# Patient Record
Sex: MALE | Race: WHITE | NOT HISPANIC OR LATINO | Employment: OTHER | URBAN - METROPOLITAN AREA
[De-identification: names, ages, dates, MRNs, and addresses within clinical notes are randomized per-mention and may not be internally consistent; named-entity substitution may affect disease eponyms.]

---

## 2017-03-24 DIAGNOSIS — E11.9 TYPE 2 DIABETES MELLITUS WITHOUT COMPLICATION: ICD-10-CM

## 2017-07-18 ENCOUNTER — PATIENT OUTREACH (OUTPATIENT)
Dept: ADMINISTRATIVE | Facility: HOSPITAL | Age: 77
End: 2017-07-18

## 2017-07-18 NOTE — PROGRESS NOTES
Pt returned my call. Pt did schedule labs and stated he knew nothing of a Diabetic Eye Exam that needed to be done yearly. Pt was asked to discuss with Dr Cannon at next visit.

## 2017-07-18 NOTE — PROGRESS NOTES
Attempting to contact pt to schedule the following over due HM:  HA1c  Microalbumin  DM Foot Exam  DM Eye Exam  Unable to reach patient at this time. Left voicemail. 2nd Attempt.

## 2017-07-25 ENCOUNTER — LAB VISIT (OUTPATIENT)
Dept: LAB | Facility: HOSPITAL | Age: 77
End: 2017-07-25
Attending: FAMILY MEDICINE
Payer: MEDICARE

## 2017-07-25 DIAGNOSIS — E78.2 MIXED HYPERLIPIDEMIA: ICD-10-CM

## 2017-07-25 DIAGNOSIS — E11.9 CONTROLLED TYPE 2 DIABETES MELLITUS WITHOUT COMPLICATION, WITHOUT LONG-TERM CURRENT USE OF INSULIN: ICD-10-CM

## 2017-07-25 PROCEDURE — 83036 HEMOGLOBIN GLYCOSYLATED A1C: CPT

## 2017-07-25 PROCEDURE — 80061 LIPID PANEL: CPT

## 2017-07-25 PROCEDURE — 80053 COMPREHEN METABOLIC PANEL: CPT

## 2017-07-25 PROCEDURE — 36415 COLL VENOUS BLD VENIPUNCTURE: CPT | Mod: PO

## 2017-07-26 LAB
ALBUMIN SERPL BCP-MCNC: 3.6 G/DL
ALBUMIN SERPL BCP-MCNC: 3.6 G/DL
ALP SERPL-CCNC: 68 U/L
ALP SERPL-CCNC: 68 U/L
ALT SERPL W/O P-5'-P-CCNC: 32 U/L
ALT SERPL W/O P-5'-P-CCNC: 32 U/L
ANION GAP SERPL CALC-SCNC: 11 MMOL/L
ANION GAP SERPL CALC-SCNC: 11 MMOL/L
AST SERPL-CCNC: 32 U/L
AST SERPL-CCNC: 32 U/L
BILIRUB SERPL-MCNC: 0.5 MG/DL
BILIRUB SERPL-MCNC: 0.5 MG/DL
BUN SERPL-MCNC: 17 MG/DL
BUN SERPL-MCNC: 17 MG/DL
CALCIUM SERPL-MCNC: 9.5 MG/DL
CALCIUM SERPL-MCNC: 9.5 MG/DL
CHLORIDE SERPL-SCNC: 103 MMOL/L
CHLORIDE SERPL-SCNC: 103 MMOL/L
CHOLEST/HDLC SERPL: 7.7 {RATIO}
CHOLEST/HDLC SERPL: 7.7 {RATIO}
CO2 SERPL-SCNC: 25 MMOL/L
CO2 SERPL-SCNC: 25 MMOL/L
CREAT SERPL-MCNC: 1.1 MG/DL
CREAT SERPL-MCNC: 1.1 MG/DL
EST. GFR  (AFRICAN AMERICAN): >60 ML/MIN/1.73 M^2
EST. GFR  (AFRICAN AMERICAN): >60 ML/MIN/1.73 M^2
EST. GFR  (NON AFRICAN AMERICAN): >60 ML/MIN/1.73 M^2
EST. GFR  (NON AFRICAN AMERICAN): >60 ML/MIN/1.73 M^2
ESTIMATED AVG GLUCOSE: 146 MG/DL
ESTIMATED AVG GLUCOSE: 146 MG/DL
GLUCOSE SERPL-MCNC: 126 MG/DL
GLUCOSE SERPL-MCNC: 126 MG/DL
HBA1C MFR BLD HPLC: 6.7 %
HBA1C MFR BLD HPLC: 6.7 %
HDL/CHOLESTEROL RATIO: 13 %
HDL/CHOLESTEROL RATIO: 13 %
HDLC SERPL-MCNC: 246 MG/DL
HDLC SERPL-MCNC: 246 MG/DL
HDLC SERPL-MCNC: 32 MG/DL
HDLC SERPL-MCNC: 32 MG/DL
LDLC SERPL CALC-MCNC: 176.8 MG/DL
LDLC SERPL CALC-MCNC: 176.8 MG/DL
NONHDLC SERPL-MCNC: 214 MG/DL
NONHDLC SERPL-MCNC: 214 MG/DL
POTASSIUM SERPL-SCNC: 3.9 MMOL/L
POTASSIUM SERPL-SCNC: 3.9 MMOL/L
PROT SERPL-MCNC: 7.2 G/DL
PROT SERPL-MCNC: 7.2 G/DL
SODIUM SERPL-SCNC: 139 MMOL/L
SODIUM SERPL-SCNC: 139 MMOL/L
TRIGL SERPL-MCNC: 186 MG/DL
TRIGL SERPL-MCNC: 186 MG/DL

## 2017-07-28 ENCOUNTER — OFFICE VISIT (OUTPATIENT)
Dept: FAMILY MEDICINE | Facility: CLINIC | Age: 77
End: 2017-07-28
Payer: MEDICARE

## 2017-07-28 VITALS
OXYGEN SATURATION: 97 % | WEIGHT: 247.38 LBS | HEART RATE: 84 BPM | TEMPERATURE: 97 F | SYSTOLIC BLOOD PRESSURE: 130 MMHG | DIASTOLIC BLOOD PRESSURE: 80 MMHG | BODY MASS INDEX: 35.42 KG/M2 | HEIGHT: 70 IN

## 2017-07-28 DIAGNOSIS — Z01.00 DIABETIC EYE EXAM: ICD-10-CM

## 2017-07-28 DIAGNOSIS — E11.9 DIABETIC EYE EXAM: ICD-10-CM

## 2017-07-28 DIAGNOSIS — E78.2 MIXED HYPERLIPIDEMIA: ICD-10-CM

## 2017-07-28 DIAGNOSIS — E11.9 CONTROLLED TYPE 2 DIABETES MELLITUS WITHOUT COMPLICATION, WITHOUT LONG-TERM CURRENT USE OF INSULIN: Primary | ICD-10-CM

## 2017-07-28 PROCEDURE — 99999 PR PBB SHADOW E&M-EST. PATIENT-LVL III: CPT | Mod: PBBFAC,,, | Performed by: FAMILY MEDICINE

## 2017-07-28 PROCEDURE — 1126F AMNT PAIN NOTED NONE PRSNT: CPT | Mod: ,,, | Performed by: FAMILY MEDICINE

## 2017-07-28 PROCEDURE — 99214 OFFICE O/P EST MOD 30 MIN: CPT | Mod: S$PBB,,, | Performed by: FAMILY MEDICINE

## 2017-07-28 PROCEDURE — 99213 OFFICE O/P EST LOW 20 MIN: CPT | Mod: PBBFAC,PO | Performed by: FAMILY MEDICINE

## 2017-07-28 PROCEDURE — 1159F MED LIST DOCD IN RCRD: CPT | Mod: ,,, | Performed by: FAMILY MEDICINE

## 2017-07-28 RX ORDER — HYDROCHLOROTHIAZIDE 25 MG/1
25 TABLET ORAL DAILY
Qty: 90 TABLET | Refills: 3 | Status: SHIPPED | OUTPATIENT
Start: 2017-07-28 | End: 2018-08-03 | Stop reason: SDUPTHER

## 2017-07-28 RX ORDER — PRAVASTATIN SODIUM 20 MG/1
20 TABLET ORAL DAILY
Qty: 90 TABLET | Refills: 3 | Status: SHIPPED | OUTPATIENT
Start: 2017-07-28 | End: 2018-01-26

## 2017-07-28 RX ORDER — METFORMIN HYDROCHLORIDE 500 MG/1
500 TABLET ORAL 2 TIMES DAILY WITH MEALS
Qty: 180 TABLET | Refills: 3 | Status: SHIPPED | OUTPATIENT
Start: 2017-07-28 | End: 2018-08-03 | Stop reason: SDUPTHER

## 2017-07-28 NOTE — PROGRESS NOTES
Chief Complaint:    Chief Complaint   Patient presents with    Annual Exam       History of Present Illness:    He is here for six-month follow-up of chronic medical problems is includes diabetes hyperlipidemia.  He is taking metformin but sometimes forgets the evening dose.  A1c is 6.6.  He is however not taking statin drug, he was given Crestor to which she had some side effect then he was given Lipitor but he says he got some stomach upset he took it for a week and then quit taking it.  He still not very convinced why he needs to be on a statin drug would discussed the increased risk of heart attacks based on his lipid numbers and is willing to try statin again.    ROS:  Review of Systems   Constitutional: Negative for activity change, chills, fatigue, fever and unexpected weight change.   HENT: Negative for congestion, ear discharge, ear pain, hearing loss, postnasal drip and rhinorrhea.    Eyes: Negative for pain and visual disturbance.   Respiratory: Negative for cough, chest tightness and shortness of breath.    Cardiovascular: Negative for chest pain and palpitations.   Gastrointestinal: Negative for abdominal pain, diarrhea and vomiting.   Endocrine: Negative for heat intolerance.   Genitourinary: Negative for dysuria, flank pain, frequency and hematuria.   Musculoskeletal: Negative for back pain, gait problem and neck pain.   Skin: Negative for color change and rash.   Neurological: Negative for dizziness, tremors, seizures, numbness and headaches.   Psychiatric/Behavioral: Negative for agitation, hallucinations, self-injury, sleep disturbance and suicidal ideas. The patient is not nervous/anxious.        No past medical history on file.    Social History:  Social History     Social History    Marital status: Single     Spouse name: N/A    Number of children: N/A    Years of education: N/A     Social History Main Topics    Smoking status: Never Smoker    Smokeless tobacco: None    Alcohol use No  "   Drug use: No    Sexual activity: No     Other Topics Concern    None     Social History Narrative    None       Family History:   family history includes Hypertension in his brother, father, and sister.    Health Maintenance   Topic Date Due    Eye Exam  01/19/1950    Urine Microalbumin  03/15/2017    Influenza Vaccine  08/01/2017    Hemoglobin A1c  01/25/2018    Lipid Panel  07/25/2018    Foot Exam  07/28/2018    TETANUS VACCINE  12/10/2025    Zoster Vaccine  Completed    Pneumococcal (65+)  Completed       Physical Exam:    Vital Signs  Temp: 97 °F (36.1 °C)  Temp src: Tympanic  Pulse: 84  SpO2: 97 %  BP: 130/80  BP Location: Left arm  BP Method: Manual  Patient Position: Sitting  Pain Score: 0-No pain  Height and Weight  Height: 5' 10" (177.8 cm)  Weight: 112.2 kg (247 lb 5.7 oz)  BSA (Calculated - sq m): 2.35 sq meters  BMI (Calculated): 35.6  Weight in (lb) to have BMI = 25: 173.9]    Body mass index is 35.49 kg/m².    Physical Exam   Constitutional: He is oriented to person, place, and time. He appears well-developed.   HENT:   Mouth/Throat: Oropharynx is clear and moist.   Eyes: Conjunctivae are normal. Pupils are equal, round, and reactive to light.   Neck: Normal range of motion. Neck supple.   Cardiovascular: Normal rate, regular rhythm and normal heart sounds.    No murmur heard.  Pulses:       Dorsalis pedis pulses are 1+ on the right side, and 1+ on the left side.        Posterior tibial pulses are 1+ on the right side, and 1+ on the left side.   Pulmonary/Chest: Effort normal and breath sounds normal. No respiratory distress. He has no wheezes. He has no rales. He exhibits no tenderness.   Abdominal: Soft. He exhibits no distension and no mass. There is no tenderness. There is no guarding.   Musculoskeletal: He exhibits no edema or tenderness.   Feet:   Right Foot:   Protective Sensation: 10 sites tested. 10 sites sensed.   Left Foot:   Protective Sensation: 10 sites tested. 10 sites " sensed.   Lymphadenopathy:     He has no cervical adenopathy.   Neurological: He is alert and oriented to person, place, and time. He has normal reflexes.   Skin: Skin is warm and dry.   Psychiatric: He has a normal mood and affect. His behavior is normal. Judgment and thought content normal.       Lab Results   Component Value Date    CHOL 246 (H) 07/25/2017    CHOL 246 (H) 07/25/2017    CHOL 248 (H) 11/14/2016    TRIG 186 (H) 07/25/2017    TRIG 186 (H) 07/25/2017    TRIG 172 (H) 11/14/2016    HDL 32 (L) 07/25/2017    HDL 32 (L) 07/25/2017    HDL 32 (L) 11/14/2016    TOTALCHOLEST 7.7 (H) 07/25/2017    TOTALCHOLEST 7.7 (H) 07/25/2017    TOTALCHOLEST 7.8 (H) 11/14/2016    NONHDLCHOL 214 07/25/2017    NONHDLCHOL 214 07/25/2017    NONHDLCHOL 216 11/14/2016       Lab Results   Component Value Date    HGBA1C 6.7 (H) 07/25/2017    HGBA1C 6.7 (H) 07/25/2017       Assessment:      ICD-10-CM ICD-9-CM   1. Controlled type 2 diabetes mellitus without complication, without long-term current use of insulin E11.9 250.00   2. Mixed hyperlipidemia E78.2 272.2   3. Diabetic eye exam E11.9 V72.0    Z01.00 250.00         Plan:  We'll add pravastatin 20 mg once a day, side effects discussed with patient please watch for any muscle symptoms of muscle pain fatigue, should that happen remote report immediately.  Please start taking evening dose of metformin on a regular basis and continue with an exercise program some weight loss will be beneficial.  Schedule a diabetic eye exam.  Recommend a six-month follow-up.  Orders Placed This Encounter   Procedures    Lipid panel    Comprehensive metabolic panel    Microalbumin/creatinine urine ratio    Hemoglobin A1c    Ambulatory referral to Optometry       Current Outpatient Prescriptions   Medication Sig Dispense Refill    hydrochlorothiazide (HYDRODIURIL) 25 MG tablet Take 1 tablet (25 mg total) by mouth once daily. 90 tablet 3    metformin (GLUCOPHAGE) 500 MG tablet Take 1 tablet (500  mg total) by mouth 2 (two) times daily with meals. 180 tablet 3    pravastatin (PRAVACHOL) 20 MG tablet Take 1 tablet (20 mg total) by mouth once daily. 90 tablet 3     No current facility-administered medications for this visit.        Medications Discontinued During This Encounter   Medication Reason    atorvastatin (LIPITOR) 20 MG tablet Patient no longer taking    rosuvastatin (CRESTOR) 20 MG tablet Patient no longer taking    hydrochlorothiazide (HYDRODIURIL) 25 MG tablet Reorder    metformin (GLUCOPHAGE) 500 MG tablet Reorder       Return in about 6 months (around 1/28/2018).      Dr Tatyana Cannon MD    Disclaimer: This note is prepared using voice recognition system and as such is likely to have errors and is not proof read.

## 2017-08-10 ENCOUNTER — OFFICE VISIT (OUTPATIENT)
Dept: OPHTHALMOLOGY | Facility: CLINIC | Age: 77
End: 2017-08-10
Payer: MEDICARE

## 2017-08-10 DIAGNOSIS — H52.03 HYPEROPIA, BILATERAL: ICD-10-CM

## 2017-08-10 DIAGNOSIS — E11.9 DIABETES MELLITUS TYPE 2 WITHOUT RETINOPATHY: Primary | ICD-10-CM

## 2017-08-10 DIAGNOSIS — H52.4 BILATERAL PRESBYOPIA: ICD-10-CM

## 2017-08-10 PROCEDURE — 92015 DETERMINE REFRACTIVE STATE: CPT | Mod: ,,, | Performed by: OPTOMETRIST

## 2017-08-10 PROCEDURE — 92004 COMPRE OPH EXAM NEW PT 1/>: CPT | Mod: S$PBB,,, | Performed by: OPTOMETRIST

## 2017-08-10 PROCEDURE — 99999 PR PBB SHADOW E&M-EST. PATIENT-LVL I: CPT | Mod: PBBFAC,,, | Performed by: OPTOMETRIST

## 2017-08-10 PROCEDURE — 99211 OFF/OP EST MAY X REQ PHY/QHP: CPT | Mod: PBBFAC | Performed by: OPTOMETRIST

## 2017-08-10 NOTE — PROGRESS NOTES
HPI     Diabetic Eye Exam    Additional comments: Latest  yesterday morning. Normally fluctuates   up and down.           Comments   Pt is new. Last eye exam was 2-3 years ago with [facility]. Pt is here   because he is a diabetic and needs his eyes checked for diabetes. No pain   or discomfort. Pt states that his reading vision has declined a little   bit. He can see most things, but its starting to get a little harder to   read. Pt wears PVL lenses right now and would like a new Rx.        Last edited by Lennox Huang, Patient Care Assistant on 8/10/2017  3:46   PM. (History)            Assessment /Plan     For exam results, see Encounter Report.    Diabetes mellitus type 2 without retinopathy    Hyperopia, bilateral    Bilateral presbyopia    Nuclear cataract, bilateral      No Background Diabetic Retinopathy    Moderate cataracts OU, not surgical    Dispense Final Rx for glasses.  RTC 1 year

## 2017-08-10 NOTE — LETTER
August 10, 2017      Tatyana Cannon MD  [id number] [address] [address]           [facility]  [address] [place] [id number]  Phone: [phone]  Fax: [phone]          Patient: Mark Dickerson Sr.   MR Number: [id number]   YOB: 1940   Date of Visit: 8/10/2017       Dear Dr. Tatyana Cannon:    Thank you for referring Mark Dickerson to me for evaluation. Attached you will find relevant portions of my assessment and plan of care.    If you have questions, please do not hesitate to call me. I look forward to following Mark Dickerson along with you.    Sincerely,    Gavin Hahn, OD    Enclosure  CC:  No Recipients    If you would like to receive this communication electronically, please contact [email or web address] or [phone] to request more information on [employer] access.    For providers and/or their staff who would like to refer a patient to [facility], please contact us through our one-stop-shop provider referral line, Tatiana Chery, at [phone].    If you feel you have received this communication in error or would no longer like to receive these types of communications, please e-mail [email or web address]

## 2018-01-09 ENCOUNTER — PES CALL (OUTPATIENT)
Dept: ADMINISTRATIVE | Facility: CLINIC | Age: 78
End: 2018-01-09

## 2018-01-18 ENCOUNTER — PATIENT OUTREACH (OUTPATIENT)
Dept: ADMINISTRATIVE | Facility: HOSPITAL | Age: 78
End: 2018-01-18

## 2018-01-19 ENCOUNTER — LAB VISIT (OUTPATIENT)
Dept: LAB | Facility: HOSPITAL | Age: 78
End: 2018-01-19
Attending: FAMILY MEDICINE
Payer: MEDICARE

## 2018-01-19 DIAGNOSIS — E11.9 CONTROLLED TYPE 2 DIABETES MELLITUS WITHOUT COMPLICATION, WITHOUT LONG-TERM CURRENT USE OF INSULIN: ICD-10-CM

## 2018-01-19 DIAGNOSIS — E78.2 MIXED HYPERLIPIDEMIA: ICD-10-CM

## 2018-01-19 PROCEDURE — 36415 COLL VENOUS BLD VENIPUNCTURE: CPT | Mod: PO

## 2018-01-19 PROCEDURE — 80053 COMPREHEN METABOLIC PANEL: CPT

## 2018-01-19 PROCEDURE — 83036 HEMOGLOBIN GLYCOSYLATED A1C: CPT

## 2018-01-19 PROCEDURE — 80061 LIPID PANEL: CPT

## 2018-01-20 LAB
ALBUMIN SERPL BCP-MCNC: 3.6 G/DL
ALP SERPL-CCNC: 60 U/L
ALT SERPL W/O P-5'-P-CCNC: 29 U/L
ANION GAP SERPL CALC-SCNC: 14 MMOL/L
AST SERPL-CCNC: 29 U/L
BILIRUB SERPL-MCNC: 0.6 MG/DL
BUN SERPL-MCNC: 19 MG/DL
CALCIUM SERPL-MCNC: 9.8 MG/DL
CHLORIDE SERPL-SCNC: 100 MMOL/L
CHOLEST SERPL-MCNC: 257 MG/DL
CHOLEST/HDLC SERPL: 7.8 {RATIO}
CO2 SERPL-SCNC: 26 MMOL/L
CREAT SERPL-MCNC: 1.2 MG/DL
EST. GFR  (AFRICAN AMERICAN): >60 ML/MIN/1.73 M^2
EST. GFR  (NON AFRICAN AMERICAN): 56.8 ML/MIN/1.73 M^2
ESTIMATED AVG GLUCOSE: 134 MG/DL
GLUCOSE SERPL-MCNC: 144 MG/DL
HBA1C MFR BLD HPLC: 6.3 %
HDLC SERPL-MCNC: 33 MG/DL
HDLC SERPL: 12.8 %
LDLC SERPL CALC-MCNC: 195.4 MG/DL
NONHDLC SERPL-MCNC: 224 MG/DL
POTASSIUM SERPL-SCNC: 3.8 MMOL/L
PROT SERPL-MCNC: 7.4 G/DL
SODIUM SERPL-SCNC: 140 MMOL/L
TRIGL SERPL-MCNC: 143 MG/DL

## 2018-01-26 ENCOUNTER — APPOINTMENT (OUTPATIENT)
Dept: LAB | Facility: HOSPITAL | Age: 78
End: 2018-01-26
Payer: MEDICARE

## 2018-01-26 ENCOUNTER — OFFICE VISIT (OUTPATIENT)
Dept: FAMILY MEDICINE | Facility: CLINIC | Age: 78
End: 2018-01-26
Payer: MEDICARE

## 2018-01-26 VITALS
TEMPERATURE: 97 F | OXYGEN SATURATION: 95 % | DIASTOLIC BLOOD PRESSURE: 78 MMHG | SYSTOLIC BLOOD PRESSURE: 130 MMHG | WEIGHT: 245.13 LBS | HEIGHT: 70 IN | BODY MASS INDEX: 35.09 KG/M2 | HEART RATE: 86 BPM

## 2018-01-26 VITALS
TEMPERATURE: 97 F | DIASTOLIC BLOOD PRESSURE: 78 MMHG | HEIGHT: 70 IN | OXYGEN SATURATION: 95 % | BODY MASS INDEX: 35.09 KG/M2 | HEART RATE: 86 BPM | SYSTOLIC BLOOD PRESSURE: 130 MMHG | WEIGHT: 245.13 LBS

## 2018-01-26 DIAGNOSIS — E78.2 MIXED HYPERLIPIDEMIA: ICD-10-CM

## 2018-01-26 DIAGNOSIS — Z00.00 WELL ADULT EXAM: Primary | ICD-10-CM

## 2018-01-26 DIAGNOSIS — Z12.11 SCREENING FOR MALIGNANT NEOPLASM OF COLON: ICD-10-CM

## 2018-01-26 DIAGNOSIS — Z78.9 STATIN INTOLERANCE: ICD-10-CM

## 2018-01-26 DIAGNOSIS — E11.9 CONTROLLED TYPE 2 DIABETES MELLITUS WITHOUT COMPLICATION, WITHOUT LONG-TERM CURRENT USE OF INSULIN: ICD-10-CM

## 2018-01-26 DIAGNOSIS — E11.69 HYPERLIPIDEMIA ASSOCIATED WITH TYPE 2 DIABETES MELLITUS: ICD-10-CM

## 2018-01-26 DIAGNOSIS — E78.5 HYPERLIPIDEMIA ASSOCIATED WITH TYPE 2 DIABETES MELLITUS: ICD-10-CM

## 2018-01-26 DIAGNOSIS — Z00.00 ENCOUNTER FOR PREVENTIVE HEALTH EXAMINATION: Primary | ICD-10-CM

## 2018-01-26 PROCEDURE — G0439 PPPS, SUBSEQ VISIT: HCPCS | Mod: S$GLB,,,

## 2018-01-26 PROCEDURE — 99999 PR PBB SHADOW E&M-EST. PATIENT-LVL III: CPT | Mod: PBBFAC,,, | Performed by: FAMILY MEDICINE

## 2018-01-26 PROCEDURE — 99999 PR PBB SHADOW E&M-EST. PATIENT-LVL IV: CPT | Mod: PBBFAC,,,

## 2018-01-26 PROCEDURE — 82274 ASSAY TEST FOR BLOOD FECAL: CPT

## 2018-01-26 PROCEDURE — 99397 PER PM REEVAL EST PAT 65+ YR: CPT | Mod: S$GLB,,, | Performed by: FAMILY MEDICINE

## 2018-01-26 RX ORDER — FLUTICASONE PROPIONATE 50 MCG
2 SPRAY, SUSPENSION (ML) NASAL DAILY
Qty: 16 G | Refills: 11 | Status: SHIPPED | OUTPATIENT
Start: 2018-01-26 | End: 2018-08-03 | Stop reason: SDUPTHER

## 2018-01-26 RX ORDER — AZITHROMYCIN 250 MG/1
250 TABLET, FILM COATED ORAL DAILY
Qty: 6 TABLET | Refills: 0 | Status: SHIPPED | OUTPATIENT
Start: 2018-01-26 | End: 2018-01-31

## 2018-01-26 NOTE — Clinical Note
Tatyana Cannon MD,   Your patient was seen today for a HRA visit. Any abnormalities or gaps that may have been identified during this visit have been addressed. Though some may require further follow up and/or texting  Orders Placed This Encounter     Fecal Immunochemical Test (iFOBT)         Standing Status: Future         Number of Occurrences: 1         Standing Expiration Date: 3/27/2019   These orders were placed using [facility] approved protocol and any results will be forwarded to your office for appropriate follow up.  I have included a copy of my visit note, please review the note and feel free to contact me with any questions.   Thank you for allowing me to participate in the care of your patients.  ALEX Farley

## 2018-01-26 NOTE — PROGRESS NOTES
Chief Complaint:    Chief Complaint   Patient presents with    Follow-up       History of Present Illness:    Patient presents today for six-month follow-up,  He is staying active lost a couple of pounds but he is not exercising as much.  A1c 6.3° blood pressure is normal his lipids have gone up because he says he's had significant side effects from pravastatin he could not sleep he looked up on the Internet and found that as a side effect so he stopped using it after that.  He does not want to try another statin drug and he is not interested in trying a statin alternative either.    He is requesting a little antibiotic to have it on hand when he travels and he gets a sinus infection 2 treated.    ROS:  Review of Systems   Constitutional: Negative for activity change, chills, fatigue, fever and unexpected weight change.   HENT: Negative for congestion, ear discharge, ear pain, hearing loss, postnasal drip and rhinorrhea.    Eyes: Negative for pain and visual disturbance.   Respiratory: Negative for cough, chest tightness and shortness of breath.    Cardiovascular: Negative for chest pain and palpitations.   Gastrointestinal: Negative for abdominal pain, diarrhea and vomiting.   Endocrine: Negative for heat intolerance.   Genitourinary: Negative for dysuria, flank pain, frequency and hematuria.   Musculoskeletal: Negative for back pain, gait problem and neck pain.   Skin: Negative for color change and rash.   Neurological: Negative for dizziness, tremors, seizures, numbness and headaches.   Psychiatric/Behavioral: Negative for agitation, hallucinations, self-injury, sleep disturbance and suicidal ideas. The patient is not nervous/anxious.        History reviewed. No pertinent past medical history.    Social History:  Social History     Social History    Marital status: Single     Spouse name: N/A    Number of children: N/A    Years of education: N/A     Social History Main Topics    Smoking status: Never Smoker  "   Smokeless tobacco: Never Used    Alcohol use No    Drug use: No    Sexual activity: No     Other Topics Concern    None     Social History Narrative    None       Family History:   family history includes Hypertension in his brother, father, and sister.    Health Maintenance   Topic Date Due    Hemoglobin A1c  07/19/2018    Foot Exam  07/28/2018    Eye Exam  08/10/2018    Lipid Panel  01/19/2019    Urine Microalbumin  01/19/2019    TETANUS VACCINE  12/10/2025    Zoster Vaccine  Completed    Pneumococcal (65+)  Completed    Influenza Vaccine  Completed       Physical Exam:    Vital Signs  Temp: 97 °F (36.1 °C)  Temp src: Tympanic  Pulse: 86  SpO2: 95 %  BP: 130/78  BP Location: Left arm  Patient Position: Sitting  Pain Score: 0-No pain  Height and Weight  Height: 5' 10" (177.8 cm)  Weight: 111.2 kg (245 lb 2.4 oz)  BSA (Calculated - sq m): 2.34 sq meters  BMI (Calculated): 35.2  Weight in (lb) to have BMI = 25: 173.9]    Body mass index is 35.18 kg/m².    Physical Exam   Constitutional: He is oriented to person, place, and time. He appears well-developed.   HENT:   Mouth/Throat: Oropharynx is clear and moist.   Eyes: Conjunctivae are normal. Pupils are equal, round, and reactive to light.   Neck: Normal range of motion. Neck supple.   Cardiovascular: Normal rate, regular rhythm and normal heart sounds.    No murmur heard.  Pulmonary/Chest: Effort normal and breath sounds normal. No respiratory distress. He has no wheezes. He has no rales. He exhibits no tenderness.   Abdominal: Soft. He exhibits no distension and no mass. There is no tenderness. There is no guarding.   Musculoskeletal: He exhibits no edema or tenderness.   Lymphadenopathy:     He has no cervical adenopathy.   Neurological: He is alert and oriented to person, place, and time. He has normal reflexes.   Skin: Skin is warm and dry.   Psychiatric: He has a normal mood and affect. His behavior is normal. Judgment and thought content " normal.         Diabetes Management Status    Statin: Taking  ACE/ARB: Not taking    Screening or Prevention Patient's value Goal Complete/Controlled?   HgA1C Testing and Control   Lab Results   Component Value Date    HGBA1C 6.3 (H) 01/19/2018      Annually/Less than 8% Yes   Lipid profile : 01/19/2018 Annually Yes   LDL control Lab Results   Component Value Date    LDLCALC 195.4 (H) 01/19/2018    Annually/Less than 100 mg/dl  No   Nephropathy screening Lab Results   Component Value Date    LABMICR 33.0 01/19/2018     No results found for: PROTEINUA Annually Yes   Blood pressure BP Readings from Last 1 Encounters:   01/26/18 130/78    Less than 140/90 Yes   Dilated retinal exam : 08/10/2017 Annually Yes   Foot exam   : 07/28/2017 Annually Yes       Assessment:      ICD-10-CM ICD-9-CM   1. Well adult exam Z00.00 V70.0   2. Controlled type 2 diabetes mellitus without complication, without long-term current use of insulin E11.9 250.00   3. Mixed hyperlipidemia E78.2 272.2   4. Statin intolerance Z78.9 995.27         Plan:  Continue current plan.  We discussed ultra low-fat diet patient is refusing another statin drug is also refusing repatha.  Please work on weight loss resume exercise program.  Prescription for Z-Murphy given to only take if absolutely necessary the signs and symptoms that he needs to watch for when antibiotic is necessary have been explained to him.  Follow-up 6 months  Orders Placed This Encounter   Procedures    Comprehensive metabolic panel    Hemoglobin A1c    Lipid panel       Current Outpatient Prescriptions   Medication Sig Dispense Refill    hydrochlorothiazide (HYDRODIURIL) 25 MG tablet Take 1 tablet (25 mg total) by mouth once daily. 90 tablet 3    metformin (GLUCOPHAGE) 500 MG tablet Take 1 tablet (500 mg total) by mouth 2 (two) times daily with meals. 180 tablet 3    azithromycin (ZITHROMAX Z-MURPHY) 250 MG tablet Take 1 tablet (250 mg total) by mouth once daily. Take 2 tabs on day 1  thereafter one tab daily by mouth for 4 days. 6 tablet 0    fluticasone (FLONASE) 50 mcg/actuation nasal spray 2 sprays (100 mcg total) by Each Nare route once daily. 16 g 11     No current facility-administered medications for this visit.        Medications Discontinued During This Encounter   Medication Reason    pravastatin (PRAVACHOL) 20 MG tablet Patient no longer taking       Follow-up in about 6 months (around 7/26/2018).      Tatyana Cannon MD

## 2018-01-26 NOTE — PROGRESS NOTES
"Mark Dickerson presented for a  Medicare AWV and comprehensive Health Risk Assessment today. The following components were reviewed and updated:    · Medical history  · Family History  · Social history  · Allergies and Current Medications  · Health Risk Assessment  · Health Maintenance  · Care Team     ** See Completed Assessments for Annual Wellness Visit within the encounter summary.**       The following assessments were completed:  · Living Situation  · CAGE  · Depression Screening  · Timed Get Up and Go  · Whisper Test  · Cognitive Function Screening  · Nutrition Screening  · ADL Screening  · PAQ Screening    Vitals:    01/26/18 1006   BP: 130/78   BP Location: Left arm   Patient Position: Sitting   BP Method: Large (Automatic)   Pulse: 86   Temp: 97 °F (36.1 °C)   TempSrc: Tympanic   SpO2: 95%   Weight: 111.2 kg (245 lb 2.4 oz)   Height: 5' 10" (1.778 m)     Body mass index is 35.18 kg/m².  Physical Exam   Constitutional: He is oriented to person, place, and time. Vital signs are normal. He appears well-developed. He is cooperative.   Eyes: Lids are normal. Right eye exhibits normal extraocular motion. Left eye exhibits normal extraocular motion.   Neck: Normal carotid pulses, no hepatojugular reflux and no JVD present. Carotid bruit is not present. No thyroid mass and no thyromegaly present.   Cardiovascular: Normal rate and regular rhythm.    Murmur heard.   Systolic murmur is present with a grade of 2/6   Pulses:       Carotid pulses are 2+ on the right side, and 2+ on the left side.       Radial pulses are 2+ on the right side, and 2+ on the left side.        Dorsalis pedis pulses are 2+ on the right side, and 2+ on the left side.        Posterior tibial pulses are 2+ on the right side, and 2+ on the left side.   Pulmonary/Chest: Effort normal and breath sounds normal.   Neurological: He is alert and oriented to person, place, and time. He has normal strength. GCS eye subscore is 4. GCS verbal subscore is " 5. GCS motor subscore is 6.   Skin: Skin is warm and dry. Capillary refill takes less than 2 seconds.   Psychiatric: He has a normal mood and affect. His speech is normal and behavior is normal. Judgment and thought content normal. He is not actively hallucinating. Cognition and memory are normal. He is attentive.         Diagnoses and health risks identified today and associated recommendations/orders:    1. Encounter for preventive health examination      2. Controlled type 2 diabetes mellitus without complication, without long-term current use of insulin        -    This problem is Chronic it is stable and well controlled. The patient is currently taking Metformin for this. It is followed by their PCP.    Lab Results   Component Value Date    HGBA1C 6.3 (H) 01/19/2018           3. Hyperlipidemia associated with type 2 diabetes mellitus        -    This problem is Chronic it is stable and poorly controlled. The patient is currently taking no medications, he is intolerant of statins  for this. It is followed by their PCP.    Lab Results   Component Value Date    CHOL 257 (H) 01/19/2018    CHOL 246 (H) 07/25/2017    CHOL 246 (H) 07/25/2017     Lab Results   Component Value Date    HDL 33 (L) 01/19/2018    HDL 32 (L) 07/25/2017    HDL 32 (L) 07/25/2017     Lab Results   Component Value Date    LDLCALC 195.4 (H) 01/19/2018    LDLCALC 176.8 (H) 07/25/2017    LDLCALC 176.8 (H) 07/25/2017     Lab Results   Component Value Date    TRIG 143 01/19/2018    TRIG 186 (H) 07/25/2017    TRIG 186 (H) 07/25/2017     Lab Results   Component Value Date    CHOLHDL 12.8 (L) 01/19/2018    CHOLHDL 13.0 (L) 07/25/2017    CHOLHDL 13.0 (L) 07/25/2017       4. Screening for malignant neoplasm of colon  - Fecal Immunochemical Test (iFOBT); Future      Provided Mark with a 5-10 year written screening schedule and personal prevention plan. Recommendations were developed using the USPSTF age appropriate recommendations. Education, counseling,  and referrals were provided as needed. After Visit Summary printed and given to patient which includes a list of additional screenings\tests needed.    No Follow-up on file.    ALEX Farley

## 2018-01-26 NOTE — PROGRESS NOTES
Patient, Mark Dickerson Sr. (MRN #[id number]), presented with a recorded BMI of 35.18 kg/m^2 and a documented comorbidity(s):  - Diabetes Mellitus Type 2  - Hyperlipidemia  to which the severe obesity is a contributing factor. This is consistent with the definition of severe obesity (BMI 35.0-35.9) with comorbidity (ICD-10 E66.01, Z68.35). The patient's severe obesity was monitored, evaluated, addressed and/or treated. This addendum to the medical record is made on 01/26/2018.

## 2018-01-26 NOTE — PATIENT INSTRUCTIONS
Counseling and Referral of Other Preventative  (Italic type indicates deductible and co-insurance are waived)    Patient Name: Mark Dickerson  Today's Date: 1/26/2018    Health Maintenance       Date Due Completion Date    Hemoglobin A1c 07/19/2018 1/19/2018    Foot Exam 07/28/2018 7/28/2017 (Done)    Override on 7/28/2017: Done    Eye Exam 08/10/2018 8/10/2017    Override on 8/10/2017: Done    Lipid Panel 01/19/2019 1/19/2018    Urine Microalbumin 01/19/2019 1/19/2018    TETANUS VACCINE 12/10/2025 12/10/2015        Orders Placed This Encounter   Procedures    Fecal Immunochemical Test (iFOBT)     The following information is provided to all patients.  This information is to help you find resources for any of the problems found today that may be affecting your health:                Living healthy guide: www.[facility].[place].gov      Understanding Diabetes: www.diabetes.org      Eating healthy: www.cdc.gov/healthyweight      CDC home safety checklist: www.cdc.gov/steadi/patient.html      Agency on Aging: www.goea.[place].[facility]      Alcoholics anonymous (AA): www.aa.org      Physical Activity: www.danuta.nih.gov/ux5okkv      Tobacco use: www.quitwithusla.org

## 2018-07-27 ENCOUNTER — LAB VISIT (OUTPATIENT)
Dept: LAB | Facility: HOSPITAL | Age: 78
End: 2018-07-27
Attending: FAMILY MEDICINE
Payer: MEDICARE

## 2018-07-27 DIAGNOSIS — E78.2 MIXED HYPERLIPIDEMIA: ICD-10-CM

## 2018-07-27 DIAGNOSIS — E11.9 CONTROLLED TYPE 2 DIABETES MELLITUS WITHOUT COMPLICATION, WITHOUT LONG-TERM CURRENT USE OF INSULIN: ICD-10-CM

## 2018-07-27 LAB
ALBUMIN SERPL BCP-MCNC: 3.5 G/DL
ALP SERPL-CCNC: 55 U/L
ALT SERPL W/O P-5'-P-CCNC: 24 U/L
ANION GAP SERPL CALC-SCNC: 8 MMOL/L
AST SERPL-CCNC: 25 U/L
BILIRUB SERPL-MCNC: 0.8 MG/DL
BUN SERPL-MCNC: 17 MG/DL
CALCIUM SERPL-MCNC: 10.1 MG/DL
CHLORIDE SERPL-SCNC: 103 MMOL/L
CHOLEST SERPL-MCNC: 245 MG/DL
CHOLEST/HDLC SERPL: 7.7 {RATIO}
CO2 SERPL-SCNC: 28 MMOL/L
CREAT SERPL-MCNC: 1 MG/DL
EST. GFR  (AFRICAN AMERICAN): >60 ML/MIN/1.73 M^2
EST. GFR  (NON AFRICAN AMERICAN): >60 ML/MIN/1.73 M^2
ESTIMATED AVG GLUCOSE: 140 MG/DL
GLUCOSE SERPL-MCNC: 134 MG/DL
HBA1C MFR BLD HPLC: 6.5 %
HDLC SERPL-MCNC: 32 MG/DL
HDLC SERPL: 13.1 %
LDLC SERPL CALC-MCNC: 175.8 MG/DL
NONHDLC SERPL-MCNC: 213 MG/DL
POTASSIUM SERPL-SCNC: 3.7 MMOL/L
PROT SERPL-MCNC: 7 G/DL
SODIUM SERPL-SCNC: 139 MMOL/L
TRIGL SERPL-MCNC: 186 MG/DL

## 2018-07-27 PROCEDURE — 36415 COLL VENOUS BLD VENIPUNCTURE: CPT | Mod: PO

## 2018-07-27 PROCEDURE — 83036 HEMOGLOBIN GLYCOSYLATED A1C: CPT

## 2018-07-27 PROCEDURE — 80061 LIPID PANEL: CPT

## 2018-07-27 PROCEDURE — 80053 COMPREHEN METABOLIC PANEL: CPT

## 2018-08-03 ENCOUNTER — OFFICE VISIT (OUTPATIENT)
Dept: FAMILY MEDICINE | Facility: CLINIC | Age: 78
End: 2018-08-03
Payer: MEDICARE

## 2018-08-03 VITALS
HEIGHT: 69 IN | BODY MASS INDEX: 36.54 KG/M2 | OXYGEN SATURATION: 95 % | TEMPERATURE: 98 F | HEART RATE: 83 BPM | DIASTOLIC BLOOD PRESSURE: 78 MMHG | SYSTOLIC BLOOD PRESSURE: 120 MMHG | WEIGHT: 246.69 LBS

## 2018-08-03 DIAGNOSIS — E78.5 HYPERLIPIDEMIA ASSOCIATED WITH TYPE 2 DIABETES MELLITUS: ICD-10-CM

## 2018-08-03 DIAGNOSIS — Z78.9 STATIN INTOLERANCE: ICD-10-CM

## 2018-08-03 DIAGNOSIS — M79.10 MYALGIA DUE TO HMG COA REDUCTASE INHIBITOR: ICD-10-CM

## 2018-08-03 DIAGNOSIS — E11.69 HYPERLIPIDEMIA ASSOCIATED WITH TYPE 2 DIABETES MELLITUS: ICD-10-CM

## 2018-08-03 DIAGNOSIS — T46.6X5A MYALGIA DUE TO HMG COA REDUCTASE INHIBITOR: ICD-10-CM

## 2018-08-03 DIAGNOSIS — E11.9 CONTROLLED TYPE 2 DIABETES MELLITUS WITHOUT COMPLICATION, WITHOUT LONG-TERM CURRENT USE OF INSULIN: Primary | ICD-10-CM

## 2018-08-03 DIAGNOSIS — R01.1 SYSTOLIC MURMUR: ICD-10-CM

## 2018-08-03 PROCEDURE — 99999 PR PBB SHADOW E&M-EST. PATIENT-LVL III: CPT | Mod: PBBFAC,,, | Performed by: FAMILY MEDICINE

## 2018-08-03 PROCEDURE — 99214 OFFICE O/P EST MOD 30 MIN: CPT | Mod: S$GLB,,, | Performed by: FAMILY MEDICINE

## 2018-08-03 RX ORDER — FLUTICASONE PROPIONATE 50 MCG
2 SPRAY, SUSPENSION (ML) NASAL DAILY
Qty: 16 G | Refills: 11 | Status: SHIPPED | OUTPATIENT
Start: 2018-08-03 | End: 2019-08-27 | Stop reason: SDUPTHER

## 2018-08-03 RX ORDER — METFORMIN HYDROCHLORIDE 500 MG/1
500 TABLET ORAL 2 TIMES DAILY WITH MEALS
Qty: 180 TABLET | Refills: 3 | Status: SHIPPED | OUTPATIENT
Start: 2018-08-03 | End: 2019-08-27 | Stop reason: SDUPTHER

## 2018-08-03 RX ORDER — HYDROCHLOROTHIAZIDE 25 MG/1
25 TABLET ORAL DAILY
Qty: 90 TABLET | Refills: 3 | Status: SHIPPED | OUTPATIENT
Start: 2018-08-03 | End: 2019-08-27 | Stop reason: SDUPTHER

## 2018-08-04 NOTE — PROGRESS NOTES
Chief Complaint:    Chief Complaint   Patient presents with    Follow-up       History of Present Illness:  Here for 6 month follow-up on a chronic medical problems diabetes is 6.5 blood pressure normal  Lipids chemistries okay he is not exercising as much.        ROS:  Review of Systems   Constitutional: Negative for activity change, chills, fatigue, fever and unexpected weight change.   HENT: Negative for congestion, ear discharge, ear pain, hearing loss, postnasal drip and rhinorrhea.    Eyes: Negative for pain and visual disturbance.   Respiratory: Negative for cough, chest tightness and shortness of breath.    Cardiovascular: Negative for chest pain and palpitations.   Gastrointestinal: Negative for abdominal pain, diarrhea and vomiting.   Endocrine: Negative for heat intolerance.   Genitourinary: Negative for dysuria, flank pain, frequency and hematuria.   Musculoskeletal: Negative for back pain, gait problem and neck pain.   Skin: Negative for color change and rash.   Neurological: Negative for dizziness, tremors, seizures, numbness and headaches.   Psychiatric/Behavioral: Negative for agitation, hallucinations, self-injury, sleep disturbance and suicidal ideas. The patient is not nervous/anxious.        Past Medical History:   Diagnosis Date    Hyperlipidemia     Hypertension     Type 2 diabetes mellitus        Social History:  Social History     Social History    Marital status: Single     Spouse name: N/A    Number of children: N/A    Years of education: N/A     Social History Main Topics    Smoking status: Former Smoker     Packs/day: 3.00     Types: Cigarettes     Start date: 1952     Quit date: 1962    Smokeless tobacco: Never Used    Alcohol use No    Drug use: No    Sexual activity: No     Other Topics Concern    None     Social History Narrative    None       Family History:   family history includes Allergic rhinitis in his daughter; Cancer in his son; Heart disease in his father and  "mother; Hyperlipidemia in his father; Hypertension in his brother, father, and sister.    Health Maintenance   Topic Date Due    Influenza Vaccine  08/01/2018    Eye Exam  08/10/2018    Urine Microalbumin  01/19/2019    Fecal Occult Blood Test (FOBT)/[employer]  01/26/2019    Hemoglobin A1c  01/27/2019    Lipid Panel  07/27/2019    Foot Exam  08/03/2019    TETANUS VACCINE  12/10/2025    Zoster Vaccine  Completed    Pneumococcal (65+)  Completed       Physical Exam:    Vital Signs  Temp: 97.9 °F (36.6 °C)  Temp src: Tympanic  Pulse: 83  SpO2: 95 %  BP: 120/78  BP Location: Left arm  Patient Position: Sitting  Pain Score: 0-No pain  Height and Weight  Height: 5' 9" (175.3 cm)  Weight: 111.9 kg (246 lb 11.1 oz)  BSA (Calculated - sq m): 2.33 sq meters  BMI (Calculated): 36.5  Weight in (lb) to have BMI = 25: 168.9]    Body mass index is 36.43 kg/m².    Physical Exam   Constitutional: He is oriented to person, place, and time. He appears well-developed.   HENT:   Mouth/Throat: Oropharynx is clear and moist.   Eyes: Conjunctivae are normal. Pupils are equal, round, and reactive to light.   Neck: Normal range of motion. Neck supple.   Cardiovascular: Normal rate and regular rhythm.    Murmur heard.   Systolic murmur is present with a grade of 3/6   Pulses:       Dorsalis pedis pulses are 0 on the right side, and 0 on the left side.        Posterior tibial pulses are 0 on the right side, and 0 on the left side.   Pulmonary/Chest: Effort normal and breath sounds normal. No respiratory distress. He has no wheezes. He has no rales. He exhibits no tenderness.   Abdominal: Soft. He exhibits no distension and no mass. There is no tenderness. There is no guarding.   Musculoskeletal: He exhibits no edema or tenderness.   Feet:   Right Foot:   Protective Sensation: 10 sites tested. 10 sites sensed.   Left Foot:   Protective Sensation: 10 sites tested. 10 sites sensed.   Lymphadenopathy:     He has no cervical adenopathy. "   Neurological: He is alert and oriented to person, place, and time. He has normal reflexes.   Skin: Skin is warm and dry.   Psychiatric: He has a normal mood and affect. His behavior is normal. Judgment and thought content normal.         Diabetes Management Status    Statin: Not taking  ACE/ARB: Not taking    Screening or Prevention Patient's value Goal Complete/Controlled?   HgA1C Testing and Control   Lab Results   Component Value Date    HGBA1C 6.5 (H) 07/27/2018      Annually/Less than 8% Yes   Lipid profile : 07/27/2018 Annually Yes   LDL control Lab Results   Component Value Date    LDLCALC 175.8 (H) 07/27/2018    Annually/Less than 100 mg/dl  No   Nephropathy screening Lab Results   Component Value Date    LABMICR 33.0 01/19/2018     No results found for: PROTEINUA Annually Yes   Blood pressure BP Readings from Last 1 Encounters:   08/03/18 120/78    Less than 140/90 Yes   Dilated retinal exam : 08/10/2017 Annually Yes   Foot exam   : 08/03/2018 Annually Yes       Assessment:      ICD-10-CM ICD-9-CM   1. Controlled type 2 diabetes mellitus without complication, without long-term current use of insulin E11.9 250.00   2. Hyperlipidemia associated with type 2 diabetes mellitus E11.69 250.80    E78.5 272.4   3. Statin intolerance Z78.9 995.27   4. Myalgia due to HMG CoA reductase inhibitor M79.1 729.1    T46.6X5A E942.2   5. Systolic murmur R01.1 785.2         Plan:  Continue current plan work on weight loss and exercise schedule echo to evaluate the murmur follow-up 6 months  Orders Placed This Encounter   Procedures    Hemoglobin A1c    Comprehensive metabolic panel    Lipid panel    Microalbumin/creatinine urine ratio    2D Echo Only       Current Outpatient Prescriptions   Medication Sig Dispense Refill    fluticasone (FLONASE) 50 mcg/actuation nasal spray 2 sprays (100 mcg total) by Each Nare route once daily. 16 g 11    hydroCHLOROthiazide (HYDRODIURIL) 25 MG tablet Take 1 tablet (25 mg total) by  mouth once daily. 90 tablet 3    metFORMIN (GLUCOPHAGE) 500 MG tablet Take 1 tablet (500 mg total) by mouth 2 (two) times daily with meals. 180 tablet 3     No current facility-administered medications for this visit.        Medications Discontinued During This Encounter   Medication Reason    hydrochlorothiazide (HYDRODIURIL) 25 MG tablet Reorder    metformin (GLUCOPHAGE) 500 MG tablet Reorder    fluticasone (FLONASE) 50 mcg/actuation nasal spray Reorder       Follow-up in about 8 months (around 4/15/2019).      Tatyana Cannon MD

## 2018-10-05 ENCOUNTER — CLINICAL SUPPORT (OUTPATIENT)
Dept: CARDIOLOGY | Facility: CLINIC | Age: 78
End: 2018-10-05
Attending: FAMILY MEDICINE
Payer: MEDICARE

## 2018-10-05 DIAGNOSIS — R01.1 UNDIAGNOSED CARDIAC MURMURS: Primary | ICD-10-CM

## 2018-10-05 DIAGNOSIS — R01.1 SYSTOLIC MURMUR: ICD-10-CM

## 2018-10-05 DIAGNOSIS — R01.1 UNDIAGNOSED CARDIAC MURMURS: ICD-10-CM

## 2018-10-05 PROCEDURE — 93307 TTE W/O DOPPLER COMPLETE: CPT | Mod: PBBFAC | Performed by: INTERNAL MEDICINE

## 2018-10-07 LAB
DIASTOLIC DYSFUNCTION: NO
ESTIMATED PA SYSTOLIC PRESSURE: 20.43
RETIRED EF AND QEF - SEE NOTES: 60 (ref 55–65)
TRICUSPID VALVE REGURGITATION: NORMAL

## 2018-11-08 ENCOUNTER — IMMUNIZATION (OUTPATIENT)
Dept: FAMILY MEDICINE | Facility: CLINIC | Age: 78
End: 2018-11-08
Payer: MEDICARE

## 2018-11-08 PROCEDURE — G0008 ADMIN INFLUENZA VIRUS VAC: HCPCS | Mod: HCNC,S$GLB,, | Performed by: FAMILY MEDICINE

## 2018-11-08 PROCEDURE — 90662 IIV NO PRSV INCREASED AG IM: CPT | Mod: HCNC,S$GLB,, | Performed by: FAMILY MEDICINE

## 2018-11-09 ENCOUNTER — PES CALL (OUTPATIENT)
Dept: ADMINISTRATIVE | Facility: CLINIC | Age: 78
End: 2018-11-09

## 2018-12-12 ENCOUNTER — OFFICE VISIT (OUTPATIENT)
Dept: OPHTHALMOLOGY | Facility: CLINIC | Age: 78
End: 2018-12-12
Payer: MEDICARE

## 2018-12-12 DIAGNOSIS — E11.9 DIABETES MELLITUS TYPE 2 WITHOUT RETINOPATHY: Primary | ICD-10-CM

## 2018-12-12 DIAGNOSIS — H52.4 BILATERAL PRESBYOPIA: ICD-10-CM

## 2018-12-12 DIAGNOSIS — H25.13 CATARACT, NUCLEAR SCLEROTIC SENILE, BILATERAL: ICD-10-CM

## 2018-12-12 DIAGNOSIS — H52.03 HYPEROPIA, BILATERAL: ICD-10-CM

## 2018-12-12 PROCEDURE — 92015 DETERMINE REFRACTIVE STATE: CPT | Mod: HCNC,S$GLB,, | Performed by: OPTOMETRIST

## 2018-12-12 PROCEDURE — 99999 PR PBB SHADOW E&M-EST. PATIENT-LVL II: CPT | Mod: PBBFAC,HCNC,, | Performed by: OPTOMETRIST

## 2018-12-12 PROCEDURE — 92014 COMPRE OPH EXAM EST PT 1/>: CPT | Mod: HCNC,S$GLB,, | Performed by: OPTOMETRIST

## 2018-12-12 NOTE — PROGRESS NOTES
HPI     NIDDM exam.  Decrease near and distance visual acuity with glasses.  Last eye exam 08/10/2017 TRF.  Update glasses RX.       Last edited by Jess Lawler on 12/12/2018 10:11 AM. (History)            Assessment /Plan     For exam results, see Encounter Report.    Diabetes mellitus type 2 without retinopathy    Cataract, nuclear sclerotic senile, bilateral    Hyperopia, bilateral    Bilateral presbyopia      No Background Diabetic Retinopathy    Significant cataracts OU, pt defers the cataract evaluation consult.    Dispense Final Rx for glasses.  RTC 1 year  Discussed above and answered questions.

## 2018-12-28 ENCOUNTER — TELEPHONE (OUTPATIENT)
Dept: FAMILY MEDICINE | Facility: CLINIC | Age: 78
End: 2018-12-28

## 2018-12-28 NOTE — TELEPHONE ENCOUNTER
Notified patient we received the [employer] paperwork he dropped off and would send it to them as soon the paperwork gets sign by Dr Cannon. Patient verbalized understanding.

## 2018-12-28 NOTE — TELEPHONE ENCOUNTER
----- Message from Tamiko Santoro sent at 12/28/2018  3:35 PM CST -----  Contact: pt   Pt is requesting a call back from the nurse in regards to the claim being denied for his hearing aid because the company needs a statement from the Dr saying the pt needs the hearing aids  And why the pt needs it   [phone] (home)

## 2019-01-11 ENCOUNTER — TELEPHONE (OUTPATIENT)
Dept: FAMILY MEDICINE | Facility: CLINIC | Age: 79
End: 2019-01-11

## 2019-01-11 DIAGNOSIS — H91.93 BILATERAL HEARING LOSS, UNSPECIFIED HEARING LOSS TYPE: Primary | ICD-10-CM

## 2019-03-28 ENCOUNTER — PATIENT OUTREACH (OUTPATIENT)
Dept: ADMINISTRATIVE | Facility: HOSPITAL | Age: 79
End: 2019-03-28

## 2019-04-03 ENCOUNTER — LAB VISIT (OUTPATIENT)
Dept: LAB | Facility: HOSPITAL | Age: 79
End: 2019-04-03
Attending: FAMILY MEDICINE
Payer: MEDICARE

## 2019-04-03 DIAGNOSIS — E11.9 CONTROLLED TYPE 2 DIABETES MELLITUS WITHOUT COMPLICATION, WITHOUT LONG-TERM CURRENT USE OF INSULIN: ICD-10-CM

## 2019-04-03 DIAGNOSIS — E78.5 HYPERLIPIDEMIA ASSOCIATED WITH TYPE 2 DIABETES MELLITUS: ICD-10-CM

## 2019-04-03 DIAGNOSIS — E11.69 HYPERLIPIDEMIA ASSOCIATED WITH TYPE 2 DIABETES MELLITUS: ICD-10-CM

## 2019-04-03 LAB
ALBUMIN SERPL BCP-MCNC: 3.6 G/DL (ref 3.5–5.2)
ALP SERPL-CCNC: 64 U/L (ref 55–135)
ALT SERPL W/O P-5'-P-CCNC: 27 U/L (ref 10–44)
ANION GAP SERPL CALC-SCNC: 7 MMOL/L (ref 8–16)
AST SERPL-CCNC: 23 U/L (ref 10–40)
BILIRUB SERPL-MCNC: 0.5 MG/DL (ref 0.1–1)
BUN SERPL-MCNC: 18 MG/DL (ref 8–23)
CALCIUM SERPL-MCNC: 10.1 MG/DL (ref 8.7–10.5)
CHLORIDE SERPL-SCNC: 103 MMOL/L (ref 95–110)
CHOLEST SERPL-MCNC: 262 MG/DL (ref 120–199)
CHOLEST/HDLC SERPL: 8.7 {RATIO} (ref 2–5)
CO2 SERPL-SCNC: 29 MMOL/L (ref 23–29)
CREAT SERPL-MCNC: 1.2 MG/DL (ref 0.5–1.4)
EST. GFR  (AFRICAN AMERICAN): >60 ML/MIN/1.73 M^2
EST. GFR  (NON AFRICAN AMERICAN): 56.4 ML/MIN/1.73 M^2
GLUCOSE SERPL-MCNC: 135 MG/DL (ref 70–110)
HDLC SERPL-MCNC: 30 MG/DL (ref 40–75)
HDLC SERPL: 11.5 % (ref 20–50)
LDLC SERPL CALC-MCNC: 172 MG/DL (ref 63–159)
NONHDLC SERPL-MCNC: 232 MG/DL
POTASSIUM SERPL-SCNC: 4.3 MMOL/L (ref 3.5–5.1)
PROT SERPL-MCNC: 7.3 G/DL (ref 6–8.4)
SODIUM SERPL-SCNC: 139 MMOL/L (ref 136–145)
TRIGL SERPL-MCNC: 300 MG/DL (ref 30–150)

## 2019-04-03 PROCEDURE — 36415 COLL VENOUS BLD VENIPUNCTURE: CPT | Mod: HCNC,PO

## 2019-04-03 PROCEDURE — 83036 HEMOGLOBIN GLYCOSYLATED A1C: CPT | Mod: HCNC

## 2019-04-03 PROCEDURE — 80053 COMPREHEN METABOLIC PANEL: CPT | Mod: HCNC

## 2019-04-03 PROCEDURE — 80061 LIPID PANEL: CPT | Mod: HCNC

## 2019-04-04 LAB
ESTIMATED AVG GLUCOSE: 146 MG/DL (ref 68–131)
HBA1C MFR BLD HPLC: 6.7 % (ref 4–5.6)

## 2019-04-10 ENCOUNTER — OFFICE VISIT (OUTPATIENT)
Dept: FAMILY MEDICINE | Facility: CLINIC | Age: 79
End: 2019-04-10
Payer: MEDICARE

## 2019-04-10 VITALS
TEMPERATURE: 97 F | SYSTOLIC BLOOD PRESSURE: 128 MMHG | OXYGEN SATURATION: 98 % | DIASTOLIC BLOOD PRESSURE: 82 MMHG | HEART RATE: 84 BPM | WEIGHT: 243.75 LBS | BODY MASS INDEX: 35.99 KG/M2

## 2019-04-10 DIAGNOSIS — Z00.00 WELL ADULT EXAM: Primary | ICD-10-CM

## 2019-04-10 DIAGNOSIS — Z78.9 STATIN INTOLERANCE: ICD-10-CM

## 2019-04-10 DIAGNOSIS — E78.5 HYPERLIPIDEMIA ASSOCIATED WITH TYPE 2 DIABETES MELLITUS: ICD-10-CM

## 2019-04-10 DIAGNOSIS — M25.561 ACUTE PAIN OF RIGHT KNEE: ICD-10-CM

## 2019-04-10 DIAGNOSIS — E11.9 CONTROLLED TYPE 2 DIABETES MELLITUS WITHOUT COMPLICATION, WITHOUT LONG-TERM CURRENT USE OF INSULIN: ICD-10-CM

## 2019-04-10 DIAGNOSIS — E11.69 HYPERLIPIDEMIA ASSOCIATED WITH TYPE 2 DIABETES MELLITUS: ICD-10-CM

## 2019-04-10 PROCEDURE — 99397 PER PM REEVAL EST PAT 65+ YR: CPT | Mod: HCNC,S$GLB,, | Performed by: FAMILY MEDICINE

## 2019-04-10 PROCEDURE — 99999 PR PBB SHADOW E&M-EST. PATIENT-LVL III: ICD-10-PCS | Mod: PBBFAC,HCNC,, | Performed by: FAMILY MEDICINE

## 2019-04-10 PROCEDURE — 99397 PR PREVENTIVE VISIT,EST,65 & OVER: ICD-10-PCS | Mod: HCNC,S$GLB,, | Performed by: FAMILY MEDICINE

## 2019-04-10 PROCEDURE — 99999 PR PBB SHADOW E&M-EST. PATIENT-LVL III: CPT | Mod: PBBFAC,HCNC,, | Performed by: FAMILY MEDICINE

## 2019-04-10 RX ORDER — DICLOFENAC SODIUM 10 MG/G
2 GEL TOPICAL DAILY
Qty: 1 TUBE | Refills: 2 | Status: SHIPPED | OUTPATIENT
Start: 2019-04-10 | End: 2021-12-21

## 2019-04-10 NOTE — PROGRESS NOTES
Chief Complaint:    Chief Complaint   Patient presents with    Follow-up       History of Present Illness:    Presents today for six-month follow-up:  He typically walks 2-3 miles a day but he has had a knee sprain on the right knee and has not been walking much because of that.  The knee is actually getting better  Blood pressure stable  A1c slightly up to 6.7.  Lipids still elevated patient has significant intolerance to statin drugs.  He wants something to help with the knee pain    ROS:  Review of Systems   Constitutional: Negative for activity change, chills, fatigue, fever and unexpected weight change.   HENT: Negative for congestion, ear discharge, ear pain, hearing loss, postnasal drip and rhinorrhea.    Eyes: Negative for pain and visual disturbance.   Respiratory: Negative for cough, chest tightness and shortness of breath.    Cardiovascular: Negative for chest pain and palpitations.   Gastrointestinal: Negative for abdominal pain, diarrhea and vomiting.   Endocrine: Negative for heat intolerance.   Genitourinary: Negative for dysuria, flank pain, frequency and hematuria.   Musculoskeletal: Negative for back pain, gait problem and neck pain.   Skin: Negative for color change and rash.   Neurological: Negative for dizziness, tremors, seizures, numbness and headaches.   Psychiatric/Behavioral: Negative for agitation, hallucinations, self-injury, sleep disturbance and suicidal ideas. The patient is not nervous/anxious.        Past Medical History:   Diagnosis Date    Foreign body, eye     right eye    Hyperlipidemia     Hypertension     Type 2 diabetes mellitus 2014    BS didn't check 12/12/2018       Social History:  Social History     Socioeconomic History    Marital status: Single     Spouse name: Not on file    Number of children: Not on file    Years of education: Not on file    Highest education level: Not on file   Occupational History    Not on file   Social Needs    Financial resource  strain: Not on file    Food insecurity:     Worry: Not on file     Inability: Not on file    Transportation needs:     Medical: Not on file     Non-medical: Not on file   Tobacco Use    Smoking status: Former Smoker     Packs/day: 3.00     Types: Cigarettes     Start date:      Last attempt to quit:      Years since quittin.3    Smokeless tobacco: Never Used   Substance and Sexual Activity    Alcohol use: No    Drug use: No    Sexual activity: Never   Lifestyle    Physical activity:     Days per week: Not on file     Minutes per session: Not on file    Stress: Not on file   Relationships    Social connections:     Talks on phone: Not on file     Gets together: Not on file     Attends [employer] service: Not on file     Active member of club or organization: Not on file     Attends meetings of clubs or organizations: Not on file     Relationship status: Not on file   Other Topics Concern    Not on file   Social History Narrative    Not on file       Family History:   family history includes Allergic rhinitis in his daughter; Cancer in his son; Cataracts in his father; Heart disease in his father and mother; Hyperlipidemia in his father; Hypertension in his brother, father, and sister.    Health Maintenance   Topic Date Due    Foot Exam  2019    Hemoglobin A1c  10/03/2019    Eye Exam  2019    Lipid Panel  2020    Urine Microalbumin  2020    TETANUS VACCINE  12/10/2025    Zoster Vaccine  Completed    Pneumococcal Vaccine (65+ Low/Medium Risk)  Completed    Influenza Vaccine  Completed       Physical Exam:    Vital Signs  Temp: 97 °F (36.1 °C)  Temp src: Tympanic  Pulse: 84  SpO2: 98 %  BP: 128/82  BP Location: Left arm  Patient Position: Sitting  Pain Score: 0-No pain  Height and Weight  Weight: 110.6 kg (243 lb 11.5 oz)]    Body mass index is 35.99 kg/m².    Physical Exam   Constitutional: He is oriented to person, place, and time. He appears well-developed.    HENT:   Right Ear: External ear normal.   Left Ear: External ear normal.   Mouth/Throat: Oropharynx is clear and moist.   Eyes: Pupils are equal, round, and reactive to light. Conjunctivae are normal.   Neck: Normal range of motion. Neck supple.   Cardiovascular: Normal rate, regular rhythm and normal heart sounds.   No murmur heard.  Pulmonary/Chest: Effort normal and breath sounds normal. No respiratory distress. He has no wheezes. He has no rales. He exhibits no tenderness.   Abdominal: Soft. He exhibits no distension and no mass. There is no tenderness. There is no guarding.   Musculoskeletal: He exhibits no edema or tenderness.   Lymphadenopathy:     He has no cervical adenopathy.   Neurological: He is alert and oriented to person, place, and time. He has normal reflexes.   Skin: Skin is warm and dry.   Psychiatric: He has a normal mood and affect. His behavior is normal. Judgment and thought content normal.         Diabetes Management Status    Statin: Not taking  ACE/ARB: Not taking    Screening or Prevention Patient's value Goal Complete/Controlled?   HgA1C Testing and Control   Lab Results   Component Value Date    HGBA1C 6.7 (H) 04/03/2019      Annually/Less than 8% Yes   Lipid profile : 04/03/2019 Annually Yes   LDL control Lab Results   Component Value Date    LDLCALC 172.0 (H) 04/03/2019    Annually/Less than 100 mg/dl  No   Nephropathy screening Lab Results   Component Value Date    LABMICR <2.5 04/03/2019     No results found for: PROTEINUA Annually Yes   Blood pressure BP Readings from Last 1 Encounters:   04/10/19 128/82    Less than 140/90 Yes   Dilated retinal exam : 12/12/2018 Annually Yes   Foot exam   : 08/03/2018 Annually Yes       Assessment:      ICD-10-CM ICD-9-CM   1. Well adult exam Z00.00 V70.0   2. Controlled type 2 diabetes mellitus without complication, without long-term current use of insulin E11.9 250.00   3. Hyperlipidemia associated with type 2 diabetes mellitus E11.69 250.80     E78.5 272.4   4. Statin intolerance Z78.9 995.27   5. Acute pain of right knee M25.561 719.46         Plan:  Recommend Voltaren gel to help with the knee pain  Please cut back on sweets and get back in the walking program to help lower the A1c  Recommend using Debrox ear drops  Cut back on fat intake in diet to help lower cholesterol.  Orders Placed This Encounter   Procedures    Comprehensive metabolic panel    Hemoglobin A1c    Lipid panel       Current Outpatient Medications   Medication Sig Dispense Refill    fluticasone (FLONASE) 50 mcg/actuation nasal spray 2 sprays (100 mcg total) by Each Nare route once daily. 16 g 11    hydroCHLOROthiazide (HYDRODIURIL) 25 MG tablet Take 1 tablet (25 mg total) by mouth once daily. 90 tablet 3    metFORMIN (GLUCOPHAGE) 500 MG tablet Take 1 tablet (500 mg total) by mouth 2 (two) times daily with meals. 180 tablet 3    diclofenac sodium (VOLTAREN) 1 % Gel Apply 2 g topically once daily. 1 Tube 2     No current facility-administered medications for this visit.        There are no discontinued medications.    Follow up in about 6 months (around 10/10/2019).      Tatyana Cannon MD

## 2019-07-10 ENCOUNTER — OFFICE VISIT (OUTPATIENT)
Dept: FAMILY MEDICINE | Facility: CLINIC | Age: 79
End: 2019-07-10
Payer: MEDICARE

## 2019-07-10 VITALS
OXYGEN SATURATION: 96 % | WEIGHT: 244.94 LBS | DIASTOLIC BLOOD PRESSURE: 84 MMHG | SYSTOLIC BLOOD PRESSURE: 127 MMHG | HEART RATE: 99 BPM | BODY MASS INDEX: 36.17 KG/M2

## 2019-07-10 DIAGNOSIS — Z13.6 SCREENING FOR AAA (ABDOMINAL AORTIC ANEURYSM): ICD-10-CM

## 2019-07-10 DIAGNOSIS — E11.69 HYPERLIPIDEMIA ASSOCIATED WITH TYPE 2 DIABETES MELLITUS: ICD-10-CM

## 2019-07-10 DIAGNOSIS — Z78.9 STATIN INTOLERANCE: ICD-10-CM

## 2019-07-10 DIAGNOSIS — E66.01 SEVERE OBESITY (BMI 35.0-35.9 WITH COMORBIDITY): ICD-10-CM

## 2019-07-10 DIAGNOSIS — E11.21 TYPE 2 DIABETES MELLITUS WITH DIABETIC NEPHROPATHY, WITHOUT LONG-TERM CURRENT USE OF INSULIN: Primary | ICD-10-CM

## 2019-07-10 DIAGNOSIS — E11.9 CONTROLLED TYPE 2 DIABETES MELLITUS WITHOUT COMPLICATION, WITHOUT LONG-TERM CURRENT USE OF INSULIN: ICD-10-CM

## 2019-07-10 DIAGNOSIS — E78.5 HYPERLIPIDEMIA ASSOCIATED WITH TYPE 2 DIABETES MELLITUS: ICD-10-CM

## 2019-07-10 PROCEDURE — 99999 PR PBB SHADOW E&M-EST. PATIENT-LVL III: ICD-10-PCS | Mod: PBBFAC,HCNC,, | Performed by: NURSE PRACTITIONER

## 2019-07-10 PROCEDURE — 99999 PR PBB SHADOW E&M-EST. PATIENT-LVL III: CPT | Mod: PBBFAC,HCNC,, | Performed by: NURSE PRACTITIONER

## 2019-07-10 PROCEDURE — 96160 PT-FOCUSED HLTH RISK ASSMT: CPT | Mod: HCNC,S$GLB,, | Performed by: NURSE PRACTITIONER

## 2019-07-10 PROCEDURE — 96160 PR PT FOCUSED HLTH RISK ASSMT: ICD-10-PCS | Mod: HCNC,S$GLB,, | Performed by: NURSE PRACTITIONER

## 2019-07-10 RX ORDER — MULTIVITAMIN
1 TABLET ORAL DAILY
COMMUNITY

## 2019-07-10 NOTE — PROGRESS NOTES
Mark Dickerson presented for a  Medicare AWV and comprehensive Health Risk Assessment today. The following components were reviewed and updated:    · Medical history  · Family History  · Social history  · Allergies and Current Medications  · Health Risk Assessment  · Health Maintenance  · Care Team     ** See Completed Assessments for Annual Wellness Visit within the encounter summary.**       The following assessments were completed:  · Living Situation  · CAGE  · Depression Screening  · Timed Get Up and Go  · Whisper Test  · Cognitive Function Screening  · Nutrition Screening  · ADL Screening  · PAQ Screening    Vitals:    07/10/19 1310   BP: 127/84   Pulse: 99   SpO2: 96%   Weight: 111.1 kg (244 lb 14.9 oz)     Body mass index is 36.17 kg/m².  Physical Exam      Diagnoses and health risks identified today and associated recommendations/orders:    1. Type 2 diabetes mellitus with diabetic nephropathy, without long-term current use of insulin  Stable continue current treatment plan      2. Controlled type 2 diabetes mellitus without complication, without long-term current use of insulin  Stable continue current treatment plan    3. Hyperlipidemia associated with type 2 diabetes mellitus  Stable continue current treatment plan    4. Statin intolerance        Provided Mark with a 5-10 year written screening schedule and personal prevention plan. Recommendations were developed using the USPSTF age appropriate recommendations. Education, counseling, and referrals were provided as needed. After Visit Summary printed and given to patient which includes a list of additional screenings\tests needed.    No follow-ups on file.    Codi Johnson NP  I offered to discuss end of life issues, including information on how to make advance directives that the patient could use to name someone who would make medical decisions on their behalf if they became too ill to make themselves.    _X_Patient declined  ___Patient is  interested, I provided paper work and offered to discuss.

## 2019-08-27 RX ORDER — FLUTICASONE PROPIONATE 50 MCG
SPRAY, SUSPENSION (ML) NASAL
Qty: 1 BOTTLE | Refills: 11 | Status: SHIPPED | OUTPATIENT
Start: 2019-08-27 | End: 2020-05-13 | Stop reason: SDUPTHER

## 2019-08-27 RX ORDER — HYDROCHLOROTHIAZIDE 25 MG/1
TABLET ORAL
Qty: 90 TABLET | Refills: 3 | Status: SHIPPED | OUTPATIENT
Start: 2019-08-27 | End: 2020-09-09 | Stop reason: SDUPTHER

## 2019-08-27 RX ORDER — METFORMIN HYDROCHLORIDE 500 MG/1
TABLET ORAL
Qty: 180 TABLET | Refills: 3 | Status: SHIPPED | OUTPATIENT
Start: 2019-08-27 | End: 2020-05-13

## 2019-11-15 ENCOUNTER — LAB VISIT (OUTPATIENT)
Dept: LAB | Facility: HOSPITAL | Age: 79
End: 2019-11-15
Attending: FAMILY MEDICINE
Payer: MEDICARE

## 2019-11-15 DIAGNOSIS — E11.69 HYPERLIPIDEMIA ASSOCIATED WITH TYPE 2 DIABETES MELLITUS: ICD-10-CM

## 2019-11-15 DIAGNOSIS — E78.5 HYPERLIPIDEMIA ASSOCIATED WITH TYPE 2 DIABETES MELLITUS: ICD-10-CM

## 2019-11-15 DIAGNOSIS — E11.9 CONTROLLED TYPE 2 DIABETES MELLITUS WITHOUT COMPLICATION, WITHOUT LONG-TERM CURRENT USE OF INSULIN: ICD-10-CM

## 2019-11-15 PROCEDURE — 83036 HEMOGLOBIN GLYCOSYLATED A1C: CPT | Mod: HCNC

## 2019-11-15 PROCEDURE — 36415 COLL VENOUS BLD VENIPUNCTURE: CPT | Mod: HCNC,PO

## 2019-11-15 PROCEDURE — 80061 LIPID PANEL: CPT | Mod: HCNC

## 2019-11-15 PROCEDURE — 80053 COMPREHEN METABOLIC PANEL: CPT | Mod: HCNC

## 2019-11-16 LAB
ALBUMIN SERPL BCP-MCNC: 3.5 G/DL (ref 3.5–5.2)
ALP SERPL-CCNC: 54 U/L (ref 55–135)
ALT SERPL W/O P-5'-P-CCNC: 26 U/L (ref 10–44)
ANION GAP SERPL CALC-SCNC: 11 MMOL/L (ref 8–16)
AST SERPL-CCNC: 27 U/L (ref 10–40)
BILIRUB SERPL-MCNC: 0.5 MG/DL (ref 0.1–1)
BUN SERPL-MCNC: 16 MG/DL (ref 8–23)
CALCIUM SERPL-MCNC: 8.9 MG/DL (ref 8.7–10.5)
CHLORIDE SERPL-SCNC: 102 MMOL/L (ref 95–110)
CHOLEST SERPL-MCNC: 260 MG/DL (ref 120–199)
CHOLEST/HDLC SERPL: 9 {RATIO} (ref 2–5)
CO2 SERPL-SCNC: 24 MMOL/L (ref 23–29)
CREAT SERPL-MCNC: 1.1 MG/DL (ref 0.5–1.4)
EST. GFR  (AFRICAN AMERICAN): >60 ML/MIN/1.73 M^2
EST. GFR  (NON AFRICAN AMERICAN): >60 ML/MIN/1.73 M^2
ESTIMATED AVG GLUCOSE: 151 MG/DL (ref 68–131)
GLUCOSE SERPL-MCNC: 132 MG/DL (ref 70–110)
HBA1C MFR BLD HPLC: 6.9 % (ref 4–5.6)
HDLC SERPL-MCNC: 29 MG/DL (ref 40–75)
HDLC SERPL: 11.2 % (ref 20–50)
LDLC SERPL CALC-MCNC: 186.8 MG/DL (ref 63–159)
NONHDLC SERPL-MCNC: 231 MG/DL
POTASSIUM SERPL-SCNC: 3.9 MMOL/L (ref 3.5–5.1)
PROT SERPL-MCNC: 7 G/DL (ref 6–8.4)
SODIUM SERPL-SCNC: 137 MMOL/L (ref 136–145)
TRIGL SERPL-MCNC: 221 MG/DL (ref 30–150)

## 2019-11-20 ENCOUNTER — OFFICE VISIT (OUTPATIENT)
Dept: FAMILY MEDICINE | Facility: CLINIC | Age: 79
End: 2019-11-20
Payer: MEDICARE

## 2019-11-20 VITALS
TEMPERATURE: 98 F | OXYGEN SATURATION: 95 % | SYSTOLIC BLOOD PRESSURE: 124 MMHG | HEIGHT: 69 IN | HEART RATE: 96 BPM | WEIGHT: 247.56 LBS | DIASTOLIC BLOOD PRESSURE: 78 MMHG | BODY MASS INDEX: 36.67 KG/M2

## 2019-11-20 DIAGNOSIS — Z78.9 STATIN INTOLERANCE: ICD-10-CM

## 2019-11-20 DIAGNOSIS — E78.5 HYPERLIPIDEMIA ASSOCIATED WITH TYPE 2 DIABETES MELLITUS: ICD-10-CM

## 2019-11-20 DIAGNOSIS — Z01.00 DIABETIC EYE EXAM: ICD-10-CM

## 2019-11-20 DIAGNOSIS — E11.9 DIABETIC EYE EXAM: ICD-10-CM

## 2019-11-20 DIAGNOSIS — R09.89 DECREASED PEDAL PULSES: ICD-10-CM

## 2019-11-20 DIAGNOSIS — E11.69 HYPERLIPIDEMIA ASSOCIATED WITH TYPE 2 DIABETES MELLITUS: ICD-10-CM

## 2019-11-20 DIAGNOSIS — Z23 NEED FOR SHINGLES VACCINE: ICD-10-CM

## 2019-11-20 DIAGNOSIS — E11.9 CONTROLLED TYPE 2 DIABETES MELLITUS WITHOUT COMPLICATION, WITHOUT LONG-TERM CURRENT USE OF INSULIN: Primary | ICD-10-CM

## 2019-11-20 PROCEDURE — 99499 RISK ADDL DX/OHS AUDIT: ICD-10-PCS | Mod: HCNC,S$GLB,, | Performed by: FAMILY MEDICINE

## 2019-11-20 PROCEDURE — 1101F PT FALLS ASSESS-DOCD LE1/YR: CPT | Mod: HCNC,CPTII,S$GLB, | Performed by: FAMILY MEDICINE

## 2019-11-20 PROCEDURE — 99999 PR PBB SHADOW E&M-EST. PATIENT-LVL IV: CPT | Mod: PBBFAC,HCNC,, | Performed by: FAMILY MEDICINE

## 2019-11-20 PROCEDURE — 1159F MED LIST DOCD IN RCRD: CPT | Mod: HCNC,S$GLB,, | Performed by: FAMILY MEDICINE

## 2019-11-20 PROCEDURE — 99214 PR OFFICE/OUTPT VISIT, EST, LEVL IV, 30-39 MIN: ICD-10-PCS | Mod: HCNC,S$GLB,, | Performed by: FAMILY MEDICINE

## 2019-11-20 PROCEDURE — 1101F PR PT FALLS ASSESS DOC 0-1 FALLS W/OUT INJ PAST YR: ICD-10-PCS | Mod: HCNC,CPTII,S$GLB, | Performed by: FAMILY MEDICINE

## 2019-11-20 PROCEDURE — 99499 UNLISTED E&M SERVICE: CPT | Mod: HCNC,S$GLB,, | Performed by: FAMILY MEDICINE

## 2019-11-20 PROCEDURE — 1126F AMNT PAIN NOTED NONE PRSNT: CPT | Mod: HCNC,S$GLB,, | Performed by: FAMILY MEDICINE

## 2019-11-20 PROCEDURE — 1159F PR MEDICATION LIST DOCUMENTED IN MEDICAL RECORD: ICD-10-PCS | Mod: HCNC,S$GLB,, | Performed by: FAMILY MEDICINE

## 2019-11-20 PROCEDURE — 1126F PR PAIN SEVERITY QUANTIFIED, NO PAIN PRESENT: ICD-10-PCS | Mod: HCNC,S$GLB,, | Performed by: FAMILY MEDICINE

## 2019-11-20 PROCEDURE — 99999 PR PBB SHADOW E&M-EST. PATIENT-LVL IV: ICD-10-PCS | Mod: PBBFAC,HCNC,, | Performed by: FAMILY MEDICINE

## 2019-11-20 PROCEDURE — 99214 OFFICE O/P EST MOD 30 MIN: CPT | Mod: HCNC,S$GLB,, | Performed by: FAMILY MEDICINE

## 2019-11-20 NOTE — PROGRESS NOTES
Chief Complaint:    Chief Complaint   Patient presents with    Follow-up       History of Present Illness:    Presents today for six-month follow-up:  He typically walks 2-3 miles a day but he has had a knee sprain on the right knee and has not been walking much because of that.  The knee is actually getting better  He plans to start walking soon.  Blood pressure stable  A1c slightly up to 6.9.  Lipids still elevated patient has significant intolerance to statin drugs.    ROS:  Review of Systems   Constitutional: Negative for activity change, chills, fatigue, fever and unexpected weight change.   HENT: Negative for congestion, ear discharge, ear pain, hearing loss, postnasal drip and rhinorrhea.    Eyes: Negative for pain and visual disturbance.   Respiratory: Negative for cough, chest tightness and shortness of breath.    Cardiovascular: Negative for chest pain and palpitations.   Gastrointestinal: Negative for abdominal pain, diarrhea and vomiting.   Endocrine: Negative for heat intolerance.   Genitourinary: Negative for dysuria, flank pain, frequency and hematuria.   Musculoskeletal: Negative for back pain, gait problem and neck pain.   Skin: Negative for color change and rash.   Neurological: Negative for dizziness, tremors, seizures, numbness and headaches.   Psychiatric/Behavioral: Negative for agitation, hallucinations, self-injury, sleep disturbance and suicidal ideas. The patient is not nervous/anxious.        Past Medical History:   Diagnosis Date    Foreign body, eye     right eye    Hyperlipidemia     Hypertension     Pneumonia     Severe obesity (BMI 35.0-35.9 with comorbidity) 7/10/2019    Type 2 diabetes mellitus 2014    BS didn't check 12/12/2018       Social History:  Social History     Socioeconomic History    Marital status: Single     Spouse name: Not on file    Number of children: Not on file    Years of education: Not on file    Highest education level: Not on file   Occupational  "History    Not on file   Social Needs    Financial resource strain: Not on file    Food insecurity:     Worry: Not on file     Inability: Not on file    Transportation needs:     Medical: Not on file     Non-medical: Not on file   Tobacco Use    Smoking status: Former Smoker     Packs/day: 3.00     Types: Cigarettes     Start date:      Last attempt to quit:      Years since quittin.9    Smokeless tobacco: Never Used   Substance and Sexual Activity    Alcohol use: No    Drug use: No    Sexual activity: Never   Lifestyle    Physical activity:     Days per week: Not on file     Minutes per session: Not on file    Stress: Not on file   Relationships    Social connections:     Talks on phone: Not on file     Gets together: Not on file     Attends Mu-ism service: Not on file     Active member of club or organization: Not on file     Attends meetings of clubs or organizations: Not on file     Relationship status: Not on file   Other Topics Concern    Not on file   Social History Narrative    Not on file       Family History:   family history includes Allergic rhinitis in his daughter; Cancer in his son; Cataracts in his father; Heart disease in his father and mother; Hyperlipidemia in his father; Hypertension in his brother, father, and sister.    Health Maintenance   Topic Date Due    Eye Exam  2019    Urine Microalbumin  2020    Hemoglobin A1c  05/15/2020    Lipid Panel  11/15/2020    Foot Exam  2020    TETANUS VACCINE  12/10/2025    Pneumococcal Vaccine (65+ Low/Medium Risk)  Completed       Physical Exam:    Vital Signs  Temp: 98.4 °F (36.9 °C)  Temp src: Temporal  Pulse: 96  SpO2: 95 %  BP: 124/78  BP Location: Left arm  Patient Position: Sitting  Pain Score: 0-No pain  Height and Weight  Height: 5' 9" (175.3 cm)  Weight: 112.3 kg (247 lb 9.2 oz)  BSA (Calculated - sq m): 2.34 sq meters  BMI (Calculated): 36.5  Weight in (lb) to have BMI = 25: 168.9]    Body mass " index is 36.56 kg/m².    Physical Exam   Constitutional: He is oriented to person, place, and time. He appears well-developed.   HENT:   Right Ear: External ear normal.   Left Ear: External ear normal.   Mouth/Throat: Oropharynx is clear and moist.   Eyes: Pupils are equal, round, and reactive to light. Conjunctivae are normal.   Neck: Normal range of motion. Neck supple.   Cardiovascular: Normal rate, regular rhythm and normal heart sounds.   No murmur heard.  Pulses:       Dorsalis pedis pulses are 0 on the right side, and 0 on the left side.        Posterior tibial pulses are 0 on the right side, and 0 on the left side.   Pulmonary/Chest: Effort normal and breath sounds normal. No respiratory distress. He has no wheezes. He has no rales. He exhibits no tenderness.   Abdominal: Soft. He exhibits no distension and no mass. There is no tenderness. There is no guarding.   Musculoskeletal: He exhibits no edema or tenderness.   Feet:   Right Foot:   Protective Sensation: 10 sites tested. 10 sites sensed.   Left Foot:   Protective Sensation: 10 sites tested. 10 sites sensed.   Lymphadenopathy:     He has no cervical adenopathy.   Neurological: He is alert and oriented to person, place, and time. He has normal reflexes.   Skin: Skin is warm and dry.   Psychiatric: He has a normal mood and affect. His behavior is normal. Judgment and thought content normal.         Diabetes Management Status    Statin: Not taking  ACE/ARB: Not taking    Screening or Prevention Patient's value Goal Complete/Controlled?   HgA1C Testing and Control   Lab Results   Component Value Date    HGBA1C 6.9 (H) 11/15/2019      Annually/Less than 8% Yes   Lipid profile : 11/15/2019 Annually Yes   LDL control Lab Results   Component Value Date    LDLCALC 186.8 (H) 11/15/2019    Annually/Less than 100 mg/dl  No   Nephropathy screening Lab Results   Component Value Date    LABMICR <2.5 04/03/2019     No results found for: PROTEINUA Annually Yes   Blood  pressure BP Readings from Last 1 Encounters:   11/20/19 124/78    Less than 140/90 Yes   Dilated retinal exam : 12/12/2018 Annually Yes   Foot exam   : 11/20/2019 Annually Yes       Assessment:      ICD-10-CM ICD-9-CM   1. Controlled type 2 diabetes mellitus without complication, without long-term current use of insulin E11.9 250.00   2. Hyperlipidemia associated with type 2 diabetes mellitus E11.69 250.80    E78.5 272.4   3. Statin intolerance Z78.9 995.27   4. Diabetic eye exam Z01.00 V72.0    E11.9 250.00   5. Decreased pedal pulses R09.89 785.9         Plan:  Recommend JAISON with exercise but he wants to wait on schedule an eye exam  Please get back on the walking program and work on weight loss Please cut back on sweets and get back in the walking program to help lower the A1c  Follow-up 6 months  Orders Placed This Encounter   Procedures    VAS Ankle Brachial Indices with Exercise    Hemoglobin A1c    Comprehensive metabolic panel    Lipid panel    Microalbumin/creatinine urine ratio    CBC auto differential    Ambulatory referral to Optometry       Current Outpatient Medications   Medication Sig Dispense Refill    cinnamon bark (CINNAMON ORAL) Take by mouth.      diclofenac sodium (VOLTAREN) 1 % Gel Apply 2 g topically once daily. 1 Tube 2    fluticasone propionate (FLONASE) 50 mcg/actuation nasal spray USE 2 SPRAY(S) IN EACH NOSTRIL ONCE DAILY 1 Bottle 11    hydroCHLOROthiazide (HYDRODIURIL) 25 MG tablet TAKE 1 TABLET BY MOUTH ONCE DAILY 90 tablet 3    metFORMIN (GLUCOPHAGE) 500 MG tablet TAKE 1 TABLET BY MOUTH TWICE DAILY WITH MEALS 180 tablet 3    multivitamin (ONE DAILY MULTIVITAMIN) per tablet Take 1 tablet by mouth once daily.       No current facility-administered medications for this visit.        There are no discontinued medications.    No follow-ups on file.      Tatyana Cannon MD

## 2019-12-11 ENCOUNTER — OFFICE VISIT (OUTPATIENT)
Dept: OPHTHALMOLOGY | Facility: CLINIC | Age: 79
End: 2019-12-11
Payer: MEDICARE

## 2019-12-11 DIAGNOSIS — H52.03 HYPEROPIA, BILATERAL: ICD-10-CM

## 2019-12-11 DIAGNOSIS — H25.13 CATARACT, NUCLEAR SCLEROTIC SENILE, BILATERAL: ICD-10-CM

## 2019-12-11 DIAGNOSIS — H52.4 BILATERAL PRESBYOPIA: ICD-10-CM

## 2019-12-11 DIAGNOSIS — E11.9 DIABETES MELLITUS TYPE 2 WITHOUT RETINOPATHY: Primary | ICD-10-CM

## 2019-12-11 PROCEDURE — 99999 PR PBB SHADOW E&M-EST. PATIENT-LVL I: ICD-10-PCS | Mod: PBBFAC,HCNC,, | Performed by: OPTOMETRIST

## 2019-12-11 PROCEDURE — 92014 COMPRE OPH EXAM EST PT 1/>: CPT | Mod: HCNC,S$GLB,, | Performed by: OPTOMETRIST

## 2019-12-11 PROCEDURE — 92014 PR EYE EXAM, EST PATIENT,COMPREHESV: ICD-10-PCS | Mod: HCNC,S$GLB,, | Performed by: OPTOMETRIST

## 2019-12-11 PROCEDURE — 92015 DETERMINE REFRACTIVE STATE: CPT | Mod: HCNC,S$GLB,, | Performed by: OPTOMETRIST

## 2019-12-11 PROCEDURE — 92015 PR REFRACTION: ICD-10-PCS | Mod: HCNC,S$GLB,, | Performed by: OPTOMETRIST

## 2019-12-11 PROCEDURE — 99999 PR PBB SHADOW E&M-EST. PATIENT-LVL I: CPT | Mod: PBBFAC,HCNC,, | Performed by: OPTOMETRIST

## 2019-12-11 NOTE — LETTER
December 11, 2019      Tatyana Cannon MD  [id number] [address] [address]           [facility]  [address] [place] [id number]  Phone: [phone]  Fax: [phone]          Patient: Mark Dickerson Sr.   MR Number: [id number]   YOB: 1940   Date of Visit: 12/11/2019       Dear Dr. Tatyana Cannon:    Thank you for referring Mark Dickerson to me for evaluation. Attached you will find relevant portions of my assessment and plan of care.    If you have questions, please do not hesitate to call me. I look forward to following Mark Dickerson along with you.    Sincerely,    Gavin Hahn, OD    Enclosure  CC:  No Recipients    If you would like to receive this communication electronically, please contact [email or web address] or [phone] to request more information on [employer] access.    For providers and/or their staff who would like to refer a patient to [facility], please contact us through our one-stop-shop provider referral line, Tatiana Chery, at [phone].    If you feel you have received this communication in error or would no longer like to receive these types of communications, please e-mail [email or web address]

## 2019-12-11 NOTE — PROGRESS NOTES
HPI     NIDDM exam.  Cloudy vision right eye with glasses.  Last eye exam 12/12/2018 TRF.  Update glasses RX.   Lab Results       Component                Value               Date                       HGBA1C                   6.9 (H)             11/15/2019            Last edited by Gavin Hahn, OD on 12/11/2019 10:06 AM. (History)            Assessment /Plan     For exam results, see Encounter Report.    Diabetes mellitus type 2 without retinopathy    Cataract, nuclear sclerotic senile, bilateral    Hyperopia, bilateral    Bilateral presbyopia      No Background Diabetic Retinopathy    Significant cataracts OU, discussed cataract surgery including Specialty IOL's  Myopic shift OU, failed glare test OU  Consult Ophthalmology for cataract evaluation at next available appointment.

## 2019-12-17 ENCOUNTER — TELEPHONE (OUTPATIENT)
Dept: OPHTHALMOLOGY | Facility: CLINIC | Age: 79
End: 2019-12-17

## 2019-12-17 NOTE — TELEPHONE ENCOUNTER
----- Message from Gemini Lawler sent at 12/17/2019 10:13 AM CST -----  Contact: pt  The pt request a return call, no additional info given and can be reached at [phone]///[email or web address]

## 2020-01-13 ENCOUNTER — OFFICE VISIT (OUTPATIENT)
Dept: OPHTHALMOLOGY | Facility: CLINIC | Age: 80
End: 2020-01-13
Payer: MEDICARE

## 2020-01-13 DIAGNOSIS — H25.13 SENILE NUCLEAR CATARACT, BILATERAL: Primary | ICD-10-CM

## 2020-01-13 DIAGNOSIS — E11.9 DIABETES MELLITUS TYPE 2 WITHOUT RETINOPATHY: ICD-10-CM

## 2020-01-13 PROCEDURE — 92014 COMPRE OPH EXAM EST PT 1/>: CPT | Mod: HCNC,S$GLB,, | Performed by: OPHTHALMOLOGY

## 2020-01-13 PROCEDURE — 92136 OPHTHALMIC BIOMETRY: CPT | Mod: HCNC,RT,S$GLB, | Performed by: OPHTHALMOLOGY

## 2020-01-13 PROCEDURE — 92014 PR EYE EXAM, EST PATIENT,COMPREHESV: ICD-10-PCS | Mod: HCNC,S$GLB,, | Performed by: OPHTHALMOLOGY

## 2020-01-13 PROCEDURE — 99999 PR PBB SHADOW E&M-EST. PATIENT-LVL II: ICD-10-PCS | Mod: PBBFAC,HCNC,, | Performed by: OPHTHALMOLOGY

## 2020-01-13 PROCEDURE — 92136 IOL MASTER - OD - RIGHT EYE: ICD-10-PCS | Mod: HCNC,RT,S$GLB, | Performed by: OPHTHALMOLOGY

## 2020-01-13 PROCEDURE — 99999 PR PBB SHADOW E&M-EST. PATIENT-LVL II: CPT | Mod: PBBFAC,HCNC,, | Performed by: OPHTHALMOLOGY

## 2020-01-13 RX ORDER — PREDNISOLONE ACETATE 10 MG/ML
1 SUSPENSION/ DROPS OPHTHALMIC 4 TIMES DAILY
Qty: 1 BOTTLE | Refills: 2 | Status: SHIPPED | OUTPATIENT
Start: 2020-01-13 | End: 2020-11-20 | Stop reason: ALTCHOICE

## 2020-01-13 RX ORDER — GATIFLOXACIN 5 MG/ML
1 SOLUTION/ DROPS OPHTHALMIC 2 TIMES DAILY
Qty: 1 BOTTLE | Refills: 2 | Status: SHIPPED | OUTPATIENT
Start: 2020-01-13 | End: 2020-11-20 | Stop reason: ALTCHOICE

## 2020-01-13 RX ORDER — KETOROLAC TROMETHAMINE 5 MG/ML
1 SOLUTION OPHTHALMIC 4 TIMES DAILY
Qty: 1 BOTTLE | Refills: 2 | Status: SHIPPED | OUTPATIENT
Start: 2020-01-13 | End: 2020-11-20 | Stop reason: ALTCHOICE

## 2020-01-13 NOTE — PROGRESS NOTES
SUBJECTIVE:   Mark Dickerson Sr. is a 79 y.o. male   Corrected distance visual acuity was 20/40 in the right eye and 20/40 in the left eye.   Chief Complaint   Patient presents with    Cataract Evaluation        HPI:  HPI     Patient states trouble with cloudy vision and glare of both eyes but more   so the right eye and no ocular pain/discomfort.      Last edited by Katie Mckeon on 1/13/2020  2:53 PM. (History)        Assessment /Plan :  1. Senile nuclear cataract, bilateral  Visually Significant Cataract OD > OS:   Patient reports decreased vision consistent with the clinical amount of lenticular opacity,  which reaches the level of visual significance and affects activities of daily living such as  read. Risks, benefits, and alternatives to cataract surgery were  discussed.  IOL options were discussed, including Premium IOL'S and the associated  side effects and additional patient cost associated with them as well as patient's visual  goals. The pt expressed a desire to proceed with surgery with the potential for some  reasonable degree of visual improvement and was consented.  Risks of loss of vision and eye reviewed as well as possibility of need for spectacle correction after surgery even with premium implants. Verbal and written preop  instructions were provided to the patient.       Pt is interested in traditional monofocal IOL aiming for:    Distance OU and understands that glasses will be generally needed at all times for near vision and often for finer distance correction especially for higher degrees of astigmatism.       Final visual acuity may be limited by astigmatism    Pt wishes to have Phaco/IOL  OD     Requests a Monofocal IOL.    Will aim for distance    Other considerations: Corneal Precautions                 2. Diabetes mellitus type 2 without retinopathy No diabetic retinopathy at this time. Reviewed diabetic eye precautions including avoiding tobacco use,  Good glucose control, and  importance of regular follow up.

## 2020-02-26 ENCOUNTER — OUTSIDE PLACE OF SERVICE (OUTPATIENT)
Dept: ADMINISTRATIVE | Facility: OTHER | Age: 80
End: 2020-02-26
Payer: MEDICARE

## 2020-02-26 PROCEDURE — 66984 XCAPSL CTRC RMVL W/O ECP: CPT | Mod: RT,,, | Performed by: OPHTHALMOLOGY

## 2020-02-26 PROCEDURE — 66984 PR REMOVAL, CATARACT, W/INSRT INTRAOC LENS, W/O ENDO CYCLO: ICD-10-PCS | Mod: RT,,, | Performed by: OPHTHALMOLOGY

## 2020-02-27 ENCOUNTER — OFFICE VISIT (OUTPATIENT)
Dept: OPHTHALMOLOGY | Facility: CLINIC | Age: 80
End: 2020-02-27
Payer: MEDICARE

## 2020-02-27 DIAGNOSIS — Z98.890 POST-OPERATIVE STATE: Primary | ICD-10-CM

## 2020-02-27 PROCEDURE — 99999 PR PBB SHADOW E&M-EST. PATIENT-LVL II: CPT | Mod: PBBFAC,HCNC,, | Performed by: OPHTHALMOLOGY

## 2020-02-27 PROCEDURE — 99024 POSTOP FOLLOW-UP VISIT: CPT | Mod: HCNC,S$GLB,, | Performed by: OPHTHALMOLOGY

## 2020-02-27 PROCEDURE — 99999 PR PBB SHADOW E&M-EST. PATIENT-LVL II: ICD-10-PCS | Mod: PBBFAC,HCNC,, | Performed by: OPHTHALMOLOGY

## 2020-02-27 PROCEDURE — 99024 PR POST-OP FOLLOW-UP VISIT: ICD-10-PCS | Mod: HCNC,S$GLB,, | Performed by: OPHTHALMOLOGY

## 2020-02-27 NOTE — PROGRESS NOTES
SUBJECTIVE  Mark Dickerson Sr. is 80 y.o. male  Uncorrected distance visual acuity was 20/40 in the right eye and not recorded in the left eye.   Chief Complaint   Patient presents with    Post-op Evaluation          HPI     1 day phaco OD doing well with drops    Last edited by Tiffany Alba MA on 2/27/2020  7:45 AM. (History)         Assessment /Plan :  1. Post-operative state Exam:  SLE:  S/C: normal  K : trace k fold  AC: trace cell and flare  Iris: normal  Lens: PCIOL    IMP:  PO Day 1 S/P Phaco/IOL OD : Doing well.    Plan:  Continue gtts to operative eye:  Pred 1% QID  Ketorolac QID  Zymaxid BID  Reinstructed in importance of absolute compliance with Post-OP instructions including medications, shield at bedtime, and limitation of activities. Follow up appointments in approximately one and six weeks or call immediately for increased pain, redness or vision loss.

## 2020-02-28 DIAGNOSIS — E11.9 CONTROLLED TYPE 2 DIABETES MELLITUS WITHOUT COMPLICATION, WITHOUT LONG-TERM CURRENT USE OF INSULIN: ICD-10-CM

## 2020-02-28 DIAGNOSIS — E78.5 HYPERLIPIDEMIA ASSOCIATED WITH TYPE 2 DIABETES MELLITUS: Primary | ICD-10-CM

## 2020-02-28 DIAGNOSIS — E11.69 HYPERLIPIDEMIA ASSOCIATED WITH TYPE 2 DIABETES MELLITUS: Primary | ICD-10-CM

## 2020-02-28 RX ORDER — GLIPIZIDE 5 MG/1
TABLET ORAL
Qty: 30 TABLET | Refills: 3 | Status: SHIPPED | OUTPATIENT
Start: 2020-02-28 | End: 2020-04-27

## 2020-02-28 NOTE — TELEPHONE ENCOUNTER
----- Message from Gabriela Jaimes sent at 2/28/2020 10:07 AM CST -----  FYI he stopped his metformin because he broke out in a rash. He said it got better after he quit the meds.

## 2020-02-28 NOTE — TELEPHONE ENCOUNTER
Stay off metformin in such a case, if he wants a different medication we can start on glipizide 5 mg, start with half pill 2 times a day watch for hypoglycemia monitor blood sugar carefully when starting glipizide.  If low blood sugar develops which include blood sugar less than 80 and symptoms stop glipizide and call back

## 2020-03-05 ENCOUNTER — OFFICE VISIT (OUTPATIENT)
Dept: OPHTHALMOLOGY | Facility: CLINIC | Age: 80
End: 2020-03-05
Payer: MEDICARE

## 2020-03-05 DIAGNOSIS — H25.12 NUCLEAR SCLEROSIS OF LEFT EYE: ICD-10-CM

## 2020-03-05 DIAGNOSIS — Z98.890 POST-OPERATIVE STATE: Primary | ICD-10-CM

## 2020-03-05 PROCEDURE — 99024 PR POST-OP FOLLOW-UP VISIT: ICD-10-PCS | Mod: HCNC,S$GLB,, | Performed by: OPHTHALMOLOGY

## 2020-03-05 PROCEDURE — 99999 PR PBB SHADOW E&M-EST. PATIENT-LVL II: CPT | Mod: PBBFAC,HCNC,, | Performed by: OPHTHALMOLOGY

## 2020-03-05 PROCEDURE — 99024 POSTOP FOLLOW-UP VISIT: CPT | Mod: HCNC,S$GLB,, | Performed by: OPHTHALMOLOGY

## 2020-03-05 PROCEDURE — 99999 PR PBB SHADOW E&M-EST. PATIENT-LVL II: ICD-10-PCS | Mod: PBBFAC,HCNC,, | Performed by: OPHTHALMOLOGY

## 2020-03-05 PROCEDURE — 92136 OPHTHALMIC BIOMETRY: CPT | Mod: 26,HCNC,LT,S$GLB | Performed by: OPHTHALMOLOGY

## 2020-03-05 PROCEDURE — 92136 IOL MASTER - OD - RIGHT EYE: ICD-10-PCS | Mod: 26,HCNC,LT,S$GLB | Performed by: OPHTHALMOLOGY

## 2020-03-05 RX ORDER — KETOROLAC TROMETHAMINE 5 MG/ML
1 SOLUTION OPHTHALMIC 4 TIMES DAILY
Qty: 1 BOTTLE | Refills: 2 | Status: SHIPPED | OUTPATIENT
Start: 2020-03-05 | End: 2020-11-20 | Stop reason: SDUPTHER

## 2020-03-05 RX ORDER — GATIFLOXACIN 5 MG/ML
1 SOLUTION/ DROPS OPHTHALMIC 2 TIMES DAILY
Qty: 1 BOTTLE | Refills: 2 | Status: SHIPPED | OUTPATIENT
Start: 2020-03-05 | End: 2020-11-20 | Stop reason: ALTCHOICE

## 2020-03-05 RX ORDER — PREDNISOLONE ACETATE 10 MG/ML
1 SUSPENSION/ DROPS OPHTHALMIC 4 TIMES DAILY
Qty: 1 BOTTLE | Refills: 2 | Status: SHIPPED | OUTPATIENT
Start: 2020-03-05 | End: 2020-11-20 | Stop reason: SDUPTHER

## 2020-03-05 NOTE — PROGRESS NOTES
SUBJECTIVE  Mark Dickerson Sr. is 80 y.o. male  Uncorrected distance visual acuity was 20/25 -2 in the right eye and not recorded in the left eye.   Chief Complaint   Patient presents with    Post-op Evaluation     1 week s/p phaco OD. doing well.           HPI     Post-op Evaluation      Additional comments: 1 week s/p phaco OD. doing well.               Comments     1.Cat OS  PCIOL OD +19.0 SN60WF (Distance) / 2/26/2020  2. DM      Pred ket qid gat bid OD          Last edited by Ute Dean MA on 3/5/2020  7:47 AM. (History)         Assessment /Plan :  1. Post-operative state Slit lamp exam:  L/L: nl  K: clear, wound sealed  AC: 0 cell and flare  Iris/Lens: IOL centered and stable      IMP:  PO Week 1 S/P Phaco/ IOL OD : Doing well with no evidence of infection or abnormal inflammation.     Plan:  Pt given and instructed in one week PO instructions. D/C zymaxid and start to taper off Ketorlac and Pred Forte 1% weekly. Can resume normal activitites and d/c eye shield. OTC reading glasses can be used until evaluated for final MR. Follow up in one month or PRN pain, redness, vision loss, or other concerns.     2. Nuclear sclerosis of left eye   Patient reports decreased vision in the fellow eye consistent with the clinical amount of lenticular opacity, which reaches the level of visual significance and affects activities of daily living including reading and glare. Risks, benefits, and alternatives to cataract surgery were discussed and pt desired to schedule cataract surgery. Pt was consented and the biometry and lens options were reviewed.    Schedule cataract surgery in OS       Pt is interested in traditional monofocal IOL aiming for:    Distance OU and understands that glasses will be generally needed at all times for near vision and often for finer distance correction especially for higher degrees of astigmatism.       Final visual acuity may be limited by astigmatism and diabetes    Pt wishes to have  Phaco/IOL  OS     Requests a Monofocal IOL.    Will aim for distance    Other considerations: None

## 2020-03-06 ENCOUNTER — TELEPHONE (OUTPATIENT)
Dept: OPHTHALMOLOGY | Facility: CLINIC | Age: 80
End: 2020-03-06

## 2020-03-06 NOTE — TELEPHONE ENCOUNTER
----- Message from LISA Mancuso sent at 3/6/2020 11:56 AM CST -----  Contact: pt  Patient is returning your call about his time for surgery.  Gunjan Fallon  ----- Message -----  From: Gunjan Cavazos  Sent: 3/6/2020  11:47 AM CST  To: [facility] Scheduling    Type:  Patient Returning Call    Who Called:Patient  Who Left Message for Patient:nurse  Does the patient know what this is regarding? na  Would the patient rather a call back or a response via [employer]? Call back  Best Call Back Number:[phone]  Additional Information: na

## 2020-03-11 ENCOUNTER — OUTSIDE PLACE OF SERVICE (OUTPATIENT)
Dept: ADMINISTRATIVE | Facility: OTHER | Age: 80
End: 2020-03-11
Payer: MEDICARE

## 2020-03-11 PROCEDURE — 66984 PR REMOVAL, CATARACT, W/INSRT INTRAOC LENS, W/O ENDO CYCLO: ICD-10-PCS | Mod: 79,LT,, | Performed by: OPHTHALMOLOGY

## 2020-03-11 PROCEDURE — 66984 XCAPSL CTRC RMVL W/O ECP: CPT | Mod: 79,LT,, | Performed by: OPHTHALMOLOGY

## 2020-03-12 ENCOUNTER — OFFICE VISIT (OUTPATIENT)
Dept: OPHTHALMOLOGY | Facility: CLINIC | Age: 80
End: 2020-03-12
Payer: MEDICARE

## 2020-03-12 DIAGNOSIS — Z98.890 POST-OPERATIVE STATE: Primary | ICD-10-CM

## 2020-03-12 PROCEDURE — 99024 PR POST-OP FOLLOW-UP VISIT: ICD-10-PCS | Mod: HCNC,S$GLB,, | Performed by: OPHTHALMOLOGY

## 2020-03-12 PROCEDURE — 99024 POSTOP FOLLOW-UP VISIT: CPT | Mod: HCNC,S$GLB,, | Performed by: OPHTHALMOLOGY

## 2020-03-12 PROCEDURE — 99999 PR PBB SHADOW E&M-EST. PATIENT-LVL I: CPT | Mod: PBBFAC,HCNC,, | Performed by: OPHTHALMOLOGY

## 2020-03-12 PROCEDURE — 99999 PR PBB SHADOW E&M-EST. PATIENT-LVL I: ICD-10-PCS | Mod: PBBFAC,HCNC,, | Performed by: OPHTHALMOLOGY

## 2020-03-12 NOTE — PROGRESS NOTES
SUBJECTIVE  Mark Dickerson Sr. is 80 y.o. male  Visual acuity was not recorded.   No chief complaint on file.              Assessment /Plan :  1. Post-operative state Exam:  SLE:  S/C: normal  K : trace k fold  AC: trace cell and flare  Iris: normal  Lens: PCIOL    IMP:  PO Day 1 S/P Phaco/IOL OS : Doing well.    Plan:  Continue gtts to operative eye:  Pred 1% QID  Ketorolac QID  Zymaxid BID  Reinstructed in importance of absolute compliance with Post-OP instructions including medications, shield at bedtime, and limitation of activities. Follow up appointments in approximately one and six weeks or call immediately for increased pain, redness or vision loss.

## 2020-03-19 ENCOUNTER — OFFICE VISIT (OUTPATIENT)
Dept: OPHTHALMOLOGY | Facility: CLINIC | Age: 80
End: 2020-03-19
Payer: MEDICARE

## 2020-03-19 DIAGNOSIS — Z98.890 POST-OPERATIVE STATE: Primary | ICD-10-CM

## 2020-03-19 PROCEDURE — 99999 PR PBB SHADOW E&M-EST. PATIENT-LVL II: CPT | Mod: PBBFAC,HCNC,, | Performed by: OPHTHALMOLOGY

## 2020-03-19 PROCEDURE — 99999 PR PBB SHADOW E&M-EST. PATIENT-LVL II: ICD-10-PCS | Mod: PBBFAC,HCNC,, | Performed by: OPHTHALMOLOGY

## 2020-03-19 PROCEDURE — 99024 PR POST-OP FOLLOW-UP VISIT: ICD-10-PCS | Mod: HCNC,S$GLB,, | Performed by: OPHTHALMOLOGY

## 2020-03-19 PROCEDURE — 99024 POSTOP FOLLOW-UP VISIT: CPT | Mod: HCNC,S$GLB,, | Performed by: OPHTHALMOLOGY

## 2020-03-19 NOTE — PROGRESS NOTES
SUBJECTIVE  Mark Dickerson Sr. is 80 y.o. male  Visual acuity was not recorded.   No chief complaint on file.              Assessment /Plan :  1. Post-operative state Slit lamp exam:  L/L: nl  K: clear, wound sealed  AC: 0 cell and flare  Iris/Lens: IOL centered and stable      IMP:  PO Week 1 S/P Phaco/ IOL OS : Doing well with no evidence of infection or abnormal inflammation.     Plan:  Pt given and instructed in one week PO instructions. D/C zymaxid and start to taper off Ketorlac and Pred Forte 1% weekly. Can resume normal activitites and d/c eye shield. OTC reading glasses can be used until evaluated for final MR. Follow up in one month or PRN pain, redness, vision loss, or other concerns.

## 2020-04-27 DIAGNOSIS — E11.69 HYPERLIPIDEMIA ASSOCIATED WITH TYPE 2 DIABETES MELLITUS: ICD-10-CM

## 2020-04-27 DIAGNOSIS — E78.5 HYPERLIPIDEMIA ASSOCIATED WITH TYPE 2 DIABETES MELLITUS: ICD-10-CM

## 2020-04-27 RX ORDER — GLIPIZIDE 5 MG/1
TABLET ORAL
Qty: 90 TABLET | Refills: 0 | Status: SHIPPED | OUTPATIENT
Start: 2020-04-27 | End: 2020-05-13

## 2020-05-06 ENCOUNTER — LAB VISIT (OUTPATIENT)
Dept: LAB | Facility: HOSPITAL | Age: 80
End: 2020-05-06
Attending: FAMILY MEDICINE
Payer: MEDICARE

## 2020-05-06 DIAGNOSIS — E11.9 CONTROLLED TYPE 2 DIABETES MELLITUS WITHOUT COMPLICATION, WITHOUT LONG-TERM CURRENT USE OF INSULIN: ICD-10-CM

## 2020-05-06 PROCEDURE — 82043 UR ALBUMIN QUANTITATIVE: CPT | Mod: HCNC

## 2020-05-07 LAB
ALBUMIN/CREAT UR: 8.6 UG/MG (ref 0–30)
CREAT UR-MCNC: 151 MG/DL (ref 23–375)
MICROALBUMIN UR DL<=1MG/L-MCNC: 13 UG/ML

## 2020-05-13 ENCOUNTER — OFFICE VISIT (OUTPATIENT)
Dept: FAMILY MEDICINE | Facility: CLINIC | Age: 80
End: 2020-05-13
Payer: MEDICARE

## 2020-05-13 ENCOUNTER — LAB VISIT (OUTPATIENT)
Dept: LAB | Facility: HOSPITAL | Age: 80
End: 2020-05-13
Attending: FAMILY MEDICINE
Payer: MEDICARE

## 2020-05-13 VITALS
OXYGEN SATURATION: 96 % | WEIGHT: 255.06 LBS | DIASTOLIC BLOOD PRESSURE: 76 MMHG | SYSTOLIC BLOOD PRESSURE: 128 MMHG | HEART RATE: 90 BPM | HEIGHT: 69 IN | TEMPERATURE: 99 F | BODY MASS INDEX: 37.78 KG/M2

## 2020-05-13 DIAGNOSIS — Z00.00 WELL ADULT EXAM: Primary | ICD-10-CM

## 2020-05-13 DIAGNOSIS — Z20.822 SUSPECTED COVID-19 VIRUS INFECTION: ICD-10-CM

## 2020-05-13 DIAGNOSIS — Z78.9 STATIN INTOLERANCE: ICD-10-CM

## 2020-05-13 DIAGNOSIS — E11.69 HYPERLIPIDEMIA ASSOCIATED WITH TYPE 2 DIABETES MELLITUS: ICD-10-CM

## 2020-05-13 DIAGNOSIS — E78.5 HYPERLIPIDEMIA ASSOCIATED WITH TYPE 2 DIABETES MELLITUS: ICD-10-CM

## 2020-05-13 LAB — SARS-COV-2 IGG SERPLBLD QL IA.RAPID: NEGATIVE

## 2020-05-13 PROCEDURE — 99397 PR PREVENTIVE VISIT,EST,65 & OVER: ICD-10-PCS | Mod: HCNC,S$GLB,, | Performed by: FAMILY MEDICINE

## 2020-05-13 PROCEDURE — 36415 COLL VENOUS BLD VENIPUNCTURE: CPT | Mod: HCNC,PO

## 2020-05-13 PROCEDURE — 99397 PER PM REEVAL EST PAT 65+ YR: CPT | Mod: HCNC,S$GLB,, | Performed by: FAMILY MEDICINE

## 2020-05-13 PROCEDURE — 99999 PR PBB SHADOW E&M-EST. PATIENT-LVL III: ICD-10-PCS | Mod: PBBFAC,HCNC,, | Performed by: FAMILY MEDICINE

## 2020-05-13 PROCEDURE — 99999 PR PBB SHADOW E&M-EST. PATIENT-LVL III: CPT | Mod: PBBFAC,HCNC,, | Performed by: FAMILY MEDICINE

## 2020-05-13 PROCEDURE — 3051F PR MOST RECENT HEMOGLOBIN A1C LEVEL 7.0 - < 8.0%: ICD-10-PCS | Mod: HCNC,CPTII,S$GLB, | Performed by: FAMILY MEDICINE

## 2020-05-13 PROCEDURE — 99499 UNLISTED E&M SERVICE: CPT | Mod: S$GLB,,, | Performed by: FAMILY MEDICINE

## 2020-05-13 PROCEDURE — 3051F HG A1C>EQUAL 7.0%<8.0%: CPT | Mod: HCNC,CPTII,S$GLB, | Performed by: FAMILY MEDICINE

## 2020-05-13 PROCEDURE — 86769 SARS-COV-2 COVID-19 ANTIBODY: CPT | Mod: HCNC

## 2020-05-13 PROCEDURE — 99499 RISK ADDL DX/OHS AUDIT: ICD-10-PCS | Mod: S$GLB,,, | Performed by: FAMILY MEDICINE

## 2020-05-13 RX ORDER — FLUTICASONE PROPIONATE 50 MCG
SPRAY, SUSPENSION (ML) NASAL
Qty: 1 G | Refills: 1 | Status: SHIPPED | OUTPATIENT
Start: 2020-05-13 | End: 2020-11-20 | Stop reason: SDUPTHER

## 2020-05-13 RX ORDER — GLIPIZIDE 5 MG/1
5 TABLET ORAL 2 TIMES DAILY WITH MEALS
Qty: 180 TABLET | Refills: 1 | Status: SHIPPED | OUTPATIENT
Start: 2020-05-13 | End: 2020-11-20 | Stop reason: SDUPTHER

## 2020-05-13 NOTE — PROGRESS NOTES
Chief Complaint:    Chief Complaint   Patient presents with    Follow-up       History of Present Illness:    Presents today for six-month follow-up:  He is not as active he has gained some weight.  His A1c is up to 7.1.  He is taking glipizide half pill b.i.d. and not taking metformin due to side effects.   Lipids still elevated patient has significant intolerance to statin drugs.    He had his cataract operation    He said he had flu like symptoms back in January would like to be checked for COVID-19 antibodies    He does have some trouble with sleep his mind runs constantly hard for him to shut down.    ROS:  Review of Systems   Constitutional: Negative for activity change, chills, fatigue, fever and unexpected weight change.   HENT: Negative for congestion, ear discharge, ear pain, hearing loss, postnasal drip and rhinorrhea.    Eyes: Negative for pain and visual disturbance.   Respiratory: Negative for cough, chest tightness and shortness of breath.    Cardiovascular: Negative for chest pain and palpitations.   Gastrointestinal: Negative for abdominal pain, diarrhea and vomiting.   Endocrine: Negative for heat intolerance.   Genitourinary: Negative for dysuria, flank pain, frequency and hematuria.   Musculoskeletal: Negative for back pain, gait problem and neck pain.   Skin: Negative for color change and rash.   Neurological: Negative for dizziness, tremors, seizures, numbness and headaches.   Psychiatric/Behavioral: Negative for agitation, hallucinations, self-injury, sleep disturbance and suicidal ideas. The patient is not nervous/anxious.        Past Medical History:   Diagnosis Date    DM (diabetes mellitus) 2014    BS didn't check 12/11/2019    Foreign body, eye     right eye    Hyperlipidemia     Hypertension     Pneumonia     Severe obesity (BMI 35.0-35.9 with comorbidity) 7/10/2019    Type 2 diabetes mellitus 2014    BS didn't check 12/12/2018       Social History:  Social History      Socioeconomic History    Marital status: Single     Spouse name: Not on file    Number of children: Not on file    Years of education: Not on file    Highest education level: Not on file   Occupational History    Not on file   Social Needs    Financial resource strain: Not on file    Food insecurity:     Worry: Not on file     Inability: Not on file    Transportation needs:     Medical: Not on file     Non-medical: Not on file   Tobacco Use    Smoking status: Former Smoker     Packs/day: 3.00     Types: Cigarettes     Start date:      Last attempt to quit:      Years since quittin.4    Smokeless tobacco: Never Used   Substance and Sexual Activity    Alcohol use: No    Drug use: No    Sexual activity: Never   Lifestyle    Physical activity:     Days per week: Not on file     Minutes per session: Not on file    Stress: Not on file   Relationships    Social connections:     Talks on phone: Not on file     Gets together: Not on file     Attends Spiritism service: Not on file     Active member of club or organization: Not on file     Attends meetings of clubs or organizations: Not on file     Relationship status: Not on file   Other Topics Concern    Not on file   Social History Narrative    Not on file       Family History:   family history includes Allergic rhinitis in his daughter; Cancer in his son; Cataracts in his father; Heart disease in his father and mother; Hyperlipidemia in his father; Hypertension in his brother, father, and sister.    Health Maintenance   Topic Date Due    Hemoglobin A1c  2020    Foot Exam  2020    Eye Exam  2021    Lipid Panel  2021    Urine Microalbumin  2021    TETANUS VACCINE  12/10/2025    Pneumococcal Vaccine (65+ Low/Medium Risk)  Completed       Physical Exam:    Vital Signs  Temp: 98.6 °F (37 °C)  Temp src: Oral  Pulse: 90  SpO2: 96 %  BP: 128/76  BP Location: Left arm  Patient Position: Sitting  Pain Score:  "0-No pain  Height and Weight  Height: 5' 9" (175.3 cm)  Weight: 115.7 kg (255 lb 1.2 oz)  BSA (Calculated - sq m): 2.37 sq meters  BMI (Calculated): 37.7  Weight in (lb) to have BMI = 25: 168.9]    Body mass index is 37.67 kg/m².    Physical Exam   Constitutional: He is oriented to person, place, and time. He appears well-developed.   HENT:   Right Ear: External ear normal.   Left Ear: External ear normal.   Mouth/Throat: Oropharynx is clear and moist.   Eyes: Pupils are equal, round, and reactive to light. Conjunctivae are normal.   Neck: Normal range of motion. Neck supple.   Cardiovascular: Normal rate, regular rhythm and normal heart sounds.   No murmur heard.  Pulses:       Dorsalis pedis pulses are 0 on the right side, and 0 on the left side.        Posterior tibial pulses are 0 on the right side, and 0 on the left side.   Pulmonary/Chest: Effort normal and breath sounds normal. No respiratory distress. He has no wheezes. He has no rales. He exhibits no tenderness.   Abdominal: Soft. He exhibits no distension and no mass. There is no tenderness. There is no guarding.   Musculoskeletal: He exhibits no edema or tenderness.   Feet:   Right Foot:   Protective Sensation: 10 sites tested. 10 sites sensed.   Left Foot:   Protective Sensation: 10 sites tested. 10 sites sensed.   Lymphadenopathy:     He has no cervical adenopathy.   Neurological: He is alert and oriented to person, place, and time. He has normal reflexes.   Skin: Skin is warm and dry.   Psychiatric: He has a normal mood and affect. His behavior is normal. Judgment and thought content normal.         Diabetes Management Status    Statin: Not taking  ACE/ARB: Not taking    Screening or Prevention Patient's value Goal Complete/Controlled?   HgA1C Testing and Control   Lab Results   Component Value Date    HGBA1C 7.1 (H) 05/06/2020      Annually/Less than 8% Yes   Lipid profile : 05/06/2020 Annually Yes   LDL control Lab Results   Component Value Date    " LDLCALC 174.4 (H) 05/06/2020    Annually/Less than 100 mg/dl  No   Nephropathy screening Lab Results   Component Value Date    LABMICR 13.0 05/06/2020     No results found for: PROTEINUA Annually Yes   Blood pressure BP Readings from Last 1 Encounters:   05/13/20 128/76    Less than 140/90 Yes   Dilated retinal exam : 01/13/2020 Annually Yes   Foot exam   : 11/20/2019 Annually Yes       Assessment:      ICD-10-CM ICD-9-CM   1. Well adult exam Z00.00 V70.0   2. Hyperlipidemia associated with type 2 diabetes mellitus E11.69 250.80    E78.5 272.4   3. Statin intolerance Z78.9 995.27   4. Suspected Covid-19 Virus Infection R68.89          Plan:    Increase glipizide to 5 mg b.i.d. and start an exercise program eat healthy cut back on snacking.  Check for COVID-19 antibody  Other medical problems stable  Discussed meditation techniques to help with insomnia    Follow-up 6 months  Orders Placed This Encounter   Procedures    Hemoglobin A1C    Comprehensive metabolic panel    Lipid Panel    Microalbumin/creatinine urine ratio    CBC auto differential    COVID-19 (SARS CoV-2) IgG Antibody       Current Outpatient Medications   Medication Sig Dispense Refill    cinnamon bark (CINNAMON ORAL) Take by mouth.      diclofenac sodium (VOLTAREN) 1 % Gel Apply 2 g topically once daily. 1 Tube 2    fluticasone propionate (FLONASE) 50 mcg/actuation nasal spray USE 2 SPRAY(S) IN EACH NOSTRIL ONCE DAILY 1 g 1    gatifloxacin 0.5 % Drop drops Apply 1 drop to eye 2 (two) times daily. Start one day before surgery 1 Bottle 2    gatifloxacin 0.5 % Drop drops Place 1 drop into the left eye 2 (two) times daily. Eyedrops to start one day before surgery 1 Bottle 2    glipiZIDE (GLUCOTROL) 5 MG tablet Take 1 tablet (5 mg total) by mouth 2 (two) times daily with meals. 180 tablet 1    hydroCHLOROthiazide (HYDRODIURIL) 25 MG tablet TAKE 1 TABLET BY MOUTH ONCE DAILY 90 tablet 3    ketorolac 0.5% (ACULAR) 0.5 % Drop Place 1 drop into  the right eye 4 (four) times daily. Eyedrops to start one day before surgery 1 Bottle 2    ketorolac 0.5% (ACULAR) 0.5 % Drop Place 1 drop into the left eye 4 (four) times daily. Eyedrops to start one day before surgery 1 Bottle 2    multivitamin (ONE DAILY MULTIVITAMIN) per tablet Take 1 tablet by mouth once daily.      prednisoLONE acetate (PRED FORTE) 1 % DrpS Place 1 drop into the right eye 4 (four) times daily. Start one day before surgery 1 Bottle 2    prednisoLONE acetate (PRED FORTE) 1 % DrpS Place 1 drop into the left eye 4 (four) times daily. Eyedrops to start one day before surgery 1 Bottle 2     No current facility-administered medications for this visit.        Medications Discontinued During This Encounter   Medication Reason    metFORMIN (GLUCOPHAGE) 500 MG tablet     glipiZIDE (GLUCOTROL) 5 MG tablet     fluticasone propionate (FLONASE) 50 mcg/actuation nasal spray Reorder       Follow up in about 6 months (around 11/13/2020).      Tatyana Cannon MD

## 2020-05-26 ENCOUNTER — OFFICE VISIT (OUTPATIENT)
Dept: OPHTHALMOLOGY | Facility: CLINIC | Age: 80
End: 2020-05-26
Payer: MEDICARE

## 2020-05-26 DIAGNOSIS — Z96.1 PSEUDOPHAKIA OF BOTH EYES: Primary | ICD-10-CM

## 2020-05-26 PROCEDURE — 99999 PR PBB SHADOW E&M-EST. PATIENT-LVL I: CPT | Mod: PBBFAC,HCNC,, | Performed by: OPTOMETRIST

## 2020-05-26 PROCEDURE — 92015 DETERMINE REFRACTIVE STATE: CPT | Mod: HCNC,S$GLB,, | Performed by: OPTOMETRIST

## 2020-05-26 PROCEDURE — 92014 COMPRE OPH EXAM EST PT 1/>: CPT | Mod: HCNC,S$GLB,, | Performed by: OPTOMETRIST

## 2020-05-26 PROCEDURE — 92014 PR EYE EXAM, EST PATIENT,COMPREHESV: ICD-10-PCS | Mod: HCNC,S$GLB,, | Performed by: OPTOMETRIST

## 2020-05-26 PROCEDURE — 92015 PR REFRACTION: ICD-10-PCS | Mod: HCNC,S$GLB,, | Performed by: OPTOMETRIST

## 2020-05-26 PROCEDURE — 99499 UNLISTED E&M SERVICE: CPT | Mod: S$GLB,,, | Performed by: OPTOMETRIST

## 2020-05-26 PROCEDURE — 99499 RISK ADDL DX/OHS AUDIT: ICD-10-PCS | Mod: S$GLB,,, | Performed by: OPTOMETRIST

## 2020-05-26 PROCEDURE — 99999 PR PBB SHADOW E&M-EST. PATIENT-LVL I: ICD-10-PCS | Mod: PBBFAC,HCNC,, | Performed by: OPTOMETRIST

## 2020-05-26 NOTE — PROGRESS NOTES
HPI     6 weeks S/P cataract SX left eye per [facility].  Decrease near visual acuity, hard to read small print.  Last eye visit 03/19/2020.  Last eye exam with [facility] 12/11/2019    Last edited by Jess Lawler on 5/26/2020  9:55 AM. (History)            Assessment /Plan     For exam results, see Encounter Report.    Pseudophakia of both eyes      Has stopped all drops. No complaints, no cell or flare present.    Stable IOP OU    RTC 6 mos DM DFE

## 2020-06-09 ENCOUNTER — TELEPHONE (OUTPATIENT)
Dept: FAMILY MEDICINE | Facility: CLINIC | Age: 80
End: 2020-06-09

## 2020-06-09 NOTE — TELEPHONE ENCOUNTER
----- Message from Mikala Izquierdo sent at 6/9/2020 12:20 PM CDT -----  Contact: Pt  Pt missed a call from the Dr's Office and would like a return call from the nurse.    Pt can be reached at [phone]    Thanks

## 2020-09-09 ENCOUNTER — TELEPHONE (OUTPATIENT)
Dept: FAMILY MEDICINE | Facility: CLINIC | Age: 80
End: 2020-09-09

## 2020-09-09 RX ORDER — HYDROCHLOROTHIAZIDE 25 MG/1
25 TABLET ORAL DAILY
Qty: 90 TABLET | Refills: 3 | Status: SHIPPED | OUTPATIENT
Start: 2020-09-09 | End: 2020-11-20 | Stop reason: SDUPTHER

## 2020-09-09 NOTE — TELEPHONE ENCOUNTER
----- Message from Eileen Bustamante sent at 9/9/2020  8:55 AM CDT -----  Regarding: refill  Please note Pt is in [place]. States he will speak with his [facility] here.     1. What is the name of the medication you are requesting? Hydrochlorot  2. What is the dose? .  3. How do you take the medication? Orally, topically, etc? .  4. How often do you take this medication? One a day  5. Do you need a 30 day or 90 day supply? .  6. How many refills are you requesting? .  7. What is your preferred pharmacy and location of the pharmacy? [facility]  8. Who can we contact with further questions? [phone]

## 2020-09-09 NOTE — TELEPHONE ENCOUNTER
----- Message from Vania Atkins sent at 9/9/2020 11:11 AM CDT -----  Regarding: refill  Contact: patient  Patient states that t he refill her requested needs to be sent to his [facility] here, not in [place], please call him back at [phone]

## 2020-11-13 ENCOUNTER — LAB VISIT (OUTPATIENT)
Dept: LAB | Facility: HOSPITAL | Age: 80
End: 2020-11-13
Attending: FAMILY MEDICINE
Payer: MEDICARE

## 2020-11-13 DIAGNOSIS — E11.69 HYPERLIPIDEMIA ASSOCIATED WITH TYPE 2 DIABETES MELLITUS: ICD-10-CM

## 2020-11-13 DIAGNOSIS — Z00.00 WELL ADULT EXAM: ICD-10-CM

## 2020-11-13 DIAGNOSIS — E78.5 HYPERLIPIDEMIA ASSOCIATED WITH TYPE 2 DIABETES MELLITUS: ICD-10-CM

## 2020-11-13 DIAGNOSIS — Z78.9 STATIN INTOLERANCE: ICD-10-CM

## 2020-11-13 LAB
ALBUMIN SERPL BCP-MCNC: 3.5 G/DL (ref 3.5–5.2)
ALP SERPL-CCNC: 71 U/L (ref 55–135)
ALT SERPL W/O P-5'-P-CCNC: 28 U/L (ref 10–44)
ANION GAP SERPL CALC-SCNC: 9 MMOL/L (ref 8–16)
AST SERPL-CCNC: 27 U/L (ref 10–40)
BASOPHILS # BLD AUTO: 0.07 K/UL (ref 0–0.2)
BASOPHILS NFR BLD: 0.8 % (ref 0–1.9)
BILIRUB SERPL-MCNC: 0.5 MG/DL (ref 0.1–1)
BUN SERPL-MCNC: 21 MG/DL (ref 8–23)
CALCIUM SERPL-MCNC: 9.6 MG/DL (ref 8.7–10.5)
CHLORIDE SERPL-SCNC: 98 MMOL/L (ref 95–110)
CHOLEST SERPL-MCNC: 257 MG/DL (ref 120–199)
CHOLEST/HDLC SERPL: 7.6 {RATIO} (ref 2–5)
CO2 SERPL-SCNC: 33 MMOL/L (ref 23–29)
CREAT SERPL-MCNC: 1.2 MG/DL (ref 0.5–1.4)
DIFFERENTIAL METHOD: ABNORMAL
EOSINOPHIL # BLD AUTO: 0.2 K/UL (ref 0–0.5)
EOSINOPHIL NFR BLD: 2.4 % (ref 0–8)
ERYTHROCYTE [DISTWIDTH] IN BLOOD BY AUTOMATED COUNT: 14.2 % (ref 11.5–14.5)
EST. GFR  (AFRICAN AMERICAN): >60 ML/MIN/1.73 M^2
EST. GFR  (NON AFRICAN AMERICAN): 56 ML/MIN/1.73 M^2
ESTIMATED AVG GLUCOSE: 146 MG/DL (ref 68–131)
GLUCOSE SERPL-MCNC: 162 MG/DL (ref 70–110)
HBA1C MFR BLD HPLC: 6.7 % (ref 4–5.6)
HCT VFR BLD AUTO: 44.4 % (ref 40–54)
HDLC SERPL-MCNC: 34 MG/DL (ref 40–75)
HDLC SERPL: 13.2 % (ref 20–50)
HGB BLD-MCNC: 14.7 G/DL (ref 14–18)
IMM GRANULOCYTES # BLD AUTO: 0.02 K/UL (ref 0–0.04)
IMM GRANULOCYTES NFR BLD AUTO: 0.2 % (ref 0–0.5)
LDLC SERPL CALC-MCNC: 194.2 MG/DL (ref 63–159)
LYMPHOCYTES # BLD AUTO: 2.2 K/UL (ref 1–4.8)
LYMPHOCYTES NFR BLD: 24 % (ref 18–48)
MCH RBC QN AUTO: 30.2 PG (ref 27–31)
MCHC RBC AUTO-ENTMCNC: 33.1 G/DL (ref 32–36)
MCV RBC AUTO: 91 FL (ref 82–98)
MONOCYTES # BLD AUTO: 0.7 K/UL (ref 0.3–1)
MONOCYTES NFR BLD: 7.4 % (ref 4–15)
NEUTROPHILS # BLD AUTO: 5.9 K/UL (ref 1.8–7.7)
NEUTROPHILS NFR BLD: 65.2 % (ref 38–73)
NONHDLC SERPL-MCNC: 223 MG/DL
NRBC BLD-RTO: 0 /100 WBC
PLATELET # BLD AUTO: 375 K/UL (ref 150–350)
PMV BLD AUTO: 10 FL (ref 9.2–12.9)
POTASSIUM SERPL-SCNC: 3.6 MMOL/L (ref 3.5–5.1)
PROT SERPL-MCNC: 7.3 G/DL (ref 6–8.4)
RBC # BLD AUTO: 4.87 M/UL (ref 4.6–6.2)
SODIUM SERPL-SCNC: 140 MMOL/L (ref 136–145)
TRIGL SERPL-MCNC: 144 MG/DL (ref 30–150)
WBC # BLD AUTO: 9.03 K/UL (ref 3.9–12.7)

## 2020-11-13 PROCEDURE — 80061 LIPID PANEL: CPT | Mod: HCNC

## 2020-11-13 PROCEDURE — 83036 HEMOGLOBIN GLYCOSYLATED A1C: CPT | Mod: HCNC

## 2020-11-13 PROCEDURE — 85025 COMPLETE CBC W/AUTO DIFF WBC: CPT | Mod: HCNC

## 2020-11-13 PROCEDURE — 80053 COMPREHEN METABOLIC PANEL: CPT | Mod: HCNC

## 2020-11-13 PROCEDURE — 36415 COLL VENOUS BLD VENIPUNCTURE: CPT | Mod: HCNC,PO

## 2020-11-20 ENCOUNTER — OFFICE VISIT (OUTPATIENT)
Dept: FAMILY MEDICINE | Facility: CLINIC | Age: 80
End: 2020-11-20
Payer: MEDICARE

## 2020-11-20 VITALS
TEMPERATURE: 98 F | HEIGHT: 69 IN | BODY MASS INDEX: 37.13 KG/M2 | OXYGEN SATURATION: 95 % | SYSTOLIC BLOOD PRESSURE: 118 MMHG | WEIGHT: 250.69 LBS | DIASTOLIC BLOOD PRESSURE: 76 MMHG | HEART RATE: 95 BPM

## 2020-11-20 DIAGNOSIS — E11.9 CONTROLLED TYPE 2 DIABETES MELLITUS WITHOUT COMPLICATION, WITHOUT LONG-TERM CURRENT USE OF INSULIN: Primary | ICD-10-CM

## 2020-11-20 DIAGNOSIS — E78.5 HYPERLIPIDEMIA ASSOCIATED WITH TYPE 2 DIABETES MELLITUS: ICD-10-CM

## 2020-11-20 DIAGNOSIS — E66.01 SEVERE OBESITY (BMI 35.0-35.9 WITH COMORBIDITY): ICD-10-CM

## 2020-11-20 DIAGNOSIS — E11.69 HYPERLIPIDEMIA ASSOCIATED WITH TYPE 2 DIABETES MELLITUS: ICD-10-CM

## 2020-11-20 DIAGNOSIS — L30.9 DERMATITIS: ICD-10-CM

## 2020-11-20 PROCEDURE — 99214 OFFICE O/P EST MOD 30 MIN: CPT | Mod: HCNC,S$GLB,, | Performed by: FAMILY MEDICINE

## 2020-11-20 PROCEDURE — 1126F AMNT PAIN NOTED NONE PRSNT: CPT | Mod: HCNC,S$GLB,, | Performed by: FAMILY MEDICINE

## 2020-11-20 PROCEDURE — 3288F PR FALLS RISK ASSESSMENT DOCUMENTED: ICD-10-PCS | Mod: HCNC,CPTII,S$GLB, | Performed by: FAMILY MEDICINE

## 2020-11-20 PROCEDURE — 1159F MED LIST DOCD IN RCRD: CPT | Mod: HCNC,S$GLB,, | Performed by: FAMILY MEDICINE

## 2020-11-20 PROCEDURE — 99999 PR PBB SHADOW E&M-EST. PATIENT-LVL III: ICD-10-PCS | Mod: PBBFAC,HCNC,, | Performed by: FAMILY MEDICINE

## 2020-11-20 PROCEDURE — 99499 RISK ADDL DX/OHS AUDIT: ICD-10-PCS | Mod: S$GLB,,, | Performed by: FAMILY MEDICINE

## 2020-11-20 PROCEDURE — 1159F PR MEDICATION LIST DOCUMENTED IN MEDICAL RECORD: ICD-10-PCS | Mod: HCNC,S$GLB,, | Performed by: FAMILY MEDICINE

## 2020-11-20 PROCEDURE — 99214 PR OFFICE/OUTPT VISIT, EST, LEVL IV, 30-39 MIN: ICD-10-PCS | Mod: HCNC,S$GLB,, | Performed by: FAMILY MEDICINE

## 2020-11-20 PROCEDURE — 3288F FALL RISK ASSESSMENT DOCD: CPT | Mod: HCNC,CPTII,S$GLB, | Performed by: FAMILY MEDICINE

## 2020-11-20 PROCEDURE — 99999 PR PBB SHADOW E&M-EST. PATIENT-LVL III: CPT | Mod: PBBFAC,HCNC,, | Performed by: FAMILY MEDICINE

## 2020-11-20 PROCEDURE — 1101F PR PT FALLS ASSESS DOC 0-1 FALLS W/OUT INJ PAST YR: ICD-10-PCS | Mod: HCNC,CPTII,S$GLB, | Performed by: FAMILY MEDICINE

## 2020-11-20 PROCEDURE — 99499 UNLISTED E&M SERVICE: CPT | Mod: S$GLB,,, | Performed by: FAMILY MEDICINE

## 2020-11-20 PROCEDURE — 1126F PR PAIN SEVERITY QUANTIFIED, NO PAIN PRESENT: ICD-10-PCS | Mod: HCNC,S$GLB,, | Performed by: FAMILY MEDICINE

## 2020-11-20 PROCEDURE — 1101F PT FALLS ASSESS-DOCD LE1/YR: CPT | Mod: HCNC,CPTII,S$GLB, | Performed by: FAMILY MEDICINE

## 2020-11-20 RX ORDER — FLUTICASONE PROPIONATE 50 MCG
SPRAY, SUSPENSION (ML) NASAL
Qty: 1 G | Refills: 1 | Status: SHIPPED | OUTPATIENT
Start: 2020-11-20 | End: 2021-12-21 | Stop reason: SDUPTHER

## 2020-11-20 RX ORDER — TRIAMCINOLONE ACETONIDE 1 MG/G
CREAM TOPICAL 2 TIMES DAILY
Qty: 1 TUBE | Refills: 1 | Status: SHIPPED | OUTPATIENT
Start: 2020-11-20 | End: 2022-04-02

## 2020-11-20 RX ORDER — HYDROCHLOROTHIAZIDE 25 MG/1
25 TABLET ORAL DAILY
Qty: 90 TABLET | Refills: 3 | Status: SHIPPED | OUTPATIENT
Start: 2020-11-20 | End: 2021-12-21 | Stop reason: SDUPTHER

## 2020-11-20 RX ORDER — GLIPIZIDE 5 MG/1
5 TABLET ORAL 2 TIMES DAILY WITH MEALS
Qty: 180 TABLET | Refills: 1 | Status: SHIPPED | OUTPATIENT
Start: 2020-11-20 | End: 2021-02-18

## 2020-11-20 NOTE — PROGRESS NOTES
Chief Complaint:    Chief Complaint   Patient presents with    Follow-up       History of Present Illness:    Presents today for six-month follow-up:  He says he is staying active and the A1c has come down as a result lost about 5 lb.    He is taking glipizide  b.i.d. and not taking metformin due to side effects.   Lipids still elevated patient has significant intolerance to statin drugs.      ROS:  Review of Systems   Constitutional: Negative for activity change, chills, fatigue, fever and unexpected weight change.   HENT: Negative for congestion, ear discharge, ear pain, hearing loss, postnasal drip and rhinorrhea.    Eyes: Negative for pain and visual disturbance.   Respiratory: Negative for cough, chest tightness and shortness of breath.    Cardiovascular: Negative for chest pain and palpitations.   Gastrointestinal: Negative for abdominal pain, diarrhea and vomiting.   Endocrine: Negative for heat intolerance.   Genitourinary: Negative for dysuria, flank pain, frequency and hematuria.   Musculoskeletal: Negative for back pain, gait problem and neck pain.   Skin: Negative for color change and rash.   Neurological: Negative for dizziness, tremors, seizures, numbness and headaches.   Psychiatric/Behavioral: Negative for agitation, hallucinations, self-injury, sleep disturbance and suicidal ideas. The patient is not nervous/anxious.        Past Medical History:   Diagnosis Date    DM (diabetes mellitus) 2014    BS didn't check 12/11/2019    Foreign body, eye     right eye    Hyperlipidemia     Hypertension     Pneumonia     Severe obesity (BMI 35.0-35.9 with comorbidity) 7/10/2019    Type 2 diabetes mellitus 2014    BS didn't check 12/12/2018       Social History:  Social History     Socioeconomic History    Marital status: Single     Spouse name: Not on file    Number of children: Not on file    Years of education: Not on file    Highest education level: Not on file   Occupational History    Not on  "file   Social Needs    Financial resource strain: Not on file    Food insecurity     Worry: Not on file     Inability: Not on file    Transportation needs     Medical: Not on file     Non-medical: Not on file   Tobacco Use    Smoking status: Former Smoker     Packs/day: 3.00     Types: Cigarettes     Start date:      Quit date:      Years since quittin.9    Smokeless tobacco: Never Used   Substance and Sexual Activity    Alcohol use: No    Drug use: No    Sexual activity: Never   Lifestyle    Physical activity     Days per week: Not on file     Minutes per session: Not on file    Stress: Not on file   Relationships    Social connections     Talks on phone: Not on file     Gets together: Not on file     Attends [employer] service: Not on file     Active member of club or organization: Not on file     Attends meetings of clubs or organizations: Not on file     Relationship status: Not on file   Other Topics Concern    Not on file   Social History Narrative    Not on file       Family History:   family history includes Allergic rhinitis in his daughter; Cancer in his son; Cataracts in his father; Heart disease in his father and mother; Hyperlipidemia in his father; Hypertension in his brother, father, and sister.    Health Maintenance   Topic Date Due    Foot Exam  2020    Hemoglobin A1c  2021    Eye Exam  2021    Lipid Panel  2021    Urine Microalbumin  2021    TETANUS VACCINE  12/10/2025    Pneumococcal Vaccine (65+ Low/Medium Risk)  Completed       Physical Exam:    Vital Signs  Temp: 97.7 °F (36.5 °C)  Temp src: Temporal  Pulse: 95  SpO2: 95 %  BP: 118/76  BP Location: Left arm  Patient Position: Sitting  Pain Score: 0-No pain  Height and Weight  Height: 5' 9" (175.3 cm)  Weight: 113.7 kg (250 lb 10.6 oz)  BSA (Calculated - sq m): 2.35 sq meters  BMI (Calculated): 37  Weight in (lb) to have BMI = 25: 168.9]    Body mass index is 37.02 kg/m².    Physical " Exam  Constitutional:       Appearance: He is well-developed.   HENT:      Right Ear: External ear normal.      Left Ear: External ear normal.   Eyes:      Conjunctiva/sclera: Conjunctivae normal.      Pupils: Pupils are equal, round, and reactive to light.   Neck:      Musculoskeletal: Normal range of motion and neck supple.   Cardiovascular:      Rate and Rhythm: Normal rate and regular rhythm.      Pulses:           Dorsalis pedis pulses are 0 on the right side and 0 on the left side.        Posterior tibial pulses are 0 on the right side and 0 on the left side.      Heart sounds: Normal heart sounds. No murmur.   Pulmonary:      Effort: Pulmonary effort is normal. No respiratory distress.      Breath sounds: Normal breath sounds. No wheezing or rales.   Chest:      Chest wall: No tenderness.   Abdominal:      General: There is no distension.      Palpations: Abdomen is soft. There is no mass.      Tenderness: There is no abdominal tenderness. There is no guarding.   Musculoskeletal:         General: No tenderness.   Feet:      Right foot:      Protective Sensation: 10 sites tested. 10 sites sensed.      Left foot:      Protective Sensation: 10 sites tested. 10 sites sensed.   Lymphadenopathy:      Cervical: No cervical adenopathy.   Skin:     General: Skin is warm and dry.   Neurological:      Mental Status: He is alert and oriented to person, place, and time.      Deep Tendon Reflexes: Reflexes are normal and symmetric.   Psychiatric:         Behavior: Behavior normal.         Thought Content: Thought content normal.         Judgment: Judgment normal.           Diabetes Management Status    Statin: Not taking  ACE/ARB: Not taking    Screening or Prevention Patient's value Goal Complete/Controlled?   HgA1C Testing and Control   Lab Results   Component Value Date    HGBA1C 6.7 (H) 11/13/2020      Annually/Less than 8% Yes   Lipid profile : 11/13/2020 Annually Yes   LDL control Lab Results   Component Value Date     LDLCALC 194.2 (H) 11/13/2020    Annually/Less than 100 mg/dl  No   Nephropathy screening Lab Results   Component Value Date    LABMICR 23.0 11/13/2020     No results found for: PROTEINUA Annually Yes   Blood pressure BP Readings from Last 1 Encounters:   11/20/20 118/76    Less than 140/90 Yes   Dilated retinal exam : 05/26/2020 Annually Yes   Foot exam   : 11/20/2019 Annually Yes       Assessment:      ICD-10-CM ICD-9-CM   1. Controlled type 2 diabetes mellitus without complication, without long-term current use of insulin  E11.9 250.00   2. Hyperlipidemia associated with type 2 diabetes mellitus  E11.69 250.80    E78.5 272.4   3. Severe obesity (BMI 35.0-35.9 with comorbidity)  E66.01 278.01    Z68.35 V85.35         Plan:    Continue current meds and plan  Discuss treatment for hyperlipidemia including Repatha or Praluent injection but he refused  Continue to work on weight loss  Follow-up 6 months  Orders Placed This Encounter   Procedures    Hemoglobin A1C    Comprehensive Metabolic Panel    Lipid Panel    CBC Auto Differential       Current Outpatient Medications   Medication Sig Dispense Refill    cinnamon bark (CINNAMON ORAL) Take by mouth.      diclofenac sodium (VOLTAREN) 1 % Gel Apply 2 g topically once daily. 1 Tube 2    fluticasone propionate (FLONASE) 50 mcg/actuation nasal spray USE 2 SPRAY(S) IN EACH NOSTRIL ONCE DAILY 1 g 1    glipiZIDE (GLUCOTROL) 5 MG tablet Take 1 tablet (5 mg total) by mouth 2 (two) times daily with meals. 180 tablet 1    hydroCHLOROthiazide (HYDRODIURIL) 25 MG tablet Take 1 tablet (25 mg total) by mouth once daily. 90 tablet 3    multivitamin (ONE DAILY MULTIVITAMIN) per tablet Take 1 tablet by mouth once daily.       No current facility-administered medications for this visit.        Medications Discontinued During This Encounter   Medication Reason    gatifloxacin 0.5 % Drop drops Therapy completed    gatifloxacin 0.5 % Drop drops Therapy completed    ketorolac  0.5% (ACULAR) 0.5 % Drop Therapy completed    ketorolac 0.5% (ACULAR) 0.5 % Drop Duplicate Order    prednisoLONE acetate (PRED FORTE) 1 % DrpS Therapy completed    prednisoLONE acetate (PRED FORTE) 1 % DrpS Duplicate Order       Follow up in about 6 months (around 5/20/2021).      Tatyana Cannon MD

## 2020-12-08 ENCOUNTER — PES CALL (OUTPATIENT)
Dept: ADMINISTRATIVE | Facility: CLINIC | Age: 80
End: 2020-12-08

## 2021-01-27 ENCOUNTER — PES CALL (OUTPATIENT)
Dept: ADMINISTRATIVE | Facility: CLINIC | Age: 81
End: 2021-01-27

## 2021-01-28 ENCOUNTER — PES CALL (OUTPATIENT)
Dept: ADMINISTRATIVE | Facility: CLINIC | Age: 81
End: 2021-01-28

## 2021-02-18 ENCOUNTER — OFFICE VISIT (OUTPATIENT)
Dept: FAMILY MEDICINE | Facility: CLINIC | Age: 81
End: 2021-02-18
Payer: MEDICARE

## 2021-02-18 VITALS
BODY MASS INDEX: 35.69 KG/M2 | OXYGEN SATURATION: 96 % | HEART RATE: 102 BPM | RESPIRATION RATE: 20 BRPM | WEIGHT: 249.31 LBS | TEMPERATURE: 98 F | DIASTOLIC BLOOD PRESSURE: 70 MMHG | SYSTOLIC BLOOD PRESSURE: 120 MMHG | HEIGHT: 70 IN

## 2021-02-18 DIAGNOSIS — E78.5 HYPERLIPIDEMIA ASSOCIATED WITH TYPE 2 DIABETES MELLITUS: ICD-10-CM

## 2021-02-18 DIAGNOSIS — E11.9 CONTROLLED TYPE 2 DIABETES MELLITUS WITHOUT COMPLICATION, WITHOUT LONG-TERM CURRENT USE OF INSULIN: ICD-10-CM

## 2021-02-18 DIAGNOSIS — Z00.00 ENCOUNTER FOR PREVENTIVE HEALTH EXAMINATION: Primary | ICD-10-CM

## 2021-02-18 DIAGNOSIS — E11.69 HYPERLIPIDEMIA ASSOCIATED WITH TYPE 2 DIABETES MELLITUS: ICD-10-CM

## 2021-02-18 DIAGNOSIS — E11.22 CKD STAGE 3 DUE TO TYPE 2 DIABETES MELLITUS: ICD-10-CM

## 2021-02-18 DIAGNOSIS — N18.30 CKD STAGE 3 DUE TO TYPE 2 DIABETES MELLITUS: ICD-10-CM

## 2021-02-18 DIAGNOSIS — E66.01 SEVERE OBESITY (BMI 35.0-35.9 WITH COMORBIDITY): ICD-10-CM

## 2021-02-18 DIAGNOSIS — Z78.9 STATIN INTOLERANCE: ICD-10-CM

## 2021-02-18 PROCEDURE — 99499 RISK ADDL DX/OHS AUDIT: ICD-10-PCS | Mod: HCNC,S$GLB,, | Performed by: NURSE PRACTITIONER

## 2021-02-18 PROCEDURE — 1158F ADVNC CARE PLAN TLK DOCD: CPT | Mod: S$GLB,,, | Performed by: NURSE PRACTITIONER

## 2021-02-18 PROCEDURE — 1158F PR ADVANCE CARE PLANNING DISCUSS DOCUMENTED IN MEDICAL RECORD: ICD-10-PCS | Mod: S$GLB,,, | Performed by: NURSE PRACTITIONER

## 2021-02-18 PROCEDURE — G0439 PPPS, SUBSEQ VISIT: HCPCS | Mod: S$GLB,,, | Performed by: NURSE PRACTITIONER

## 2021-02-18 PROCEDURE — 3288F PR FALLS RISK ASSESSMENT DOCUMENTED: ICD-10-PCS | Mod: CPTII,S$GLB,, | Performed by: NURSE PRACTITIONER

## 2021-02-18 PROCEDURE — 1101F PR PT FALLS ASSESS DOC 0-1 FALLS W/OUT INJ PAST YR: ICD-10-PCS | Mod: CPTII,S$GLB,, | Performed by: NURSE PRACTITIONER

## 2021-02-18 PROCEDURE — 3288F FALL RISK ASSESSMENT DOCD: CPT | Mod: CPTII,S$GLB,, | Performed by: NURSE PRACTITIONER

## 2021-02-18 PROCEDURE — 99999 PR PBB SHADOW E&M-EST. PATIENT-LVL IV: ICD-10-PCS | Mod: PBBFAC,,, | Performed by: NURSE PRACTITIONER

## 2021-02-18 PROCEDURE — G0439 PR MEDICARE ANNUAL WELLNESS SUBSEQUENT VISIT: ICD-10-PCS | Mod: S$GLB,,, | Performed by: NURSE PRACTITIONER

## 2021-02-18 PROCEDURE — 1125F PR PAIN SEVERITY QUANTIFIED, PAIN PRESENT: ICD-10-PCS | Mod: S$GLB,,, | Performed by: NURSE PRACTITIONER

## 2021-02-18 PROCEDURE — 99999 PR PBB SHADOW E&M-EST. PATIENT-LVL IV: CPT | Mod: PBBFAC,,, | Performed by: NURSE PRACTITIONER

## 2021-02-18 PROCEDURE — 1101F PT FALLS ASSESS-DOCD LE1/YR: CPT | Mod: CPTII,S$GLB,, | Performed by: NURSE PRACTITIONER

## 2021-02-18 PROCEDURE — 99499 UNLISTED E&M SERVICE: CPT | Mod: HCNC,S$GLB,, | Performed by: NURSE PRACTITIONER

## 2021-02-18 PROCEDURE — 1125F AMNT PAIN NOTED PAIN PRSNT: CPT | Mod: S$GLB,,, | Performed by: NURSE PRACTITIONER

## 2021-03-22 RX ORDER — DEXTROSE 4 G
1 TABLET,CHEWABLE ORAL 2 TIMES DAILY
Qty: 1 EACH | Refills: 0 | Status: SHIPPED | OUTPATIENT
Start: 2021-03-22 | End: 2022-04-02

## 2021-03-22 RX ORDER — LANCETS
EACH MISCELLANEOUS
Qty: 180 EACH | Refills: 3 | Status: SHIPPED | OUTPATIENT
Start: 2021-03-22 | End: 2022-04-02

## 2021-03-29 RX ORDER — ISOPROPYL ALCOHOL 70 ML/100ML
1 SWAB TOPICAL 2 TIMES DAILY
Qty: 200 EACH | Refills: 1 | Status: SHIPPED | OUTPATIENT
Start: 2021-03-29 | End: 2022-04-02

## 2021-03-31 ENCOUNTER — IMMUNIZATION (OUTPATIENT)
Dept: INTERNAL MEDICINE | Facility: CLINIC | Age: 81
End: 2021-03-31
Payer: MEDICARE

## 2021-03-31 DIAGNOSIS — Z23 NEED FOR VACCINATION: Primary | ICD-10-CM

## 2021-03-31 PROCEDURE — 91300 COVID-19, MRNA, LNP-S, PF, 30 MCG/0.3 ML DOSE VACCINE: CPT | Mod: PBBFAC | Performed by: FAMILY MEDICINE

## 2021-04-21 ENCOUNTER — IMMUNIZATION (OUTPATIENT)
Dept: INTERNAL MEDICINE | Facility: CLINIC | Age: 81
End: 2021-04-21
Payer: MEDICARE

## 2021-04-21 DIAGNOSIS — Z23 NEED FOR VACCINATION: Primary | ICD-10-CM

## 2021-04-21 PROCEDURE — 91300 COVID-19, MRNA, LNP-S, PF, 30 MCG/0.3 ML DOSE VACCINE: CPT | Mod: HCNC,PBBFAC | Performed by: FAMILY MEDICINE

## 2021-04-21 PROCEDURE — 0002A COVID-19, MRNA, LNP-S, PF, 30 MCG/0.3 ML DOSE VACCINE: CPT | Mod: HCNC,PBBFAC | Performed by: FAMILY MEDICINE

## 2021-05-04 DIAGNOSIS — E11.69 HYPERLIPIDEMIA ASSOCIATED WITH TYPE 2 DIABETES MELLITUS: ICD-10-CM

## 2021-05-04 DIAGNOSIS — E78.5 HYPERLIPIDEMIA ASSOCIATED WITH TYPE 2 DIABETES MELLITUS: ICD-10-CM

## 2021-05-04 RX ORDER — GLIPIZIDE 5 MG/1
5 TABLET ORAL 2 TIMES DAILY WITH MEALS
Qty: 180 TABLET | Refills: 0 | Status: SHIPPED | OUTPATIENT
Start: 2021-05-04 | End: 2021-07-02 | Stop reason: SDUPTHER

## 2021-05-04 RX ORDER — GLIPIZIDE 5 MG/1
TABLET ORAL
Qty: 30 TABLET | Refills: 0 | Status: SHIPPED | OUTPATIENT
Start: 2021-05-04 | End: 2021-05-04 | Stop reason: SDUPTHER

## 2021-05-21 ENCOUNTER — LAB VISIT (OUTPATIENT)
Dept: LAB | Facility: HOSPITAL | Age: 81
End: 2021-05-21
Attending: FAMILY MEDICINE
Payer: MEDICARE

## 2021-05-21 DIAGNOSIS — E11.9 CONTROLLED TYPE 2 DIABETES MELLITUS WITHOUT COMPLICATION, WITHOUT LONG-TERM CURRENT USE OF INSULIN: ICD-10-CM

## 2021-05-21 DIAGNOSIS — E78.5 HYPERLIPIDEMIA ASSOCIATED WITH TYPE 2 DIABETES MELLITUS: ICD-10-CM

## 2021-05-21 DIAGNOSIS — E66.01 SEVERE OBESITY (BMI 35.0-35.9 WITH COMORBIDITY): ICD-10-CM

## 2021-05-21 DIAGNOSIS — E11.69 HYPERLIPIDEMIA ASSOCIATED WITH TYPE 2 DIABETES MELLITUS: ICD-10-CM

## 2021-05-21 LAB
ALBUMIN SERPL BCP-MCNC: 3.5 G/DL (ref 3.5–5.2)
ALP SERPL-CCNC: 68 U/L (ref 55–135)
ALT SERPL W/O P-5'-P-CCNC: 26 U/L (ref 10–44)
ANION GAP SERPL CALC-SCNC: 12 MMOL/L (ref 8–16)
AST SERPL-CCNC: 26 U/L (ref 10–40)
BASOPHILS # BLD AUTO: 0.07 K/UL (ref 0–0.2)
BASOPHILS NFR BLD: 0.9 % (ref 0–1.9)
BILIRUB SERPL-MCNC: 0.5 MG/DL (ref 0.1–1)
BUN SERPL-MCNC: 15 MG/DL (ref 8–23)
CALCIUM SERPL-MCNC: 9.7 MG/DL (ref 8.7–10.5)
CHLORIDE SERPL-SCNC: 99 MMOL/L (ref 95–110)
CHOLEST SERPL-MCNC: 234 MG/DL (ref 120–199)
CHOLEST/HDLC SERPL: 7.8 {RATIO} (ref 2–5)
CO2 SERPL-SCNC: 27 MMOL/L (ref 23–29)
CREAT SERPL-MCNC: 1.1 MG/DL (ref 0.5–1.4)
DIFFERENTIAL METHOD: NORMAL
EOSINOPHIL # BLD AUTO: 0.2 K/UL (ref 0–0.5)
EOSINOPHIL NFR BLD: 3.1 % (ref 0–8)
ERYTHROCYTE [DISTWIDTH] IN BLOOD BY AUTOMATED COUNT: 14.4 % (ref 11.5–14.5)
EST. GFR  (AFRICAN AMERICAN): >60 ML/MIN/1.73 M^2
EST. GFR  (NON AFRICAN AMERICAN): >60 ML/MIN/1.73 M^2
ESTIMATED AVG GLUCOSE: 148 MG/DL (ref 68–131)
GLUCOSE SERPL-MCNC: 161 MG/DL (ref 70–110)
HBA1C MFR BLD: 6.8 % (ref 4–5.6)
HCT VFR BLD AUTO: 43.8 % (ref 40–54)
HDLC SERPL-MCNC: 30 MG/DL (ref 40–75)
HDLC SERPL: 12.8 % (ref 20–50)
HGB BLD-MCNC: 14.5 G/DL (ref 14–18)
IMM GRANULOCYTES # BLD AUTO: 0.02 K/UL (ref 0–0.04)
IMM GRANULOCYTES NFR BLD AUTO: 0.3 % (ref 0–0.5)
LDLC SERPL CALC-MCNC: 163.8 MG/DL (ref 63–159)
LYMPHOCYTES # BLD AUTO: 2 K/UL (ref 1–4.8)
LYMPHOCYTES NFR BLD: 26.5 % (ref 18–48)
MCH RBC QN AUTO: 29.9 PG (ref 27–31)
MCHC RBC AUTO-ENTMCNC: 33.1 G/DL (ref 32–36)
MCV RBC AUTO: 90 FL (ref 82–98)
MONOCYTES # BLD AUTO: 0.6 K/UL (ref 0.3–1)
MONOCYTES NFR BLD: 8 % (ref 4–15)
NEUTROPHILS # BLD AUTO: 4.7 K/UL (ref 1.8–7.7)
NEUTROPHILS NFR BLD: 61.2 % (ref 38–73)
NONHDLC SERPL-MCNC: 204 MG/DL
NRBC BLD-RTO: 0 /100 WBC
PLATELET # BLD AUTO: 350 K/UL (ref 150–450)
PMV BLD AUTO: 10.1 FL (ref 9.2–12.9)
POTASSIUM SERPL-SCNC: 3.5 MMOL/L (ref 3.5–5.1)
PROT SERPL-MCNC: 7.4 G/DL (ref 6–8.4)
RBC # BLD AUTO: 4.85 M/UL (ref 4.6–6.2)
SODIUM SERPL-SCNC: 138 MMOL/L (ref 136–145)
TRIGL SERPL-MCNC: 201 MG/DL (ref 30–150)
WBC # BLD AUTO: 7.62 K/UL (ref 3.9–12.7)

## 2021-05-21 PROCEDURE — 83036 HEMOGLOBIN GLYCOSYLATED A1C: CPT | Performed by: FAMILY MEDICINE

## 2021-05-21 PROCEDURE — 36415 COLL VENOUS BLD VENIPUNCTURE: CPT | Mod: PO | Performed by: FAMILY MEDICINE

## 2021-05-21 PROCEDURE — 80061 LIPID PANEL: CPT | Performed by: FAMILY MEDICINE

## 2021-05-21 PROCEDURE — 80053 COMPREHEN METABOLIC PANEL: CPT | Performed by: FAMILY MEDICINE

## 2021-05-21 PROCEDURE — 85025 COMPLETE CBC W/AUTO DIFF WBC: CPT | Performed by: FAMILY MEDICINE

## 2021-05-28 ENCOUNTER — OFFICE VISIT (OUTPATIENT)
Dept: FAMILY MEDICINE | Facility: CLINIC | Age: 81
End: 2021-05-28
Payer: MEDICARE

## 2021-05-28 VITALS
TEMPERATURE: 98 F | OXYGEN SATURATION: 96 % | HEART RATE: 92 BPM | BODY MASS INDEX: 34.82 KG/M2 | HEIGHT: 70 IN | SYSTOLIC BLOOD PRESSURE: 118 MMHG | WEIGHT: 243.19 LBS | DIASTOLIC BLOOD PRESSURE: 86 MMHG

## 2021-05-28 DIAGNOSIS — Z78.9 STATIN INTOLERANCE: ICD-10-CM

## 2021-05-28 DIAGNOSIS — M17.11 PRIMARY OSTEOARTHRITIS OF RIGHT KNEE: ICD-10-CM

## 2021-05-28 DIAGNOSIS — G47.00 INSOMNIA, UNSPECIFIED TYPE: ICD-10-CM

## 2021-05-28 DIAGNOSIS — E78.5 HYPERLIPIDEMIA ASSOCIATED WITH TYPE 2 DIABETES MELLITUS: ICD-10-CM

## 2021-05-28 DIAGNOSIS — Z01.00 DIABETIC EYE EXAM: ICD-10-CM

## 2021-05-28 DIAGNOSIS — T78.40XA ALLERGY, INITIAL ENCOUNTER: ICD-10-CM

## 2021-05-28 DIAGNOSIS — E11.69 HYPERLIPIDEMIA ASSOCIATED WITH TYPE 2 DIABETES MELLITUS: ICD-10-CM

## 2021-05-28 DIAGNOSIS — E11.9 CONTROLLED TYPE 2 DIABETES MELLITUS WITHOUT COMPLICATION, WITHOUT LONG-TERM CURRENT USE OF INSULIN: Primary | ICD-10-CM

## 2021-05-28 DIAGNOSIS — E11.9 DIABETIC EYE EXAM: ICD-10-CM

## 2021-05-28 PROCEDURE — 99214 OFFICE O/P EST MOD 30 MIN: CPT | Mod: 25,S$GLB,, | Performed by: FAMILY MEDICINE

## 2021-05-28 PROCEDURE — 3288F FALL RISK ASSESSMENT DOCD: CPT | Mod: CPTII,S$GLB,, | Performed by: FAMILY MEDICINE

## 2021-05-28 PROCEDURE — 99999 PR PBB SHADOW E&M-EST. PATIENT-LVL IV: ICD-10-PCS | Mod: PBBFAC,,, | Performed by: FAMILY MEDICINE

## 2021-05-28 PROCEDURE — 20610 DRAIN/INJ JOINT/BURSA W/O US: CPT | Mod: RT,S$GLB,, | Performed by: FAMILY MEDICINE

## 2021-05-28 PROCEDURE — 3288F PR FALLS RISK ASSESSMENT DOCUMENTED: ICD-10-PCS | Mod: CPTII,S$GLB,, | Performed by: FAMILY MEDICINE

## 2021-05-28 PROCEDURE — 1159F MED LIST DOCD IN RCRD: CPT | Mod: S$GLB,,, | Performed by: FAMILY MEDICINE

## 2021-05-28 PROCEDURE — 1126F AMNT PAIN NOTED NONE PRSNT: CPT | Mod: S$GLB,,, | Performed by: FAMILY MEDICINE

## 2021-05-28 PROCEDURE — 1159F PR MEDICATION LIST DOCUMENTED IN MEDICAL RECORD: ICD-10-PCS | Mod: S$GLB,,, | Performed by: FAMILY MEDICINE

## 2021-05-28 PROCEDURE — 99499 RISK ADDL DX/OHS AUDIT: ICD-10-PCS | Mod: HCNC,S$GLB,, | Performed by: FAMILY MEDICINE

## 2021-05-28 PROCEDURE — 1126F PR PAIN SEVERITY QUANTIFIED, NO PAIN PRESENT: ICD-10-PCS | Mod: S$GLB,,, | Performed by: FAMILY MEDICINE

## 2021-05-28 PROCEDURE — 1101F PR PT FALLS ASSESS DOC 0-1 FALLS W/OUT INJ PAST YR: ICD-10-PCS | Mod: CPTII,S$GLB,, | Performed by: FAMILY MEDICINE

## 2021-05-28 PROCEDURE — 99499 UNLISTED E&M SERVICE: CPT | Mod: HCNC,S$GLB,, | Performed by: FAMILY MEDICINE

## 2021-05-28 PROCEDURE — 20610 PR DRAIN/INJECT LARGE JOINT/BURSA: ICD-10-PCS | Mod: RT,S$GLB,, | Performed by: FAMILY MEDICINE

## 2021-05-28 PROCEDURE — 99214 PR OFFICE/OUTPT VISIT, EST, LEVL IV, 30-39 MIN: ICD-10-PCS | Mod: 25,S$GLB,, | Performed by: FAMILY MEDICINE

## 2021-05-28 PROCEDURE — 99999 PR PBB SHADOW E&M-EST. PATIENT-LVL IV: CPT | Mod: PBBFAC,,, | Performed by: FAMILY MEDICINE

## 2021-05-28 PROCEDURE — 1101F PT FALLS ASSESS-DOCD LE1/YR: CPT | Mod: CPTII,S$GLB,, | Performed by: FAMILY MEDICINE

## 2021-05-28 RX ORDER — LEVOCETIRIZINE DIHYDROCHLORIDE 5 MG/1
5 TABLET, FILM COATED ORAL NIGHTLY
Qty: 30 TABLET | Refills: 11 | Status: SHIPPED | OUTPATIENT
Start: 2021-05-28 | End: 2021-12-21 | Stop reason: SDUPTHER

## 2021-05-28 RX ORDER — METHYLPREDNISOLONE ACETATE 40 MG/ML
40 INJECTION, SUSPENSION INTRA-ARTICULAR; INTRALESIONAL; INTRAMUSCULAR; SOFT TISSUE
Status: COMPLETED | OUTPATIENT
Start: 2021-05-28 | End: 2021-05-28

## 2021-05-28 RX ORDER — ZOLPIDEM TARTRATE 5 MG/1
5 TABLET ORAL NIGHTLY PRN
Qty: 26 TABLET | Refills: 3 | Status: SHIPPED | OUTPATIENT
Start: 2021-05-28 | End: 2022-04-02

## 2021-05-28 RX ADMIN — METHYLPREDNISOLONE ACETATE 40 MG: 40 INJECTION, SUSPENSION INTRA-ARTICULAR; INTRALESIONAL; INTRAMUSCULAR; SOFT TISSUE at 09:05

## 2021-06-02 ENCOUNTER — PATIENT OUTREACH (OUTPATIENT)
Dept: ADMINISTRATIVE | Facility: OTHER | Age: 81
End: 2021-06-02

## 2021-06-03 ENCOUNTER — OFFICE VISIT (OUTPATIENT)
Dept: OPHTHALMOLOGY | Facility: CLINIC | Age: 81
End: 2021-06-03
Payer: MEDICARE

## 2021-06-03 DIAGNOSIS — Z96.1 PSEUDOPHAKIA OF BOTH EYES: ICD-10-CM

## 2021-06-03 DIAGNOSIS — Z01.00 DIABETIC EYE EXAM: ICD-10-CM

## 2021-06-03 DIAGNOSIS — E11.9 DIABETIC EYE EXAM: ICD-10-CM

## 2021-06-03 DIAGNOSIS — H52.4 BILATERAL PRESBYOPIA: ICD-10-CM

## 2021-06-03 DIAGNOSIS — E11.9 DIABETES MELLITUS TYPE 2 WITHOUT RETINOPATHY: Primary | ICD-10-CM

## 2021-06-03 PROCEDURE — 99999 PR PBB SHADOW E&M-EST. PATIENT-LVL III: CPT | Mod: PBBFAC,,, | Performed by: OPTOMETRIST

## 2021-06-03 PROCEDURE — 2023F PR DILATED RETINAL EXAM W/O EVID OF RETINOPATHY: ICD-10-PCS | Mod: S$GLB,,, | Performed by: OPTOMETRIST

## 2021-06-03 PROCEDURE — 92014 PR EYE EXAM, EST PATIENT,COMPREHESV: ICD-10-PCS | Mod: S$GLB,,, | Performed by: OPTOMETRIST

## 2021-06-03 PROCEDURE — 92015 PR REFRACTION: ICD-10-PCS | Mod: S$GLB,,, | Performed by: OPTOMETRIST

## 2021-06-03 PROCEDURE — 99499 UNLISTED E&M SERVICE: CPT | Mod: HCNC,S$GLB,, | Performed by: OPTOMETRIST

## 2021-06-03 PROCEDURE — 99999 PR PBB SHADOW E&M-EST. PATIENT-LVL III: ICD-10-PCS | Mod: PBBFAC,,, | Performed by: OPTOMETRIST

## 2021-06-03 PROCEDURE — 99499 RISK ADDL DX/OHS AUDIT: ICD-10-PCS | Mod: HCNC,S$GLB,, | Performed by: OPTOMETRIST

## 2021-06-03 PROCEDURE — 92015 DETERMINE REFRACTIVE STATE: CPT | Mod: S$GLB,,, | Performed by: OPTOMETRIST

## 2021-06-03 PROCEDURE — 2023F DILAT RTA XM W/O RTNOPTHY: CPT | Mod: S$GLB,,, | Performed by: OPTOMETRIST

## 2021-06-03 PROCEDURE — 92014 COMPRE OPH EXAM EST PT 1/>: CPT | Mod: S$GLB,,, | Performed by: OPTOMETRIST

## 2021-06-15 ENCOUNTER — TELEPHONE (OUTPATIENT)
Dept: FAMILY MEDICINE | Facility: CLINIC | Age: 81
End: 2021-06-15

## 2021-07-02 DIAGNOSIS — E78.5 HYPERLIPIDEMIA ASSOCIATED WITH TYPE 2 DIABETES MELLITUS: ICD-10-CM

## 2021-07-02 DIAGNOSIS — E11.69 HYPERLIPIDEMIA ASSOCIATED WITH TYPE 2 DIABETES MELLITUS: ICD-10-CM

## 2021-07-02 RX ORDER — GLIPIZIDE 5 MG/1
5 TABLET ORAL 2 TIMES DAILY WITH MEALS
Qty: 180 TABLET | Refills: 0 | Status: SHIPPED | OUTPATIENT
Start: 2021-07-02 | End: 2021-07-28 | Stop reason: SDUPTHER

## 2021-07-28 DIAGNOSIS — E78.5 HYPERLIPIDEMIA ASSOCIATED WITH TYPE 2 DIABETES MELLITUS: ICD-10-CM

## 2021-07-28 DIAGNOSIS — E11.69 HYPERLIPIDEMIA ASSOCIATED WITH TYPE 2 DIABETES MELLITUS: ICD-10-CM

## 2021-07-28 RX ORDER — GLIPIZIDE 5 MG/1
5 TABLET ORAL 2 TIMES DAILY WITH MEALS
Qty: 180 TABLET | Refills: 1 | Status: SHIPPED | OUTPATIENT
Start: 2021-07-28 | End: 2021-10-01

## 2021-09-29 ENCOUNTER — TELEPHONE (OUTPATIENT)
Dept: FAMILY MEDICINE | Facility: CLINIC | Age: 81
End: 2021-09-29

## 2021-09-29 DIAGNOSIS — M17.11 PRIMARY OSTEOARTHRITIS OF RIGHT KNEE: Primary | ICD-10-CM

## 2021-09-30 ENCOUNTER — TELEPHONE (OUTPATIENT)
Dept: ORTHOPEDICS | Facility: CLINIC | Age: 81
End: 2021-09-30

## 2021-10-25 DIAGNOSIS — M25.561 RIGHT KNEE PAIN, UNSPECIFIED CHRONICITY: Primary | ICD-10-CM

## 2021-10-26 ENCOUNTER — OFFICE VISIT (OUTPATIENT)
Dept: SPORTS MEDICINE | Facility: CLINIC | Age: 81
End: 2021-10-26
Payer: MEDICARE

## 2021-10-26 ENCOUNTER — HOSPITAL ENCOUNTER (OUTPATIENT)
Dept: RADIOLOGY | Facility: HOSPITAL | Age: 81
Discharge: HOME | End: 2021-10-26
Attending: FAMILY MEDICINE
Payer: MEDICARE

## 2021-10-26 VITALS — BODY MASS INDEX: 34.82 KG/M2 | WEIGHT: 243.19 LBS | HEIGHT: 70 IN

## 2021-10-26 DIAGNOSIS — M17.0 BILATERAL PRIMARY OSTEOARTHRITIS OF KNEE: Primary | ICD-10-CM

## 2021-10-26 DIAGNOSIS — M25.561 RIGHT KNEE PAIN, UNSPECIFIED CHRONICITY: ICD-10-CM

## 2021-10-26 DIAGNOSIS — M17.11 PRIMARY OSTEOARTHRITIS OF RIGHT KNEE: ICD-10-CM

## 2021-10-26 DIAGNOSIS — E11.22 CKD STAGE 3 DUE TO TYPE 2 DIABETES MELLITUS: Primary | ICD-10-CM

## 2021-10-26 DIAGNOSIS — N18.30 CKD STAGE 3 DUE TO TYPE 2 DIABETES MELLITUS: Primary | ICD-10-CM

## 2021-10-26 PROCEDURE — 1125F PR PAIN SEVERITY QUANTIFIED, PAIN PRESENT: ICD-10-PCS | Mod: HCNC,CPTII,S$GLB, | Performed by: FAMILY MEDICINE

## 2021-10-26 PROCEDURE — 99499 RISK ADDL DX/OHS AUDIT: ICD-10-PCS | Mod: HCNC,S$GLB,, | Performed by: FAMILY MEDICINE

## 2021-10-26 PROCEDURE — 3288F FALL RISK ASSESSMENT DOCD: CPT | Mod: HCNC,CPTII,S$GLB, | Performed by: FAMILY MEDICINE

## 2021-10-26 PROCEDURE — 99214 PR OFFICE/OUTPT VISIT, EST, LEVL IV, 30-39 MIN: ICD-10-PCS | Mod: HCNC,S$GLB,, | Performed by: FAMILY MEDICINE

## 2021-10-26 PROCEDURE — 1101F PT FALLS ASSESS-DOCD LE1/YR: CPT | Mod: HCNC,CPTII,S$GLB, | Performed by: FAMILY MEDICINE

## 2021-10-26 PROCEDURE — 73562 X-RAY EXAM OF KNEE 3: CPT | Mod: 26,HCNC,LT, | Performed by: RADIOLOGY

## 2021-10-26 PROCEDURE — 73564 X-RAY EXAM KNEE 4 OR MORE: CPT | Mod: 26,HCNC,RT, | Performed by: RADIOLOGY

## 2021-10-26 PROCEDURE — 73562 XR KNEE ORTHO RIGHT WITH FLEXION: ICD-10-PCS | Mod: 26,HCNC,LT, | Performed by: RADIOLOGY

## 2021-10-26 PROCEDURE — 99999 PR PBB SHADOW E&M-EST. PATIENT-LVL IV: ICD-10-PCS | Mod: PBBFAC,HCNC,, | Performed by: FAMILY MEDICINE

## 2021-10-26 PROCEDURE — 1125F AMNT PAIN NOTED PAIN PRSNT: CPT | Mod: HCNC,CPTII,S$GLB, | Performed by: FAMILY MEDICINE

## 2021-10-26 PROCEDURE — 3288F PR FALLS RISK ASSESSMENT DOCUMENTED: ICD-10-PCS | Mod: HCNC,CPTII,S$GLB, | Performed by: FAMILY MEDICINE

## 2021-10-26 PROCEDURE — 99999 PR PBB SHADOW E&M-EST. PATIENT-LVL IV: CPT | Mod: PBBFAC,HCNC,, | Performed by: FAMILY MEDICINE

## 2021-10-26 PROCEDURE — 99499 UNLISTED E&M SERVICE: CPT | Mod: HCNC,S$GLB,, | Performed by: FAMILY MEDICINE

## 2021-10-26 PROCEDURE — 1159F PR MEDICATION LIST DOCUMENTED IN MEDICAL RECORD: ICD-10-PCS | Mod: HCNC,CPTII,S$GLB, | Performed by: FAMILY MEDICINE

## 2021-10-26 PROCEDURE — 1101F PR PT FALLS ASSESS DOC 0-1 FALLS W/OUT INJ PAST YR: ICD-10-PCS | Mod: HCNC,CPTII,S$GLB, | Performed by: FAMILY MEDICINE

## 2021-10-26 PROCEDURE — 1159F MED LIST DOCD IN RCRD: CPT | Mod: HCNC,CPTII,S$GLB, | Performed by: FAMILY MEDICINE

## 2021-10-26 PROCEDURE — 73564 XR KNEE ORTHO RIGHT WITH FLEXION: ICD-10-PCS | Mod: 26,HCNC,RT, | Performed by: RADIOLOGY

## 2021-10-26 PROCEDURE — 73564 X-RAY EXAM KNEE 4 OR MORE: CPT | Mod: TC,HCNC,RT

## 2021-10-26 PROCEDURE — 99214 OFFICE O/P EST MOD 30 MIN: CPT | Mod: HCNC,S$GLB,, | Performed by: FAMILY MEDICINE

## 2021-10-28 ENCOUNTER — CLINICAL SUPPORT (OUTPATIENT)
Dept: REHABILITATION | Facility: HOSPITAL | Age: 81
End: 2021-10-28
Payer: MEDICARE

## 2021-10-28 DIAGNOSIS — M17.0 BILATERAL PRIMARY OSTEOARTHRITIS OF KNEE: ICD-10-CM

## 2021-10-28 DIAGNOSIS — R29.898 DECREASED STRENGTH INVOLVING KNEE JOINT: ICD-10-CM

## 2021-10-28 PROCEDURE — 97110 THERAPEUTIC EXERCISES: CPT | Mod: HCNC

## 2021-10-28 PROCEDURE — 97161 PT EVAL LOW COMPLEX 20 MIN: CPT | Mod: HCNC

## 2021-10-29 PROBLEM — M17.11 PRIMARY OSTEOARTHRITIS OF RIGHT KNEE: Status: ACTIVE | Noted: 2021-10-29

## 2021-11-02 ENCOUNTER — CLINICAL SUPPORT (OUTPATIENT)
Dept: REHABILITATION | Facility: HOSPITAL | Age: 81
End: 2021-11-02
Payer: MEDICARE

## 2021-11-02 DIAGNOSIS — R29.898 DECREASED STRENGTH INVOLVING KNEE JOINT: ICD-10-CM

## 2021-11-02 PROCEDURE — 97110 THERAPEUTIC EXERCISES: CPT | Mod: HCNC

## 2021-12-02 ENCOUNTER — IMMUNIZATION (OUTPATIENT)
Dept: PHARMACY | Facility: CLINIC | Age: 81
End: 2021-12-02
Payer: MEDICARE

## 2021-12-02 DIAGNOSIS — Z23 NEED FOR VACCINATION: Primary | ICD-10-CM

## 2021-12-03 ENCOUNTER — TELEPHONE (OUTPATIENT)
Dept: ORTHOPEDICS | Facility: CLINIC | Age: 81
End: 2021-12-03
Payer: MEDICARE

## 2021-12-14 ENCOUNTER — OFFICE VISIT (OUTPATIENT)
Dept: SPORTS MEDICINE | Facility: CLINIC | Age: 81
End: 2021-12-14
Payer: MEDICARE

## 2021-12-14 VITALS — HEIGHT: 70 IN | WEIGHT: 243 LBS | BODY MASS INDEX: 34.79 KG/M2

## 2021-12-14 DIAGNOSIS — M17.0 BILATERAL PRIMARY OSTEOARTHRITIS OF KNEE: Primary | ICD-10-CM

## 2021-12-14 PROCEDURE — 99999 PR PBB SHADOW E&M-EST. PATIENT-LVL III: ICD-10-PCS | Mod: PBBFAC,,, | Performed by: PHYSICAL MEDICINE & REHABILITATION

## 2021-12-14 PROCEDURE — 20610 LARGE JOINT ASPIRATION/INJECTION: BILATERAL KNEE: ICD-10-PCS | Mod: 50,S$GLB,, | Performed by: PHYSICAL MEDICINE & REHABILITATION

## 2021-12-14 PROCEDURE — 99499 NO LOS: ICD-10-PCS | Mod: S$GLB,,, | Performed by: PHYSICAL MEDICINE & REHABILITATION

## 2021-12-14 PROCEDURE — 99499 UNLISTED E&M SERVICE: CPT | Mod: S$GLB,,, | Performed by: PHYSICAL MEDICINE & REHABILITATION

## 2021-12-14 PROCEDURE — 20610 DRAIN/INJ JOINT/BURSA W/O US: CPT | Mod: 50,S$GLB,, | Performed by: PHYSICAL MEDICINE & REHABILITATION

## 2021-12-14 PROCEDURE — 99999 PR PBB SHADOW E&M-EST. PATIENT-LVL III: CPT | Mod: PBBFAC,,, | Performed by: PHYSICAL MEDICINE & REHABILITATION

## 2021-12-16 ENCOUNTER — LAB VISIT (OUTPATIENT)
Dept: LAB | Facility: HOSPITAL | Age: 81
End: 2021-12-16
Attending: FAMILY MEDICINE
Payer: MEDICARE

## 2021-12-16 DIAGNOSIS — T78.40XA ALLERGY, INITIAL ENCOUNTER: ICD-10-CM

## 2021-12-16 DIAGNOSIS — G47.00 INSOMNIA, UNSPECIFIED TYPE: ICD-10-CM

## 2021-12-16 DIAGNOSIS — E11.69 HYPERLIPIDEMIA ASSOCIATED WITH TYPE 2 DIABETES MELLITUS: ICD-10-CM

## 2021-12-16 DIAGNOSIS — Z78.9 STATIN INTOLERANCE: ICD-10-CM

## 2021-12-16 DIAGNOSIS — E11.9 CONTROLLED TYPE 2 DIABETES MELLITUS WITHOUT COMPLICATION, WITHOUT LONG-TERM CURRENT USE OF INSULIN: ICD-10-CM

## 2021-12-16 DIAGNOSIS — E78.5 HYPERLIPIDEMIA ASSOCIATED WITH TYPE 2 DIABETES MELLITUS: ICD-10-CM

## 2021-12-16 DIAGNOSIS — M17.11 PRIMARY OSTEOARTHRITIS OF RIGHT KNEE: ICD-10-CM

## 2021-12-16 LAB
ALBUMIN SERPL BCP-MCNC: 3.4 G/DL (ref 3.5–5.2)
ALP SERPL-CCNC: 67 U/L (ref 55–135)
ALT SERPL W/O P-5'-P-CCNC: 22 U/L (ref 10–44)
ANION GAP SERPL CALC-SCNC: 12 MMOL/L (ref 8–16)
AST SERPL-CCNC: 26 U/L (ref 10–40)
BASOPHILS # BLD AUTO: 0.06 K/UL (ref 0–0.2)
BASOPHILS NFR BLD: 0.7 % (ref 0–1.9)
BILIRUB SERPL-MCNC: 0.6 MG/DL (ref 0.1–1)
BUN SERPL-MCNC: 17 MG/DL (ref 8–23)
CALCIUM SERPL-MCNC: 9.9 MG/DL (ref 8.7–10.5)
CHLORIDE SERPL-SCNC: 100 MMOL/L (ref 95–110)
CHOLEST SERPL-MCNC: 268 MG/DL (ref 120–199)
CHOLEST/HDLC SERPL: 8.9 {RATIO} (ref 2–5)
CO2 SERPL-SCNC: 26 MMOL/L (ref 23–29)
CREAT SERPL-MCNC: 1.2 MG/DL (ref 0.5–1.4)
DIFFERENTIAL METHOD: NORMAL
EOSINOPHIL # BLD AUTO: 0.3 K/UL (ref 0–0.5)
EOSINOPHIL NFR BLD: 3.2 % (ref 0–8)
ERYTHROCYTE [DISTWIDTH] IN BLOOD BY AUTOMATED COUNT: 13.9 % (ref 11.5–14.5)
EST. GFR  (AFRICAN AMERICAN): >60 ML/MIN/1.73 M^2
EST. GFR  (NON AFRICAN AMERICAN): 55.6 ML/MIN/1.73 M^2
ESTIMATED AVG GLUCOSE: 160 MG/DL (ref 68–131)
GLUCOSE SERPL-MCNC: 160 MG/DL (ref 70–110)
HBA1C MFR BLD: 7.2 % (ref 4–5.6)
HCT VFR BLD AUTO: 42.7 % (ref 40–54)
HDLC SERPL-MCNC: 30 MG/DL (ref 40–75)
HDLC SERPL: 11.2 % (ref 20–50)
HGB BLD-MCNC: 14.1 G/DL (ref 14–18)
IMM GRANULOCYTES # BLD AUTO: 0.01 K/UL (ref 0–0.04)
IMM GRANULOCYTES NFR BLD AUTO: 0.1 % (ref 0–0.5)
LDLC SERPL CALC-MCNC: 198 MG/DL (ref 63–159)
LYMPHOCYTES # BLD AUTO: 2.3 K/UL (ref 1–4.8)
LYMPHOCYTES NFR BLD: 26.5 % (ref 18–48)
MCH RBC QN AUTO: 29.9 PG (ref 27–31)
MCHC RBC AUTO-ENTMCNC: 33 G/DL (ref 32–36)
MCV RBC AUTO: 91 FL (ref 82–98)
MONOCYTES # BLD AUTO: 0.6 K/UL (ref 0.3–1)
MONOCYTES NFR BLD: 6.5 % (ref 4–15)
NEUTROPHILS # BLD AUTO: 5.5 K/UL (ref 1.8–7.7)
NEUTROPHILS NFR BLD: 63 % (ref 38–73)
NONHDLC SERPL-MCNC: 238 MG/DL
NRBC BLD-RTO: 0 /100 WBC
PLATELET # BLD AUTO: 357 K/UL (ref 150–450)
PMV BLD AUTO: 10 FL (ref 9.2–12.9)
POTASSIUM SERPL-SCNC: 4 MMOL/L (ref 3.5–5.1)
PROT SERPL-MCNC: 7.2 G/DL (ref 6–8.4)
RBC # BLD AUTO: 4.71 M/UL (ref 4.6–6.2)
SODIUM SERPL-SCNC: 138 MMOL/L (ref 136–145)
TRIGL SERPL-MCNC: 200 MG/DL (ref 30–150)
WBC # BLD AUTO: 8.78 K/UL (ref 3.9–12.7)

## 2021-12-16 PROCEDURE — 83036 HEMOGLOBIN GLYCOSYLATED A1C: CPT | Mod: HCNC | Performed by: FAMILY MEDICINE

## 2021-12-16 PROCEDURE — 36415 COLL VENOUS BLD VENIPUNCTURE: CPT | Mod: HCNC,PO | Performed by: FAMILY MEDICINE

## 2021-12-16 PROCEDURE — 80061 LIPID PANEL: CPT | Mod: HCNC | Performed by: FAMILY MEDICINE

## 2021-12-16 PROCEDURE — 85025 COMPLETE CBC W/AUTO DIFF WBC: CPT | Mod: HCNC | Performed by: FAMILY MEDICINE

## 2021-12-16 PROCEDURE — 80053 COMPREHEN METABOLIC PANEL: CPT | Mod: HCNC | Performed by: FAMILY MEDICINE

## 2021-12-21 ENCOUNTER — OFFICE VISIT (OUTPATIENT)
Dept: FAMILY MEDICINE | Facility: CLINIC | Age: 81
End: 2021-12-21
Payer: MEDICARE

## 2021-12-21 ENCOUNTER — OFFICE VISIT (OUTPATIENT)
Dept: SPORTS MEDICINE | Facility: CLINIC | Age: 81
End: 2021-12-21
Payer: MEDICARE

## 2021-12-21 VITALS
HEART RATE: 100 BPM | HEIGHT: 70 IN | SYSTOLIC BLOOD PRESSURE: 114 MMHG | TEMPERATURE: 98 F | OXYGEN SATURATION: 96 % | BODY MASS INDEX: 34.2 KG/M2 | WEIGHT: 238.88 LBS | DIASTOLIC BLOOD PRESSURE: 80 MMHG

## 2021-12-21 VITALS — BODY MASS INDEX: 34.07 KG/M2 | HEIGHT: 70 IN | WEIGHT: 238 LBS

## 2021-12-21 DIAGNOSIS — Z00.00 WELL ADULT EXAM: Primary | ICD-10-CM

## 2021-12-21 DIAGNOSIS — E11.22 CKD STAGE 3 DUE TO TYPE 2 DIABETES MELLITUS: ICD-10-CM

## 2021-12-21 DIAGNOSIS — J30.9 ALLERGIC RHINITIS, UNSPECIFIED SEASONALITY, UNSPECIFIED TRIGGER: ICD-10-CM

## 2021-12-21 DIAGNOSIS — R09.89 DECREASED PEDAL PULSES: ICD-10-CM

## 2021-12-21 DIAGNOSIS — E11.69 HYPERLIPIDEMIA ASSOCIATED WITH TYPE 2 DIABETES MELLITUS: ICD-10-CM

## 2021-12-21 DIAGNOSIS — M17.0 BILATERAL PRIMARY OSTEOARTHRITIS OF KNEE: Primary | ICD-10-CM

## 2021-12-21 DIAGNOSIS — M25.561 RIGHT KNEE PAIN, UNSPECIFIED CHRONICITY: ICD-10-CM

## 2021-12-21 DIAGNOSIS — N18.30 CKD STAGE 3 DUE TO TYPE 2 DIABETES MELLITUS: ICD-10-CM

## 2021-12-21 DIAGNOSIS — E78.5 HYPERLIPIDEMIA ASSOCIATED WITH TYPE 2 DIABETES MELLITUS: ICD-10-CM

## 2021-12-21 PROCEDURE — 99397 PER PM REEVAL EST PAT 65+ YR: CPT | Mod: HCNC,S$GLB,, | Performed by: FAMILY MEDICINE

## 2021-12-21 PROCEDURE — 99999 PR PBB SHADOW E&M-EST. PATIENT-LVL IV: ICD-10-PCS | Mod: PBBFAC,HCNC,, | Performed by: FAMILY MEDICINE

## 2021-12-21 PROCEDURE — 99499 NO LOS: ICD-10-PCS | Mod: HCNC,S$GLB,, | Performed by: PHYSICAL MEDICINE & REHABILITATION

## 2021-12-21 PROCEDURE — 99999 PR PBB SHADOW E&M-EST. PATIENT-LVL III: ICD-10-PCS | Mod: PBBFAC,HCNC,, | Performed by: PHYSICAL MEDICINE & REHABILITATION

## 2021-12-21 PROCEDURE — 99397 PR PREVENTIVE VISIT,EST,65 & OVER: ICD-10-PCS | Mod: HCNC,S$GLB,, | Performed by: FAMILY MEDICINE

## 2021-12-21 PROCEDURE — 20610 DRAIN/INJ JOINT/BURSA W/O US: CPT | Mod: 50,HCNC,S$GLB, | Performed by: PHYSICAL MEDICINE & REHABILITATION

## 2021-12-21 PROCEDURE — 99999 PR PBB SHADOW E&M-EST. PATIENT-LVL IV: CPT | Mod: PBBFAC,HCNC,, | Performed by: FAMILY MEDICINE

## 2021-12-21 PROCEDURE — 99999 PR PBB SHADOW E&M-EST. PATIENT-LVL III: CPT | Mod: PBBFAC,HCNC,, | Performed by: PHYSICAL MEDICINE & REHABILITATION

## 2021-12-21 PROCEDURE — 99499 UNLISTED E&M SERVICE: CPT | Mod: HCNC,S$GLB,, | Performed by: PHYSICAL MEDICINE & REHABILITATION

## 2021-12-21 PROCEDURE — 20610 LARGE JOINT ASPIRATION/INJECTION: BILATERAL KNEE: ICD-10-PCS | Mod: 50,HCNC,S$GLB, | Performed by: PHYSICAL MEDICINE & REHABILITATION

## 2021-12-21 RX ORDER — LEVOCETIRIZINE DIHYDROCHLORIDE 5 MG/1
5 TABLET, FILM COATED ORAL NIGHTLY
Qty: 30 TABLET | Refills: 11 | Status: SHIPPED | OUTPATIENT
Start: 2021-12-21 | End: 2022-11-28 | Stop reason: SDUPTHER

## 2021-12-21 RX ORDER — GLIPIZIDE 5 MG/1
5 TABLET ORAL 2 TIMES DAILY WITH MEALS
Qty: 180 TABLET | Refills: 3 | Status: SHIPPED | OUTPATIENT
Start: 2021-12-21 | End: 2022-06-20 | Stop reason: SDUPTHER

## 2021-12-21 RX ORDER — HYDROCHLOROTHIAZIDE 25 MG/1
25 TABLET ORAL DAILY
Qty: 90 TABLET | Refills: 3 | Status: SHIPPED | OUTPATIENT
Start: 2021-12-21 | End: 2022-04-27

## 2021-12-21 RX ORDER — FLUTICASONE PROPIONATE 50 MCG
SPRAY, SUSPENSION (ML) NASAL
Qty: 1 G | Refills: 1 | Status: SHIPPED | OUTPATIENT
Start: 2021-12-21 | End: 2022-04-02

## 2021-12-30 ENCOUNTER — OFFICE VISIT (OUTPATIENT)
Dept: SPORTS MEDICINE | Facility: CLINIC | Age: 81
End: 2021-12-30
Payer: MEDICARE

## 2021-12-30 VITALS — HEIGHT: 70 IN | RESPIRATION RATE: 20 BRPM | WEIGHT: 244.06 LBS | BODY MASS INDEX: 34.94 KG/M2

## 2021-12-30 DIAGNOSIS — M17.0 BILATERAL PRIMARY OSTEOARTHRITIS OF KNEE: Primary | ICD-10-CM

## 2021-12-30 PROCEDURE — 99999 PR PBB SHADOW E&M-EST. PATIENT-LVL III: CPT | Mod: PBBFAC,HCNC,, | Performed by: STUDENT IN AN ORGANIZED HEALTH CARE EDUCATION/TRAINING PROGRAM

## 2021-12-30 PROCEDURE — 99999 PR PBB SHADOW E&M-EST. PATIENT-LVL III: ICD-10-PCS | Mod: PBBFAC,HCNC,, | Performed by: STUDENT IN AN ORGANIZED HEALTH CARE EDUCATION/TRAINING PROGRAM

## 2021-12-30 PROCEDURE — 1101F PT FALLS ASSESS-DOCD LE1/YR: CPT | Mod: HCNC,CPTII,S$GLB, | Performed by: STUDENT IN AN ORGANIZED HEALTH CARE EDUCATION/TRAINING PROGRAM

## 2021-12-30 PROCEDURE — 1159F PR MEDICATION LIST DOCUMENTED IN MEDICAL RECORD: ICD-10-PCS | Mod: HCNC,CPTII,S$GLB, | Performed by: STUDENT IN AN ORGANIZED HEALTH CARE EDUCATION/TRAINING PROGRAM

## 2021-12-30 PROCEDURE — 1101F PR PT FALLS ASSESS DOC 0-1 FALLS W/OUT INJ PAST YR: ICD-10-PCS | Mod: HCNC,CPTII,S$GLB, | Performed by: STUDENT IN AN ORGANIZED HEALTH CARE EDUCATION/TRAINING PROGRAM

## 2021-12-30 PROCEDURE — 3288F PR FALLS RISK ASSESSMENT DOCUMENTED: ICD-10-PCS | Mod: HCNC,CPTII,S$GLB, | Performed by: STUDENT IN AN ORGANIZED HEALTH CARE EDUCATION/TRAINING PROGRAM

## 2021-12-30 PROCEDURE — 1126F AMNT PAIN NOTED NONE PRSNT: CPT | Mod: HCNC,CPTII,S$GLB, | Performed by: STUDENT IN AN ORGANIZED HEALTH CARE EDUCATION/TRAINING PROGRAM

## 2021-12-30 PROCEDURE — 3288F FALL RISK ASSESSMENT DOCD: CPT | Mod: HCNC,CPTII,S$GLB, | Performed by: STUDENT IN AN ORGANIZED HEALTH CARE EDUCATION/TRAINING PROGRAM

## 2021-12-30 PROCEDURE — 99499 UNLISTED E&M SERVICE: CPT | Mod: HCNC,S$GLB,, | Performed by: STUDENT IN AN ORGANIZED HEALTH CARE EDUCATION/TRAINING PROGRAM

## 2021-12-30 PROCEDURE — 1126F PR PAIN SEVERITY QUANTIFIED, NO PAIN PRESENT: ICD-10-PCS | Mod: HCNC,CPTII,S$GLB, | Performed by: STUDENT IN AN ORGANIZED HEALTH CARE EDUCATION/TRAINING PROGRAM

## 2021-12-30 PROCEDURE — 20610 DRAIN/INJ JOINT/BURSA W/O US: CPT | Mod: 50,HCNC,S$GLB, | Performed by: STUDENT IN AN ORGANIZED HEALTH CARE EDUCATION/TRAINING PROGRAM

## 2021-12-30 PROCEDURE — 1159F MED LIST DOCD IN RCRD: CPT | Mod: HCNC,CPTII,S$GLB, | Performed by: STUDENT IN AN ORGANIZED HEALTH CARE EDUCATION/TRAINING PROGRAM

## 2021-12-30 PROCEDURE — 99499 NO LOS: ICD-10-PCS | Mod: HCNC,S$GLB,, | Performed by: STUDENT IN AN ORGANIZED HEALTH CARE EDUCATION/TRAINING PROGRAM

## 2021-12-30 PROCEDURE — 20610 LARGE JOINT ASPIRATION/INJECTION: BILATERAL KNEE: ICD-10-PCS | Mod: 50,HCNC,S$GLB, | Performed by: STUDENT IN AN ORGANIZED HEALTH CARE EDUCATION/TRAINING PROGRAM

## 2022-04-02 ENCOUNTER — HOSPITAL ENCOUNTER (OUTPATIENT)
Facility: HOSPITAL | Age: 82
Discharge: HOME | End: 2022-04-03
Attending: EMERGENCY MEDICINE | Admitting: INTERNAL MEDICINE
Payer: MEDICARE

## 2022-04-02 DIAGNOSIS — R79.89 TROPONIN I ABOVE REFERENCE RANGE: Primary | ICD-10-CM

## 2022-04-02 DIAGNOSIS — R42 DIZZINESS: ICD-10-CM

## 2022-04-02 DIAGNOSIS — R55 PRE-SYNCOPE: ICD-10-CM

## 2022-04-02 DIAGNOSIS — R42 POSTURAL DIZZINESS WITH PRESYNCOPE: ICD-10-CM

## 2022-04-02 DIAGNOSIS — R55 POSTURAL DIZZINESS WITH PRESYNCOPE: ICD-10-CM

## 2022-04-02 PROBLEM — I95.1 ORTHOSTATIC HYPOTENSION: Status: ACTIVE | Noted: 2022-04-02

## 2022-04-02 LAB
ALBUMIN SERPL BCP-MCNC: 3.6 G/DL (ref 3.5–5.2)
ALP SERPL-CCNC: 59 U/L (ref 55–135)
ALT SERPL W/O P-5'-P-CCNC: 23 U/L (ref 10–44)
ANION GAP SERPL CALC-SCNC: 13 MMOL/L (ref 8–16)
AST SERPL-CCNC: 26 U/L (ref 10–40)
BASOPHILS # BLD AUTO: 0.09 K/UL (ref 0–0.2)
BASOPHILS NFR BLD: 1 % (ref 0–1.9)
BILIRUB SERPL-MCNC: 0.5 MG/DL (ref 0.1–1)
BILIRUB UR QL STRIP: NEGATIVE
BNP SERPL-MCNC: 60 PG/ML (ref 0–99)
BUN SERPL-MCNC: 21 MG/DL (ref 8–23)
CALCIUM SERPL-MCNC: 9.3 MG/DL (ref 8.7–10.5)
CHLORIDE SERPL-SCNC: 103 MMOL/L (ref 95–110)
CLARITY UR: CLEAR
CO2 SERPL-SCNC: 24 MMOL/L (ref 23–29)
COLOR UR: YELLOW
CREAT SERPL-MCNC: 1.2 MG/DL (ref 0.5–1.4)
DIFFERENTIAL METHOD: ABNORMAL
EOSINOPHIL # BLD AUTO: 0.3 K/UL (ref 0–0.5)
EOSINOPHIL NFR BLD: 2.9 % (ref 0–8)
ERYTHROCYTE [DISTWIDTH] IN BLOOD BY AUTOMATED COUNT: 14.2 % (ref 11.5–14.5)
EST. GFR  (AFRICAN AMERICAN): >60 ML/MIN/1.73 M^2
EST. GFR  (NON AFRICAN AMERICAN): 55 ML/MIN/1.73 M^2
GLUCOSE SERPL-MCNC: 149 MG/DL (ref 70–110)
GLUCOSE UR QL STRIP: NEGATIVE
HCT VFR BLD AUTO: 40.2 % (ref 40–54)
HGB BLD-MCNC: 13.7 G/DL (ref 14–18)
HGB UR QL STRIP: NEGATIVE
IMM GRANULOCYTES # BLD AUTO: 0.04 K/UL (ref 0–0.04)
IMM GRANULOCYTES NFR BLD AUTO: 0.4 % (ref 0–0.5)
KETONES UR QL STRIP: NEGATIVE
LEUKOCYTE ESTERASE UR QL STRIP: NEGATIVE
LIPASE SERPL-CCNC: 11 U/L (ref 4–60)
LYMPHOCYTES # BLD AUTO: 2.1 K/UL (ref 1–4.8)
LYMPHOCYTES NFR BLD: 22.2 % (ref 18–48)
MCH RBC QN AUTO: 30 PG (ref 27–31)
MCHC RBC AUTO-ENTMCNC: 34.1 G/DL (ref 32–36)
MCV RBC AUTO: 88 FL (ref 82–98)
MONOCYTES # BLD AUTO: 0.7 K/UL (ref 0.3–1)
MONOCYTES NFR BLD: 7.3 % (ref 4–15)
NEUTROPHILS # BLD AUTO: 6.2 K/UL (ref 1.8–7.7)
NEUTROPHILS NFR BLD: 66.2 % (ref 38–73)
NITRITE UR QL STRIP: NEGATIVE
NRBC BLD-RTO: 0 /100 WBC
PH UR STRIP: 7 [PH] (ref 5–8)
PLATELET # BLD AUTO: 310 K/UL (ref 150–450)
PMV BLD AUTO: 9.6 FL (ref 9.2–12.9)
POCT GLUCOSE: 124 MG/DL (ref 70–110)
POCT GLUCOSE: 148 MG/DL (ref 70–110)
POTASSIUM SERPL-SCNC: 3.6 MMOL/L (ref 3.5–5.1)
PROT SERPL-MCNC: 7.3 G/DL (ref 6–8.4)
PROT UR QL STRIP: NEGATIVE
RBC # BLD AUTO: 4.57 M/UL (ref 4.6–6.2)
SARS-COV-2 RDRP RESP QL NAA+PROBE: NEGATIVE
SODIUM SERPL-SCNC: 140 MMOL/L (ref 136–145)
SP GR UR STRIP: 1.02 (ref 1–1.03)
TROPONIN I SERPL DL<=0.01 NG/ML-MCNC: 0.04 NG/ML (ref 0–0.03)
TROPONIN I SERPL DL<=0.01 NG/ML-MCNC: 0.05 NG/ML (ref 0–0.03)
TROPONIN I SERPL DL<=0.01 NG/ML-MCNC: 0.05 NG/ML (ref 0–0.03)
TSH SERPL DL<=0.005 MIU/L-ACNC: 1.06 UIU/ML (ref 0.4–4)
URN SPEC COLLECT METH UR: NORMAL
UROBILINOGEN UR STRIP-ACNC: NEGATIVE EU/DL
WBC # BLD AUTO: 9.4 K/UL (ref 3.9–12.7)

## 2022-04-02 PROCEDURE — 25000003 PHARM REV CODE 250: Performed by: NURSE PRACTITIONER

## 2022-04-02 PROCEDURE — 93005 ELECTROCARDIOGRAM TRACING: CPT

## 2022-04-02 PROCEDURE — P9612 CATHETERIZE FOR URINE SPEC: HCPCS

## 2022-04-02 PROCEDURE — U0002 COVID-19 LAB TEST NON-CDC: HCPCS | Performed by: EMERGENCY MEDICINE

## 2022-04-02 PROCEDURE — 93010 ELECTROCARDIOGRAM REPORT: CPT | Mod: ,,, | Performed by: INTERNAL MEDICINE

## 2022-04-02 PROCEDURE — 83880 ASSAY OF NATRIURETIC PEPTIDE: CPT | Performed by: EMERGENCY MEDICINE

## 2022-04-02 PROCEDURE — 84443 ASSAY THYROID STIM HORMONE: CPT | Performed by: NURSE PRACTITIONER

## 2022-04-02 PROCEDURE — 63600175 PHARM REV CODE 636 W HCPCS: Performed by: NURSE PRACTITIONER

## 2022-04-02 PROCEDURE — 84484 ASSAY OF TROPONIN QUANT: CPT | Mod: 91 | Performed by: NURSE PRACTITIONER

## 2022-04-02 PROCEDURE — G0378 HOSPITAL OBSERVATION PER HR: HCPCS

## 2022-04-02 PROCEDURE — 96360 HYDRATION IV INFUSION INIT: CPT

## 2022-04-02 PROCEDURE — 96372 THER/PROPH/DIAG INJ SC/IM: CPT | Mod: 59 | Performed by: NURSE PRACTITIONER

## 2022-04-02 PROCEDURE — 84484 ASSAY OF TROPONIN QUANT: CPT | Performed by: EMERGENCY MEDICINE

## 2022-04-02 PROCEDURE — 93010 EKG 12-LEAD: ICD-10-PCS | Mod: ,,, | Performed by: INTERNAL MEDICINE

## 2022-04-02 PROCEDURE — 99285 EMERGENCY DEPT VISIT HI MDM: CPT | Mod: 25,CS

## 2022-04-02 PROCEDURE — 25000003 PHARM REV CODE 250: Performed by: EMERGENCY MEDICINE

## 2022-04-02 PROCEDURE — 83690 ASSAY OF LIPASE: CPT | Performed by: EMERGENCY MEDICINE

## 2022-04-02 PROCEDURE — 80053 COMPREHEN METABOLIC PANEL: CPT | Performed by: EMERGENCY MEDICINE

## 2022-04-02 PROCEDURE — 81003 URINALYSIS AUTO W/O SCOPE: CPT | Performed by: EMERGENCY MEDICINE

## 2022-04-02 PROCEDURE — 82962 GLUCOSE BLOOD TEST: CPT

## 2022-04-02 PROCEDURE — 85025 COMPLETE CBC W/AUTO DIFF WBC: CPT | Performed by: EMERGENCY MEDICINE

## 2022-04-02 RX ORDER — IBUPROFEN 200 MG
24 TABLET ORAL
Status: DISCONTINUED | OUTPATIENT
Start: 2022-04-02 | End: 2022-04-03 | Stop reason: HOSPADM

## 2022-04-02 RX ORDER — ACETAMINOPHEN 325 MG/1
650 TABLET ORAL EVERY 8 HOURS PRN
Status: DISCONTINUED | OUTPATIENT
Start: 2022-04-02 | End: 2022-04-03 | Stop reason: HOSPADM

## 2022-04-02 RX ORDER — SODIUM CHLORIDE 9 MG/ML
INJECTION, SOLUTION INTRAVENOUS ONCE
Status: COMPLETED | OUTPATIENT
Start: 2022-04-02 | End: 2022-04-02

## 2022-04-02 RX ORDER — ONDANSETRON 8 MG/1
8 TABLET, ORALLY DISINTEGRATING ORAL EVERY 8 HOURS PRN
Status: DISCONTINUED | OUTPATIENT
Start: 2022-04-02 | End: 2022-04-03 | Stop reason: HOSPADM

## 2022-04-02 RX ORDER — IBUPROFEN 200 MG
16 TABLET ORAL
Status: DISCONTINUED | OUTPATIENT
Start: 2022-04-02 | End: 2022-04-03 | Stop reason: HOSPADM

## 2022-04-02 RX ORDER — ASPIRIN 325 MG
325 TABLET ORAL
Status: COMPLETED | OUTPATIENT
Start: 2022-04-02 | End: 2022-04-02

## 2022-04-02 RX ORDER — ENOXAPARIN SODIUM 100 MG/ML
40 INJECTION SUBCUTANEOUS EVERY 24 HOURS
Status: DISCONTINUED | OUTPATIENT
Start: 2022-04-02 | End: 2022-04-03 | Stop reason: HOSPADM

## 2022-04-02 RX ORDER — INSULIN ASPART 100 [IU]/ML
0-5 INJECTION, SOLUTION INTRAVENOUS; SUBCUTANEOUS
Status: DISCONTINUED | OUTPATIENT
Start: 2022-04-02 | End: 2022-04-03 | Stop reason: HOSPADM

## 2022-04-02 RX ORDER — GLUCAGON 1 MG
1 KIT INJECTION
Status: DISCONTINUED | OUTPATIENT
Start: 2022-04-02 | End: 2022-04-03 | Stop reason: HOSPADM

## 2022-04-02 RX ORDER — AMOXICILLIN 250 MG
1 CAPSULE ORAL 2 TIMES DAILY PRN
Status: DISCONTINUED | OUTPATIENT
Start: 2022-04-02 | End: 2022-04-03 | Stop reason: HOSPADM

## 2022-04-02 RX ORDER — TALC
6 POWDER (GRAM) TOPICAL NIGHTLY PRN
Status: DISCONTINUED | OUTPATIENT
Start: 2022-04-02 | End: 2022-04-03 | Stop reason: HOSPADM

## 2022-04-02 RX ORDER — MECLIZINE HYDROCHLORIDE 25 MG/1
25 TABLET ORAL 3 TIMES DAILY PRN
Status: DISCONTINUED | OUTPATIENT
Start: 2022-04-02 | End: 2022-04-03 | Stop reason: HOSPADM

## 2022-04-02 RX ADMIN — ENOXAPARIN SODIUM 40 MG: 100 INJECTION SUBCUTANEOUS at 09:04

## 2022-04-02 RX ADMIN — SODIUM CHLORIDE 1000 ML: 0.9 INJECTION, SOLUTION INTRAVENOUS at 01:04

## 2022-04-02 RX ADMIN — SODIUM CHLORIDE: 0.9 INJECTION, SOLUTION INTRAVENOUS at 07:04

## 2022-04-02 RX ADMIN — ASPIRIN 325 MG ORAL TABLET 325 MG: 325 PILL ORAL at 01:04

## 2022-04-02 NOTE — ED PROVIDER NOTES
SCRIBE #1 NOTE: I, Brody Baum, am scribing for, and in the presence of, Moisés Rodarte Jr., MD. I have scribed the entire note.       History     Chief Complaint   Patient presents with    Dizziness     Dizziness on standing, fell at home/ lost balance     Review of patient's allergies indicates:  No Known Allergies      History of Present Illness     HPI    4/2/2022, 12:02 PM  History obtained from the patient      History of Present Illness: Mark Dickerson Sr. is a 82 y.o. male patient with a PMHx of AFIB, CKD, DM, HTN, who presents to the Emergency Department for evaluation of fall due to diziness which occurred PTA. Pt states that his diziness improves when he sits down but does not go away, and that he feels like the room is spinning around him. Pt reports that he is currently taking blood sugar medication. Following onset of symptoms pt reports that his blood sugar was 90/60. Symptoms are constant and moderate in severity. No mitigating or exacerbating factors reported. Associated sxs include SOB, syncope, and nausea. Patient denies any vomiting, chest pain, diarrhea, coughing, blood in stool, and all other sxs at this time. No prior Tx reported. No further complaints or concerns at this time.       Arrival mode: Personal vehicle    PCP: Tatyana Cannon MD        Past Medical History:  Past Medical History:   Diagnosis Date    CKD stage 3 due to type 2 diabetes mellitus 2/18/2021    DM (diabetes mellitus) 2014    BS didn't check 12/11/2019    Foreign body, eye     right eye    Hyperlipidemia     Hypertension     Pneumonia     Primary osteoarthritis of right knee 10/29/2021    Severe obesity (BMI 35.0-35.9 with comorbidity) 7/10/2019    Type 2 diabetes mellitus 2014    BS didn't check 12/12/2018       Past Surgical History:  Past Surgical History:   Procedure Laterality Date    APPENDECTOMY      CATARACT EXTRACTION Bilateral     CPG         Family History:  Family History   Problem Relation Age of  Onset    Heart disease Mother     Hypertension Father     Heart disease Father     Hyperlipidemia Father     Cataracts Father     Hypertension Sister     Hypertension Brother     Cancer Son         colon    Allergic rhinitis Daughter        Social History:  Social History     Tobacco Use    Smoking status: Former Smoker     Packs/day: 3.00     Types: Cigarettes     Start date:      Quit date:      Years since quittin.2    Smokeless tobacco: Never Used   Substance and Sexual Activity    Alcohol use: No    Drug use: No    Sexual activity: Never        Review of Systems     Review of Systems   Constitutional: Negative for fever.   HENT: Negative for sore throat.    Respiratory: Positive for shortness of breath. Negative for cough.    Cardiovascular: Negative for chest pain.   Gastrointestinal: Positive for nausea. Negative for blood in stool, diarrhea and vomiting.   Genitourinary: Negative for dysuria.   Musculoskeletal: Negative for back pain.   Skin: Negative for rash.   Neurological: Positive for dizziness and syncope. Negative for weakness.   Hematological: Does not bruise/bleed easily.   All other systems reviewed and are negative.       Physical Exam     Initial Vitals [22 1145]   BP Pulse Resp Temp SpO2   92/60 84 16 98.4 °F (36.9 °C) 97 %      MAP       --          Physical Exam  Nursing Notes and Vital Signs Reviewed.  Constitutional: Patient is in no acute distress. Well-developed and well-nourished.  Head: Atraumatic. Normocephalic.  Eyes: PERRL. EOM intact. Conjunctivae are not pale. No scleral icterus.  ENT: Mucous membranes are moist. Oropharynx is clear and symmetric.    Neck: Supple. Full ROM. No lymphadenopathy.  Cardiovascular: Regular rate. Regular rhythm. No murmurs, rubs, or gallops. Distal pulses are 2+ and symmetric.  Pulmonary/Chest: No respiratory distress. Clear to auscultation bilaterally. No wheezing or rales.  Abdominal: Soft and non-distended.  There is no  "tenderness.  No rebound, guarding, or rigidity. Good bowel sounds.  Genitourinary: No CVA tenderness  Musculoskeletal: Moves all extremities. No obvious deformities. No edema. No calf tenderness.  Skin: Warm and dry.  Neurological:  Alert, awake, and appropriate.  Normal speech.  No acute focal neurological deficits are appreciated.  Psychiatric: Normal affect. Good eye contact. Appropriate in content.     ED Course   Procedures  ED Vital Signs:  Vitals:    04/02/22 1145 04/02/22 1223 04/02/22 1225 04/02/22 1230   BP: 92/60 127/82  131/81   Pulse: 84 89 77 92   Resp: 16 20  20   Temp: 98.4 °F (36.9 °C)      TempSrc: Oral      SpO2: 97% 95%  (!) 94%   Height: 5' 10" (1.778 m)       04/02/22 1231 04/02/22 1300 04/02/22 1400 04/02/22 1530   BP: 111/69 128/80 (!) 162/82 (!) 165/94   Pulse: 92 90 80 82   Resp: 19 20 16 19   Temp:       TempSrc:       SpO2: 96% 95% 97% 99%   Height:        04/02/22 1600 04/02/22 1630   BP: (!) 148/81 (!) 167/95   Pulse: 68 77   Resp: 17    Temp:     TempSrc:     SpO2: 96% 95%   Height:         Abnormal Lab Results:  Labs Reviewed   CBC W/ AUTO DIFFERENTIAL - Abnormal; Notable for the following components:       Result Value    RBC 4.57 (*)     Hemoglobin 13.7 (*)     All other components within normal limits   COMPREHENSIVE METABOLIC PANEL - Abnormal; Notable for the following components:    Glucose 149 (*)     eGFR if non  55 (*)     All other components within normal limits   TROPONIN I - Abnormal; Notable for the following components:    Troponin I 0.043 (*)     All other components within normal limits   TROPONIN I - Abnormal; Notable for the following components:    Troponin I 0.053 (*)     All other components within normal limits   POCT GLUCOSE - Abnormal; Notable for the following components:    POCT Glucose 148 (*)     All other components within normal limits   LIPASE   URINALYSIS, REFLEX TO URINE CULTURE    Narrative:     Specimen Source->Urine   B-TYPE " NATRIURETIC PEPTIDE   SARS-COV-2 RNA AMPLIFICATION, QUAL   POCT GLUCOSE MONITORING CONTINUOUS        All Lab Results:  Results for orders placed or performed during the hospital encounter of 04/02/22   CBC auto differential   Result Value Ref Range    WBC 9.40 3.90 - 12.70 K/uL    RBC 4.57 (L) 4.60 - 6.20 M/uL    Hemoglobin 13.7 (L) 14.0 - 18.0 g/dL    Hematocrit 40.2 40.0 - 54.0 %    MCV 88 82 - 98 fL    MCH 30.0 27.0 - 31.0 pg    MCHC 34.1 32.0 - 36.0 g/dL    RDW 14.2 11.5 - 14.5 %    Platelets 310 150 - 450 K/uL    MPV 9.6 9.2 - 12.9 fL    Immature Granulocytes 0.4 0.0 - 0.5 %    Gran # (ANC) 6.2 1.8 - 7.7 K/uL    Immature Grans (Abs) 0.04 0.00 - 0.04 K/uL    Lymph # 2.1 1.0 - 4.8 K/uL    Mono # 0.7 0.3 - 1.0 K/uL    Eos # 0.3 0.0 - 0.5 K/uL    Baso # 0.09 0.00 - 0.20 K/uL    nRBC 0 0 /100 WBC    Gran % 66.2 38.0 - 73.0 %    Lymph % 22.2 18.0 - 48.0 %    Mono % 7.3 4.0 - 15.0 %    Eosinophil % 2.9 0.0 - 8.0 %    Basophil % 1.0 0.0 - 1.9 %    Differential Method Automated    Comprehensive metabolic panel   Result Value Ref Range    Sodium 140 136 - 145 mmol/L    Potassium 3.6 3.5 - 5.1 mmol/L    Chloride 103 95 - 110 mmol/L    CO2 24 23 - 29 mmol/L    Glucose 149 (H) 70 - 110 mg/dL    BUN 21 8 - 23 mg/dL    Creatinine 1.2 0.5 - 1.4 mg/dL    Calcium 9.3 8.7 - 10.5 mg/dL    Total Protein 7.3 6.0 - 8.4 g/dL    Albumin 3.6 3.5 - 5.2 g/dL    Total Bilirubin 0.5 0.1 - 1.0 mg/dL    Alkaline Phosphatase 59 55 - 135 U/L    AST 26 10 - 40 U/L    ALT 23 10 - 44 U/L    Anion Gap 13 8 - 16 mmol/L    eGFR if African American >60 >60 mL/min/1.73 m^2    eGFR if non African American 55 (A) >60 mL/min/1.73 m^2   Lipase   Result Value Ref Range    Lipase 11 4 - 60 U/L   Troponin I   Result Value Ref Range    Troponin I 0.043 (H) 0.000 - 0.026 ng/mL   Urinalysis, Reflex to Urine Culture Urine, Clean Catch    Specimen: Urine   Result Value Ref Range    Specimen UA Urine, Catheterized     Color, UA Yellow Yellow, Straw, Arti     Appearance, UA Clear Clear    pH, UA 7.0 5.0 - 8.0    Specific Gravity, UA 1.020 1.005 - 1.030    Protein, UA Negative Negative    Glucose, UA Negative Negative    Ketones, UA Negative Negative    Bilirubin (UA) Negative Negative    Occult Blood UA Negative Negative    Nitrite, UA Negative Negative    Urobilinogen, UA Negative <2.0 EU/dL    Leukocytes, UA Negative Negative   Brain natriuretic peptide   Result Value Ref Range    BNP 60 0 - 99 pg/mL   Troponin I   Result Value Ref Range    Troponin I 0.053 (H) 0.000 - 0.026 ng/mL   POCT glucose   Result Value Ref Range    POCT Glucose 148 (H) 70 - 110 mg/dL         Imaging Results:  Imaging Results          X-Ray Chest AP Portable (Final result)  Result time 04/02/22 13:24:58    Final result by SITA Elliott Sr., MD (04/02/22 13:24:58)                 Impression:      1. There is no evidence of an acute pulmonary process.  2. The size of the heart is prominent.  This may be secondary to magnification.  .      Electronically signed by: Marco Elliott MD  Date:    04/02/2022  Time:    13:24             Narrative:    EXAMINATION:  XR CHEST AP PORTABLE    CLINICAL HISTORY:  dizziness;    COMPARISON:  None    FINDINGS:  The size of the heart is prominent.  There is no evidence of an acute pulmonary process.  There is no pneumothorax.  The costophrenic angles are sharp.                                 The EKG was ordered, reviewed, and independently interpreted by the ED provider.  Interpretation time: 12:17  Rate: 96 BPM  Rhythm: Normal sinus rhythm with sinus arrythmia  Interpretation: No acute ST changes. No STEMI.           The Emergency Provider reviewed the vital signs and test results, which are outlined above.     ED Discussion       4:34 PM  Patient is orthostatic positive our has a mild bump in his troponin which is elevated on a repeat.  He is having no chest pain but was nauseated at the time of his episode.  Patient being admitted to Hospital Medicine for  further evaluation and treatment.  I made consultation with Albina Chiang NP.  She accepts the patient to observation status \[facility]\"" Medicine  telemetry.    ED Course as of 04/02/22 1650   Sat Apr 02, 2022   1553 Troponin I(!): 0.053 [RT]      ED Course User Index  [RT] Moisés Rodarte Jr., MD     Medical Decision Making:   Clinical Tests:   Lab Tests: Ordered and Reviewed  Radiological Study: Ordered and Reviewed  Medical Tests: Ordered and Reviewed           ED Medication(s):  Medications   sodium chloride 0.9% bolus 1,000 mL (0 mLs Intravenous Stopped 4/2/22 1419)   aspirin tablet 325 mg (325 mg Oral Given 4/2/22 1319)       New Prescriptions    No medications on file               Scribe Attestation:   Scribe #1: I performed the above scribed service and the documentation accurately describes the services I performed. I attest to the accuracy of the note.     Attending:   Physician Attestation Statement for Scribe #1: I, Moisés Rodarte Jr., MD, personally performed the services described in this documentation, as scribed by Brody Baum, in my presence, and it is both accurate and complete.           Clinical Impression       ICD-10-CM ICD-9-CM   1. Troponin I above reference range  R77.8 790.6   2. Postural dizziness with presyncope  R42 780.4    R55 780.2       Disposition:   Disposition: Admitted  Condition: Fair         Moisés Rodarte Jr., MD  04/02/22 1638       Mosiés Rodarte Jr., MD  04/02/22 1650

## 2022-04-02 NOTE — SUBJECTIVE & OBJECTIVE
Past Medical History:   Diagnosis Date    CKD stage 3 due to type 2 diabetes mellitus 2/18/2021    DM (diabetes mellitus) 2014    BS didn't check 12/11/2019    Foreign body, eye     right eye    Hyperlipidemia     Hypertension     Pneumonia     Primary osteoarthritis of right knee 10/29/2021    Severe obesity (BMI 35.0-35.9 with comorbidity) 7/10/2019    Type 2 diabetes mellitus 2014    BS didn't check 12/12/2018       Past Surgical History:   Procedure Laterality Date    APPENDECTOMY      CATARACT EXTRACTION Bilateral     CPG       Review of patient's allergies indicates:  No Known Allergies    No current facility-administered medications on file prior to encounter.     Current Outpatient Medications on File Prior to Encounter   Medication Sig    glipiZIDE (GLUCOTROL) 5 MG tablet Take 1 tablet (5 mg total) by mouth 2 (two) times daily with meals.    hydroCHLOROthiazide (HYDRODIURIL) 25 MG tablet Take 1 tablet (25 mg total) by mouth once daily.    levocetirizine (XYZAL) 5 MG tablet Take 1 tablet (5 mg total) by mouth every evening.    multivitamin (THERAGRAN) per tablet Take 1 tablet by mouth once daily.    [DISCONTINUED] alcohol swabs PadM Apply 1 each topically 2 (two) times a day.    [DISCONTINUED] blood glucose control high,low Soln Use as directed    [DISCONTINUED] blood sugar diagnostic Strp 1 strip by Misc.(Non-Drug; Combo Route) route 2 (two) times a day.    [DISCONTINUED] blood-glucose meter (ACCU-CHEK GUIDE GLUCOSE METER) Misc 1 each by Misc.(Non-Drug; Combo Route) route 2 (two) times a day.    [DISCONTINUED] cinnamon bark (CINNAMON ORAL) Take by mouth.    [DISCONTINUED] fluticasone propionate (FLONASE) 50 mcg/actuation nasal spray USE 2 SPRAY(S) IN EACH NOSTRIL ONCE DAILY    [DISCONTINUED] lancets (ACCU-CHEK SOFTCLIX LANCETS) Misc Use to check blood sugar BID    [DISCONTINUED] triamcinolone acetonide 0.1% (KENALOG) 0.1 % cream Apply topically 2 (two) times daily. for 10 days    [DISCONTINUED] zolpidem  (AMBIEN) 5 MG Tab Take 1 tablet (5 mg total) by mouth nightly as needed.     Family History       Problem Relation (Age of Onset)    Allergic rhinitis Daughter    Cancer Son    Cataracts Father    Heart disease Mother, Father    Hyperlipidemia Father    Hypertension Father, Sister, Brother          Tobacco Use    Smoking status: Former Smoker     Packs/day: 3.00     Types: Cigarettes     Start date:      Quit date:      Years since quittin.2    Smokeless tobacco: Never Used   Substance and Sexual Activity    Alcohol use: No    Drug use: No    Sexual activity: Never     Review of Systems   Constitutional: Negative.    HENT: Negative.     Eyes: Negative.    Respiratory: Negative.     Cardiovascular: Negative.    Gastrointestinal:  Positive for nausea.   Endocrine: Negative.    Genitourinary: Negative.    Musculoskeletal: Negative.    Skin: Negative.    Allergic/Immunologic: Negative.    Neurological:  Positive for dizziness.        + fall    Hematological: Negative.    Psychiatric/Behavioral: Negative.     Objective:     Vital Signs (Most Recent):  Temp: 98.4 °F (36.9 °C) (22 1145)  Pulse: 76 (22 1700)  Resp: 19 (22 1700)  BP: (!) 157/77 (22 1700)  SpO2: 96 % (22 1700)   Vital Signs (24h Range):  Temp:  [98.4 °F (36.9 °C)] 98.4 °F (36.9 °C)  Pulse:  [68-92] 76  Resp:  [16-20] 19  SpO2:  [94 %-99 %] 96 %  BP: ()/(60-95) 157/77     Weight: 116.1 kg (255 lb 15.3 oz)  Body mass index is 36.73 kg/m².    Physical Exam  Vitals and nursing note reviewed.   Constitutional:       Appearance: Normal appearance. He is well-developed.   HENT:      Head: Normocephalic and atraumatic.   Eyes:      Extraocular Movements: EOM normal.      Pupils: Pupils are equal, round, and reactive to light.   Cardiovascular:      Rate and Rhythm: Normal rate and regular rhythm.      Pulses: Normal pulses.      Heart sounds: Normal heart sounds.   Pulmonary:      Effort: Pulmonary effort is normal. No  tachypnea, accessory muscle usage or respiratory distress.      Breath sounds: Normal breath sounds.   Abdominal:      General: Bowel sounds are normal.      Palpations: Abdomen is soft.      Tenderness: There is no abdominal tenderness.   Musculoskeletal:         General: Normal range of motion.      Cervical back: Normal range of motion and neck supple.   Skin:     General: Skin is warm and dry.      Capillary Refill: Capillary refill takes less than 2 seconds.   Neurological:      Mental Status: He is alert and oriented to person, place, and time.      Deep Tendon Reflexes: Reflexes are normal and symmetric.   Psychiatric:         Speech: Speech normal.         Behavior: Behavior normal.         Thought Content: Thought content normal.         Judgment: Judgment normal.         CRANIAL NERVES     CN III, IV, VI   Pupils are equal, round, and reactive to light.  Extraocular motions are normal.      Significant Labs: All pertinent labs within the past 24 hours have been reviewed.  BMP:   Recent Labs   Lab 04/02/22  1236   *      K 3.6      CO2 24   BUN 21   CREATININE 1.2   CALCIUM 9.3     CBC:   Recent Labs   Lab 04/02/22  1236   WBC 9.40   HGB 13.7*   HCT 40.2        CMP:   Recent Labs   Lab 04/02/22  1236      K 3.6      CO2 24   *   BUN 21   CREATININE 1.2   CALCIUM 9.3   PROT 7.3   ALBUMIN 3.6   BILITOT 0.5   ALKPHOS 59   AST 26   ALT 23   ANIONGAP 13   EGFRNONAA 55*     Cardiac Markers:   Recent Labs   Lab 04/02/22  1236   BNP 60     Troponin:   Recent Labs   Lab 04/02/22  1236 04/02/22  1435   TROPONINI 0.043* 0.053*     TSH: No results for input(s): TSH in the last 4320 hours.  Urine Studies:   Recent Labs   Lab 04/02/22  1524   COLORU Yellow   APPEARANCEUA Clear   PHUR 7.0   SPECGRAV 1.020   PROTEINUA Negative   GLUCUA Negative   KETONESU Negative   BILIRUBINUA Negative   OCCULTUA Negative   NITRITE Negative   UROBILINOGEN Negative   LEUKOCYTESUR Negative        Significant Imaging:   Imaging Results              X-Ray Chest AP Portable (Final result)  Result time 04/02/22 13:24:58      Final result by SITA Elliott Sr., MD (04/02/22 13:24:58)                   Impression:      1. There is no evidence of an acute pulmonary process.  2. The size of the heart is prominent.  This may be secondary to magnification.  .      Electronically signed by: Marco Elliott MD  Date:    04/02/2022  Time:    13:24               Narrative:    EXAMINATION:  XR CHEST AP PORTABLE    CLINICAL HISTORY:  dizziness;    COMPARISON:  None    FINDINGS:  The size of the heart is prominent.  There is no evidence of an acute pulmonary process.  There is no pneumothorax.  The costophrenic angles are sharp.

## 2022-04-02 NOTE — H&P
[facility]  [facility] Medicine  History & Physical    Patient Name: Mark Dickerson Sr.  MRN: [id number]  Patient Class: OP- Observation  Admission Date: 4/2/2022  Attending Physician: Vishal Dye MD   Primary Care Provider: Tatyana Cannon MD         Patient information was obtained from patient, relative(s) and ER records.     Subjective:     Principal Problem: Orthostatic hypotension  Chief Complaint:   Chief Complaint   Patient presents with    Dizziness     Dizziness on standing, fell at home/ lost balance        HPI: Mark Dickerson Sr. is a 82 y.o. male patient with a PMHx of AFIB, CKD, DM, HTN, who presents to the Emergency Department for evaluation of fall due to dizziness which occurred PTA. Pt reports the room was spinning around him, hx of vertigo in the past.  Pt reports that his blood sugar was in the 140s. Associated sxs include nausea. Patient denies any vomiting, chest pain, diarrhea, coughing, blood in stool, and all other sxs at this time. Found in the ED to be orthostatic. Troponin 0.043, 0.053. Otherwise labs unremarkable. Patient received 1L NS bolus in the ED. Patient placed in observation for orthostatic hypotension under the care of hospital medicine.       Past Medical History:   Diagnosis Date    CKD stage 3 due to type 2 diabetes mellitus 2/18/2021    DM (diabetes mellitus) 2014    BS didn't check 12/11/2019    Foreign body, eye     right eye    Hyperlipidemia     Hypertension     Pneumonia     Primary osteoarthritis of right knee 10/29/2021    Severe obesity (BMI 35.0-35.9 with comorbidity) 7/10/2019    Type 2 diabetes mellitus 2014    BS didn't check 12/12/2018       Past Surgical History:   Procedure Laterality Date    APPENDECTOMY      CATARACT EXTRACTION Bilateral     CPG       Review of patient's allergies indicates:  No Known Allergies    No current facility-administered medications on file prior to encounter.     Current Outpatient Medications on File  Prior to Encounter   Medication Sig    glipiZIDE (GLUCOTROL) 5 MG tablet Take 1 tablet (5 mg total) by mouth 2 (two) times daily with meals.    hydroCHLOROthiazide (HYDRODIURIL) 25 MG tablet Take 1 tablet (25 mg total) by mouth once daily.    levocetirizine (XYZAL) 5 MG tablet Take 1 tablet (5 mg total) by mouth every evening.    multivitamin (THERAGRAN) per tablet Take 1 tablet by mouth once daily.    [DISCONTINUED] alcohol swabs PadM Apply 1 each topically 2 (two) times a day.    [DISCONTINUED] blood glucose control high,low Soln Use as directed    [DISCONTINUED] blood sugar diagnostic Strp 1 strip by Misc.(Non-Drug; Combo Route) route 2 (two) times a day.    [DISCONTINUED] blood-glucose meter (ACCU-CHEK GUIDE GLUCOSE METER) Misc 1 each by Misc.(Non-Drug; Combo Route) route 2 (two) times a day.    [DISCONTINUED] cinnamon bark (CINNAMON ORAL) Take by mouth.    [DISCONTINUED] fluticasone propionate (FLONASE) 50 mcg/actuation nasal spray USE 2 SPRAY(S) IN EACH NOSTRIL ONCE DAILY    [DISCONTINUED] lancets (ACCU-CHEK SOFTCLIX LANCETS) Misc Use to check blood sugar BID    [DISCONTINUED] triamcinolone acetonide 0.1% (KENALOG) 0.1 % cream Apply topically 2 (two) times daily. for 10 days    [DISCONTINUED] zolpidem (AMBIEN) 5 MG Tab Take 1 tablet (5 mg total) by mouth nightly as needed.     Family History       Problem Relation (Age of Onset)    Allergic rhinitis Daughter    Cancer Son    Cataracts Father    Heart disease Mother, Father    Hyperlipidemia Father    Hypertension Father, Sister, Brother          Tobacco Use    Smoking status: Former Smoker     Packs/day: 3.00     Types: Cigarettes     Start date:      Quit date:      Years since quittin.2    Smokeless tobacco: Never Used   Substance and Sexual Activity    Alcohol use: No    Drug use: No    Sexual activity: Never     Review of Systems   Constitutional: Negative.    HENT: Negative.     Eyes: Negative.    Respiratory: Negative.      Cardiovascular: Negative.    Gastrointestinal:  Positive for nausea.   Endocrine: Negative.    Genitourinary: Negative.    Musculoskeletal: Negative.    Skin: Negative.    Allergic/Immunologic: Negative.    Neurological:  Positive for dizziness.        + fall    Hematological: Negative.    Psychiatric/Behavioral: Negative.     Objective:     Vital Signs (Most Recent):  Temp: 98.4 °F (36.9 °C) (04/02/22 1145)  Pulse: 76 (04/02/22 1700)  Resp: 19 (04/02/22 1700)  BP: (!) 157/77 (04/02/22 1700)  SpO2: 96 % (04/02/22 1700)   Vital Signs (24h Range):  Temp:  [98.4 °F (36.9 °C)] 98.4 °F (36.9 °C)  Pulse:  [68-92] 76  Resp:  [16-20] 19  SpO2:  [94 %-99 %] 96 %  BP: ()/(60-95) 157/77     Weight: 116.1 kg (255 lb 15.3 oz)  Body mass index is 36.73 kg/m².    Physical Exam  Vitals and nursing note reviewed.   Constitutional:       Appearance: Normal appearance. He is well-developed.   HENT:      Head: Normocephalic and atraumatic.   Eyes:      Extraocular Movements: EOM normal.      Pupils: Pupils are equal, round, and reactive to light.   Cardiovascular:      Rate and Rhythm: Normal rate and regular rhythm.      Pulses: Normal pulses.      Heart sounds: Normal heart sounds.   Pulmonary:      Effort: Pulmonary effort is normal. No tachypnea, accessory muscle usage or respiratory distress.      Breath sounds: Normal breath sounds.   Abdominal:      General: Bowel sounds are normal.      Palpations: Abdomen is soft.      Tenderness: There is no abdominal tenderness.   Musculoskeletal:         General: Normal range of motion.      Cervical back: Normal range of motion and neck supple.   Skin:     General: Skin is warm and dry.      Capillary Refill: Capillary refill takes less than 2 seconds.   Neurological:      Mental Status: He is alert and oriented to person, place, and time.      Deep Tendon Reflexes: Reflexes are normal and symmetric.   Psychiatric:         Speech: Speech normal.         Behavior: Behavior normal.          Thought Content: Thought content normal.         Judgment: Judgment normal.         CRANIAL NERVES     CN III, IV, VI   Pupils are equal, round, and reactive to light.  Extraocular motions are normal.      Significant Labs: All pertinent labs within the past 24 hours have been reviewed.  BMP:   Recent Labs   Lab 04/02/22  1236   *      K 3.6      CO2 24   BUN 21   CREATININE 1.2   CALCIUM 9.3     CBC:   Recent Labs   Lab 04/02/22  1236   WBC 9.40   HGB 13.7*   HCT 40.2        CMP:   Recent Labs   Lab 04/02/22  1236      K 3.6      CO2 24   *   BUN 21   CREATININE 1.2   CALCIUM 9.3   PROT 7.3   ALBUMIN 3.6   BILITOT 0.5   ALKPHOS 59   AST 26   ALT 23   ANIONGAP 13   EGFRNONAA 55*     Cardiac Markers:   Recent Labs   Lab 04/02/22  1236   BNP 60     Troponin:   Recent Labs   Lab 04/02/22  1236 04/02/22  1435   TROPONINI 0.043* 0.053*     TSH: No results for input(s): TSH in the last 4320 hours.  Urine Studies:   Recent Labs   Lab 04/02/22  1524   COLORU Yellow   APPEARANCEUA Clear   PHUR 7.0   SPECGRAV 1.020   PROTEINUA Negative   GLUCUA Negative   KETONESU Negative   BILIRUBINUA Negative   OCCULTUA Negative   NITRITE Negative   UROBILINOGEN Negative   LEUKOCYTESUR Negative       Significant Imaging:   Imaging Results              X-Ray Chest AP Portable (Final result)  Result time 04/02/22 13:24:58      Final result by SITA Elliott Sr., MD (04/02/22 13:24:58)                   Impression:      1. There is no evidence of an acute pulmonary process.  2. The size of the heart is prominent.  This may be secondary to magnification.  .      Electronically signed by: Marco Elliott MD  Date:    04/02/2022  Time:    13:24               Narrative:    EXAMINATION:  XR CHEST AP PORTABLE    CLINICAL HISTORY:  dizziness;    COMPARISON:  None    FINDINGS:  The size of the heart is prominent.  There is no evidence of an acute pulmonary process.  There is no pneumothorax.  The  costophrenic angles are sharp.                                       Assessment/Plan:     Elevated troponin  Denies any chest pain  EKG unrevealing   Troponin 0.043, 0.053  Trend troponin       Pre-syncope  Likely related to orthostatic hypotension, Will r/o cardiac/neuro event   CT head pending   Echo  Carotid US   Orthostatic VS every shift  Neuro checks every 4 hours  Serial troponin levels         Vertigo  Meclizine prn   Neuro-checks    ENT follow-up       Orthostatic hypotension  IV hydration   Orthostatics qshift         CKD stage 3 due to type 2 diabetes mellitus  Renal function stable       Controlled type 2 diabetes mellitus without complication, without long-term current use of insulin  Patient's FSGs are controlled on current medication regimen.  Last A1c reviewed-   Lab Results   Component Value Date    HGBA1C 7.2 (H) 12/16/2021     Most recent fingerstick glucose reviewed-   Recent Labs   Lab 04/02/22  1226   POCTGLUCOSE 148*     Current correctional scale  Low  Maintain anti-hyperglycemic dose as follows-   Antihyperglycemics (From admission, onward)            Start     Stop Route Frequency Ordered    04/02/22 1818  insulin aspart U-100 pen 0-5 Units         -- SubQ Before meals & nightly PRN 04/02/22 1720        Hold Oral hypoglycemics while patient is in the hospital.        VTE Risk Mitigation (From admission, onward)         Ordered     enoxaparin injection 40 mg  Daily         04/02/22 1720     IP VTE HIGH RISK PATIENT  Once         04/02/22 1720     Place sequential compression device  Until discontinued         04/02/22 1720                   Joan Chiang NP  Department of Hospital Medicine   [facility] - Emergency Dept.

## 2022-04-02 NOTE — PHARMACY MED REC
"Admission Medication History     The home medication history was taken by Randy Lawler.    You may go to "Admission" then "Reconcile Home Medications" tabs to review and/or act upon these items.      The home medication list has been updated by the Pharmacy department.    Please read ALL comments highlighted in yellow.    Please address this information as you see fit.     Feel free to contact us if you have any questions or require assistance.      The medications listed below were removed from the home medication list. Please reorder if appropriate:  Patient reports no longer taking the following medication(s):   AMBIEN 5MG    Medications listed below were obtained from: Patient/family  (Not in a hospital admission)      Randy Lawler  [id number]    Current Outpatient Medications on File Prior to Encounter   Medication Sig Dispense Refill Last Dose    glipiZIDE (GLUCOTROL) 5 MG tablet Take 1 tablet (5 mg total) by mouth 2 (two) times daily with meals. 180 tablet 3 4/2/2022 at Unknown time    hydroCHLOROthiazide (HYDRODIURIL) 25 MG tablet Take 1 tablet (25 mg total) by mouth once daily. 90 tablet 3 4/1/2022 at Unknown time    levocetirizine (XYZAL) 5 MG tablet Take 1 tablet (5 mg total) by mouth every evening. 30 tablet 11 Past Week at Unknown time    multivitamin (THERAGRAN) per tablet Take 1 tablet by mouth once daily.   4/1/2022 at Unknown time                             .          "

## 2022-04-02 NOTE — ASSESSMENT & PLAN NOTE
Patient's FSGs are controlled on current medication regimen.  Last A1c reviewed-   Lab Results   Component Value Date    HGBA1C 7.2 (H) 12/16/2021     Most recent fingerstick glucose reviewed-   Recent Labs   Lab 04/02/22  1226   POCTGLUCOSE 148*     Current correctional scale  Low  Maintain anti-hyperglycemic dose as follows-   Antihyperglycemics (From admission, onward)            Start     Stop Route Frequency Ordered    04/02/22 1818  insulin aspart U-100 pen 0-5 Units         -- SubQ Before meals & nightly PRN 04/02/22 1720        Hold Oral hypoglycemics while patient is in the hospital.

## 2022-04-02 NOTE — HPI
Mark Dickerson Sr. is a 82 y.o. male patient with a PMHx of AFIB, CKD, DM, HTN, who presents to the Emergency Department for evaluation of fall due to dizziness which occurred PTA. Pt reports the room was spinning around him, hx of vertigo in the past.  Pt reports that his blood sugar was in the 140s. Associated sxs include nausea. Patient denies any vomiting, chest pain, diarrhea, coughing, blood in stool, and all other sxs at this time. Found in the ED to be orthostatic. Troponin 0.043, 0.053. Otherwise labs unremarkable. Patient received 1L NS bolus in the ED. Patient placed in observation for orthostatic hypotension under the care of hospital medicine.

## 2022-04-02 NOTE — ASSESSMENT & PLAN NOTE
Likely related to orthostatic hypotension, Will r/o cardiac/neuro event   CT head pending   Echo  Carotid US   Orthostatic VS every shift  Neuro checks every 4 hours  Serial troponin levels

## 2022-04-03 VITALS
SYSTOLIC BLOOD PRESSURE: 125 MMHG | OXYGEN SATURATION: 94 % | TEMPERATURE: 98 F | WEIGHT: 240 LBS | RESPIRATION RATE: 18 BRPM | BODY MASS INDEX: 34.36 KG/M2 | DIASTOLIC BLOOD PRESSURE: 66 MMHG | HEIGHT: 70 IN | HEART RATE: 84 BPM

## 2022-04-03 PROBLEM — R55 PRE-SYNCOPE: Status: RESOLVED | Noted: 2022-04-02 | Resolved: 2022-04-03

## 2022-04-03 PROBLEM — I95.1 ORTHOSTATIC HYPOTENSION: Status: RESOLVED | Noted: 2022-04-02 | Resolved: 2022-04-03

## 2022-04-03 PROBLEM — R42 VERTIGO: Status: RESOLVED | Noted: 2022-04-02 | Resolved: 2022-04-03

## 2022-04-03 PROBLEM — I10 PRIMARY HYPERTENSION: Status: ACTIVE | Noted: 2022-04-03

## 2022-04-03 PROBLEM — I35.0 NONRHEUMATIC AORTIC VALVE STENOSIS: Status: ACTIVE | Noted: 2022-04-03

## 2022-04-03 LAB
ANION GAP SERPL CALC-SCNC: 10 MMOL/L (ref 8–16)
AORTIC ROOT ANNULUS: 2.7 CM
AV INDEX (PROSTH): 0.25
AV MEAN GRADIENT: 31 MMHG
AV PEAK GRADIENT: 47 MMHG
AV REGURGITATION PRESSURE HALF TIME: 608.83 MS
AV VALVE AREA: 1.05 CM2
AV VELOCITY RATIO: 0.25
BSA FOR ECHO PROCEDURE: 2.32 M2
BUN SERPL-MCNC: 15 MG/DL (ref 8–23)
CALCIUM SERPL-MCNC: 8.9 MG/DL (ref 8.7–10.5)
CHLORIDE SERPL-SCNC: 105 MMOL/L (ref 95–110)
CO2 SERPL-SCNC: 23 MMOL/L (ref 23–29)
CREAT SERPL-MCNC: 0.9 MG/DL (ref 0.5–1.4)
CV ECHO LV RWT: 0.69 CM
DOP CALC AO PEAK VEL: 3.43 M/S
DOP CALC AO VTI: 79 CM
DOP CALC LVOT AREA: 4.2 CM2
DOP CALC LVOT DIAMETER: 2.3 CM
DOP CALC LVOT PEAK VEL: 0.85 M/S
DOP CALC LVOT STROKE VOLUME: 83.05 CM3
DOP CALC MV VTI: 58.6 CM
DOP CALCLVOT PEAK VEL VTI: 20 CM
E WAVE DECELERATION TIME: 246.08 MSEC
E/A RATIO: 0.65
E/E' RATIO: 15.83 M/S
ECHO EF ESTIMATED: 61 %
ECHO LV POSTERIOR WALL: 1.41 CM (ref 0.6–1.1)
EJECTION FRACTION: 55 %
EST. GFR  (AFRICAN AMERICAN): >60 ML/MIN/1.73 M^2
EST. GFR  (NON AFRICAN AMERICAN): >60 ML/MIN/1.73 M^2
FRACTIONAL SHORTENING: 33 % (ref 28–44)
GLUCOSE SERPL-MCNC: 151 MG/DL (ref 70–110)
INTERVENTRICULAR SEPTUM: 1.72 CM (ref 0.6–1.1)
IVC DIAMETER: 2.19 CM
LA MAJOR: 4.5 CM
LA MINOR: 4.39 CM
LA WIDTH: 4.31 CM
LEFT ATRIUM SIZE: 4.5 CM
LEFT ATRIUM VOLUME INDEX: 32.4 ML/M2
LEFT ATRIUM VOLUME: 73.27 CM3
LEFT INTERNAL DIMENSION IN SYSTOLE: 2.76 CM (ref 2.1–4)
LEFT VENTRICLE DIASTOLIC VOLUME INDEX: 32.81 ML/M2
LEFT VENTRICLE DIASTOLIC VOLUME: 74.14 ML
LEFT VENTRICLE MASS INDEX: 114 G/M2
LEFT VENTRICLE SYSTOLIC VOLUME INDEX: 12.6 ML/M2
LEFT VENTRICLE SYSTOLIC VOLUME: 28.58 ML
LEFT VENTRICULAR INTERNAL DIMENSION IN DIASTOLE: 4.1 CM (ref 3.5–6)
LEFT VENTRICULAR MASS: 257.7 G
LV LATERAL E/E' RATIO: 15.83 M/S
LV SEPTAL E/E' RATIO: 15.83 M/S
LVOT MG: 1.66 MMHG
LVOT MV: 0.64 CM/S
MV MEAN GRADIENT: 5 MMHG
MV PEAK A VEL: 1.46 M/S
MV PEAK E VEL: 0.95 M/S
MV PEAK GRADIENT: 13 MMHG
MV STENOSIS PRESSURE HALF TIME: 71.36 MS
MV VALVE AREA BY CONTINUITY EQUATION: 1.42 CM2
MV VALVE AREA P 1/2 METHOD: 3.08 CM2
PISA AR MAX VEL: 3.59 M/S
PISA TR MAX VEL: 2.57 M/S
POCT GLUCOSE: 160 MG/DL (ref 70–110)
POTASSIUM SERPL-SCNC: 3.5 MMOL/L (ref 3.5–5.1)
PULM VEIN S/D RATIO: 0.81
PV MV: 0.64 M/S
PV PEAK D VEL: 0.47 M/S
PV PEAK S VEL: 0.38 M/S
PV PEAK VELOCITY: 0.89 CM/S
RA MAJOR: 4.23 CM
RA PRESSURE: 8 MMHG
RA WIDTH: 3.47 CM
RIGHT VENTRICULAR END-DIASTOLIC DIMENSION: 2.15 CM
SODIUM SERPL-SCNC: 138 MMOL/L (ref 136–145)
TDI LATERAL: 0.06 M/S
TDI SEPTAL: 0.06 M/S
TDI: 0.06 M/S
TR MAX PG: 26 MMHG
TRICUSPID ANNULAR PLANE SYSTOLIC EXCURSION: 1.65 CM
TV REST PULMONARY ARTERY PRESSURE: 34 MMHG

## 2022-04-03 PROCEDURE — G0378 HOSPITAL OBSERVATION PER HR: HCPCS

## 2022-04-03 PROCEDURE — 99220 PR INITIAL OBSERVATION CARE,LEVL III: ICD-10-PCS | Mod: 25,,, | Performed by: INTERNAL MEDICINE

## 2022-04-03 PROCEDURE — 97165 OT EVAL LOW COMPLEX 30 MIN: CPT

## 2022-04-03 PROCEDURE — 97161 PT EVAL LOW COMPLEX 20 MIN: CPT

## 2022-04-03 PROCEDURE — 36415 COLL VENOUS BLD VENIPUNCTURE: CPT | Performed by: NURSE PRACTITIONER

## 2022-04-03 PROCEDURE — 80048 BASIC METABOLIC PNL TOTAL CA: CPT | Performed by: NURSE PRACTITIONER

## 2022-04-03 PROCEDURE — 99220 PR INITIAL OBSERVATION CARE,LEVL III: CPT | Mod: 25,,, | Performed by: INTERNAL MEDICINE

## 2022-04-03 NOTE — CONSULTS
[facility] - Med Surg  Cardiology  Consult Note    Patient Name: Makr Dickerson Sr.  MRN: [id number]  Admission Date: 4/2/2022  Hospital Length of Stay: 0 days  Code Status: Full Code   Attending Provider: Vishal Ackerman, *   Consulting Provider: Juwan Villalta MD  Primary Care Physician: Tatyana Cannon MD  Principal Problem:Orthostatic hypotension    Patient information was obtained from patient and ER records.     Inpatient consult to Cardiology  Consult performed by: Juwan Villalta MD  Consult ordered by: Pranav Rene NP        Subjective:       HPI:   81 yo male, cardiology consutl for orthostatic hypotension and elevated troponin   PMHAFIB, CKD, DM, HTN, who presents to the Emergency Department for evaluation of fall due to dizziness which occurred PTA. Pt reports the room was spinning around him, hx of vertigo in the past.  Pt reports that his blood sugar was in the 140s. Patient received 1L NS bolus in the ED.   ED to be orthostatic positive  Troponin 0.043 -->0.053.   Ekg NSR and PVC  Today echo  · Concentric remodeling and normal systolic function.  · With normal right ventricular systolic function.  · Mild aortic regurgitation.  · There is moderate aortic valve stenosis.  · Aortic valve area is 1.05 cm2; peak velocity is 3.43 m/s; mean gradient is 31 mmHg.  · Intermediate central venous pressure (8 mmHg).  · The estimated PA systolic pressure is 34 mmHg.  · The estimated ejection fraction is 55%.  · Indeterminate left ventricular diastolic function        Past Medical History:   Diagnosis Date    CKD stage 3 due to type 2 diabetes mellitus 2/18/2021    DM (diabetes mellitus) 2014    BS didn't check 12/11/2019    Foreign body, eye     right eye    Hyperlipidemia     Hypertension     Pneumonia     Primary osteoarthritis of right knee 10/29/2021    Severe obesity (BMI 35.0-35.9 with comorbidity) 7/10/2019    Type 2 diabetes mellitus 2014    BS didn't check 12/12/2018       Past Surgical  History:   Procedure Laterality Date    APPENDECTOMY      CATARACT EXTRACTION Bilateral     CPG       Review of patient's allergies indicates:  No Known Allergies    No current facility-administered medications on file prior to encounter.     Current Outpatient Medications on File Prior to Encounter   Medication Sig    glipiZIDE (GLUCOTROL) 5 MG tablet Take 1 tablet (5 mg total) by mouth 2 (two) times daily with meals.    hydroCHLOROthiazide (HYDRODIURIL) 25 MG tablet Take 1 tablet (25 mg total) by mouth once daily.    levocetirizine (XYZAL) 5 MG tablet Take 1 tablet (5 mg total) by mouth every evening.    multivitamin (THERAGRAN) per tablet Take 1 tablet by mouth once daily.     Family History       Problem Relation (Age of Onset)    Allergic rhinitis Daughter    Cancer Son    Cataracts Father    Heart disease Mother, Father    Hyperlipidemia Father    Hypertension Father, Sister, Brother          Tobacco Use    Smoking status: Former Smoker     Packs/day: 3.00     Types: Cigarettes     Start date:      Quit date:      Years since quittin.2    Smokeless tobacco: Never Used   Substance and Sexual Activity    Alcohol use: No    Drug use: No    Sexual activity: Never     Review of Systems   Neurological:  Positive for dizziness.   Objective:     Vital Signs (Most Recent):  Temp: 98 °F (36.7 °C) (22 0736)  Pulse: 88 (22 1310)  Resp: 18 (22 0736)  BP: 125/66 (22 1140)  SpO2: (!) 94 % (22 0736)   Vital Signs (24h Range):  Temp:  [97.4 °F (36.3 °C)-98.6 °F (37 °C)] 98 °F (36.7 °C)  Pulse:  [] 88  Resp:  [17-20] 18  SpO2:  [94 %-99 %] 94 %  BP: (110-177)/(66-95) 125/66     Weight: 108.9 kg (240 lb)  Body mass index is 34.44 kg/m².    SpO2: (!) 94 %  O2 Device (Oxygen Therapy): room air      Intake/Output Summary (Last 24 hours) at 4/3/2022 6557  Last data filed at 4/3/2022 1000  Gross per 24 hour   Intake 600 ml   Output --   Net 600 ml       Lines/Drains/Airways        Peripheral Intravenous Line  Duration                  Peripheral IV - Single Lumen 04/02/22 1242 20 G Right Antecubital 1 day                    Physical Exam  HENT:      Head: Normocephalic.   Eyes:      Pupils: Pupils are equal, round, and reactive to light.   Neck:      Thyroid: No thyromegaly.      Vascular: Normal carotid pulses. No carotid bruit or JVD.   Cardiovascular:      Rate and Rhythm: Normal rate and regular rhythm. No extrasystoles are present.     Chest Wall: PMI is not displaced.      Pulses: Normal pulses.           Carotid pulses are 2+ on the right side and 2+ on the left side.     Heart sounds: Murmur heard.   Harsh midsystolic murmur is present at the upper right sternal border radiating to the neck.     No gallop. No S3 sounds.   Pulmonary:      Effort: No respiratory distress.      Breath sounds: Normal breath sounds. No stridor.   Abdominal:      General: Bowel sounds are normal.      Palpations: Abdomen is soft.      Tenderness: There is no abdominal tenderness. There is no rebound.   Musculoskeletal:         General: Normal range of motion.   Skin:     Findings: No rash.   Neurological:      Mental Status: He is alert and oriented to person, place, and time.   Psychiatric:         Behavior: Behavior normal.       Significant Labs:   Recent Lab Results  (Last 5 results in the past 24 hours)        04/03/22  1206   04/03/22  0800   04/03/22  0600   04/02/22  2024   04/02/22  1822        Anion Gap   10             Ao root annulus 2.70               Ao peak can 3.43               Ao VTI 79.00               AR Max Can 3.59               AV valve area 1.05               AV mean gradient 31               AV index (prosthetic) 0.25               AV peak gradient 47               AV regurgitation pressure 1/2 time [id number]               AV Velocity Ratio 0.25               BSA 2.32               BUN   15             Calcium   8.9             Chloride   105             CO2   23              Creatinine   0.9             Left Ventricle Relative Wall Thickness 0.69               E/A ratio 0.65               Echo EF Estimated 61               E/E' ratio 15.83               eGFR if    >60             eGFR if non    >60  Comment: Calculation used to obtain the estimated glomerular filtration  rate (eGFR) is the CKD-EPI equation.                EF 55               E wave deceleration time [id number]               FS 33               Glucose   151             IVC diameter 2.19               IVS 1.72               LA WIDTH 4.31               Left Atrium Major Axis 4.50               Left Atrium Minor Axis 4.39               LA size 4.50               LA volume 73.27               LA Volume Index 32.4               LVOT area 4.2               LV LATERAL E/E' RATIO 15.83               LV SEPTAL E/E' RATIO 15.83               LV Diastolic Volume 74.14               LV Diastolic Volume Index 32.81               LVIDd 4.10               LVIDs 2.76               LV mass 257.70               LV Mass Index 114               Left Ventricular Outflow Tract Mean Gradient 1.66               Left Ventricular Outflow Tract Mean Velocity [id number]               LVOT diameter 2.30               LVOT peak can 0.85               LVOT stroke volume 83.05               LVOT peak VTI 20.00               LV Systolic Volume 28.58               LV Systolic Volume Index 12.6               Mean e' 0.06               MV valve area p 1/2 method 3.08               MV valve area by continuity eq 1.42               MV mean gradient 5               MV peak gradient 13               MV Peak A Can 1.46               MV Peak E Can 0.95               MV stenosis pressure 1/2 time 71.021243228917112               MV VTI 58.6               POCT Glucose     160   124         Potassium   3.5             Pulmonary Valve Mean Velocity 0.64               PV Peak D Can 0.47               PV Peak S  Can [id number]               PV PEAK VELOCITY 0.89               Pulm vein S/D ratio 0.81               Posterior Wall 1.41               Right Atrial Pressure (from IVC) 8               RA Major Axis 4.23               RA Width 3.47               RVDD 2.15               Sodium   138             TAPSE 1.65               TDI SEPTAL 0.06               TDI LATERAL 0.06               Triscuspid Valve Regurgitation Peak Gradient 26               TR Max Can 2.57               Troponin I         0.050  Comment: The reference interval for Troponin I represents the 99th percentile   cutoff   for our facility and is consistent with 3rd generation assay   performance.         TV rest pulmonary artery pressure 34                                      Significant Imaging: EKG:      Assessment and Plan:     * Orthostatic hypotension  Resolved  Hydration  Fall precaution    Nonrheumatic aortic valve stenosis  Moderate AS    F/u as OP    Primary hypertension  Continue HCTZ    Elevated troponin  Demand ischemia from dehydration and orthostatic hypotension  F/u as OP        VTE Risk Mitigation (From admission, onward)         Ordered     enoxaparin injection 40 mg  Daily         04/02/22 1720     IP VTE HIGH RISK PATIENT  Once         04/02/22 1720     Place sequential compression device  Until discontinued         04/02/22 1720                Thank you for your consult. I will sign off. Please contact us if you have any additional questions.    Juwan Villalta MD  Cardiology   [facility] - Med Surg

## 2022-04-03 NOTE — PT/OT/SLP EVAL
Physical Therapy Evaluation and Discharge Note    Patient Name:  Mark Dickerson Sr.   MRN:  [id number]    Recommendations:     Discharge Recommendations:  home   Discharge Equipment Recommendations: none   Barriers to discharge: None    Assessment:     Mark Dickerson Sr. is a 82 y.o. male admitted with a medical diagnosis of Orthostatic hypotension. .  At this time, patient is functioning at their prior level of function and does not require further acute PT services.     Recent Surgery: * No surgery found *      Plan:     During this hospitalization, patient does not require further acute PT services.  Please re-consult if situation changes.      Subjective     Chief Complaint: NONE  Patient/Family Comments/goals: GO HOME  Pain/Comfort:  · Pain Rating 1: 0/10  · Pain Rating Post-Intervention 1: 0/10    Patients cultural, spiritual, Cheondoism conflicts given the current situation:      Living Environment:  PT LIVES AT HOME ALONE IN A ONE STORY HOME WITH 4 STEPS AND RAILING  Prior to admission, patients level of function was IND DRIVES.  Equipment used at home: none.  DME owned (not currently used): single point cane.  Upon discharge, patient will have assistance from NONE.    Objective:     Communicated with NURSE WARREN AND [facility] CHART REVIEW prior to session.  Patient found supine with telemetry, peripheral IV upon PT entry to room.    General Precautions: Standard,     Orthopedic Precautions:N/A   Braces: N/A   Respiratory Status: Room air    Exams:  · RLE ROM: WNL  · RLE Strength: WNL  · LLE ROM: WNL  · LLE Strength: WNL    Functional Mobility:  · Bed Mobility:     · Supine to Sit: independence  · Transfers:     · Sit to Stand:  independence with no AD  · Bed to Chair: independence with  no AD  using  Stand Pivot  · Gait: 250' IND      AM-PAC 6 CLICK MOBILITY  Total Score:21     Patient left up in chair with call button in reach.    GOALS:   Multidisciplinary Problems     Physical Therapy Goals     Not on file                 History:     Past Medical History:   Diagnosis Date    CKD stage 3 due to type 2 diabetes mellitus 2/18/2021    DM (diabetes mellitus) 2014    BS didn't check 12/11/2019    Foreign body, eye     right eye    Hyperlipidemia     Hypertension     Pneumonia     Primary osteoarthritis of right knee 10/29/2021    Severe obesity (BMI 35.0-35.9 with comorbidity) 7/10/2019    Type 2 diabetes mellitus 2014    BS didn't check 12/12/2018       Past Surgical History:   Procedure Laterality Date    APPENDECTOMY      CATARACT EXTRACTION Bilateral     CPG       Time Tracking:     PT Received On: 04/03/22  PT Start Time: 1115     PT Stop Time: 1135  PT Total Time (min): 20 min     Billable Minutes: Evaluation 20 04/03/2022

## 2022-04-03 NOTE — DISCHARGE SUMMARY
[facility] Medicine  Discharge Summary      Patient Name: Mark Dickerson Sr.  MRN: [id number]  Patient Class: OP- Observation  Admission Date: 4/2/2022  Hospital Length of Stay: 0 days  Discharge Date and Time:  04/03/2022 3:11 PM  Attending Physician: Vishal Ackerman, *   Discharging Provider: Pranav Rene NP  Primary Care Provider: Tatyana Cannon MD      HPI:   Mark Dickerson Sr. is a 82 y.o. male patient with a PMHx of AFIB, CKD, DM, HTN, who presents to the Emergency Department for evaluation of fall due to dizziness which occurred PTA. Pt reports the room was spinning around him, hx of vertigo in the past.  Pt reports that his blood sugar was in the 140s. Associated sxs include nausea. Patient denies any vomiting, chest pain, diarrhea, coughing, blood in stool, and all other sxs at this time. Found in the ED to be orthostatic. Troponin 0.043, 0.053. Otherwise labs unremarkable. Patient received 1L NS bolus in the ED. Patient placed in observation for orthostatic hypotension under the care of hospital medicine.       * No surgery found *      Hospital Course:   He was admitted to the [facility] service for further care. He was positive for orthostatic hypotension. IVFs were initiated. TTE ordered and reviewed, head CT negative, troponin was elevated (likely 2/2 demand--no c/o CP or EKG changes). CXR with no evidence of an acute pulmonary process. Cardiology was consulted. Will DC to home with cardiology outpatient follow up. Patient has been instructed to cont PO intake while at home. He has also been instructed to change positions from lying, sitting, to standing slowly.        Goals of Care Treatment Preferences:  Code Status: Full Code      Consults:   Consults (From admission, onward)        Status Ordering Provider     Inpatient consult to Cardiology  Once        Provider:  Juwan Villalta MD    Completed PRANAV RENE          No new Assessment & Plan notes have  been filed under this hospital service since the last note was generated.  Service: Hospital Medicine    Final Active Diagnoses:    Diagnosis Date Noted POA    PRINCIPAL PROBLEM:  Orthostatic hypotension [I95.1] 04/02/2022 Unknown    Primary hypertension [I10] 04/03/2022 Unknown    Nonrheumatic aortic valve stenosis [I35.0] 04/03/2022 Unknown    Elevated troponin [R77.8] 04/02/2022 Yes    CKD stage 3 due to type 2 diabetes mellitus [E11.22, N18.30] 02/18/2021 Yes    Controlled type 2 diabetes mellitus without complication, without long-term current use of insulin [E11.9] 07/28/2017 Yes      Problems Resolved During this Admission:    Diagnosis Date Noted Date Resolved POA    Vertigo [R42] 04/02/2022 04/03/2022 Yes    Pre-syncope [R55] 04/02/2022 04/03/2022 Yes       Discharged Condition: stable    Disposition: Home or Self Care    Follow Up:   Follow-up Information     Tatyana Cannon MD Follow up in 1 week(s).    Specialty: Family Medicine  Contact information:  [address] [place] [id number]  [phone]                       Patient Instructions:      Ambulatory referral/consult to Cardiology   Standing Status: Future   Referral Priority: Routine Referral Type: Consultation   Referral Reason: Specialty Services Required   Requested Specialty: Cardiology   Number of Visits Requested: 1     Diet diabetic     Diet Cardiac     Notify your health care provider if you experience any of the following:  temperature >100.4     Notify your health care provider if you experience any of the following:  persistent nausea and vomiting or diarrhea     Notify your health care provider if you experience any of the following:  severe uncontrolled pain     Notify your health care provider if you experience any of the following:  redness, tenderness, or signs of infection (pain, swelling, redness, odor or green/yellow discharge around incision site)     Notify your health care provider if you experience any of  the following:  difficulty breathing or increased cough     Notify your health care provider if you experience any of the following:  severe persistent headache     Notify your health care provider if you experience any of the following:  worsening rash     Notify your health care provider if you experience any of the following:  persistent dizziness, light-headedness, or visual disturbances     Notify your health care provider if you experience any of the following:  increased confusion or weakness     Activity as tolerated       Significant Diagnostic Studies: Labs: All labs within the past 24 hours have been reviewed    Pending Diagnostic Studies:     None         Medications:  Reconciled Home Medications:      Medication List      CONTINUE taking these medications    glipiZIDE 5 MG tablet  Commonly known as: GLUCOTROL  Take 1 tablet (5 mg total) by mouth 2 (two) times daily with meals.     hydroCHLOROthiazide 25 MG tablet  Commonly known as: HYDRODIURIL  Take 1 tablet (25 mg total) by mouth once daily.     levocetirizine 5 MG tablet  Commonly known as: XYZAL  Take 1 tablet (5 mg total) by mouth every evening.     multivitamin per tablet  Commonly known as: THERAGRAN  Take 1 tablet by mouth once daily.        STOP taking these medications    CINNAMON ORAL            Indwelling Lines/Drains at time of discharge:   Lines/Drains/Airways     None                 Time spent on the discharge of patient: 45 minutes         Pranav Rene NP  Department of Hospital Medicine  [facility] Surg

## 2022-04-03 NOTE — HPI
83 yo male, cardiology consutl for orthostatic hypotension and elevated troponin   PMHAFIB, CKD, DM, HTN, who presents to the Emergency Department for evaluation of fall due to dizziness which occurred PTA. Pt reports the room was spinning around him, hx of vertigo in the past.  Pt reports that his blood sugar was in the 140s. Patient received 1L NS bolus in the ED.   ED to be orthostatic positive  Troponin 0.043 -->0.053.   Ekg NSR and PVC  Today echo  Concentric remodeling and normal systolic function.  With normal right ventricular systolic function.  Mild aortic regurgitation.  There is moderate aortic valve stenosis.  Aortic valve area is 1.05 cm2; peak velocity is 3.43 m/s; mean gradient is 31 mmHg.  Intermediate central venous pressure (8 mmHg).  The estimated PA systolic pressure is 34 mmHg.  The estimated ejection fraction is 55%.  Indeterminate left ventricular diastolic function

## 2022-04-03 NOTE — PLAN OF CARE
P.T. EVAL COMPLETED PT IS IND WITH GROSS FUNC MOBILITY AND GT COMMUNITY DISTANCES. PT WILL BE D/C  FROM P.T. TO [facility]. COMPLETE EVAL NOTE TO FOLLOW.

## 2022-04-03 NOTE — HOSPITAL COURSE
He was admitted to the [facility] service for further care. He was positive for orthostatic hypotension. IVFs were initiated. TTE ordered and reviewed, head CT negative, troponin was elevated (likely 2/2 demand--no c/o CP or EKG changes). CXR with no evidence of an acute pulmonary process. Cardiology was consulted. Will DC to home with cardiology outpatient follow up. Patient has been instructed to cont PO intake while at home. He has also been instructed to change positions from lying, sitting, to standing slowly.

## 2022-04-03 NOTE — PLAN OF CARE
[facility] - Med Surg  Discharge Final Note    Primary Care Provider: Tatyana Cannon MD    Expected Discharge Date: 4/3/2022    Final Discharge Note (most recent)       Final Note - 04/03/22 1611          Final Note    Assessment Type Final Discharge Note (P)      Anticipated Discharge Disposition Home or Self Care (P)         Post-Acute Status    Discharge Delays None known at this time (P)                      Important Message from Medicare  Important Message from Medicare regarding Discharge Appeal Rights: Given to patient/caregiver, Explained to patient/caregiver, Signed/date by patient/caregiver     Date IMM was signed: 04/03/22  Time IMM was signed: 1239    Contact Info       Tatyana Cannon MD   Specialty: Family Medicine   Relationship: PCP - General    [address] [place] [id number]   Phone: [phone]       Next Steps: Follow up in 1 week(s)          Violetta Gong LMSW 4/3/2022 4:12 PM

## 2022-04-03 NOTE — PT/OT/SLP EVAL
Occupational Therapy   Evaluation and Discharge Note    Name: Mark Dickerson Sr.  MRN: [id number]  Admitting Diagnosis:  Orthostatic hypotension   Recent Surgery: * No surgery found *      Recommendations:     Discharge Recommendations: home  Discharge Equipment Recommendations:  none  Barriers to discharge:       Assessment:     Mark Dickerson Sr. is a 82 y.o. male with a medical diagnosis of Orthostatic hypotension. At this time, patient is functioning at their prior level of function and does not require further acute OT services.     Plan:     During this hospitalization, patient does not require further acute OT services.  Please re-consult if situation changes.    · Plan of Care Reviewed with: patient    Subjective     Chief Complaint:   Patient/Family Comments/goals:     Occupational Profile:  Living Environment: lives alone in 1 story house with 4 steps to enter  Previous level of function: (I) with adl's and functional mobility  Roles and Routines: occupational therapy  Equipment Used at home:  none  Assistance upon Discharge:     Pain/Comfort:  · Pain Rating 1: 0/10    Patients cultural, spiritual, Yazidism conflicts given the current situation:      Objective:     Communicated with: nurse and epic chart review prior to session.  Patient found HOB elevated with telemetry, peripheral IV upon OT entry to room.    General Precautions: Standard,     Orthopedic Precautions:N/A   Braces:    Respiratory Status: Room air     Occupational Performance:    Bed Mobility:    · Patient completed Rolling/Turning to Right with independence  · Patient completed Scooting/Bridging with independence  · Patient completed Supine to Sit with independence    Functional Mobility/Transfers:  · Patient completed Sit <> Stand Transfer with independence  with  no assistive device   · Patient completed Bed <> Chair Transfer using Step Transfer technique with independence with no assistive device  · Functional Mobility:  PT AMBULATED (I)  100 FEET .    Activities of Daily Living:  · Upper Body Dressing: independence .  · Lower Body Dressing: independence .    Cognitive/Visual Perceptual:  Cognitive/Psychosocial Skills:     -       Oriented to: Person, Place, Time and Situation   -       Follows Commands/attention:Follows multistep  commands  -       Communication: clear/fluent  -       Memory: No Deficits noted  -       Safety awareness/insight to disability: intact     Physical Exam:  Upper Extremity Range of Motion:     -       Right Upper Extremity: WFL  -       Left Upper Extremity: WFL  Upper Extremity Strength:    -       Right Upper Extremity: WFL  -       Left Upper Extremity: WFL   Strength:    -       Right Upper Extremity: WFL  -       Left Upper Extremity: WFL    AMPAC 6 Click ADL:  AMPAC Total Score: 24    Treatment & Education:  O.T. EVAL COMPLETED . PT EDUCATED ON O.T. POC. PT (I) WITH ADL'S, FUNCTIONAL MOBILITY/ TRANSFERS AND WFL B UE STRENGTH.ENDURANCE. PT DISCHARGED FROM SKILLED O.T. . RECOMMEND: HOME  Education:    Patient left up in chair with all lines intact, call button in reach and NURSE WARREN notified    GOALS:   Multidisciplinary Problems     Occupational Therapy Goals     Not on file                History:     Past Medical History:   Diagnosis Date    CKD stage 3 due to type 2 diabetes mellitus 2/18/2021    DM (diabetes mellitus) 2014    BS didn't check 12/11/2019    Foreign body, eye     right eye    Hyperlipidemia     Hypertension     Pneumonia     Primary osteoarthritis of right knee 10/29/2021    Severe obesity (BMI 35.0-35.9 with comorbidity) 7/10/2019    Type 2 diabetes mellitus 2014    BS didn't check 12/12/2018       Past Surgical History:   Procedure Laterality Date    APPENDECTOMY      CATARACT EXTRACTION Bilateral     CPG       Time Tracking:     OT Date of Treatment: 04/03/22  OT Start Time: 0815  OT Stop Time: 0830  OT Total Time (min): 15 min    Billable Minutes:Evaluation 15  MINUTES    4/3/2022

## 2022-04-03 NOTE — SUBJECTIVE & OBJECTIVE
Past Medical History:   Diagnosis Date    CKD stage 3 due to type 2 diabetes mellitus 2021    DM (diabetes mellitus)     BS didn't check 2019    Foreign body, eye     right eye    Hyperlipidemia     Hypertension     Pneumonia     Primary osteoarthritis of right knee 10/29/2021    Severe obesity (BMI 35.0-35.9 with comorbidity) 7/10/2019    Type 2 diabetes mellitus     BS didn't check 2018       Past Surgical History:   Procedure Laterality Date    APPENDECTOMY      CATARACT EXTRACTION Bilateral     CPG       Review of patient's allergies indicates:  No Known Allergies    No current facility-administered medications on file prior to encounter.     Current Outpatient Medications on File Prior to Encounter   Medication Sig    glipiZIDE (GLUCOTROL) 5 MG tablet Take 1 tablet (5 mg total) by mouth 2 (two) times daily with meals.    hydroCHLOROthiazide (HYDRODIURIL) 25 MG tablet Take 1 tablet (25 mg total) by mouth once daily.    levocetirizine (XYZAL) 5 MG tablet Take 1 tablet (5 mg total) by mouth every evening.    multivitamin (THERAGRAN) per tablet Take 1 tablet by mouth once daily.     Family History       Problem Relation (Age of Onset)    Allergic rhinitis Daughter    Cancer Son    Cataracts Father    Heart disease Mother, Father    Hyperlipidemia Father    Hypertension Father, Sister, Brother          Tobacco Use    Smoking status: Former Smoker     Packs/day: 3.00     Types: Cigarettes     Start date:      Quit date:      Years since quittin.2    Smokeless tobacco: Never Used   Substance and Sexual Activity    Alcohol use: No    Drug use: No    Sexual activity: Never     Review of Systems   Neurological:  Positive for dizziness.   Objective:     Vital Signs (Most Recent):  Temp: 98 °F (36.7 °C) (22 0736)  Pulse: 88 (22 1310)  Resp: 18 (22 0736)  BP: 125/66 (22 1140)  SpO2: (!) 94 % (22 0736)   Vital Signs (24h Range):  Temp:  [97.4 °F (36.3  °C)-98.6 °F (37 °C)] 98 °F (36.7 °C)  Pulse:  [] 88  Resp:  [17-20] 18  SpO2:  [94 %-99 %] 94 %  BP: (110-177)/(66-95) 125/66     Weight: 108.9 kg (240 lb)  Body mass index is 34.44 kg/m².    SpO2: (!) 94 %  O2 Device (Oxygen Therapy): room air      Intake/Output Summary (Last 24 hours) at 4/3/2022 1454  Last data filed at 4/3/2022 1000  Gross per 24 hour   Intake 600 ml   Output --   Net 600 ml       Lines/Drains/Airways       Peripheral Intravenous Line  Duration                  Peripheral IV - Single Lumen 04/02/22 1242 20 G Right Antecubital 1 day                    Physical Exam  HENT:      Head: Normocephalic.   Eyes:      Pupils: Pupils are equal, round, and reactive to light.   Neck:      Thyroid: No thyromegaly.      Vascular: Normal carotid pulses. No carotid bruit or JVD.   Cardiovascular:      Rate and Rhythm: Normal rate and regular rhythm. No extrasystoles are present.     Chest Wall: PMI is not displaced.      Pulses: Normal pulses.           Carotid pulses are 2+ on the right side and 2+ on the left side.     Heart sounds: Murmur heard.   Harsh midsystolic murmur is present at the upper right sternal border radiating to the neck.     No gallop. No S3 sounds.   Pulmonary:      Effort: No respiratory distress.      Breath sounds: Normal breath sounds. No stridor.   Abdominal:      General: Bowel sounds are normal.      Palpations: Abdomen is soft.      Tenderness: There is no abdominal tenderness. There is no rebound.   Musculoskeletal:         General: Normal range of motion.   Skin:     Findings: No rash.   Neurological:      Mental Status: He is alert and oriented to person, place, and time.   Psychiatric:         Behavior: Behavior normal.       Significant Labs:   Recent Lab Results  (Last 5 results in the past 24 hours)        04/03/22  1206   04/03/22  0800   04/03/22  0600   04/02/22 2024 04/02/22  1822        Anion Gap   10             Ao root annulus 2.70               Ao peak zac  3.43               Ao VTI 79.00               AR Max Can 3.59               AV valve area 1.05               AV mean gradient 31               AV index (prosthetic) 0.25               AV peak gradient 47               AV regurgitation pressure 1/2 time [id number]               AV Velocity Ratio 0.25               BSA 2.32               BUN   15             Calcium   8.9             Chloride   105             CO2   23             Creatinine   0.9             Left Ventricle Relative Wall Thickness 0.69               E/A ratio 0.65               Echo EF Estimated 61               E/E' ratio 15.83               eGFR if    >60             eGFR if non    >60  Comment: Calculation used to obtain the estimated glomerular filtration  rate (eGFR) is the CKD-EPI equation.                EF 55               E wave deceleration time [id number]               FS 33               Glucose   151             IVC diameter 2.19               IVS 1.72               LA WIDTH 4.31               Left Atrium Major Axis 4.50               Left Atrium Minor Axis 4.39               LA size 4.50               LA volume 73.27               LA Volume Index 32.4               LVOT area 4.2               LV LATERAL E/E' RATIO 15.83               LV SEPTAL E/E' RATIO 15.83               LV Diastolic Volume 74.14               LV Diastolic Volume Index 32.81               LVIDd 4.10               LVIDs 2.76               LV mass 257.70               LV Mass Index 114               Left Ventricular Outflow Tract Mean Gradient 1.66               Left Ventricular Outflow Tract Mean Velocity [id number]               LVOT diameter 2.30               LVOT peak can 0.85               LVOT stroke volume 83.05               LVOT peak VTI 20.00               LV Systolic Volume 28.58               LV Systolic Volume Index 12.6               Mean e' 0.06               MV valve area p 1/2 method 3.08                MV valve area by continuity eq 1.42               MV mean gradient 5               MV peak gradient 13               MV Peak A Can 1.46               MV Peak E Can 0.95               MV stenosis pressure 1/2 time [id number]               MV VTI 58.6               POCT Glucose     160   124         Potassium   3.5             Pulmonary Valve Mean Velocity 0.64               PV Peak D Can 0.47               PV Peak S Can [id number]               PV PEAK VELOCITY 0.89               Pulm vein S/D ratio 0.81               Posterior Wall 1.41               Right Atrial Pressure (from IVC) 8               RA Major Axis 4.23               RA Width 3.47               RVDD 2.15               Sodium   138             TAPSE 1.65               TDI SEPTAL 0.06               TDI LATERAL 0.06               Triscuspid Valve Regurgitation Peak Gradient 26               TR Max Can 2.57               Troponin I         0.050  Comment: The reference interval for Troponin I represents the 99th percentile   cutoff   for our facility and is consistent with 3rd generation assay   performance.         TV rest pulmonary artery pressure 34                                      Significant Imaging: EKG:

## 2022-04-05 ENCOUNTER — PATIENT OUTREACH (OUTPATIENT)
Dept: ADMINISTRATIVE | Facility: HOSPITAL | Age: 82
End: 2022-04-05
Payer: MEDICARE

## 2022-04-06 ENCOUNTER — OFFICE VISIT (OUTPATIENT)
Dept: FAMILY MEDICINE | Facility: CLINIC | Age: 82
End: 2022-04-06
Payer: MEDICARE

## 2022-04-06 VITALS
HEART RATE: 98 BPM | SYSTOLIC BLOOD PRESSURE: 120 MMHG | DIASTOLIC BLOOD PRESSURE: 70 MMHG | OXYGEN SATURATION: 96 % | BODY MASS INDEX: 34.49 KG/M2 | WEIGHT: 240.94 LBS | TEMPERATURE: 98 F | HEIGHT: 70 IN

## 2022-04-06 DIAGNOSIS — I35.0 AORTIC VALVE STENOSIS, ETIOLOGY OF CARDIAC VALVE DISEASE UNSPECIFIED: ICD-10-CM

## 2022-04-06 DIAGNOSIS — E11.9 CONTROLLED TYPE 2 DIABETES MELLITUS WITHOUT COMPLICATION, WITHOUT LONG-TERM CURRENT USE OF INSULIN: ICD-10-CM

## 2022-04-06 DIAGNOSIS — Z09 HOSPITAL DISCHARGE FOLLOW-UP: Primary | ICD-10-CM

## 2022-04-06 PROCEDURE — 3051F HG A1C>EQUAL 7.0%<8.0%: CPT | Mod: CPTII,S$GLB,, | Performed by: FAMILY MEDICINE

## 2022-04-06 PROCEDURE — 3288F PR FALLS RISK ASSESSMENT DOCUMENTED: ICD-10-PCS | Mod: CPTII,S$GLB,, | Performed by: FAMILY MEDICINE

## 2022-04-06 PROCEDURE — 3072F PR LOW RISK FOR RETINOPATHY: ICD-10-PCS | Mod: CPTII,S$GLB,, | Performed by: FAMILY MEDICINE

## 2022-04-06 PROCEDURE — 3078F PR MOST RECENT DIASTOLIC BLOOD PRESSURE < 80 MM HG: ICD-10-PCS | Mod: CPTII,S$GLB,, | Performed by: FAMILY MEDICINE

## 2022-04-06 PROCEDURE — 3074F SYST BP LT 130 MM HG: CPT | Mod: CPTII,S$GLB,, | Performed by: FAMILY MEDICINE

## 2022-04-06 PROCEDURE — 1159F PR MEDICATION LIST DOCUMENTED IN MEDICAL RECORD: ICD-10-PCS | Mod: CPTII,S$GLB,, | Performed by: FAMILY MEDICINE

## 2022-04-06 PROCEDURE — 1101F PR PT FALLS ASSESS DOC 0-1 FALLS W/OUT INJ PAST YR: ICD-10-PCS | Mod: CPTII,S$GLB,, | Performed by: FAMILY MEDICINE

## 2022-04-06 PROCEDURE — 99999 PR PBB SHADOW E&M-EST. PATIENT-LVL III: CPT | Mod: PBBFAC,,, | Performed by: FAMILY MEDICINE

## 2022-04-06 PROCEDURE — 1101F PT FALLS ASSESS-DOCD LE1/YR: CPT | Mod: CPTII,S$GLB,, | Performed by: FAMILY MEDICINE

## 2022-04-06 PROCEDURE — 99214 OFFICE O/P EST MOD 30 MIN: CPT | Mod: S$GLB,,, | Performed by: FAMILY MEDICINE

## 2022-04-06 PROCEDURE — 3051F PR MOST RECENT HEMOGLOBIN A1C LEVEL 7.0 - < 8.0%: ICD-10-PCS | Mod: CPTII,S$GLB,, | Performed by: FAMILY MEDICINE

## 2022-04-06 PROCEDURE — 3288F FALL RISK ASSESSMENT DOCD: CPT | Mod: CPTII,S$GLB,, | Performed by: FAMILY MEDICINE

## 2022-04-06 PROCEDURE — 99999 PR PBB SHADOW E&M-EST. PATIENT-LVL III: ICD-10-PCS | Mod: PBBFAC,,, | Performed by: FAMILY MEDICINE

## 2022-04-06 PROCEDURE — 3078F DIAST BP <80 MM HG: CPT | Mod: CPTII,S$GLB,, | Performed by: FAMILY MEDICINE

## 2022-04-06 PROCEDURE — 3072F LOW RISK FOR RETINOPATHY: CPT | Mod: CPTII,S$GLB,, | Performed by: FAMILY MEDICINE

## 2022-04-06 PROCEDURE — 99214 PR OFFICE/OUTPT VISIT, EST, LEVL IV, 30-39 MIN: ICD-10-PCS | Mod: S$GLB,,, | Performed by: FAMILY MEDICINE

## 2022-04-06 PROCEDURE — 1159F MED LIST DOCD IN RCRD: CPT | Mod: CPTII,S$GLB,, | Performed by: FAMILY MEDICINE

## 2022-04-06 PROCEDURE — 3074F PR MOST RECENT SYSTOLIC BLOOD PRESSURE < 130 MM HG: ICD-10-PCS | Mod: CPTII,S$GLB,, | Performed by: FAMILY MEDICINE

## 2022-04-06 NOTE — PROGRESS NOTES
Chief Complaint:    Chief Complaint   Patient presents with    Hospital Follow Up       History of Present Illness:  Patient with a hx of HLD associated with DM2, HTN, CKD stage 3, and primary osteoarthritis of right knee presents today for a hospital follow-up    Dehydration.   Will now follow up with Dr. Villalta.  Was found to have aortic stenosis moderate  Was taken off cinnamon and water pill. Drinks about 1 gallon of water per day.   BP good today.  Wants continuous glucose monitor.   Thinks he may need a right knee replacement.           ROS:  Review of Systems   Constitutional: Negative for activity change, chills, fatigue, fever and unexpected weight change.   HENT: Negative for congestion, ear discharge, ear pain, hearing loss, postnasal drip and rhinorrhea.    Eyes: Negative for pain and visual disturbance.   Respiratory: Negative for cough, chest tightness and shortness of breath.    Cardiovascular: Negative for chest pain and palpitations.   Gastrointestinal: Negative for abdominal pain, diarrhea and vomiting.   Endocrine: Negative for heat intolerance.   Genitourinary: Negative for dysuria, flank pain, frequency and hematuria.   Musculoskeletal: Negative for back pain, gait problem and neck pain.   Skin: Negative for color change and rash.   Neurological: Negative for dizziness, tremors, seizures, numbness and headaches.   Psychiatric/Behavioral: Negative for agitation, hallucinations, self-injury, sleep disturbance and suicidal ideas. The patient is not nervous/anxious.    All other systems reviewed and are negative.      Past Medical History:   Diagnosis Date    CKD stage 3 due to type 2 diabetes mellitus 2/18/2021    DM (diabetes mellitus) 2014    BS didn't check 12/11/2019    Foreign body, eye     right eye    Hyperlipidemia     Hypertension     Pneumonia     Primary osteoarthritis of right knee 10/29/2021    Severe obesity (BMI 35.0-35.9 with comorbidity) 7/10/2019    Type 2 diabetes  "mellitus 2014    BS didn't check 2018       Social History:  Social History     Socioeconomic History    Marital status:    Tobacco Use    Smoking status: Former Smoker     Packs/day: 3.00     Types: Cigarettes     Start date:      Quit date:      Years since quittin.3    Smokeless tobacco: Never Used   Substance and Sexual Activity    Alcohol use: No    Drug use: No    Sexual activity: Never       Family History:   family history includes Allergic rhinitis in his daughter; Cancer in his son; Cataracts in his father; Heart disease in his father and mother; Hyperlipidemia in his father; Hypertension in his brother, father, and sister.    Health Maintenance   Topic Date Due    Eye Exam  2022    Hemoglobin A1c  2022    Lipid Panel  2022    Foot Exam  2022    TETANUS VACCINE  12/10/2025       Physical Exam:    Vital Signs  Temp: 97.7 °F (36.5 °C)  Pulse: 98  SpO2: 96 %  BP: 120/70  Height and Weight  Height: 5' 10" (177.8 cm)  Weight: 109.3 kg (240 lb 15.4 oz)  BSA (Calculated - sq m): 2.32 sq meters  BMI (Calculated): 34.6  Weight in (lb) to have BMI = 25: 173.9]    Body mass index is 34.57 kg/m².    Physical Exam  Vitals and nursing note reviewed.   Constitutional:       Appearance: Normal appearance.   HENT:      Head: Normocephalic and atraumatic.      Right Ear: Tympanic membrane normal.      Left Ear: Tympanic membrane normal.   Eyes:      Extraocular Movements: Extraocular movements intact.      Pupils: Pupils are equal, round, and reactive to light.   Cardiovascular:      Rate and Rhythm: Normal rate and regular rhythm.      Pulses: Normal pulses.      Heart sounds: Murmur heard.     No gallop.   Pulmonary:      Effort: Pulmonary effort is normal. No respiratory distress.      Breath sounds: Normal breath sounds. No wheezing, rhonchi or rales.   Abdominal:      General: There is no distension.      Palpations: Abdomen is soft.      Tenderness: There is " no abdominal tenderness.   Musculoskeletal:         General: No swelling, deformity or signs of injury. Normal range of motion.      Cervical back: Normal range of motion.   Skin:     General: Skin is warm and dry.      Capillary Refill: Capillary refill takes less than 2 seconds.      Coloration: Skin is not jaundiced or pale.   Neurological:      General: No focal deficit present.      Mental Status: He is alert and oriented to person, place, and time.   Psychiatric:         Mood and Affect: Mood normal.         Behavior: Behavior normal.           Diabetes Management Status    Statin: Not taking  ACE/ARB: Not taking    Screening or Prevention Patient's value Goal Complete/Controlled?   HgA1C Testing and Control   Lab Results   Component Value Date    HGBA1C 7.2 (H) 12/16/2021      Annually/Less than 8% Yes   Lipid profile : 12/16/2021 Annually Yes   LDL control Lab Results   Component Value Date    LDLCALC 198.0 (H) 12/16/2021    Annually/Less than 100 mg/dl  No   Nephropathy screening Lab Results   Component Value Date    LABMICR <5.0 12/16/2021     Lab Results   Component Value Date    PROTEINUA Negative 04/02/2022    Annually Yes   Blood pressure BP Readings from Last 1 Encounters:   04/03/22 125/66    Less than 140/90 Yes   Dilated retinal exam : 06/03/2021 Annually Yes   Foot exam   : 12/21/2021 Annually Yes       Assessment:      ICD-10-CM ICD-9-CM   1. Hospital discharge follow-up  Z09 V67.59   2. Aortic valve stenosis, etiology of cardiac valve disease unspecified  I35.0 424.1         Plan:    Maintain hydration  Stay active as tolerated.   Follow up with Dr. Villalta on 04/27 at 3:40 PM  Kidney disease is stable  Continue current meds and plan  No orders of the defined types were placed in this encounter.        Current Outpatient Medications   Medication Sig Dispense Refill    glipiZIDE (GLUCOTROL) 5 MG tablet Take 1 tablet (5 mg total) by mouth 2 (two) times daily with meals. 180 tablet 3     hydroCHLOROthiazide (HYDRODIURIL) 25 MG tablet Take 1 tablet (25 mg total) by mouth once daily. 90 tablet 3    levocetirizine (XYZAL) 5 MG tablet Take 1 tablet (5 mg total) by mouth every evening. 30 tablet 11    multivitamin (THERAGRAN) per tablet Take 1 tablet by mouth once daily.       No current facility-administered medications for this visit.       There are no discontinued medications.    No follow-ups on file.      Tatyana Cannon MD  Scribe Attestation:   I, Philomena Gardner, am scribing for, and in the presence of, Dr.Arif Cannon I performed the above scribed service and the documentation accurately describes the services I performed. I attest to the accuracy of the note.    I, Dr. Tatyana Cannon, reviewed documentation as scribed above. I performed the services described in this documentation.  I agree that the record reflects my personal performance and is accurate and complete. Tatyana Cannon MD.  10/04/2021

## 2022-04-12 ENCOUNTER — TELEPHONE (OUTPATIENT)
Dept: ADMINISTRATIVE | Facility: HOSPITAL | Age: 82
End: 2022-04-12
Payer: MEDICARE

## 2022-04-22 ENCOUNTER — OFFICE VISIT (OUTPATIENT)
Dept: FAMILY MEDICINE | Facility: CLINIC | Age: 82
End: 2022-04-22
Payer: MEDICARE

## 2022-04-22 ENCOUNTER — IMMUNIZATION (OUTPATIENT)
Dept: PRIMARY CARE CLINIC | Facility: CLINIC | Age: 82
End: 2022-04-22
Payer: MEDICARE

## 2022-04-22 VITALS
DIASTOLIC BLOOD PRESSURE: 78 MMHG | BODY MASS INDEX: 34.43 KG/M2 | WEIGHT: 240.5 LBS | OXYGEN SATURATION: 97 % | SYSTOLIC BLOOD PRESSURE: 130 MMHG | HEART RATE: 74 BPM | TEMPERATURE: 98 F | RESPIRATION RATE: 20 BRPM | HEIGHT: 70 IN

## 2022-04-22 DIAGNOSIS — N18.30 CKD STAGE 3 DUE TO TYPE 2 DIABETES MELLITUS: ICD-10-CM

## 2022-04-22 DIAGNOSIS — E11.9 CONTROLLED TYPE 2 DIABETES MELLITUS WITHOUT COMPLICATION, WITHOUT LONG-TERM CURRENT USE OF INSULIN: ICD-10-CM

## 2022-04-22 DIAGNOSIS — I35.0 NONRHEUMATIC AORTIC VALVE STENOSIS: ICD-10-CM

## 2022-04-22 DIAGNOSIS — Z23 NEED FOR VACCINATION: Primary | ICD-10-CM

## 2022-04-22 DIAGNOSIS — E11.59 HYPERTENSION ASSOCIATED WITH DIABETES: ICD-10-CM

## 2022-04-22 DIAGNOSIS — E66.9 OBESITY (BMI 30.0-34.9): ICD-10-CM

## 2022-04-22 DIAGNOSIS — E11.22 CKD STAGE 3 DUE TO TYPE 2 DIABETES MELLITUS: ICD-10-CM

## 2022-04-22 DIAGNOSIS — E78.5 HYPERLIPIDEMIA ASSOCIATED WITH TYPE 2 DIABETES MELLITUS: ICD-10-CM

## 2022-04-22 DIAGNOSIS — I15.2 HYPERTENSION ASSOCIATED WITH DIABETES: ICD-10-CM

## 2022-04-22 DIAGNOSIS — E11.69 HYPERLIPIDEMIA ASSOCIATED WITH TYPE 2 DIABETES MELLITUS: ICD-10-CM

## 2022-04-22 DIAGNOSIS — R26.9 ABNORMALITY OF GAIT AND MOBILITY: ICD-10-CM

## 2022-04-22 DIAGNOSIS — M17.11 PRIMARY OSTEOARTHRITIS OF RIGHT KNEE: ICD-10-CM

## 2022-04-22 DIAGNOSIS — Z00.00 ENCOUNTER FOR PREVENTIVE HEALTH EXAMINATION: Primary | ICD-10-CM

## 2022-04-22 PROBLEM — I95.1 ORTHOSTATIC HYPOTENSION: Status: RESOLVED | Noted: 2022-04-02 | Resolved: 2022-04-22

## 2022-04-22 PROBLEM — E66.01 SEVERE OBESITY (BMI 35.0-35.9 WITH COMORBIDITY): Status: RESOLVED | Noted: 2019-07-10 | Resolved: 2022-04-22

## 2022-04-22 PROBLEM — R79.89 ELEVATED TROPONIN: Status: RESOLVED | Noted: 2022-04-02 | Resolved: 2022-04-22

## 2022-04-22 PROCEDURE — 3051F HG A1C>EQUAL 7.0%<8.0%: CPT | Mod: CPTII,S$GLB,, | Performed by: NURSE PRACTITIONER

## 2022-04-22 PROCEDURE — 3288F PR FALLS RISK ASSESSMENT DOCUMENTED: ICD-10-PCS | Mod: CPTII,S$GLB,, | Performed by: NURSE PRACTITIONER

## 2022-04-22 PROCEDURE — 3072F PR LOW RISK FOR RETINOPATHY: ICD-10-PCS | Mod: CPTII,S$GLB,, | Performed by: NURSE PRACTITIONER

## 2022-04-22 PROCEDURE — G0439 PPPS, SUBSEQ VISIT: HCPCS | Mod: S$GLB,,, | Performed by: NURSE PRACTITIONER

## 2022-04-22 PROCEDURE — 1125F AMNT PAIN NOTED PAIN PRSNT: CPT | Mod: CPTII,S$GLB,, | Performed by: NURSE PRACTITIONER

## 2022-04-22 PROCEDURE — 3078F PR MOST RECENT DIASTOLIC BLOOD PRESSURE < 80 MM HG: ICD-10-PCS | Mod: CPTII,S$GLB,, | Performed by: NURSE PRACTITIONER

## 2022-04-22 PROCEDURE — 1170F FXNL STATUS ASSESSED: CPT | Mod: CPTII,S$GLB,, | Performed by: NURSE PRACTITIONER

## 2022-04-22 PROCEDURE — 1101F PT FALLS ASSESS-DOCD LE1/YR: CPT | Mod: CPTII,S$GLB,, | Performed by: NURSE PRACTITIONER

## 2022-04-22 PROCEDURE — 1160F PR REVIEW ALL MEDS BY PRESCRIBER/CLIN PHARMACIST DOCUMENTED: ICD-10-PCS | Mod: CPTII,S$GLB,, | Performed by: NURSE PRACTITIONER

## 2022-04-22 PROCEDURE — 20610 PR DRAIN/INJECT LARGE JOINT/BURSA: ICD-10-PCS | Mod: RT,S$GLB,, | Performed by: NURSE PRACTITIONER

## 2022-04-22 PROCEDURE — 99999 PR PBB SHADOW E&M-EST. PATIENT-LVL IV: CPT | Mod: PBBFAC,,, | Performed by: NURSE PRACTITIONER

## 2022-04-22 PROCEDURE — 3051F PR MOST RECENT HEMOGLOBIN A1C LEVEL 7.0 - < 8.0%: ICD-10-PCS | Mod: CPTII,S$GLB,, | Performed by: NURSE PRACTITIONER

## 2022-04-22 PROCEDURE — 99213 OFFICE O/P EST LOW 20 MIN: CPT | Mod: 25,S$GLB,, | Performed by: NURSE PRACTITIONER

## 2022-04-22 PROCEDURE — 1170F PR FUNCTIONAL STATUS ASSESSED: ICD-10-PCS | Mod: CPTII,S$GLB,, | Performed by: NURSE PRACTITIONER

## 2022-04-22 PROCEDURE — 1101F PR PT FALLS ASSESS DOC 0-1 FALLS W/OUT INJ PAST YR: ICD-10-PCS | Mod: CPTII,S$GLB,, | Performed by: NURSE PRACTITIONER

## 2022-04-22 PROCEDURE — 99213 PR OFFICE/OUTPT VISIT, EST, LEVL III, 20-29 MIN: ICD-10-PCS | Mod: 25,S$GLB,, | Performed by: NURSE PRACTITIONER

## 2022-04-22 PROCEDURE — 1159F PR MEDICATION LIST DOCUMENTED IN MEDICAL RECORD: ICD-10-PCS | Mod: CPTII,S$GLB,, | Performed by: NURSE PRACTITIONER

## 2022-04-22 PROCEDURE — G0439 PR MEDICARE ANNUAL WELLNESS SUBSEQUENT VISIT: ICD-10-PCS | Mod: S$GLB,,, | Performed by: NURSE PRACTITIONER

## 2022-04-22 PROCEDURE — 1159F MED LIST DOCD IN RCRD: CPT | Mod: CPTII,S$GLB,, | Performed by: NURSE PRACTITIONER

## 2022-04-22 PROCEDURE — 1160F RVW MEDS BY RX/DR IN RCRD: CPT | Mod: CPTII,S$GLB,, | Performed by: NURSE PRACTITIONER

## 2022-04-22 PROCEDURE — 1125F PR PAIN SEVERITY QUANTIFIED, PAIN PRESENT: ICD-10-PCS | Mod: CPTII,S$GLB,, | Performed by: NURSE PRACTITIONER

## 2022-04-22 PROCEDURE — 3075F PR MOST RECENT SYSTOLIC BLOOD PRESS GE 130-139MM HG: ICD-10-PCS | Mod: CPTII,S$GLB,, | Performed by: NURSE PRACTITIONER

## 2022-04-22 PROCEDURE — 3288F FALL RISK ASSESSMENT DOCD: CPT | Mod: CPTII,S$GLB,, | Performed by: NURSE PRACTITIONER

## 2022-04-22 PROCEDURE — 20610 DRAIN/INJ JOINT/BURSA W/O US: CPT | Mod: RT,S$GLB,, | Performed by: NURSE PRACTITIONER

## 2022-04-22 PROCEDURE — 3078F DIAST BP <80 MM HG: CPT | Mod: CPTII,S$GLB,, | Performed by: NURSE PRACTITIONER

## 2022-04-22 PROCEDURE — 3075F SYST BP GE 130 - 139MM HG: CPT | Mod: CPTII,S$GLB,, | Performed by: NURSE PRACTITIONER

## 2022-04-22 PROCEDURE — 99999 PR PBB SHADOW E&M-EST. PATIENT-LVL IV: ICD-10-PCS | Mod: PBBFAC,,, | Performed by: NURSE PRACTITIONER

## 2022-04-22 PROCEDURE — 3072F LOW RISK FOR RETINOPATHY: CPT | Mod: CPTII,S$GLB,, | Performed by: NURSE PRACTITIONER

## 2022-04-22 PROCEDURE — 91305 COVID-19, MRNA, LNP-S, PF, 30 MCG/0.3 ML DOSE VACCINE (PFIZER): CPT | Mod: PBBFAC | Performed by: FAMILY MEDICINE

## 2022-04-22 RX ORDER — METHYLPREDNISOLONE ACETATE 40 MG/ML
40 INJECTION, SUSPENSION INTRA-ARTICULAR; INTRALESIONAL; INTRAMUSCULAR; SOFT TISSUE ONCE
Status: COMPLETED | OUTPATIENT
Start: 2022-04-22 | End: 2022-04-22

## 2022-04-22 RX ADMIN — METHYLPREDNISOLONE ACETATE 40 MG: 40 INJECTION, SUSPENSION INTRA-ARTICULAR; INTRALESIONAL; INTRAMUSCULAR; SOFT TISSUE at 09:04

## 2022-04-22 NOTE — PATIENT INSTRUCTIONS
Counseling and Referral of Other Preventative  (Italic type indicates deductible and co-insurance are waived)    Patient Name: Mark Dickerson  Today's Date: 4/22/2022    Health Maintenance       Date Due Completion Date    Eye Exam 06/03/2022 6/3/2021    Override on 12/11/2019: Done    Hemoglobin A1c 06/16/2022 12/16/2021    Diabetes Urine Screening 12/16/2022 12/16/2021    Lipid Panel 12/16/2022 12/16/2021    Foot Exam 12/21/2022 12/21/2021 (Done)    Override on 12/21/2021: Done    Override on 11/20/2019: Done    Override on 8/3/2018: Done    Override on 7/28/2017: Done    TETANUS VACCINE 12/10/2025 12/10/2015        No orders of the defined types were placed in this encounter.    The following information is provided to all patients.  This information is to help you find resources for any of the problems found today that may be affecting your health:                Living healthy guide: www.[facility].[place].gov      Understanding Diabetes: www.diabetes.org      Eating healthy: www.cdc.gov/healthyweight      CDC home safety checklist: www.cdc.gov/steadi/patient.html      Agency on Aging: www.goea.[place].gov      Alcoholics anonymous (AA): www.aa.org      Physical Activity: www.danuta.nih.gov/of8lqfh      Tobacco use: www.quitwithusla.org

## 2022-04-22 NOTE — PROGRESS NOTES
"Subjective:      Patient ID: Mark Dickerson Sr. is a 82 y.o. male.    Chief Complaint: Medicare AWV    HPI:  Mr Dickerson is here for his AWV.  He is requesting a steroid shot to his right knee.  Has a hx of OA, was given Orthovisc in Dec and had relief until recently.  His right knee is beginning to hurt again, his left knee feels ok.  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  Would like to see if a steroid shot will relieve some of the pain.  Denies any recent falls or infection.      Past Medical History:   Diagnosis Date    CKD stage 3 due to type 2 diabetes mellitus 2/18/2021    DM (diabetes mellitus) 2014    BS didn't check 12/11/2019    Foreign body, eye     right eye    Hyperlipidemia     Hypertension     Need for shingles vaccine 11/20/2019    Pneumonia     Primary osteoarthritis of right knee 10/29/2021    Severe obesity (BMI 35.0-35.9 with comorbidity) 7/10/2019    Type 2 diabetes mellitus 2014    BS didn't check 12/12/2018       Past Surgical History:   Procedure Laterality Date    APPENDECTOMY      CATARACT EXTRACTION Bilateral     CPG       Lab Results   Component Value Date    WBC 9.40 04/02/2022    HGB 13.7 (L) 04/02/2022    HCT 40.2 04/02/2022     04/02/2022    CHOL 268 (H) 12/16/2021    TRIG 200 (H) 12/16/2021    HDL 30 (L) 12/16/2021    ALT 23 04/02/2022    AST 26 04/02/2022     04/03/2022    K 3.5 04/03/2022     04/03/2022    CREATININE 0.9 04/03/2022    BUN 15 04/03/2022    CO2 23 04/03/2022    TSH 1.063 04/02/2022    PSA 0.33 12/10/2015    HGBA1C 7.2 (H) 12/16/2021       /78   Pulse 74   Temp 97.5 °F (36.4 °C) (Temporal)   Resp 20   Ht 5' 10" (1.778 m)   Wt 109.1 kg (240 lb 8.4 oz)   SpO2 97%   BMI 34.51 kg/m²       Review of Systems   Musculoskeletal: Positive for arthralgias and gait problem.      Objective:     Physical Exam  Musculoskeletal:      Comments: Right knee was cleaned with betadine.  I cc of Depomedrol 40 mg and .5 cc of lidocaine " injected to right medial knee.  Tolerated well       Assessment:      1. Encounter for preventive health examination    2. Abnormality of gait and mobility    3. Hypertension associated with diabetes    4. Hyperlipidemia associated with type 2 diabetes mellitus    5. CKD stage 3 due to type 2 diabetes mellitus    6. Obesity (BMI 30.0-34.9)    7. Nonrheumatic aortic valve stenosis    8. Controlled type 2 diabetes mellitus without complication, without long-term current use of insulin    9. Primary osteoarthritis of right knee      Plan:   Encounter for preventive health examination    Abnormality of gait and mobility    Hypertension associated with diabetes    Hyperlipidemia associated with type 2 diabetes mellitus    CKD stage 3 due to type 2 diabetes mellitus    Obesity (BMI 30.0-34.9)    Nonrheumatic aortic valve stenosis    Controlled type 2 diabetes mellitus without complication, without long-term current use of insulin    Primary osteoarthritis of right knee    We discussed he may not get a lot of relief from the steroid, but wanted to proceed.  Advised to elevate his knee and ice it today.  Please follow up with ortho.  His eye exam was scheduled since he will not return until November of this year.       Current Outpatient Medications:     glipiZIDE (GLUCOTROL) 5 MG tablet, Take 1 tablet (5 mg total) by mouth 2 (two) times daily with meals., Disp: 180 tablet, Rfl: 3    hydroCHLOROthiazide (HYDRODIURIL) 25 MG tablet, Take 1 tablet (25 mg total) by mouth once daily., Disp: 90 tablet, Rfl: 3    levocetirizine (XYZAL) 5 MG tablet, Take 1 tablet (5 mg total) by mouth every evening., Disp: 30 tablet, Rfl: 11    multivitamin (THERAGRAN) per tablet, Take 1 tablet by mouth once daily., Disp: , Rfl:

## 2022-04-22 NOTE — PROGRESS NOTES
"  Mark Dickerson presented for a  Medicare AWV and comprehensive Health Risk Assessment today. The following components were reviewed and updated:    · Medical history  · Family History  · Social history  · Allergies and Current Medications  · Health Risk Assessment  · Health Maintenance  · Care Team         ** See Completed Assessments for Annual Wellness Visit within the encounter summary.**         The following assessments were completed:  · Living Situation  · CAGE  · Depression Screening  · Timed Get Up and Go  · Whisper Test  · Cognitive Function Screening  · Nutrition Screening  · ADL Screening  · PAQ Screening        Vitals:    04/22/22 0905   BP: 130/78   Pulse: 74   Resp: 20   Temp: 97.5 °F (36.4 °C)   TempSrc: Temporal   SpO2: 97%   Weight: 109.1 kg (240 lb 8.4 oz)   Height: 5' 10" (1.778 m)     Body mass index is 34.51 kg/m².  Physical Exam  Constitutional:       General: He is not in acute distress.     Appearance: Normal appearance. He is well-developed. He is obese. He is not ill-appearing, toxic-appearing or diaphoretic.   HENT:      Head: Normocephalic and atraumatic.      Right Ear: External ear normal.      Left Ear: External ear normal.      Nose: Nose normal. No rhinorrhea.      Mouth/Throat:      Pharynx: Oropharynx is clear. No posterior oropharyngeal erythema.   Eyes:      Extraocular Movements: Extraocular movements intact.      Conjunctiva/sclera: Conjunctivae normal.   Neck:      Vascular: No carotid bruit.   Cardiovascular:      Rate and Rhythm: Normal rate and regular rhythm.      Pulses: Normal pulses.      Heart sounds: Normal heart sounds. No murmur heard.    No friction rub. No gallop.   Pulmonary:      Effort: Pulmonary effort is normal. No respiratory distress.      Breath sounds: Normal breath sounds. No stridor. No wheezing, rhonchi or rales.   Chest:      Chest wall: No tenderness.   Abdominal:      General: Bowel sounds are normal. There is no distension.      Palpations: " Abdomen is soft. There is no mass.      Tenderness: There is no abdominal tenderness.      Hernia: No hernia is present.   Musculoskeletal:         General: No tenderness. Normal range of motion.      Cervical back: Normal range of motion and neck supple. No tenderness.      Comments: Right knee limited ROM due to pain, scant swelling, no redness or signs of infection   Lymphadenopathy:      Cervical: No cervical adenopathy.   Skin:     General: Skin is warm and dry.   Neurological:      Mental Status: He is alert and oriented to person, place, and time.      Cranial Nerves: No cranial nerve deficit.   Psychiatric:         Mood and Affect: Mood normal.         Behavior: Behavior normal.               Diagnoses and health risks identified today and associated recommendations/orders:    1. Encounter for preventive health examination  Screenings performed, as noted above.  Personal preventative testing needs reviewed.     2. Abnormality of gait and mobility  Monitored/treated on meds, continue the same tx, stable, limited ROM of right knee due to OA pain    3. Hypertension associated with diabetes  Monitored/treated on meds, continue the same tx, stable, follow up with pcp and cardiology    4. Hyperlipidemia associated with type 2 diabetes mellitus  Monitored/treated on meds, continue the same tx, stable, follow up with pcp and cardiology    5. CKD stage 3 due to type 2 diabetes mellitus  Monitored/treated on meds, continue the same tx, stable, follow up with pcp    6. Obesity (BMI 30.0-34.9)  Monitored/treated on meds, continue the same tx, stable, follow up with pcp    7. Nonrheumatic aortic valve stenosis  Monitored/treated on meds, continue the same tx, stable, follow up with pcp and cardiology    8. Controlled type 2 diabetes mellitus without complication, without long-term current use of insulin  Monitored/treated on meds, continue the same tx, stable, follow up with pcp    9. Primary osteoarthritis of right  knee  Monitored/treated on meds, continue the same tx, follow up with ortho      Provided Mark with a 5-10 year written screening schedule and personal prevention plan. Recommendations were developed using the USPSTF age appropriate recommendations. Education, counseling, and referrals were provided as needed. After Visit Summary printed and given to patient which includes a list of additional screenings\tests needed.    No follow-ups on file.    Freddy Tejeda, NP  I offered to discuss advanced care planning, including how to pick a person who would make decisions for you if you were unable to make them for yourself, called a health care power of , and what kind of decisions you might make such as use of life sustaining treatments such as ventilators and tube feeding when faced with a life limiting illness recorded on a living will that they will need to know. (How you want to be cared for as you near the end of your natural life)     X Patient is interested in learning more about how to make advanced directives.  I provided them paperwork and offered to discuss this with them.

## 2022-04-27 ENCOUNTER — OFFICE VISIT (OUTPATIENT)
Dept: CARDIOLOGY | Facility: CLINIC | Age: 82
End: 2022-04-27
Payer: MEDICARE

## 2022-04-27 VITALS
BODY MASS INDEX: 34.29 KG/M2 | OXYGEN SATURATION: 95 % | HEART RATE: 108 BPM | DIASTOLIC BLOOD PRESSURE: 68 MMHG | SYSTOLIC BLOOD PRESSURE: 118 MMHG | WEIGHT: 239 LBS

## 2022-04-27 DIAGNOSIS — E11.22 CKD STAGE 3 DUE TO TYPE 2 DIABETES MELLITUS: ICD-10-CM

## 2022-04-27 DIAGNOSIS — E11.9 CONTROLLED TYPE 2 DIABETES MELLITUS WITHOUT COMPLICATION, WITHOUT LONG-TERM CURRENT USE OF INSULIN: ICD-10-CM

## 2022-04-27 DIAGNOSIS — R79.89 TROPONIN I ABOVE REFERENCE RANGE: ICD-10-CM

## 2022-04-27 DIAGNOSIS — I15.2 HYPERTENSION ASSOCIATED WITH DIABETES: Primary | ICD-10-CM

## 2022-04-27 DIAGNOSIS — E11.69 HYPERLIPIDEMIA ASSOCIATED WITH TYPE 2 DIABETES MELLITUS: ICD-10-CM

## 2022-04-27 DIAGNOSIS — I35.0 NONRHEUMATIC AORTIC VALVE STENOSIS: ICD-10-CM

## 2022-04-27 DIAGNOSIS — E11.59 HYPERTENSION ASSOCIATED WITH DIABETES: Primary | ICD-10-CM

## 2022-04-27 DIAGNOSIS — N18.30 CKD STAGE 3 DUE TO TYPE 2 DIABETES MELLITUS: ICD-10-CM

## 2022-04-27 DIAGNOSIS — R55 PRE-SYNCOPE: ICD-10-CM

## 2022-04-27 DIAGNOSIS — E78.5 HYPERLIPIDEMIA ASSOCIATED WITH TYPE 2 DIABETES MELLITUS: ICD-10-CM

## 2022-04-27 PROCEDURE — 99499 RISK ADDL DX/OHS AUDIT: ICD-10-PCS | Mod: S$GLB,,, | Performed by: INTERNAL MEDICINE

## 2022-04-27 PROCEDURE — 99214 OFFICE O/P EST MOD 30 MIN: CPT | Mod: S$GLB,,, | Performed by: INTERNAL MEDICINE

## 2022-04-27 PROCEDURE — 99214 PR OFFICE/OUTPT VISIT, EST, LEVL IV, 30-39 MIN: ICD-10-PCS | Mod: S$GLB,,, | Performed by: INTERNAL MEDICINE

## 2022-04-27 PROCEDURE — 3072F PR LOW RISK FOR RETINOPATHY: ICD-10-PCS | Mod: CPTII,S$GLB,, | Performed by: INTERNAL MEDICINE

## 2022-04-27 PROCEDURE — 99999 PR PBB SHADOW E&M-EST. PATIENT-LVL III: ICD-10-PCS | Mod: PBBFAC,,, | Performed by: INTERNAL MEDICINE

## 2022-04-27 PROCEDURE — 1159F PR MEDICATION LIST DOCUMENTED IN MEDICAL RECORD: ICD-10-PCS | Mod: CPTII,S$GLB,, | Performed by: INTERNAL MEDICINE

## 2022-04-27 PROCEDURE — 3078F PR MOST RECENT DIASTOLIC BLOOD PRESSURE < 80 MM HG: ICD-10-PCS | Mod: CPTII,S$GLB,, | Performed by: INTERNAL MEDICINE

## 2022-04-27 PROCEDURE — 1160F PR REVIEW ALL MEDS BY PRESCRIBER/CLIN PHARMACIST DOCUMENTED: ICD-10-PCS | Mod: CPTII,S$GLB,, | Performed by: INTERNAL MEDICINE

## 2022-04-27 PROCEDURE — 3051F PR MOST RECENT HEMOGLOBIN A1C LEVEL 7.0 - < 8.0%: ICD-10-PCS | Mod: CPTII,S$GLB,, | Performed by: INTERNAL MEDICINE

## 2022-04-27 PROCEDURE — 99499 UNLISTED E&M SERVICE: CPT | Mod: S$GLB,,, | Performed by: INTERNAL MEDICINE

## 2022-04-27 PROCEDURE — 99999 PR PBB SHADOW E&M-EST. PATIENT-LVL III: CPT | Mod: PBBFAC,,, | Performed by: INTERNAL MEDICINE

## 2022-04-27 PROCEDURE — 3072F LOW RISK FOR RETINOPATHY: CPT | Mod: CPTII,S$GLB,, | Performed by: INTERNAL MEDICINE

## 2022-04-27 PROCEDURE — 3051F HG A1C>EQUAL 7.0%<8.0%: CPT | Mod: CPTII,S$GLB,, | Performed by: INTERNAL MEDICINE

## 2022-04-27 PROCEDURE — 3078F DIAST BP <80 MM HG: CPT | Mod: CPTII,S$GLB,, | Performed by: INTERNAL MEDICINE

## 2022-04-27 PROCEDURE — 1159F MED LIST DOCD IN RCRD: CPT | Mod: CPTII,S$GLB,, | Performed by: INTERNAL MEDICINE

## 2022-04-27 PROCEDURE — 1160F RVW MEDS BY RX/DR IN RCRD: CPT | Mod: CPTII,S$GLB,, | Performed by: INTERNAL MEDICINE

## 2022-04-27 PROCEDURE — 3074F SYST BP LT 130 MM HG: CPT | Mod: CPTII,S$GLB,, | Performed by: INTERNAL MEDICINE

## 2022-04-27 PROCEDURE — 3074F PR MOST RECENT SYSTOLIC BLOOD PRESSURE < 130 MM HG: ICD-10-PCS | Mod: CPTII,S$GLB,, | Performed by: INTERNAL MEDICINE

## 2022-04-27 RX ORDER — METOPROLOL SUCCINATE 50 MG/1
50 TABLET, EXTENDED RELEASE ORAL DAILY
Qty: 90 TABLET | Refills: 2 | Status: SHIPPED | OUTPATIENT
Start: 2022-04-27 | End: 2022-04-27 | Stop reason: SDUPTHER

## 2022-04-27 RX ORDER — METOPROLOL SUCCINATE 50 MG/1
50 TABLET, EXTENDED RELEASE ORAL DAILY
Qty: 90 TABLET | Refills: 2 | Status: SHIPPED | OUTPATIENT
Start: 2022-04-27 | End: 2022-11-28

## 2022-04-27 RX ORDER — METOPROLOL SUCCINATE 50 MG/1
50 TABLET, EXTENDED RELEASE ORAL DAILY
Qty: 30 TABLET | Refills: 5 | Status: SHIPPED | OUTPATIENT
Start: 2022-04-27 | End: 2022-04-27 | Stop reason: SDUPTHER

## 2022-04-27 NOTE — TELEPHONE ENCOUNTER
----- Message from [employer] sent at 4/27/2022  3:12 PM CDT -----  Contact: pt  1. What is the name of the medication you are requesting? Heart medication  2. What is the dose? n/a  3. How do you take the medication? Orally, topically, etc? n/a  4. How often do you take this medication? n/a  5. Do you need a 30 day or 90 day supply? 90 day  6. How many refills are you requesting? n/a  7. What is your preferred pharmacy and location of the pharmacy? [facility]  8. Who can we contact with further questions? The pt at [phone]

## 2022-04-27 NOTE — PROGRESS NOTES
Subjective:   Patient ID:  Mark Dickerson Sr. is a 82 y.o. male who presents for follow up of No chief complaint on file.      83 yo male, d/c f/u  PMH PAF, mod AS, CKD, DM, HTN, OH.   ad[facility] for [facility] due to dizziness which occurred PTA. Pt reports the room was spinning around him, hx of vertigo in the past.  Pt reports that his blood sugar was in the 140s. Patient received 1L NS bolus in the ED.   ED to be orthostatic positive. Troponin 0.043 -->0.053. Ekg NSR and PVC  Echo  · Concentric remodeling and normal systolic function.  · With normal right ventricular systolic function.  · Mild aortic regurgitation.  · There is moderate aortic valve stenosis.  · Aortic valve area is 1.05 cm2; peak velocity is 3.43 m/s; mean gradient is 31 mmHg.  · Intermediate central venous pressure (8 mmHg).  · The estimated PA systolic pressure is 34 mmHg.  · The estimated ejection fraction is 55%.  · Indeterminate left ventricular diastolic function    Now no recurrent dizziness and faint. No chest pain dyspnea dyspnea and diarrhea          Past Medical History:   Diagnosis Date    CKD stage 3 due to type 2 diabetes mellitus 2021    DM (diabetes mellitus)     BS didn't check 2019    Foreign body, eye     right eye    Hyperlipidemia     Hypertension     Need for shingles vaccine 2019    Pneumonia     Primary osteoarthritis of right knee 10/29/2021    Severe obesity (BMI 35.0-35.9 with comorbidity) 7/10/2019    Type 2 diabetes mellitus     BS didn't check 2018       Past Surgical History:   Procedure Laterality Date    APPENDECTOMY      CATARACT EXTRACTION Bilateral     CPG       Social History     Tobacco Use    Smoking status: Former Smoker     Packs/day: 3.00     Types: Cigarettes     Start date:      Quit date:      Years since quittin.3    Smokeless tobacco: Never Used   Substance Use Topics    Alcohol use: No    Drug use: No       Family History   Problem  Relation Age of Onset    Heart disease Mother     Hypertension Father     Heart disease Father     Hyperlipidemia Father     Cataracts Father     Hypertension Sister     Hypertension Brother     Cancer Son         colon    Allergic rhinitis Daughter          Review of Systems   Constitutional: Positive for malaise/fatigue. Negative for decreased appetite, diaphoresis, fever and night sweats.   HENT: Negative for nosebleeds.    Eyes: Negative for blurred vision and double vision.   Cardiovascular: Negative for chest pain, claudication, dyspnea on exertion, irregular heartbeat, leg swelling, near-syncope, orthopnea, palpitations, paroxysmal nocturnal dyspnea and syncope.   Respiratory: Negative for cough, shortness of breath, sleep disturbances due to breathing, snoring, sputum production and wheezing.    Endocrine: Negative for cold intolerance and polyuria.   Hematologic/Lymphatic: Does not bruise/bleed easily.   Skin: Negative for rash.   Musculoskeletal: Negative for back pain, falls, joint pain, joint swelling and neck pain.   Gastrointestinal: Negative for abdominal pain, heartburn, nausea and vomiting.   Genitourinary: Negative for dysuria, frequency and hematuria.   Neurological: Negative for difficulty with concentration, dizziness, focal weakness, headaches, light-headedness, numbness, seizures and weakness.   Psychiatric/Behavioral: Negative for depression, memory loss and substance abuse. The patient does not have insomnia.    Allergic/Immunologic: Negative for HIV exposure and hives.       Objective:   Physical Exam  HENT:      Head: Normocephalic.   Eyes:      Pupils: Pupils are equal, round, and reactive to light.   Neck:      Thyroid: No thyromegaly.      Vascular: Normal carotid pulses. No carotid bruit or JVD.   Cardiovascular:      Rate and Rhythm: Normal rate and regular rhythm.  No extrasystoles are present.     Chest Wall: PMI is not displaced.      Pulses: Normal pulses.            Carotid pulses are 2+ on the right side and 2+ on the left side.     Heart sounds: Murmur heard.    Harsh midsystolic murmur is present at the upper right sternal border radiating to the neck.    No gallop. No S3 sounds.   Pulmonary:      Effort: No respiratory distress.      Breath sounds: Normal breath sounds. No stridor.   Abdominal:      General: Bowel sounds are normal.      Palpations: Abdomen is soft.      Tenderness: There is no abdominal tenderness. There is no rebound.   Musculoskeletal:         General: Normal range of motion.   Skin:     Findings: No rash.   Neurological:      Mental Status: He is alert and oriented to person, place, and time.   Psychiatric:         Behavior: Behavior normal.         Lab Results   Component Value Date    CHOL 268 (H) 12/16/2021    CHOL 234 (H) 05/21/2021    CHOL 257 (H) 11/13/2020     Lab Results   Component Value Date    HDL 30 (L) 12/16/2021    HDL 30 (L) 05/21/2021    HDL 34 (L) 11/13/2020     Lab Results   Component Value Date    LDLCALC 198.0 (H) 12/16/2021    LDLCALC 163.8 (H) 05/21/2021    LDLCALC 194.2 (H) 11/13/2020     Lab Results   Component Value Date    TRIG 200 (H) 12/16/2021    TRIG 201 (H) 05/21/2021    TRIG 144 11/13/2020     Lab Results   Component Value Date    CHOLHDL 11.2 (L) 12/16/2021    CHOLHDL 12.8 (L) 05/21/2021    CHOLHDL 13.2 (L) 11/13/2020       Chemistry        Component Value Date/Time     04/03/2022 0800    K 3.5 04/03/2022 0800     04/03/2022 0800    CO2 23 04/03/2022 0800    BUN 15 04/03/2022 0800    CREATININE 0.9 04/03/2022 0800     (H) 04/03/2022 0800        Component Value Date/Time    CALCIUM 8.9 04/03/2022 0800    ALKPHOS 59 04/02/2022 1236    AST 26 04/02/2022 1236    ALT 23 04/02/2022 1236    BILITOT 0.5 04/02/2022 1236    ESTGFRAFRICA >60 04/03/2022 0800    EGFRNONAA >60 04/03/2022 0800          Lab Results   Component Value Date    HGBA1C 7.2 (H) 12/16/2021     Lab Results   Component Value Date    TSH 1.063  04/02/2022     No results found for: INR, PROTIME  Lab Results   Component Value Date    WBC 9.40 04/02/2022    HGB 13.7 (L) 04/02/2022    HCT 40.2 04/02/2022    MCV 88 04/02/2022     04/02/2022     BMP  Sodium   Date Value Ref Range Status   04/03/2022 138 136 - 145 mmol/L Final     Potassium   Date Value Ref Range Status   04/03/2022 3.5 3.5 - 5.1 mmol/L Final     Chloride   Date Value Ref Range Status   04/03/2022 105 95 - 110 mmol/L Final     CO2   Date Value Ref Range Status   04/03/2022 23 23 - 29 mmol/L Final     BUN   Date Value Ref Range Status   04/03/2022 15 8 - 23 mg/dL Final     Creatinine   Date Value Ref Range Status   04/03/2022 0.9 0.5 - 1.4 mg/dL Final     Calcium   Date Value Ref Range Status   04/03/2022 8.9 8.7 - 10.5 mg/dL Final     Anion Gap   Date Value Ref Range Status   04/03/2022 10 8 - 16 mmol/L Final     eGFR if    Date Value Ref Range Status   04/03/2022 >60 >60 mL/min/1.73 m^2 Final     eGFR if non    Date Value Ref Range Status   04/03/2022 >60 >60 mL/min/1.73 m^2 Final     Comment:     Calculation used to obtain the estimated glomerular filtration  rate (eGFR) is the CKD-EPI equation.        BNP  @LABRCNTIP(BNP,BNPTRIAGEBLO)@  @LABRCNTIP(troponini)@  CrCl cannot be calculated (Patient's most recent lab result is older than the maximum 7 days allowed.).  No results found in the last 24 hours.  No results found in the last 24 hours.  No results found in the last 24 hours.    Assessment:      1. Hypertension associated with diabetes    2. Troponin I above reference range    3. Pre-syncope    4. Hyperlipidemia associated with type 2 diabetes mellitus    5. Nonrheumatic aortic valve stenosis    6. CKD stage 3 due to type 2 diabetes mellitus    7. Controlled type 2 diabetes mellitus without complication, without long-term current use of insulin      BP controll  Mod AS    Plan:   D/c HCTZ  Add ToprolXL 50 mg daily for HTN and PAF    Counseled  DASH  Check Lipid profile in 6 months  Recommend heart-healthy diet, weight control and regular exercise.  [place]. Risk modification.   I have reviewed all pertinent labs and cardiac studies independently. Plans and recommendations have been formulated under my direct supervision. All questions answered and patient voiced understanding.   If symptoms persist go to the ED  RTC in 6 months

## 2022-04-27 NOTE — TELEPHONE ENCOUNTER
Care Due:                  Date            Visit Type   Department     Provider  --------------------------------------------------------------------------------                                [facility]  Last Visit: 04-      FOLLOW UP    MEDICINE       Tatyana Cannon                               -                              PRIMARY      [facility]  Next Visit: 05-      CARE ([facility])   MEDICINE       Tatyana Cannon                                                            Last  Test          Frequency    Reason                     Performed    Due Date  --------------------------------------------------------------------------------    HBA1C.......  6 months...  glipiZIDE................  12-   06-    Powered by [employer]. Reference number: [id number].   4/27/2022 5:33:54 PM CDT

## 2022-05-04 ENCOUNTER — OFFICE VISIT (OUTPATIENT)
Dept: OPHTHALMOLOGY | Facility: CLINIC | Age: 82
End: 2022-05-04
Payer: MEDICARE

## 2022-05-04 ENCOUNTER — PATIENT MESSAGE (OUTPATIENT)
Dept: OPHTHALMOLOGY | Facility: CLINIC | Age: 82
End: 2022-05-04

## 2022-05-04 DIAGNOSIS — Z96.1 PSEUDOPHAKIA OF BOTH EYES: ICD-10-CM

## 2022-05-04 DIAGNOSIS — E11.9 DIABETES MELLITUS TYPE 2 WITHOUT RETINOPATHY: Primary | ICD-10-CM

## 2022-05-04 DIAGNOSIS — H52.4 BILATERAL PRESBYOPIA: ICD-10-CM

## 2022-05-04 PROCEDURE — 92014 PR EYE EXAM, EST PATIENT,COMPREHESV: ICD-10-PCS | Mod: S$GLB,,, | Performed by: OPTOMETRIST

## 2022-05-04 PROCEDURE — 1159F MED LIST DOCD IN RCRD: CPT | Mod: CPTII,S$GLB,, | Performed by: OPTOMETRIST

## 2022-05-04 PROCEDURE — 99499 RISK ADDL DX/OHS AUDIT: ICD-10-PCS | Mod: HCNC,S$GLB,, | Performed by: OPTOMETRIST

## 2022-05-04 PROCEDURE — 92014 COMPRE OPH EXAM EST PT 1/>: CPT | Mod: S$GLB,,, | Performed by: OPTOMETRIST

## 2022-05-04 PROCEDURE — 99999 PR PBB SHADOW E&M-EST. PATIENT-LVL II: CPT | Mod: PBBFAC,,, | Performed by: OPTOMETRIST

## 2022-05-04 PROCEDURE — 1159F PR MEDICATION LIST DOCUMENTED IN MEDICAL RECORD: ICD-10-PCS | Mod: CPTII,S$GLB,, | Performed by: OPTOMETRIST

## 2022-05-04 PROCEDURE — 99999 PR PBB SHADOW E&M-EST. PATIENT-LVL II: ICD-10-PCS | Mod: PBBFAC,,, | Performed by: OPTOMETRIST

## 2022-05-04 PROCEDURE — 92015 PR REFRACTION: ICD-10-PCS | Mod: S$GLB,,, | Performed by: OPTOMETRIST

## 2022-05-04 PROCEDURE — 92015 DETERMINE REFRACTIVE STATE: CPT | Mod: S$GLB,,, | Performed by: OPTOMETRIST

## 2022-05-04 PROCEDURE — 99499 UNLISTED E&M SERVICE: CPT | Mod: HCNC,S$GLB,, | Performed by: OPTOMETRIST

## 2022-05-04 NOTE — PROGRESS NOTES
HPI     NIDDM exam  No visual complaints.  Las eye exam 06/03/2021 TRF.  Update glasses RX.  Lab Results       Component                Value               Date                       HGBA1C                   7.2 (H)             12/16/2021                Last edited by Jess Lawler MA on 5/4/2022 10:12 AM. (History)            Assessment /Plan     For exam results, see Encounter Report.    Diabetes mellitus type 2 without retinopathy    Pseudophakia of both eyes    Bilateral presbyopia      No Background Diabetic Retinopathy    Stable IOL OU.    Dispense Final Rx for glasses.  RTC 1 year  Discussed above and answered questions.

## 2022-05-09 ENCOUNTER — TELEPHONE (OUTPATIENT)
Dept: CARDIOLOGY | Facility: CLINIC | Age: 82
End: 2022-05-09
Payer: MEDICARE

## 2022-05-09 NOTE — TELEPHONE ENCOUNTER
----- Message from Radha Kelly MA sent at 5/9/2022  9:14 AM CDT -----  Regarding: FW: new med  Contact: patient  Please advise.  ----- Message -----  From: Vania Atkins  Sent: 5/9/2022   9:06 AM CDT  To: [facility] Eche[facility] Staff  Subject: new med                                          Patient states that since he has been on new med, his energy level has gone down, patient would like to be advised, please call him back at [phone]

## 2022-05-20 ENCOUNTER — LAB VISIT (OUTPATIENT)
Dept: LAB | Facility: HOSPITAL | Age: 82
End: 2022-05-20
Attending: FAMILY MEDICINE
Payer: MEDICARE

## 2022-05-20 DIAGNOSIS — E11.22 CKD STAGE 3 DUE TO TYPE 2 DIABETES MELLITUS: ICD-10-CM

## 2022-05-20 DIAGNOSIS — E78.5 HYPERLIPIDEMIA ASSOCIATED WITH TYPE 2 DIABETES MELLITUS: ICD-10-CM

## 2022-05-20 DIAGNOSIS — E11.69 HYPERLIPIDEMIA ASSOCIATED WITH TYPE 2 DIABETES MELLITUS: ICD-10-CM

## 2022-05-20 DIAGNOSIS — R09.89 DECREASED PEDAL PULSES: ICD-10-CM

## 2022-05-20 DIAGNOSIS — N18.30 CKD STAGE 3 DUE TO TYPE 2 DIABETES MELLITUS: ICD-10-CM

## 2022-05-20 DIAGNOSIS — J30.9 ALLERGIC RHINITIS, UNSPECIFIED SEASONALITY, UNSPECIFIED TRIGGER: ICD-10-CM

## 2022-05-20 LAB
ALBUMIN SERPL BCP-MCNC: 3.4 G/DL (ref 3.5–5.2)
ALP SERPL-CCNC: 78 U/L (ref 55–135)
ALT SERPL W/O P-5'-P-CCNC: 24 U/L (ref 10–44)
ANION GAP SERPL CALC-SCNC: 10 MMOL/L (ref 8–16)
AST SERPL-CCNC: 28 U/L (ref 10–40)
BASOPHILS # BLD AUTO: 0.05 K/UL (ref 0–0.2)
BASOPHILS NFR BLD: 0.7 % (ref 0–1.9)
BILIRUB SERPL-MCNC: 0.3 MG/DL (ref 0.1–1)
BUN SERPL-MCNC: 19 MG/DL (ref 8–23)
CALCIUM SERPL-MCNC: 9.3 MG/DL (ref 8.7–10.5)
CHLORIDE SERPL-SCNC: 105 MMOL/L (ref 95–110)
CHOLEST SERPL-MCNC: 234 MG/DL (ref 120–199)
CHOLEST/HDLC SERPL: 7.1 {RATIO} (ref 2–5)
CO2 SERPL-SCNC: 24 MMOL/L (ref 23–29)
CREAT SERPL-MCNC: 1.2 MG/DL (ref 0.5–1.4)
DIFFERENTIAL METHOD: ABNORMAL
EOSINOPHIL # BLD AUTO: 0.3 K/UL (ref 0–0.5)
EOSINOPHIL NFR BLD: 3.8 % (ref 0–8)
ERYTHROCYTE [DISTWIDTH] IN BLOOD BY AUTOMATED COUNT: 14.6 % (ref 11.5–14.5)
EST. GFR  (AFRICAN AMERICAN): >60 ML/MIN/1.73 M^2
EST. GFR  (NON AFRICAN AMERICAN): 55.2 ML/MIN/1.73 M^2
ESTIMATED AVG GLUCOSE: 148 MG/DL (ref 68–131)
GLUCOSE SERPL-MCNC: 144 MG/DL (ref 70–110)
HBA1C MFR BLD: 6.8 % (ref 4–5.6)
HCT VFR BLD AUTO: 39.7 % (ref 40–54)
HDLC SERPL-MCNC: 33 MG/DL (ref 40–75)
HDLC SERPL: 14.1 % (ref 20–50)
HGB BLD-MCNC: 12.8 G/DL (ref 14–18)
IMM GRANULOCYTES # BLD AUTO: 0.02 K/UL (ref 0–0.04)
IMM GRANULOCYTES NFR BLD AUTO: 0.3 % (ref 0–0.5)
LDLC SERPL CALC-MCNC: 175.2 MG/DL (ref 63–159)
LYMPHOCYTES # BLD AUTO: 2 K/UL (ref 1–4.8)
LYMPHOCYTES NFR BLD: 29.7 % (ref 18–48)
MCH RBC QN AUTO: 29.3 PG (ref 27–31)
MCHC RBC AUTO-ENTMCNC: 32.2 G/DL (ref 32–36)
MCV RBC AUTO: 91 FL (ref 82–98)
MONOCYTES # BLD AUTO: 0.6 K/UL (ref 0.3–1)
MONOCYTES NFR BLD: 8 % (ref 4–15)
NEUTROPHILS # BLD AUTO: 4 K/UL (ref 1.8–7.7)
NEUTROPHILS NFR BLD: 57.5 % (ref 38–73)
NONHDLC SERPL-MCNC: 201 MG/DL
NRBC BLD-RTO: 0 /100 WBC
PLATELET # BLD AUTO: 345 K/UL (ref 150–450)
PMV BLD AUTO: 9.9 FL (ref 9.2–12.9)
POTASSIUM SERPL-SCNC: 4.7 MMOL/L (ref 3.5–5.1)
PROT SERPL-MCNC: 6.3 G/DL (ref 6–8.4)
RBC # BLD AUTO: 4.37 M/UL (ref 4.6–6.2)
SODIUM SERPL-SCNC: 139 MMOL/L (ref 136–145)
TRIGL SERPL-MCNC: 129 MG/DL (ref 30–150)
WBC # BLD AUTO: 6.88 K/UL (ref 3.9–12.7)

## 2022-05-20 PROCEDURE — 80053 COMPREHEN METABOLIC PANEL: CPT | Performed by: FAMILY MEDICINE

## 2022-05-20 PROCEDURE — 80061 LIPID PANEL: CPT | Performed by: FAMILY MEDICINE

## 2022-05-20 PROCEDURE — 85025 COMPLETE CBC W/AUTO DIFF WBC: CPT | Performed by: FAMILY MEDICINE

## 2022-05-20 PROCEDURE — 36415 COLL VENOUS BLD VENIPUNCTURE: CPT | Mod: PO | Performed by: FAMILY MEDICINE

## 2022-05-20 PROCEDURE — 83036 HEMOGLOBIN GLYCOSYLATED A1C: CPT | Performed by: FAMILY MEDICINE

## 2022-05-25 ENCOUNTER — OFFICE VISIT (OUTPATIENT)
Dept: FAMILY MEDICINE | Facility: CLINIC | Age: 82
End: 2022-05-25
Payer: MEDICARE

## 2022-05-25 ENCOUNTER — LAB VISIT (OUTPATIENT)
Dept: LAB | Facility: HOSPITAL | Age: 82
End: 2022-05-25
Attending: FAMILY MEDICINE
Payer: MEDICARE

## 2022-05-25 VITALS
TEMPERATURE: 98 F | HEIGHT: 70 IN | DIASTOLIC BLOOD PRESSURE: 82 MMHG | HEART RATE: 84 BPM | WEIGHT: 246.25 LBS | SYSTOLIC BLOOD PRESSURE: 112 MMHG | OXYGEN SATURATION: 95 % | BODY MASS INDEX: 35.25 KG/M2

## 2022-05-25 DIAGNOSIS — E78.5 HYPERLIPIDEMIA ASSOCIATED WITH TYPE 2 DIABETES MELLITUS: Primary | ICD-10-CM

## 2022-05-25 DIAGNOSIS — N18.30 CKD STAGE 3 DUE TO TYPE 2 DIABETES MELLITUS: ICD-10-CM

## 2022-05-25 DIAGNOSIS — I15.2 HYPERTENSION ASSOCIATED WITH DIABETES: ICD-10-CM

## 2022-05-25 DIAGNOSIS — M17.11 PRIMARY OSTEOARTHRITIS OF RIGHT KNEE: ICD-10-CM

## 2022-05-25 DIAGNOSIS — D64.9 MILD ANEMIA: ICD-10-CM

## 2022-05-25 DIAGNOSIS — E11.59 HYPERTENSION ASSOCIATED WITH DIABETES: ICD-10-CM

## 2022-05-25 DIAGNOSIS — E11.22 CKD STAGE 3 DUE TO TYPE 2 DIABETES MELLITUS: ICD-10-CM

## 2022-05-25 DIAGNOSIS — E11.69 HYPERLIPIDEMIA ASSOCIATED WITH TYPE 2 DIABETES MELLITUS: Primary | ICD-10-CM

## 2022-05-25 LAB
FERRITIN SERPL-MCNC: 111 NG/ML (ref 20–300)
IRON SERPL-MCNC: 81 UG/DL (ref 45–160)
IRON SERPL-MCNC: 81 UG/DL (ref 45–160)
LDH SERPL L TO P-CCNC: 195 U/L (ref 110–260)
SATURATED IRON: 22 % (ref 20–50)
TOTAL IRON BINDING CAPACITY: 376 UG/DL (ref 250–450)
TRANSFERRIN SERPL-MCNC: 254 MG/DL (ref 200–375)
TRANSFERRIN SERPL-MCNC: 254 MG/DL (ref 200–375)

## 2022-05-25 PROCEDURE — 1101F PR PT FALLS ASSESS DOC 0-1 FALLS W/OUT INJ PAST YR: ICD-10-PCS | Mod: CPTII,S$GLB,, | Performed by: FAMILY MEDICINE

## 2022-05-25 PROCEDURE — 1159F MED LIST DOCD IN RCRD: CPT | Mod: CPTII,S$GLB,, | Performed by: FAMILY MEDICINE

## 2022-05-25 PROCEDURE — 99214 PR OFFICE/OUTPT VISIT, EST, LEVL IV, 30-39 MIN: ICD-10-PCS | Mod: S$GLB,,, | Performed by: FAMILY MEDICINE

## 2022-05-25 PROCEDURE — 83615 LACTATE (LD) (LDH) ENZYME: CPT | Performed by: FAMILY MEDICINE

## 2022-05-25 PROCEDURE — 1126F PR PAIN SEVERITY QUANTIFIED, NO PAIN PRESENT: ICD-10-PCS | Mod: CPTII,S$GLB,, | Performed by: FAMILY MEDICINE

## 2022-05-25 PROCEDURE — 3079F DIAST BP 80-89 MM HG: CPT | Mod: CPTII,S$GLB,, | Performed by: FAMILY MEDICINE

## 2022-05-25 PROCEDURE — 99999 PR PBB SHADOW E&M-EST. PATIENT-LVL III: CPT | Mod: PBBFAC,,, | Performed by: FAMILY MEDICINE

## 2022-05-25 PROCEDURE — 1101F PT FALLS ASSESS-DOCD LE1/YR: CPT | Mod: CPTII,S$GLB,, | Performed by: FAMILY MEDICINE

## 2022-05-25 PROCEDURE — 1159F PR MEDICATION LIST DOCUMENTED IN MEDICAL RECORD: ICD-10-PCS | Mod: CPTII,S$GLB,, | Performed by: FAMILY MEDICINE

## 2022-05-25 PROCEDURE — 3079F PR MOST RECENT DIASTOLIC BLOOD PRESSURE 80-89 MM HG: ICD-10-PCS | Mod: CPTII,S$GLB,, | Performed by: FAMILY MEDICINE

## 2022-05-25 PROCEDURE — 3074F PR MOST RECENT SYSTOLIC BLOOD PRESSURE < 130 MM HG: ICD-10-PCS | Mod: CPTII,S$GLB,, | Performed by: FAMILY MEDICINE

## 2022-05-25 PROCEDURE — 3288F FALL RISK ASSESSMENT DOCD: CPT | Mod: CPTII,S$GLB,, | Performed by: FAMILY MEDICINE

## 2022-05-25 PROCEDURE — 3074F SYST BP LT 130 MM HG: CPT | Mod: CPTII,S$GLB,, | Performed by: FAMILY MEDICINE

## 2022-05-25 PROCEDURE — 1126F AMNT PAIN NOTED NONE PRSNT: CPT | Mod: CPTII,S$GLB,, | Performed by: FAMILY MEDICINE

## 2022-05-25 PROCEDURE — 36415 COLL VENOUS BLD VENIPUNCTURE: CPT | Mod: PO | Performed by: FAMILY MEDICINE

## 2022-05-25 PROCEDURE — 99214 OFFICE O/P EST MOD 30 MIN: CPT | Mod: S$GLB,,, | Performed by: FAMILY MEDICINE

## 2022-05-25 PROCEDURE — 3072F LOW RISK FOR RETINOPATHY: CPT | Mod: CPTII,S$GLB,, | Performed by: FAMILY MEDICINE

## 2022-05-25 PROCEDURE — 3072F PR LOW RISK FOR RETINOPATHY: ICD-10-PCS | Mod: CPTII,S$GLB,, | Performed by: FAMILY MEDICINE

## 2022-05-25 PROCEDURE — 99999 PR PBB SHADOW E&M-EST. PATIENT-LVL III: ICD-10-PCS | Mod: PBBFAC,,, | Performed by: FAMILY MEDICINE

## 2022-05-25 PROCEDURE — 82728 ASSAY OF FERRITIN: CPT | Performed by: FAMILY MEDICINE

## 2022-05-25 PROCEDURE — 84466 ASSAY OF TRANSFERRIN: CPT | Performed by: FAMILY MEDICINE

## 2022-05-25 PROCEDURE — 3288F PR FALLS RISK ASSESSMENT DOCUMENTED: ICD-10-PCS | Mod: CPTII,S$GLB,, | Performed by: FAMILY MEDICINE

## 2022-05-25 NOTE — PROGRESS NOTES
Chief Complaint:    Chief Complaint   Patient presents with    Follow-up       History of Present Illness:  Patient with HLD associated with DM2, HTN, CKD stage 3, presents today for a six month follow-up    Not exercising as much as he would like due to right knee pain.   A1C is 6.8, down from 7.2   Cholesterol came down 30 points.   Watching his diet, cut down his meat intake. Staying hydrated. BP good today.  Slightly anemic.   Denies CP, SOB, or constipation. Denies depression or anxiety.            ROS:  Review of Systems   Constitutional: Negative for activity change, chills, fatigue, fever and unexpected weight change.   HENT: Negative for congestion, ear discharge, ear pain, hearing loss, postnasal drip and rhinorrhea.    Eyes: Negative for pain and visual disturbance.   Respiratory: Negative for cough, chest tightness and shortness of breath.    Cardiovascular: Negative for chest pain and palpitations.   Gastrointestinal: Negative for abdominal pain, constipation, diarrhea and vomiting.   Endocrine: Negative for heat intolerance.   Genitourinary: Negative for dysuria, flank pain, frequency and hematuria.   Musculoskeletal: Negative for back pain, gait problem and neck pain.   Skin: Negative for color change and rash.   Neurological: Negative for dizziness, tremors, seizures, numbness and headaches.   Psychiatric/Behavioral: Negative for agitation, dysphoric mood, hallucinations, self-injury, sleep disturbance and suicidal ideas. The patient is not nervous/anxious.    All other systems reviewed and are negative.      Past Medical History:   Diagnosis Date    CKD stage 3 due to type 2 diabetes mellitus 2/18/2021    DM (diabetes mellitus) 2014    BS didn't check 12/11/2019    DM (diabetes mellitus) 2014    BS didn't check 05/05/2022    Foreign body, eye     right eye    Hyperlipidemia     Hypertension     Need for shingles vaccine 11/20/2019    Pneumonia     Primary osteoarthritis of right knee  10/29/2021    Severe obesity (BMI 35.0-35.9 with comorbidity) 7/10/2019    Type 2 diabetes mellitus 2014    BS didn't check 2018       Social History:  Social History     Socioeconomic History    Marital status:    Tobacco Use    Smoking status: Former Smoker     Packs/day: 3.00     Types: Cigarettes     Start date:      Quit date:      Years since quittin.4    Smokeless tobacco: Never Used   Substance and Sexual Activity    Alcohol use: No    Drug use: No    Sexual activity: Never     Social Determinants of Health     Financial Resource Strain: Low Risk     Difficulty of Paying Living Expenses: Not hard at all   Food Insecurity: No Food Insecurity    Worried About Running Out of Food in the Last Year: Never true    Ran Out of Food in the Last Year: Never true   Transportation Needs: No Transportation Needs    Lack of Transportation (Medical): No    Lack of Transportation (Non-Medical): No   Physical Activity: Inactive    Days of Exercise per Week: 0 days    Minutes of Exercise per Session: 0 min   Stress: Stress Concern Present    Feeling of Stress : To some extent   Social Connections: Moderately Integrated    Frequency of Communication with Friends and Family: More than three times a week    Frequency of Social Gatherings with Friends and Family: More than three times a week    Attends Jain Services: More than 4 times per year    Active Member of Clubs or Organizations: Yes    Attends Club or Organization Meetings: More than 4 times per year    Marital Status:    Housing Stability: Unknown    Unable to Pay for Housing in the Last Year: No    Unstable Housing in the Last Year: No       Family History:   family history includes Allergic rhinitis in his daughter; Cancer in his son; Cataracts in his father; Heart disease in his father and mother; Hyperlipidemia in his father; Hypertension in his brother, father, and sister.    Health Maintenance   Topic  "Date Due    Hemoglobin A1c  11/20/2022    Foot Exam  12/21/2022    Eye Exam  05/04/2023    Lipid Panel  05/20/2023    TETANUS VACCINE  12/10/2025       Physical Exam:    Vital Signs  Temp: 97.9 °F (36.6 °C)  Temp src: Temporal  Pulse: 84  SpO2: 95 %  BP: 112/82  BP Location: Left arm  Patient Position: Sitting  Pain Score: 0-No pain  Height and Weight  Height: 5' 10" (177.8 cm)  Weight: 111.7 kg (246 lb 4.1 oz)  BSA (Calculated - sq m): 2.35 sq meters  BMI (Calculated): 35.3  Weight in (lb) to have BMI = 25: 173.9]    Body mass index is 35.33 kg/m².    Physical Exam  Vitals and nursing note reviewed.   Constitutional:       Appearance: Normal appearance.   HENT:      Head: Normocephalic and atraumatic.      Right Ear: Tympanic membrane normal.      Left Ear: Tympanic membrane normal.   Eyes:      Extraocular Movements: Extraocular movements intact.      Pupils: Pupils are equal, round, and reactive to light.   Cardiovascular:      Rate and Rhythm: Normal rate and regular rhythm.      Pulses: Normal pulses.      Heart sounds: No murmur heard.    No gallop.   Pulmonary:      Effort: Pulmonary effort is normal. No respiratory distress.      Breath sounds: Normal breath sounds. No wheezing, rhonchi or rales.   Abdominal:      General: There is no distension.      Palpations: Abdomen is soft.      Tenderness: There is no abdominal tenderness.   Musculoskeletal:         General: No swelling, deformity or signs of injury. Normal range of motion.      Cervical back: Normal range of motion.   Skin:     General: Skin is warm and dry.      Capillary Refill: Capillary refill takes less than 2 seconds.      Coloration: Skin is not jaundiced or pale.   Neurological:      General: No focal deficit present.      Mental Status: He is alert and oriented to person, place, and time.   Psychiatric:         Mood and Affect: Mood normal.         Behavior: Behavior normal.           Diabetes Management Status    Statin: Not " taking  ACE/ARB: Not taking    Screening or Prevention Patient's value Goal Complete/Controlled?   HgA1C Testing and Control   Lab Results   Component Value Date    HGBA1C 6.8 (H) 05/20/2022      Annually/Less than 8% Yes   Lipid profile : 05/20/2022 Annually Yes   LDL control Lab Results   Component Value Date    LDLCALC 175.2 (H) 05/20/2022    Annually/Less than 100 mg/dl  No   Nephropathy screening Lab Results   Component Value Date    LABMICR 6.0 05/20/2022     Lab Results   Component Value Date    PROTEINUA Negative 04/02/2022    Annually Yes   Blood pressure BP Readings from Last 1 Encounters:   05/25/22 112/82    Less than 140/90 Yes   Dilated retinal exam : 05/04/2022 Annually Yes   Foot exam   : 12/21/2021 Annually Yes       Assessment:      ICD-10-CM ICD-9-CM   1. Hyperlipidemia associated with type 2 diabetes mellitus  E11.69 250.80    E78.5 272.4   2. Hypertension associated with diabetes  E11.59 250.80    I15.2 401.9   3. CKD stage 3 due to type 2 diabetes mellitus  E11.22 250.40    N18.30 585.3   4. Primary osteoarthritis of right knee  M17.11 715.16   5. Mild anemia  D64.9 285.9     Plan:  Continue current meds and plan  Maintain hydration  Stay active as tolerated.   Keep balanced diet. Add dark green leafy vegetables to diet.  Kidney disease is stable  Complete occult blood stool tests for mild anemia.  See labs below.   Follow up 6 months.  Orders Placed This Encounter   Procedures    Iron and TIBC    Lactate Dehydrogenase    Transferrin    Occult blood x 1, stool    Iron    Ferritin    Hemoglobin A1C    Comprehensive Metabolic Panel    CBC Auto Differential    Lipid Panel    Microalbumin/Creatinine Ratio, Urine         Current Outpatient Medications   Medication Sig Dispense Refill    glipiZIDE (GLUCOTROL) 5 MG tablet Take 1 tablet (5 mg total) by mouth 2 (two) times daily with meals. 180 tablet 3    levocetirizine (XYZAL) 5 MG tablet Take 1 tablet (5 mg total) by mouth every evening.  30 tablet 11    metoprolol succinate (TOPROL-XL) 50 MG 24 hr tablet Take 1 tablet (50 mg total) by mouth once daily. 90 tablet 2    multivitamin (THERAGRAN) per tablet Take 1 tablet by mouth once daily.       No current facility-administered medications for this visit.       There are no discontinued medications.    No follow-ups on file.      Tatyana Cannon MD  Scribe Attestation:   I, Philomena Gardner, am scribing for, and in the presence of, Dr.Arif Cannon I performed the above scribed service and the documentation accurately describes the services I performed. I attest to the accuracy of the note.    I, Dr. Tatyana Cannon, reviewed documentation as scribed above. I performed the services described in this documentation.  I agree that the record reflects my personal performance and is accurate and complete. Tatyana Cannon MD.  05/25/2022

## 2022-06-20 DIAGNOSIS — E11.69 HYPERLIPIDEMIA ASSOCIATED WITH TYPE 2 DIABETES MELLITUS: ICD-10-CM

## 2022-06-20 DIAGNOSIS — E78.5 HYPERLIPIDEMIA ASSOCIATED WITH TYPE 2 DIABETES MELLITUS: ICD-10-CM

## 2022-06-20 RX ORDER — HYDROCHLOROTHIAZIDE 25 MG/1
25 TABLET ORAL DAILY
Qty: 90 TABLET | Refills: 1 | Status: SHIPPED | OUTPATIENT
Start: 2022-06-20 | End: 2022-11-28 | Stop reason: SDUPTHER

## 2022-06-20 RX ORDER — GLIPIZIDE 5 MG/1
5 TABLET ORAL 2 TIMES DAILY WITH MEALS
Qty: 180 TABLET | Refills: 1 | Status: SHIPPED | OUTPATIENT
Start: 2022-06-20 | End: 2022-11-28 | Stop reason: SDUPTHER

## 2022-06-20 NOTE — TELEPHONE ENCOUNTER
----- Message from Isadora Navarro sent at 6/20/2022 11:16 AM CDT -----  Contact: JAVY ANGELO SR. [[id number]]  .Type:  Needs Medical Advice    Who Called: JAVY ANGELO SR. [[id number]]  Pharmacy name and phone #:  .  [facility] [place], [place] - [address]  [address] [address]  Phone: [phone] Fax: [phone]     Would the patient rather a call back or a response via [facility]? Call   Best Call Back Number: .[phone] (home)    Additional Information: Pt is req a call back in regards to hydrochlorothiazide an he states that the other medication he was on messed him up and that he started back taking the hydrochlorothiazide an he is now feeling fine. He states that the other medication slowed his heart down to much he wants to speak with the nurse to have this called in for him also he needs  glipiZIDE.. Thanks AW

## 2022-06-20 NOTE — TELEPHONE ENCOUNTER
Please call the patient he needs a refill on his hydrochlorothiazide I think?  He call my phone and left a message and he wants it sent to a certain pharmacy.  Please encourage him to use my [email or web address] next time and not call my personal phone.

## 2022-06-20 NOTE — TELEPHONE ENCOUNTER
No new care gaps identified.  [facility] Embedded Care Gaps. Reference number: [id number]. 6/20/2022   12:34:24 PM CDT

## 2022-10-11 ENCOUNTER — PATIENT OUTREACH (OUTPATIENT)
Dept: ADMINISTRATIVE | Facility: HOSPITAL | Age: 82
End: 2022-10-11
Payer: MEDICARE

## 2022-10-11 NOTE — PROGRESS NOTES
Working Medication Compliance Report:     Pt moved to [place]. Spoke with pharm to confirm last medication pickup date. Care everywhere also shows patient moved to [place]. Called to discuss, LVM.

## 2022-11-22 ENCOUNTER — LAB VISIT (OUTPATIENT)
Dept: LAB | Facility: HOSPITAL | Age: 82
End: 2022-11-22
Attending: FAMILY MEDICINE
Payer: MEDICARE

## 2022-11-22 DIAGNOSIS — E78.5 HYPERLIPIDEMIA ASSOCIATED WITH TYPE 2 DIABETES MELLITUS: ICD-10-CM

## 2022-11-22 DIAGNOSIS — E11.69 HYPERLIPIDEMIA ASSOCIATED WITH TYPE 2 DIABETES MELLITUS: ICD-10-CM

## 2022-11-22 LAB
BASOPHILS # BLD AUTO: 0.08 K/UL (ref 0–0.2)
BASOPHILS NFR BLD: 0.9 % (ref 0–1.9)
DIFFERENTIAL METHOD: ABNORMAL
EOSINOPHIL # BLD AUTO: 0.2 K/UL (ref 0–0.5)
EOSINOPHIL NFR BLD: 2.7 % (ref 0–8)
ERYTHROCYTE [DISTWIDTH] IN BLOOD BY AUTOMATED COUNT: 14.4 % (ref 11.5–14.5)
HCT VFR BLD AUTO: 42.2 % (ref 40–54)
HGB BLD-MCNC: 13.9 G/DL (ref 14–18)
IMM GRANULOCYTES # BLD AUTO: 0.01 K/UL (ref 0–0.04)
IMM GRANULOCYTES NFR BLD AUTO: 0.1 % (ref 0–0.5)
LYMPHOCYTES # BLD AUTO: 2.1 K/UL (ref 1–4.8)
LYMPHOCYTES NFR BLD: 23.6 % (ref 18–48)
MCH RBC QN AUTO: 30 PG (ref 27–31)
MCHC RBC AUTO-ENTMCNC: 32.9 G/DL (ref 32–36)
MCV RBC AUTO: 91 FL (ref 82–98)
MONOCYTES # BLD AUTO: 0.7 K/UL (ref 0.3–1)
MONOCYTES NFR BLD: 7.4 % (ref 4–15)
NEUTROPHILS # BLD AUTO: 5.8 K/UL (ref 1.8–7.7)
NEUTROPHILS NFR BLD: 65.3 % (ref 38–73)
NRBC BLD-RTO: 0 /100 WBC
PLATELET # BLD AUTO: 373 K/UL (ref 150–450)
PMV BLD AUTO: 10.2 FL (ref 9.2–12.9)
RBC # BLD AUTO: 4.63 M/UL (ref 4.6–6.2)
WBC # BLD AUTO: 8.83 K/UL (ref 3.9–12.7)

## 2022-11-22 PROCEDURE — 80053 COMPREHEN METABOLIC PANEL: CPT | Performed by: FAMILY MEDICINE

## 2022-11-22 PROCEDURE — 36415 COLL VENOUS BLD VENIPUNCTURE: CPT | Mod: PO | Performed by: FAMILY MEDICINE

## 2022-11-22 PROCEDURE — 85025 COMPLETE CBC W/AUTO DIFF WBC: CPT | Performed by: FAMILY MEDICINE

## 2022-11-22 PROCEDURE — 80061 LIPID PANEL: CPT | Performed by: FAMILY MEDICINE

## 2022-11-22 PROCEDURE — 83036 HEMOGLOBIN GLYCOSYLATED A1C: CPT | Performed by: FAMILY MEDICINE

## 2022-11-23 LAB
ALBUMIN SERPL BCP-MCNC: 3.5 G/DL (ref 3.5–5.2)
ALP SERPL-CCNC: 61 U/L (ref 55–135)
ALT SERPL W/O P-5'-P-CCNC: 26 U/L (ref 10–44)
ANION GAP SERPL CALC-SCNC: 19 MMOL/L (ref 8–16)
AST SERPL-CCNC: 32 U/L (ref 10–40)
BILIRUB SERPL-MCNC: 0.5 MG/DL (ref 0.1–1)
BUN SERPL-MCNC: 20 MG/DL (ref 8–23)
CALCIUM SERPL-MCNC: 9.5 MG/DL (ref 8.7–10.5)
CHLORIDE SERPL-SCNC: 97 MMOL/L (ref 95–110)
CHOLEST SERPL-MCNC: 265 MG/DL (ref 120–199)
CHOLEST/HDLC SERPL: 8.8 {RATIO} (ref 2–5)
CO2 SERPL-SCNC: 22 MMOL/L (ref 23–29)
CREAT SERPL-MCNC: 1.1 MG/DL (ref 0.5–1.4)
EST. GFR  (NO RACE VARIABLE): >60 ML/MIN/1.73 M^2
ESTIMATED AVG GLUCOSE: 160 MG/DL (ref 68–131)
GLUCOSE SERPL-MCNC: 131 MG/DL (ref 70–110)
HBA1C MFR BLD: 7.2 % (ref 4–5.6)
HDLC SERPL-MCNC: 30 MG/DL (ref 40–75)
HDLC SERPL: 11.3 % (ref 20–50)
LDLC SERPL CALC-MCNC: 191.4 MG/DL (ref 63–159)
NONHDLC SERPL-MCNC: 235 MG/DL
POTASSIUM SERPL-SCNC: 3.6 MMOL/L (ref 3.5–5.1)
PROT SERPL-MCNC: 7.3 G/DL (ref 6–8.4)
SODIUM SERPL-SCNC: 138 MMOL/L (ref 136–145)
TRIGL SERPL-MCNC: 218 MG/DL (ref 30–150)

## 2022-11-28 ENCOUNTER — OFFICE VISIT (OUTPATIENT)
Dept: FAMILY MEDICINE | Facility: CLINIC | Age: 82
End: 2022-11-28
Payer: MEDICARE

## 2022-11-28 VITALS
HEART RATE: 89 BPM | HEIGHT: 70 IN | DIASTOLIC BLOOD PRESSURE: 86 MMHG | WEIGHT: 242.5 LBS | OXYGEN SATURATION: 97 % | SYSTOLIC BLOOD PRESSURE: 124 MMHG | BODY MASS INDEX: 34.72 KG/M2 | TEMPERATURE: 98 F

## 2022-11-28 DIAGNOSIS — Z00.00 WELL ADULT EXAM: Primary | ICD-10-CM

## 2022-11-28 DIAGNOSIS — E11.69 HYPERLIPIDEMIA ASSOCIATED WITH TYPE 2 DIABETES MELLITUS: ICD-10-CM

## 2022-11-28 DIAGNOSIS — E11.9 CONTROLLED TYPE 2 DIABETES MELLITUS WITHOUT COMPLICATION, WITHOUT LONG-TERM CURRENT USE OF INSULIN: ICD-10-CM

## 2022-11-28 DIAGNOSIS — E78.5 HYPERLIPIDEMIA ASSOCIATED WITH TYPE 2 DIABETES MELLITUS: ICD-10-CM

## 2022-11-28 DIAGNOSIS — H53.8 BLURRED VISION, RIGHT EYE: ICD-10-CM

## 2022-11-28 DIAGNOSIS — M17.11 PRIMARY OSTEOARTHRITIS OF RIGHT KNEE: ICD-10-CM

## 2022-11-28 PROCEDURE — 3074F SYST BP LT 130 MM HG: CPT | Mod: CPTII,S$GLB,, | Performed by: FAMILY MEDICINE

## 2022-11-28 PROCEDURE — 1101F PR PT FALLS ASSESS DOC 0-1 FALLS W/OUT INJ PAST YR: ICD-10-PCS | Mod: CPTII,S$GLB,, | Performed by: FAMILY MEDICINE

## 2022-11-28 PROCEDURE — 3079F DIAST BP 80-89 MM HG: CPT | Mod: CPTII,S$GLB,, | Performed by: FAMILY MEDICINE

## 2022-11-28 PROCEDURE — 20610 PR DRAIN/INJECT LARGE JOINT/BURSA: ICD-10-PCS | Mod: RT,S$GLB,, | Performed by: FAMILY MEDICINE

## 2022-11-28 PROCEDURE — 20610 DRAIN/INJ JOINT/BURSA W/O US: CPT | Mod: RT,S$GLB,, | Performed by: FAMILY MEDICINE

## 2022-11-28 PROCEDURE — 3072F PR LOW RISK FOR RETINOPATHY: ICD-10-PCS | Mod: CPTII,S$GLB,, | Performed by: FAMILY MEDICINE

## 2022-11-28 PROCEDURE — 3074F PR MOST RECENT SYSTOLIC BLOOD PRESSURE < 130 MM HG: ICD-10-PCS | Mod: CPTII,S$GLB,, | Performed by: FAMILY MEDICINE

## 2022-11-28 PROCEDURE — 3072F LOW RISK FOR RETINOPATHY: CPT | Mod: CPTII,S$GLB,, | Performed by: FAMILY MEDICINE

## 2022-11-28 PROCEDURE — 99999 PR PBB SHADOW E&M-EST. PATIENT-LVL III: CPT | Mod: PBBFAC,,, | Performed by: FAMILY MEDICINE

## 2022-11-28 PROCEDURE — 1159F PR MEDICATION LIST DOCUMENTED IN MEDICAL RECORD: ICD-10-PCS | Mod: CPTII,S$GLB,, | Performed by: FAMILY MEDICINE

## 2022-11-28 PROCEDURE — 1101F PT FALLS ASSESS-DOCD LE1/YR: CPT | Mod: CPTII,S$GLB,, | Performed by: FAMILY MEDICINE

## 2022-11-28 PROCEDURE — 3288F FALL RISK ASSESSMENT DOCD: CPT | Mod: CPTII,S$GLB,, | Performed by: FAMILY MEDICINE

## 2022-11-28 PROCEDURE — 99999 PR PBB SHADOW E&M-EST. PATIENT-LVL III: ICD-10-PCS | Mod: PBBFAC,,, | Performed by: FAMILY MEDICINE

## 2022-11-28 PROCEDURE — 1160F RVW MEDS BY RX/DR IN RCRD: CPT | Mod: CPTII,S$GLB,, | Performed by: FAMILY MEDICINE

## 2022-11-28 PROCEDURE — 99213 PR OFFICE/OUTPT VISIT, EST, LEVL III, 20-29 MIN: ICD-10-PCS | Mod: 25,S$GLB,, | Performed by: FAMILY MEDICINE

## 2022-11-28 PROCEDURE — 3079F PR MOST RECENT DIASTOLIC BLOOD PRESSURE 80-89 MM HG: ICD-10-PCS | Mod: CPTII,S$GLB,, | Performed by: FAMILY MEDICINE

## 2022-11-28 PROCEDURE — 3288F PR FALLS RISK ASSESSMENT DOCUMENTED: ICD-10-PCS | Mod: CPTII,S$GLB,, | Performed by: FAMILY MEDICINE

## 2022-11-28 PROCEDURE — 1159F MED LIST DOCD IN RCRD: CPT | Mod: CPTII,S$GLB,, | Performed by: FAMILY MEDICINE

## 2022-11-28 PROCEDURE — 99213 OFFICE O/P EST LOW 20 MIN: CPT | Mod: 25,S$GLB,, | Performed by: FAMILY MEDICINE

## 2022-11-28 PROCEDURE — 1160F PR REVIEW ALL MEDS BY PRESCRIBER/CLIN PHARMACIST DOCUMENTED: ICD-10-PCS | Mod: CPTII,S$GLB,, | Performed by: FAMILY MEDICINE

## 2022-11-28 RX ORDER — METHYLPREDNISOLONE ACETATE 40 MG/ML
40 INJECTION, SUSPENSION INTRA-ARTICULAR; INTRALESIONAL; INTRAMUSCULAR; SOFT TISSUE
Status: DISCONTINUED | OUTPATIENT
Start: 2022-11-28 | End: 2022-11-28

## 2022-11-28 RX ORDER — LEVOCETIRIZINE DIHYDROCHLORIDE 5 MG/1
5 TABLET, FILM COATED ORAL NIGHTLY
Qty: 30 TABLET | Refills: 11 | Status: SHIPPED | OUTPATIENT
Start: 2022-11-28 | End: 2023-11-28

## 2022-11-28 RX ORDER — METHYLPREDNISOLONE ACETATE 40 MG/ML
40 INJECTION, SUSPENSION INTRA-ARTICULAR; INTRALESIONAL; INTRAMUSCULAR; SOFT TISSUE
Status: COMPLETED | OUTPATIENT
Start: 2022-11-28 | End: 2022-11-28

## 2022-11-28 RX ORDER — HYDROCHLOROTHIAZIDE 25 MG/1
25 TABLET ORAL DAILY
Qty: 90 TABLET | Refills: 2 | Status: SHIPPED | OUTPATIENT
Start: 2022-11-28 | End: 2023-08-14 | Stop reason: SDUPTHER

## 2022-11-28 RX ORDER — GLIPIZIDE 5 MG/1
5 TABLET ORAL 2 TIMES DAILY WITH MEALS
Qty: 180 TABLET | Refills: 2 | Status: SHIPPED | OUTPATIENT
Start: 2022-11-28 | End: 2023-08-15 | Stop reason: SDUPTHER

## 2022-11-28 RX ADMIN — METHYLPREDNISOLONE ACETATE 40 MG: 40 INJECTION, SUSPENSION INTRA-ARTICULAR; INTRALESIONAL; INTRAMUSCULAR; SOFT TISSUE at 05:11

## 2022-11-28 NOTE — PROGRESS NOTES
Chief Complaint:    Chief Complaint   Patient presents with    Follow-up       History of Present Illness:  Patient with HLD associated with DM2, HTN, and CKD stage 3 presents today for a six month follow-up    Recently returned from [place].   A1C is 7.2, up from 6.8   Not exercising as much as he would like due to right knee pain. Looking into knee replacement now.   Cholesterol is 265. HDL is 30. LDL is 191. Triglycerides are 216.   Slightly anemic. Hb is 13.9  Reports blurry vision in his R eye. PSHx includes cataract removal.   Drinks ~one gallon of water/day.  Denies CP, SOB, or constipation. Denies difficulty urinating.          ROS:  Review of Systems   Constitutional:  Negative for activity change, chills, fatigue, fever and unexpected weight change.   HENT:  Negative for congestion, ear discharge, ear pain, hearing loss, postnasal drip and rhinorrhea.    Eyes:  Positive for visual disturbance. Negative for pain.   Respiratory:  Negative for cough, chest tightness and shortness of breath.    Cardiovascular:  Negative for chest pain and palpitations.   Gastrointestinal:  Negative for abdominal pain, constipation, diarrhea and vomiting.   Endocrine: Negative for heat intolerance.   Genitourinary:  Negative for dysuria, flank pain, frequency and hematuria.   Musculoskeletal:  Negative for back pain, gait problem and neck pain.   Skin:  Negative for color change and rash.   Neurological:  Negative for dizziness, tremors, seizures, numbness and headaches.   Psychiatric/Behavioral:  Negative for agitation, dysphoric mood, hallucinations, self-injury, sleep disturbance and suicidal ideas. The patient is not nervous/anxious.    All other systems reviewed and are negative.    Past Medical History:   Diagnosis Date    CKD stage 3 due to type 2 diabetes mellitus 2/18/2021    DM (diabetes mellitus) 2014    BS didn't check 12/11/2019    DM (diabetes mellitus) 2014    BS didn't check 05/05/2022    Foreign body, eye      right eye    Hyperlipidemia     Hypertension     Need for shingles vaccine 2019    Pneumonia     Primary osteoarthritis of right knee 10/29/2021    Severe obesity (BMI 35.0-35.9 with comorbidity) 7/10/2019    Type 2 diabetes mellitus 2014    BS didn't check 2018       Social History:  Social History     Socioeconomic History    Marital status:    Tobacco Use    Smoking status: Former     Packs/day: 3.00     Types: Cigarettes     Start date:      Quit date:      Years since quittin.9    Smokeless tobacco: Never   Substance and Sexual Activity    Alcohol use: No    Drug use: No    Sexual activity: Never     Social Determinants of Health     Financial Resource Strain: Low Risk     Difficulty of Paying Living Expenses: Not hard at all   Food Insecurity: No Food Insecurity    Worried About Running Out of Food in the Last Year: Never true    Ran Out of Food in the Last Year: Never true   Transportation Needs: No Transportation Needs    Lack of Transportation (Medical): No    Lack of Transportation (Non-Medical): No   Physical Activity: Inactive    Days of Exercise per Week: 0 days    Minutes of Exercise per Session: 0 min   Stress: Stress Concern Present    Feeling of Stress : To some extent   Social Connections: Moderately Integrated    Frequency of Communication with Friends and Family: More than three times a week    Frequency of Social Gatherings with Friends and Family: More than three times a week    Attends Taoist Services: More than 4 times per year    Active Member of Clubs or Organizations: Yes    Attends Club or Organization Meetings: More than 4 times per year    Marital Status:    Housing Stability: Unknown    Unable to Pay for Housing in the Last Year: No    Unstable Housing in the Last Year: No       Family History:   family history includes Allergic rhinitis in his daughter; Cancer in his son; Cataracts in his father; Heart disease in his father and mother;  "Hyperlipidemia in his father; Hypertension in his brother, father, and sister.    Health Maintenance   Topic Date Due    Eye Exam  05/04/2023    Hemoglobin A1c  05/22/2023    Lipid Panel  11/22/2023    TETANUS VACCINE  12/10/2025       Physical Exam:    Vital Signs  Temp: 97.5 °F (36.4 °C)  Temp src: Temporal  Pulse: 89  SpO2: 97 %  BP: 124/86  BP Location: Left arm  Patient Position: Sitting  Height and Weight  Height: 5' 10" (177.8 cm)  Weight: 110 kg (242 lb 8.1 oz)  BSA (Calculated - sq m): 2.33 sq meters  BMI (Calculated): 34.8  Weight in (lb) to have BMI = 25: 173.9]    Body mass index is 34.8 kg/m².    Physical Exam  Vitals and nursing note reviewed.   Constitutional:       Appearance: Normal appearance.   HENT:      Head: Normocephalic and atraumatic.      Right Ear: Tympanic membrane normal.      Left Ear: Tympanic membrane normal.   Eyes:      Extraocular Movements: Extraocular movements intact.      Pupils: Pupils are equal, round, and reactive to light.   Cardiovascular:      Rate and Rhythm: Normal rate and regular rhythm.      Pulses: Normal pulses.      Heart sounds: No murmur heard.    No gallop.   Pulmonary:      Effort: Pulmonary effort is normal. No respiratory distress.      Breath sounds: Normal breath sounds. No wheezing, rhonchi or rales.   Abdominal:      General: There is no distension.      Palpations: Abdomen is soft.      Tenderness: There is no abdominal tenderness.   Musculoskeletal:         General: No swelling, deformity or signs of injury. Normal range of motion.      Cervical back: Normal range of motion.   Skin:     General: Skin is warm and dry.      Capillary Refill: Capillary refill takes less than 2 seconds.      Coloration: Skin is not jaundiced or pale.   Neurological:      General: No focal deficit present.      Mental Status: He is alert and oriented to person, place, and time.   Psychiatric:         Mood and Affect: Mood normal.         Behavior: Behavior normal. "         Diabetes Management Status    Statin: Not taking  ACE/ARB: Not taking    Screening or Prevention Patient's value Goal Complete/Controlled?   HgA1C Testing and Control   Lab Results   Component Value Date    HGBA1C 7.2 (H) 11/22/2022      Annually/Less than 8% Yes   Lipid profile : 11/22/2022 Annually Yes   LDL control Lab Results   Component Value Date    LDLCALC 191.4 (H) 11/22/2022    Annually/Less than 100 mg/dl  No   Nephropathy screening Lab Results   Component Value Date    LABMICR 8.0 11/22/2022     Lab Results   Component Value Date    PROTEINUA Negative 04/02/2022    Annually Yes   Blood pressure BP Readings from Last 1 Encounters:   11/28/22 124/86    Less than 140/90 Yes   Dilated retinal exam : 05/04/2022 Annually Yes   Foot exam   Most Recent Foot Exam Date: Not Found Annually Yes       Assessment:      ICD-10-CM ICD-9-CM   1. Well adult exam  Z00.00 V70.0   2. Primary osteoarthritis of right knee  M17.11 715.16   3. Controlled type 2 diabetes mellitus without complication, without long-term current use of insulin  E11.9 250.00   4. Blurred vision, right eye  H53.8 368.8   5. Hyperlipidemia associated with type 2 diabetes mellitus  E11.69 250.80    E78.5 272.4     Plan:  Continue current meds and plan  Administered steroid injection to right knee. Massage area and apply ice after injection. Avoid heavy lifting.   He'll come back later this week for his influenza vaccine.   Refer to optometry for blurred vision, right eye.   Follow diabetic diet. Limit starch intake. Stop snacking. No junk food.   Kidney disease is stable  See labs below.   Follow up 6 months.      Offered steroid injection, risk of steroid injection discussed with the patient which include but not limited to,  1.  Infection  2.  Bleeding  3.  Tendon disruption  4.  Elevation of blood sugar  5.  Fat atrophy  6.  Worsening pain and other unforeseen complication.  Treatment alternatives were also discussed, patient likes to proceed  with the steroid injection.  Depo-Medrol 40 mg and lidocaine 2% without epi Cc was used for injection, and the area was identified prepped and injection done sterile condition, patient tolerated procedure well.  Injection done of the right knee  Orders Placed This Encounter   Procedures    Hemoglobin A1C    Comprehensive Metabolic Panel    CBC Auto Differential    Lipid Panel    Microalbumin/Creatinine Ratio, Urine    Ambulatory referral/consult to Optometry     Current Outpatient Medications   Medication Sig Dispense Refill    multivitamin (THERAGRAN) per tablet Take 1 tablet by mouth once daily.      glipiZIDE (GLUCOTROL) 5 MG tablet Take 1 tablet (5 mg total) by mouth 2 (two) times daily with meals. 180 tablet 2    hydroCHLOROthiazide (HYDRODIURIL) 25 MG tablet Take 1 tablet (25 mg total) by mouth once daily. 90 tablet 2    levocetirizine (XYZAL) 5 MG tablet Take 1 tablet (5 mg total) by mouth every evening. 30 tablet 11     No current facility-administered medications for this visit.       Medications Discontinued During This Encounter   Medication Reason    levocetirizine (XYZAL) 5 MG tablet Reorder    metoprolol succinate (TOPROL-XL) 50 MG 24 hr tablet     hydroCHLOROthiazide (HYDRODIURIL) 25 MG tablet Reorder    glipiZIDE (GLUCOTROL) 5 MG tablet Reorder       Follow up in about 6 months (around 5/28/2023).      Tatyana Cannon MD  Scribe Attestation:   I, Philomena Gardner, am scribing for, and in the presence of, Dr.Arif Cannon I performed the above scribed service and the documentation accurately describes the services I performed. I attest to the accuracy of the note.    I, Dr. Tatyana Cannon, reviewed documentation as scribed above. I performed the services described in this documentation.  I agree that the record reflects my personal performance and is accurate and complete. Tatyana Cannon MD.  11/28/2022

## 2022-11-29 DIAGNOSIS — M25.569 KNEE PAIN, UNSPECIFIED CHRONICITY, UNSPECIFIED LATERALITY: Primary | ICD-10-CM

## 2022-12-01 ENCOUNTER — PATIENT MESSAGE (OUTPATIENT)
Dept: OPHTHALMOLOGY | Facility: CLINIC | Age: 82
End: 2022-12-01

## 2022-12-01 ENCOUNTER — OFFICE VISIT (OUTPATIENT)
Dept: OPHTHALMOLOGY | Facility: CLINIC | Age: 82
End: 2022-12-01
Payer: MEDICARE

## 2022-12-01 ENCOUNTER — HOSPITAL ENCOUNTER (OUTPATIENT)
Dept: RADIOLOGY | Facility: HOSPITAL | Age: 82
Discharge: HOME | End: 2022-12-01
Attending: FAMILY MEDICINE
Payer: MEDICARE

## 2022-12-01 ENCOUNTER — OFFICE VISIT (OUTPATIENT)
Dept: SPORTS MEDICINE | Facility: CLINIC | Age: 82
End: 2022-12-01
Payer: MEDICARE

## 2022-12-01 ENCOUNTER — IMMUNIZATION (OUTPATIENT)
Dept: FAMILY MEDICINE | Facility: CLINIC | Age: 82
End: 2022-12-01
Payer: MEDICARE

## 2022-12-01 VITALS — WEIGHT: 242.5 LBS | HEIGHT: 70 IN | BODY MASS INDEX: 34.72 KG/M2

## 2022-12-01 DIAGNOSIS — H53.8 BLURRED VISION, RIGHT EYE: ICD-10-CM

## 2022-12-01 DIAGNOSIS — I15.2 HYPERTENSION ASSOCIATED WITH DIABETES: Primary | ICD-10-CM

## 2022-12-01 DIAGNOSIS — H52.7 REFRACTIVE ERRORS: ICD-10-CM

## 2022-12-01 DIAGNOSIS — M25.569 KNEE PAIN, UNSPECIFIED CHRONICITY, UNSPECIFIED LATERALITY: ICD-10-CM

## 2022-12-01 DIAGNOSIS — Z96.1 PSEUDOPHAKIA OF BOTH EYES: ICD-10-CM

## 2022-12-01 DIAGNOSIS — E11.59 HYPERTENSION ASSOCIATED WITH DIABETES: Primary | ICD-10-CM

## 2022-12-01 DIAGNOSIS — E11.9 DIABETES MELLITUS TYPE 2 WITHOUT RETINOPATHY: Primary | ICD-10-CM

## 2022-12-01 DIAGNOSIS — M17.0 PRIMARY OSTEOARTHRITIS OF KNEES, BILATERAL: ICD-10-CM

## 2022-12-01 PROCEDURE — 1159F PR MEDICATION LIST DOCUMENTED IN MEDICAL RECORD: ICD-10-PCS | Mod: CPTII,S$GLB,, | Performed by: FAMILY MEDICINE

## 2022-12-01 PROCEDURE — 1125F AMNT PAIN NOTED PAIN PRSNT: CPT | Mod: CPTII,S$GLB,, | Performed by: FAMILY MEDICINE

## 2022-12-01 PROCEDURE — 2023F DILAT RTA XM W/O RTNOPTHY: CPT | Mod: CPTII,S$GLB,, | Performed by: OPTOMETRIST

## 2022-12-01 PROCEDURE — 99214 OFFICE O/P EST MOD 30 MIN: CPT | Mod: S$GLB,,, | Performed by: FAMILY MEDICINE

## 2022-12-01 PROCEDURE — 1101F PT FALLS ASSESS-DOCD LE1/YR: CPT | Mod: CPTII,S$GLB,, | Performed by: FAMILY MEDICINE

## 2022-12-01 PROCEDURE — 92014 PR EYE EXAM, EST PATIENT,COMPREHESV: ICD-10-PCS | Mod: S$GLB,,, | Performed by: OPTOMETRIST

## 2022-12-01 PROCEDURE — 1159F MED LIST DOCD IN RCRD: CPT | Mod: CPTII,S$GLB,, | Performed by: FAMILY MEDICINE

## 2022-12-01 PROCEDURE — 2023F PR DILATED RETINAL EXAM W/O EVID OF RETINOPATHY: ICD-10-PCS | Mod: CPTII,S$GLB,, | Performed by: OPTOMETRIST

## 2022-12-01 PROCEDURE — 99214 PR OFFICE/OUTPT VISIT, EST, LEVL IV, 30-39 MIN: ICD-10-PCS | Mod: S$GLB,,, | Performed by: FAMILY MEDICINE

## 2022-12-01 PROCEDURE — 1159F PR MEDICATION LIST DOCUMENTED IN MEDICAL RECORD: ICD-10-PCS | Mod: CPTII,S$GLB,, | Performed by: OPTOMETRIST

## 2022-12-01 PROCEDURE — 1159F MED LIST DOCD IN RCRD: CPT | Mod: CPTII,S$GLB,, | Performed by: OPTOMETRIST

## 2022-12-01 PROCEDURE — 3288F PR FALLS RISK ASSESSMENT DOCUMENTED: ICD-10-PCS | Mod: CPTII,S$GLB,, | Performed by: FAMILY MEDICINE

## 2022-12-01 PROCEDURE — 92015 DETERMINE REFRACTIVE STATE: CPT | Mod: S$GLB,,, | Performed by: OPTOMETRIST

## 2022-12-01 PROCEDURE — 99999 PR PBB SHADOW E&M-EST. PATIENT-LVL II: ICD-10-PCS | Mod: PBBFAC,,, | Performed by: OPTOMETRIST

## 2022-12-01 PROCEDURE — G0008 FLU VACCINE - QUADRIVALENT - ADJUVANTED: ICD-10-PCS | Mod: S$GLB,,, | Performed by: FAMILY MEDICINE

## 2022-12-01 PROCEDURE — 99999 PR PBB SHADOW E&M-EST. PATIENT-LVL III: CPT | Mod: PBBFAC,,, | Performed by: FAMILY MEDICINE

## 2022-12-01 PROCEDURE — 73564 X-RAY EXAM KNEE 4 OR MORE: CPT | Mod: TC,50

## 2022-12-01 PROCEDURE — G0008 ADMIN INFLUENZA VIRUS VAC: HCPCS | Mod: S$GLB,,, | Performed by: FAMILY MEDICINE

## 2022-12-01 PROCEDURE — 73564 X-RAY EXAM KNEE 4 OR MORE: CPT | Mod: 26,50,, | Performed by: RADIOLOGY

## 2022-12-01 PROCEDURE — 1101F PR PT FALLS ASSESS DOC 0-1 FALLS W/OUT INJ PAST YR: ICD-10-PCS | Mod: CPTII,S$GLB,, | Performed by: FAMILY MEDICINE

## 2022-12-01 PROCEDURE — 90694 FLU VACCINE - QUADRIVALENT - ADJUVANTED: ICD-10-PCS | Mod: S$GLB,,, | Performed by: FAMILY MEDICINE

## 2022-12-01 PROCEDURE — 92014 COMPRE OPH EXAM EST PT 1/>: CPT | Mod: S$GLB,,, | Performed by: OPTOMETRIST

## 2022-12-01 PROCEDURE — 1125F PR PAIN SEVERITY QUANTIFIED, PAIN PRESENT: ICD-10-PCS | Mod: CPTII,S$GLB,, | Performed by: FAMILY MEDICINE

## 2022-12-01 PROCEDURE — 99999 PR PBB SHADOW E&M-EST. PATIENT-LVL II: CPT | Mod: PBBFAC,,, | Performed by: OPTOMETRIST

## 2022-12-01 PROCEDURE — 73564 XR KNEE ORTHO BILAT WITH FLEXION: ICD-10-PCS | Mod: 26,50,, | Performed by: RADIOLOGY

## 2022-12-01 PROCEDURE — 3288F FALL RISK ASSESSMENT DOCD: CPT | Mod: CPTII,S$GLB,, | Performed by: FAMILY MEDICINE

## 2022-12-01 PROCEDURE — 99999 PR PBB SHADOW E&M-EST. PATIENT-LVL III: ICD-10-PCS | Mod: PBBFAC,,, | Performed by: FAMILY MEDICINE

## 2022-12-01 PROCEDURE — 90694 VACC AIIV4 NO PRSRV 0.5ML IM: CPT | Mod: S$GLB,,, | Performed by: FAMILY MEDICINE

## 2022-12-01 PROCEDURE — 3072F PR LOW RISK FOR RETINOPATHY: ICD-10-PCS | Mod: CPTII,S$GLB,, | Performed by: FAMILY MEDICINE

## 2022-12-01 PROCEDURE — 3072F LOW RISK FOR RETINOPATHY: CPT | Mod: CPTII,S$GLB,, | Performed by: FAMILY MEDICINE

## 2022-12-01 PROCEDURE — 92015 PR REFRACTION: ICD-10-PCS | Mod: S$GLB,,, | Performed by: OPTOMETRIST

## 2022-12-01 NOTE — PROGRESS NOTES
HPI     Diabetic Eye Exam     Additional comments: Right eye blurry x 2 months           Comments    Lab Results       Component                Value               Date                       HGBA1C                   7.2 (H)             11/22/2022                      Last edited by Gavin Hahn, OD on 12/1/2022  8:13 AM.            Assessment /Plan     For exam results, see Encounter Report.    Diabetes mellitus type 2 without retinopathy    Blurred vision, right eye  -     Ambulatory referral/consult to Optometry    Pseudophakia of both eyes    Refractive errors      No Background Diabetic Retinopathy    Stable IOL OU.    Dispense Final Rx for glasses.  RTC 1 year  Discussed above and answered questions.

## 2022-12-01 NOTE — PROGRESS NOTES
Subjective:     Patient ID: Mark Dickerson Sr. is a 82 y.o. male.    Chief Complaint: Pain of the Left Knee and Pain of the Right Knee      HPI: Patient is being seen for bilateral knee follow up since his last office visit on 12/30/21 with Dr. Finn. Says his right knee has constant pain. Rating pain 3/10 at today's office visit. Takes advil once a day to help with pain relief. Is not in physical therapy. Would like to discuss having a right knee replacement.   States that his knees feel unstable at times when he is walking and he has to be very careful not shift his body weight in the wrong position.    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 Blossom Paredes since he has bone-on-bone.  He had Visco injections done last December and says that they did help for least 6 months or so.  Unable to walk several miles a day as he previously did for his general health and diabetes due to knee pain worsening.    Past Medical History:   Diagnosis Date    CKD stage 3 due to type 2 diabetes mellitus 2/18/2021    DM (diabetes mellitus) 2014    BS didn't check 12/11/2019    DM (diabetes mellitus) 2014    BS didn't check 05/05/2022    Foreign body, eye     right eye    Hyperlipidemia     Hypertension     Need for shingles vaccine 11/20/2019    Pneumonia     Primary osteoarthritis of right knee 10/29/2021    Severe obesity (BMI 35.0-35.9 with comorbidity) 7/10/2019    Type 2 diabetes mellitus 2014    BS didn't check 12/12/2018     Past Surgical History:   Procedure Laterality Date    APPENDECTOMY      CATARACT EXTRACTION Bilateral     CPG     Family History   Problem Relation Age of Onset    Heart disease Mother     Hypertension Father     Heart disease Father     Hyperlipidemia Father     Cataracts Father     Hypertension Sister     Hypertension Brother     Cancer Son         colon    Allergic  rhinitis Daughter      Social History     Socioeconomic History    Marital status:    Tobacco Use    Smoking status: Former     Packs/day: 3.00     Types: Cigarettes     Start date:      Quit date:      Years since quittin.9    Smokeless tobacco: Never   Substance and Sexual Activity    Alcohol use: No    Drug use: No    Sexual activity: Never     Social Determinants of Health     Financial Resource Strain: Low Risk     Difficulty of Paying Living Expenses: Not hard at all   Food Insecurity: No Food Insecurity    Worried About Running Out of Food in the Last Year: Never true    Ran Out of Food in the Last Year: Never true   Transportation Needs: No Transportation Needs    Lack of Transportation (Medical): No    Lack of Transportation (Non-Medical): No   Physical Activity: Inactive    Days of Exercise per Week: 0 days    Minutes of Exercise per Session: 0 min   Stress: Stress Concern Present    Feeling of Stress : To some extent   Social Connections: Moderately Integrated    Frequency of Communication with Friends and Family: More than three times a week    Frequency of Social Gatherings with Friends and Family: More than three times a week    Attends Judaism Services: More than 4 times per year    Active Member of Clubs or Organizations: Yes    Attends Club or Organization Meetings: More than 4 times per year    Marital Status:    Housing Stability: Unknown    Unable to Pay for Housing in the Last Year: No    Unstable Housing in the Last Year: No       Current Outpatient Medications:     glipiZIDE (GLUCOTROL) 5 MG tablet, Take 1 tablet (5 mg total) by mouth 2 (two) times daily with meals., Disp: 180 tablet, Rfl: 2    hydroCHLOROthiazide (HYDRODIURIL) 25 MG tablet, Take 1 tablet (25 mg total) by mouth once daily., Disp: 90 tablet, Rfl: 2    levocetirizine (XYZAL) 5 MG tablet, Take 1 tablet (5 mg total) by mouth every evening., Disp: 30 tablet, Rfl: 11    multivitamin (THERAGRAN) per  tablet, Take 1 tablet by mouth once daily., Disp: , Rfl:   Review of patient's allergies indicates:  No Known Allergies  Review of Systems   Constitutional:  Negative for chills, fever and weight loss.   Respiratory:  Negative for shortness of breath.    Cardiovascular:  Negative for chest pain and palpitations.     Objective:   Body mass index is 34.8 kg/m².  There were no vitals filed for this visit.        Ortho/SPM Exam--alert and oriented well-nourished well-developed and ambulatory with cane, very pleasant adult male no acute distress.    Respiratory effort normal     Bilateral knees-no acute deformity but chronic bony changes noted   Range of motion 0 to 100° with crepitus  Strength 3 to 4/5   Neurovascular intact   Minimal varus and valgus laxity and negative Lachman's  Nontender to palpation     Psychiatric good affect and cognition    Plan-start physical therapy.  Refer to joint replacement ortho.  If for some reason not getting joint replacement in the next 6 months and we could do another Visco injection series.    There are no Patient Instructions on file for this visit.    IMAGING: X-ray Knee Ortho Bilateral with Flexion  Narrative: EXAMINATION:  XR KNEE ORTHO BILAT WITH FLEXION    CLINICAL HISTORY:  Pain in unspecified knee    TECHNIQUE:  AP standing of both knees, PA flexion standing views of both knees, and Merchant views of both knees were performed.  Lateral views of both knees were also performed.    COMPARISON:  Radiographs from 10/26/2021    FINDINGS:  When compared to 10/26/2021, there is been no significant interval change.  Bilateral tricompartmental degenerative changes are seen most severe in the medial compartments, right greater than left with prominent joint space narrowing and adjacent marginal spurring.  No acute fracture or dislocation.  No significant effusion.  Atherosclerosis is noted.  Soft tissues appear normal.  Impression: See above    Electronically signed by: Son Contreras  MD  Date:    12/01/2022  Time:    11:06       Radiographs / Imaging : Relevant imaging results reviewed by me and interpreted by me, discussed with the patient and / or family -agree with x-ray report for knees      Assessment:     Encounter Diagnoses   Name Primary?    Primary osteoarthritis of knees, bilateral     Hypertension associated with diabetes Yes        Plan:   Hypertension associated with diabetes    Primary osteoarthritis of knees, bilateral      The patient verbalized good understanding of the medical issues discussed today and expressed appreciation for the care provided.  Patient was given the opportunity to ask questions and be an active participant in their medical care. Patient had no further questions or concerns at this time.     The patient was encouraged to participate in appropriate physical activity.      Lance Contreras M.D.  [facility]        This note was dictated using voice recognition software and may have sound a like errors.

## 2022-12-01 NOTE — PATIENT INSTRUCTIONS
"Patient Education       Chronic Knee Pain   About this topic   The knee is a large, complex joint. It is made up of 4 bones: The thigh bone, two lower leg bones, and the kneecap. There is a capsule around the joint. Cartilage acts like a shock absorber in the knee and reduces friction, which helps the knee move more smoothly. The knee also has ligaments and tendons. Ligaments are bands of tissue that join one bone to another. Tendons are bands of tissue that join muscles to the bone. There are many muscles around the knee and in front and back of the thigh. If there is a problem with any of these parts of the joint, you can have pain in your knee.       What are the causes?   Arthritis ? There are a few types of arthritis which all cause swelling of a joint. Osteoarthritis is the most common and happens over time with "wear and tear" on the joint.  Overuse ? Repeat motions or too much bending at the knee can lead to pain.  Bursitis ? Bursae are small fluid-filled sacs that help tendons glide easier. These can get swollen and hurt. This often happens to people who do work on their knees, like gardeners, roofers, and carpet layers.  Ligament tear or sprain ? Ligament injuries can make the knee shaky or unstable and painful.  Meniscus tear ? Injuries to the meniscus or cartilage can make your knee lock and cause pain with some movements.  Muscle strain ? Injuries to the muscles near the knee happen often with sports. If these do not heal the right way, ongoing pain can happen.  Patellar tendinopathy ? The tendon that goes over your kneecap can get swollen and hurt due to overuse and repetitive stress on the knee.  Chondromalacia patella ? This is a health problem where there is pain under the kneecap from cartilage being worn.  Dislocating kneecap ? If your kneecap slips out of its groove, pain can happen.  Baker's cyst ? This is a lump that is behind the knee. This is also known as a popliteal cyst.  Hip, ankle, back " problems ? Problems in the back and in nearby joints, such as the hip and ankle, can cause pain in the knee.  Fluid collection ? Fluid can collect in the knee joint after a knee injury and lead to pain if not treated.  Osgood-Schlatter disease ? This is a health problem seen in children and teens. The front of the knee below the kneecap is bothered by repeat movements.  Osteochondritis dissecans ? This is a health problem seen in children and teens. There is a problem with the blood flow to the bone and cartilage of the knee.  Plica syndrome ? Plica are folds of tissue that are left over from growth before birth. These can get sore and cause pain.  Patellofemoral pain syndrome - This happens when you have pain in front of your knee or around or behind your kneecap.  Iliotibial band syndrome - This happens when the iliotibial band, tissues that runs down the outside of the thigh from the hip to the shin, is overused. It causes pain on the outside of the knee.  What are the main signs?   Pain or stiffness  Swelling  Warmth or redness  Visible deformity  Hard to walk, go up or down stairs, or put weight on your leg  Knee locking ? not able to bend or straighten your knee fully  Knee is weak, roberth, or gives way  Crunching and popping noises in the knee  How does the doctor diagnose this health problem?   Your doctor will look at your knee. Your doctor will feel all over your knee to find where the pain is. The doctor may move your knee and push or pull on many parts to check your motion and strength. Your doctor may have you stand and walk to see if your knee is stable. The doctor may order:  Blood tests  X-ray  CT or MRI scan  Ultrasound  Surgery ? At times, arthroscopic surgery may be needed to find the cause of the pain.  How does the doctor treat this health problem?   Finding out the true cause of your knee pain is the most important step to fix the problem and lower your pain. Some things that may lessen your  knee pain are:  Rest  Ice  Compression  Elevation - keeping the knee raised  Braces or supports  Heat may be used later but not right away. Heat can make swelling worse.  Exercises to stretch and strengthen the knee  Physical therapy (PT)  Surgery  What drugs may be needed?   The doctor may order drugs to:  Help with pain and swelling  Fight an infection  The doctor may give you a shot of an anti-inflammatory drug called a corticosteroid. This will help with swelling. Talk with your doctor about the risks of this shot.  What can be done to prevent this health problem?   If your knee pain is due to overuse, do not do repetitive movements that caused the problem if possible.  Take breaks often when doing things that use repeat movements.  Do not sit or keep your knee in one position for long periods of time.  If you sleep on your side, use a pillow in between your legs. This can help take stress off of the knee.  Always warm up and stretch before a workout and cool down after.  If you are a runner, actively stretch before a run. Talk to your doctor to make sure you understand the difference between active and passive stretching. Use good ways to train, such as slowly adding to how far you run.  Run on softer surfaces such as a track. This is easier on your knee than a hard surface like cement.  Try activities like swimming or biking rather than running. Running can put a lot of stress on your knee joint.  Stay away from activities that could result in twisting, sudden stops and starts, and blows to the knee. Sports such as basketball, skiing, football, and jogging are some common sports that can lead to knee injuries.  Wear shoes with good support. Replace your shoes often.  Keep a healthy weight. Being too heavy puts more stress on the knee joint. This makes the knee more at risk for injury.  Stay active and work out to keep your muscles strong and flexible.  Where can I learn more?   NHS  Choices  http://www.nhs.uk/conditions/knee-pain/Pages/Introduction.aspx   Last Reviewed Date   2020-04-23  Consumer Information Use and Disclaimer   This information is not specific medical advice and does not replace information you receive from your health care provider. This is only a brief summary of general information. It does NOT include all information about conditions, illnesses, injuries, tests, procedures, treatments, therapies, discharge instructions or life-style choices that may apply to you. You must talk with your health care provider for complete information about your health and treatment options. This information should not be used to decide whether or not to accept your health care providers advice, instructions or recommendations. Only your health care provider has the knowledge and training to provide advice that is right for you.  Copyright   Copyright © 2021 [employer] and its affiliates and/or licensors. All rights reserved.

## 2022-12-02 DIAGNOSIS — M17.0 BILATERAL PRIMARY OSTEOARTHRITIS OF KNEE: Primary | ICD-10-CM

## 2022-12-06 ENCOUNTER — CLINICAL SUPPORT (OUTPATIENT)
Dept: REHABILITATION | Facility: HOSPITAL | Age: 82
End: 2022-12-06
Payer: MEDICARE

## 2022-12-06 DIAGNOSIS — M25.661 DECREASED RANGE OF MOTION OF RIGHT KNEE: ICD-10-CM

## 2022-12-06 DIAGNOSIS — M25.561 ACUTE PAIN OF RIGHT KNEE: ICD-10-CM

## 2022-12-06 DIAGNOSIS — M17.0 BILATERAL PRIMARY OSTEOARTHRITIS OF KNEE: ICD-10-CM

## 2022-12-06 DIAGNOSIS — R26.9 GAIT DIFFICULTY: ICD-10-CM

## 2022-12-06 PROBLEM — R29.898 DECREASED STRENGTH INVOLVING KNEE JOINT: Status: RESOLVED | Noted: 2021-10-28 | Resolved: 2022-12-06

## 2022-12-06 PROCEDURE — 97162 PT EVAL MOD COMPLEX 30 MIN: CPT | Mod: PN

## 2022-12-06 PROCEDURE — 97110 THERAPEUTIC EXERCISES: CPT | Mod: PN

## 2022-12-06 NOTE — PLAN OF CARE
[facility]  Physical Therapy Initial Evaluation    Name: Mark Dickerson .  Clinic Number: [id number]    Therapy Diagnosis:   Encounter Diagnoses   Name Primary?    Bilateral primary osteoarthritis of knee     Acute pain of right knee     Decreased range of motion of right knee     Gait difficulty      Physician: Lance Contreras MD    Physician Orders: PT Eval and Treat   Medical Diagnosis from Referral: M17.0 (ICD-10-CM) - Bilateral primary osteoarthritis of knee  Evaluation Date: 12/6/2022  Authorization Period Expiration: 12/31/22  Plan of Care Expiration: 2/14/23  Visit # / Visits authorized: 1/ 1  Foto: 1/3  Progress Note due 30 days from 12/6/22    Time In: 9:20 am  Time Out: 10:00 am  Total Billable Time: 40 minutes    Precautions: Standard HTN, DMII, Obesity, CKD stage 3    Subjective   Date of onset: 2.5 years ago  History of current condition - Mark reports: he is pre-op on the R knee with surgery schedule on or around 2/8/23 per patient reports. He reports that both knee need surgery but the R knee is the first.     Pain:  Current 2/10, worst 9/10, best 1/10   Location: R knee  Description: constant dull pain, sharp is stepping wrong  Aggravating Factors: walking, standing long periods  Easing Factors: rest medications    Prior Therapy: yes for the knee  Social History:  lives alone, son lives near by, 4 steps  Occupation: preacher, travels for work  Prior Level of Function: independent  Current Level of Function: limited with use of SP cane    Imaging, X-ray: When compared to 10/26/2021, there is been no significant interval change.  Bilateral tricompartmental degenerative changes are seen most severe in the medial compartments, right greater than left with prominent joint space narrowing and adjacent marginal spurring.  No acute fracture or dislocation.  No significant effusion.  Atherosclerosis is noted.  Soft tissues appear normal.    Medical History:   Past Medical  History:   Diagnosis Date    CKD stage 3 due to type 2 diabetes mellitus 2/18/2021    DM (diabetes mellitus) 2014    BS didn't check 12/11/2019    DM (diabetes mellitus) 2014    BS didn't check 05/05/2022    Foreign body, eye     right eye    Hyperlipidemia     Hypertension     Need for shingles vaccine 11/20/2019    Pneumonia     Primary osteoarthritis of right knee 10/29/2021    Severe obesity (BMI 35.0-35.9 with comorbidity) 7/10/2019    Type 2 diabetes mellitus 2014    BS didn't check 12/12/2018       Surgical History:   Mark Dickerson Sr.  has a past surgical history that includes Appendectomy and Cataract extraction (Bilateral).    Medications:   Mark has a current medication list which includes the following prescription(s): glipizide, hydrochlorothiazide, levocetirizine, and multivitamin.    Allergies:   Review of patient's allergies indicates:  No Known Allergies     Pts goals: tolerate standing longer for work duties    Objective       CMS Impairment/Limitation/Restriction for FOTO knee Survey    Therapist reviewed FOTO scores for Mark Dickerson Sr. on 12/6/2022.   FOTO documents entered into [email or web address] - see Media section.    Limitation Score: 49%       Gait:  Anterior trunk lean, wide SAMI, Short step length on the left LE, decreased hip extension, decreased to push off    Posture: forward head posture, rounded shoulders, increased kyphosis, increase lumbar lordosis, anterior pelvic tilt    Lumbar ROM:  Standing trunk flexion 10 inches from the floor with poor reversal of lumbar curve and increased T spine kyphosis    Balance: R LE single leg stand= 2 sec (single leg heel raise 1x with not even 10% ROM), L LE single leg stand = 2 sec,(single leg stand heel raise 20% ROM at best 5 reps with wall for support) sit to stand test in 30 sec 6 reps     Sensation: Sensation intact to light touch B LE's .     Knee AROM:     (R) (L)     Flexion   115 115     Extension  2 5          Hip  AROM:  Flexion   90 80     ER   35 35     IR   5 5     ABD   20 20     Hamstring   62 55    Ankle AROM:  Ankle DF(terrance) 10 10     Ankle DF (soleus) 15 10    Strength:     R L  Hip flexors L2   4-/5 5/5  Quadriceps L3   4-/5 5/5     Hamstrings S1   4/5 5/5    Dorsiflexion L4  5/5 5/5      Plantar flexion S1  2-/5 2/5 when using wall for balance/support    Gluteus Medius L45S1 2+/5 4-/5    Gluteus Rodrigo L5S12 3+/5 4-/5      Joint Mobility: patella glides laterally mildly    Muscle Length: Pt presents with limited muscle length tension in the quads, glutes, hip flexors and calves B.    Special Test:       JASBIR  -B Denies groin pain but so tight unable to fully test joint     Gavin + B    TREATMENT   Treatment Time In: 9:45 am  Treatment Time Out: 10:00 am  Total Treatment time separate from Evaluation: 15 minutes    Mark received therapeutic exercises to develop strength, ROM, flexibility, posture, and core stabilization for 15 minutes including:    SLR 1x 10 B  Leaning hip extension 1x 10 B  Seated LAQ 1x 5  Standing trunk flexion 2x 5  Single leg heel raise attempted 2-5 reps; Double heel raises 1x 8  Home Exercises and Patient Education Provided    Education provided:   -Education on condition, HEP, and PT educated pt in the need for us to work on hip mobility and strength to reduce knee pain.    Written Home Exercises Provided: Patient instructed to cont prior HEP.  Exercises were reviewed and Mark was able to demonstrate them prior to the end of the session.  Mark demonstrated good  understanding of the education provided.     See EMR under Patient Instructions for exercises provided 12/6/2022.    Assessment   Mark is a 82 y.o. male referred to outpatient Physical Therapy with a medical diagnosis of M17.0 (ICD-10-CM) - Bilateral primary osteoarthritis of knee. The patient presents with signs and symptoms consistent with diagnosis along with knee ROM loss, R knee pain, gait difficulty and impairments  which include decreased ROM, decreased strength, decreased joint mobility, decreased muscle length, impaired balance, gait abnormalities, and decreased overall function.  These impairments are limiting patient's ability to squat, kneel, stand prolonged and manage walking community distances.     Pt prognosis is Good.   Pt will benefit from skilled outpatient Physical Therapy to address the deficits stated above and in the chart below, provide pt/family education, and to maximize pt's level of independence.     Plan of care discussed with patient: Yes  Pt's spiritual, cultural and educational needs considered and patient is agreeable to the plan of care and goals as stated below:     Anticipated Barriers for therapy: driving out of town to preach    Medical Necessity is demonstrated by the following  History  Co-morbidities and personal factors that may impact the plan of care Co-morbidities:   See medical history  CKD stage 3  HTN  DMII    Personal Factors:   lifestyle     moderate   Examination  Body Structures and Functions, activity limitations and participation restrictions that may impact the plan of care Body Regions:   back  lower extremities  trunk    Body Systems:    ROM  strength  balance  gait  transfers  transitions  motor control    Participation Restrictions:   See current restrictions    Activity limitations:   Learning and applying knowledge  watching  listening    General Tasks and Commands  undertaking multiple tasks    Communication  communicating with/receiving spoken language    Mobility  lifting and carrying objects  walking  using transportation (bus, train, plane, car)  driving (bike, car, motorcycle)    Self care  washing oneself (bathing, drying, washing hands)  caring for body parts (brushing teeth, shaving, grooming)  toileting  dressing  eating  drinking  looking after one's health    Domestic Life  shopping  cooking  doing house work (cleaning house, washing dishes, laundry)  assisting  others    Interactions/Relationships  complex interpersonal interactions    Life Areas  employment  basic economic transactions    Community and Social Life  community life  recreation and leisure         moderate   Clinical Presentation evolving clinical presentation with changing clinical characteristics moderate   Decision Making/ Complexity Score: moderate     Goals:  Short Term Goals: In 4 weeks:  1.Pt to be educated on HEP.  2.Patient to demo increased hip flexion to 105 degrees or better.  3.Patient to increase glute med strength to 4-/5 or better.  4.Patient to have decreased pain to 3/10 or better.  5.Patient to increase LE single leg stance to 4 seconds or better B.  6.Patient to improve score on the FOTO by 10%.      Long Term Goals: In 10 weeks 2/14/23  1. Patient to perform daily activities including walking up 2 flights of stairs to be able to return to work tasks.  2. Patient to demonstrate increased AROM knee extension to 0 degrees or better.  3. Patient to demonstrate 4 reps or better of single leg heal raises B.  4. Patient to have decreased pain to 2/10 or better.  5. Patient to improve score on the FOTO to 42% limitation or better.      Plan   Plan of care Certification: 12/6/2022 to 2/14/23    Outpatient Physical Therapy 2 times weekly for 10 weeks to include the following interventions: Electrical Stimulation IFC, NMES, Gait Training, Manual Therapy, Moist Heat/ Ice, Neuromuscular Re-ed, Patient Education, Self Care, Therapeutic Activities, Therapeutic Exercise, and .     Cayla Fontenot, PT, CIDN, SFMA    Thank you for this referral.    These services are reasonable and necessary for the conditions set forth above while under my care.

## 2022-12-28 ENCOUNTER — CLINICAL SUPPORT (OUTPATIENT)
Dept: REHABILITATION | Facility: HOSPITAL | Age: 82
End: 2022-12-28
Payer: MEDICARE

## 2022-12-28 DIAGNOSIS — M25.661 DECREASED RANGE OF MOTION OF RIGHT KNEE: ICD-10-CM

## 2022-12-28 DIAGNOSIS — R26.9 GAIT DIFFICULTY: ICD-10-CM

## 2022-12-28 DIAGNOSIS — M25.561 ACUTE PAIN OF RIGHT KNEE: Primary | ICD-10-CM

## 2022-12-28 PROCEDURE — 97110 THERAPEUTIC EXERCISES: CPT | Mod: HCNC,PN,CQ

## 2022-12-28 NOTE — PROGRESS NOTES
"[facility]   Physical Therapy Treatment Note     Name: Mark Dickerson .  Clinic Number: [id number]    Therapy Diagnosis:   Encounter Diagnoses   Name Primary?    Acute pain of right knee Yes    Decreased range of motion of right knee     Gait difficulty      Physician: Lance Contreras MD    Visit Date: 12/28/2022     Physician Orders: PT Eval and Treat   Medical Diagnosis from Referral: M17.0 (ICD-10-CM) - Bilateral primary osteoarthritis of knee  Evaluation Date: 12/6/2022  Authorization Period Expiration: 12/31/22  Plan of Care Expiration: 2/14/23  Visit # / Visits authorized: 1/5  FOTO: 1/3  Progress Note due 30 days from 12/6/22    PTA Visit #: 1/5     Time In: 9:35pm  Time Out: 10:30pm  Total Billable Time: 48 minutes    SUBJECTIVE     Pt reports: having toothache pain with walking especially during the cold weather.  He was compliant with home exercise program.  Response to previous treatment: n/a  Functional change: n/a    Pain: 4/10  Location: right knee      OBJECTIVE     Objective Measures updated at progress report unless specified.     Treatment     Mark received the treatments listed below:      therapeutic exercises to develop strength, endurance, and flexibility for 48 minutes including:    SLR; 2x8 each slow and controlled  Hamstring Stretch; 3x20" each  Iliotibial Band Stretch; 3x20" each  DKTC; 2x10  LAQ; 2x8 each  Clams; 2x8 each  Standing Heel Raises; x15  Single leg heel raise; 2x8 each  Standing Hip Abduction; 2x10 each  Gastroc stretch standing; 3x20"     manual therapy techniques: Joint mobilizations, Myofacial release, and Soft tissue Mobilization were applied to the: right knee for 00 minutes, including:      neuromuscular re-education activities to improve: Balance and Coordination for 00 minutes. The following activities were included:          Patient Education and Home Exercises     Home Exercises Provided and Patient Education Provided     Education " provided:   -    Written Home Exercises Provided: Patient instructed to cont prior HEP. Exercises were reviewed and Mark was able to demonstrate them prior to the end of the session.  Mark demonstrated good  understanding of the education provided. See EMR under Patient Instructions for exercises provided during therapy sessions    ASSESSMENT     Today is patient's first treatment visit since Initial Evaluation. Patient demonstrates tendency to move quickly through exercises especially repetitive AROM activities. Cueing provided for patient to focus on control and allowing more time under tension especially with eccentric lowering. Patient reports feeling more work in the muscles stating a good fatigue felt after. Patient reports feeling a good workout post treatment with no exacerbations of subjective knee pain.    Mark Is progressing well towards his goals.   Pt prognosis is Good.     Pt will continue to benefit from skilled outpatient physical therapy to address the deficits listed in the problem list box on initial evaluation, provide pt/family education and to maximize pt's level of independence in the home and community environment.     Pt's spiritual, cultural and educational needs considered and pt agreeable to plan of care and goals.     Anticipated barriers to physical therapy: none    Goals:   Short Term Goals:    1. Pt will be 50% independent with HEP.     Long Term Goals: 4 weeks   1. Pt will be 100% independent with HEP.  2. Pt will have 5/5 strength in B hips and knees.  3. Pt will get in/out of the car without difficulty.  4. Pt will be able to perform heavy duties around his house without difficulty.    PLAN     Plan of care Certification: 12/6/2022 to 2/14/23     Outpatient Physical Therapy 2 times weekly for 10 weeks to include the following interventions: Electrical Stimulation IFC, NMES, Gait Training, Manual Therapy, Moist Heat/ Ice, Neuromuscular Re-ed, Patient Education, Self Care,  Therapeutic Activities, and Therapeutic Exercise.    Fred Cotton, PTA

## 2022-12-30 ENCOUNTER — DOCUMENTATION ONLY (OUTPATIENT)
Dept: REHABILITATION | Facility: HOSPITAL | Age: 82
End: 2022-12-30

## 2022-12-30 ENCOUNTER — CLINICAL SUPPORT (OUTPATIENT)
Dept: REHABILITATION | Facility: HOSPITAL | Age: 82
End: 2022-12-30
Payer: MEDICARE

## 2022-12-30 DIAGNOSIS — M25.561 ACUTE PAIN OF RIGHT KNEE: Primary | ICD-10-CM

## 2022-12-30 DIAGNOSIS — M25.661 DECREASED RANGE OF MOTION OF RIGHT KNEE: ICD-10-CM

## 2022-12-30 DIAGNOSIS — R26.9 GAIT DIFFICULTY: ICD-10-CM

## 2022-12-30 PROCEDURE — 97110 THERAPEUTIC EXERCISES: CPT | Mod: HCNC,PN,CQ

## 2022-12-30 NOTE — PROGRESS NOTES
PT/PTA met face to face to discuss pt's treatment plan and progress towards established goals. Pt will be seen by a physical therapist minimally every 6th visit or every 30 days.        Christal Whipple PTA

## 2022-12-30 NOTE — PROGRESS NOTES
"[facility]   Physical Therapist Assistant Treatment Note     Name: Mark Dickerson .  Clinic Number: [id number]    Therapy Diagnosis:   Encounter Diagnoses   Name Primary?    Acute pain of right knee Yes    Decreased range of motion of right knee     Gait difficulty      Physician: Lance Contreras MD    Visit Date: 2022     Physician Orders: PT Eval and Treat   Medical Diagnosis from Referral: M17.0 (ICD-10-CM) - Bilateral primary osteoarthritis of knee  Evaluation Date: 2022  Authorization Period Expiration: 22  Plan of Care Expiration: 23  Visit # / Visits authorized:  +eval    FOTO: 1/3  Progress Note due 30 days from 22    PTA Visit #:      Time In: 8:42  Time Out: 9:37  Total Billable Time:53  minutes    SUBJECTIVE     Pt reports: "not feeling too bad but it hurts when I walk or stand; I have bone on bone"  Patient requested not to lie prone or work in standing today as 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  He was compliant with home exercise program.  Response to previous treatment: no issues  Functional change: n/a at this time    Pain: 4/10  Location: right knee      OBJECTIVE     Objective Measures updated at progress report unless specified.     Treatment     Mark received the treatments listed below:      therapeutic exercises to develop strength, endurance, and flexibility for   53 minutes including:    NuStep for joint mobility x6 min  Quad set w/heel prop 2x10  Quad set w/SLR 3x10--cues to initiate ex w/quad set  Bridges w/ball squeeze 3x10  Side lying clams RTB hold 3"  3x10 B  Supine piriformis stretch x5 B  Seated LAQ hold 3"  2x15  Seated tail gator 3#  3 min        DEFERRED:  Hamstring Stretch; 3x20" each  Iliotibial Band Stretch; 3x20" each  DKTC; 2x10  LAQ; 2x8 each  Standing Heel Raises; x15  Single leg heel raise; 2x8 each  Standing Hip Abduction; 2x10 each  Gastroc stretch standing; 3x20" "     manual therapy techniques: Joint mobilizations, Myofacial release, and Soft tissue Mobilization were applied to the: right knee for 00 minutes, including:--      neuromuscular re-education activities to improve: Balance and Coordination for 00 minutes. The following activities were included:---          Patient Education and Home Exercises     Home Exercises Provided and Patient Education Provided     Education provided:   -    Written Home Exercises Provided: Patient instructed to cont prior HEP. Exercises were reviewed and Mark was able to demonstrate them prior to the end of the session.  Mark demonstrated good  understanding of the education provided. See EMR under Patient Instructions for exercises provided during therapy sessions    ASSESSMENT     Mark presented to therapy today with mild right knee pain. His gait was mildly antalgic with decreased stance time on the right due to pain. Continued with strengthening activities for his hips and knee for improved strength and stability. He required less cueing today with his exercises however he is a bit challenged with eccentric control from weakness but also distraction/habitual. Mark demonstrated a positive response to his session today as evidenced with his activity tolerance/progression and no reported increased pain. Continued skilled intervention indicated to increase his mobility w/stability to tolerate his daily activities.     Mark Is progressing well towards his goals.   Pt prognosis is Good.     Pt will continue to benefit from skilled outpatient physical therapy to address the deficits listed in the problem list box on initial evaluation, provide pt/family education and to maximize pt's level of independence in the home and community environment.     Pt's spiritual, cultural and educational needs considered and pt agreeable to plan of care and goals.     Anticipated barriers to physical therapy: none    Goals:   Short Term Goals:    1.  Pt will be 50% independent with HEP.     Long Term Goals: 4 weeks   1. Pt will be 100% independent with HEP.  2. Pt will have 5/5 strength in B hips and knees.  3. Pt will get in/out of the car without difficulty.  4. Pt will be able to perform heavy duties around his house without difficulty.    PLAN     Plan of care Certification: 12/6/2022 to 2/14/23     Outpatient Physical Therapy 2 times weekly for 10 weeks to include the following interventions: Electrical Stimulation IFC, NMES, Gait Training, Manual Therapy, Moist Heat/ Ice, Neuromuscular Re-ed, Patient Education, Self Care, Therapeutic Activities, and Therapeutic Exercise.    Christal Whipple, PTA

## 2022-12-30 NOTE — PROGRESS NOTES
"[facility]   Physical Therapy Treatment Note     Name: Mark Dickerson .  Clinic Number: [id number]    Therapy Diagnosis:   No diagnosis found.    Physician: Lance Contreras MD    Visit Date: 12/30/2022     Physician Orders: PT Eval and Treat   Medical Diagnosis from Referral: M17.0 (ICD-10-CM) - Bilateral primary osteoarthritis of knee  Evaluation Date: 12/6/2022  Authorization Period Expiration: 12/31/22  Plan of Care Expiration: 2/14/23  Visit # / Visits authorized: 1/5  FOTO: 1/3  Progress Note due 30 days from 12/6/22    PTA Visit #: 1/5     Time In: 9:35pm  Time Out: 10:30pm  Total Billable Time: 48 minutes    SUBJECTIVE     Pt reports: having toothache pain with walking especially during the cold weather.  He was compliant with home exercise program.  Response to previous treatment: n/a  Functional change: n/a    Pain: 4/10  Location: right knee      OBJECTIVE     Objective Measures updated at progress report unless specified.     Treatment     Mark received the treatments listed below:      therapeutic exercises to develop strength, endurance, and flexibility for 48 minutes including:    SLR; 2x8 each slow and controlled  Hamstring Stretch; 3x20" each  Iliotibial Band Stretch; 3x20" each  DKTC; 2x10  LAQ; 2x8 each  Clams; 2x8 each  Standing Heel Raises; x15  Single leg heel raise; 2x8 each  Standing Hip Abduction; 2x10 each  Gastroc stretch standing; 3x20"     manual therapy techniques: Joint mobilizations, Myofacial release, and Soft tissue Mobilization were applied to the: right knee for 00 minutes, including:      neuromuscular re-education activities to improve: Balance and Coordination for 00 minutes. The following activities were included:          Patient Education and Home Exercises     Home Exercises Provided and Patient Education Provided     Education provided:   -    Written Home Exercises Provided: Patient instructed to cont prior HEP. Exercises were reviewed and " Mark was able to demonstrate them prior to the end of the session.  Mark demonstrated good  understanding of the education provided. See EMR under Patient Instructions for exercises provided during therapy sessions    ASSESSMENT     Today is patient's first treatment visit since Initial Evaluation. Patient demonstrates tendency to move quickly through exercises especially repetitive AROM activities. Cueing provided for patient to focus on control and allowing more time under tension especially with eccentric lowering. Patient reports feeling more work in the muscles stating a good fatigue felt after. Patient reports feeling a good workout post treatment with no exacerbations of subjective knee pain.    Mark Is progressing well towards his goals.   Pt prognosis is Good.     Pt will continue to benefit from skilled outpatient physical therapy to address the deficits listed in the problem list box on initial evaluation, provide pt/family education and to maximize pt's level of independence in the home and community environment.     Pt's spiritual, cultural and educational needs considered and pt agreeable to plan of care and goals.     Anticipated barriers to physical therapy: none    Goals:   Short Term Goals:    1. Pt will be 50% independent with HEP.     Long Term Goals: 4 weeks   1. Pt will be 100% independent with HEP.  2. Pt will have 5/5 strength in B hips and knees.  3. Pt will get in/out of the car without difficulty.  4. Pt will be able to perform heavy duties around his house without difficulty.    PLAN     Plan of care Certification: 12/6/2022 to 2/14/23     Outpatient Physical Therapy 2 times weekly for 10 weeks to include the following interventions: Electrical Stimulation IFC, NMES, Gait Training, Manual Therapy, Moist Heat/ Ice, Neuromuscular Re-ed, Patient Education, Self Care, Therapeutic Activities, and Therapeutic Exercise.    Cayla Fontenot, PT

## 2023-01-03 ENCOUNTER — CLINICAL SUPPORT (OUTPATIENT)
Dept: REHABILITATION | Facility: HOSPITAL | Age: 83
End: 2023-01-03
Payer: MEDICARE

## 2023-01-03 DIAGNOSIS — R26.9 GAIT DIFFICULTY: ICD-10-CM

## 2023-01-03 DIAGNOSIS — M25.561 ACUTE PAIN OF RIGHT KNEE: Primary | ICD-10-CM

## 2023-01-03 DIAGNOSIS — M25.661 DECREASED RANGE OF MOTION OF RIGHT KNEE: ICD-10-CM

## 2023-01-03 PROCEDURE — 97110 THERAPEUTIC EXERCISES: CPT | Mod: HCNC,PN

## 2023-01-03 NOTE — PROGRESS NOTES
"[facility]   Physical Therapist Treatment Note     Name: Mark Dickerson .  Clinic Number: [id number]    Therapy Diagnosis:   Encounter Diagnoses   Name Primary?    Acute pain of right knee Yes    Decreased range of motion of right knee     Gait difficulty        Physician: Lance Contreras MD    Visit Date: 1/3/2023     Physician Orders: PT Eval and Treat   Medical Diagnosis from Referral: M17.0 (ICD-10-CM) - Bilateral primary osteoarthritis of knee  Evaluation Date: 12/6/2022  Authorization Period Expiration: 12/31/22  Plan of Care Expiration: 2/14/23  Visit # / Visits authorized: 3/12 +eval    FOTO: 1/3  Progress Note due 30 days from 12/6/22    PTA Visit #:     Time In: 9:55 am  Time Out: 10:40 am  Total Billable Time: 45 minutes    SUBJECTIVE     Pt reports: his right knee will need surgery soon.    He was compliant with home exercise program.  Response to previous treatment: no issues  Functional change: n/a at this time    Pain: 4/10  Location: right knee      OBJECTIVE     Objective Measures updated at progress report unless specified.     Treatment     Mark received the treatments listed below:      therapeutic exercises to develop strength, endurance, and flexibility for 45 minutes including:    NuStep for joint mobility x6 min  Quad set w/heel prop 2x10  Quad set w/SLR 3x10--cues to initiate ex w/quad set  Bridges w/ball squeeze 3x10  LAQ; 2x8 each  Standing Heel Raises; x15  Leg swings 2x10 B  Standing Hip Abduction; 2x10 each        DEFERRED:  Side lying clams RTB hold 3"  3x10 B  Supine piriformis stretch x5 B  Seated LAQ hold 3"  2x15  Seated tail gator 3#  3 min  Hamstring Stretch; 3x20" each  Iliotibial Band Stretch; 3x20" each  DKTC; 2x10  Gastroc stretch standing; 3x20"     manual therapy techniques: Joint mobilizations, Myofacial release, and Soft tissue Mobilization were applied to the: right knee for 00 minutes, including:--            Patient Education and Home " Exercises     Home Exercises Provided and Patient Education Provided     Education provided:   - cont HEP    Written Home Exercises Provided: Patient instructed to cont prior HEP. Exercises were reviewed and Mark was able to demonstrate them prior to the end of the session.  Mark demonstrated good  understanding of the education provided. See EMR under Patient Instructions for exercises provided during therapy sessions    ASSESSMENT     Mark presented to therapy today with mild right knee pain. Continued with strengthening activities for his hips and knee for improved strength and stability.     Mark Is progressing well towards his goals.   Pt prognosis is Good.     Pt will continue to benefit from skilled outpatient physical therapy to address the deficits listed in the problem list box on initial evaluation, provide pt/family education and to maximize pt's level of independence in the home and community environment.     Pt's spiritual, cultural and educational needs considered and pt agreeable to plan of care and goals.     Anticipated barriers to physical therapy: none    Goals:   Short Term Goals:    1. Pt will be 50% independent with HEP.     Long Term Goals: 4 weeks   1. Pt will be 100% independent with HEP.  2. Pt will have 5/5 strength in B hips and knees.  3. Pt will get in/out of the car without difficulty.  4. Pt will be able to perform heavy duties around his house without difficulty.    PLAN     Plan of care Certification: 12/6/2022 to 2/14/23     Outpatient Physical Therapy 2 times weekly for 10 weeks to include the following interventions: Electrical Stimulation IFC, NMES, Gait Training, Manual Therapy, Moist Heat/ Ice, Neuromuscular Re-ed, Patient Education, Self Care, Therapeutic Activities, and Therapeutic Exercise.    Sherley Quick, PT

## 2023-01-05 ENCOUNTER — PES CALL (OUTPATIENT)
Dept: ADMINISTRATIVE | Facility: CLINIC | Age: 83
End: 2023-01-05
Payer: MEDICARE

## 2023-01-05 ENCOUNTER — CLINICAL SUPPORT (OUTPATIENT)
Dept: REHABILITATION | Facility: HOSPITAL | Age: 83
End: 2023-01-05
Payer: MEDICARE

## 2023-01-05 DIAGNOSIS — M25.561 ACUTE PAIN OF RIGHT KNEE: Primary | ICD-10-CM

## 2023-01-05 DIAGNOSIS — R26.9 GAIT DIFFICULTY: ICD-10-CM

## 2023-01-05 DIAGNOSIS — M25.661 DECREASED RANGE OF MOTION OF RIGHT KNEE: ICD-10-CM

## 2023-01-05 PROCEDURE — 97110 THERAPEUTIC EXERCISES: CPT | Mod: HCNC,PN

## 2023-01-05 NOTE — PROGRESS NOTES
"[facility]   Physical Therapist Treatment Note     Name: Mark Dickerson .  Clinic Number: [id number]    Therapy Diagnosis:   Encounter Diagnoses   Name Primary?    Acute pain of right knee Yes    Decreased range of motion of right knee     Gait difficulty          Physician: Lance Contreras MD    Visit Date: 1/5/2023     Physician Orders: PT Eval and Treat   Medical Diagnosis from Referral: M17.0 (ICD-10-CM) - Bilateral primary osteoarthritis of knee  Evaluation Date: 12/6/2022  Authorization Period Expiration: 12/31/22  Plan of Care Expiration: 2/14/23  Visit # / Visits authorized: 2/12 ( 2 + eval)    FOTO: 1/3  Progress Note due 30 days from 12/6/22    PTA Visit #:     Time In:10:10  Time Out: 10:59  Total Billable Time: 49 minutes    SUBJECTIVE     Pt reports: his right knee hurts with walking    He was compliant with home exercise program.  Response to previous treatment: no issues  Functional change: n/a at this time    Pain: 4-5/ 10  Location: right knee      OBJECTIVE     Objective Measures updated at progress report unless specified.     Treatment     Mark received the treatments listed below:      therapeutic exercises to develop strength, endurance, and flexibility for 49 minutes including:    NuStep for joint mobility x 7 min  Shuttle 6 bands 3 minutes  Standing heel raises 3 x 10 reps  Standing hip abduction 3 x 10 reps B  Standing hip extension 3 x 10 reps B  Sit to stand 9 reps in 30 secs  Sitting knee flexion with G TB 1 x 20 reps B  Sitting knee extension with 2# 1 x 20 reps B    deferred  Quad set w/heel prop 2x10  Quad set w/SLR 3x10--cues to initiate ex w/quad set  Bridges w/ball squeeze 3x10  LAQ; 2x8 each  Leg swings 2x10 B          DEFERRED:  Side lying clams RTB hold 3"  3x10 B  Supine piriformis stretch x5 B  Seated LAQ hold 3"  2x15  Seated tail gator 3#  3 min  Hamstring Stretch; 3x20" each  Iliotibial Band Stretch; 3x20" each  DKTC; 2x10  Gastroc stretch " "standing; 3x20"     manual therapy techniques: Joint mobilizations, Myofacial release, and Soft tissue Mobilization were applied to the: right knee for 00 minutes, including:--            Patient Education and Home Exercises     Home Exercises Provided and Patient Education Provided     Education provided:   - cont HEP    Written Home Exercises Provided: Patient instructed to cont prior HEP. Exercises were reviewed and Mark was able to demonstrate them prior to the end of the session.  Mark demonstrated good  understanding of the education provided. See EMR under Patient Instructions for exercises provided during therapy sessions    ASSESSMENT     Mark presented to therapy today with mild complaints of right knee pain. Patient progressed with exercises and resistance today with no complaints. Patient will benefit from continued   strengthening activities for his hips and knee for improved strength and stability.     Mark Is progressing well towards his goals.   Pt prognosis is Good.     Pt will continue to benefit from skilled outpatient physical therapy to address the deficits listed in the problem list box on initial evaluation, provide pt/family education and to maximize pt's level of independence in the home and community environment.     Pt's spiritual, cultural and educational needs considered and pt agreeable to plan of care and goals.     Anticipated barriers to physical therapy: none    Goals:   Short Term Goals:    1. Pt will be 50% independent with HEP.     Long Term Goals: 4 weeks   1. Pt will be 100% independent with HEP.  2. Pt will have 5/5 strength in B hips and knees.  3. Pt will get in/out of the car without difficulty.  4. Pt will be able to perform heavy duties around his house without difficulty.    PLAN     Plan of care Certification: 12/6/2022 to 2/14/23     Outpatient Physical Therapy 2 times weekly for 10 weeks to include the following interventions: Electrical Stimulation IFC, NMES, " Gait Training, Manual Therapy, Moist Heat/ Ice, Neuromuscular Re-ed, Patient Education, Self Care, Therapeutic Activities, and Therapeutic Exercise.    Viv Sheikh, PT

## 2023-01-09 ENCOUNTER — OFFICE VISIT (OUTPATIENT)
Dept: FAMILY MEDICINE | Facility: CLINIC | Age: 83
End: 2023-01-09
Payer: MEDICARE

## 2023-01-09 VITALS
HEIGHT: 70 IN | WEIGHT: 244.5 LBS | RESPIRATION RATE: 18 BRPM | BODY MASS INDEX: 35 KG/M2 | TEMPERATURE: 98 F | DIASTOLIC BLOOD PRESSURE: 72 MMHG | OXYGEN SATURATION: 95 % | HEART RATE: 100 BPM | SYSTOLIC BLOOD PRESSURE: 128 MMHG

## 2023-01-09 DIAGNOSIS — E11.69 HYPERLIPIDEMIA ASSOCIATED WITH TYPE 2 DIABETES MELLITUS: ICD-10-CM

## 2023-01-09 DIAGNOSIS — I35.0 NONRHEUMATIC AORTIC VALVE STENOSIS: ICD-10-CM

## 2023-01-09 DIAGNOSIS — E11.22 CKD STAGE 3 DUE TO TYPE 2 DIABETES MELLITUS: ICD-10-CM

## 2023-01-09 DIAGNOSIS — E11.59 HYPERTENSION ASSOCIATED WITH DIABETES: ICD-10-CM

## 2023-01-09 DIAGNOSIS — Z00.00 ENCOUNTER FOR PREVENTIVE HEALTH EXAMINATION: Primary | ICD-10-CM

## 2023-01-09 DIAGNOSIS — I15.2 HYPERTENSION ASSOCIATED WITH DIABETES: ICD-10-CM

## 2023-01-09 DIAGNOSIS — M17.11 PRIMARY OSTEOARTHRITIS OF RIGHT KNEE: ICD-10-CM

## 2023-01-09 DIAGNOSIS — R26.9 ABNORMALITY OF GAIT AND MOBILITY: ICD-10-CM

## 2023-01-09 DIAGNOSIS — N18.30 CKD STAGE 3 DUE TO TYPE 2 DIABETES MELLITUS: ICD-10-CM

## 2023-01-09 DIAGNOSIS — E78.5 HYPERLIPIDEMIA ASSOCIATED WITH TYPE 2 DIABETES MELLITUS: ICD-10-CM

## 2023-01-09 PROCEDURE — 99999 PR PBB SHADOW E&M-EST. PATIENT-LVL IV: ICD-10-PCS | Mod: PBBFAC,HCNC,, | Performed by: NURSE PRACTITIONER

## 2023-01-09 PROCEDURE — 1160F PR REVIEW ALL MEDS BY PRESCRIBER/CLIN PHARMACIST DOCUMENTED: ICD-10-PCS | Mod: HCNC,CPTII,S$GLB, | Performed by: NURSE PRACTITIONER

## 2023-01-09 PROCEDURE — 1101F PT FALLS ASSESS-DOCD LE1/YR: CPT | Mod: HCNC,CPTII,S$GLB, | Performed by: NURSE PRACTITIONER

## 2023-01-09 PROCEDURE — 3074F SYST BP LT 130 MM HG: CPT | Mod: HCNC,CPTII,S$GLB, | Performed by: NURSE PRACTITIONER

## 2023-01-09 PROCEDURE — 1160F RVW MEDS BY RX/DR IN RCRD: CPT | Mod: HCNC,CPTII,S$GLB, | Performed by: NURSE PRACTITIONER

## 2023-01-09 PROCEDURE — 3072F PR LOW RISK FOR RETINOPATHY: ICD-10-PCS | Mod: HCNC,CPTII,S$GLB, | Performed by: NURSE PRACTITIONER

## 2023-01-09 PROCEDURE — 1125F PR PAIN SEVERITY QUANTIFIED, PAIN PRESENT: ICD-10-PCS | Mod: HCNC,CPTII,S$GLB, | Performed by: NURSE PRACTITIONER

## 2023-01-09 PROCEDURE — 1170F FXNL STATUS ASSESSED: CPT | Mod: HCNC,CPTII,S$GLB, | Performed by: NURSE PRACTITIONER

## 2023-01-09 PROCEDURE — 1125F AMNT PAIN NOTED PAIN PRSNT: CPT | Mod: HCNC,CPTII,S$GLB, | Performed by: NURSE PRACTITIONER

## 2023-01-09 PROCEDURE — 3078F DIAST BP <80 MM HG: CPT | Mod: HCNC,CPTII,S$GLB, | Performed by: NURSE PRACTITIONER

## 2023-01-09 PROCEDURE — 3072F LOW RISK FOR RETINOPATHY: CPT | Mod: HCNC,CPTII,S$GLB, | Performed by: NURSE PRACTITIONER

## 2023-01-09 PROCEDURE — G0439 PR MEDICARE ANNUAL WELLNESS SUBSEQUENT VISIT: ICD-10-PCS | Mod: HCNC,S$GLB,, | Performed by: NURSE PRACTITIONER

## 2023-01-09 PROCEDURE — 1101F PR PT FALLS ASSESS DOC 0-1 FALLS W/OUT INJ PAST YR: ICD-10-PCS | Mod: HCNC,CPTII,S$GLB, | Performed by: NURSE PRACTITIONER

## 2023-01-09 PROCEDURE — 3078F PR MOST RECENT DIASTOLIC BLOOD PRESSURE < 80 MM HG: ICD-10-PCS | Mod: HCNC,CPTII,S$GLB, | Performed by: NURSE PRACTITIONER

## 2023-01-09 PROCEDURE — 3288F FALL RISK ASSESSMENT DOCD: CPT | Mod: HCNC,CPTII,S$GLB, | Performed by: NURSE PRACTITIONER

## 2023-01-09 PROCEDURE — G0439 PPPS, SUBSEQ VISIT: HCPCS | Mod: HCNC,S$GLB,, | Performed by: NURSE PRACTITIONER

## 2023-01-09 PROCEDURE — 3074F PR MOST RECENT SYSTOLIC BLOOD PRESSURE < 130 MM HG: ICD-10-PCS | Mod: HCNC,CPTII,S$GLB, | Performed by: NURSE PRACTITIONER

## 2023-01-09 PROCEDURE — 1170F PR FUNCTIONAL STATUS ASSESSED: ICD-10-PCS | Mod: HCNC,CPTII,S$GLB, | Performed by: NURSE PRACTITIONER

## 2023-01-09 PROCEDURE — 3288F PR FALLS RISK ASSESSMENT DOCUMENTED: ICD-10-PCS | Mod: HCNC,CPTII,S$GLB, | Performed by: NURSE PRACTITIONER

## 2023-01-09 PROCEDURE — 99999 PR PBB SHADOW E&M-EST. PATIENT-LVL IV: CPT | Mod: PBBFAC,HCNC,, | Performed by: NURSE PRACTITIONER

## 2023-01-09 PROCEDURE — 1159F PR MEDICATION LIST DOCUMENTED IN MEDICAL RECORD: ICD-10-PCS | Mod: HCNC,CPTII,S$GLB, | Performed by: NURSE PRACTITIONER

## 2023-01-09 PROCEDURE — 1159F MED LIST DOCD IN RCRD: CPT | Mod: HCNC,CPTII,S$GLB, | Performed by: NURSE PRACTITIONER

## 2023-01-09 NOTE — PATIENT INSTRUCTIONS
Counseling and Referral of Other Preventative  (Italic type indicates deductible and co-insurance are waived)    Patient Name: Mark Dickerson  Today's Date: 1/9/2023    Health Maintenance       Date Due Completion Date    COVID-19 Vaccine (5 - Booster for Pfizer series) 06/17/2022 4/22/2022    Hemoglobin A1c 05/22/2023 11/22/2022    Diabetes Urine Screening 11/22/2023 11/22/2022    Lipid Panel 11/22/2023 11/22/2022    Eye Exam 12/01/2023 12/1/2022    Override on 12/11/2019: Done    TETANUS VACCINE 12/10/2025 12/10/2015        No orders of the defined types were placed in this encounter.      The following information is provided to all patients.  This information is to help you find resources for any of the problems found today that may be affecting your health:                Living healthy guide: www.[facility].[place].gov      Understanding Diabetes: www.diabetes.org      Eating healthy: www.cdc.gov/healthyweight      [facility] home safety checklist: www.cdc.gov/steadi/patient.html      Agency on Aging: www.goea.[place].[facility]      Alcoholics anonymous (AA): www.aa.org      Physical Activity: www.[place].nih.gov/um4lxoy      Tobacco use: www.quitwithusla.org

## 2023-01-09 NOTE — PROGRESS NOTES
"  Mark Dickerson presented for a  Medicare AWV and comprehensive Health Risk Assessment today. The following components were reviewed and updated:    Medical history  Family History  Social history  Allergies and Current Medications  Health Risk Assessment  Health Maintenance  Care Team         ** See Completed Assessments for Annual Wellness Visit within the encounter summary.**         The following assessments were completed:  Living Situation  CAGE  Depression Screening  Timed Get Up and Go  Whisper Test  Cognitive Function Screening    Nutrition Screening  ADL Screening  PAQ Screening        Vitals:    01/09/23 1002   BP: 128/72   Pulse: 100   Resp: 18   Temp: 98.1 °F (36.7 °C)   TempSrc: Temporal   SpO2: 95%   Weight: 110.9 kg (244 lb 7.8 oz)   Height: 5' 10" (1.778 m)     Body mass index is 35.08 kg/m².  Physical Exam  Constitutional:       General: He is not in acute distress.     Appearance: He is well-developed. He is obese. He is not ill-appearing, toxic-appearing or diaphoretic.   HENT:      Head: Normocephalic and atraumatic.      Right Ear: External ear normal.      Left Ear: External ear normal.      Nose: Nose normal.      Mouth/Throat:      Mouth: Mucous membranes are moist.      Pharynx: No posterior oropharyngeal erythema.   Eyes:      Conjunctiva/sclera: Conjunctivae normal.   Neck:      Vascular: No carotid bruit.   Cardiovascular:      Rate and Rhythm: Regular rhythm. Tachycardia present.      Heart sounds: Murmur heard.     No friction rub. No gallop.      Comments: Right midsternal systolic murmur  Pulmonary:      Effort: Pulmonary effort is normal. No respiratory distress.      Breath sounds: Normal breath sounds. No wheezing or rales.   Chest:      Chest wall: No tenderness.   Abdominal:      General: Bowel sounds are normal. There is no distension.      Palpations: Abdomen is soft. There is no mass.      Tenderness: There is no abdominal tenderness.      Hernia: No hernia is present. "   Musculoskeletal:         General: No tenderness. Normal range of motion.      Cervical back: Normal range of motion and neck supple. No tenderness.   Lymphadenopathy:      Cervical: No cervical adenopathy.   Skin:     General: Skin is warm and dry.   Neurological:      Mental Status: He is alert and oriented to person, place, and time.      Cranial Nerves: No cranial nerve deficit.   Psychiatric:         Mood and Affect: Mood normal.         Behavior: Behavior normal.             Diagnoses and health risks identified today and associated recommendations/orders:    1. Encounter for preventive health examination  Screenings performed, as noted above.  Personal preventative testing needs reviewed.      2. Hypertension associated with diabetes  Treated with HCTZ, stable, follow up with  pcp    3. CKD stage 3 due to type 2 diabetes mellitus  Monitored, stable, mild, follow up with pcp    4. Abnormality of gait and mobility  Monitored, stable now, is in PT for right knee pain, states is discussing knee replacement with ortho    5. Hyperlipidemia associated with type 2 diabetes mellitus  Treated with fish oil, stable, cont tx    6. Nonrheumatic aortic valve stenosis  Monitored, stable, follow up with pcp and cardiology    7. Primary osteoarthritis of right knee  Monitored, stable now, is in PT for right knee pain, states is discussing knee replacement with ortho    Review for Substance Use Disorders: patient does not use substances per chart    Review for opioid screening: Patient is not prescribed opioids       Provided Mark with a 5-10 year written screening schedule and personal prevention plan. Recommendations were developed using the USPSTF age appropriate recommendations. Education, counseling, and referrals were provided as needed. After Visit Summary printed and given to patient which includes a list of additional screenings\tests needed.    No follow-ups on file.    Freddy Tejeda NP    I offered to discuss  advanced care planning, including how to pick a person who would make decisions for you if you were unable to make them for yourself, called a health care power of , and what kind of decisions you might make such as use of life sustaining treatments such as ventilators and tube feeding when faced with a life limiting illness recorded on a living will that they will need to know. (How you want to be cared for as you near the end of your natural life)     X Patient is interested in learning more about how to make advanced directives.  I provided them paperwork and offered to discuss this with them.

## 2023-01-09 NOTE — PROGRESS NOTES
"[facility]   Physical Therapist Assistant Treatment Note     Name: Mark Dickerson .  Clinic Number: [id number]    Therapy Diagnosis:   No diagnosis found.        Physician: Lance Contreras MD    Visit Date: 1/10/2023     Physician Orders: PT Eval and Treat   Medical Diagnosis from Referral: M17.0 (ICD-10-CM) - Bilateral primary osteoarthritis of knee  Evaluation Date: 12/6/2022  Authorization Period Expiration: 4/2/23  Plan of Care Expiration: 2/14/23  Visit # / Visits authorized: 3/12 ( 2 + eval)    FOTO: 1/3  Progress Note due 30 days from 12/6/22    PTA Visit #: 1/5    Time In:  Time Out:   Total Billable Time:  minutes    SUBJECTIVE     Pt reports: his right knee hurts with walking    He was compliant with home exercise program.  Response to previous treatment: no issues  Functional change: n/a at this time    Pain: 4-5/ 10  Location: right knee      OBJECTIVE     Objective Measures updated at progress report unless specified.     Treatment     Mark received the treatments listed below:      therapeutic exercises to develop strength, endurance, and flexibility for 49 minutes including:    NuStep for joint mobility x 7 min  Shuttle 6 bands 3 minutes  Standing heel raises 3 x 10 reps  Standing hip abduction 3 x 10 reps B  Standing hip extension 3 x 10 reps B  Sit to stand 9 reps in 30 secs  Sitting knee flexion with G TB 1 x 20 reps B  Sitting knee extension with 2# 1 x 20 reps B    deferred  Quad set w/heel prop 2x10  Quad set w/SLR 3x10--cues to initiate ex w/quad set  Bridges w/ball squeeze 3x10              DEFERRED:  Side lying clams RTB hold 3"  3x10 B  Supine piriformis stretch x5 B  Seated LAQ hold 3"  2x15  Seated tail gator 3#  3 min  Hamstring Stretch; 3x20" each  Iliotibial Band Stretch; 3x20" each  DKTC; 2x10  Gastroc stretch standing; 3x20"     manual therapy techniques: Joint mobilizations, Myofacial release, and Soft tissue Mobilization were applied to the: right " knee for 00 minutes, including:--            Patient Education and Home Exercises     Home Exercises Provided and Patient Education Provided     Education provided:   - cont HEP    Written Home Exercises Provided: Patient instructed to cont prior HEP. Exercises were reviewed and Mark was able to demonstrate them prior to the end of the session.  Mark demonstrated good  understanding of the education provided. See EMR under Patient Instructions for exercises provided during therapy sessions    ASSESSMENT     Mark presented to therapy today with mild complaints of right knee pain. Patient progressed with exercises and resistance today with no complaints. Patient will benefit from continued   strengthening activities for his hips and knee for improved strength and stability.     Mark Is progressing well towards his goals.   Pt prognosis is Good.     Pt will continue to benefit from skilled outpatient physical therapy to address the deficits listed in the problem list box on initial evaluation, provide pt/family education and to maximize pt's level of independence in the home and community environment.     Pt's spiritual, cultural and educational needs considered and pt agreeable to plan of care and goals.     Anticipated barriers to physical therapy: none    Goals:   Short Term Goals:    1. Pt will be 50% independent with HEP.     Long Term Goals: 4 weeks   1. Pt will be 100% independent with HEP.  2. Pt will have 5/5 strength in B hips and knees.  3. Pt will get in/out of the car without difficulty.  4. Pt will be able to perform heavy duties around his house without difficulty.    PLAN     Plan of care Certification: 12/6/2022 to 2/14/23     Outpatient Physical Therapy 2 times weekly for 10 weeks to include the following interventions: Electrical Stimulation IFC, NMES, Gait Training, Manual Therapy, Moist Heat/ Ice, Neuromuscular Re-ed, Patient Education, Self Care, Therapeutic Activities, and Therapeutic  Exercise.    Christal Whipple, PTA

## 2023-01-10 ENCOUNTER — CLINICAL SUPPORT (OUTPATIENT)
Dept: REHABILITATION | Facility: HOSPITAL | Age: 83
End: 2023-01-10
Payer: MEDICARE

## 2023-01-10 DIAGNOSIS — M25.661 DECREASED RANGE OF MOTION OF RIGHT KNEE: ICD-10-CM

## 2023-01-10 DIAGNOSIS — R26.9 GAIT DIFFICULTY: ICD-10-CM

## 2023-01-10 DIAGNOSIS — M25.561 ACUTE PAIN OF RIGHT KNEE: Primary | ICD-10-CM

## 2023-01-10 PROCEDURE — 97110 THERAPEUTIC EXERCISES: CPT | Mod: HCNC,PN

## 2023-01-10 NOTE — PLAN OF CARE
[facility]   Physical Therapist Treatment Note and Discharge Note    Name: Mark Dickerson Cecille  Clinic Number: [id number]    Therapy Diagnosis:   Encounter Diagnoses   Name Primary?    Acute pain of right knee Yes    Decreased range of motion of right knee     Gait difficulty      Physician: Lance Contreras MD    Visit Date: 1/10/2023     Physician Orders: PT Eval and Treat   Medical Diagnosis from Referral: M17.0 (ICD-10-CM) - Bilateral primary osteoarthritis of knee  Evaluation Date: 12/6/2022  Authorization Period Expiration: 12/31/22  Plan of Care Expiration: 2/14/23  Visit # / Visits authorized: 3/12 (6 visits in total)    FOTO: 2/3 (37% limitation)    Time In:10:45 am  Time Out: 11:30  Total Billable Time: 45 minutes    SUBJECTIVE     Pt reports: movement still has pain but he is planning to schedule surgery    He was compliant with home exercise program.  Response to previous treatment: no issues  Functional change: n/a at this time    Pain: 4-5/ 10  Location: right knee      OBJECTIVE   Knee AROM:                                 at eval        (R)       (L) Today R L                                      Flexion                         115      115  115 115                                      Extension                    2          5  4 0                                                      Hip AROM:                 Flexion                         90        80  110 110                                      ER                               35        35  40 40                                      IR                                 5          5  10 10                                      ABD                             20        20  15 15                                      Hamstring                    62        55  65 60      Ankle AROM:             Ankle DF(terrance)            10        10  12 10                                          Strength:                                                         R          L  R L  Hip flexors L2                          4-/5      5/5  4+ 5  Quadriceps L3                        4-/5      5/5        5 5                          Hamstrings S1                       4/5       5/5  4+ 5                          Dorsiflexion L4                        5/5       5/5                  5 5                           Plantar flexion S1                    2-/5      2/5 when using wall for balance/support; 2+2+                          Gluteus Medius L45S1           2+/5     4-/5  4 4+                          Gluteus Rodrigo L5S12        3+/5     4-/5  4 4                           sit to stand test in 30 sec 9 reps  Objective Measures updated at progress report unless specified.     Treatment     Mark received the treatments listed below:      therapeutic exercises to develop strength, endurance, and flexibility for 40 minutes including:    NuStep for joint mobility x 6 min  Standing heel raises 3 x 15 reps  Sit to stand from 24 inch box 1x 10 then 20 inch box 1x 10  Sit to stand 9 reps in 30 secs  Seated LAQ 4# 2x 10 3-5 sed hold per leg  Standing marching 4# 3x 10 with mild lateral support of table top for balance at times  Standing hip abduction 4# 2x 10 per leg  Single knee to chest 1x 10 per leg        atient Education and Home Exercises     Home Exercises Provided and Patient Education Provided     Education provided:   - cont HEP    Written Home Exercises Provided: Patient instructed to cont prior HEP. Exercises were reviewed and Mark was able to demonstrate them prior to the end of the session.  Mark demonstrated good  understanding of the education provided. See EMR under Patient Instructions for exercises provided during therapy sessions    ASSESSMENT     Mark presented to therapy today with mild complaints of right knee pain that he reports feels mildly better with therex but ROM did not change a lot in the knees but the hips. Pt declined working on  prone limiting our ability to stretch the quad and hip flexors more. Pt states he is returning to the Dr for surgery planning so he requested D/C.  Mark Is progressing well towards his goals.   Pt prognosis is Good.     Pt's spiritual, cultural and educational needs considered and pt agreeable to plan of care and goals.     Anticipated barriers to physical therapy: none    Goals:   Short Term Goals:    1. Pt will be 50% independent with HEP. met     Long Term Goals: 4 weeks   1. Pt will be 100% independent with HEP. Met so upgraded D/C HEP today  2. Pt will have 5/5 strength in B hips and knees. Improved but not fully met  3. Pt will get in/out of the car without difficulty. met  4. Pt will be able to perform heavy duties around his house without difficulty. Met by his reports  37% limitation on FOTO today    PLAN     Plan of care Certification: 12/6/2022 to 2/14/23  D/C as pt returning for surgery.     Cayla Fontenot, PT

## 2023-01-10 NOTE — PROGRESS NOTES
[facility]   Physical Therapist Treatment Note and Discharge Note    Name: Mark Dickerson Cecille  Clinic Number: [id number]    Therapy Diagnosis:   Encounter Diagnoses   Name Primary?    Acute pain of right knee Yes    Decreased range of motion of right knee     Gait difficulty      Physician: Lance Contreras MD    Visit Date: 1/10/2023     Physician Orders: PT Eval and Treat   Medical Diagnosis from Referral: M17.0 (ICD-10-CM) - Bilateral primary osteoarthritis of knee  Evaluation Date: 12/6/2022  Authorization Period Expiration: 12/31/22  Plan of Care Expiration: 2/14/23  Visit # / Visits authorized: 3/12 (6 visits in total)    FOTO: 2/3 (37% limitation)    Time In:10:45 am  Time Out: 11:30  Total Billable Time: 45 minutes    SUBJECTIVE     Pt reports: movement still has pain but he is planning to schedule surgery    He was compliant with home exercise program.  Response to previous treatment: no issues  Functional change: n/a at this time    Pain: 4-5/ 10  Location: right knee      OBJECTIVE   Knee AROM:                                 at eval        (R)       (L) Today R L                                      Flexion                         115      115  115 115                                      Extension                    2          5  4 0                                                      Hip AROM:                 Flexion                         90        80  110 110                                      ER                               35        35  40 40                                      IR                                 5          5  10 10                                      ABD                             20        20  15 15                                      Hamstring                    62        55  65 60      Ankle AROM:             Ankle DF(terrance)            10        10  12 10                                          Strength:                                                         R          L  R L  Hip flexors L2                          4-/5      5/5  4+ 5  Quadriceps L3                        4-/5      5/5        5 5                          Hamstrings S1                       4/5       5/5  4+ 5                          Dorsiflexion L4                        5/5       5/5                  5 5                           Plantar flexion S1                    2-/5      2/5 when using wall for balance/support; 2+2+                          Gluteus Medius L45S1           2+/5     4-/5  4 4+                          Gluteus Rodrigo L5S12        3+/5     4-/5  4 4                           sit to stand test in 30 sec 9 reps  Objective Measures updated at progress report unless specified.     Treatment     Mark received the treatments listed below:      therapeutic exercises to develop strength, endurance, and flexibility for 40 minutes including:    NuStep for joint mobility x 6 min  Standing heel raises 3 x 15 reps  Sit to stand from 24 inch box 1x 10 then 20 inch box 1x 10  Sit to stand 9 reps in 30 secs  Seated LAQ 4# 2x 10 3-5 sed hold per leg  Standing marching 4# 3x 10 with mild lateral support of table top for balance at times  Standing hip abduction 4# 2x 10 per leg  Single knee to chest 1x 10 per leg        atient Education and Home Exercises     Home Exercises Provided and Patient Education Provided     Education provided:   - cont HEP    Written Home Exercises Provided: Patient instructed to cont prior HEP. Exercises were reviewed and Mark was able to demonstrate them prior to the end of the session.  Mark demonstrated good  understanding of the education provided. See EMR under Patient Instructions for exercises provided during therapy sessions    ASSESSMENT     Mark presented to therapy today with mild complaints of right knee pain that he reports feels mildly better with therex but ROM did not change a lot in the knees but the hips. Pt declined working on  prone limiting our ability to stretch the quad and hip flexors more. Pt states he is returning to the Dr for surgery planning so he requested D/C.  Mark Is progressing well towards his goals.   Pt prognosis is Good.     Pt's spiritual, cultural and educational needs considered and pt agreeable to plan of care and goals.     Anticipated barriers to physical therapy: none    Goals:   Short Term Goals:    1. Pt will be 50% independent with HEP. met     Long Term Goals: 4 weeks   1. Pt will be 100% independent with HEP. Met so upgraded D/C HEP today  2. Pt will have 5/5 strength in B hips and knees. Improved but not fully met  3. Pt will get in/out of the car without difficulty. met  4. Pt will be able to perform heavy duties around his house without difficulty. Met by his reports  37% limitation on FOTO today    PLAN     Plan of care Certification: 12/6/2022 to 2/14/23  D/C as pt returning for surgery.     Cayla Fontenot, PT

## 2023-01-18 ENCOUNTER — OFFICE VISIT (OUTPATIENT)
Dept: SPORTS MEDICINE | Facility: CLINIC | Age: 83
End: 2023-01-18
Payer: MEDICARE

## 2023-01-18 VITALS — HEIGHT: 70 IN | WEIGHT: 244.5 LBS | BODY MASS INDEX: 35 KG/M2

## 2023-01-18 DIAGNOSIS — I15.2 HYPERTENSION ASSOCIATED WITH DIABETES: ICD-10-CM

## 2023-01-18 DIAGNOSIS — M17.0 PRIMARY OSTEOARTHRITIS OF KNEES, BILATERAL: Primary | ICD-10-CM

## 2023-01-18 DIAGNOSIS — E11.59 HYPERTENSION ASSOCIATED WITH DIABETES: ICD-10-CM

## 2023-01-18 PROCEDURE — 3288F PR FALLS RISK ASSESSMENT DOCUMENTED: ICD-10-PCS | Mod: HCNC,CPTII,S$GLB, | Performed by: FAMILY MEDICINE

## 2023-01-18 PROCEDURE — 3072F PR LOW RISK FOR RETINOPATHY: ICD-10-PCS | Mod: HCNC,CPTII,S$GLB, | Performed by: FAMILY MEDICINE

## 2023-01-18 PROCEDURE — 99214 OFFICE O/P EST MOD 30 MIN: CPT | Mod: HCNC,S$GLB,, | Performed by: FAMILY MEDICINE

## 2023-01-18 PROCEDURE — 99999 PR PBB SHADOW E&M-EST. PATIENT-LVL III: ICD-10-PCS | Mod: PBBFAC,HCNC,, | Performed by: FAMILY MEDICINE

## 2023-01-18 PROCEDURE — 99999 PR PBB SHADOW E&M-EST. PATIENT-LVL III: CPT | Mod: PBBFAC,HCNC,, | Performed by: FAMILY MEDICINE

## 2023-01-18 PROCEDURE — 1101F PT FALLS ASSESS-DOCD LE1/YR: CPT | Mod: HCNC,CPTII,S$GLB, | Performed by: FAMILY MEDICINE

## 2023-01-18 PROCEDURE — 3288F FALL RISK ASSESSMENT DOCD: CPT | Mod: HCNC,CPTII,S$GLB, | Performed by: FAMILY MEDICINE

## 2023-01-18 PROCEDURE — 1159F MED LIST DOCD IN RCRD: CPT | Mod: HCNC,CPTII,S$GLB, | Performed by: FAMILY MEDICINE

## 2023-01-18 PROCEDURE — 3072F LOW RISK FOR RETINOPATHY: CPT | Mod: HCNC,CPTII,S$GLB, | Performed by: FAMILY MEDICINE

## 2023-01-18 PROCEDURE — 1159F PR MEDICATION LIST DOCUMENTED IN MEDICAL RECORD: ICD-10-PCS | Mod: HCNC,CPTII,S$GLB, | Performed by: FAMILY MEDICINE

## 2023-01-18 PROCEDURE — 1125F AMNT PAIN NOTED PAIN PRSNT: CPT | Mod: HCNC,CPTII,S$GLB, | Performed by: FAMILY MEDICINE

## 2023-01-18 PROCEDURE — 1125F PR PAIN SEVERITY QUANTIFIED, PAIN PRESENT: ICD-10-PCS | Mod: HCNC,CPTII,S$GLB, | Performed by: FAMILY MEDICINE

## 2023-01-18 PROCEDURE — 99214 PR OFFICE/OUTPT VISIT, EST, LEVL IV, 30-39 MIN: ICD-10-PCS | Mod: HCNC,S$GLB,, | Performed by: FAMILY MEDICINE

## 2023-01-18 PROCEDURE — 1101F PR PT FALLS ASSESS DOC 0-1 FALLS W/OUT INJ PAST YR: ICD-10-PCS | Mod: HCNC,CPTII,S$GLB, | Performed by: FAMILY MEDICINE

## 2023-01-18 NOTE — PROGRESS NOTES
Subjective:     Patient ID: Mark Dickerson Sr. is a 82 y.o. male.    Chief Complaint: Pain of the Left Knee and Pain of the Right Knee      HPI: Patient is being seen for bilateral knee follow up since last office visit on 12/1/22. Says his knees are doing much better since then. Rating pain 6/10 at today's office visit. Takes Advil once at night to help with pain relief. Participated and completed physical therapy twice a week at the [facility] location. Is doing at home exercises.    Very physically active but cannot walk 4 miles a day anymore and only about 1/2 mi per day and would like to be able to walk a few miles an to walk more in the woods for hunting purposes and will see Dr. Han to consider knee joint replacement in February.  Otherwise he has got some benefit from [facility] in would continue on the 6 month protocol.    He takes a cherry pill to decrease body and joint inflammation and interested in starting turmeric.    Past Medical History:   Diagnosis Date    CKD stage 3 due to type 2 diabetes mellitus 2/18/2021    DM (diabetes mellitus) 2014    BS didn't check 12/11/2019    DM (diabetes mellitus) 2014    BS didn't check 05/05/2022    Foreign body, eye     right eye    Hyperlipidemia     Hypertension     Need for shingles vaccine 11/20/2019    Pneumonia     Primary osteoarthritis of right knee 10/29/2021    Severe obesity (BMI 35.0-35.9 with comorbidity) 7/10/2019    Type 2 diabetes mellitus 2014    BS didn't check 12/12/2018     Past Surgical History:   Procedure Laterality Date    APPENDECTOMY      CATARACT EXTRACTION Bilateral     CPG     Family History   Problem Relation Age of Onset    Heart disease Mother     Hypertension Father     Heart disease Father     Hyperlipidemia Father     Cataracts Father     Hypertension Sister     Hypertension Brother     Cancer Son         colon    Allergic rhinitis Daughter      Social History     Socioeconomic History    Marital status:    Tobacco Use     Smoking status: Former     Packs/day: 3.00     Types: Cigarettes     Start date:      Quit date:      Years since quittin.0    Smokeless tobacco: Never   Substance and Sexual Activity    Alcohol use: No    Drug use: No    Sexual activity: Never     Social Determinants of Health     Financial Resource Strain: Low Risk     Difficulty of Paying Living Expenses: Not hard at all   Food Insecurity: No Food Insecurity    Worried About Running Out of Food in the Last Year: Never true    Ran Out of Food in the Last Year: Never true   Transportation Needs: No Transportation Needs    Lack of Transportation (Medical): No    Lack of Transportation (Non-Medical): No   Physical Activity: Inactive    Days of Exercise per Week: 0 days    Minutes of Exercise per Session: 0 min   Stress: No Stress Concern Present    Feeling of Stress : Only a little   Social Connections: Moderately Integrated    Frequency of Communication with Friends and Family: More than three times a week    Frequency of Social Gatherings with Friends and Family: More than three times a week    Attends L[employer] Services: More than 4 times per year    Active Member of Clubs or Organizations: Yes    Attends Club or Organization Meetings: More than 4 times per year    Marital Status:    Housing Stability: Low Risk     Unable to Pay for Housing in the Last Year: No    Number of Places Lived in the Last Year: 1    Unstable Housing in the Last Year: No       Current Outpatient Medications:     glipiZIDE (GLUCOTROL) 5 MG tablet, Take 1 tablet (5 mg total) by mouth 2 (two) times daily with meals., Disp: 180 tablet, Rfl: 2    hydroCHLOROthiazide (HYDRODIURIL) 25 MG tablet, Take 1 tablet (25 mg total) by mouth once daily., Disp: 90 tablet, Rfl: 2    levocetirizine (XYZAL) 5 MG tablet, Take 1 tablet (5 mg total) by mouth every evening., Disp: 30 tablet, Rfl: 11    multivitamin (THERAGRAN) per tablet, Take 1 tablet by mouth once daily., Disp: , Rfl:    Review of patient's allergies indicates:  No Known Allergies  Review of Systems   Constitutional:  Negative for chills, fever and weight loss.   Respiratory:  Negative for shortness of breath.    Cardiovascular:  Negative for chest pain and palpitations.     Objective:   Body mass index is 35.08 kg/m².  There were no vitals filed for this visit.        Ortho/SPM Exam-alert and oriented well-nourished well-developed ambulatory adult male no acute distress     Respiratory effort is normal     Bilateral knees-chronic bony changes noted but no acute deformity or swelling  Good range of motion  Strength 4/5   Joints stable with negative Lachman's  Nontender to palpation   Neurovascular intact         Psychiatric good affect and cognition     Plan continue home exercise program including daily walking.  Continue any oxygen supplements as desired and Advil when needed.      Follow-up with joint replacement surgeon for discussion in February and then see us to discuss later.    There are no Patient Instructions on file for this visit.    IMAGING: X-ray Knee Ortho Bilateral with Flexion  Narrative: EXAMINATION:  XR KNEE ORTHO BILAT WITH FLEXION    CLINICAL HISTORY:  Pain in unspecified knee    TECHNIQUE:  AP standing of both knees, PA flexion standing views of both knees, and Merchant views of both knees were performed.  Lateral views of both knees were also performed.    COMPARISON:  Radiographs from 10/26/2021    FINDINGS:  When compared to 10/26/2021, there is been no significant interval change.  Bilateral tricompartmental degenerative changes are seen most severe in the medial compartments, right greater than left with prominent joint space narrowing and adjacent marginal spurring.  No acute fracture or dislocation.  No significant effusion.  Atherosclerosis is noted.  Soft tissues appear normal.  Impression: See above    Electronically signed by: Son Contreras MD  Date:    12/01/2022  Time:    11:06       Radiographs  / Imaging : Relevant imaging results reviewed by me and interpreted by me, discussed with the patient and / or family -agree with x-ray report on knees      Assessment:     Encounter Diagnoses   Name Primary?    Primary osteoarthritis of knees, bilateral Yes    Hypertension associated with diabetes         Plan:   Primary osteoarthritis of knees, bilateral    Hypertension associated with diabetes        The patient verbalized good understanding of the medical issues discussed today and expressed appreciation for the care provided.  Patient was given the opportunity to ask questions and be an active participant in their medical care. Patient had no further questions or concerns at this time.     The patient was encouraged to participate in appropriate physical activity.      Lance Contreras M.D.  [facility]        This note was dictated using voice recognition software and may have sound a like errors.

## 2023-02-07 DIAGNOSIS — Z00.00 ENCOUNTER FOR MEDICARE ANNUAL WELLNESS EXAM: ICD-10-CM

## 2023-02-09 DIAGNOSIS — Z00.00 ENCOUNTER FOR MEDICARE ANNUAL WELLNESS EXAM: ICD-10-CM

## 2023-02-20 ENCOUNTER — OFFICE VISIT (OUTPATIENT)
Dept: ORTHOPEDICS | Facility: CLINIC | Age: 83
End: 2023-02-20
Payer: MEDICARE

## 2023-02-20 VITALS — HEIGHT: 70 IN | WEIGHT: 245.13 LBS | BODY MASS INDEX: 35.09 KG/M2

## 2023-02-20 DIAGNOSIS — M21.162 ACQUIRED VARUS DEFORMITY KNEE, LEFT: ICD-10-CM

## 2023-02-20 DIAGNOSIS — M17.11 ARTHRITIS OF KNEE, RIGHT: Primary | ICD-10-CM

## 2023-02-20 DIAGNOSIS — M17.12 ARTHRITIS OF KNEE, LEFT: ICD-10-CM

## 2023-02-20 DIAGNOSIS — M21.161 ACQUIRED VARUS DEFORMITY KNEE, RIGHT: ICD-10-CM

## 2023-02-20 DIAGNOSIS — M17.0 BILATERAL PRIMARY OSTEOARTHRITIS OF KNEE: Primary | ICD-10-CM

## 2023-02-20 PROCEDURE — 1160F PR REVIEW ALL MEDS BY PRESCRIBER/CLIN PHARMACIST DOCUMENTED: ICD-10-PCS | Mod: HCNC,CPTII,S$GLB, | Performed by: ORTHOPAEDIC SURGERY

## 2023-02-20 PROCEDURE — 1159F PR MEDICATION LIST DOCUMENTED IN MEDICAL RECORD: ICD-10-PCS | Mod: HCNC,CPTII,S$GLB, | Performed by: ORTHOPAEDIC SURGERY

## 2023-02-20 PROCEDURE — 1101F PT FALLS ASSESS-DOCD LE1/YR: CPT | Mod: HCNC,CPTII,S$GLB, | Performed by: ORTHOPAEDIC SURGERY

## 2023-02-20 PROCEDURE — 1159F MED LIST DOCD IN RCRD: CPT | Mod: HCNC,CPTII,S$GLB, | Performed by: ORTHOPAEDIC SURGERY

## 2023-02-20 PROCEDURE — 99204 PR OFFICE/OUTPT VISIT, NEW, LEVL IV, 45-59 MIN: ICD-10-PCS | Mod: HCNC,S$GLB,, | Performed by: ORTHOPAEDIC SURGERY

## 2023-02-20 PROCEDURE — 1126F PR PAIN SEVERITY QUANTIFIED, NO PAIN PRESENT: ICD-10-PCS | Mod: HCNC,CPTII,S$GLB, | Performed by: ORTHOPAEDIC SURGERY

## 2023-02-20 PROCEDURE — 3072F LOW RISK FOR RETINOPATHY: CPT | Mod: HCNC,CPTII,S$GLB, | Performed by: ORTHOPAEDIC SURGERY

## 2023-02-20 PROCEDURE — 99999 PR PBB SHADOW E&M-EST. PATIENT-LVL III: ICD-10-PCS | Mod: PBBFAC,HCNC,, | Performed by: ORTHOPAEDIC SURGERY

## 2023-02-20 PROCEDURE — 3072F PR LOW RISK FOR RETINOPATHY: ICD-10-PCS | Mod: HCNC,CPTII,S$GLB, | Performed by: ORTHOPAEDIC SURGERY

## 2023-02-20 PROCEDURE — 3288F PR FALLS RISK ASSESSMENT DOCUMENTED: ICD-10-PCS | Mod: HCNC,CPTII,S$GLB, | Performed by: ORTHOPAEDIC SURGERY

## 2023-02-20 PROCEDURE — 1160F RVW MEDS BY RX/DR IN RCRD: CPT | Mod: HCNC,CPTII,S$GLB, | Performed by: ORTHOPAEDIC SURGERY

## 2023-02-20 PROCEDURE — 3288F FALL RISK ASSESSMENT DOCD: CPT | Mod: HCNC,CPTII,S$GLB, | Performed by: ORTHOPAEDIC SURGERY

## 2023-02-20 PROCEDURE — 99204 OFFICE O/P NEW MOD 45 MIN: CPT | Mod: HCNC,S$GLB,, | Performed by: ORTHOPAEDIC SURGERY

## 2023-02-20 PROCEDURE — 1126F AMNT PAIN NOTED NONE PRSNT: CPT | Mod: HCNC,CPTII,S$GLB, | Performed by: ORTHOPAEDIC SURGERY

## 2023-02-20 PROCEDURE — 1101F PR PT FALLS ASSESS DOC 0-1 FALLS W/OUT INJ PAST YR: ICD-10-PCS | Mod: HCNC,CPTII,S$GLB, | Performed by: ORTHOPAEDIC SURGERY

## 2023-02-20 PROCEDURE — 99999 PR PBB SHADOW E&M-EST. PATIENT-LVL III: CPT | Mod: PBBFAC,HCNC,, | Performed by: ORTHOPAEDIC SURGERY

## 2023-02-20 NOTE — PROGRESS NOTES
Subjective:     Patient ID: Mark Dickerson Sr. is a 83 y.o. male.    Chief Complaint: Pain of the Right Knee  Bilateral knee pain with the right worse than the left     HPI:  02/20/2023  Patient diagnosed with arthritis of both of his knees.  He received Orthovisc in 2021 December and he is doing well with it.  He stated physical therapy made a big difference.  Now he can get up from a sitting position without using his hands.  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  He feels that his pain right now is 0/10 that he does not need to have any injections.  But he knows that he should get a repeated prior to having again.  He wanted to understand what total knee replacement is all about and we showed him on the x-ray how it looks like given brochure we went over things with him.  I did tell him total knee replacement is when everything fails and you are miserable and can not live with what she got is went total knee replacement as needed.  He expressed understanding he feels that he is not there yet.  He occasionally takes an Advil and I did tell him to be careful because he is diabetic and he has stage III kidney.  He started taking natural products like turmeric and it seems to help a little bit.  We went over chondroitin and glucosamine also with him that is something that people take for arthritis.    Denies any pain in the hips or in the pelvis.  Denies any fever or chills or shortness of breath or difficulty with chewing or swallowing loss of bowel bladder control  Past Medical History:   Diagnosis Date    CKD stage 3 due to type 2 diabetes mellitus 2/18/2021    DM (diabetes mellitus) 2014    BS didn't check 12/11/2019    DM (diabetes mellitus) 2014    BS didn't check 05/05/2022    Foreign body, eye     right eye    Hyperlipidemia     Hypertension     Need for shingles vaccine 11/20/2019    Pneumonia     Primary osteoarthritis of right knee 10/29/2021    Severe obesity (BMI 35.0-35.9 with  comorbidity) 7/10/2019    Type 2 diabetes mellitus     BS didn't check 2018     Past Surgical History:   Procedure Laterality Date    APPENDECTOMY      CATARACT EXTRACTION Bilateral     CPG     Family History   Problem Relation Age of Onset    Heart disease Mother     Hypertension Father     Heart disease Father     Hyperlipidemia Father     Cataracts Father     Hypertension Sister     Hypertension Brother     Cancer Son         colon    Allergic rhinitis Daughter      Social History     Socioeconomic History    Marital status:    Tobacco Use    Smoking status: Former     Packs/day: 3.00     Types: Cigarettes     Start date:      Quit date:      Years since quittin.1    Smokeless tobacco: Never   Substance and Sexual Activity    Alcohol use: No    Drug use: No    Sexual activity: Never     Social Determinants of Health     Financial Resource Strain: Low Risk     Difficulty of Paying Living Expenses: Not hard at all   Food Insecurity: No Food Insecurity    Worried About Running Out of Food in the Last Year: Never true    Ran Out of Food in the Last Year: Never true   Transportation Needs: No Transportation Needs    Lack of Transportation (Medical): No    Lack of Transportation (Non-Medical): No   Physical Activity: Inactive    Days of Exercise per Week: 0 days    Minutes of Exercise per Session: 0 min   Stress: No Stress Concern Present    Feeling of Stress : Only a little   Social Connections: Moderately Integrated    Frequency of Communication with Friends and Family: More than three times a week    Frequency of Social Gatherings with Friends and Family: More than three times a week    Attends [employer] Services: More than 4 times per year    Active Member of Clubs or Organizations: Yes    Attends Club or Organization Meetings: More than 4 times per year    Marital Status:    Housing Stability: Low Risk     Unable to Pay for Housing in the Last Year: No    Number of Places Lived  in the Last Year: 1    Unstable Housing in the Last Year: No     Medication List with Changes/Refills   Current Medications    GLIPIZIDE (GLUCOTROL) 5 MG TABLET    Take 1 tablet (5 mg total) by mouth 2 (two) times daily with meals.    HYDROCHLOROTHIAZIDE (HYDRODIURIL) 25 MG TABLET    Take 1 tablet (25 mg total) by mouth once daily.    LEVOCETIRIZINE (XYZAL) 5 MG TABLET    Take 1 tablet (5 mg total) by mouth every evening.    MULTIVITAMIN (THERAGRAN) PER TABLET    Take 1 tablet by mouth once daily.     Review of patient's allergies indicates:  No Known Allergies  Review of Systems   Constitutional: Negative for decreased appetite.   HENT:  Negative for tinnitus.    Eyes:  Negative for double vision.   Cardiovascular:  Negative for chest pain.   Respiratory:  Negative for wheezing.    Hematologic/Lymphatic: Negative for bleeding problem.   Skin:  Negative for dry skin.   Musculoskeletal:  Positive for arthritis, joint pain and stiffness. Negative for back pain, gout and neck pain.   Gastrointestinal:  Negative for abdominal pain.   Genitourinary:  Negative for bladder incontinence.   Neurological:  Negative for numbness, paresthesias and sensory change.   Psychiatric/Behavioral:  Negative for altered mental status.      Objective:   Body mass index is 35.18 kg/m².  There were no vitals filed for this visit.       General    Constitutional: He is oriented to person, place, and time. He appears well-developed.   HENT:   Head: Atraumatic.   Eyes: EOM are normal.   Pulmonary/Chest: Effort normal.   Neurological: He is alert and oriented to person, place, and time.   Psychiatric: Judgment normal.           Ambulating without any assistive devices  Pelvis is level  Hips right hip slight decrease in internal rotation compared to the left side but otherwise no pain in the groin.    Hip flexors and abductors and adductors and quads and hamstrings are 5/5   Right knee with medial joint tenderness mild crepitus to compression on  the patella.  Active motion 2-120 degrees.  The no defect in the patella or quadriceps tendon.  There is excellent strength.  And very mild swelling   Left knee with also medial joint tenderness and mild crepitus to compression on the patella.  Active motion is 0-120 degrees no defect in the patella or quadriceps tendon.  She has very mild swelling.  Ankle motion intact   Calves are soft      Relevant imaging results reviewed and interpreted by me, discussed with the patient and / or family today   X-ray 12/01/2022 bilateral knees showing complete loss of joint space on the inside of the knee with marginal osteophytic changes and sclerosis consistent with bilateral knee severe arthritis with varus deformity  Assessment:     Encounter Diagnoses   Name Primary?    Arthritis of knee, right Yes    Acquired varus deformity knee, right     Arthritis of knee, left     Acquired varus deformity knee, left         Plan:   Arthritis of knee, right    Acquired varus deformity knee, right    Arthritis of knee, left    Acquired varus deformity knee, left         Patient Instructions   X-ray show you have severe arthritis in both of her knees  The Orthovisc/gel injection seems to work well last time given in 2021   You went to physical therapy you did really well and now your pain is 0/10  You want to avoid total knee replacement and which we are agreeable about it because it is only needed when it is absolutely nothing working and you are miserable  Total knee replacement is considered by the government now as outpatient surgery it will take roughly 2 hours to perform and you go home the same day.  Will take roughly 3 months to heal from it.  And if you do well you can come back 3-6 months afterwards to have the other side done.  We usually send people home the same day and will send therapist to her house to work with you for 2 weeks and then you go for outpatient physical therapy after that.  You will have a visiting nurse that  will check on you and change her dressings   Total knee basically we capped the bone with metal and there is plastic insert.  I showed you an x-ray on that   I will give you a brochure about total knee replacement which is good reading for later.  Also if your into computers you can go on you tube and search total knee replacement    I will schedule you in April to receive bilateral knee Orthovisc injections since that brand is what used 1st and help you   Physical therapy helped her quite a bit your able to get up from sitting position without using her arms and hands to push up.  The secret is to continue exercising so you do not get weak again        Disclaimer: This note was prepared using a voice recognition system and is likely to have sound alike errors within the text.

## 2023-02-20 NOTE — PATIENT INSTRUCTIONS
X-ray show you have severe arthritis in both of her knees  The Orthovisc/gel injection seems to work well last time given in 2021   You went to physical therapy you did really well and now your pain is 0/10  You want to avoid total knee replacement and which we are agreeable about it because it is only needed when it is absolutely nothing working and you are miserable  Total knee replacement is considered by the government now as outpatient surgery it will take roughly 2 hours to perform and you go home the same day.  Will take roughly 3 months to heal from it.  And if you do well you can come back 3-6 months afterwards to have the other side done.  We usually send people home the same day and will send therapist to her house to work with you for 2 weeks and then you go for outpatient physical therapy after that.  You will have a visiting nurse that will check on you and change her dressings   Total knee basically we capped the bone with metal and there is plastic insert.  I showed you an x-ray on that   I will give you a brochure about total knee replacement which is good reading for later.  Also if your into computers you can go on you tube and search total knee replacement    I will schedule you in April to receive bilateral knee Orthovisc injections since that brand is what used 1st and help you   Physical therapy helped her quite a bit your able to get up from sitting position without using her arms and hands to push up.  The secret is to continue exercising so you do not get weak again

## 2023-04-13 ENCOUNTER — OFFICE VISIT (OUTPATIENT)
Dept: ORTHOPEDICS | Facility: CLINIC | Age: 83
End: 2023-04-13
Payer: MEDICARE

## 2023-04-13 VITALS — WEIGHT: 245.13 LBS | BODY MASS INDEX: 35.09 KG/M2 | HEIGHT: 70 IN

## 2023-04-13 DIAGNOSIS — M17.12 ARTHRITIS OF KNEE, LEFT: ICD-10-CM

## 2023-04-13 DIAGNOSIS — M21.162 ACQUIRED VARUS DEFORMITY KNEE, LEFT: ICD-10-CM

## 2023-04-13 DIAGNOSIS — M21.161 ACQUIRED VARUS DEFORMITY KNEE, RIGHT: ICD-10-CM

## 2023-04-13 DIAGNOSIS — M17.11 ARTHRITIS OF KNEE, RIGHT: Primary | ICD-10-CM

## 2023-04-13 PROCEDURE — 1159F MED LIST DOCD IN RCRD: CPT | Mod: HCNC,CPTII,S$GLB, | Performed by: ORTHOPAEDIC SURGERY

## 2023-04-13 PROCEDURE — 99213 PR OFFICE/OUTPT VISIT, EST, LEVL III, 20-29 MIN: ICD-10-PCS | Mod: 25,HCNC,S$GLB, | Performed by: ORTHOPAEDIC SURGERY

## 2023-04-13 PROCEDURE — 1101F PT FALLS ASSESS-DOCD LE1/YR: CPT | Mod: HCNC,CPTII,S$GLB, | Performed by: ORTHOPAEDIC SURGERY

## 2023-04-13 PROCEDURE — 1101F PR PT FALLS ASSESS DOC 0-1 FALLS W/OUT INJ PAST YR: ICD-10-PCS | Mod: HCNC,CPTII,S$GLB, | Performed by: ORTHOPAEDIC SURGERY

## 2023-04-13 PROCEDURE — 1125F AMNT PAIN NOTED PAIN PRSNT: CPT | Mod: HCNC,CPTII,S$GLB, | Performed by: ORTHOPAEDIC SURGERY

## 2023-04-13 PROCEDURE — 20610 LARGE JOINT ASPIRATION/INJECTION: BILATERAL KNEE: ICD-10-PCS | Mod: 50,HCNC,S$GLB, | Performed by: ORTHOPAEDIC SURGERY

## 2023-04-13 PROCEDURE — 3288F FALL RISK ASSESSMENT DOCD: CPT | Mod: HCNC,CPTII,S$GLB, | Performed by: ORTHOPAEDIC SURGERY

## 2023-04-13 PROCEDURE — 1159F PR MEDICATION LIST DOCUMENTED IN MEDICAL RECORD: ICD-10-PCS | Mod: HCNC,CPTII,S$GLB, | Performed by: ORTHOPAEDIC SURGERY

## 2023-04-13 PROCEDURE — 99999 PR PBB SHADOW E&M-EST. PATIENT-LVL III: CPT | Mod: PBBFAC,HCNC,, | Performed by: ORTHOPAEDIC SURGERY

## 2023-04-13 PROCEDURE — 3072F PR LOW RISK FOR RETINOPATHY: ICD-10-PCS | Mod: HCNC,CPTII,S$GLB, | Performed by: ORTHOPAEDIC SURGERY

## 2023-04-13 PROCEDURE — 1125F PR PAIN SEVERITY QUANTIFIED, PAIN PRESENT: ICD-10-PCS | Mod: HCNC,CPTII,S$GLB, | Performed by: ORTHOPAEDIC SURGERY

## 2023-04-13 PROCEDURE — 3288F PR FALLS RISK ASSESSMENT DOCUMENTED: ICD-10-PCS | Mod: HCNC,CPTII,S$GLB, | Performed by: ORTHOPAEDIC SURGERY

## 2023-04-13 PROCEDURE — 99213 OFFICE O/P EST LOW 20 MIN: CPT | Mod: 25,HCNC,S$GLB, | Performed by: ORTHOPAEDIC SURGERY

## 2023-04-13 PROCEDURE — 20610 DRAIN/INJ JOINT/BURSA W/O US: CPT | Mod: 50,HCNC,S$GLB, | Performed by: ORTHOPAEDIC SURGERY

## 2023-04-13 PROCEDURE — 99999 PR PBB SHADOW E&M-EST. PATIENT-LVL III: ICD-10-PCS | Mod: PBBFAC,HCNC,, | Performed by: ORTHOPAEDIC SURGERY

## 2023-04-13 PROCEDURE — 3072F LOW RISK FOR RETINOPATHY: CPT | Mod: HCNC,CPTII,S$GLB, | Performed by: ORTHOPAEDIC SURGERY

## 2023-04-13 NOTE — PROCEDURES
Large Joint Aspiration/Injection: bilateral knee    Date/Time: 4/13/2023 2:00 PM  Performed by: Marco Morales MD  Authorized by: Marco Morales MD     Consent Done?:  Yes (Verbal)  Indications:  Arthritis  Site marked: the procedure site was marked    Timeout: prior to procedure the correct patient, procedure, and site was verified      Local anesthesia used?: Yes    Local anesthetic:  Lidocaine 1% without epinephrine    Details:  Needle Size:  22 G  Ultrasonic Guidance for needle placement?: No    Approach:  Anterolateral  Location:  Knee  Laterality:  Bilateral  Site:  Bilateral knee  Medications (Right):  15 mg sodium hyaluronate (orthovisc) 30 mg/2 mL  Medications (Left):  15 mg sodium hyaluronate (orthovisc) 30 mg/2 mL  Patient tolerance:  Patient tolerated the procedure well with no immediate complications

## 2023-04-13 NOTE — PROGRESS NOTES
Subjective:     Patient ID: Mark Dickerson Sr. is a 83 y.o. male.    Chief Complaint: Pain of the Right Knee and Pain of the Left Knee  Bilateral knee pain with the right worse than the left     HPI:  02/20/2023  Patient diagnosed with arthritis of both of his knees.  He received Orthovisc in 2021 December and he is doing well with it.  He stated physical therapy made a big difference.  Now he can get up from a sitting position without using his hands.  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  He feels that his pain right now is 0/10 that he does not need to have any injections.  But he knows that he should get a repeated prior to having again.  He wanted to understand what total knee replacement is all about and we showed him on the x-ray how it looks like given brochure we went over things with him.  I did tell him total knee replacement is when everything fails and you are miserable and can not live with what she got is went total knee replacement as needed.  He expressed understanding he feels that he is not there yet.  He occasionally takes an Advil and I did tell him to be careful because he is diabetic and he has stage III kidney.  He started taking natural products like turmeric and it seems to help a little bit.  We went over chondroitin and glucosamine also with him that is something that people take for arthritis.    Denies any pain in the hips or in the pelvis.  Denies any fever or chills or shortness of breath or difficulty with chewing or swallowing loss of bowel bladder control          04/13/2023   Bilateral knee severe arthritis.  He stated the physical therapy seems to have helped .  He does have pain when he ambulates it goes up to around 2/10.  When he sits down the pain is not as bad.  Occasional Advil and occasional Tylenol.  He does have stage 3 kidney disease can not take any inflammatories.  He ambulates without any assistive devices.  Last time we discussed total knee  replacement and he is happy that at this time he is not going to needed because he is not hurting so much.  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  No fever no chills no shortness of breath or difficulty with chewing swallowing loss of bowel bladder control blurry vision double vision loss sense smell or taste  Past Medical History:   Diagnosis Date    CKD stage 3 due to type 2 diabetes mellitus 2021    DM (diabetes mellitus)     BS didn't check 2019    DM (diabetes mellitus)     BS didn't check 2022    Foreign body, eye     right eye    Hyperlipidemia     Hypertension     Need for shingles vaccine 2019    Pneumonia     Primary osteoarthritis of right knee 10/29/2021    Severe obesity (BMI 35.0-35.9 with comorbidity) 7/10/2019    Type 2 diabetes mellitus     BS didn't check 2018     Past Surgical History:   Procedure Laterality Date    APPENDECTOMY      CATARACT EXTRACTION Bilateral     CPG     Family History   Problem Relation Age of Onset    Heart disease Mother     Hypertension Father     Heart disease Father     Hyperlipidemia Father     Cataracts Father     Hypertension Sister     Hypertension Brother     Cancer Son         colon    Allergic rhinitis Daughter      Social History     Socioeconomic History    Marital status:    Tobacco Use    Smoking status: Former     Packs/day: 3.00     Types: Cigarettes     Start date:      Quit date:      Years since quittin.3    Smokeless tobacco: Never   Substance and Sexual Activity    Alcohol use: No    Drug use: No    Sexual activity: Never     Social Determinants of Health     Financial Resource Strain: Low Risk     Difficulty of Paying Living Expenses: Not hard at all   Food Insecurity: No Food Insecurity    Worried About Running Out of Food in the Last Year: Never true    Ran Out of Food in the Last Year: Never true   Transportation Needs: No Transportation Needs    Lack of  Transportation (Medical): No    Lack of Transportation (Non-Medical): No   Physical Activity: Inactive    Days of Exercise per Week: 0 days    Minutes of Exercise per Session: 0 min   Stress: No Stress Concern Present    Feeling of Stress : Only a little   Social Connections: Moderately Integrated    Frequency of Communication with Friends and Family: More than three times a week    Frequency of Social Gatherings with Friends and Family: More than three times a week    Attends [employer] Services: More than 4 times per year    Active Member of Clubs or Organizations: Yes    Attends Club or Organization Meetings: More than 4 times per year    Marital Status:    Housing Stability: Low Risk     Unable to Pay for Housing in the Last Year: No    Number of Places Lived in the Last Year: 1    Unstable Housing in the Last Year: No     Medication List with Changes/Refills   Current Medications    GLIPIZIDE (GLUCOTROL) 5 MG TABLET    Take 1 tablet (5 mg total) by mouth 2 (two) times daily with meals.    HYDROCHLOROTHIAZIDE (HYDRODIURIL) 25 MG TABLET    Take 1 tablet (25 mg total) by mouth once daily.    LEVOCETIRIZINE (XYZAL) 5 MG TABLET    Take 1 tablet (5 mg total) by mouth every evening.    MULTIVITAMIN (THERAGRAN) PER TABLET    Take 1 tablet by mouth once daily.     Review of patient's allergies indicates:  No Known Allergies  Review of Systems   Constitutional: Negative for decreased appetite.   HENT:  Negative for tinnitus.    Eyes:  Negative for double vision.   Cardiovascular:  Negative for chest pain.   Respiratory:  Negative for wheezing.    Hematologic/Lymphatic: Negative for bleeding problem.   Skin:  Negative for dry skin.   Musculoskeletal:  Positive for arthritis, joint pain and stiffness. Negative for back pain, gout and neck pain.   Gastrointestinal:  Negative for abdominal pain.   Genitourinary:  Negative for bladder incontinence.   Neurological:  Negative for numbness, paresthesias and sensory change.    Psychiatric/Behavioral:  Negative for altered mental status.      Objective:   Body mass index is 35.18 kg/m².  There were no vitals filed for this visit.       General    Constitutional: He is oriented to person, place, and time. He appears well-developed.   HENT:   Head: Atraumatic.   Eyes: EOM are normal.   Pulmonary/Chest: Effort normal.   Neurological: He is alert and oriented to person, place, and time.   Psychiatric: Judgment normal.           Ambulating without any assistive devices  Pelvis is level  Hips right hip slight decrease in internal rotation compared to the left side but otherwise no pain in the groin.    Hip flexors and abductors and adductors and quads and hamstrings are 5/5   Right knee with medial joint tenderness mild crepitus to compression on the patella.  Active motion 2-120 degrees.  The no defect in the patella or quadriceps tendon.  There is excellent strength.  And very mild swelling   Left knee with also medial joint tenderness and mild crepitus to compression on the patella.  Active motion is 0-120 degrees no defect in the patella or quadriceps tendon.  She has very mild swelling.  Ankle motion intact   Calves are soft      Relevant imaging results reviewed and interpreted by me, discussed with the patient and / or family today   X-ray 12/01/2022 bilateral knees showing complete loss of joint space on the inside of the knee with marginal osteophytic changes and sclerosis consistent with bilateral knee severe arthritis with varus deformity  Assessment:     Encounter Diagnoses   Name Primary?    Arthritis of knee, right Yes    Acquired varus deformity knee, right     Arthritis of knee, left     Acquired varus deformity knee, left         Plan:   Arthritis of knee, right  -     Large Joint Aspiration/Injection: bilateral knee    Acquired varus deformity knee, right    Arthritis of knee, left  -     Large Joint Aspiration/Injection: bilateral knee    Acquired varus deformity knee,  left         Patient Instructions   Injected both knees with orthovisc #1/3  on 4/13/2023  Ice the knees if they swell up or hurt more  It is not a reaction or allergic reaction is just sometimes people do swell up the 2nd time around   I will see you in a week to finish the series of 3        Disclaimer: This note was prepared using a voice recognition system and is likely to have sound alike errors within the text.

## 2023-04-13 NOTE — PATIENT INSTRUCTIONS
Injected both knees with orthovisc #1/3  on 4/13/2023  Ice the knees if they swell up or hurt more  It is not a reaction or allergic reaction is just sometimes people do swell up the 2nd time around   I will see you in a week to finish the series of 3

## 2023-04-14 ENCOUNTER — HOSPITAL ENCOUNTER (OUTPATIENT)
Facility: HOSPITAL | Age: 83
Discharge: HOME | End: 2023-04-15
Attending: FAMILY MEDICINE | Admitting: HOSPITALIST
Payer: MEDICARE

## 2023-04-14 DIAGNOSIS — G45.9 TIA (TRANSIENT ISCHEMIC ATTACK): Primary | ICD-10-CM

## 2023-04-14 DIAGNOSIS — E11.69 HYPERLIPIDEMIA ASSOCIATED WITH TYPE 2 DIABETES MELLITUS: ICD-10-CM

## 2023-04-14 DIAGNOSIS — E11.22 CKD STAGE 3 DUE TO TYPE 2 DIABETES MELLITUS: ICD-10-CM

## 2023-04-14 DIAGNOSIS — I15.2 HYPERTENSION ASSOCIATED WITH DIABETES: ICD-10-CM

## 2023-04-14 DIAGNOSIS — E78.5 HYPERLIPIDEMIA ASSOCIATED WITH TYPE 2 DIABETES MELLITUS: ICD-10-CM

## 2023-04-14 DIAGNOSIS — R09.89 DECREASED PEDAL PULSES: ICD-10-CM

## 2023-04-14 DIAGNOSIS — M17.0 PRIMARY OSTEOARTHRITIS OF KNEES, BILATERAL: ICD-10-CM

## 2023-04-14 DIAGNOSIS — I63.9 STROKE: ICD-10-CM

## 2023-04-14 DIAGNOSIS — I63.9 CEREBROVASCULAR ACCIDENT (CVA): ICD-10-CM

## 2023-04-14 DIAGNOSIS — N18.30 CKD STAGE 3 DUE TO TYPE 2 DIABETES MELLITUS: ICD-10-CM

## 2023-04-14 DIAGNOSIS — E11.9 CONTROLLED TYPE 2 DIABETES MELLITUS WITHOUT COMPLICATION, WITHOUT LONG-TERM CURRENT USE OF INSULIN: ICD-10-CM

## 2023-04-14 DIAGNOSIS — Z78.9 STATIN INTOLERANCE: ICD-10-CM

## 2023-04-14 DIAGNOSIS — E11.59 HYPERTENSION ASSOCIATED WITH DIABETES: ICD-10-CM

## 2023-04-14 LAB
ALBUMIN SERPL BCP-MCNC: 3.6 G/DL (ref 3.5–5.2)
ALP SERPL-CCNC: 68 U/L (ref 55–135)
ALT SERPL W/O P-5'-P-CCNC: 25 U/L (ref 10–44)
ANION GAP SERPL CALC-SCNC: 14 MMOL/L (ref 8–16)
AST SERPL-CCNC: 28 U/L (ref 10–40)
BASOPHILS # BLD AUTO: 0.09 K/UL (ref 0–0.2)
BASOPHILS NFR BLD: 0.9 % (ref 0–1.9)
BILIRUB SERPL-MCNC: 0.4 MG/DL (ref 0.1–1)
BILIRUB UR QL STRIP: NEGATIVE
BUN SERPL-MCNC: 22 MG/DL (ref 8–23)
CALCIUM SERPL-MCNC: 9.7 MG/DL (ref 8.7–10.5)
CHLORIDE SERPL-SCNC: 99 MMOL/L (ref 95–110)
CLARITY UR: CLEAR
CO2 SERPL-SCNC: 25 MMOL/L (ref 23–29)
COLOR UR: YELLOW
CREAT SERPL-MCNC: 1.3 MG/DL (ref 0.5–1.4)
DIFFERENTIAL METHOD: ABNORMAL
EOSINOPHIL # BLD AUTO: 0.4 K/UL (ref 0–0.5)
EOSINOPHIL NFR BLD: 3.5 % (ref 0–8)
ERYTHROCYTE [DISTWIDTH] IN BLOOD BY AUTOMATED COUNT: 13.5 % (ref 11.5–14.5)
EST. GFR  (NO RACE VARIABLE): 54 ML/MIN/1.73 M^2
GLUCOSE SERPL-MCNC: 163 MG/DL (ref 70–110)
GLUCOSE UR QL STRIP: NEGATIVE
HCT VFR BLD AUTO: 39 % (ref 40–54)
HCV AB SERPL QL IA: NEGATIVE
HEP C VIRUS HOLD SPECIMEN: NORMAL
HGB BLD-MCNC: 13.1 G/DL (ref 14–18)
HGB UR QL STRIP: NEGATIVE
HIV 1+2 AB+HIV1 P24 AG SERPL QL IA: NEGATIVE
IMM GRANULOCYTES # BLD AUTO: 0.02 K/UL (ref 0–0.04)
IMM GRANULOCYTES NFR BLD AUTO: 0.2 % (ref 0–0.5)
INR PPP: 1 (ref 0.8–1.2)
KETONES UR QL STRIP: NEGATIVE
LEUKOCYTE ESTERASE UR QL STRIP: NEGATIVE
LYMPHOCYTES # BLD AUTO: 2.2 K/UL (ref 1–4.8)
LYMPHOCYTES NFR BLD: 21.8 % (ref 18–48)
MCH RBC QN AUTO: 30 PG (ref 27–31)
MCHC RBC AUTO-ENTMCNC: 33.6 G/DL (ref 32–36)
MCV RBC AUTO: 89 FL (ref 82–98)
MONOCYTES # BLD AUTO: 0.7 K/UL (ref 0.3–1)
MONOCYTES NFR BLD: 6.6 % (ref 4–15)
NEUTROPHILS # BLD AUTO: 6.7 K/UL (ref 1.8–7.7)
NEUTROPHILS NFR BLD: 67 % (ref 38–73)
NITRITE UR QL STRIP: NEGATIVE
NRBC BLD-RTO: 0 /100 WBC
PH UR STRIP: 6 [PH] (ref 5–8)
PLATELET # BLD AUTO: 333 K/UL (ref 150–450)
PMV BLD AUTO: 9.3 FL (ref 9.2–12.9)
POCT GLUCOSE: 167 MG/DL (ref 70–110)
POTASSIUM SERPL-SCNC: 3.5 MMOL/L (ref 3.5–5.1)
PROT SERPL-MCNC: 7.6 G/DL (ref 6–8.4)
PROT UR QL STRIP: NEGATIVE
PROTHROMBIN TIME: 10.6 SEC (ref 9–12.5)
RBC # BLD AUTO: 4.37 M/UL (ref 4.6–6.2)
SODIUM SERPL-SCNC: 138 MMOL/L (ref 136–145)
SP GR UR STRIP: 1.01 (ref 1–1.03)
TSH SERPL DL<=0.005 MIU/L-ACNC: 0.92 UIU/ML (ref 0.4–4)
URN SPEC COLLECT METH UR: NORMAL
UROBILINOGEN UR STRIP-ACNC: NEGATIVE EU/DL
WBC # BLD AUTO: 9.93 K/UL (ref 3.9–12.7)

## 2023-04-14 PROCEDURE — 85025 COMPLETE CBC W/AUTO DIFF WBC: CPT | Mod: HCNC | Performed by: FAMILY MEDICINE

## 2023-04-14 PROCEDURE — G0425 PR INPT TELEHEALTH CONSULT 30M: ICD-10-PCS | Mod: HCNC,95,, | Performed by: STUDENT IN AN ORGANIZED HEALTH CARE EDUCATION/TRAINING PROGRAM

## 2023-04-14 PROCEDURE — 87389 HIV-1 AG W/HIV-1&-2 AB AG IA: CPT | Mod: HCNC | Performed by: FAMILY MEDICINE

## 2023-04-14 PROCEDURE — 81003 URINALYSIS AUTO W/O SCOPE: CPT | Mod: HCNC | Performed by: NURSE PRACTITIONER

## 2023-04-14 PROCEDURE — 93010 EKG 12-LEAD: ICD-10-PCS | Mod: HCNC,,, | Performed by: STUDENT IN AN ORGANIZED HEALTH CARE EDUCATION/TRAINING PROGRAM

## 2023-04-14 PROCEDURE — 85610 PROTHROMBIN TIME: CPT | Mod: HCNC | Performed by: FAMILY MEDICINE

## 2023-04-14 PROCEDURE — G0378 HOSPITAL OBSERVATION PER HR: HCPCS | Mod: HCNC

## 2023-04-14 PROCEDURE — 99285 EMERGENCY DEPT VISIT HI MDM: CPT | Mod: 25,HCNC

## 2023-04-14 PROCEDURE — G0425 INPT/ED TELECONSULT30: HCPCS | Mod: HCNC,95,, | Performed by: STUDENT IN AN ORGANIZED HEALTH CARE EDUCATION/TRAINING PROGRAM

## 2023-04-14 PROCEDURE — 93005 ELECTROCARDIOGRAM TRACING: CPT | Mod: HCNC

## 2023-04-14 PROCEDURE — 80061 LIPID PANEL: CPT | Mod: HCNC | Performed by: FAMILY MEDICINE

## 2023-04-14 PROCEDURE — 25000003 PHARM REV CODE 250: Mod: HCNC | Performed by: FAMILY MEDICINE

## 2023-04-14 PROCEDURE — 93010 ELECTROCARDIOGRAM REPORT: CPT | Mod: HCNC,,, | Performed by: STUDENT IN AN ORGANIZED HEALTH CARE EDUCATION/TRAINING PROGRAM

## 2023-04-14 PROCEDURE — 82962 GLUCOSE BLOOD TEST: CPT | Mod: HCNC

## 2023-04-14 PROCEDURE — 80053 COMPREHEN METABOLIC PANEL: CPT | Mod: HCNC | Performed by: FAMILY MEDICINE

## 2023-04-14 PROCEDURE — 86803 HEPATITIS C AB TEST: CPT | Mod: HCNC | Performed by: FAMILY MEDICINE

## 2023-04-14 PROCEDURE — 84443 ASSAY THYROID STIM HORMONE: CPT | Mod: HCNC | Performed by: FAMILY MEDICINE

## 2023-04-14 PROCEDURE — 83036 HEMOGLOBIN GLYCOSYLATED A1C: CPT | Mod: HCNC | Performed by: NURSE PRACTITIONER

## 2023-04-14 RX ORDER — DEXTROSE 40 %
30 GEL (GRAM) ORAL
Status: DISCONTINUED | OUTPATIENT
Start: 2023-04-14 | End: 2023-04-14

## 2023-04-14 RX ORDER — CLOPIDOGREL BISULFATE 75 MG/1
75 TABLET ORAL DAILY
Status: DISCONTINUED | OUTPATIENT
Start: 2023-04-15 | End: 2023-04-15 | Stop reason: HOSPADM

## 2023-04-14 RX ORDER — IBUPROFEN 200 MG
24 TABLET ORAL
Status: DISCONTINUED | OUTPATIENT
Start: 2023-04-14 | End: 2023-04-15

## 2023-04-14 RX ORDER — ONDANSETRON 2 MG/ML
4 INJECTION INTRAMUSCULAR; INTRAVENOUS EVERY 6 HOURS PRN
Status: DISCONTINUED | OUTPATIENT
Start: 2023-04-14 | End: 2023-04-15 | Stop reason: HOSPADM

## 2023-04-14 RX ORDER — GLUCAGON 1 MG
1 KIT INJECTION
Status: DISCONTINUED | OUTPATIENT
Start: 2023-04-14 | End: 2023-04-15

## 2023-04-14 RX ORDER — CLOPIDOGREL 300 MG/1
300 TABLET, FILM COATED ORAL
Status: COMPLETED | OUTPATIENT
Start: 2023-04-14 | End: 2023-04-14

## 2023-04-14 RX ORDER — LABETALOL HYDROCHLORIDE 5 MG/ML
10 INJECTION, SOLUTION INTRAVENOUS
Status: DISCONTINUED | OUTPATIENT
Start: 2023-04-14 | End: 2023-04-15 | Stop reason: HOSPADM

## 2023-04-14 RX ORDER — PROCHLORPERAZINE EDISYLATE 5 MG/ML
5 INJECTION INTRAMUSCULAR; INTRAVENOUS EVERY 6 HOURS PRN
Status: DISCONTINUED | OUTPATIENT
Start: 2023-04-14 | End: 2023-04-15 | Stop reason: HOSPADM

## 2023-04-14 RX ORDER — IBUPROFEN 200 MG
16 TABLET ORAL
Status: DISCONTINUED | OUTPATIENT
Start: 2023-04-14 | End: 2023-04-15

## 2023-04-14 RX ORDER — ATORVASTATIN CALCIUM 40 MG/1
40 TABLET, FILM COATED ORAL DAILY
Status: DISCONTINUED | OUTPATIENT
Start: 2023-04-15 | End: 2023-04-15 | Stop reason: HOSPADM

## 2023-04-14 RX ORDER — SODIUM CHLORIDE 0.9 % (FLUSH) 0.9 %
10 SYRINGE (ML) INJECTION
Status: DISCONTINUED | OUTPATIENT
Start: 2023-04-14 | End: 2023-04-15 | Stop reason: HOSPADM

## 2023-04-14 RX ORDER — DEXTROSE 40 %
15 GEL (GRAM) ORAL
Status: DISCONTINUED | OUTPATIENT
Start: 2023-04-14 | End: 2023-04-14 | Stop reason: SDUPTHER

## 2023-04-14 RX ORDER — INSULIN ASPART 100 [IU]/ML
0-5 INJECTION, SOLUTION INTRAVENOUS; SUBCUTANEOUS
Status: DISCONTINUED | OUTPATIENT
Start: 2023-04-14 | End: 2023-04-15 | Stop reason: HOSPADM

## 2023-04-14 RX ADMIN — CLOPIDOGREL BISULFATE 300 MG: 300 TABLET, FILM COATED ORAL at 06:04

## 2023-04-14 NOTE — HPI
82 y/o male with a history of HTN, HLD, T2DM, OA who presented with transient aphasia. Was at a restaurant with a friend when he had an episode of word finding difficulty and garbled speech that lasted 10-15 mins. No weakness/numbness, or facial droop. These symptoms have never occurred before.    No prior history of stroke. Was unable to tolerate aspirin in the past as he bruises easily. Also says that he 'bleeds more than normal'.    /77

## 2023-04-14 NOTE — CONSULTS
[facility]  Vascular Neurology  Comprehensive Stroke Center  TeleVascular Neurology Acute Consultation Note      Consults    Consulting Provider: DONIS MENENDEZ  Current Providers  No providers found    Patient Location:  [facility] EMERGENCY DEPARTMENT Emergency Department  Spoke hospital nurse at bedside with patient assisting consultant.     Patient information was obtained from patient, spouse/SO, past medical records, ER records, primary team, and son .         Assessment/Plan:       Diagnoses:   Neuro  TIA (transient ischemic attack)  84 y/o male with a history of HTN, HLD, T2DM who presented with transient aphasia and dysarthria lasting 10-15 mins.     Exam is normal. NIHSS 0.  ABCD2 score 4    Clinically consistent with an embolic TIA. Defer IV thrombolysis as he is back to baseline.     Recommendations:  -- Load with Plavix [id number] now and start 75mg daily from tomorrow. Will hold off on DAPT as he states that he bruises easily and has had bleeding in the past w/ aspirin.  -- Lipitor 40mg daily  -- MRI brain w/ MRA head and neck. Recommend permissive HTN w/ SBP >220 until MRI brain definitely rules out an acute infarct.  -- TTE w/o bubble  -- Lipid panel, Hb A1c and TSH    Aggressive risk factor modification: HTN, DM, HLD       Rehab efforts: The patient has been evaluated by a stroke team provider and the therapy needs have been fully considered based off the presenting complaints and exam findings. The following therapy evaluations are needed: None      VTE prophylaxis: Mechanical prophylaxis: Place SCDs       BP parameters: Infarct: No intervention, SBP <220            STROKE DOCUMENTATION     Acute Stroke Times:   Acute Stroke Times   Last Known Normal Date: 04/14/23  Last Known Normal Time: 1530  Symptom Onset Date: 04/14/23  Symptom Onset Time: 1530  Stroke Team Called Date: 04/14/23  Stroke Team Called Time: 1703  Stroke Team Arrival Date: 04/14/23  Stroke Team  Arrival Time: 1705  CT Interpretation Time: 1722  Thrombolytic Therapy Recommended: No    NIH Scale:  1a. Level of Consciousness: 0-->Alert, keenly responsive  1b. LOC Questions: 0-->Answers both questions correctly  1c. LOC Commands: 0-->Performs both tasks correctly  2. Best Gaze: 0-->Normal  3. Visual: 0-->No visual loss  4. Facial Palsy: 0-->Normal symmetrical movements  5a. Motor Arm, Left: 0-->No drift, limb holds 90 (or 45) degrees for full 10 secs  5b. Motor Arm, Right: 0-->No drift, limb holds 90 (or 45) degrees for full 10 secs  6a. Motor Leg, Left: 0-->No drift, leg holds 30 degree position for full 5 secs  6b. Motor Leg, Right: 0-->No drift, leg holds 30 degree position for full 5 secs  7. Limb Ataxia: 0-->Absent  8. Sensory: 0-->Normal, no sensory loss  9. Best Language: 0-->No aphasia, normal  10. Dysarthria: 0-->Normal  11. Extinction and Inattention (formerly Neglect): 0-->No abnormality  Total (NIH Stroke Scale): 0     Modified Alvarado Score: 0  William Coma Scale:    ABCD2 Score:    [id number] Score:   HAS -BLED Score:   ICH Score:   Hunt & Goodrich Classification:       Blood pressure 125/77, pulse 97, temperature 98.1 °F (36.7 °C), temperature source Oral, resp. rate 16, weight 112.1 kg (247 lb 2.2 oz), SpO2 95 %.  Eligible for thrombolytic therapy?: Yes  Thrombolytic therapy recomended: Thrombolytic therapy not recommended due to Patient back to neurological baseline  Possible Interventional Revascularization Candidate? No; at this time symptoms not suggestive of large vessel occlusion    Disposition Recommendation: admit to inpatient    Subjective:     History of Present Illness:  82 y/o male with a history of HTN, HLD, T2DM, OA who presented with transient aphasia. Was at a restaurant with a friend when he had an episode of word finding difficulty and garbled speech that lasted 10-15 mins. No weakness/numbness, or facial droop. These symptoms have never occurred before.    No prior history of  stroke. Was unable to tolerate aspirin in the past as he bruises easily. Also says that he 'bleeds more than normal'.    /77          Woke up with symptoms?: No    Recent bleeding noted: no  Does the patient take any Blood Thinners? no  Medications: No relevant medications      Past Medical History: hypertension, diabetes and hyperlipidemia    Past Surgical History: no relevant surgical history    Family History: no relevant history    Social History: no smoking, no drinking, no drugs    Allergies: No Known Allergies No relevant allergies    Review of Systems   Constitutional: Negative for fever.   HENT: Negative for trouble swallowing.    Eyes: Negative for visual disturbance.   Musculoskeletal: Negative for gait problem.   Neurological: Positive for speech difficulty. Negative for facial asymmetry and weakness.   Psychiatric/Behavioral: Negative for agitation and confusion.     Objective:   Vitals: Blood pressure 125/77, pulse 105, temperature 98.1 °F (36.7 °C), temperature source Oral, resp. rate 16, weight 112.1 kg (247 lb 2.2 oz), SpO2 95 %.     CT READ: Yes  No hemmorhage. No mass effect. No early infarct signs.     Physical Exam  Eyes:      General: No visual field deficit.  Neurological:      Mental Status: He is alert and oriented to person, place, and time.      Cranial Nerves: No dysarthria or facial asymmetry.      Sensory: No sensory deficit.      Motor: No weakness or pronator drift.      Coordination: Coordination normal. Finger-Nose-Finger Test and Heel to Shin Test normal.             Recommended the emergency room physician to have a brief discussion with the patient and/or family if available regarding the  risks and benefits of treatment, and to briefly document the occurrence of that discussion in his clinical encounter note.     The attending portion of this evaluation, treatment, and documentation was performed per Ruiz Jacobson MD via audiovisual.    Billing code:   (non-intervention mild to moderate stroke, TIA, some mimics)      This patient has a critical neurological condition/illness, with some potential for high morbidity and mortality.  There is a moderate probability for acute neurological change leading to clinical and possibly life-threatening deterioration requiring highest level of physician preparedness for urgent intervention.  Care was coordinated with other physicians involved in the patient's care.  Radiologic studies and laboratory data were reviewed and interpreted, and plan of care was re-assessed based on the results.  Diagnosis, treatment options and prognosis may have been discussed with the patient and/or family members or caregiver.    In your opinion, this was a: Tier 1 Van Negative    Consult End Time: 5:54 PM     Ruiz Jacobson MD  Comprehensive Stroke Center  Vascular Neurology   [facility]

## 2023-04-14 NOTE — ASSESSMENT & PLAN NOTE
84 y/o male with a history of HTN, HLD, T2DM who presented with transient aphasia and dysarthria lasting 10-15 mins.     Exam is normal. NIHSS 0.  ABCD2 score 4    Clinically consistent with an embolic TIA. Defer IV thrombolysis as he is back to baseline.     Recommendations:  -- Load with Plavix [id number] now and start 75mg daily from tomorrow. Will hold off on DAPT as he states that he bruises easily and has had bleeding in the past w/ aspirin.  -- Lipitor 40mg daily  -- MRI brain w/ MRA head and neck. Recommend permissive HTN w/ SBP >220 until MRI brain definitely rules out an acute infarct.  -- TTE w/o bubble  -- Lipid panel, Hb A1c and TSH    Aggressive risk factor modification: HTN, DM, HLD       Rehab efforts: The patient has been evaluated by a stroke team provider and the therapy needs have been fully considered based off the presenting complaints and exam findings. The following therapy evaluations are needed: None      VTE prophylaxis: Mechanical prophylaxis: Place SCDs       BP parameters: Infarct: No intervention, SBP <220

## 2023-04-14 NOTE — ED NOTES
Care handoff from MIYA Chairez. Pt is a/o x 4. No current neuro deficits, follows commands, speech is clear. Son at bedside. Pt connected to cardiac monitor, auto bp cuff, cont pulse ox. Awaiting further orders.

## 2023-04-14 NOTE — ED PROVIDER NOTES
SCRIBE #1 NOTE: I, Mal Ferrari, am scribing for, and in the presence of, Lynne Lockwood MD. I have scribed the entire note.       History     Chief Complaint   Patient presents with    Speech Problem     Pt's daughter in law reports several minutes of confusion and speech difficulties.  Last known normal was approximately 45 minutes-60 minutes prior to arrival.  Family states his speech is currently at baseline, but he was repeating himself on the way to the hospital.  Pt's daughter-in-law also reports intermittent increasing confusion x 2-3 weeks.     Review of patient's allergies indicates:  No Known Allergies      History of Present Illness     HPI    4/14/2023, 4:55 PM  History obtained from the  daughter in law and patient      History of Present Illness: Mark Dickerson Sr. is a 83 y.o. male patient with a PMHx of HLD, HTN, Pneumonia, DM type II, CKD, and severe obesity who presents to the Emergency Department for evaluation of speech aphasia which onset within 30 minutes PTA. Pt' last known normal was approx 45-60 min PTA. Pt's daughter in law says the pt is at baseline now but kept repeating himself as they were en route. Pt says his thinking was clear but words would not produce properly. Symptoms are constant and moderate in severity. No mitigating or exacerbating factors reported. No associated sxs. Patient denies any CP, SOB, weakness, abd pain, and all other sxs at this time. No prior Tx. Pt's daughter in law reports that the pt has had increased confusion within the past few weeks. No further complaints or concerns at this time.       Arrival mode: Personal vehicle    PCP: Tatyana Cannon MD        Past Medical History:  Past Medical History:   Diagnosis Date    CKD stage 3 due to type 2 diabetes mellitus 2/18/2021    DM (diabetes mellitus) 2014    BS didn't check 12/11/2019    DM (diabetes mellitus) 2014    BS didn't check 05/05/2022    Foreign body, eye     right eye    Hyperlipidemia      Hypertension     Need for shingles vaccine 2019    Pneumonia     Primary osteoarthritis of right knee 10/29/2021    Severe obesity (BMI 35.0-35.9 with comorbidity) 7/10/2019    Type 2 diabetes mellitus 2014    BS didn't check 2018       Past Surgical History:  Past Surgical History:   Procedure Laterality Date    APPENDECTOMY      CATARACT EXTRACTION Bilateral     CPG         Family History:  Family History   Problem Relation Age of Onset    Heart disease Mother     Hypertension Father     Heart disease Father     Hyperlipidemia Father     Cataracts Father     Hypertension Sister     Hypertension Brother     Cancer Son         colon    Allergic rhinitis Daughter        Social History:  Social History     Tobacco Use    Smoking status: Former     Packs/day: 3.00     Types: Cigarettes     Start date:      Quit date:      Years since quittin.3    Smokeless tobacco: Never   Substance and Sexual Activity    Alcohol use: No    Drug use: No    Sexual activity: Never        Review of Systems     Review of Systems   Constitutional:  Negative for chills and fever.   HENT:  Negative for congestion and sore throat.    Respiratory:  Negative for cough and shortness of breath.    Cardiovascular:  Negative for chest pain.   Gastrointestinal:  Negative for abdominal pain, diarrhea, nausea and vomiting.   Genitourinary:  Negative for dysuria.   Musculoskeletal:  Negative for back pain.   Skin:  Negative for rash.   Neurological:  Positive for speech difficulty. Negative for weakness and numbness.        (+) speech aphasia   Hematological:  Does not bruise/bleed easily.   All other systems reviewed and are negative.     Physical Exam     Initial Vitals [23 1645]   BP Pulse Resp Temp SpO2   125/77 105 16 98.1 °F (36.7 °C) 95 %      MAP       --          Physical Exam  Nursing Notes and Vital Signs Reviewed.  Constitutional: Patient is in no acute distress. Well-developed and well-nourished.  Head:  "Atraumatic. Normocephalic.  Eyes: PERRL. EOM intact. Conjunctivae are not pale. No scleral icterus.  ENT: Mucous membranes are moist. Oropharynx is clear and symmetric.    Neck: Supple. Full ROM. No lymphadenopathy.  Cardiovascular: Regular rate. Regular rhythm. No murmurs, rubs, or gallops. Distal pulses are 2+ and symmetric.  Pulmonary/Chest: No respiratory distress. Clear to auscultation bilaterally. No wheezing or rales.  Abdominal: Soft and non-distended.  There is no tenderness.  No rebound, guarding, or rigidity. Good bowel sounds.  Genitourinary: No CVA tenderness  Musculoskeletal: Moves all extremities. No obvious deformities. No edema. No calf tenderness.  Skin: Warm and dry.  Neurological:  Alert, awake, and appropriate.  Normal speech.  No acute focal neurological deficits are appreciated.  Psychiatric: Normal affect. Good eye contact. Appropriate in content.     ED Course   Procedures  ED Vital Signs:  Vitals:    04/14/23 1645 04/14/23 1730 04/14/23 1830 04/14/23 1930   BP: 125/77  116/70    Pulse: 105 97 85 90   Resp: 16  18    Temp: 98.1 °F (36.7 °C)      TempSrc: Oral      SpO2: 95%  95%    Weight: 112.1 kg (247 lb 2.2 oz)      Height:        04/14/23 2134 04/14/23 2205 04/15/23 0205 04/15/23 0437   BP: 139/85   130/68   Pulse: 82 85 84 88   Resp: 20   16   Temp: 97.8 °F (36.6 °C)   98.1 °F (36.7 °C)   TempSrc:    Oral   SpO2: 97%   95%   Weight: 113.8 kg (250 lb 14.1 oz)      Height: 5' 10" (1.778 m)       04/15/23 0609 04/15/23 0705 04/15/23 0754 04/15/23 0801   BP:   124/68 124/68   Pulse: 86 85 91 84   Resp:   18 18   Temp:   98.2 °F (36.8 °C)    TempSrc:       SpO2:   (!) 92% (!) 92%   Weight:    113.4 kg (250 lb)   Height:    5' 10" (1.778 m)    04/15/23 0905   BP:    Pulse: 86   Resp:    Temp:    TempSrc:    SpO2:    Weight:    Height:        Abnormal Lab Results:  Labs Reviewed   CBC W/ AUTO DIFFERENTIAL - Abnormal; Notable for the following components:       Result Value    RBC 4.37 (*)     " Hemoglobin 13.1 (*)     Hematocrit 39.0 (*)     All other components within normal limits    Narrative:     Release to patient->Immediate   COMPREHENSIVE METABOLIC PANEL - Abnormal; Notable for the following components:    Glucose 163 (*)     eGFR 54 (*)     All other components within normal limits    Narrative:     Release to patient->Immediate   POCT GLUCOSE - Abnormal; Notable for the following components:    POCT Glucose 167 (*)     All other components within normal limits   PROTIME-INR    Narrative:     Release to patient->Immediate   TSH    Narrative:     Release to patient->Immediate   HIV 1 / 2 ANTIBODY    Narrative:     Release to patient->Immediate   HEPATITIS C ANTIBODY    Narrative:     Release to patient->Immediate   HEP C VIRUS HOLD SPECIMEN    Narrative:     Release to patient->Immediate   URINALYSIS, REFLEX TO URINE CULTURE    Narrative:     Specimen Source->Urine        All Lab Results:  Results for orders placed or performed during the hospital encounter of 04/14/23   CBC W/ AUTO DIFFERENTIAL   Result Value Ref Range    WBC 9.93 3.90 - 12.70 K/uL    RBC 4.37 (L) 4.60 - 6.20 M/uL    Hemoglobin 13.1 (L) 14.0 - 18.0 g/dL    Hematocrit 39.0 (L) 40.0 - 54.0 %    MCV 89 82 - 98 fL    MCH 30.0 27.0 - 31.0 pg    MCHC 33.6 32.0 - 36.0 g/dL    RDW 13.5 11.5 - 14.5 %    Platelets 333 150 - 450 K/uL    MPV 9.3 9.2 - 12.9 fL    Immature Granulocytes 0.2 0.0 - 0.5 %    Gran # (ANC) 6.7 1.8 - 7.7 K/uL    Immature Grans (Abs) 0.02 0.00 - 0.04 K/uL    Lymph # 2.2 1.0 - 4.8 K/uL    Mono # 0.7 0.3 - 1.0 K/uL    Eos # 0.4 0.0 - 0.5 K/uL    Baso # 0.09 0.00 - 0.20 K/uL    nRBC 0 0 /100 WBC    Gran % 67.0 38.0 - 73.0 %    Lymph % 21.8 18.0 - 48.0 %    Mono % 6.6 4.0 - 15.0 %    Eosinophil % 3.5 0.0 - 8.0 %    Basophil % 0.9 0.0 - 1.9 %    Differential Method Automated    Comprehensive metabolic panel   Result Value Ref Range    Sodium 138 136 - 145 mmol/L    Potassium 3.5 3.5 - 5.1 mmol/L    Chloride 99 95 - 110  mmol/L    CO2 25 23 - 29 mmol/L    Glucose 163 (H) 70 - 110 mg/dL    BUN 22 8 - 23 mg/dL    Creatinine 1.3 0.5 - 1.4 mg/dL    Calcium 9.7 8.7 - 10.5 mg/dL    Total Protein 7.6 6.0 - 8.4 g/dL    Albumin 3.6 3.5 - 5.2 g/dL    Total Bilirubin 0.4 0.1 - 1.0 mg/dL    Alkaline Phosphatase 68 55 - 135 U/L    AST 28 10 - 40 U/L    ALT 25 10 - 44 U/L    Anion Gap 14 8 - 16 mmol/L    eGFR 54 (A) >60 mL/min/1.73 m^2   Protime-INR   Result Value Ref Range    Prothrombin Time 10.6 9.0 - 12.5 sec    INR 1.0 0.8 - 1.2   TSH   Result Value Ref Range    TSH 0.921 0.400 - 4.000 uIU/mL   LDL - Lipid Panel   Result Value Ref Range    Cholesterol 268 (H) 120 - 199 mg/dL    Triglycerides 310 (H) 30 - 150 mg/dL    HDL 30 (L) 40 - 75 mg/dL    LDL Cholesterol 176.0 (H) 63.0 - 159.0 mg/dL    HDL/Cholesterol Ratio 11.2 (L) 20.0 - 50.0 %    Total Cholesterol/HDL Ratio 8.9 (H) 2.0 - 5.0    Non-HDL Cholesterol 238 mg/dL   HIV 1/2 Ag/Ab (4th Gen)   Result Value Ref Range    HIV 1/2 Ag/Ab Negative Negative   Hepatitis C Antibody   Result Value Ref Range    Hepatitis C Ab Negative Negative   HCV Virus Hold Specimen   Result Value Ref Range    HEP C Virus Hold Specimen Hold for HCV sendout    Urinalysis, Reflex to Urine Culture Urine, Clean Catch    Specimen: Urine   Result Value Ref Range    Specimen UA Urine, Clean Catch     Color, UA Yellow Yellow, Straw, Arti    Appearance, UA Clear Clear    pH, UA 6.0 5.0 - 8.0    Specific Gravity, UA 1.010 1.005 - 1.030    Protein, UA Negative Negative    Glucose, UA Negative Negative    Ketones, UA Negative Negative    Bilirubin (UA) Negative Negative    Occult Blood UA Negative Negative    Nitrite, UA Negative Negative    Urobilinogen, UA Negative <2.0 EU/dL    Leukocytes, UA Negative Negative   Hemoglobin A1c   Result Value Ref Range    Hemoglobin A1C 6.8 (H) 4.0 - 5.6 %    Estimated Avg Glucose 148 (H) 68 - 131 mg/dL   Comprehensive metabolic panel   Result Value Ref Range    Sodium 138 136 - 145 mmol/L     Potassium 3.1 (L) 3.5 - 5.1 mmol/L    Chloride 102 95 - 110 mmol/L    CO2 25 23 - 29 mmol/L    Glucose 186 (H) 70 - 110 mg/dL    BUN 21 8 - 23 mg/dL    Creatinine 1.2 0.5 - 1.4 mg/dL    Calcium 9.1 8.7 - 10.5 mg/dL    Total Protein 6.6 6.0 - 8.4 g/dL    Albumin 3.2 (L) 3.5 - 5.2 g/dL    Total Bilirubin 0.2 0.1 - 1.0 mg/dL    Alkaline Phosphatase 70 55 - 135 U/L    AST 24 10 - 40 U/L    ALT 22 10 - 44 U/L    Anion Gap 11 8 - 16 mmol/L    eGFR 59 (A) >60 mL/min/1.73 m^2   Magnesium   Result Value Ref Range    Magnesium 1.7 1.6 - 2.6 mg/dL   Phosphorus   Result Value Ref Range    Phosphorus 2.7 2.7 - 4.5 mg/dL   CBC auto differential   Result Value Ref Range    WBC 9.20 3.90 - 12.70 K/uL    RBC 4.18 (L) 4.60 - 6.20 M/uL    Hemoglobin 12.5 (L) 14.0 - 18.0 g/dL    Hematocrit 36.8 (L) 40.0 - 54.0 %    MCV 88 82 - 98 fL    MCH 29.9 27.0 - 31.0 pg    MCHC 34.0 32.0 - 36.0 g/dL    RDW 13.6 11.5 - 14.5 %    Platelets 321 150 - 450 K/uL    MPV 9.4 9.2 - 12.9 fL    Immature Granulocytes 0.2 0.0 - 0.5 %    Gran # (ANC) 5.7 1.8 - 7.7 K/uL    Immature Grans (Abs) 0.02 0.00 - 0.04 K/uL    Lymph # 2.3 1.0 - 4.8 K/uL    Mono # 0.8 0.3 - 1.0 K/uL    Eos # 0.4 0.0 - 0.5 K/uL    Baso # 0.08 0.00 - 0.20 K/uL    nRBC 0 0 /100 WBC    Gran % 61.5 38.0 - 73.0 %    Lymph % 25.3 18.0 - 48.0 %    Mono % 8.3 4.0 - 15.0 %    Eosinophil % 3.8 0.0 - 8.0 %    Basophil % 0.9 0.0 - 1.9 %    Differential Method Automated    Troponin I   Result Value Ref Range    Troponin I <0.006 0.000 - 0.026 ng/mL   Echo   Result Value Ref Range    BSA 2.37 m2    TDI SEPTAL 0.05 m/s    LV LATERAL E/E' RATIO 9.29 m/s    LV SEPTAL E/E' RATIO 13.00 m/s    LA WIDTH 3.70 cm    IVC diameter 1.71 cm    Left Ventricular Outflow Tract Mean Velocity 0.80 cm/s    Left Ventricular Outflow Tract Mean Gradient 2.73 mmHg    TDI LATERAL 0.07 m/s    LVIDd 4.00 3.5 - 6.0 cm    IVS 1.82 (A) 0.6 - 1.1 cm    Posterior Wall 1.30 (A) 0.6 - 1.1 cm    Ao root annulus 3.31 cm    LVIDs 2.79  2.1 - 4.0 cm    FS 30 28 - 44 %    LA volume 42.49 cm3    STJ 3.38 cm    Ascending aorta 3.29 cm    LV mass 247.66 g    LA size 2.89 cm    TAPSE 1.90 cm    Left Ventricle Relative Wall Thickness 0.65 cm    AV regurgitation pressure 1/2 time [id number] ms    AV mean gradient 31 mmHg    AV valve area 0.94 cm2    AV Velocity Ratio 0.31     AV index (prosthetic) 0.27     MV valve area p 1/2 method 5.77 cm2    E/A ratio 0.47     Mean e' 0.06 m/s    E wave deceleration time 131.60 msec    IVRT 72.31 msec    LVOT diameter 2.11 cm    LVOT area 3.5 cm2    LVOT peak can 1.03 m/s    LVOT peak VTI 20.00 cm    Ao peak can 3.35 m/s    Ao VTI 74.2 cm    RVOT peak can 0.93 m/s    RVOT peak VTI 15.0 cm    LVOT stroke volume 69.90 cm3    AV peak gradient 45 mmHg    PV mean gradient 2.04 mmHg    E/E' ratio 10.83 m/s    MV Peak E Can 0.65 m/s    AR Max Can 2.51 m/s    TR Max Can 2.31 m/s    MV stenosis pressure 1/2 time 38.16 ms    MV Peak A Can 1.37 m/s    LV Systolic Volume 29.19 mL    LV Systolic Volume Index 12.7 mL/m2    LV Diastolic Volume 70.05 mL    LV Diastolic Volume Index 30.59 mL/m2    LA Volume Index 18.6 mL/m2    LV Mass Index 108 g/m2    RA Major Axis 3.40 cm    Left Atrium Minor Axis 4.63 cm    Left Atrium Major Axis 4.72 cm    Triscuspid Valve Regurgitation Peak Gradient 21 mmHg    RA Width 2.80 cm    Right Atrial Pressure (from IVC) 3 mmHg    EF 60 %    TV rest pulmonary artery pressure 24 mmHg   POCT glucose   Result Value Ref Range    POCT Glucose 167 (H) 70 - 110 mg/dL   POCT glucose   Result Value Ref Range    POCT Glucose 174 (H) 70 - 110 mg/dL        Imaging Results:  Imaging Results              MRA Brain (Final result)  Result time 04/14/23 23:26:48      Final result by Jhoan Roman MD (04/14/23 23:26:48)                   Impression:      As above is      Electronically signed by: Jhoan Roman  Date:    04/14/2023  Time:    23:26               Narrative:    EXAMINATION:  MRA BRAIN WITHOUT  CONTRAST    CLINICAL HISTORY:  Stroke/TIA, determine embolic source;    TECHNIQUE:  Non-contrast 3-D time-of-flight intracranial MR angiography was performed through the [place] [facility] [place] with MIP reformatting.    COMPARISON:  None    FINDINGS:  Mild luminal irregularities of the intracranial vasculature but no evidence for hemodynamically significant stenosis.  Flow enhancement of the middle cerebral anterior cerebral and posterior cerebral arteries are noted.                                       MRI BRAIN WITHOUT CONTRAST (Final result)  Result time 04/14/23 23:21:39      Final result by Jhoan Roman MD (04/14/23 23:21:39)                   Impression:      No evidence for acute infarct    Atrophy and chronic microvascular ischemic changes not unusual for stated age.  A      Electronically signed by: Jhoan Roman  Date:    04/14/2023  Time:    23:21               Narrative:    EXAMINATION:  MRI BRAIN WITHOUT CONTRAST    CLINICAL HISTORY:  Transient ischemic attack (TIA);    TECHNIQUE:  Sagittal T1. Axial T1, T2, T2 FLAIR, GRE, DWI. Coronal T2 FLAIR.    COMPARISON:  None available.    FINDINGS:  There is no restricted diffusion.  Periventricular and subcortical FLAIR hyperintensities consistent with chronic microvascular ischemic changes mild motion artifacts.    The ventricles and sulci are normal in size and configuration. Midline structures are normal. There are preserved arterial flow-voids on T2 weighted imaging. Right mastoid air cell opacification.    No abnormal marrow signal is identified.                                       MRA Neck without contrast (Final result)  Result time 04/14/23 23:25:39      Final result by Jhoan Roman MD (04/14/23 23:25:39)                   Impression:      Mild moderate atherosclerotic changes of the carotid bifurcation bilaterally.  No hemodynamically significant stenosis of the carotid and vertebral arteries.      Electronically signed by: Jhoan  Abel  Date:    04/14/2023  Time:    23:25               Narrative:    EXAMINATION:  MRA NECK WITHOUT CONTRAST    CLINICAL HISTORY:  Stroke/TIA, determine embolic source;    TECHNIQUE:  Non-contrast 2D and/or 3D time-of-flight MR angiography of the neck was performed, with MIP reformatting.    COMPARISON:  None    FINDINGS:  Mild moderate atherosclerotic narrowing of the carotid bifurcation bilaterally.  Flow enhancement in the internal carotid artery and vertebral arteries noted.  Arch vessels are not well evaluated.  Vertebral arteries are patent.  Right dominant vertebral artery noted.                                       X-Ray Chest 1 View (Final result)  Result time 04/14/23 18:32:26      Final result by SITA Elliott Sr., MD (04/14/23 18:32:26)                   Impression:      1. There is no focal pulmonary infiltrate visualized.  2. The size of the heart is prominent.  Some of this may be secondary to magnification.  .      Electronically signed by: Marco Elliott MD  Date:    04/14/2023  Time:    18:32               Narrative:    EXAMINATION:  XR CHEST 1 VIEW    CLINICAL HISTORY:  Cerebral infarction, unspecified    COMPARISON:  04/02/2022    FINDINGS:  The size of the heart is prominent.  There is no focal pulmonary infiltrate visualized.  There is no pneumothorax.  The costophrenic angles are sharp.                                       CT Head Without Contrast (Final result)  Result time 04/14/23 17:12:41      Final result by Timmy Thornton MD (04/14/23 17:12:41)                   Impression:      No CT evidence of acute intracranial abnormality.    Chronic right mastoid air cell effusion.    ASPECTS score is 10.    Findings communicated with Dr. Lockwood at 17:11 on 04/14/2023.    All CT scans at this facility use dose modulation, iterative reconstruction, and/or weight base dosing when appropriate to reduce radiation dose to as low as reasonably achievable.      Electronically signed by: Timmy  Norberto  Date:    04/14/2023  Time:    17:12               Narrative:    EXAMINATION:  CT HEAD WITHOUT CONTRAST    CLINICAL HISTORY:  Neuro deficit, acute, stroke suspected;    TECHNIQUE:  Contiguous axial images were obtained from the skull base through the vertex without intravenous contrast.    COMPARISON:  Head CT 04/02/2022    FINDINGS:  No intracranial hemorrhage. No mass effect or midline shift. Normal parenchymal attenuation. The ventricles and sulci are normal in size and configuration. Paranasal sinuses are clear.  Chronic right mastoid air cell effusion.  Left mastoid air cells are clear.  No concerning osseous findings.                                       The EKG was ordered, reviewed, and independently interpreted by the ED provider.  Interpretation time: 17:30  Rate: 100 BPM  Rhythm:  Sinus Rhythm with premature atrial complexes  Interpretation: LVH with repolarization abnormality. No STEMI.             The Emergency Provider reviewed the vital signs and test results, which are outlined above.     ED Discussion     6:50 PM: Discussed pt's case with Dr. Jacobson (Neurologist) who recommends 300mg plavix right now and 75mg daily from tomorrow. Hold off on the DAPT; Lipitor 40mg daily. MRI brain w/ MRA head and neck and recommend permissive HTN w/ SBP >220 until acute infarct is ruled out. TTE w/o bubble, and a lipid panel.    7:13 PM: Discussed case with Dr. Joseph ([facility]). Dr. Veras agrees with current care and management of pt and accepts admission.   Admitting Service:   Admitting Physician: Dr. Veras  Admit to: OBS    7:13 PM: Re-evaluated pt. I have discussed test results, shared treatment plan, and the need for admission with patient and family at bedside. Pt and family express understanding at this time and agree with all information. All questions answered. Pt and family have no further questions or concerns at this time. Pt is ready for admit.           Medical Decision  Making:   Clinical Tests:   Lab Tests: Ordered and Reviewed  Radiological Study: Ordered and Reviewed  Medical Tests: Ordered and Reviewed         ED Medication(s):  Medications   clopidogreL tablet 300 mg (300 mg Oral Given 4/14/23 1854)       Discharge Medication List as of 4/15/2023 11:11 AM        START taking these medications    Details   aspirin 81 MG Chew Take 1 tablet (81 mg total) by mouth once daily., Starting Sat 4/15/2023, Until Sun 4/14/2024, OTC      atorvastatin (LIPITOR) 40 MG tablet Take 1 tablet (40 mg total) by mouth once daily., Starting Sun 4/16/2023, Until Tue 5/16/2023, Normal              Follow-up Information       Tatyana Cannon MD. Schedule an appointment as soon as possible for a visit in 3 day(s).    Specialty: Family Medicine  Why: call office and schedule a hospital follow up in 3-5 days  Contact information:  [address]  [facility] ([place], [place]) [id number] [phone]               Damir Montenegro MD. Schedule an appointment as soon as possible for a visit in 1 week(s).    Specialty: Internal Medicine  Why: call office and schedule hospital follow up in 1-2 weeks  Contact information:  [address]  [address]  [facility] [address] [phone]                                 Scribe Attestation:   Scribe #1: I performed the above scribed service and the documentation accurately describes the services I performed. I attest to the accuracy of the note.     Attending:   Physician Attestation Statement for Scribe #1: I, Lynne Lockwood MD, personally performed the services described in this documentation, as scribed by Mal Ferrari, in my presence, and it is both accurate and complete.           Clinical Impression       ICD-10-CM ICD-9-CM   1. TIA (transient ischemic attack)  G45.9 435.9   2. Stroke  I63.9 434.91   3. Cerebrovascular accident (CVA)  I63.9 434.91   4. Hyperlipidemia associated with type 2 diabetes mellitus  E11.69 250.80    E78.5 272.4   5. Controlled type 2  diabetes mellitus without complication, without long-term current use of insulin  E11.9 250.00   6. Statin intolerance  Z78.9 995.27   7. Decreased pedal pulses  R09.89 785.9   8. CKD stage 3 due to type 2 diabetes mellitus  E11.22 250.40    N18.30 585.3   9. Hypertension associated with diabetes  E11.59 250.80    I15.2 401.9   10. Primary osteoarthritis of knees, bilateral  M17.0 715.16       Disposition:   Disposition: Placed in Observation  Condition: Fair       Lynne Lockwood MD  04/16/23 9681

## 2023-04-14 NOTE — SUBJECTIVE & OBJECTIVE
Woke up with symptoms?: No    Recent bleeding noted: no  Does the patient take any Blood Thinners? no  Medications: No relevant medications      Past Medical History: hypertension, diabetes and hyperlipidemia    Past Surgical History: no relevant surgical history    Family History: no relevant history    Social History: no smoking, no drinking, no drugs    Allergies: No Known Allergies No relevant allergies    Review of Systems   Constitutional: Negative for fever.   HENT: Negative for trouble swallowing.    Eyes: Negative for visual disturbance.   Musculoskeletal: Negative for gait problem.   Neurological: Positive for speech difficulty. Negative for facial asymmetry and weakness.   Psychiatric/Behavioral: Negative for agitation and confusion.     Objective:   Vitals: Blood pressure 125/77, pulse 105, temperature 98.1 °F (36.7 °C), temperature source Oral, resp. rate 16, weight 112.1 kg (247 lb 2.2 oz), SpO2 95 %.     CT READ: Yes  No hemmorhage. No mass effect. No early infarct signs.     Physical Exam  Eyes:      General: No visual field deficit.  Neurological:      Mental Status: He is alert and oriented to person, place, and time.      Cranial Nerves: No dysarthria or facial asymmetry.      Sensory: No sensory deficit.      Motor: No weakness or pronator drift.      Coordination: Coordination normal. Finger-Nose-Finger Test and Heel to Shin Test normal.

## 2023-04-15 VITALS
OXYGEN SATURATION: 92 % | HEART RATE: 86 BPM | BODY MASS INDEX: 35.79 KG/M2 | DIASTOLIC BLOOD PRESSURE: 68 MMHG | RESPIRATION RATE: 18 BRPM | WEIGHT: 250 LBS | HEIGHT: 70 IN | TEMPERATURE: 98 F | SYSTOLIC BLOOD PRESSURE: 124 MMHG

## 2023-04-15 LAB
ALBUMIN SERPL BCP-MCNC: 3.2 G/DL (ref 3.5–5.2)
ALP SERPL-CCNC: 70 U/L (ref 55–135)
ALT SERPL W/O P-5'-P-CCNC: 22 U/L (ref 10–44)
ANION GAP SERPL CALC-SCNC: 11 MMOL/L (ref 8–16)
AORTIC ROOT ANNULUS: 3.31 CM
ASCENDING AORTA: 3.29 CM
AST SERPL-CCNC: 24 U/L (ref 10–40)
AV INDEX (PROSTH): 0.27
AV MEAN GRADIENT: 31 MMHG
AV PEAK GRADIENT: 45 MMHG
AV REGURGITATION PRESSURE HALF TIME: 670.18 MS
AV VALVE AREA: 0.94 CM2
AV VELOCITY RATIO: 0.31
BASOPHILS # BLD AUTO: 0.08 K/UL (ref 0–0.2)
BASOPHILS NFR BLD: 0.9 % (ref 0–1.9)
BILIRUB SERPL-MCNC: 0.2 MG/DL (ref 0.1–1)
BSA FOR ECHO PROCEDURE: 2.37 M2
BUN SERPL-MCNC: 21 MG/DL (ref 8–23)
CALCIUM SERPL-MCNC: 9.1 MG/DL (ref 8.7–10.5)
CHLORIDE SERPL-SCNC: 102 MMOL/L (ref 95–110)
CHOLEST SERPL-MCNC: 268 MG/DL (ref 120–199)
CHOLEST/HDLC SERPL: 8.9 {RATIO} (ref 2–5)
CO2 SERPL-SCNC: 25 MMOL/L (ref 23–29)
CREAT SERPL-MCNC: 1.2 MG/DL (ref 0.5–1.4)
CV ECHO LV RWT: 0.65 CM
DIFFERENTIAL METHOD: ABNORMAL
DOP CALC AO PEAK VEL: 3.35 M/S
DOP CALC AO VTI: 74.2 CM
DOP CALC LVOT AREA: 3.5 CM2
DOP CALC LVOT DIAMETER: 2.11 CM
DOP CALC LVOT PEAK VEL: 1.03 M/S
DOP CALC LVOT STROKE VOLUME: 69.9 CM3
DOP CALC RVOT PEAK VEL: 0.93 M/S
DOP CALC RVOT VTI: 15 CM
DOP CALCLVOT PEAK VEL VTI: 20 CM
E WAVE DECELERATION TIME: 131.6 MSEC
E/A RATIO: 0.47
E/E' RATIO: 10.83 M/S
ECHO LV POSTERIOR WALL: 1.3 CM (ref 0.6–1.1)
EJECTION FRACTION: 60 %
EOSINOPHIL # BLD AUTO: 0.4 K/UL (ref 0–0.5)
EOSINOPHIL NFR BLD: 3.8 % (ref 0–8)
ERYTHROCYTE [DISTWIDTH] IN BLOOD BY AUTOMATED COUNT: 13.6 % (ref 11.5–14.5)
EST. GFR  (NO RACE VARIABLE): 59 ML/MIN/1.73 M^2
ESTIMATED AVG GLUCOSE: 148 MG/DL (ref 68–131)
FRACTIONAL SHORTENING: 30 % (ref 28–44)
GLUCOSE SERPL-MCNC: 186 MG/DL (ref 70–110)
HBA1C MFR BLD: 6.8 % (ref 4–5.6)
HCT VFR BLD AUTO: 36.8 % (ref 40–54)
HDLC SERPL-MCNC: 30 MG/DL (ref 40–75)
HDLC SERPL: 11.2 % (ref 20–50)
HGB BLD-MCNC: 12.5 G/DL (ref 14–18)
IMM GRANULOCYTES # BLD AUTO: 0.02 K/UL (ref 0–0.04)
IMM GRANULOCYTES NFR BLD AUTO: 0.2 % (ref 0–0.5)
INTERVENTRICULAR SEPTUM: 1.82 CM (ref 0.6–1.1)
IVC DIAMETER: 1.71 CM
IVRT: 72.31 MSEC
LA MAJOR: 4.72 CM
LA MINOR: 4.63 CM
LA WIDTH: 3.7 CM
LDLC SERPL CALC-MCNC: 176 MG/DL (ref 63–159)
LEFT ATRIUM SIZE: 2.89 CM
LEFT ATRIUM VOLUME INDEX: 18.6 ML/M2
LEFT ATRIUM VOLUME: 42.49 CM3
LEFT INTERNAL DIMENSION IN SYSTOLE: 2.79 CM (ref 2.1–4)
LEFT VENTRICLE DIASTOLIC VOLUME INDEX: 30.59 ML/M2
LEFT VENTRICLE DIASTOLIC VOLUME: 70.05 ML
LEFT VENTRICLE MASS INDEX: 108 G/M2
LEFT VENTRICLE SYSTOLIC VOLUME INDEX: 12.7 ML/M2
LEFT VENTRICLE SYSTOLIC VOLUME: 29.19 ML
LEFT VENTRICULAR INTERNAL DIMENSION IN DIASTOLE: 4 CM (ref 3.5–6)
LEFT VENTRICULAR MASS: 247.66 G
LV LATERAL E/E' RATIO: 9.29 M/S
LV SEPTAL E/E' RATIO: 13 M/S
LVOT MG: 2.73 MMHG
LVOT MV: 0.8 CM/S
LYMPHOCYTES # BLD AUTO: 2.3 K/UL (ref 1–4.8)
LYMPHOCYTES NFR BLD: 25.3 % (ref 18–48)
MAGNESIUM SERPL-MCNC: 1.7 MG/DL (ref 1.6–2.6)
MCH RBC QN AUTO: 29.9 PG (ref 27–31)
MCHC RBC AUTO-ENTMCNC: 34 G/DL (ref 32–36)
MCV RBC AUTO: 88 FL (ref 82–98)
MONOCYTES # BLD AUTO: 0.8 K/UL (ref 0.3–1)
MONOCYTES NFR BLD: 8.3 % (ref 4–15)
MV PEAK A VEL: 1.37 M/S
MV PEAK E VEL: 0.65 M/S
MV STENOSIS PRESSURE HALF TIME: 38.16 MS
MV VALVE AREA P 1/2 METHOD: 5.77 CM2
NEUTROPHILS # BLD AUTO: 5.7 K/UL (ref 1.8–7.7)
NEUTROPHILS NFR BLD: 61.5 % (ref 38–73)
NONHDLC SERPL-MCNC: 238 MG/DL
NRBC BLD-RTO: 0 /100 WBC
PHOSPHATE SERPL-MCNC: 2.7 MG/DL (ref 2.7–4.5)
PISA AR MAX VEL: 2.51 M/S
PISA TR MAX VEL: 2.31 M/S
PLATELET # BLD AUTO: 321 K/UL (ref 150–450)
PMV BLD AUTO: 9.4 FL (ref 9.2–12.9)
POCT GLUCOSE: 174 MG/DL (ref 70–110)
POTASSIUM SERPL-SCNC: 3.1 MMOL/L (ref 3.5–5.1)
PROT SERPL-MCNC: 6.6 G/DL (ref 6–8.4)
PV MEAN GRADIENT: 2.04 MMHG
RA MAJOR: 3.4 CM
RA PRESSURE: 3 MMHG
RA WIDTH: 2.8 CM
RBC # BLD AUTO: 4.18 M/UL (ref 4.6–6.2)
SODIUM SERPL-SCNC: 138 MMOL/L (ref 136–145)
STJ: 3.38 CM
TDI LATERAL: 0.07 M/S
TDI SEPTAL: 0.05 M/S
TDI: 0.06 M/S
TR MAX PG: 21 MMHG
TRICUSPID ANNULAR PLANE SYSTOLIC EXCURSION: 1.9 CM
TRIGL SERPL-MCNC: 310 MG/DL (ref 30–150)
TROPONIN I SERPL DL<=0.01 NG/ML-MCNC: <0.006 NG/ML (ref 0–0.03)
TV REST PULMONARY ARTERY PRESSURE: 24 MMHG
WBC # BLD AUTO: 9.2 K/UL (ref 3.9–12.7)

## 2023-04-15 PROCEDURE — 84100 ASSAY OF PHOSPHORUS: CPT | Mod: HCNC | Performed by: NURSE PRACTITIONER

## 2023-04-15 PROCEDURE — 80053 COMPREHEN METABOLIC PANEL: CPT | Mod: HCNC | Performed by: NURSE PRACTITIONER

## 2023-04-15 PROCEDURE — 36415 COLL VENOUS BLD VENIPUNCTURE: CPT | Mod: HCNC | Performed by: NURSE PRACTITIONER

## 2023-04-15 PROCEDURE — 97162 PT EVAL MOD COMPLEX 30 MIN: CPT | Mod: HCNC

## 2023-04-15 PROCEDURE — G0378 HOSPITAL OBSERVATION PER HR: HCPCS | Mod: HCNC

## 2023-04-15 PROCEDURE — 25000003 PHARM REV CODE 250: Mod: HCNC | Performed by: NURSE PRACTITIONER

## 2023-04-15 PROCEDURE — 97165 OT EVAL LOW COMPLEX 30 MIN: CPT | Mod: HCNC

## 2023-04-15 PROCEDURE — 97530 THERAPEUTIC ACTIVITIES: CPT | Mod: HCNC

## 2023-04-15 PROCEDURE — 85025 COMPLETE CBC W/AUTO DIFF WBC: CPT | Mod: HCNC | Performed by: NURSE PRACTITIONER

## 2023-04-15 PROCEDURE — 97116 GAIT TRAINING THERAPY: CPT | Mod: HCNC

## 2023-04-15 PROCEDURE — 94761 N-INVAS EAR/PLS OXIMETRY MLT: CPT | Mod: HCNC

## 2023-04-15 PROCEDURE — 83735 ASSAY OF MAGNESIUM: CPT | Mod: HCNC | Performed by: NURSE PRACTITIONER

## 2023-04-15 PROCEDURE — 84484 ASSAY OF TROPONIN QUANT: CPT | Mod: HCNC | Performed by: NURSE PRACTITIONER

## 2023-04-15 RX ORDER — EZETIMIBE 10 MG/1
10 TABLET ORAL NIGHTLY
Status: DISCONTINUED | OUTPATIENT
Start: 2023-04-15 | End: 2023-04-15 | Stop reason: HOSPADM

## 2023-04-15 RX ORDER — GLUCAGON 1 MG
1 KIT INJECTION
Status: DISCONTINUED | OUTPATIENT
Start: 2023-04-15 | End: 2023-04-15 | Stop reason: HOSPADM

## 2023-04-15 RX ORDER — IBUPROFEN 200 MG
16 TABLET ORAL
Status: DISCONTINUED | OUTPATIENT
Start: 2023-04-15 | End: 2023-04-15

## 2023-04-15 RX ORDER — IBUPROFEN 200 MG
16 TABLET ORAL
Status: DISCONTINUED | OUTPATIENT
Start: 2023-04-15 | End: 2023-04-15 | Stop reason: HOSPADM

## 2023-04-15 RX ORDER — IBUPROFEN 200 MG
24 TABLET ORAL
Status: DISCONTINUED | OUTPATIENT
Start: 2023-04-15 | End: 2023-04-15

## 2023-04-15 RX ORDER — GLUCAGON 1 MG
1 KIT INJECTION
Status: DISCONTINUED | OUTPATIENT
Start: 2023-04-15 | End: 2023-04-15

## 2023-04-15 RX ORDER — CETIRIZINE HYDROCHLORIDE 5 MG/1
5 TABLET ORAL DAILY
Status: DISCONTINUED | OUTPATIENT
Start: 2023-04-15 | End: 2023-04-15 | Stop reason: HOSPADM

## 2023-04-15 RX ORDER — DEXTROSE 40 %
30 GEL (GRAM) ORAL
Status: DISCONTINUED | OUTPATIENT
Start: 2023-04-15 | End: 2023-04-15

## 2023-04-15 RX ORDER — HYDROCHLOROTHIAZIDE 25 MG/1
25 TABLET ORAL DAILY
Status: DISCONTINUED | OUTPATIENT
Start: 2023-04-15 | End: 2023-04-15 | Stop reason: HOSPADM

## 2023-04-15 RX ORDER — ATORVASTATIN CALCIUM 40 MG/1
40 TABLET, FILM COATED ORAL DAILY
Qty: 30 TABLET | Refills: 0 | Status: SHIPPED | OUTPATIENT
Start: 2023-04-16 | End: 2023-05-19 | Stop reason: SDUPTHER

## 2023-04-15 RX ORDER — NAPROXEN SODIUM 220 MG/1
81 TABLET, FILM COATED ORAL DAILY
Refills: 0 | COMMUNITY
Start: 2023-04-15 | End: 2024-02-26

## 2023-04-15 RX ORDER — DEXTROSE 40 %
15 GEL (GRAM) ORAL
Status: DISCONTINUED | OUTPATIENT
Start: 2023-04-15 | End: 2023-04-15

## 2023-04-15 RX ORDER — IBUPROFEN 200 MG
24 TABLET ORAL
Status: DISCONTINUED | OUTPATIENT
Start: 2023-04-15 | End: 2023-04-15 | Stop reason: HOSPADM

## 2023-04-15 RX ADMIN — CETIRIZINE HYDROCHLORIDE 5 MG: 5 TABLET ORAL at 09:04

## 2023-04-15 RX ADMIN — HYDROCHLOROTHIAZIDE 25 MG: 25 TABLET ORAL at 09:04

## 2023-04-15 RX ADMIN — CLOPIDOGREL BISULFATE 75 MG: 75 TABLET ORAL at 09:04

## 2023-04-15 RX ADMIN — THERA TABS 1 TABLET: TAB at 09:04

## 2023-04-15 RX ADMIN — ATORVASTATIN CALCIUM 40 MG: 40 TABLET, FILM COATED ORAL at 09:04

## 2023-04-15 NOTE — ASSESSMENT & PLAN NOTE
Patient's FSGs are controlled on current medication regimen.  Last A1c reviewed-   Lab Results   Component Value Date    HGBA1C 6.8 (H) 04/14/2023     Most recent fingerstick glucose reviewed-   Recent Labs   Lab 04/14/23  1705 04/15/23  0439   POCTGLUCOSE 167* 174*     Current correctional scale  Low  Maintain anti-hyperglycemic dose as follows-   Antihyperglycemics (From admission, onward)    Start     Stop Route Frequency Ordered    04/14/23 2156  insulin aspart U-100 pen 0-5 Units         -- SubQ Before meals & nightly PRN 04/14/23 2056      Plan:  -Hold Oral hypoglycemics while patient is in the hospital  -SSI  -Hyperglycemia protocol

## 2023-04-15 NOTE — PT/OT/SLP EVAL
Physical Therapy Evaluation and Discharge Note    Patient Name:  Mark Dickerson Sr.   MRN:  [id number]    Recommendations:     Discharge Recommendations: home  Discharge Equipment Recommendations: none   Barriers to discharge: None    Assessment:     Mark Dickerson Sr. is a 83 y.o. male admitted with a medical diagnosis of TIA (transient ischemic attack). .  At this time, patient is functioning at their prior level of function and does not require further acute PT services.     Recent Surgery: * No surgery found *      Plan:     During this hospitalization, patient does not require further acute PT services.  Please re-consult if situation changes.      Subjective     Chief Complaint: None stated   Patient/Family Comments/goals: Go home  Pain/Comfort:  Pain Rating 1: 0/10    Patients cultural, spiritual, [employer]ism conflicts given the current situation: no    Living Environment:  Patient lives alone on the same property as his son and daughter in law. Patient lives in a mobile home with 5 steps and hand rails to enter.   Prior to admission, patients level of function was independent, only using a SPC for long distance ambulation, driving.  Equipment used at home: cane, straight.  DME owned (not currently used): none.  Upon discharge, patient will have assistance from family.    Objective:     Communicated with MIYA Ferraro prior to session.  Patient found  seated on couch  with peripheral IV, telemetry upon PT entry to room.    General Precautions: Standard,      Orthopedic Precautions:N/A   Braces: N/A  Respiratory Status: Room air    Exams:  Cognitive Exam:  Patient is oriented to Person, Place, Time, and Situation  Sensation:    -       Intact  RLE ROM: WFL  RLE Strength: WFL  LLE ROM: WFL  LLE Strength: WFL    Functional Mobility:  Transfers:     Sit to Stand:  independence with no AD  Gait: 100 feet, independence with no AD    AM-PAC 6 CLICK MOBILITY  Total Score:21       Treatment and Education:    Initial PT eval  completed. Patient ambulated 100 feet, independent with no AD. Patient will not require skilled therapy services. Recommend DC home.     AM-PAC 6 CLICK MOBILITY  Total Score:21     Patient left  seated on couch  with call button in reach and daughter in law  present.    GOALS:   Multidisciplinary Problems       Physical Therapy Goals       Not on file                    History:     Past Medical History:   Diagnosis Date    CKD stage 3 due to type 2 diabetes mellitus 2/18/2021    DM (diabetes mellitus) 2014    BS didn't check 12/11/2019    DM (diabetes mellitus) 2014    BS didn't check 05/05/2022    Foreign body, eye     right eye    Hyperlipidemia     Hypertension     Need for shingles vaccine 11/20/2019    Pneumonia     Primary osteoarthritis of right knee 10/29/2021    Severe obesity (BMI 35.0-35.9 with comorbidity) 7/10/2019    Type 2 diabetes mellitus 2014    BS didn't check 12/12/2018       Past Surgical History:   Procedure Laterality Date    APPENDECTOMY      CATARACT EXTRACTION Bilateral     CPG       Time Tracking:     PT Received On: 04/15/23  PT Start Time: 0920     PT Stop Time: 0943  PT Total Time (min): 23 min     Billable Minutes: Evaluation 10 and Gait Training 13      04/15/2023

## 2023-04-15 NOTE — PLAN OF CARE
Initial PT eval completed. Patient ambulated 100 feet, independent with no AD. Patient will not require skilled therapy services. Recommend DC home.

## 2023-04-15 NOTE — HOSPITAL COURSE
"84 y/o male admitted after having an episode of difficulty articulating that lasted ~30 minutes and confusion. He was eating lunch with friends and got back in his truck and noticed he had difficulty finding words and speaking. He was able to drive home, a friend was in the vehicle with him, and said he was driving all over the road. Patient denies that and said he thought he was driving good. He denied any falls, head injury, or previous stroke/TIA. He was brought to ER for evaluation and family says he kept repeating himself en route here. Code stroke was called. He was loaded with Plavix 300 mg x 1 and started on 75 mg daily and a statin.   CT/MRI no acute findings. 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   Lipid panel with chol 268, , triglycerides 310. 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 He said he was on a statin at one time and it caused insomnia. But he is willing to try it again. Was informed to notify PCP/cardiologist if unable to tolerate to try a different one that may cause less side effects. He also takes Fish Oil at home.   Hgb A1C 6.8, continue glipizide.   Will change Plavix to aspirin 81 mg, patient reports he bruises easily. He was not on any antiplatelet therapy prior to admission.   Echo revealed EF 60%, normal diastolic/systolic function, mod to severe aortic valve stenosis; mild aortic regurgitation. Patient said he is aware of the aortic stenosis and regurgitation, followed by Dr. Montenegro at [facility] cardiology. Last Echo 4/3/2022 revealed moderate aortic valve stenosis. Patient denies angina or dyspnea.     Potassium 3.1, repleted, will need follow up with PCP for repeat labs.     Will need to follow up with Dr. Montenegro after discharge for further monitoring and management.   Follow up with PCP in 3-5 days for hospital follow up.  Educated on cardiac/low sodium diet.     Referral sent to [facility] home " program.     Patient seen and examined on the day of discharge.  All questions and concerns were addressed prior to discharge.    Face to face encounter with patient: 32 minutes

## 2023-04-15 NOTE — H&P
[facility]  History & Physical    Patient Name: Mark Dickerson Sr.  MRN: [id number]  Patient Class: OP- Observation  Admission Date: 4/14/2023  Attending Physician: Judah Oden MD   Primary Care Provider: Tatyana Cannon MD         Patient information was obtained from patient, past medical records and ER records.     Subjective:     Principal Problem:TIA (transient ischemic attack)    Chief Complaint:   Chief Complaint   Patient presents with    Speech Problem     Pt's daughter in law reports several minutes of confusion and speech difficulties.  Last known normal was approximately 45 minutes-60 minutes prior to arrival.  Family states his speech is currently at baseline, but he was repeating himself on the way to the hospital.  Pt's daughter-in-law also reports intermittent increasing confusion x 2-3 weeks.        HPI: Mark Dickerson Sr. is a 83 y.o. male with a PMH  has a past medical history of CKD stage 3 due to type 2 diabetes mellitus (2/18/2021), DM (diabetes mellitus) (2014), DM (diabetes mellitus) (2014), Foreign body, eye, Hyperlipidemia, Hypertension, Need for shingles vaccine (11/20/2019), Pneumonia, Primary osteoarthritis of right knee (10/29/2021), Severe obesity (BMI 35.0-35.9 with comorbidity) (7/10/2019), and Type 2 diabetes mellitus (2014).  Presented to the ER for evaluation of difficulty speaking/finding his words which started roughly 30 minutes prior to arrival to our facility.  Patient last known normal was approximally 45-60 minutes prior to arrival to our facility.  While EN route to the facility patient kept repeating himself, but is now reportedly at his baseline mental status.  Denies previous history of TIA/CVA.  Denies any associated weakness/numbness/tingling/facial droop, or any other symptoms associated with presentation.  However, patient's daughter-in-law is noted some increased confusion over the past couple of weeks, but patient denies  symptoms.  ER workup mostly unremarkable with CBG reading of 163 mg/dL.  Chest x-ray without acute cardiopulmonary findings.  CT of the head was negative for acute intracranial abnormalities.  TSH negative.  Coags negative.  CBC and CMP within normal limits.  EKG revealed sinus rhythm with occasional PACs with a ventricular rate of 100 beats per minute with a QT/QTC of 362/466.  Code stroke was paged overhead.      Neurovascular tele consult was initiated.  Recommendations include:  --Load with Plavix [id number] now and start 75mg daily from tomorrow.   --Will hold off on DAPT as he states that he bruises easily and has had bleeding in the past w/ aspirin.  -- Lipitor 40mg daily  -- MRI brain w/ MRA head and neck. Recommend permissive HTN w/ SBP >220 until MRI brain definitely rules out an acute infarct.  -- TTE w/o bubble  -- Lipid panel, Hb A1c and TSH    Hospital Medicine consulted to admit patient for TIA workup.  Patient is in agreement with treatment plan.  Patient will be placed under observation status.    PCP: Tatyana Cannon        Past Medical History:   Diagnosis Date    CKD stage 3 due to type 2 diabetes mellitus 2/18/2021    DM (diabetes mellitus) 2014    BS didn't check 12/11/2019    DM (diabetes mellitus) 2014    BS didn't check 05/05/2022    Foreign body, eye     right eye    Hyperlipidemia     Hypertension     Need for shingles vaccine 11/20/2019    Pneumonia     Primary osteoarthritis of right knee 10/29/2021    Severe obesity (BMI 35.0-35.9 with comorbidity) 7/10/2019    Type 2 diabetes mellitus 2014    BS didn't check 12/12/2018       Past Surgical History:   Procedure Laterality Date    APPENDECTOMY      CATARACT EXTRACTION Bilateral     CPG       Review of patient's allergies indicates:  No Known Allergies    No current facility-administered medications on file prior to encounter.     Current Outpatient Medications on File Prior to Encounter   Medication Sig    glipiZIDE (GLUCOTROL) 5  MG tablet Take 1 tablet (5 mg total) by mouth 2 (two) times daily with meals.    hydroCHLOROthiazide (HYDRODIURIL) 25 MG tablet Take 1 tablet (25 mg total) by mouth once daily.    levocetirizine (XYZAL) 5 MG tablet Take 1 tablet (5 mg total) by mouth every evening.    multivitamin (THERAGRAN) per tablet Take 1 tablet by mouth once daily.     Family History       Problem Relation (Age of Onset)    Allergic rhinitis Daughter    Cancer Son    Cataracts Father    Heart disease Mother, Father    Hyperlipidemia Father    Hypertension Father, Sister, Brother          Tobacco Use    Smoking status: Former     Packs/day: 3.00     Types: Cigarettes     Start date:      Quit date:      Years since quittin.3    Smokeless tobacco: Never   Substance and Sexual Activity    Alcohol use: No    Drug use: No    Sexual activity: Never     Review of Systems   Neurological:  Positive for speech difficulty. Negative for dizziness, facial asymmetry, weakness, light-headedness, numbness and headaches.   All other systems reviewed and are negative.  Objective:     Vital Signs (Most Recent):  Temp: 98.1 °F (36.7 °C) (04/15/23 043)  Pulse: 86 (04/15/23 0609)  Resp: 16 (04/15/23 0437)  BP: 130/68 (04/15/23 0437)  SpO2: 95 % (04/15/23 0437) Vital Signs (24h Range):  Temp:  [97.8 °F (36.6 °C)-98.1 °F (36.7 °C)] 98.1 °F (36.7 °C)  Pulse:  [] 86  Resp:  [16-20] 16  SpO2:  [95 %-97 %] 95 %  BP: (116-139)/(68-85) 130/68     Weight: 113.8 kg (250 lb 14.1 oz)  Body mass index is 36 kg/m².    Physical Exam  Vitals and nursing note reviewed.   Constitutional:       General: He is awake. He is not in acute distress.     Appearance: Normal appearance. He is well-developed and well-groomed. He is not ill-appearing, toxic-appearing or diaphoretic.   HENT:      Head: Normocephalic and atraumatic.      Mouth/Throat:      Mouth: Mucous membranes are moist.      Pharynx: Oropharynx is clear.   Eyes:      Extraocular Movements:  Extraocular movements intact.      Conjunctiva/sclera: Conjunctivae normal.      Pupils: Pupils are equal, round, and reactive to light.   Cardiovascular:      Rate and Rhythm: Normal rate and regular rhythm.      Pulses: Normal pulses.      Heart sounds: Normal heart sounds. No murmur heard.  Pulmonary:      Effort: Pulmonary effort is normal.      Breath sounds: Normal breath sounds.   Abdominal:      General: Bowel sounds are normal.      Palpations: Abdomen is soft.      Tenderness: There is no abdominal tenderness.   Musculoskeletal:      Cervical back: Normal range of motion and neck supple.      Comments: 5/5 strength throughout   Skin:     General: Skin is warm and dry.      Capillary Refill: Capillary refill takes less than 2 seconds.   Neurological:      General: No focal deficit present.      Mental Status: He is alert and oriented to person, place, and time. Mental status is at baseline.      GCS: GCS eye subscore is 4. GCS verbal subscore is 5. GCS motor subscore is 6.      Cranial Nerves: Cranial nerves 2-12 are intact.      Motor: Motor function is intact.      Coordination: Coordination is intact.      Deep Tendon Reflexes: Reflexes are normal and symmetric.      Comments: NIH score 0.   Psychiatric:         Mood and Affect: Mood normal.         Behavior: Behavior normal. Behavior is cooperative.         CRANIAL NERVES     CN III, IV, VI   Pupils are equal, round, and reactive to light.   LABS:  Recent Results (from the past 24 hour(s))   POCT glucose    Collection Time: 04/14/23  5:05 PM   Result Value Ref Range    POCT Glucose 167 (H) 70 - 110 mg/dL   CBC W/ AUTO DIFFERENTIAL    Collection Time: 04/14/23  5:55 PM   Result Value Ref Range    WBC 9.93 3.90 - 12.70 K/uL    RBC 4.37 (L) 4.60 - 6.20 M/uL    Hemoglobin 13.1 (L) 14.0 - 18.0 g/dL    Hematocrit 39.0 (L) 40.0 - 54.0 %    MCV 89 82 - 98 fL    MCH 30.0 27.0 - 31.0 pg    MCHC 33.6 32.0 - 36.0 g/dL    RDW 13.5 11.5 - 14.5 %    Platelets 333 150  - 450 K/uL    MPV 9.3 9.2 - 12.9 fL    Immature Granulocytes 0.2 0.0 - 0.5 %    Gran # (ANC) 6.7 1.8 - 7.7 K/uL    Immature Grans (Abs) 0.02 0.00 - 0.04 K/uL    Lymph # 2.2 1.0 - 4.8 K/uL    Mono # 0.7 0.3 - 1.0 K/uL    Eos # 0.4 0.0 - 0.5 K/uL    Baso # 0.09 0.00 - 0.20 K/uL    nRBC 0 0 /100 WBC    Gran % 67.0 38.0 - 73.0 %    Lymph % 21.8 18.0 - 48.0 %    Mono % 6.6 4.0 - 15.0 %    Eosinophil % 3.5 0.0 - 8.0 %    Basophil % 0.9 0.0 - 1.9 %    Differential Method Automated    Comprehensive metabolic panel    Collection Time: 04/14/23  5:55 PM   Result Value Ref Range    Sodium 138 136 - 145 mmol/L    Potassium 3.5 3.5 - 5.1 mmol/L    Chloride 99 95 - 110 mmol/L    CO2 25 23 - 29 mmol/L    Glucose 163 (H) 70 - 110 mg/dL    BUN 22 8 - 23 mg/dL    Creatinine 1.3 0.5 - 1.4 mg/dL    Calcium 9.7 8.7 - 10.5 mg/dL    Total Protein 7.6 6.0 - 8.4 g/dL    Albumin 3.6 3.5 - 5.2 g/dL    Total Bilirubin 0.4 0.1 - 1.0 mg/dL    Alkaline Phosphatase 68 55 - 135 U/L    AST 28 10 - 40 U/L    ALT 25 10 - 44 U/L    Anion Gap 14 8 - 16 mmol/L    eGFR 54 (A) >60 mL/min/1.73 m^2   Protime-INR    Collection Time: 04/14/23  5:55 PM   Result Value Ref Range    Prothrombin Time 10.6 9.0 - 12.5 sec    INR 1.0 0.8 - 1.2   TSH    Collection Time: 04/14/23  5:55 PM   Result Value Ref Range    TSH 0.921 0.400 - 4.000 uIU/mL   LDL - Lipid Panel    Collection Time: 04/14/23  5:55 PM   Result Value Ref Range    Cholesterol 268 (H) 120 - 199 mg/dL    Triglycerides 310 (H) 30 - 150 mg/dL    HDL 30 (L) 40 - 75 mg/dL    LDL Cholesterol 176.0 (H) 63.0 - 159.0 mg/dL    HDL/Cholesterol Ratio 11.2 (L) 20.0 - 50.0 %    Total Cholesterol/HDL Ratio 8.9 (H) 2.0 - 5.0    Non-HDL Cholesterol 238 mg/dL   HIV 1/2 Ag/Ab (4th Gen)    Collection Time: 04/14/23  5:55 PM   Result Value Ref Range    HIV 1/2 Ag/Ab Negative Negative   Hepatitis C Antibody    Collection Time: 04/14/23  5:55 PM   Result Value Ref Range    Hepatitis C Ab Negative Negative   HCV Virus Hold  Specimen    Collection Time: 04/14/23  5:55 PM   Result Value Ref Range    HEP C Virus Hold Specimen Hold for HCV sendout    Urinalysis, Reflex to Urine Culture Urine, Clean Catch    Collection Time: 04/14/23  9:05 PM    Specimen: Urine   Result Value Ref Range    Specimen UA Urine, Clean Catch     Color, UA Yellow Yellow, Straw, Arti    Appearance, UA Clear Clear    pH, UA 6.0 5.0 - 8.0    Specific Gravity, UA 1.010 1.005 - 1.030    Protein, UA Negative Negative    Glucose, UA Negative Negative    Ketones, UA Negative Negative    Bilirubin (UA) Negative Negative    Occult Blood UA Negative Negative    Nitrite, UA Negative Negative    Urobilinogen, UA Negative <2.0 EU/dL    Leukocytes, UA Negative Negative   Hemoglobin A1c    Collection Time: 04/14/23  9:16 PM   Result Value Ref Range    Hemoglobin A1C 6.8 (H) 4.0 - 5.6 %    Estimated Avg Glucose 148 (H) 68 - 131 mg/dL   POCT glucose    Collection Time: 04/15/23  4:39 AM   Result Value Ref Range    POCT Glucose 174 (H) 70 - 110 mg/dL   CBC auto differential    Collection Time: 04/15/23  4:59 AM   Result Value Ref Range    WBC 9.20 3.90 - 12.70 K/uL    RBC 4.18 (L) 4.60 - 6.20 M/uL    Hemoglobin 12.5 (L) 14.0 - 18.0 g/dL    Hematocrit 36.8 (L) 40.0 - 54.0 %    MCV 88 82 - 98 fL    MCH 29.9 27.0 - 31.0 pg    MCHC 34.0 32.0 - 36.0 g/dL    RDW 13.6 11.5 - 14.5 %    Platelets 321 150 - 450 K/uL    MPV 9.4 9.2 - 12.9 fL    Immature Granulocytes 0.2 0.0 - 0.5 %    Gran # (ANC) 5.7 1.8 - 7.7 K/uL    Immature Grans (Abs) 0.02 0.00 - 0.04 K/uL    Lymph # 2.3 1.0 - 4.8 K/uL    Mono # 0.8 0.3 - 1.0 K/uL    Eos # 0.4 0.0 - 0.5 K/uL    Baso # 0.08 0.00 - 0.20 K/uL    nRBC 0 0 /100 WBC    Gran % 61.5 38.0 - 73.0 %    Lymph % 25.3 18.0 - 48.0 %    Mono % 8.3 4.0 - 15.0 %    Eosinophil % 3.8 0.0 - 8.0 %    Basophil % 0.9 0.0 - 1.9 %    Differential Method Automated    Comprehensive metabolic panel    Collection Time: 04/15/23  5:00 AM   Result Value Ref Range    Sodium 138 136 -  145 mmol/L    Potassium 3.1 (L) 3.5 - 5.1 mmol/L    Chloride 102 95 - 110 mmol/L    CO2 25 23 - 29 mmol/L    Glucose 186 (H) 70 - 110 mg/dL    BUN 21 8 - 23 mg/dL    Creatinine 1.2 0.5 - 1.4 mg/dL    Calcium 9.1 8.7 - 10.5 mg/dL    Total Protein 6.6 6.0 - 8.4 g/dL    Albumin 3.2 (L) 3.5 - 5.2 g/dL    Total Bilirubin 0.2 0.1 - 1.0 mg/dL    Alkaline Phosphatase 70 55 - 135 U/L    AST 24 10 - 40 U/L    ALT 22 10 - 44 U/L    Anion Gap 11 8 - 16 mmol/L    eGFR 59 (A) >60 mL/min/1.73 m^2   Magnesium    Collection Time: 04/15/23  5:00 AM   Result Value Ref Range    Magnesium 1.7 1.6 - 2.6 mg/dL   Phosphorus    Collection Time: 04/15/23  5:00 AM   Result Value Ref Range    Phosphorus 2.7 2.7 - 4.5 mg/dL   Troponin I    Collection Time: 04/15/23  5:00 AM   Result Value Ref Range    Troponin I <0.006 0.000 - 0.026 ng/mL       RADIOLOGY  X-Ray Chest 1 View    Result Date: 4/14/2023  EXAMINATION: XR CHEST 1 VIEW CLINICAL HISTORY: Cerebral infarction, unspecified COMPARISON: 04/02/2022 FINDINGS: The size of the heart is prominent.  There is no focal pulmonary infiltrate visualized.  There is no pneumothorax.  The costophrenic angles are sharp.     1. There is no focal pulmonary infiltrate visualized. 2. The size of the heart is prominent.  Some of this may be secondary to magnification. . Electronically signed by: Marco Elliott MD Date:    04/14/2023 Time:    18:32    CT Head Without Contrast    Result Date: 4/14/2023  EXAMINATION: CT HEAD WITHOUT CONTRAST CLINICAL HISTORY: Neuro deficit, acute, stroke suspected; TECHNIQUE: Contiguous axial images were obtained from the skull base through the vertex without intravenous contrast. COMPARISON: Head CT 04/02/2022 FINDINGS: No intracranial hemorrhage. No mass effect or midline shift. Normal parenchymal attenuation. The ventricles and sulci are normal in size and configuration. Paranasal sinuses are clear.  Chronic right mastoid air cell effusion.  Left mastoid air cells are clear.   No concerning osseous findings.     No CT evidence of acute intracranial abnormality. Chronic right mastoid air cell effusion. ASPECTS score is 10. Findings communicated with Dr. Lockwood at 17:11 on 04/14/2023. All CT scans at this facility use dose modulation, iterative reconstruction, and/or weight base dosing when appropriate to reduce radiation dose to as low as reasonably achievable. Electronically signed by: Timmy Thornton Date:    04/14/2023 Time:    17:12    MRA Brain    Result Date: 4/14/2023  EXAMINATION: MRA BRAIN WITHOUT CONTRAST CLINICAL HISTORY: Stroke/TIA, determine embolic source; TECHNIQUE: Non-contrast 3-D time-of-flight intracranial MR angiography was performed through the [place] [facility] [place] with MIP reformatting. COMPARISON: None FINDINGS: Mild luminal irregularities of the intracranial vasculature but no evidence for hemodynamically significant stenosis.  Flow enhancement of the middle cerebral anterior cerebral and posterior cerebral arteries are noted.     As above is Electronically signed by: Jhoan Roman Date:    04/14/2023 Time:    23:26    MRI BRAIN WITHOUT CONTRAST    Result Date: 4/14/2023  EXAMINATION: MRI BRAIN WITHOUT CONTRAST CLINICAL HISTORY: Transient ischemic attack (TIA); TECHNIQUE: Sagittal T1. Axial T1, T2, T2 FLAIR, GRE, DWI. Coronal T2 FLAIR. COMPARISON: None available. FINDINGS: There is no restricted diffusion.  Periventricular and subcortical FLAIR hyperintensities consistent with chronic microvascular ischemic changes mild motion artifacts. The ventricles and sulci are normal in size and configuration. Midline structures are normal. There are preserved arterial flow-voids on T2 weighted imaging. Right mastoid air cell opacification. No abnormal marrow signal is identified.     No evidence for acute infarct Atrophy and chronic microvascular ischemic changes not unusual for stated age.  A Electronically signed by: Jhoan Roman Date:    04/14/2023 Time:    23:21    MRA Neck  without contrast    Result Date: 4/14/2023  EXAMINATION: MRA NECK WITHOUT CONTRAST CLINICAL HISTORY: Stroke/TIA, determine embolic source; TECHNIQUE: Non-contrast 2D and/or 3D time-of-flight MR angiography of the neck was performed, with MIP reformatting. COMPARISON: None FINDINGS: Mild moderate atherosclerotic narrowing of the carotid bifurcation bilaterally.  Flow enhancement in the internal carotid artery and vertebral arteries noted.  Arch vessels are not well evaluated.  Vertebral arteries are patent.  Right dominant vertebral artery noted.     Mild moderate atherosclerotic changes of the carotid bifurcation bilaterally.  No hemodynamically significant stenosis of the carotid and vertebral arteries. Electronically signed by: Jhoan Roman Date:    04/14/2023 Time:    23:25      EKG    MICROBIOLOGY    MDM     Amount and/or Complexity of Data Reviewed  Clinical lab tests: reviewed  Tests in the radiology section of CPT®: reviewed  Tests in the medicine section of CPT®: reviewed  Discussion of test results with the performing providers: yes  Decide to obtain previous medical records or to obtain history from someone other than the patient: yes  Obtain history from someone other than the patient: yes  Review and summarize past medical records: yes  Discuss the patient with other providers: yes  Independent visualization of images, tracings, or specimens: yes          Assessment/Plan:     * TIA (transient ischemic attack)    Antithrombotics for secondary stroke prevention: Antiplatelets: Aspirin: 81 mg daily  Clopidogrel: 300 mg loading dose x 1, now  Clopidogrel: 75 mg daily    Statins for secondary stroke prevention and hyperlipidemia, if present:   Statins: Atorvastatin- 40 mg daily    Aggressive risk factor modification: HTN, DM, HLD, Obesity     Rehab efforts: The patient has been evaluated by a stroke team provider and the therapy needs have been fully considered based off the presenting complaints and exam  findings. The following therapy evaluations are needed: PT evaluate and treat, OT evaluate and treat, SLP evaluate and treat    Diagnostics ordered/pending: CT scan of head without contrast to asses brain parenchyma, HgbA1C to assess blood glucose levels, Lipid Profile to assess cholesterol levels, MRA head to assess vasculature, MRA neck/arch to assess vasculature, TTE to assess cardiac function/status , TSH to assess thyroid function    VTE prophylaxis: Mechanical prophylaxis: Place SCDs  None: Reason for No Pharmacological VTE Prophylaxis: Ambulating with or without assistance    BP parameters: TIA: SBP <220 until imaging confirmation of no infarct         Controlled type 2 diabetes mellitus without complication, without long-term current use of insulin  Patient's FSGs are controlled on current medication regimen.  Last A1c reviewed-   Lab Results   Component Value Date    HGBA1C 6.8 (H) 04/14/2023     Most recent fingerstick glucose reviewed-   Recent Labs   Lab 04/14/23  1705 04/15/23  0439   POCTGLUCOSE 167* 174*     Current correctional scale  Low  Maintain anti-hyperglycemic dose as follows-   Antihyperglycemics (From admission, onward)    Start     Stop Route Frequency Ordered    04/14/23 2156  insulin aspart U-100 pen 0-5 Units         -- SubQ Before meals & nightly PRN 04/14/23 2056      Plan:  -Hold Oral hypoglycemics while patient is in the hospital  -SSI  -Hyperglycemia protocol    Hypertension associated with diabetes  Currently normotensive. BP usually well controlled per patient with home medications.  Plan:  -Optimize pain control   -Continue home medications (hctz), titrate as needed   -Monitor BP  -Low salt/cardiac diet when not NPO  -IV hydralazine prn for SBP>160 or DBP>90         Hyperlipidemia associated with type 2 diabetes mellitus  Patient is not chronically on statin.will continue for now. Last Lipid Panel:   Lab Results   Component Value Date    CHOL 268 (H) 04/14/2023    HDL 30 (L)  04/14/2023    LDLCALC 176.0 (H) 04/14/2023    TRIG 310 (H) 04/14/2023    CHOLHDL 11.2 (L) 04/14/2023     Plan:  -Initiate on 40mg lipitor  -low fat/low calorie diet      Obesity with serious comorbidity  Body mass index is 36 kg/m². Morbid obesity complicates all aspects of disease management from diagnostic modalities to treatment. Weight loss encouraged and health benefits explained to patient.         VTE Risk Mitigation (From admission, onward)         Ordered     IP VTE HIGH RISK PATIENT  Once         04/14/23 2055     Place sequential compression device  Until discontinued         04/14/23 2055              //Core Measures   -DVT proph: SCDs, initiated on plavix, ambulatory  -Code status full    -Surrogate: son    Components of this note were documented using a voice recognition system and are subject to errors not corrected at the time the document was proof read. Please contact the author for any clarifications.       Robles Veras NP  Department of Hospital Medicine  [facility] - Telemetry ([facility])

## 2023-04-15 NOTE — DISCHARGE SUMMARY
[facility] ER Medicine  Discharge Summary      Patient Name: Mark Dickerson Sr.  MRN: [id number]  [facility]: [id number]  Patient Class: OP- Observation  Admission Date: 4/14/2023  Hospital Length of Stay: 0 days  Discharge Date and Time: 4/15/2023 12:21 PM  Attending Physician: No att. providers found   Discharging Provider: Pauline Link NP  Primary Care Provider: Tatyana Cannon MD    Primary Care Team: Networked reference to record PCT     HPI:   Mark Dickerson Sr. is a 83 y.o. male with a PMH  has a past medical history of CKD stage 3 due to type 2 diabetes mellitus (2/18/2021), DM (diabetes mellitus) (2014), DM (diabetes mellitus) (2014), Foreign body, eye, Hyperlipidemia, Hypertension, Need for shingles vaccine (11/20/2019), Pneumonia, Primary osteoarthritis of right knee (10/29/2021), Severe obesity (BMI 35.0-35.9 with comorbidity) (7/10/2019), and Type 2 diabetes mellitus (2014).  Presented to the ER for evaluation of difficulty speaking/finding his words which started roughly 30 minutes prior to arrival to our facility.  Patient last known normal was approximally 45-60 minutes prior to arrival to our facility.  While EN route to the facility patient kept repeating himself, but is now reportedly at his baseline mental status.  Denies previous history of TIA/CVA.  Denies any associated weakness/numbness/tingling/facial droop, or any other symptoms associated with presentation.  However, patient's daughter-in-law is noted some increased confusion over the past couple of weeks, but patient denies symptoms.  ER workup mostly unremarkable with CBG reading of 163 mg/dL.  Chest x-ray without acute cardiopulmonary findings.  CT of the head was negative for acute intracranial abnormalities.  TSH negative.  Coags negative.  CBC and CMP within normal limits.  EKG revealed sinus rhythm with occasional PACs with a ventricular rate of 100 beats per minute with a QT/QTC of 362/466.  Code stroke was  "paged overhead.      Neurovascular tele consult was initiated.  Recommendations include:  --Load with Plavix [id number] now and start 75mg daily from tomorrow.   --Will hold off on DAPT as he states that he bruises easily and has had bleeding in the past w/ aspirin.  -- Lipitor 40mg daily  -- MRI brain w/ MRA head and neck. Recommend permissive HTN w/ SBP >220 until MRI brain definitely rules out an acute infarct.  -- TTE w/o bubble  -- Lipid panel, Hb A1c and TSH    Hospital Medicine consulted to admit patient for TIA workup.  Patient is in agreement with treatment plan.  Patient will be placed under observation status.    PCP: Tatyana Cannon      * No surgery found *      Hospital Course:   82 y/o male admitted after having an episode of difficulty articulating that lasted ~30 minutes and confusion. He was eating lunch with friends and got back in his truck and noticed he had difficulty finding words and speaking. He was able to drive home, a friend was in the vehicle with him, and said he was driving all over the road. Patient denies that and said he thought he was driving good. He denied any falls, head injury, or previous stroke/TIA. He was brought to ER for evaluation and family says he kept repeating himself en route here. Code stroke was called. He was loaded with Plavix 300 mg x 1 and started on 75 mg daily and a statin.   CT/MRI no acute findings. 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   Lipid panel with chol 268, , triglycerides 310. 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 He said he was on a statin at one time and it caused insomnia. But he is willing to try it again. Was informed to notify PCP/cardiologist if unable to tolerate to try a different one that may cause less side effects. He also takes Fish Oil at home.   Hgb A1C 6.8, continue glipizide.   Will change Plavix to aspirin 81 mg, patient reports he bruises easily. " He was not on any antiplatelet therapy prior to admission.   Echo revealed EF 60%, normal diastolic/systolic function, mod to severe aortic valve stenosis; mild aortic regurgitation. Patient said he is aware of the aortic stenosis and regurgitation, followed by Dr. Montenegro at [facility] cardiology. Last Echo 4/3/2022 revealed moderate aortic valve stenosis. Patient denies angina or dyspnea.     Potassium 3.1, repleted, will need follow up with PCP for repeat labs.     Will need to follow up with Dr. Montenegro after discharge for further monitoring and management.   Follow up with PCP in 3-5 days for hospital follow up.  Educated on cardiac/low sodium diet.     Referral sent to [facility].     Patient seen and examined on the day of discharge.  All questions and concerns were addressed prior to discharge.    Face to face encounter with patient: 32 minutes     Goals of Care Treatment Preferences:  Code Status: Full Code      Consults:   Consults (From admission, onward)          Status Ordering Provider     IP consult to case management/social work  Once        Provider:  (Not yet assigned)    Completed ROSS PRIETO            Neuro  * TIA (transient ischemic attack)    Antithrombotics for secondary stroke prevention: Antiplatelets: Aspirin: 81 mg daily  Clopidogrel: 300 mg loading dose x 1, now  Clopidogrel: 75 mg daily    Statins for secondary stroke prevention and hyperlipidemia, if present:   Statins: Atorvastatin- 40 mg daily    Aggressive risk factor modification: HTN, DM, HLD, Obesity     Rehab efforts: The patient has been evaluated by a stroke team provider and the therapy needs have been fully considered based off the presenting complaints and exam findings. The following therapy evaluations are needed: PT evaluate and treat, OT evaluate and treat, SLP evaluate and treat    Diagnostics ordered/pending: CT scan of head without contrast to asses brain parenchyma, HgbA1C to assess blood glucose  levels, Lipid Profile to assess cholesterol levels, MRA head to assess vasculature, MRA neck/arch to assess vasculature, TTE to assess cardiac function/status , TSH to assess thyroid function    VTE prophylaxis: Mechanical prophylaxis: Place SCDs  None: Reason for No Pharmacological VTE Prophylaxis: Ambulating with or without assistance    BP parameters: TIA: SBP <220 until imaging confirmation of no infarct      -MRI no acute infarct  -MRA revealed mild to moderate atherosclerosis in carotids  -Follows Dr. Montenegro OP  -Will continue ASA 81 mg and statin on discharge  -educated on lifestyle modifications, diet/exercise    Cardiac/Vascular  Hypertension associated with diabetes  Chronic, controlled  -Latest blood pressure and vitals reviewed-   Temp:  [97.8 °F (36.6 °C)-98.2 °F (36.8 °C)]   Pulse:  []   Resp:  [16-20]   BP: (116-139)/(68-85)   SpO2:  [92 %-97 %] .   -Home meds for hypertension were reviewed and noted below.   Hypertension Medications               hydroCHLOROthiazide (HYDRODIURIL) 25 MG tablet Take 1 tablet (25 mg total) by mouth once daily.          -While in the hospital, will manage blood pressure as follows; Continue home antihypertensive regimen  -PRN anti-hypertensive medication if patient's BP > 160/100   -optimize pain management  -follow up with PCP after discharge    Hyperlipidemia associated with type 2 diabetes mellitus  Patient is not chronically on statin.will continue for now. Last Lipid Panel:   Lab Results   Component Value Date    CHOL 268 (H) 04/14/2023    HDL 30 (L) 04/14/2023    LDLCALC 176.0 (H) 04/14/2023    TRIG 310 (H) 04/14/2023    CHOLHDL 11.2 (L) 04/14/2023   -Initiate on 40mg lipitor  -encourage low sodium/cardiac diet    Endocrine  Obesity with serious comorbidity  Body mass index is 35.87 kg/m². Morbid obesity complicates all aspects of disease management from diagnostic modalities to treatment. Weight loss encouraged and health benefits explained to  patient.    Controlled type 2 diabetes mellitus without complication, without long-term current use of insulin  Patient's FSGs are controlled on current medication regimen.  Last A1c reviewed-   Lab Results   Component Value Date    HGBA1C 6.8 (H) 04/14/2023     Most recent fingerstick glucose reviewed-   Recent Labs   Lab 04/14/23  1705 04/15/23  0439   POCTGLUCOSE 167* 174*     Current correctional scale  Low  Maintain anti-hyperglycemic dose as follows-   Antihyperglycemics (From admission, onward)      None          -Hold Oral hypoglycemics while patient is in the hospital, restart on discharge  -SSI  -Hyperglycemia protocol      Final Active Diagnoses:    Diagnosis Date Noted POA    PRINCIPAL PROBLEM:  TIA (transient ischemic attack) [G45.9] 04/14/2023 Yes    Obesity with serious comorbidity [E66.9] 04/22/2022 Yes    Hypertension associated with diabetes [E11.59, I15.2] 04/03/2022 Yes    Hyperlipidemia associated with type 2 diabetes mellitus [E11.69, E78.5] 07/28/2017 Yes    Controlled type 2 diabetes mellitus without complication, without long-term current use of insulin [E11.9] 07/28/2017 Yes      Problems Resolved During this Admission:       Discharged Condition: good    Disposition: Home or Self Care    Follow Up:   Follow-up Information       Tatyana Cannon MD. Schedule an appointment as soon as possible for a visit in 3 day(s).    Specialty: Family Medicine  Why: call office and schedule a hospital follow up in 3-5 days  Contact information:  [address] [address] [address] [phone]               Damir Montenegro MD. Schedule an appointment as soon as possible for a visit in 1 week(s).    Specialty: Internal Medicine  Why: call office and schedule hospital follow up in 1-2 weeks  Contact information:  [address] [facility]  [address]  [facility] [address] [phone]                           Patient Instructions:      Ambulatory referral/consult to [facility]   Standing  Status: Future   Referral Priority: Routine Referral Type: Consultation   Referral Reason: Specialty Services Required   Number of Visits Requested: 1     Diet Cardiac     Diet diabetic     Activity as tolerated       Significant Diagnostic Studies: Labs: CMP   Recent Labs   Lab 04/14/23  1755 04/15/23  0500    138   K 3.5 3.1*   CL 99 102   CO2 25 25   * 186*   BUN 22 21   CREATININE 1.3 1.2   CALCIUM 9.7 9.1   PROT 7.6 6.6   ALBUMIN 3.6 3.2*   BILITOT 0.4 0.2   ALKPHOS 68 70   AST 28 24   ALT 25 22   ANIONGAP 14 11   , CBC   Recent Labs   Lab 04/14/23  1755 04/15/23  0459   WBC 9.93 9.20   HGB 13.1* 12.5*   HCT 39.0* 36.8*    321   , INR   Lab Results   Component Value Date    INR 1.0 04/14/2023   , Lipid Panel   Lab Results   Component Value Date    CHOL 268 (H) 04/14/2023    HDL 30 (L) 04/14/2023    LDLCALC 176.0 (H) 04/14/2023    TRIG 310 (H) 04/14/2023    CHOLHDL 11.2 (L) 04/14/2023   , Troponin   Recent Labs   Lab 04/15/23  0500   TROPONINI <0.006   , and A1C:   Recent Labs   Lab 11/22/22  0813 04/14/23  2116   HGBA1C 7.2* 6.8*     Microbiology: Blood Culture No results found for: LABBLOO  Radiology: X-Ray: CXR: X-Ray Chest 1 View (CXR):   Results for orders placed or performed during the hospital encounter of 04/14/23   X-Ray Chest 1 View    Narrative    EXAMINATION:  XR CHEST 1 VIEW    CLINICAL HISTORY:  Cerebral infarction, unspecified    COMPARISON:  04/02/2022    FINDINGS:  The size of the heart is prominent.  There is no focal pulmonary infiltrate visualized.  There is no pneumothorax.  The costophrenic angles are sharp.      Impression    1. There is no focal pulmonary infiltrate visualized.  2. The size of the heart is prominent.  Some of this may be secondary to magnification.  .      Electronically signed by: Marco Elliott MD  Date:    04/14/2023  Time:    18:32       Pending Diagnostic Studies:       None           Medications:  Reconciled Home Medications:      Medication  List        START taking these medications      aspirin 81 MG Chew  Take 1 tablet (81 mg total) by mouth once daily.     atorvastatin 40 MG tablet  Commonly known as: LIPITOR  Take 1 tablet (40 mg total) by mouth once daily.  Start taking on: April 16, 2023            CONTINUE taking these medications      glipiZIDE 5 MG tablet  Commonly known as: GLUCOTROL  Take 1 tablet (5 mg total) by mouth 2 (two) times daily with meals.     hydroCHLOROthiazide 25 MG tablet  Commonly known as: HYDRODIURIL  Take 1 tablet (25 mg total) by mouth once daily.     levocetirizine 5 MG tablet  Commonly known as: XYZAL  Take 1 tablet (5 mg total) by mouth every evening.     multivitamin per tablet  Commonly known as: THERAGRAN  Take 1 tablet by mouth once daily.              Indwelling Lines/Drains at time of discharge:   Lines/Drains/Airways       None                   Time spent on the discharge of patient: 41 minutes         Pauline Link NP  Department of Hospital Medicine  [facility] - Tele[facility] ([facility])

## 2023-04-15 NOTE — SUBJECTIVE & OBJECTIVE
Past Medical History:   Diagnosis Date    CKD stage 3 due to type 2 diabetes mellitus 2021    DM (diabetes mellitus)     BS didn't check 2019    DM (diabetes mellitus)     BS didn't check 2022    Foreign body, eye     right eye    Hyperlipidemia     Hypertension     Need for shingles vaccine 2019    Pneumonia     Primary osteoarthritis of right knee 10/29/2021    Severe obesity (BMI 35.0-35.9 with comorbidity) 7/10/2019    Type 2 diabetes mellitus     BS didn't check 2018       Past Surgical History:   Procedure Laterality Date    APPENDECTOMY      CATARACT EXTRACTION Bilateral     CPG       Review of patient's allergies indicates:  No Known Allergies    No current facility-administered medications on file prior to encounter.     Current Outpatient Medications on File Prior to Encounter   Medication Sig    glipiZIDE (GLUCOTROL) 5 MG tablet Take 1 tablet (5 mg total) by mouth 2 (two) times daily with meals.    hydroCHLOROthiazide (HYDRODIURIL) 25 MG tablet Take 1 tablet (25 mg total) by mouth once daily.    levocetirizine (XYZAL) 5 MG tablet Take 1 tablet (5 mg total) by mouth every evening.    multivitamin (THERAGRAN) per tablet Take 1 tablet by mouth once daily.     Family History       Problem Relation (Age of Onset)    Allergic rhinitis Daughter    Cancer Son    Cataracts Father    Heart disease Mother, Father    Hyperlipidemia Father    Hypertension Father, Sister, Brother          Tobacco Use    Smoking status: Former     Packs/day: 3.00     Types: Cigarettes     Start date:      Quit date:      Years since quittin.3    Smokeless tobacco: Never   Substance and Sexual Activity    Alcohol use: No    Drug use: No    Sexual activity: Never     Review of Systems   Neurological:  Positive for speech difficulty. Negative for dizziness, facial asymmetry, weakness, light-headedness, numbness and headaches.   All other systems reviewed and are negative.  Objective:      Vital Signs (Most Recent):  Temp: 98.1 °F (36.7 °C) (04/15/23 0437)  Pulse: 86 (04/15/23 0609)  Resp: 16 (04/15/23 0437)  BP: 130/68 (04/15/23 0437)  SpO2: 95 % (04/15/23 0437) Vital Signs (24h Range):  Temp:  [97.8 °F (36.6 °C)-98.1 °F (36.7 °C)] 98.1 °F (36.7 °C)  Pulse:  [] 86  Resp:  [16-20] 16  SpO2:  [95 %-97 %] 95 %  BP: (116-139)/(68-85) 130/68     Weight: 113.8 kg (250 lb 14.1 oz)  Body mass index is 36 kg/m².    Physical Exam  Vitals and nursing note reviewed.   Constitutional:       General: He is awake. He is not in acute distress.     Appearance: Normal appearance. He is well-developed and well-groomed. He is not ill-appearing, toxic-appearing or diaphoretic.   HENT:      Head: Normocephalic and atraumatic.      Mouth/Throat:      Mouth: Mucous membranes are moist.      Pharynx: Oropharynx is clear.   Eyes:      Extraocular Movements: Extraocular movements intact.      Conjunctiva/sclera: Conjunctivae normal.      Pupils: Pupils are equal, round, and reactive to light.   Cardiovascular:      Rate and Rhythm: Normal rate and regular rhythm.      Pulses: Normal pulses.      Heart sounds: Normal heart sounds. No murmur heard.  Pulmonary:      Effort: Pulmonary effort is normal.      Breath sounds: Normal breath sounds.   Abdominal:      General: Bowel sounds are normal.      Palpations: Abdomen is soft.      Tenderness: There is no abdominal tenderness.   Musculoskeletal:      Cervical back: Normal range of motion and neck supple.      Comments: 5/5 strength throughout   Skin:     General: Skin is warm and dry.      Capillary Refill: Capillary refill takes less than 2 seconds.   Neurological:      General: No focal deficit present.      Mental Status: He is alert and oriented to person, place, and time. Mental status is at baseline.      GCS: GCS eye subscore is 4. GCS verbal subscore is 5. GCS motor subscore is 6.      Cranial Nerves: Cranial nerves 2-12 are intact.      Motor: Motor function is  intact.      Coordination: Coordination is intact.      Deep Tendon Reflexes: Reflexes are normal and symmetric.      Comments: NIH score 0.   Psychiatric:         Mood and Affect: Mood normal.         Behavior: Behavior normal. Behavior is cooperative.         CRANIAL NERVES     CN III, IV, VI   Pupils are equal, round, and reactive to light.   LABS:  Recent Results (from the past 24 hour(s))   POCT glucose    Collection Time: 04/14/23  5:05 PM   Result Value Ref Range    POCT Glucose 167 (H) 70 - 110 mg/dL   CBC W/ AUTO DIFFERENTIAL    Collection Time: 04/14/23  5:55 PM   Result Value Ref Range    WBC 9.93 3.90 - 12.70 K/uL    RBC 4.37 (L) 4.60 - 6.20 M/uL    Hemoglobin 13.1 (L) 14.0 - 18.0 g/dL    Hematocrit 39.0 (L) 40.0 - 54.0 %    MCV 89 82 - 98 fL    MCH 30.0 27.0 - 31.0 pg    MCHC 33.6 32.0 - 36.0 g/dL    RDW 13.5 11.5 - 14.5 %    Platelets 333 150 - 450 K/uL    MPV 9.3 9.2 - 12.9 fL    Immature Granulocytes 0.2 0.0 - 0.5 %    Gran # (ANC) 6.7 1.8 - 7.7 K/uL    Immature Grans (Abs) 0.02 0.00 - 0.04 K/uL    Lymph # 2.2 1.0 - 4.8 K/uL    Mono # 0.7 0.3 - 1.0 K/uL    Eos # 0.4 0.0 - 0.5 K/uL    Baso # 0.09 0.00 - 0.20 K/uL    nRBC 0 0 /100 WBC    Gran % 67.0 38.0 - 73.0 %    Lymph % 21.8 18.0 - 48.0 %    Mono % 6.6 4.0 - 15.0 %    Eosinophil % 3.5 0.0 - 8.0 %    Basophil % 0.9 0.0 - 1.9 %    Differential Method Automated    Comprehensive metabolic panel    Collection Time: 04/14/23  5:55 PM   Result Value Ref Range    Sodium 138 136 - 145 mmol/L    Potassium 3.5 3.5 - 5.1 mmol/L    Chloride 99 95 - 110 mmol/L    CO2 25 23 - 29 mmol/L    Glucose 163 (H) 70 - 110 mg/dL    BUN 22 8 - 23 mg/dL    Creatinine 1.3 0.5 - 1.4 mg/dL    Calcium 9.7 8.7 - 10.5 mg/dL    Total Protein 7.6 6.0 - 8.4 g/dL    Albumin 3.6 3.5 - 5.2 g/dL    Total Bilirubin 0.4 0.1 - 1.0 mg/dL    Alkaline Phosphatase 68 55 - 135 U/L    AST 28 10 - 40 U/L    ALT 25 10 - 44 U/L    Anion Gap 14 8 - 16 mmol/L    eGFR 54 (A) >60 mL/min/1.73 m^2    Protime-INR    Collection Time: 04/14/23  5:55 PM   Result Value Ref Range    Prothrombin Time 10.6 9.0 - 12.5 sec    INR 1.0 0.8 - 1.2   TSH    Collection Time: 04/14/23  5:55 PM   Result Value Ref Range    TSH 0.921 0.400 - 4.000 uIU/mL   LDL - Lipid Panel    Collection Time: 04/14/23  5:55 PM   Result Value Ref Range    Cholesterol 268 (H) 120 - 199 mg/dL    Triglycerides 310 (H) 30 - 150 mg/dL    HDL 30 (L) 40 - 75 mg/dL    LDL Cholesterol 176.0 (H) 63.0 - 159.0 mg/dL    HDL/Cholesterol Ratio 11.2 (L) 20.0 - 50.0 %    Total Cholesterol/HDL Ratio 8.9 (H) 2.0 - 5.0    Non-HDL Cholesterol 238 mg/dL   HIV 1/2 Ag/Ab (4th Gen)    Collection Time: 04/14/23  5:55 PM   Result Value Ref Range    HIV 1/2 Ag/Ab Negative Negative   Hepatitis C Antibody    Collection Time: 04/14/23  5:55 PM   Result Value Ref Range    Hepatitis C Ab Negative Negative   HCV Virus Hold Specimen    Collection Time: 04/14/23  5:55 PM   Result Value Ref Range    HEP C Virus Hold Specimen Hold for HCV sendout    Urinalysis, Reflex to Urine Culture Urine, Clean Catch    Collection Time: 04/14/23  9:05 PM    Specimen: Urine   Result Value Ref Range    Specimen UA Urine, Clean Catch     Color, UA Yellow Yellow, Straw, Arti    Appearance, UA Clear Clear    pH, UA 6.0 5.0 - 8.0    Specific Gravity, UA 1.010 1.005 - 1.030    Protein, UA Negative Negative    Glucose, UA Negative Negative    Ketones, UA Negative Negative    Bilirubin (UA) Negative Negative    Occult Blood UA Negative Negative    Nitrite, UA Negative Negative    Urobilinogen, UA Negative <2.0 EU/dL    Leukocytes, UA Negative Negative   Hemoglobin A1c    Collection Time: 04/14/23  9:16 PM   Result Value Ref Range    Hemoglobin A1C 6.8 (H) 4.0 - 5.6 %    Estimated Avg Glucose 148 (H) 68 - 131 mg/dL   POCT glucose    Collection Time: 04/15/23  4:39 AM   Result Value Ref Range    POCT Glucose 174 (H) 70 - 110 mg/dL   CBC auto differential    Collection Time: 04/15/23  4:59 AM   Result Value  Ref Range    WBC 9.20 3.90 - 12.70 K/uL    RBC 4.18 (L) 4.60 - 6.20 M/uL    Hemoglobin 12.5 (L) 14.0 - 18.0 g/dL    Hematocrit 36.8 (L) 40.0 - 54.0 %    MCV 88 82 - 98 fL    MCH 29.9 27.0 - 31.0 pg    MCHC 34.0 32.0 - 36.0 g/dL    RDW 13.6 11.5 - 14.5 %    Platelets 321 150 - 450 K/uL    MPV 9.4 9.2 - 12.9 fL    Immature Granulocytes 0.2 0.0 - 0.5 %    Gran # (ANC) 5.7 1.8 - 7.7 K/uL    Immature Grans (Abs) 0.02 0.00 - 0.04 K/uL    Lymph # 2.3 1.0 - 4.8 K/uL    Mono # 0.8 0.3 - 1.0 K/uL    Eos # 0.4 0.0 - 0.5 K/uL    Baso # 0.08 0.00 - 0.20 K/uL    nRBC 0 0 /100 WBC    Gran % 61.5 38.0 - 73.0 %    Lymph % 25.3 18.0 - 48.0 %    Mono % 8.3 4.0 - 15.0 %    Eosinophil % 3.8 0.0 - 8.0 %    Basophil % 0.9 0.0 - 1.9 %    Differential Method Automated    Comprehensive metabolic panel    Collection Time: 04/15/23  5:00 AM   Result Value Ref Range    Sodium 138 136 - 145 mmol/L    Potassium 3.1 (L) 3.5 - 5.1 mmol/L    Chloride 102 95 - 110 mmol/L    CO2 25 23 - 29 mmol/L    Glucose 186 (H) 70 - 110 mg/dL    BUN 21 8 - 23 mg/dL    Creatinine 1.2 0.5 - 1.4 mg/dL    Calcium 9.1 8.7 - 10.5 mg/dL    Total Protein 6.6 6.0 - 8.4 g/dL    Albumin 3.2 (L) 3.5 - 5.2 g/dL    Total Bilirubin 0.2 0.1 - 1.0 mg/dL    Alkaline Phosphatase 70 55 - 135 U/L    AST 24 10 - 40 U/L    ALT 22 10 - 44 U/L    Anion Gap 11 8 - 16 mmol/L    eGFR 59 (A) >60 mL/min/1.73 m^2   Magnesium    Collection Time: 04/15/23  5:00 AM   Result Value Ref Range    Magnesium 1.7 1.6 - 2.6 mg/dL   Phosphorus    Collection Time: 04/15/23  5:00 AM   Result Value Ref Range    Phosphorus 2.7 2.7 - 4.5 mg/dL   Troponin I    Collection Time: 04/15/23  5:00 AM   Result Value Ref Range    Troponin I <0.006 0.000 - 0.026 ng/mL       RADIOLOGY  X-Ray Chest 1 View    Result Date: 4/14/2023  EXAMINATION: XR CHEST 1 VIEW CLINICAL HISTORY: Cerebral infarction, unspecified COMPARISON: 04/02/2022 FINDINGS: The size of the heart is prominent.  There is no focal pulmonary infiltrate  visualized.  There is no pneumothorax.  The costophrenic angles are sharp.     1. There is no focal pulmonary infiltrate visualized. 2. The size of the heart is prominent.  Some of this may be secondary to magnification. . Electronically signed by: Marco Elliott MD Date:    04/14/2023 Time:    18:32    CT Head Without Contrast    Result Date: 4/14/2023  EXAMINATION: CT HEAD WITHOUT CONTRAST CLINICAL HISTORY: Neuro deficit, acute, stroke suspected; TECHNIQUE: Contiguous axial images were obtained from the skull base through the vertex without intravenous contrast. COMPARISON: Head CT 04/02/2022 FINDINGS: No intracranial hemorrhage. No mass effect or midline shift. Normal parenchymal attenuation. The ventricles and sulci are normal in size and configuration. Paranasal sinuses are clear.  Chronic right mastoid air cell effusion.  Left mastoid air cells are clear.  No concerning osseous findings.     No CT evidence of acute intracranial abnormality. Chronic right mastoid air cell effusion. ASPECTS score is 10. Findings communicated with Dr. Lockwood at 17:11 on 04/14/2023. All CT scans at this facility use dose modulation, iterative reconstruction, and/or weight base dosing when appropriate to reduce radiation dose to as low as reasonably achievable. Electronically signed by: Timmy Thornton Date:    04/14/2023 Time:    17:12    MRA Brain    Result Date: 4/14/2023  EXAMINATION: MRA BRAIN WITHOUT CONTRAST CLINICAL HISTORY: Stroke/TIA, determine embolic source; TECHNIQUE: Non-contrast 3-D time-of-flight intracranial MR angiography was performed through the [place] [facility] [place] [facility] [place] with MIP reformatting. COMPARISON: None FINDINGS: Mild luminal irregularities of the intracranial vasculature but no evidence for hemodynamically significant stenosis.  Flow enhancement of the middle cerebral anterior cerebral and posterior cerebral arteries are noted.     As above is Electronically signed by: Jhoan Roman Date:    04/14/2023  Time:    23:26    MRI BRAIN WITHOUT CONTRAST    Result Date: 4/14/2023  EXAMINATION: MRI BRAIN WITHOUT CONTRAST CLINICAL HISTORY: Transient ischemic attack (TIA); TECHNIQUE: Sagittal T1. Axial T1, T2, T2 FLAIR, GRE, DWI. Coronal T2 FLAIR. COMPARISON: None available. FINDINGS: There is no restricted diffusion.  Periventricular and subcortical FLAIR hyperintensities consistent with chronic microvascular ischemic changes mild motion artifacts. The ventricles and sulci are normal in size and configuration. Midline structures are normal. There are preserved arterial flow-voids on T2 weighted imaging. Right mastoid air cell opacification. No abnormal marrow signal is identified.     No evidence for acute infarct Atrophy and chronic microvascular ischemic changes not unusual for stated age.  A Electronically signed by: Jhoan Roman Date:    04/14/2023 Time:    23:21    MRA Neck without contrast    Result Date: 4/14/2023  EXAMINATION: MRA NECK WITHOUT CONTRAST CLINICAL HISTORY: Stroke/TIA, determine embolic source; TECHNIQUE: Non-contrast 2D and/or 3D time-of-flight MR angiography of the neck was performed, with MIP reformatting. COMPARISON: None FINDINGS: Mild moderate atherosclerotic narrowing of the carotid bifurcation bilaterally.  Flow enhancement in the internal carotid artery and vertebral arteries noted.  Arch vessels are not well evaluated.  Vertebral arteries are patent.  Right dominant vertebral artery noted.     Mild moderate atherosclerotic changes of the carotid bifurcation bilaterally.  No hemodynamically significant stenosis of the carotid and vertebral arteries. Electronically signed by: Jhoan Roman Date:    04/14/2023 Time:    23:25      EKG    MICROBIOLOGY    MDM     Amount and/or Complexity of Data Reviewed  Clinical lab tests: reviewed  Tests in the radiology section of CPT®: reviewed  Tests in the medicine section of CPT®: reviewed  Discussion of test results with the performing providers:  yes  Decide to obtain previous medical records or to obtain history from someone other than the patient: yes  Obtain history from someone other than the patient: yes  Review and summarize past medical records: yes  Discuss the patient with other providers: yes  Independent visualization of images, tracings, or specimens: yes

## 2023-04-15 NOTE — ASSESSMENT & PLAN NOTE
Patient is not chronically on statin.will continue for now. Last Lipid Panel:   Lab Results   Component Value Date    CHOL 268 (H) 04/14/2023    HDL 30 (L) 04/14/2023    LDLCALC 176.0 (H) 04/14/2023    TRIG 310 (H) 04/14/2023    CHOLHDL 11.2 (L) 04/14/2023   -Initiate on 40mg lipitor  -encourage low sodium/cardiac diet

## 2023-04-15 NOTE — HPI
Mark Dickerson Sr. is a 83 y.o. male with a PMH  has a past medical history of CKD stage 3 due to type 2 diabetes mellitus (2/18/2021), DM (diabetes mellitus) (2014), DM (diabetes mellitus) (2014), Foreign body, eye, Hyperlipidemia, Hypertension, Need for shingles vaccine (11/20/2019), Pneumonia, Primary osteoarthritis of right knee (10/29/2021), Severe obesity (BMI 35.0-35.9 with comorbidity) (7/10/2019), and Type 2 diabetes mellitus (2014).  Presented to the ER for evaluation of difficulty speaking/finding his words which started roughly 30 minutes prior to arrival to our facility.  Patient last known normal was approximally 45-60 minutes prior to arrival to our facility.  While EN route to the facility patient kept repeating himself, but is now reportedly at his baseline mental status.  Denies previous history of TIA/CVA.  Denies any associated weakness/numbness/tingling/facial droop, or any other symptoms associated with presentation.  However, patient's daughter-in-law is noted some increased confusion over the past couple of weeks, but patient denies symptoms.  ER workup mostly unremarkable with CBG reading of 163 mg/dL.  Chest x-ray without acute cardiopulmonary findings.  CT of the head was negative for acute intracranial abnormalities.  TSH negative.  Coags negative.  CBC and CMP within normal limits.  EKG revealed sinus rhythm with occasional PACs with a ventricular rate of 100 beats per minute with a QT/QTC of 362/466.  Code stroke was paged overhead.      Neurovascular tele consult was initiated.  Recommendations include:  --Load with Plavix [id number] now and start 75mg daily from tomorrow.   --Will hold off on DAPT as he states that he bruises easily and has had bleeding in the past w/ aspirin.  -- Lipitor 40mg daily  -- MRI brain w/ MRA head and neck. Recommend permissive HTN w/ SBP >220 until MRI brain definitely rules out an acute infarct.  -- TTE w/o bubble  -- Lipid panel, Hb A1c and TSH    Hospital  Medicine consulted to admit patient for TIA workup.  Patient is in agreement with treatment plan.  Patient will be placed under observation status.    PCP: Tatyana Cannon

## 2023-04-15 NOTE — ASSESSMENT & PLAN NOTE
Chronic, controlled  -Latest blood pressure and vitals reviewed-   Temp:  [97.8 °F (36.6 °C)-98.2 °F (36.8 °C)]   Pulse:  []   Resp:  [16-20]   BP: (116-139)/(68-85)   SpO2:  [92 %-97 %] .   -Home meds for hypertension were reviewed and noted below.   Hypertension Medications             hydroCHLOROthiazide (HYDRODIURIL) 25 MG tablet Take 1 tablet (25 mg total) by mouth once daily.        -While in the hospital, will manage blood pressure as follows; Continue home antihypertensive regimen  -PRN anti-hypertensive medication if patient's BP > 160/100   -optimize pain management  -follow up with PCP after discharge

## 2023-04-15 NOTE — ASSESSMENT & PLAN NOTE
Antithrombotics for secondary stroke prevention: Antiplatelets: Aspirin: 81 mg daily  Clopidogrel: 300 mg loading dose x 1, now  Clopidogrel: 75 mg daily    Statins for secondary stroke prevention and hyperlipidemia, if present:   Statins: Atorvastatin- 40 mg daily    Aggressive risk factor modification: HTN, DM, HLD, Obesity     Rehab efforts: The patient has been evaluated by a stroke team provider and the therapy needs have been fully considered based off the presenting complaints and exam findings. The following therapy evaluations are needed: PT evaluate and treat, OT evaluate and treat, SLP evaluate and treat    Diagnostics ordered/pending: CT scan of head without contrast to asses brain parenchyma, HgbA1C to assess blood glucose levels, Lipid Profile to assess cholesterol levels, MRA head to assess vasculature, MRA neck/arch to assess vasculature, TTE to assess cardiac function/status , TSH to assess thyroid function    VTE prophylaxis: Mechanical prophylaxis: Place SCDs  None: Reason for No Pharmacological VTE Prophylaxis: Ambulating with or without assistance    BP parameters: TIA: SBP <220 until imaging confirmation of no infarct

## 2023-04-15 NOTE — ASSESSMENT & PLAN NOTE
Body mass index is 36 kg/m². Morbid obesity complicates all aspects of disease management from diagnostic modalities to treatment. Weight loss encouraged and health benefits explained to patient.

## 2023-04-15 NOTE — ASSESSMENT & PLAN NOTE
Patient's FSGs are controlled on current medication regimen.  Last A1c reviewed-   Lab Results   Component Value Date    HGBA1C 6.8 (H) 04/14/2023     Most recent fingerstick glucose reviewed-   Recent Labs   Lab 04/14/23  1705 04/15/23  0439   POCTGLUCOSE 167* 174*     Current correctional scale  Low  Maintain anti-hyperglycemic dose as follows-   Antihyperglycemics (From admission, onward)    None        -Hold Oral hypoglycemics while patient is in the hospital, restart on discharge  -SSI  -Hyperglycemia protocol

## 2023-04-15 NOTE — ASSESSMENT & PLAN NOTE
Patient is not chronically on statin.will continue for now. Last Lipid Panel:   Lab Results   Component Value Date    CHOL 268 (H) 04/14/2023    HDL 30 (L) 04/14/2023    LDLCALC 176.0 (H) 04/14/2023    TRIG 310 (H) 04/14/2023    CHOLHDL 11.2 (L) 04/14/2023     Plan:  -Initiate on 40mg lipitor  -low fat/low calorie diet

## 2023-04-15 NOTE — PLAN OF CARE
[facility] - Telemetry (Hospital)  Discharge Final Note    Primary Care Provider: Tatyana Cannon MD    Expected Discharge Date: 4/15/2023    Final Discharge Note (most recent)       Final Note - 04/15/23 1057          Final Note    Assessment Type Final Discharge Note     Anticipated Discharge Disposition Home or Self Care        Post-Acute Status    Discharge Delays None known at this time                     Important Message from Medicare             Contact Info       Tatyana Cannon MD   Specialty: Family Medicine   Relationship: PCP - General    [id number] [place] [address]  [facility] [address]   Phone: [phone]       Next Steps: Schedule an appointment as soon as possible for a visit in 3 day(s)    Instructions: call office and schedule a hospital follow up in 3-5 days    Damir Montenegro MD   Specialty: Internal Medicine    [address]  [address]  [facility] [address]   Phone: [phone]       Next Steps: Schedule an appointment as soon as possible for a visit in 1 week(s)    Instructions: call office and schedule hospital follow up in 1-2 weeks          Pt has no discharge needs at the time of chart review.

## 2023-04-15 NOTE — PT/OT/SLP EVAL
Occupational Therapy   Evaluation and Discharge Note    Name: Mark Dickerson Sr.  MRN: [id number]  Admitting Diagnosis: TIA (transient ischemic attack)  Recent Surgery: * No surgery found *      Recommendations:     Discharge Recommendations: home  Discharge Equipment Recommendations: none  Barriers to discharge:       Assessment:     Mark Dickerson Sr. is a 83 y.o. male with a medical diagnosis of TIA (transient ischemic attack). At this time, patient is functioning at their prior level of function and does not require further acute OT services.     Plan:     During this hospitalization, patient does not require further acute OT services.  Please re-consult if situation changes.    Plan of Care Reviewed with: patient    Subjective     Chief Complaint:   Patient/Family Comments/goals: RETURN HOME    Occupational Profile:  Living Environment: LIVES ALONE IN MOBILE HOME WITH 5/6 STEPS TO ENTER AND B RAILS  Previous level of function: (I) TO MOD (I) PRN WITH SPC  Roles and Routines: DRIVE AND PRN AT A   Equipment Used at home: cane, straight  Assistance upon Discharge:     Pain/Comfort:  Pain Rating 1: 0/10    Patients cultural, spiritual, Hoahaoism conflicts given the current situation:      Objective:     Communicated with: NURSE ND Epic CHART REVIEW prior to session.  Patient found up in chair with peripheral IV, telemetry upon OT entry to room.    General Precautions: Standard, fall  Orthopedic Precautions: N/A  Braces:    Respiratory Status: Room air     Occupational Performance:    Functional Mobility/Transfers:  Patient completed Sit <> Stand Transfer with independence  with  no assistive device   Functional Mobility: (I) WITH FUNCTIONAL MOBILITY X 100 FEET WITH NO AE OR O2     Activities of Daily Living:  Upper Body Dressing: independence .  Lower Body Dressing: independence .    Cognitive/Visual Perceptual:  Cognitive/Psychosocial Skills:     -       Oriented to: Person, Place, Time, and Situation   -        Follows Commands/attention:Follows multistep  commands  -       Communication: clear/fluent  -       Memory: No Deficits noted  -       Safety awareness/insight to disability: intact     Physical Exam:  Upper Extremity Range of Motion:     -       Right Upper Extremity: WFL  -       Left Upper Extremity: WFL  Upper Extremity Strength:    -       Right Upper Extremity: WFL  -       Left Upper Extremity: WFL   Strength:    -       Right Upper Extremity: WFL  -       Left Upper Extremity: WFL    AMPAC 6 Click ADL:  AMPAC Total Score: 24    Treatment & Education:  Patient educated on role of OT in acute setting and benefits oF OOB activity. Encouraged OOB throughout the course of hospitalization to decrease risk of hospital related debility. Patient stated understanding and in agreement with POC.      Patient left up in chair with all lines intact, call button in reach, NURSE notified, and FAMILY present    GOALS:   Multidisciplinary Problems       Occupational Therapy Goals       Not on file                    History:     Past Medical History:   Diagnosis Date    CKD stage 3 due to type 2 diabetes mellitus 2/18/2021    DM (diabetes mellitus) 2014    BS didn't check 12/11/2019    DM (diabetes mellitus) 2014    BS didn't check 05/05/2022    Foreign body, eye     right eye    Hyperlipidemia     Hypertension     Need for shingles vaccine 11/20/2019    Pneumonia     Primary osteoarthritis of right knee 10/29/2021    Severe obesity (BMI 35.0-35.9 with comorbidity) 7/10/2019    Type 2 diabetes mellitus 2014    BS didn't check 12/12/2018         Past Surgical History:   Procedure Laterality Date    APPENDECTOMY      CATARACT EXTRACTION Bilateral     CPG       Time Tracking:     OT Date of Treatment: 04/15/23  OT Start Time: 0925  OT Stop Time: 0950  OT Total Time (min): 25 min    Billable Minutes:Evaluation 15 MINUTES  Therapeutic Activity 10 MINUTES    4/15/2023

## 2023-04-15 NOTE — ASSESSMENT & PLAN NOTE
Body mass index is 35.87 kg/m². Morbid obesity complicates all aspects of disease management from diagnostic modalities to treatment. Weight loss encouraged and health benefits explained to patient.

## 2023-04-15 NOTE — ASSESSMENT & PLAN NOTE
Currently normotensive. BP usually well controlled per patient with home medications.  Plan:  -Optimize pain control   -Continue home medications (hctz), titrate as needed   -Monitor BP  -Low salt/cardiac diet when not NPO  -IV hydralazine prn for SBP>160 or DBP>90

## 2023-04-15 NOTE — PHARMACY MED REC
"Admission Medication History     The home medication history was taken by Randy Lawler.    You may go to "Admission" then "Reconcile Home Medications" tabs to review and/or act upon these items.     The home medication list has been updated by the Pharmacy department.   Please read ALL comments highlighted in yellow.   Please address this information as you see fit.    Feel free to contact us if you have any questions or require assistance.      Medications listed below were obtained from: Analytic software- [email or web address] and Medical records  (Not in a hospital admission)      Randy Lawler  [id number]    Current Outpatient Medications on File Prior to Encounter   Medication Sig Dispense Refill Last Dose    glipiZIDE (GLUCOTROL) 5 MG tablet Take 1 tablet (5 mg total) by mouth 2 (two) times daily with meals. 180 tablet 2     hydroCHLOROthiazide (HYDRODIURIL) 25 MG tablet Take 1 tablet (25 mg total) by mouth once daily. 90 tablet 2     levocetirizine (XYZAL) 5 MG tablet Take 1 tablet (5 mg total) by mouth every evening. 30 tablet 11     multivitamin (THERAGRAN) per tablet Take 1 tablet by mouth once daily.                            .        "

## 2023-04-15 NOTE — ASSESSMENT & PLAN NOTE
Antithrombotics for secondary stroke prevention: Antiplatelets: Aspirin: 81 mg daily  Clopidogrel: 300 mg loading dose x 1, now  Clopidogrel: 75 mg daily    Statins for secondary stroke prevention and hyperlipidemia, if present:   Statins: Atorvastatin- 40 mg daily    Aggressive risk factor modification: HTN, DM, HLD, Obesity     Rehab efforts: The patient has been evaluated by a stroke team provider and the therapy needs have been fully considered based off the presenting complaints and exam findings. The following therapy evaluations are needed: PT evaluate and treat, OT evaluate and treat, SLP evaluate and treat    Diagnostics ordered/pending: CT scan of head without contrast to asses brain parenchyma, HgbA1C to assess blood glucose levels, Lipid Profile to assess cholesterol levels, MRA head to assess vasculature, MRA neck/arch to assess vasculature, TTE to assess cardiac function/status , TSH to assess thyroid function    VTE prophylaxis: Mechanical prophylaxis: Place SCDs  None: Reason for No Pharmacological VTE Prophylaxis: Ambulating with or without assistance    BP parameters: TIA: SBP <220 until imaging confirmation of no infarct      -MRI no acute infarct  -MRA revealed mild to moderate atherosclerosis in carotids  -Follows Dr. Montenegro OP  -Will continue ASA 81 mg and statin on discharge  -educated on lifestyle modifications, diet/exercise

## 2023-04-15 NOTE — PROGRESS NOTES
Avs reviewed with pt  Verbalized understanding of instructions and diet information   Pt denies having further questions   Pt states he will call to arrange his own apts   Piv removed   Tele monitor removed and returned to [facility]     Pt wheeled down to waiting car   
Fair

## 2023-04-17 ENCOUNTER — PES CALL (OUTPATIENT)
Dept: ADMINISTRATIVE | Facility: CLINIC | Age: 83
End: 2023-04-17
Payer: MEDICARE

## 2023-04-20 ENCOUNTER — LAB VISIT (OUTPATIENT)
Dept: LAB | Facility: HOSPITAL | Age: 83
End: 2023-04-20
Attending: FAMILY MEDICINE
Payer: MEDICARE

## 2023-04-20 ENCOUNTER — OFFICE VISIT (OUTPATIENT)
Dept: FAMILY MEDICINE | Facility: CLINIC | Age: 83
End: 2023-04-20
Payer: MEDICARE

## 2023-04-20 ENCOUNTER — OFFICE VISIT (OUTPATIENT)
Dept: ORTHOPEDICS | Facility: CLINIC | Age: 83
End: 2023-04-20
Payer: MEDICARE

## 2023-04-20 VITALS — HEIGHT: 70 IN | BODY MASS INDEX: 35.07 KG/M2 | WEIGHT: 245 LBS

## 2023-04-20 VITALS
BODY MASS INDEX: 35.09 KG/M2 | DIASTOLIC BLOOD PRESSURE: 62 MMHG | HEIGHT: 70 IN | WEIGHT: 245.13 LBS | OXYGEN SATURATION: 96 % | HEART RATE: 87 BPM | SYSTOLIC BLOOD PRESSURE: 110 MMHG

## 2023-04-20 DIAGNOSIS — E11.9 CONTROLLED TYPE 2 DIABETES MELLITUS WITHOUT COMPLICATION, WITHOUT LONG-TERM CURRENT USE OF INSULIN: ICD-10-CM

## 2023-04-20 DIAGNOSIS — E87.6 HYPOKALEMIA: ICD-10-CM

## 2023-04-20 DIAGNOSIS — G45.9 TIA (TRANSIENT ISCHEMIC ATTACK): Primary | ICD-10-CM

## 2023-04-20 DIAGNOSIS — M21.162 ACQUIRED VARUS DEFORMITY KNEE, LEFT: ICD-10-CM

## 2023-04-20 DIAGNOSIS — M17.12 ARTHRITIS OF KNEE, LEFT: ICD-10-CM

## 2023-04-20 DIAGNOSIS — M17.11 ARTHRITIS OF KNEE, RIGHT: Primary | ICD-10-CM

## 2023-04-20 DIAGNOSIS — M21.161 ACQUIRED VARUS DEFORMITY KNEE, RIGHT: ICD-10-CM

## 2023-04-20 LAB
ANION GAP SERPL CALC-SCNC: 12 MMOL/L (ref 8–16)
BUN SERPL-MCNC: 20 MG/DL (ref 8–23)
CALCIUM SERPL-MCNC: 9.2 MG/DL (ref 8.7–10.5)
CHLORIDE SERPL-SCNC: 100 MMOL/L (ref 95–110)
CO2 SERPL-SCNC: 25 MMOL/L (ref 23–29)
CREAT SERPL-MCNC: 1.3 MG/DL (ref 0.5–1.4)
EST. GFR  (NO RACE VARIABLE): 53.8 ML/MIN/1.73 M^2
GLUCOSE SERPL-MCNC: 175 MG/DL (ref 70–110)
POTASSIUM SERPL-SCNC: 3.6 MMOL/L (ref 3.5–5.1)
SODIUM SERPL-SCNC: 137 MMOL/L (ref 136–145)

## 2023-04-20 PROCEDURE — 1101F PT FALLS ASSESS-DOCD LE1/YR: CPT | Mod: HCNC,CPTII,S$GLB, | Performed by: ORTHOPAEDIC SURGERY

## 2023-04-20 PROCEDURE — 1101F PR PT FALLS ASSESS DOC 0-1 FALLS W/OUT INJ PAST YR: ICD-10-PCS | Mod: HCNC,CPTII,S$GLB, | Performed by: FAMILY MEDICINE

## 2023-04-20 PROCEDURE — 99214 PR OFFICE/OUTPT VISIT, EST, LEVL IV, 30-39 MIN: ICD-10-PCS | Mod: HCNC,S$GLB,, | Performed by: FAMILY MEDICINE

## 2023-04-20 PROCEDURE — 1101F PT FALLS ASSESS-DOCD LE1/YR: CPT | Mod: HCNC,CPTII,S$GLB, | Performed by: FAMILY MEDICINE

## 2023-04-20 PROCEDURE — 3074F SYST BP LT 130 MM HG: CPT | Mod: HCNC,CPTII,S$GLB, | Performed by: FAMILY MEDICINE

## 2023-04-20 PROCEDURE — 36415 COLL VENOUS BLD VENIPUNCTURE: CPT | Mod: HCNC,PO | Performed by: FAMILY MEDICINE

## 2023-04-20 PROCEDURE — 1126F AMNT PAIN NOTED NONE PRSNT: CPT | Mod: HCNC,CPTII,S$GLB, | Performed by: FAMILY MEDICINE

## 2023-04-20 PROCEDURE — 99999 PR PBB SHADOW E&M-EST. PATIENT-LVL III: ICD-10-PCS | Mod: PBBFAC,HCNC,, | Performed by: ORTHOPAEDIC SURGERY

## 2023-04-20 PROCEDURE — 3288F FALL RISK ASSESSMENT DOCD: CPT | Mod: HCNC,CPTII,S$GLB, | Performed by: ORTHOPAEDIC SURGERY

## 2023-04-20 PROCEDURE — 99212 OFFICE O/P EST SF 10 MIN: CPT | Mod: 25,HCNC,S$GLB, | Performed by: ORTHOPAEDIC SURGERY

## 2023-04-20 PROCEDURE — 99214 OFFICE O/P EST MOD 30 MIN: CPT | Mod: HCNC,S$GLB,, | Performed by: FAMILY MEDICINE

## 2023-04-20 PROCEDURE — 3288F PR FALLS RISK ASSESSMENT DOCUMENTED: ICD-10-PCS | Mod: HCNC,CPTII,S$GLB, | Performed by: FAMILY MEDICINE

## 2023-04-20 PROCEDURE — 3072F LOW RISK FOR RETINOPATHY: CPT | Mod: HCNC,CPTII,S$GLB, | Performed by: FAMILY MEDICINE

## 2023-04-20 PROCEDURE — 20610 LARGE JOINT ASPIRATION/INJECTION: BILATERAL KNEE: ICD-10-PCS | Mod: 50,HCNC,S$GLB, | Performed by: ORTHOPAEDIC SURGERY

## 2023-04-20 PROCEDURE — 1126F PR PAIN SEVERITY QUANTIFIED, NO PAIN PRESENT: ICD-10-PCS | Mod: HCNC,CPTII,S$GLB, | Performed by: ORTHOPAEDIC SURGERY

## 2023-04-20 PROCEDURE — 3288F PR FALLS RISK ASSESSMENT DOCUMENTED: ICD-10-PCS | Mod: HCNC,CPTII,S$GLB, | Performed by: ORTHOPAEDIC SURGERY

## 2023-04-20 PROCEDURE — 3288F FALL RISK ASSESSMENT DOCD: CPT | Mod: HCNC,CPTII,S$GLB, | Performed by: FAMILY MEDICINE

## 2023-04-20 PROCEDURE — 1159F PR MEDICATION LIST DOCUMENTED IN MEDICAL RECORD: ICD-10-PCS | Mod: HCNC,CPTII,S$GLB, | Performed by: ORTHOPAEDIC SURGERY

## 2023-04-20 PROCEDURE — 1159F PR MEDICATION LIST DOCUMENTED IN MEDICAL RECORD: ICD-10-PCS | Mod: HCNC,CPTII,S$GLB, | Performed by: FAMILY MEDICINE

## 2023-04-20 PROCEDURE — 80048 BASIC METABOLIC PNL TOTAL CA: CPT | Mod: HCNC | Performed by: FAMILY MEDICINE

## 2023-04-20 PROCEDURE — 1126F PR PAIN SEVERITY QUANTIFIED, NO PAIN PRESENT: ICD-10-PCS | Mod: HCNC,CPTII,S$GLB, | Performed by: FAMILY MEDICINE

## 2023-04-20 PROCEDURE — 3074F PR MOST RECENT SYSTOLIC BLOOD PRESSURE < 130 MM HG: ICD-10-PCS | Mod: HCNC,CPTII,S$GLB, | Performed by: FAMILY MEDICINE

## 2023-04-20 PROCEDURE — 3078F DIAST BP <80 MM HG: CPT | Mod: HCNC,CPTII,S$GLB, | Performed by: FAMILY MEDICINE

## 2023-04-20 PROCEDURE — 20610 DRAIN/INJ JOINT/BURSA W/O US: CPT | Mod: 50,HCNC,S$GLB, | Performed by: ORTHOPAEDIC SURGERY

## 2023-04-20 PROCEDURE — 1159F MED LIST DOCD IN RCRD: CPT | Mod: HCNC,CPTII,S$GLB, | Performed by: FAMILY MEDICINE

## 2023-04-20 PROCEDURE — 99212 PR OFFICE/OUTPT VISIT, EST, LEVL II, 10-19 MIN: ICD-10-PCS | Mod: 25,HCNC,S$GLB, | Performed by: ORTHOPAEDIC SURGERY

## 2023-04-20 PROCEDURE — 99999 PR PBB SHADOW E&M-EST. PATIENT-LVL III: ICD-10-PCS | Mod: PBBFAC,HCNC,, | Performed by: FAMILY MEDICINE

## 2023-04-20 PROCEDURE — 3072F LOW RISK FOR RETINOPATHY: CPT | Mod: HCNC,CPTII,S$GLB, | Performed by: ORTHOPAEDIC SURGERY

## 2023-04-20 PROCEDURE — 1159F MED LIST DOCD IN RCRD: CPT | Mod: HCNC,CPTII,S$GLB, | Performed by: ORTHOPAEDIC SURGERY

## 2023-04-20 PROCEDURE — 3072F PR LOW RISK FOR RETINOPATHY: ICD-10-PCS | Mod: HCNC,CPTII,S$GLB, | Performed by: FAMILY MEDICINE

## 2023-04-20 PROCEDURE — 3072F PR LOW RISK FOR RETINOPATHY: ICD-10-PCS | Mod: HCNC,CPTII,S$GLB, | Performed by: ORTHOPAEDIC SURGERY

## 2023-04-20 PROCEDURE — 99999 PR PBB SHADOW E&M-EST. PATIENT-LVL III: CPT | Mod: PBBFAC,HCNC,, | Performed by: ORTHOPAEDIC SURGERY

## 2023-04-20 PROCEDURE — 1126F AMNT PAIN NOTED NONE PRSNT: CPT | Mod: HCNC,CPTII,S$GLB, | Performed by: ORTHOPAEDIC SURGERY

## 2023-04-20 PROCEDURE — 99999 PR PBB SHADOW E&M-EST. PATIENT-LVL III: CPT | Mod: PBBFAC,HCNC,, | Performed by: FAMILY MEDICINE

## 2023-04-20 PROCEDURE — 1101F PR PT FALLS ASSESS DOC 0-1 FALLS W/OUT INJ PAST YR: ICD-10-PCS | Mod: HCNC,CPTII,S$GLB, | Performed by: ORTHOPAEDIC SURGERY

## 2023-04-20 PROCEDURE — 3078F PR MOST RECENT DIASTOLIC BLOOD PRESSURE < 80 MM HG: ICD-10-PCS | Mod: HCNC,CPTII,S$GLB, | Performed by: FAMILY MEDICINE

## 2023-04-20 NOTE — PROGRESS NOTES
Chief Complaint:    Chief Complaint   Patient presents with    Hospital Follow Up     TIA happened last week , he has an episode where he couldn't articulate , lasted only a few minutes, no other symptoms        History of Present Illness:    Patient presents today  He went to ED says for briefly for about 5 minutes he could not speak the words would not come out of his mouth it was mostly garbled speech no other weakness symptoms resolved  His workup in the ED was unremarkable says MRI of the brain, MRA and see MRA of the neck was okay.  Echo showed aortic stenosis    ROS:  Review of Systems   Constitutional:  Negative for activity change, chills, fatigue, fever and unexpected weight change.   HENT:  Negative for congestion, ear discharge, ear pain, hearing loss, postnasal drip and rhinorrhea.    Eyes:  Negative for pain and visual disturbance.   Respiratory:  Negative for cough, chest tightness and shortness of breath.    Cardiovascular:  Negative for chest pain and palpitations.   Gastrointestinal:  Negative for abdominal pain, diarrhea and vomiting.   Endocrine: Negative for heat intolerance.   Genitourinary:  Negative for dysuria, flank pain, frequency and hematuria.   Musculoskeletal:  Negative for back pain, gait problem and neck pain.   Skin:  Negative for color change and rash.   Neurological:  Negative for dizziness, tremors, seizures, numbness and headaches.   Psychiatric/Behavioral:  Negative for agitation, hallucinations, self-injury, sleep disturbance and suicidal ideas. The patient is not nervous/anxious.      Past Medical History:   Diagnosis Date    CKD stage 3 due to type 2 diabetes mellitus 2/18/2021    DM (diabetes mellitus) 2014    BS didn't check 12/11/2019    DM (diabetes mellitus) 2014    BS didn't check 05/05/2022    Foreign body, eye     right eye    Hyperlipidemia     Hypertension     Need for shingles vaccine 11/20/2019    Pneumonia     Primary osteoarthritis of right knee 10/29/2021     Severe obesity (BMI 35.0-35.9 with comorbidity) 7/10/2019    Type 2 diabetes mellitus 2014    BS didn't check 2018       Social History:  Social History     Socioeconomic History    Marital status:    Tobacco Use    Smoking status: Former     Packs/day: 3.00     Types: Cigarettes     Start date:      Quit date:      Years since quittin.3    Smokeless tobacco: Never   Substance and Sexual Activity    Alcohol use: No    Drug use: No    Sexual activity: Never     Social Determinants of Health     Financial Resource Strain: Low Risk     Difficulty of Paying Living Expenses: Not hard at all   Food Insecurity: No Food Insecurity    Worried About Running Out of Food in the Last Year: Never true    Ran Out of Food in the Last Year: Never true   Transportation Needs: No Transportation Needs    Lack of Transportation (Medical): No    Lack of Transportation (Non-Medical): No   Physical Activity: Inactive    Days of Exercise per Week: 0 days    Minutes of Exercise per Session: 0 min   Stress: No Stress Concern Present    Feeling of Stress : Only a little   Social Connections: Moderately Integrated    Frequency of Communication with Friends and Family: More than three times a week    Frequency of Social Gatherings with Friends and Family: More than three times a week    Attends Tenriism Services: More than 4 times per year    Active Member of Clubs or Organizations: Yes    Attends Club or Organization Meetings: More than 4 times per year    Marital Status:    Housing Stability: Low Risk     Unable to Pay for Housing in the Last Year: No    Number of Places Lived in the Last Year: 1    Unstable Housing in the Last Year: No       Family History:   family history includes Allergic rhinitis in his daughter; Cancer in his son; Cataracts in his father; Heart disease in his father and mother; Hyperlipidemia in his father; Hypertension in his brother, father, and sister.    Health Maintenance   Topic  "Date Due    Hemoglobin A1c  10/14/2023    Eye Exam  12/01/2023    Lipid Panel  04/14/2024    TETANUS VACCINE  12/10/2025       Exam:Physical     Vital Signs  Pulse: 87  SpO2: 96 %  BP: 110/62  Pain Score: 0-No pain  Height and Weight  Height: 5' 10" (177.8 cm)  Weight: 111.2 kg (245 lb 2.4 oz)  BSA (Calculated - sq m): 2.34 sq meters  BMI (Calculated): 35.2  Weight in (lb) to have BMI = 25: 173.9]    Body mass index is 35.18 kg/m².    Physical Exam  Constitutional:       Appearance: He is well-developed.   Eyes:      Conjunctiva/sclera: Conjunctivae normal.      Pupils: Pupils are equal, round, and reactive to light.   Cardiovascular:      Rate and Rhythm: Normal rate and regular rhythm.      Heart sounds: Normal heart sounds. No murmur heard.  Pulmonary:      Effort: Pulmonary effort is normal. No respiratory distress.      Breath sounds: Normal breath sounds. No wheezing or rales.   Chest:      Chest wall: No tenderness.   Abdominal:      General: There is no distension.      Palpations: Abdomen is soft. There is no mass.      Tenderness: There is no abdominal tenderness. There is no guarding.   Musculoskeletal:         General: No tenderness.      Cervical back: Normal range of motion and neck supple.   Lymphadenopathy:      Cervical: No cervical adenopathy.   Skin:     General: Skin is warm and dry.   Neurological:      Mental Status: He is alert and oriented to person, place, and time.      Deep Tendon Reflexes: Reflexes are normal and symmetric.   Psychiatric:         Behavior: Behavior normal.         Thought Content: Thought content normal.         Judgment: Judgment normal.         Assessment:      ICD-10-CM ICD-9-CM   1. TIA (transient ischemic attack)  G45.9 435.9   2. Hypokalemia  E87.6 276.8   3. Controlled type 2 diabetes mellitus without complication, without long-term current use of insulin  E11.9 250.00         Plan:      Will get a 2 week Holter monitor  Check a BMP because he was hypokalemic "   Continue baby aspirin      No follow-ups on file.      Tatyana Cannon MD

## 2023-04-20 NOTE — PROCEDURES
Large Joint Aspiration/Injection: bilateral knee    Date/Time: 4/20/2023 2:00 PM  Performed by: Marco Morales MD  Authorized by: Marco Morales MD     Consent Done?:  Yes (Verbal)  Indications:  Arthritis  Site marked: the procedure site was marked    Timeout: prior to procedure the correct patient, procedure, and site was verified      Local anesthesia used?: Yes    Local anesthetic:  Lidocaine 1% without epinephrine    Details:  Needle Size:  22 G  Ultrasonic Guidance for needle placement?: No    Location:  Knee  Laterality:  Bilateral  Site:  Bilateral knee  Medications (Right):  15 mg sodium hyaluronate (orthovisc) 30 mg/2 mL  Medications (Left):  15 mg sodium hyaluronate (orthovisc) 30 mg/2 mL  Patient tolerance:  Patient tolerated the procedure well with no immediate complications

## 2023-04-20 NOTE — PROGRESS NOTES
Subjective:     Patient ID: Mark Dickerson Sr. is a 83 y.o. male.    Chief Complaint: Pain of the Right Knee and Pain of the Left Knee  Bilateral knee pain with the right worse than the left     HPI:  02/20/2023  Patient diagnosed with arthritis of both of his knees.  He received Orthovisc in 2021 December and he is doing well with it.  He stated physical therapy made a big difference.  Now he can get up from a sitting position without using his hands.  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  He feels that his pain right now is 0/10 that he does not need to have any injections.  But he knows that he should get a repeated prior to having again.  He wanted to understand what total knee replacement is all about and we showed him on the x-ray how it looks like given brochure we went over things with him.  I did tell him total knee replacement is when everything fails and you are miserable and can not live with what she got is went total knee replacement as needed.  He expressed understanding he feels that he is not there yet.  He occasionally takes an Advil and I did tell him to be careful because he is diabetic and he has stage III kidney.  He started taking natural products like turmeric and it seems to help a little bit.  We went over chondroitin and glucosamine also with him that is something that people take for arthritis.    Denies any pain in the hips or in the pelvis.  Denies any fever or chills or shortness of breath or difficulty with chewing or swallowing loss of bowel bladder control          04/13/2023   Bilateral knee severe arthritis.  He stated the physical therapy seems to have helped .  He does have pain when he ambulates it goes up to around 2/10.  When he sits down the pain is not as bad.  Occasional Advil and occasional Tylenol.  He does have stage 3 kidney disease can not take any inflammatories.  He ambulates without any assistive devices.  Last time we discussed total knee  replacement and he is happy that at this time he is not going to needed because he is not hurting so much.  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  He is continuing with exercise program.  We started Orthovisc series of 3 injections he said the mildly swelled up on him but did not require much of any treatment for it after he received them.  He is ambulating without any assistive devices.  If he sits down his pain is 0/10 when he over does it it goes up to around 3 to 4/10.    No fever no chills no shortness of breath no difficulty with chewing swallowing loss of bowel bladder control blurry vision double vision loss sense smell or taste.  He ambulates without any assistive devices  Past Medical History:   Diagnosis Date    CKD stage 3 due to type 2 diabetes mellitus 2/18/2021    DM (diabetes mellitus) 2014    BS didn't check 12/11/2019    DM (diabetes mellitus) 2014    BS didn't check 05/05/2022    Foreign body, eye     right eye    Hyperlipidemia     Hypertension     Need for shingles vaccine 11/20/2019    Pneumonia     Primary osteoarthritis of right knee 10/29/2021    Severe obesity (BMI 35.0-35.9 with comorbidity) 7/10/2019    Type 2 diabetes mellitus 2014    BS didn't check 12/12/2018     Past Surgical History:   Procedure Laterality Date    APPENDECTOMY      CATARACT EXTRACTION Bilateral     CPG     Family History   Problem Relation Age of Onset    Heart disease Mother     Hypertension Father     Heart disease Father     Hyperlipidemia Father     Cataracts Father     Hypertension Sister     Hypertension Brother     Cancer Son         colon    Allergic rhinitis Daughter      Social History     Socioeconomic History    Marital status:    Tobacco Use     Smoking status: Former     Packs/day: 3.00     Types: Cigarettes     Start date:      Quit date:      Years since quittin.3    Smokeless tobacco: Never   Substance and Sexual Activity    Alcohol use: No    Drug use: No    Sexual activity: Never     Social Determinants of Health     Financial Resource Strain: Low Risk     Difficulty of Paying Living Expenses: Not hard at all   Food Insecurity: No Food Insecurity    Worried About Running Out of Food in the Last Year: Never true    Ran Out of Food in the Last Year: Never true   Transportation Needs: No Transportation Needs    Lack of Transportation (Medical): No    Lack of Transportation (Non-Medical): No   Physical Activity: Inactive    Days of Exercise per Week: 0 days    Minutes of Exercise per Session: 0 min   Stress: No Stress Concern Present    Feeling of Stress : Only a little   Social Connections: Moderately Integrated    Frequency of Communication with Friends and Family: More than three times a week    Frequency of Social Gatherings with Friends and Family: More than three times a week    Attends Rastafari Services: More than 4 times per year    Active Member of Clubs or Organizations: Yes    Attends Club or Organization Meetings: More than 4 times per year    Marital Status:    Housing Stability: Low Risk     Unable to Pay for Housing in the Last Year: No    Number of Places Lived in the Last Year: 1    Unstable Housing in the Last Year: No     Medication List with Changes/Refills   Current Medications    ASPIRIN 81 MG CHEW    Take 1 tablet (81 mg total) by mouth once daily.    ATORVASTATIN (LIPITOR) 40 MG TABLET    Take 1 tablet (40 mg total) by mouth once daily.    GLIPIZIDE (GLUCOTROL) 5 MG TABLET    Take 1 tablet (5 mg total) by mouth 2 (two) times daily with meals.    HYDROCHLOROTHIAZIDE (HYDRODIURIL) 25 MG TABLET    Take 1 tablet (25 mg total) by mouth once daily.    LEVOCETIRIZINE (XYZAL) 5 MG TABLET    Take 1 tablet (5 mg total)  by mouth every evening.    MULTIVITAMIN (THERAGRAN) PER TABLET    Take 1 tablet by mouth once daily.     Review of patient's allergies indicates:  No Known Allergies  Review of Systems   Constitutional: Negative for decreased appetite.   HENT:  Negative for tinnitus.    Eyes:  Negative for double vision.   Cardiovascular:  Negative for chest pain.   Respiratory:  Negative for wheezing.    Hematologic/Lymphatic: Negative for bleeding problem.   Skin:  Negative for dry skin.   Musculoskeletal:  Positive for arthritis, joint pain and stiffness. Negative for back pain, gout and neck pain.   Gastrointestinal:  Negative for abdominal pain.   Genitourinary:  Negative for bladder incontinence.   Neurological:  Negative for numbness, paresthesias and sensory change.   Psychiatric/Behavioral:  Negative for altered mental status.      Objective:   Body mass index is 35.15 kg/m².  There were no vitals filed for this visit.       General    Constitutional: He is oriented to person, place, and time. He appears well-developed.   HENT:   Head: Atraumatic.   Eyes: EOM are normal.   Pulmonary/Chest: Effort normal.   Neurological: He is alert and oriented to person, place, and time.   Psychiatric: Judgment normal.           Ambulating without any assistive devices  Pelvis is level  Hips right hip slight decrease in internal rotation compared to the left side but otherwise no pain in the groin.    Hip flexors and abductors and adductors and quads and hamstrings are 5/5   Right knee with medial joint tenderness mild crepitus to compression on the patella.  Active motion 2-120 degrees.  The no defect in the patella or quadriceps tendon.  There is excellent strength.  And very mild swelling   Left knee with also medial joint tenderness and mild crepitus to compression on the patella.  Active motion is 0-120 degrees no defect in the patella or quadriceps tendon.  She has very mild swelling.  Ankle motion intact   Calves are  soft      Relevant imaging results reviewed and interpreted by me, discussed with the patient and / or family today   X-ray 12/01/2022 bilateral knees showing complete loss of joint space on the inside of the knee with marginal osteophytic changes and sclerosis consistent with bilateral knee severe arthritis with varus deformity  Assessment:     Encounter Diagnoses   Name Primary?    Arthritis of knee, right Yes    Acquired varus deformity knee, right     Arthritis of knee, left     Acquired varus deformity knee, left         Plan:   Arthritis of knee, right  -     Large Joint Aspiration/Injection: bilateral knee    Acquired varus deformity knee, right    Arthritis of knee, left  -     Large Joint Aspiration/Injection: bilateral knee    Acquired varus deformity knee, left         Patient Instructions   Bilateral knee arthritis   We started you on the Orthovisc series of 3 injections and you did well with the 1st injection out of 3   Proceeded today with 2. Out of 3 in both of her knees 04/20/2023   Ice the knees the next few days if they swell up or hurt more and also you can take Tylenol as needed not to exceed 650 milligrams twice a day  I will see you in 1 week        Disclaimer: This note was prepared using a voice recognition system and is likely to have sound alike errors within the text.

## 2023-04-20 NOTE — PATIENT INSTRUCTIONS
Bilateral knee arthritis   We started you on the Orthovisc series of 3 injections and you did well with the 1st injection out of 3   Proceeded today with 2. Out of 3 in both of her knees 04/20/2023   Ice the knees the next few days if they swell up or hurt more and also you can take Tylenol as needed not to exceed 650 milligrams twice a day  I will see you in 1 week

## 2023-04-24 ENCOUNTER — TELEPHONE (OUTPATIENT)
Dept: FAMILY MEDICINE | Facility: CLINIC | Age: 83
End: 2023-04-24
Payer: MEDICARE

## 2023-04-24 NOTE — TELEPHONE ENCOUNTER
----- Message from Gina Guthrie sent at 4/24/2023  3:34 PM CDT -----  Contact: patient Mark [phone]  Patient returning a call he thinks maybe from Dr. Cannon's office

## 2023-04-25 ENCOUNTER — OFFICE VISIT (OUTPATIENT)
Dept: HOME HEALTH SERVICES | Facility: CLINIC | Age: 83
End: 2023-04-25
Payer: MEDICARE

## 2023-04-25 VITALS
HEART RATE: 91 BPM | OXYGEN SATURATION: 98 % | RESPIRATION RATE: 18 BRPM | SYSTOLIC BLOOD PRESSURE: 128 MMHG | DIASTOLIC BLOOD PRESSURE: 62 MMHG

## 2023-04-25 DIAGNOSIS — E11.59 HYPERTENSION ASSOCIATED WITH DIABETES: ICD-10-CM

## 2023-04-25 DIAGNOSIS — E11.69 HYPERLIPIDEMIA ASSOCIATED WITH TYPE 2 DIABETES MELLITUS: ICD-10-CM

## 2023-04-25 DIAGNOSIS — E78.5 HYPERLIPIDEMIA ASSOCIATED WITH TYPE 2 DIABETES MELLITUS: ICD-10-CM

## 2023-04-25 DIAGNOSIS — G45.9 TIA (TRANSIENT ISCHEMIC ATTACK): ICD-10-CM

## 2023-04-25 DIAGNOSIS — I15.2 HYPERTENSION ASSOCIATED WITH DIABETES: ICD-10-CM

## 2023-04-25 PROCEDURE — 3072F LOW RISK FOR RETINOPATHY: CPT | Mod: CPTII,S$GLB,,

## 2023-04-25 PROCEDURE — 3074F PR MOST RECENT SYSTOLIC BLOOD PRESSURE < 130 MM HG: ICD-10-PCS | Mod: CPTII,S$GLB,,

## 2023-04-25 PROCEDURE — 3072F PR LOW RISK FOR RETINOPATHY: ICD-10-PCS | Mod: CPTII,S$GLB,,

## 2023-04-25 PROCEDURE — 3078F DIAST BP <80 MM HG: CPT | Mod: CPTII,S$GLB,,

## 2023-04-25 PROCEDURE — 3074F SYST BP LT 130 MM HG: CPT | Mod: CPTII,S$GLB,,

## 2023-04-25 PROCEDURE — 99350 HOME/RES VST EST HIGH MDM 60: CPT | Mod: S$GLB,,,

## 2023-04-25 PROCEDURE — 99350 PR HOME VISIT,ESTAB PATIENT,LEVEL IV: ICD-10-PCS | Mod: S$GLB,,,

## 2023-04-25 PROCEDURE — 3078F PR MOST RECENT DIASTOLIC BLOOD PRESSURE < 80 MM HG: ICD-10-PCS | Mod: CPTII,S$GLB,,

## 2023-04-25 NOTE — ASSESSMENT & PLAN NOTE
Episode of slurred speech, resolved after minutes  MRI/MRA brain unremarkable, MRA neck with some atherosclerosis  Added ASA/Statin  Continue current medication  Monitor

## 2023-04-25 NOTE — PROGRESS NOTES
[facility]  Medical Home Visit    Visit Date: 4/25/2023  Encounter Provider: Timmy Galloway  PCP:  Tatyana Cannon MD    Subjective:      Patient ID: Mark Dickerson Sr. is a 83 y.o. male.    Consult Requested By:  Pauline Link  Reason for Consult:  Follow up hospital encounter    Mark is being seen at home due to being seen at home due to physical debility that presents a taxing effort to leave the home, to mitigate high risk of hospital readmission and/or due to the limited availability of reliable or safe options for transportation to the point of access to the provider. Prior to treatment on this visit the chart was reviewed and patient verbal consent was obtained.    Chief Complaint: Follow-up      Patient is being seen today for a follow up visit to recent hospital encounter.  He was hospitalized 4/14-4/15 with TIA.  He presented to the ED after having an episode of approximately 5 minutes of being unable to speak.  He reports symptoms resolved after episode and has not had any problems since.  Workup on hospital unremarkable.  He was place on ASA and statin.  He had a follow up with primary since discharge.  Upon visit today he is ambulating in his home in no acute distress.  He reports no complaints or concerns at this time.  Questions elicited. Time allowed for questions, all issues addressed. Contact info given for any concerns or changes.               Review of Systems   Constitutional: Negative.    HENT: Negative.     Eyes: Negative.    Respiratory: Negative.  Negative for chest tightness.    Cardiovascular: Negative.  Negative for leg swelling.   Gastrointestinal: Negative.    Endocrine: Negative.    Genitourinary: Negative.    Musculoskeletal: Negative.    Skin: Negative.    Allergic/Immunologic: Negative.    Neurological: Negative.    Hematological: Negative.    Psychiatric/Behavioral: Negative.  Negative for agitation.    All other systems reviewed and are  negative.    Assessments:  Environmental: Patient lives in a mobile home with steps at the entrance.  There is adequate lighting and the temperature is comfortable.    Functional Status: Ambulated independently.   Safety: Fall precautions.   Nutritional: Adequate food in the home.   Home Health/DME/Supplies: No HH/DME    Objective:   Physical Exam  Vitals reviewed.   Constitutional:       General: He is not in acute distress.     Appearance: Normal appearance. He is well-developed. He is obese.   HENT:      Head: Normocephalic and atraumatic.      Nose: Nose normal.      Mouth/Throat:      Mouth: Mucous membranes are dry.      Pharynx: Oropharynx is clear.   Eyes:      Pupils: Pupils are equal, round, and reactive to light.   Cardiovascular:      Rate and Rhythm: Normal rate and regular rhythm.      Pulses: Normal pulses.      Heart sounds: Normal heart sounds.   Pulmonary:      Effort: Pulmonary effort is normal.      Breath sounds: Normal breath sounds.   Abdominal:      General: Bowel sounds are normal.      Palpations: Abdomen is soft.   Musculoskeletal:         General: Normal range of motion.      Cervical back: Normal range of motion and neck supple.   Skin:     General: Skin is warm and dry.   Neurological:      General: No focal deficit present.      Mental Status: He is alert and oriented to person, place, and time. Mental status is at baseline.   Psychiatric:         Mood and Affect: Mood normal.         Behavior: Behavior normal.         Thought Content: Thought content normal.         Judgment: Judgment normal.       Vitals:    04/25/23 1736   BP: 128/62   Pulse: 91   Resp: 18   SpO2: 98%     There is no height or weight on file to calculate BMI.    Assessment:     1. TIA (transient ischemic attack)    2. Hyperlipidemia associated with type 2 diabetes mellitus    3. Hypertension associated with diabetes        Plan:     Ethical / Legal: Advance Care Planning   Surrogate decision maker:  Javier Echavarria  Relationship: Daughter  Code Status:  Full  [place]:  None  Other advance directive:  None, Capacity to make medical decisions:  Yes, Conflict No       Problem List Items Addressed This Visit          Neuro    TIA (transient ischemic attack)    Current Assessment & Plan     Episode of slurred speech, resolved after minutes  MRI unremarkable, MRA with some atherosclerosis  Added ASA/Statin  Continue current medication  Monitor                Cardiac/Vascular    Hyperlipidemia associated with type 2 diabetes mellitus    Current Assessment & Plan     Chol 268, , Trig 310  Started on statin  Continue plan  Low fat/cardiac diet             Hypertension associated with diabetes    Current Assessment & Plan     Controlled  Continue current medication  Monitor             - Continue all medications, treatments and therapies as ordered.   - Follow all instructions, recommendations as discussed.  - Maintain Safety Precautions at all times.  - Attend all medical appointments as scheduled.  - For worsening symptoms: call Primary Care Physician or Nurse Practitioner.  - For emergencies, call 911 or immediately report to the nearest emergency room.  - Limit Risks of environmental exposure to coronavirus/COVID-19 as discussed including: social distancing, hand hygiene, and limiting departures from the home for necessities only.   Were controlled substances prescribed?  No    Follow Up Appointments:   Future Appointments   Date Time Provider Department Center   4/27/2023  1:00 PM Marco Morales MD [facility]   5/12/2023  9:00 AM SPECIMEN, [facility] SPECLAB [facility]   5/12/2023  9:10 AM LABORATORY, [facility] LAB [facility]   5/19/2023 10:20 AM Tatyana Cannon MD [facility] MED [facility]       Signature: Timmy Galloway NP

## 2023-04-27 ENCOUNTER — OFFICE VISIT (OUTPATIENT)
Dept: ORTHOPEDICS | Facility: CLINIC | Age: 83
End: 2023-04-27
Payer: MEDICARE

## 2023-04-27 VITALS — BODY MASS INDEX: 35.07 KG/M2 | HEIGHT: 70 IN | WEIGHT: 244.94 LBS

## 2023-04-27 DIAGNOSIS — M21.162 ACQUIRED VARUS DEFORMITY KNEE, LEFT: ICD-10-CM

## 2023-04-27 DIAGNOSIS — M21.161 ACQUIRED VARUS DEFORMITY KNEE, RIGHT: ICD-10-CM

## 2023-04-27 DIAGNOSIS — M17.11 ARTHRITIS OF KNEE, RIGHT: Primary | ICD-10-CM

## 2023-04-27 DIAGNOSIS — M17.12 ARTHRITIS OF KNEE, LEFT: ICD-10-CM

## 2023-04-27 DIAGNOSIS — M17.0 BILATERAL PRIMARY OSTEOARTHRITIS OF KNEE: Primary | ICD-10-CM

## 2023-04-27 PROCEDURE — 20610 LARGE JOINT ASPIRATION/INJECTION: BILATERAL KNEE: ICD-10-PCS | Mod: 50,HCNC,S$GLB, | Performed by: ORTHOPAEDIC SURGERY

## 2023-04-27 PROCEDURE — 1125F AMNT PAIN NOTED PAIN PRSNT: CPT | Mod: HCNC,CPTII,S$GLB, | Performed by: ORTHOPAEDIC SURGERY

## 2023-04-27 PROCEDURE — 1125F PR PAIN SEVERITY QUANTIFIED, PAIN PRESENT: ICD-10-PCS | Mod: HCNC,CPTII,S$GLB, | Performed by: ORTHOPAEDIC SURGERY

## 2023-04-27 PROCEDURE — 99999 PR PBB SHADOW E&M-EST. PATIENT-LVL III: CPT | Mod: PBBFAC,HCNC,, | Performed by: ORTHOPAEDIC SURGERY

## 2023-04-27 PROCEDURE — 1159F MED LIST DOCD IN RCRD: CPT | Mod: HCNC,CPTII,S$GLB, | Performed by: ORTHOPAEDIC SURGERY

## 2023-04-27 PROCEDURE — 3072F LOW RISK FOR RETINOPATHY: CPT | Mod: HCNC,CPTII,S$GLB, | Performed by: ORTHOPAEDIC SURGERY

## 2023-04-27 PROCEDURE — 20610 DRAIN/INJ JOINT/BURSA W/O US: CPT | Mod: 50,HCNC,S$GLB, | Performed by: ORTHOPAEDIC SURGERY

## 2023-04-27 PROCEDURE — 1159F PR MEDICATION LIST DOCUMENTED IN MEDICAL RECORD: ICD-10-PCS | Mod: HCNC,CPTII,S$GLB, | Performed by: ORTHOPAEDIC SURGERY

## 2023-04-27 PROCEDURE — 1101F PR PT FALLS ASSESS DOC 0-1 FALLS W/OUT INJ PAST YR: ICD-10-PCS | Mod: HCNC,CPTII,S$GLB, | Performed by: ORTHOPAEDIC SURGERY

## 2023-04-27 PROCEDURE — 99499 UNLISTED E&M SERVICE: CPT | Mod: HCNC,S$GLB,, | Performed by: ORTHOPAEDIC SURGERY

## 2023-04-27 PROCEDURE — 3072F PR LOW RISK FOR RETINOPATHY: ICD-10-PCS | Mod: HCNC,CPTII,S$GLB, | Performed by: ORTHOPAEDIC SURGERY

## 2023-04-27 PROCEDURE — 1101F PT FALLS ASSESS-DOCD LE1/YR: CPT | Mod: HCNC,CPTII,S$GLB, | Performed by: ORTHOPAEDIC SURGERY

## 2023-04-27 PROCEDURE — 99999 PR PBB SHADOW E&M-EST. PATIENT-LVL III: ICD-10-PCS | Mod: PBBFAC,HCNC,, | Performed by: ORTHOPAEDIC SURGERY

## 2023-04-27 PROCEDURE — 3288F FALL RISK ASSESSMENT DOCD: CPT | Mod: HCNC,CPTII,S$GLB, | Performed by: ORTHOPAEDIC SURGERY

## 2023-04-27 PROCEDURE — 3288F PR FALLS RISK ASSESSMENT DOCUMENTED: ICD-10-PCS | Mod: HCNC,CPTII,S$GLB, | Performed by: ORTHOPAEDIC SURGERY

## 2023-04-27 PROCEDURE — 99499 NO LOS: ICD-10-PCS | Mod: HCNC,S$GLB,, | Performed by: ORTHOPAEDIC SURGERY

## 2023-04-27 NOTE — PROCEDURES
Large Joint Aspiration/Injection: bilateral knee    Date/Time: 4/27/2023 1:00 PM  Performed by: Marco Morales MD  Authorized by: Marco Morales MD     Consent Done?:  Yes (Verbal)  Indications:  Arthritis  Site marked: the procedure site was marked    Timeout: prior to procedure the correct patient, procedure, and site was verified      Local anesthesia used?: Yes    Local anesthetic:  Lidocaine 1% without epinephrine    Details:  Needle Size:  22 G  Ultrasonic Guidance for needle placement?: No    Approach:  Anterolateral  Location:  Knee  Laterality:  Bilateral  Site:  Bilateral knee  Medications (Right):  30 mg sodium hyaluronate (orthovisc) 30 mg/2 mL  Medications (Left):  30 mg sodium hyaluronate (orthovisc) 30 mg/2 mL  Patient tolerance:  Patient tolerated the procedure well with no immediate complications

## 2023-04-27 NOTE — PATIENT INSTRUCTIONS
Injected bilateral knees with Orthovisc 3/3 04/27/2023   Ice the knees the next several days   You can still take Tylenol 650 mg twice a day  Give this roughly 6 weeks for it to start working  If they work we can have the series repeated once every 6 months   You can still get a steroid injection in the knee in the future  Return in 6 months for re-evaluation

## 2023-04-27 NOTE — PROGRESS NOTES
Subjective:     Patient ID: Mark Dickerson Sr. is a 83 y.o. male.    Chief Complaint: Pain of the Right Knee and Pain of the Left Knee  Bilateral knee pain with the right worse than the left     HPI:  02/20/2023  Patient diagnosed with arthritis of both of his knees.  He received Orthovisc in 2021 December and he is doing well with it.  He stated physical therapy made a big difference.  Now he can get up from a sitting position without using his hands.  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  He feels that his pain right now is 0/10 that he does not need to have any injections.  But he knows that he should get a repeated prior to having again.  He wanted to understand what total knee replacement is all about and we showed him on the x-ray how it looks like given brochure we went over things with him.  I did tell him total knee replacement is when everything fails and you are miserable and can not live with what she got is went total knee replacement as needed.  He expressed understanding he feels that he is not there yet.  He occasionally takes an Advil and I did tell him to be careful because he is diabetic and he has stage III kidney.  He started taking natural products like turmeric and it seems to help a little bit.  We went over chondroitin and glucosamine also with him that is something that people take for arthritis.    Denies any pain in the hips or in the pelvis.  Denies any fever or chills or shortness of breath or difficulty with chewing or swallowing loss of bowel bladder control          04/13/2023   Bilateral knee severe arthritis.  He stated the physical therapy seems to have helped .  He does have pain when he ambulates it goes up to around 2/10.  When he sits down the pain is not as bad.  Occasional Advil and occasional Tylenol.  He does have stage 3 kidney disease can not take any inflammatories.  He ambulates without any assistive devices.  Last time we discussed total knee  replacement and he is happy that at this time he is not going to needed because he is not hurting so much.  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  He is continuing with exercise program.  We started Orthovisc series of 3 injections he said the mildly swelled up on him but did not require much of any treatment for it after he received them.  He is ambulating without any assistive devices.  If he sits down his pain is 0/10 when he over does it it goes up to around 3 to 4/10.    No fever no chills no shortness of breath no difficulty with chewing swallowing loss of bowel bladder control blurry vision double vision loss sense smell or taste.  He ambulates without any assistive devices      04/27/2023   Bilateral knee severe arthritis he is happy he is not going to undergo any surgery at this time.  The viscosupplementation seems to start to work.  It only hurts him around 2/10 when he excessively work.  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  He wants follow-up appointment end of November after things given.  We discussed the pros and cons of viscosupplementation.  He is not in severe pain to require total knee replacements.      No fever no chills no shortness of breath or difficulty with chewing or swallowing loss of bowel bladder control blurry vision double vision loss sense smell or taste     He ambulates without any assistive devices and very comfortably  Past Medical History:   Diagnosis Date    CKD stage 3 due to type 2 diabetes mellitus 2/18/2021    DM (diabetes mellitus) 2014    BS didn't check 12/11/2019    DM (diabetes mellitus) 2014    BS didn't check 05/05/2022    Foreign body, eye     right eye     Hyperlipidemia     Hypertension     Need for shingles vaccine 2019    Pneumonia     Primary osteoarthritis of right knee 10/29/2021    Severe obesity (BMI 35.0-35.9 with comorbidity) 7/10/2019    Type 2 diabetes mellitus     BS didn't check 2018     Past Surgical History:   Procedure Laterality Date    APPENDECTOMY      CATARACT EXTRACTION Bilateral     CPG     Family History   Problem Relation Age of Onset    Heart disease Mother     Hypertension Father     Heart disease Father     Hyperlipidemia Father     Cataracts Father     Hypertension Sister     Hypertension Brother     Cancer Son         colon    Allergic rhinitis Daughter      Social History     Socioeconomic History    Marital status:    Tobacco Use    Smoking status: Former     Packs/day: 3.00     Types: Cigarettes     Start date:      Quit date:      Years since quittin.3    Smokeless tobacco: Never   Substance and Sexual Activity    Alcohol use: No    Drug use: No    Sexual activity: Never     Social Determinants of Health     Financial Resource Strain: Low Risk     Difficulty of Paying Living Expenses: Not hard at all   Food Insecurity: No Food Insecurity    Worried About Running Out of Food in the Last Year: Never true    Ran Out of Food in the Last Year: Never true   Transportation Needs: No Transportation Needs    Lack of Transportation (Medical): No    Lack of Transportation (Non-Medical): No   Physical Activity: Inactive    Days of Exercise per Week: 0 days    Minutes of Exercise per Session: 0 min   Stress: No Stress Concern Present    Feeling of Stress : Only a little   Social Connections: Moderately Integrated    Frequency of Communication with Friends and Family: More than three times a week    Frequency of Social Gatherings with Friends and Family: More than three times a week    Attends Mu-ism Services: More than 4 times per year    Active Member of Clubs or Organizations: Yes    Attends Club or  Organization Meetings: More than 4 times per year    Marital Status:    Housing Stability: Unknown    Unable to Pay for Housing in the Last Year: No    Number of Places Lived in the Last Year: 1     Medication List with Changes/Refills   Current Medications    ASPIRIN 81 MG CHEW    Take 1 tablet (81 mg total) by mouth once daily.    ATORVASTATIN (LIPITOR) 40 MG TABLET    Take 1 tablet (40 mg total) by mouth once daily.    GLIPIZIDE (GLUCOTROL) 5 MG TABLET    Take 1 tablet (5 mg total) by mouth 2 (two) times daily with meals.    HYDROCHLOROTHIAZIDE (HYDRODIURIL) 25 MG TABLET    Take 1 tablet (25 mg total) by mouth once daily.    LEVOCETIRIZINE (XYZAL) 5 MG TABLET    Take 1 tablet (5 mg total) by mouth every evening.    MULTIVITAMIN (THERAGRAN) PER TABLET    Take 1 tablet by mouth once daily.     Review of patient's allergies indicates:  No Known Allergies  Review of Systems   Constitutional: Negative for decreased appetite.   HENT:  Negative for tinnitus.    Eyes:  Negative for double vision.   Cardiovascular:  Negative for chest pain.   Respiratory:  Negative for wheezing.    Hematologic/Lymphatic: Negative for bleeding problem.   Skin:  Negative for dry skin.   Musculoskeletal:  Positive for arthritis, joint pain and stiffness. Negative for back pain, gout and neck pain.   Gastrointestinal:  Negative for abdominal pain.   Genitourinary:  Negative for bladder incontinence.   Neurological:  Negative for numbness, paresthesias and sensory change.   Psychiatric/Behavioral:  Negative for altered mental status.      Objective:   Body mass index is 35.14 kg/m².  There were no vitals filed for this visit.       General    Constitutional: He is oriented to person, place, and time. He appears well-developed.   HENT:   Head: Atraumatic.   Eyes: EOM are normal.   Pulmonary/Chest: Effort normal.   Neurological: He is alert and oriented to person, place, and time.   Psychiatric: Judgment normal.           Ambulating  without any assistive devices  Pelvis is level  Hips right hip slight decrease in internal rotation compared to the left side but otherwise no pain in the groin.    Hip flexors and abductors and adductors and quads and hamstrings are 5/5   Right knee with medial joint tenderness mild crepitus to compression on the patella.  Active motion 2-120 degrees.  The no defect in the patella or quadriceps tendon.  There is excellent strength.  And very mild swelling   Left knee with also medial joint tenderness and mild crepitus to compression on the patella.  Active motion is 0-120 degrees no defect in the patella or quadriceps tendon.  She has very mild swelling.  Ankle motion intact   Calves are soft      Relevant imaging results reviewed and interpreted by me, discussed with the patient and / or family today   X-ray 12/01/2022 bilateral knees showing complete loss of joint space on the inside of the knee with marginal osteophytic changes and sclerosis consistent with bilateral knee severe arthritis with varus deformity  Assessment:     Encounter Diagnoses   Name Primary?    Arthritis of knee, right Yes    Acquired varus deformity knee, right     Arthritis of knee, left     Acquired varus deformity knee, left         Plan:   Arthritis of knee, right  -     Large Joint Aspiration/Injection: bilateral knee    Acquired varus deformity knee, right    Arthritis of knee, left  -     Large Joint Aspiration/Injection: bilateral knee    Acquired varus deformity knee, left         Patient Instructions   Injected bilateral knees with Orthovisc 3/3 04/27/2023   Ice the knees the next several days   You can still take Tylenol 650 mg twice a day  Give this roughly 6 weeks for it to start working  If they work we can have the series repeated once every 6 months   You can still get a steroid injection in the knee in the future  Return in 6 months for re-evaluation        Disclaimer: This note was prepared using a voice recognition system  and is likely to have sound alike errors within the text.

## 2023-05-12 ENCOUNTER — LAB VISIT (OUTPATIENT)
Dept: LAB | Facility: HOSPITAL | Age: 83
End: 2023-05-12
Attending: FAMILY MEDICINE
Payer: MEDICARE

## 2023-05-12 ENCOUNTER — TELEPHONE (OUTPATIENT)
Dept: FAMILY MEDICINE | Facility: CLINIC | Age: 83
End: 2023-05-12
Payer: MEDICARE

## 2023-05-12 DIAGNOSIS — E11.9 CONTROLLED TYPE 2 DIABETES MELLITUS WITHOUT COMPLICATION, WITHOUT LONG-TERM CURRENT USE OF INSULIN: ICD-10-CM

## 2023-05-12 LAB
ALBUMIN SERPL BCP-MCNC: 3.4 G/DL (ref 3.5–5.2)
ALP SERPL-CCNC: 55 U/L (ref 55–135)
ALT SERPL W/O P-5'-P-CCNC: 28 U/L (ref 10–44)
ANION GAP SERPL CALC-SCNC: 10 MMOL/L (ref 8–16)
AST SERPL-CCNC: 29 U/L (ref 10–40)
BASOPHILS # BLD AUTO: 0.09 K/UL (ref 0–0.2)
BASOPHILS NFR BLD: 0.9 % (ref 0–1.9)
BILIRUB SERPL-MCNC: 0.5 MG/DL (ref 0.1–1)
BUN SERPL-MCNC: 24 MG/DL (ref 8–23)
CALCIUM SERPL-MCNC: 9.4 MG/DL (ref 8.7–10.5)
CHLORIDE SERPL-SCNC: 98 MMOL/L (ref 95–110)
CHOLEST SERPL-MCNC: 181 MG/DL (ref 120–199)
CHOLEST/HDLC SERPL: 6.5 {RATIO} (ref 2–5)
CO2 SERPL-SCNC: 25 MMOL/L (ref 23–29)
CREAT SERPL-MCNC: 1.4 MG/DL (ref 0.5–1.4)
DIFFERENTIAL METHOD: ABNORMAL
EOSINOPHIL # BLD AUTO: 0.3 K/UL (ref 0–0.5)
EOSINOPHIL NFR BLD: 3.4 % (ref 0–8)
ERYTHROCYTE [DISTWIDTH] IN BLOOD BY AUTOMATED COUNT: 13.9 % (ref 11.5–14.5)
EST. GFR  (NO RACE VARIABLE): 49.3 ML/MIN/1.73 M^2
ESTIMATED AVG GLUCOSE: 148 MG/DL (ref 68–131)
GLUCOSE SERPL-MCNC: 158 MG/DL (ref 70–110)
HBA1C MFR BLD: 6.8 % (ref 4–5.6)
HCT VFR BLD AUTO: 41 % (ref 40–54)
HDLC SERPL-MCNC: 28 MG/DL (ref 40–75)
HDLC SERPL: 15.5 % (ref 20–50)
HGB BLD-MCNC: 13.2 G/DL (ref 14–18)
IMM GRANULOCYTES # BLD AUTO: 0.02 K/UL (ref 0–0.04)
IMM GRANULOCYTES NFR BLD AUTO: 0.2 % (ref 0–0.5)
LDLC SERPL CALC-MCNC: 120 MG/DL (ref 63–159)
LYMPHOCYTES # BLD AUTO: 2.6 K/UL (ref 1–4.8)
LYMPHOCYTES NFR BLD: 25.9 % (ref 18–48)
MCH RBC QN AUTO: 29.7 PG (ref 27–31)
MCHC RBC AUTO-ENTMCNC: 32.2 G/DL (ref 32–36)
MCV RBC AUTO: 92 FL (ref 82–98)
MONOCYTES # BLD AUTO: 0.8 K/UL (ref 0.3–1)
MONOCYTES NFR BLD: 7.9 % (ref 4–15)
NEUTROPHILS # BLD AUTO: 6.2 K/UL (ref 1.8–7.7)
NEUTROPHILS NFR BLD: 61.7 % (ref 38–73)
NONHDLC SERPL-MCNC: 153 MG/DL
NRBC BLD-RTO: 0 /100 WBC
PLATELET # BLD AUTO: 384 K/UL (ref 150–450)
PMV BLD AUTO: 9.7 FL (ref 9.2–12.9)
POTASSIUM SERPL-SCNC: 3.3 MMOL/L (ref 3.5–5.1)
PROT SERPL-MCNC: 7.1 G/DL (ref 6–8.4)
RBC # BLD AUTO: 4.44 M/UL (ref 4.6–6.2)
SODIUM SERPL-SCNC: 133 MMOL/L (ref 136–145)
TRIGL SERPL-MCNC: 165 MG/DL (ref 30–150)
WBC # BLD AUTO: 10.06 K/UL (ref 3.9–12.7)

## 2023-05-12 PROCEDURE — 85025 COMPLETE CBC W/AUTO DIFF WBC: CPT | Mod: HCNC | Performed by: FAMILY MEDICINE

## 2023-05-12 PROCEDURE — 80053 COMPREHEN METABOLIC PANEL: CPT | Mod: HCNC | Performed by: FAMILY MEDICINE

## 2023-05-12 PROCEDURE — 80061 LIPID PANEL: CPT | Mod: HCNC | Performed by: FAMILY MEDICINE

## 2023-05-12 PROCEDURE — 36415 COLL VENOUS BLD VENIPUNCTURE: CPT | Mod: HCNC,PO | Performed by: FAMILY MEDICINE

## 2023-05-12 PROCEDURE — 83036 HEMOGLOBIN GLYCOSYLATED A1C: CPT | Mod: HCNC | Performed by: FAMILY MEDICINE

## 2023-05-19 ENCOUNTER — OFFICE VISIT (OUTPATIENT)
Dept: FAMILY MEDICINE | Facility: CLINIC | Age: 83
End: 2023-05-19
Payer: MEDICARE

## 2023-05-19 VITALS
HEIGHT: 70 IN | DIASTOLIC BLOOD PRESSURE: 68 MMHG | HEART RATE: 96 BPM | TEMPERATURE: 99 F | WEIGHT: 249.13 LBS | SYSTOLIC BLOOD PRESSURE: 127 MMHG | BODY MASS INDEX: 35.66 KG/M2 | OXYGEN SATURATION: 95 % | RESPIRATION RATE: 18 BRPM

## 2023-05-19 DIAGNOSIS — E78.5 HYPERLIPIDEMIA ASSOCIATED WITH TYPE 2 DIABETES MELLITUS: Primary | ICD-10-CM

## 2023-05-19 DIAGNOSIS — I35.0 AORTIC STENOSIS, SEVERE: ICD-10-CM

## 2023-05-19 DIAGNOSIS — I15.2 HYPERTENSION ASSOCIATED WITH DIABETES: ICD-10-CM

## 2023-05-19 DIAGNOSIS — E11.59 HYPERTENSION ASSOCIATED WITH DIABETES: ICD-10-CM

## 2023-05-19 DIAGNOSIS — E11.69 HYPERLIPIDEMIA ASSOCIATED WITH TYPE 2 DIABETES MELLITUS: Primary | ICD-10-CM

## 2023-05-19 DIAGNOSIS — E87.6 HYPOKALEMIA: ICD-10-CM

## 2023-05-19 PROCEDURE — 3072F PR LOW RISK FOR RETINOPATHY: ICD-10-PCS | Mod: HCNC,CPTII,, | Performed by: FAMILY MEDICINE

## 2023-05-19 PROCEDURE — 3078F DIAST BP <80 MM HG: CPT | Mod: HCNC,CPTII,, | Performed by: FAMILY MEDICINE

## 2023-05-19 PROCEDURE — 3074F SYST BP LT 130 MM HG: CPT | Mod: HCNC,CPTII,, | Performed by: FAMILY MEDICINE

## 2023-05-19 PROCEDURE — 1126F PR PAIN SEVERITY QUANTIFIED, NO PAIN PRESENT: ICD-10-PCS | Mod: HCNC,CPTII,, | Performed by: FAMILY MEDICINE

## 2023-05-19 PROCEDURE — 3072F LOW RISK FOR RETINOPATHY: CPT | Mod: HCNC,CPTII,, | Performed by: FAMILY MEDICINE

## 2023-05-19 PROCEDURE — 1101F PT FALLS ASSESS-DOCD LE1/YR: CPT | Mod: HCNC,CPTII,, | Performed by: FAMILY MEDICINE

## 2023-05-19 PROCEDURE — 3288F PR FALLS RISK ASSESSMENT DOCUMENTED: ICD-10-PCS | Mod: HCNC,CPTII,, | Performed by: FAMILY MEDICINE

## 2023-05-19 PROCEDURE — 99999 PR PBB SHADOW E&M-EST. PATIENT-LVL IV: ICD-10-PCS | Mod: PBBFAC,HCNC,, | Performed by: FAMILY MEDICINE

## 2023-05-19 PROCEDURE — 1126F AMNT PAIN NOTED NONE PRSNT: CPT | Mod: HCNC,CPTII,, | Performed by: FAMILY MEDICINE

## 2023-05-19 PROCEDURE — 99999 PR PBB SHADOW E&M-EST. PATIENT-LVL IV: CPT | Mod: PBBFAC,HCNC,, | Performed by: FAMILY MEDICINE

## 2023-05-19 PROCEDURE — 3288F FALL RISK ASSESSMENT DOCD: CPT | Mod: HCNC,CPTII,, | Performed by: FAMILY MEDICINE

## 2023-05-19 PROCEDURE — 3078F PR MOST RECENT DIASTOLIC BLOOD PRESSURE < 80 MM HG: ICD-10-PCS | Mod: HCNC,CPTII,, | Performed by: FAMILY MEDICINE

## 2023-05-19 PROCEDURE — 1101F PR PT FALLS ASSESS DOC 0-1 FALLS W/OUT INJ PAST YR: ICD-10-PCS | Mod: HCNC,CPTII,, | Performed by: FAMILY MEDICINE

## 2023-05-19 PROCEDURE — 99214 OFFICE O/P EST MOD 30 MIN: CPT | Mod: HCNC,PO | Performed by: FAMILY MEDICINE

## 2023-05-19 PROCEDURE — 1159F PR MEDICATION LIST DOCUMENTED IN MEDICAL RECORD: ICD-10-PCS | Mod: HCNC,CPTII,, | Performed by: FAMILY MEDICINE

## 2023-05-19 PROCEDURE — 99214 OFFICE O/P EST MOD 30 MIN: CPT | Mod: HCNC,,, | Performed by: FAMILY MEDICINE

## 2023-05-19 PROCEDURE — 3074F PR MOST RECENT SYSTOLIC BLOOD PRESSURE < 130 MM HG: ICD-10-PCS | Mod: HCNC,CPTII,, | Performed by: FAMILY MEDICINE

## 2023-05-19 PROCEDURE — 1159F MED LIST DOCD IN RCRD: CPT | Mod: HCNC,CPTII,, | Performed by: FAMILY MEDICINE

## 2023-05-19 PROCEDURE — 99214 PR OFFICE/OUTPT VISIT, EST, LEVL IV, 30-39 MIN: ICD-10-PCS | Mod: HCNC,,, | Performed by: FAMILY MEDICINE

## 2023-05-19 RX ORDER — POTASSIUM CHLORIDE 20 MEQ/1
20 TABLET, EXTENDED RELEASE ORAL DAILY
Qty: 90 TABLET | Refills: 3 | Status: SHIPPED | OUTPATIENT
Start: 2023-05-19

## 2023-05-19 RX ORDER — ATORVASTATIN CALCIUM 40 MG/1
40 TABLET, FILM COATED ORAL DAILY
Qty: 90 TABLET | Refills: 3 | Status: SHIPPED | OUTPATIENT
Start: 2023-05-19

## 2023-05-19 NOTE — PROGRESS NOTES
Chief Complaint:    Chief Complaint   Patient presents with    Follow-up       History of Present Illness:  Patient with HLD associated with DM2, HTN, and CKD stage 3 presents today for a six month follow-up      Doing well no complaints  A1c 6.8.    On statin therapy no side effects lipids have come down significantly   Blood pressure is stable     Mild hypokalemia and slightly elevated BUN creatinine suggestive of some dehydration acknowledges that he was not drinking much water at that time.            ROS:  Review of Systems   Constitutional:  Negative for activity change, chills, fatigue, fever and unexpected weight change.   HENT:  Negative for congestion, ear discharge, ear pain, hearing loss, postnasal drip and rhinorrhea.    Eyes:  Positive for visual disturbance. Negative for pain.   Respiratory:  Negative for cough, chest tightness and shortness of breath.    Cardiovascular:  Negative for chest pain and palpitations.   Gastrointestinal:  Negative for abdominal pain, constipation, diarrhea and vomiting.   Endocrine: Negative for heat intolerance.   Genitourinary:  Negative for dysuria, flank pain, frequency and hematuria.   Musculoskeletal:  Negative for back pain, gait problem and neck pain.   Skin:  Negative for color change and rash.   Neurological:  Negative for dizziness, tremors, seizures, numbness and headaches.   Psychiatric/Behavioral:  Negative for agitation, dysphoric mood, hallucinations, self-injury, sleep disturbance and suicidal ideas. The patient is not nervous/anxious.    All other systems reviewed and are negative.    Past Medical History:   Diagnosis Date    CKD stage 3 due to type 2 diabetes mellitus 2/18/2021    DM (diabetes mellitus) 2014    BS didn't check 12/11/2019    DM (diabetes mellitus) 2014    BS didn't check 05/05/2022    Foreign body, eye     right eye    Hyperlipidemia     Hypertension     Need for shingles vaccine 11/20/2019    Pneumonia     Primary osteoarthritis of right  knee 10/29/2021    Severe obesity (BMI 35.0-35.9 with comorbidity) 7/10/2019    Type 2 diabetes mellitus 2014    BS didn't check 2018       Social History:  Social History     Socioeconomic History    Marital status:    Tobacco Use    Smoking status: Former     Packs/day: 3.00     Types: Cigarettes     Start date:      Quit date:      Years since quittin.4    Smokeless tobacco: Never   Substance and Sexual Activity    Alcohol use: No    Drug use: No    Sexual activity: Never     Social Determinants of Health     Financial Resource Strain: Low Risk     Difficulty of Paying Living Expenses: Not hard at all   Food Insecurity: No Food Insecurity    Worried About Running Out of Food in the Last Year: Never true    Ran Out of Food in the Last Year: Never true   Transportation Needs: No Transportation Needs    Lack of Transportation (Medical): No    Lack of Transportation (Non-Medical): No   Physical Activity: Inactive    Days of Exercise per Week: 0 days    Minutes of Exercise per Session: 0 min   Stress: No Stress Concern Present    Feeling of Stress : Only a little   Social Connections: Moderately Integrated    Frequency of Communication with Friends and Family: More than three times a week    Frequency of Social Gatherings with Friends and Family: More than three times a week    Attends [employer] Services: More than 4 times per year    Active Member of Clubs or Organizations: Yes    Attends Club or Organization Meetings: More than 4 times per year    Marital Status:    Housing Stability: Unknown    Unable to Pay for Housing in the Last Year: No    Number of Places Lived in the Last Year: 1       Family History:   family history includes Allergic rhinitis in his daughter; Cancer in his son; Cataracts in his father; Heart disease in his father and mother; Hyperlipidemia in his father; Hypertension in his brother, father, and sister.    Health Maintenance   Topic Date Due    Hemoglobin A1c  " 11/12/2023    Eye Exam  12/01/2023    Lipid Panel  05/12/2024    TETANUS VACCINE  12/10/2025       Physical Exam:    Vital Signs  Temp: 98.7 °F (37.1 °C)  Pulse: 96  Resp: 18  SpO2: 95 %  BP: 127/68  Pain Score: 0-No pain  Height and Weight  Height: 5' 10" (177.8 cm)  Weight: 113 kg (249 lb 1.9 oz)  BSA (Calculated - sq m): 2.36 sq meters  BMI (Calculated): 35.7  Weight in (lb) to have BMI = 25: 173.9]    Body mass index is 35.74 kg/m².    Physical Exam  Vitals and nursing note reviewed.   Constitutional:       Appearance: Normal appearance.   HENT:      Head: Normocephalic and atraumatic.      Right Ear: Tympanic membrane normal.      Left Ear: Tympanic membrane normal.   Eyes:      Extraocular Movements: Extraocular movements intact.      Pupils: Pupils are equal, round, and reactive to light.   Cardiovascular:      Rate and Rhythm: Normal rate and regular rhythm.      Pulses: Normal pulses.      Heart sounds: Murmur heard.   Systolic murmur is present with a grade of 3/6.     No gallop.   Pulmonary:      Effort: Pulmonary effort is normal. No respiratory distress.      Breath sounds: Normal breath sounds. No wheezing, rhonchi or rales.   Abdominal:      General: There is no distension.      Palpations: Abdomen is soft.      Tenderness: There is no abdominal tenderness.   Musculoskeletal:         General: No swelling, deformity or signs of injury. Normal range of motion.      Cervical back: Normal range of motion.   Skin:     General: Skin is warm and dry.      Capillary Refill: Capillary refill takes less than 2 seconds.      Coloration: Skin is not jaundiced or pale.   Neurological:      General: No focal deficit present.      Mental Status: He is alert and oriented to person, place, and time.   Psychiatric:         Mood and Affect: Mood normal.         Behavior: Behavior normal.         Diabetes Management Status    Statin: Not taking  ACE/ARB: Not taking    Screening or Prevention Patient's value Goal " Complete/Controlled?   HgA1C Testing and Control   Lab Results   Component Value Date    HGBA1C 6.8 (H) 05/12/2023      Annually/Less than 8% Yes   Lipid profile : 05/12/2023 Annually Yes   LDL control Lab Results   Component Value Date    LDLCALC 120.0 05/12/2023    Annually/Less than 100 mg/dl  No   Nephropathy screening Lab Results   Component Value Date    LABMICR 8.0 11/22/2022     Lab Results   Component Value Date    PROTEINUA Negative 04/14/2023    Annually Yes   Blood pressure BP Readings from Last 1 Encounters:   05/19/23 127/68    Less than 140/90 Yes   Dilated retinal exam : 12/01/2022 Annually Yes   Foot exam   Most Recent Foot Exam Date: Not Found Annually Yes       Assessment:      ICD-10-CM ICD-9-CM   1. Hyperlipidemia associated with type 2 diabetes mellitus  E11.69 250.80    E78.5 272.4   2. Hypertension associated with diabetes  E11.59 250.80    I15.2 401.9   3. Hypokalemia  E87.6 276.8   4. Aortic stenosis, severe  I35.0 424.1     Plan:  Hypokalemia secondary to use of hydrochlorothiazide given K 2 or 20 mEq 1 p.o. daily   Please increase fluid intake   Other medical problems stable   Aortic stenosis moderate to severe patient is symptomatic signs and symptoms discuss will need replacement surgery at some point.    Follow-up 6 months      Orders Placed This Encounter   Procedures    Hemoglobin A1C    Comprehensive Metabolic Panel    CBC Auto Differential    Lipid Panel    Microalbumin/Creatinine Ratio, Urine     Current Outpatient Medications   Medication Sig Dispense Refill    aspirin 81 MG Chew Take 1 tablet (81 mg total) by mouth once daily.  0    glipiZIDE (GLUCOTROL) 5 MG tablet Take 1 tablet (5 mg total) by mouth 2 (two) times daily with meals. 180 tablet 2    hydroCHLOROthiazide (HYDRODIURIL) 25 MG tablet Take 1 tablet (25 mg total) by mouth once daily. 90 tablet 2    levocetirizine (XYZAL) 5 MG tablet Take 1 tablet (5 mg total) by mouth every evening. 30 tablet 11    multivitamin  (THERAGRAN) per tablet Take 1 tablet by mouth once daily.      atorvastatin (LIPITOR) 40 MG tablet Take 1 tablet (40 mg total) by mouth once daily. 90 tablet 3    potassium chloride SA (K-DUR,KLOR-CON) 20 MEQ tablet Take 1 tablet (20 mEq total) by mouth once daily. 90 tablet 3     No current facility-administered medications for this visit.       Medications Discontinued During This Encounter   Medication Reason    atorvastatin (LIPITOR) 40 MG tablet Reorder       Follow up in about 6 months (around 11/19/2023).      Tatyana Cannon MD  Scribe Attestation:   I, Philomena Gardner, am scribing for, and in the presence of, Dr.Arif Cannon I performed the above scribed service and the documentation accurately describes the services I performed. I attest to the accuracy of the note.    I, Dr. Tatyana Cannon, reviewed documentation as scribed above. I performed the services described in this documentation.  I agree that the record reflects my personal performance and is accurate and complete. Tatyana Cannon MD.  05/19/2023

## 2023-07-17 PROBLEM — G45.9 TIA (TRANSIENT ISCHEMIC ATTACK): Status: RESOLVED | Noted: 2023-04-14 | Resolved: 2023-07-17

## 2023-08-14 DIAGNOSIS — E11.59 HYPERTENSION ASSOCIATED WITH DIABETES: Primary | ICD-10-CM

## 2023-08-14 DIAGNOSIS — I15.2 HYPERTENSION ASSOCIATED WITH DIABETES: Primary | ICD-10-CM

## 2023-08-14 RX ORDER — HYDROCHLOROTHIAZIDE 25 MG/1
25 TABLET ORAL DAILY
Qty: 90 TABLET | Refills: 2 | Status: SHIPPED | OUTPATIENT
Start: 2023-08-14 | End: 2024-01-19

## 2023-08-14 NOTE — TELEPHONE ENCOUNTER
Care Due:                  Date            Visit Type   Department     Provider  --------------------------------------------------------------------------------                                EP -                              [facility]  Last Visit: 05-      CARE ([facility])   MEDICINE       Tatyana Cannon                              [facility]  Next Visit: 12-      CARE ([place] [facility])   MEDICINE       Tatyana Cannon                                                            Last  Test          Frequency    Reason                     Performed    Due Date  --------------------------------------------------------------------------------    HBA1C.......  6 months...  glipiZIDE................  05- 11-    [facility] Embedded Care Due Messages. Reference number: [id number].   8/14/2023 2:21:02 PM CDT

## 2023-08-14 NOTE — TELEPHONE ENCOUNTER
----- Message from Ivonne Singh sent at 8/14/2023 12:15 PM CDT -----  .Type:  RX Refill Request    Who Called: .Mark Dickerson Sr.   Refill or New Rx:refill  RX Name and Strength:hydroCHLOROthiazide (HYDRODIURIL) 25 MG tablet and the prescription for his sugar  How is the patient currently taking it? (ex. 1XDay):daily  Is this a 30 day or 90 day RX:90    Preferred Pharmacy with phone number:  .[facility] [address] [place] [id number]  Phone: [phone] Fax: [phone]     Local or Mail Order:  Ordering Provider:K[employer]  Would the patient rather a call back or a response via [facility]? Call back  Best Call Back Number:.[phone]   Additional Information:

## 2023-08-15 ENCOUNTER — TELEPHONE (OUTPATIENT)
Dept: FAMILY MEDICINE | Facility: CLINIC | Age: 83
End: 2023-08-15
Payer: MEDICARE

## 2023-08-15 DIAGNOSIS — E78.5 HYPERLIPIDEMIA ASSOCIATED WITH TYPE 2 DIABETES MELLITUS: ICD-10-CM

## 2023-08-15 DIAGNOSIS — E11.69 HYPERLIPIDEMIA ASSOCIATED WITH TYPE 2 DIABETES MELLITUS: ICD-10-CM

## 2023-08-15 RX ORDER — GLIPIZIDE 5 MG/1
5 TABLET ORAL 2 TIMES DAILY WITH MEALS
Qty: 180 TABLET | Refills: 2 | Status: SHIPPED | OUTPATIENT
Start: 2023-08-15 | End: 2024-01-19

## 2023-08-15 NOTE — TELEPHONE ENCOUNTER
----- Message from Soumya Lawler sent at 8/15/2023 11:27 AM CDT -----  Please refill the medication(s) listed below. Please call the patient when the prescription(s) is ready for  at this phone number              Medication #1glipiZIDE (GLUCOTROL) 5 MG tablet         Medication #2           Preferred Pharmacy: [facility] [address] [place] [address]

## 2023-08-15 NOTE — TELEPHONE ENCOUNTER
----- Message from Soumya Lawler sent at 8/15/2023 11:30 AM CDT -----  Regarding: pt call back  Name of Who is Calling:JAVY ANGELO SR. [[id number]]           What is the request in detail: pt needs to be called            Can the clinic reply by [facility]:           What Number to Call Back if not in [facility]: [phone]

## 2023-08-15 NOTE — TELEPHONE ENCOUNTER
----- Message from Soumya Lawler sent at 8/15/2023 11:27 AM CDT -----  Please refill the medication(s) listed below. Please call the patient when the prescription(s) is ready for  at this phone number              Medication #1glipiZIDE (GLUCOTROL) 5 MG tablet         Medication #2           Preferred Pharmacy: [facility] [address] [place] [address]

## 2023-08-15 NOTE — TELEPHONE ENCOUNTER
No care due was identified.  [facility] Embedded Care Due Messages. Reference number: [id number].   8/15/2023 11:58:00 AM CDT

## 2023-11-28 ENCOUNTER — LAB VISIT (OUTPATIENT)
Dept: LAB | Facility: HOSPITAL | Age: 83
End: 2023-11-28
Attending: FAMILY MEDICINE
Payer: MEDICARE

## 2023-11-28 DIAGNOSIS — I15.2 HYPERTENSION ASSOCIATED WITH DIABETES: ICD-10-CM

## 2023-11-28 DIAGNOSIS — E87.6 HYPOKALEMIA: ICD-10-CM

## 2023-11-28 DIAGNOSIS — E78.5 HYPERLIPIDEMIA ASSOCIATED WITH TYPE 2 DIABETES MELLITUS: ICD-10-CM

## 2023-11-28 DIAGNOSIS — E11.59 HYPERTENSION ASSOCIATED WITH DIABETES: ICD-10-CM

## 2023-11-28 DIAGNOSIS — E11.69 HYPERLIPIDEMIA ASSOCIATED WITH TYPE 2 DIABETES MELLITUS: ICD-10-CM

## 2023-11-28 LAB
ALBUMIN SERPL BCP-MCNC: 3.5 G/DL (ref 3.5–5.2)
ALP SERPL-CCNC: 66 U/L (ref 55–135)
ALT SERPL W/O P-5'-P-CCNC: 28 U/L (ref 10–44)
ANION GAP SERPL CALC-SCNC: 10 MMOL/L (ref 8–16)
AST SERPL-CCNC: 30 U/L (ref 10–40)
BASOPHILS # BLD AUTO: 0.07 K/UL (ref 0–0.2)
BASOPHILS NFR BLD: 0.9 % (ref 0–1.9)
BILIRUB SERPL-MCNC: 0.8 MG/DL (ref 0.1–1)
BUN SERPL-MCNC: 17 MG/DL (ref 8–23)
CALCIUM SERPL-MCNC: 9.3 MG/DL (ref 8.7–10.5)
CHLORIDE SERPL-SCNC: 98 MMOL/L (ref 95–110)
CHOLEST SERPL-MCNC: 198 MG/DL (ref 120–199)
CHOLEST/HDLC SERPL: 6 {RATIO} (ref 2–5)
CO2 SERPL-SCNC: 27 MMOL/L (ref 23–29)
CREAT SERPL-MCNC: 0.9 MG/DL (ref 0.5–1.4)
DIFFERENTIAL METHOD: ABNORMAL
EOSINOPHIL # BLD AUTO: 0.4 K/UL (ref 0–0.5)
EOSINOPHIL NFR BLD: 4.4 % (ref 0–8)
ERYTHROCYTE [DISTWIDTH] IN BLOOD BY AUTOMATED COUNT: 14.6 % (ref 11.5–14.5)
EST. GFR  (NO RACE VARIABLE): >60 ML/MIN/1.73 M^2
ESTIMATED AVG GLUCOSE: 157 MG/DL (ref 68–131)
GLUCOSE SERPL-MCNC: 158 MG/DL (ref 70–110)
HBA1C MFR BLD: 7.1 % (ref 4–5.6)
HCT VFR BLD AUTO: 39 % (ref 40–54)
HDLC SERPL-MCNC: 33 MG/DL (ref 40–75)
HDLC SERPL: 16.7 % (ref 20–50)
HGB BLD-MCNC: 13 G/DL (ref 14–18)
IMM GRANULOCYTES # BLD AUTO: 0.03 K/UL (ref 0–0.04)
IMM GRANULOCYTES NFR BLD AUTO: 0.4 % (ref 0–0.5)
LDLC SERPL CALC-MCNC: 135.4 MG/DL (ref 63–159)
LYMPHOCYTES # BLD AUTO: 2.3 K/UL (ref 1–4.8)
LYMPHOCYTES NFR BLD: 28.4 % (ref 18–48)
MCH RBC QN AUTO: 30 PG (ref 27–31)
MCHC RBC AUTO-ENTMCNC: 33.3 G/DL (ref 32–36)
MCV RBC AUTO: 90 FL (ref 82–98)
MONOCYTES # BLD AUTO: 0.6 K/UL (ref 0.3–1)
MONOCYTES NFR BLD: 7.6 % (ref 4–15)
NEUTROPHILS # BLD AUTO: 4.8 K/UL (ref 1.8–7.7)
NEUTROPHILS NFR BLD: 58.3 % (ref 38–73)
NONHDLC SERPL-MCNC: 165 MG/DL
NRBC BLD-RTO: 0 /100 WBC
PLATELET # BLD AUTO: 352 K/UL (ref 150–450)
PMV BLD AUTO: 9.5 FL (ref 9.2–12.9)
POTASSIUM SERPL-SCNC: 3.8 MMOL/L (ref 3.5–5.1)
PROT SERPL-MCNC: 7 G/DL (ref 6–8.4)
RBC # BLD AUTO: 4.34 M/UL (ref 4.6–6.2)
SODIUM SERPL-SCNC: 135 MMOL/L (ref 136–145)
TRIGL SERPL-MCNC: 148 MG/DL (ref 30–150)
WBC # BLD AUTO: 8.21 K/UL (ref 3.9–12.7)

## 2023-11-28 PROCEDURE — 85025 COMPLETE CBC W/AUTO DIFF WBC: CPT | Performed by: FAMILY MEDICINE

## 2023-11-28 PROCEDURE — 80061 LIPID PANEL: CPT | Performed by: FAMILY MEDICINE

## 2023-11-28 PROCEDURE — 36415 COLL VENOUS BLD VENIPUNCTURE: CPT | Mod: PO | Performed by: FAMILY MEDICINE

## 2023-11-28 PROCEDURE — 83036 HEMOGLOBIN GLYCOSYLATED A1C: CPT | Performed by: FAMILY MEDICINE

## 2023-11-28 PROCEDURE — 80053 COMPREHEN METABOLIC PANEL: CPT | Performed by: FAMILY MEDICINE

## 2023-11-30 ENCOUNTER — OFFICE VISIT (OUTPATIENT)
Dept: ORTHOPEDICS | Facility: CLINIC | Age: 83
End: 2023-11-30
Payer: MEDICARE

## 2023-11-30 VITALS — HEIGHT: 70 IN | WEIGHT: 249 LBS | BODY MASS INDEX: 35.65 KG/M2

## 2023-11-30 DIAGNOSIS — M17.0 BILATERAL PRIMARY OSTEOARTHRITIS OF KNEE: ICD-10-CM

## 2023-11-30 DIAGNOSIS — M17.12 ARTHRITIS OF KNEE, LEFT: ICD-10-CM

## 2023-11-30 DIAGNOSIS — M17.11 ARTHRITIS OF KNEE, RIGHT: Primary | ICD-10-CM

## 2023-11-30 DIAGNOSIS — M21.162 ACQUIRED VARUS DEFORMITY KNEE, LEFT: ICD-10-CM

## 2023-11-30 DIAGNOSIS — M21.161 ACQUIRED VARUS DEFORMITY KNEE, RIGHT: ICD-10-CM

## 2023-11-30 PROCEDURE — 1159F PR MEDICATION LIST DOCUMENTED IN MEDICAL RECORD: ICD-10-PCS | Mod: CPTII,S$GLB,, | Performed by: ORTHOPAEDIC SURGERY

## 2023-11-30 PROCEDURE — 99999 PR PBB SHADOW E&M-EST. PATIENT-LVL III: ICD-10-PCS | Mod: PBBFAC,,, | Performed by: ORTHOPAEDIC SURGERY

## 2023-11-30 PROCEDURE — 1101F PR PT FALLS ASSESS DOC 0-1 FALLS W/OUT INJ PAST YR: ICD-10-PCS | Mod: CPTII,S$GLB,, | Performed by: ORTHOPAEDIC SURGERY

## 2023-11-30 PROCEDURE — 3288F PR FALLS RISK ASSESSMENT DOCUMENTED: ICD-10-PCS | Mod: CPTII,S$GLB,, | Performed by: ORTHOPAEDIC SURGERY

## 2023-11-30 PROCEDURE — 1125F AMNT PAIN NOTED PAIN PRSNT: CPT | Mod: CPTII,S$GLB,, | Performed by: ORTHOPAEDIC SURGERY

## 2023-11-30 PROCEDURE — 1159F MED LIST DOCD IN RCRD: CPT | Mod: CPTII,S$GLB,, | Performed by: ORTHOPAEDIC SURGERY

## 2023-11-30 PROCEDURE — 3072F PR LOW RISK FOR RETINOPATHY: ICD-10-PCS | Mod: CPTII,S$GLB,, | Performed by: ORTHOPAEDIC SURGERY

## 2023-11-30 PROCEDURE — 20610 DRAIN/INJ JOINT/BURSA W/O US: CPT | Mod: 50,S$GLB,, | Performed by: ORTHOPAEDIC SURGERY

## 2023-11-30 PROCEDURE — 99213 PR OFFICE/OUTPT VISIT, EST, LEVL III, 20-29 MIN: ICD-10-PCS | Mod: 25,S$GLB,, | Performed by: ORTHOPAEDIC SURGERY

## 2023-11-30 PROCEDURE — 3072F LOW RISK FOR RETINOPATHY: CPT | Mod: CPTII,S$GLB,, | Performed by: ORTHOPAEDIC SURGERY

## 2023-11-30 PROCEDURE — 20610 LARGE JOINT ASPIRATION/INJECTION: BILATERAL KNEE: ICD-10-PCS | Mod: 50,S$GLB,, | Performed by: ORTHOPAEDIC SURGERY

## 2023-11-30 PROCEDURE — 3288F FALL RISK ASSESSMENT DOCD: CPT | Mod: CPTII,S$GLB,, | Performed by: ORTHOPAEDIC SURGERY

## 2023-11-30 PROCEDURE — 99213 OFFICE O/P EST LOW 20 MIN: CPT | Mod: 25,S$GLB,, | Performed by: ORTHOPAEDIC SURGERY

## 2023-11-30 PROCEDURE — 1125F PR PAIN SEVERITY QUANTIFIED, PAIN PRESENT: ICD-10-PCS | Mod: CPTII,S$GLB,, | Performed by: ORTHOPAEDIC SURGERY

## 2023-11-30 PROCEDURE — 1101F PT FALLS ASSESS-DOCD LE1/YR: CPT | Mod: CPTII,S$GLB,, | Performed by: ORTHOPAEDIC SURGERY

## 2023-11-30 PROCEDURE — 99999 PR PBB SHADOW E&M-EST. PATIENT-LVL III: CPT | Mod: PBBFAC,,, | Performed by: ORTHOPAEDIC SURGERY

## 2023-11-30 NOTE — PATIENT INSTRUCTIONS
Please do not take any Advil or Aleve or naproxen over-the-counter because you have stage 3 kidney disease you do not want a Nikky in your kidneys with the anti-inflammatories   You can use the topical anti-inflammatories like Voltaren gel or cream another name is diclofenac gel or cream also you can use Aspercreme with lidocaine all of these are over-the-counter.  You need to apply 2 in on you knees 3 times a day if needed  Proceeded injecting both of her knees today with Orthovisc 1. Out of 3 injections 11/30/23   Will see you weekly to receive series of 3 injections in each knee  You have hard time getting up from a sitting position and you do have severe arthritis in the knees  We will put through for a power lift chair

## 2023-11-30 NOTE — PROCEDURES
Large Joint Aspiration/Injection: bilateral knee    Date/Time: 11/30/2023 1:20 PM    Performed by: Marco Morales MD  Authorized by: Marco Morales MD    Consent Done?:  Yes (Verbal)  Indications:  Arthritis  Site marked: the procedure site was marked    Timeout: prior to procedure the correct patient, procedure, and site was verified      Local anesthesia used?: Yes    Local anesthetic:  Lidocaine 1% without epinephrine    Details:  Needle Size:  22 G  Ultrasonic Guidance for needle placement?: No    Approach:  Anterolateral  Location:  Knee  Laterality:  Bilateral  Site:  Bilateral knee  Medications (Right):  15 mg sodium hyaluronate (orthovisc) 30 mg/2 mL  Medications (Left):  15 mg sodium hyaluronate (orthovisc) 30 mg/2 mL  Patient tolerance:  Patient tolerated the procedure well with no immediate complications

## 2023-12-01 ENCOUNTER — OFFICE VISIT (OUTPATIENT)
Dept: FAMILY MEDICINE | Facility: CLINIC | Age: 83
End: 2023-12-01
Payer: MEDICARE

## 2023-12-01 ENCOUNTER — TELEPHONE (OUTPATIENT)
Dept: RADIOLOGY | Facility: HOSPITAL | Age: 83
End: 2023-12-01
Payer: MEDICARE

## 2023-12-01 ENCOUNTER — HOSPITAL ENCOUNTER (OUTPATIENT)
Dept: RADIOLOGY | Facility: HOSPITAL | Age: 83
Discharge: HOME | End: 2023-12-01
Attending: FAMILY MEDICINE
Payer: MEDICARE

## 2023-12-01 VITALS
HEART RATE: 91 BPM | SYSTOLIC BLOOD PRESSURE: 120 MMHG | DIASTOLIC BLOOD PRESSURE: 70 MMHG | HEIGHT: 70 IN | BODY MASS INDEX: 33.85 KG/M2 | WEIGHT: 236.44 LBS | OXYGEN SATURATION: 96 %

## 2023-12-01 DIAGNOSIS — E78.5 HYPERLIPIDEMIA ASSOCIATED WITH TYPE 2 DIABETES MELLITUS: ICD-10-CM

## 2023-12-01 DIAGNOSIS — E11.69 HYPERLIPIDEMIA ASSOCIATED WITH TYPE 2 DIABETES MELLITUS: ICD-10-CM

## 2023-12-01 DIAGNOSIS — N63.42 SUBAREOLAR MASS OF LEFT BREAST: ICD-10-CM

## 2023-12-01 DIAGNOSIS — N63.42 SUBAREOLAR MASS OF LEFT BREAST: Primary | ICD-10-CM

## 2023-12-01 DIAGNOSIS — B35.9 DERMATOPHYTOSIS: Primary | ICD-10-CM

## 2023-12-01 DIAGNOSIS — E11.9 CONTROLLED TYPE 2 DIABETES MELLITUS WITHOUT COMPLICATION, WITHOUT LONG-TERM CURRENT USE OF INSULIN: ICD-10-CM

## 2023-12-01 PROCEDURE — 77062 MAMMO DIGITAL DIAGNOSTIC BILAT WITH TOMO: ICD-10-PCS | Mod: 26,,, | Performed by: RADIOLOGY

## 2023-12-01 PROCEDURE — 99214 PR OFFICE/OUTPT VISIT, EST, LEVL IV, 30-39 MIN: ICD-10-PCS | Mod: S$GLB,,, | Performed by: FAMILY MEDICINE

## 2023-12-01 PROCEDURE — 3288F FALL RISK ASSESSMENT DOCD: CPT | Mod: CPTII,S$GLB,, | Performed by: FAMILY MEDICINE

## 2023-12-01 PROCEDURE — 3072F PR LOW RISK FOR RETINOPATHY: ICD-10-PCS | Mod: CPTII,S$GLB,, | Performed by: FAMILY MEDICINE

## 2023-12-01 PROCEDURE — 99999 PR PBB SHADOW E&M-EST. PATIENT-LVL IV: ICD-10-PCS | Mod: PBBFAC,,, | Performed by: FAMILY MEDICINE

## 2023-12-01 PROCEDURE — 1101F PR PT FALLS ASSESS DOC 0-1 FALLS W/OUT INJ PAST YR: ICD-10-PCS | Mod: CPTII,S$GLB,, | Performed by: FAMILY MEDICINE

## 2023-12-01 PROCEDURE — 3074F SYST BP LT 130 MM HG: CPT | Mod: CPTII,S$GLB,, | Performed by: FAMILY MEDICINE

## 2023-12-01 PROCEDURE — 3078F PR MOST RECENT DIASTOLIC BLOOD PRESSURE < 80 MM HG: ICD-10-PCS | Mod: CPTII,S$GLB,, | Performed by: FAMILY MEDICINE

## 2023-12-01 PROCEDURE — 76642 US BREAST LEFT LIMITED: ICD-10-PCS | Mod: 26,LT,, | Performed by: RADIOLOGY

## 2023-12-01 PROCEDURE — 76642 ULTRASOUND BREAST LIMITED: CPT | Mod: TC,LT

## 2023-12-01 PROCEDURE — 77066 DX MAMMO INCL CAD BI: CPT | Mod: 26,,, | Performed by: RADIOLOGY

## 2023-12-01 PROCEDURE — 3074F PR MOST RECENT SYSTOLIC BLOOD PRESSURE < 130 MM HG: ICD-10-PCS | Mod: CPTII,S$GLB,, | Performed by: FAMILY MEDICINE

## 2023-12-01 PROCEDURE — 77062 BREAST TOMOSYNTHESIS BI: CPT | Mod: 26,,, | Performed by: RADIOLOGY

## 2023-12-01 PROCEDURE — 3072F LOW RISK FOR RETINOPATHY: CPT | Mod: CPTII,S$GLB,, | Performed by: FAMILY MEDICINE

## 2023-12-01 PROCEDURE — 1159F MED LIST DOCD IN RCRD: CPT | Mod: CPTII,S$GLB,, | Performed by: FAMILY MEDICINE

## 2023-12-01 PROCEDURE — 76642 ULTRASOUND BREAST LIMITED: CPT | Mod: 26,LT,, | Performed by: RADIOLOGY

## 2023-12-01 PROCEDURE — 3078F DIAST BP <80 MM HG: CPT | Mod: CPTII,S$GLB,, | Performed by: FAMILY MEDICINE

## 2023-12-01 PROCEDURE — 77066 MAMMO DIGITAL DIAGNOSTIC BILAT WITH TOMO: ICD-10-PCS | Mod: 26,,, | Performed by: RADIOLOGY

## 2023-12-01 PROCEDURE — 99214 OFFICE O/P EST MOD 30 MIN: CPT | Mod: S$GLB,,, | Performed by: FAMILY MEDICINE

## 2023-12-01 PROCEDURE — 1101F PT FALLS ASSESS-DOCD LE1/YR: CPT | Mod: CPTII,S$GLB,, | Performed by: FAMILY MEDICINE

## 2023-12-01 PROCEDURE — 3288F PR FALLS RISK ASSESSMENT DOCUMENTED: ICD-10-PCS | Mod: CPTII,S$GLB,, | Performed by: FAMILY MEDICINE

## 2023-12-01 PROCEDURE — 99999 PR PBB SHADOW E&M-EST. PATIENT-LVL IV: CPT | Mod: PBBFAC,,, | Performed by: FAMILY MEDICINE

## 2023-12-01 PROCEDURE — 1159F PR MEDICATION LIST DOCUMENTED IN MEDICAL RECORD: ICD-10-PCS | Mod: CPTII,S$GLB,, | Performed by: FAMILY MEDICINE

## 2023-12-01 PROCEDURE — 77066 DX MAMMO INCL CAD BI: CPT | Mod: TC

## 2023-12-01 RX ORDER — CLOTRIMAZOLE 1 %
CREAM (GRAM) TOPICAL 2 TIMES DAILY
Qty: 113 G | Refills: 1 | Status: SHIPPED | OUTPATIENT
Start: 2023-12-01 | End: 2024-01-29 | Stop reason: SDUPTHER

## 2023-12-01 NOTE — PROGRESS NOTES
Chief Complaint:    Chief Complaint   Patient presents with    6 month follow up       History of Present Illness:  Patient with HLD associated with DM2, HTN, and CKD stage 3 presents today for a six month follow-up    Doing well no complaints  A1c 7.1, Liver/kidney function good.    On statin therapy no side effects lipids have come down significantly   Blood pressure is stable     Rash to medial side right ankle for 6 months, has been using hydrocortisone cream.    Sore and discoloration to Left breast area covering nipple for 4 months.       ROS:  Review of Systems   Constitutional:  Negative for appetite change, chills and fever.   HENT:  Negative for congestion, ear pain, postnasal drip, rhinorrhea, sinus pressure and sinus pain.    Eyes:  Negative for pain.   Respiratory:  Negative for cough, chest tightness and shortness of breath.    Cardiovascular:  Negative for chest pain and palpitations.   Gastrointestinal:  Negative for abdominal pain, blood in stool, constipation, diarrhea and nausea.   Genitourinary:  Negative for difficulty urinating, dysuria, flank pain and hematuria.   Musculoskeletal:  Negative for arthralgias and myalgias.   Skin:  Positive for rash and wound (R breast). Negative for pallor.   Neurological:  Negative for dizziness, tremors, speech difficulty, light-headedness and headaches.   Psychiatric/Behavioral:  Negative for behavioral problems, dysphoric mood and sleep disturbance. The patient is not nervous/anxious.    All other systems reviewed and are negative.      Past Medical History:   Diagnosis Date    CKD stage 3 due to type 2 diabetes mellitus 2/18/2021    DM (diabetes mellitus) 2014    BS didn't check 12/11/2019    DM (diabetes mellitus) 2014    BS didn't check 05/05/2022    Foreign body, eye     right eye    Hyperlipidemia     Hypertension     Need for shingles vaccine 11/20/2019    Pneumonia     Primary osteoarthritis of right knee 10/29/2021    Severe obesity (BMI 35.0-35.9  with comorbidity) 7/10/2019    Type 2 diabetes mellitus     BS didn't check 2018       Social History:  Social History     Socioeconomic History    Marital status:    Tobacco Use    Smoking status: Former     Current packs/day: 0.00     Average packs/day: 3.0 packs/day for 10.0 years (30.0 ttl pk-yrs)     Types: Cigarettes     Start date:      Quit date:      Years since quittin.9    Smokeless tobacco: Never   Substance and Sexual Activity    Alcohol use: No    Drug use: No    Sexual activity: Never     Social Determinants of Health     Financial Resource Strain: Low Risk  (2023)    Overall Financial Resource Strain (CARDIA)     Difficulty of Paying Living Expenses: Not hard at all   Food Insecurity: No Food Insecurity (2023)    Hunger Vital Sign     Worried About Running Out of Food in the Last Year: Never true     Ran Out of Food in the Last Year: Never true   Transportation Needs: No Transportation Needs (2023)    PRAPARE - Transportation     Lack of Transportation (Medical): No     Lack of Transportation (Non-Medical): No   Physical Activity: Inactive (2023)    Exercise Vital Sign     Days of Exercise per Week: 0 days     Minutes of Exercise per Session: 0 min   Stress: No Stress Concern Present (2023)    [employer] [place] [employer] - Occupational Stress Questionnaire     Feeling of Stress : Only a little   Social Connections: Moderately Integrated (2023)    Social Connection and Isolation Panel [NHANES]     Frequency of Communication with Friends and Family: More than three times a week     Frequency of Social Gatherings with Friends and Family: More than three times a week     Attends Catholic Services: More than 4 times per year     Active Member of Clubs or Organizations: Yes     Attends Club or Organization Meetings: More than 4 times per year     Marital Status:    Housing Stability: Unknown (2023)    Housing Stability Vital  "Sign     Unable to Pay for Housing in the Last Year: No     Number of Places Lived in the Last Year: 1       Family History:   family history includes Allergic rhinitis in his daughter; Cancer in his son; Cataracts in his father; Heart disease in his father and mother; Hyperlipidemia in his father; Hypertension in his brother, father, and sister.    Health Maintenance   Topic Date Due    Eye Exam  12/01/2023    Hemoglobin A1c  05/28/2024    Lipid Panel  11/28/2024    TETANUS VACCINE  12/10/2025    Shingles Vaccine  Completed       Physical Exam:    Vital Signs  Pulse: 91  SpO2: 96 %  BP: 120/70  BP Location: Right arm  Patient Position: Sitting  Height and Weight  Height: 5' 10" (177.8 cm)  Weight: 107.2 kg (236 lb 7.1 oz)  BSA (Calculated - sq m): 2.3 sq meters  BMI (Calculated): 33.9  Weight in (lb) to have BMI = 25: 173.9]    Body mass index is 33.93 kg/m².    Physical Exam  Vitals and nursing note reviewed.   Constitutional:       Appearance: Normal appearance.   HENT:      Head: Normocephalic and atraumatic.      Right Ear: Tympanic membrane normal.      Left Ear: Tympanic membrane normal.   Eyes:      Extraocular Movements: Extraocular movements intact.      Pupils: Pupils are equal, round, and reactive to light.   Cardiovascular:      Rate and Rhythm: Normal rate and regular rhythm.      Pulses: Normal pulses.      Heart sounds: Murmur heard.      Systolic murmur is present with a grade of 3/6.      No gallop.   Pulmonary:      Effort: Pulmonary effort is normal. No respiratory distress.      Breath sounds: Normal breath sounds. No wheezing, rhonchi or rales.   Abdominal:      General: There is no distension.      Palpations: Abdomen is soft.      Tenderness: There is no abdominal tenderness.   Musculoskeletal:         General: No swelling, deformity or signs of injury. Normal range of motion.      Cervical back: Normal range of motion.   Skin:     General: Skin is warm and dry.      Capillary Refill: " Capillary refill takes less than 2 seconds.      Coloration: Skin is not jaundiced or pale.      Findings: No abscess.             Comments: 6 cm large, hard, irregular, mass   Neurological:      General: No focal deficit present.      Mental Status: He is alert and oriented to person, place, and time.   Psychiatric:         Mood and Affect: Mood normal.         Behavior: Behavior normal.           Diabetes Management Status    Statin: Not taking  ACE/ARB: Not taking    Screening or Prevention Patient's value Goal Complete/Controlled?   HgA1C Testing and Control   Lab Results   Component Value Date    HGBA1C 7.1 (H) 11/28/2023      Annually/Less than 8% Yes   Lipid profile : 11/28/2023 Annually Yes   LDL control Lab Results   Component Value Date    LDLCALC 135.4 11/28/2023    Annually/Less than 100 mg/dl  No   Nephropathy screening Lab Results   Component Value Date    LABMICR 8.0 11/22/2022     Lab Results   Component Value Date    PROTEINUA Negative 04/14/2023    Annually Yes   Blood pressure BP Readings from Last 1 Encounters:   12/01/23 120/70    Less than 140/90 Yes   Dilated retinal exam : 12/01/2022 Annually Yes   Foot exam   Most Recent Foot Exam Date: Not Found Annually Yes       Assessment:      ICD-10-CM ICD-9-CM   1. Dermatophytosis  B35.9 110.9   2. Controlled type 2 diabetes mellitus without complication, without long-term current use of insulin  E11.9 250.00   3. Subareolar mass of left breast  N63.42 611.72   4. Hyperlipidemia associated with type 2 diabetes mellitus  E11.69 250.80    E78.5 272.4       Plan:    Start Lotrimin 1 % cream and Discontinue hydrocortisone cream.   Suspect cancer to subareolar mass of left breast.   Schedule mammogram. Refer to Breast Surgery for Subareolar mass of left breast. Order US Breast Biopsy Left    Other medical problems stable   See labs below.  Follow-up 6 months    Orders Placed This Encounter   Procedures    Mammo Digital Diagnostic Bilat    US Breast Biopsy  Left    Hemoglobin A1C    Comprehensive Metabolic Panel    CBC Auto Differential    Lipid Panel    Ambulatory referral/consult to Breast Surgery     Current Outpatient Medications   Medication Sig Dispense Refill    aspirin 81 MG Chew Take 1 tablet (81 mg total) by mouth once daily.  0    atorvastatin (LIPITOR) 40 MG tablet Take 1 tablet (40 mg total) by mouth once daily. 90 tablet 3    glipiZIDE (GLUCOTROL) 5 MG tablet Take 1 tablet (5 mg total) by mouth 2 (two) times daily with meals. 180 tablet 2    hydroCHLOROthiazide (HYDRODIURIL) 25 MG tablet Take 1 tablet (25 mg total) by mouth once daily. 90 tablet 2    multivitamin (THERAGRAN) per tablet Take 1 tablet by mouth once daily.      potassium chloride SA (K-DUR,KLOR-CON) 20 MEQ tablet Take 1 tablet (20 mEq total) by mouth once daily. 90 tablet 3    clotrimazole (LOTRIMIN) 1 % cream Apply topically 2 (two) times daily. 113 g 1    levocetirizine (XYZAL) 5 MG tablet Take 1 tablet (5 mg total) by mouth every evening. 30 tablet 11     No current facility-administered medications for this visit.       There are no discontinued medications.      No follow-ups on file.      Tatyana Cannon MD  Scribe Attestation:   I, Rene Link, am scribing for, and in the presence of, Dr.Arif Cannon I performed the above scribed service and the documentation accurately describes the services I performed. I attest to the accuracy of the note.    I, Dr. Tatyana Cannon, reviewed documentation as scribed above. I performed the services described in this documentation.  I agree that the record reflects my personal performance and is accurate and complete. Tatyana Cannon MD.  12/01/2023

## 2023-12-01 NOTE — PROGRESS NOTES
Subjective:     Patient ID: Mark Dickerson Sr. is a 83 y.o. male.    Chief Complaint: Pain of the Left Knee and Pain of the Right Knee  Bilateral knee pain with the right worse than the left     HPI:  02/20/2023  Patient diagnosed with arthritis of both of his knees.  He received Orthovisc in 2021 December and he is doing well with it.  He stated physical therapy made a big difference.  Now he can get up from a sitting position without using his hands.  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  He feels that his pain right now is 0/10 that he does not need to have any injections.  But he knows that he should get a repeated prior to having again.  He wanted to understand what total knee replacement is all about and we showed him on the x-ray how it looks like given brochure we went over things with him.  I did tell him total knee replacement is when everything fails and you are miserable and can not live with what she got is went total knee replacement as needed.  He expressed understanding he feels that he is not there yet.  He occasionally takes an Advil and I did tell him to be careful because he is diabetic and he has stage III kidney.  He started taking natural products like turmeric and it seems to help a little bit.  We went over chondroitin and glucosamine also with him that is something that people take for arthritis.    Denies any pain in the hips or in the pelvis.  Denies any fever or chills or shortness of breath or difficulty with chewing or swallowing loss of bowel bladder control          04/13/2023   Bilateral knee severe arthritis.  He stated the physical therapy seems to have helped .  He does have pain when he ambulates it goes up to around 2/10.  When he sits down the pain is not as bad.  Occasional Advil and occasional Tylenol.  He does have stage 3 kidney disease can not take any inflammatories.  He ambulates without any assistive devices.  Last time we discussed total knee  replacement and he is happy that at this time he is not going to needed because he is not hurting so much.  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  He is continuing with exercise program.  We started Orthovisc series of 3 injections he said the mildly swelled up on him but did not require much of any treatment for it after he received them.  He is ambulating without any assistive devices.  If he sits down his pain is 0/10 when he over does it it goes up to around 3 to 4/10.    No fever no chills no shortness of breath no difficulty with chewing swallowing loss of bowel bladder control blurry vision double vision loss sense smell or taste.  He ambulates without any assistive devices      04/27/2023   Bilateral knee severe arthritis he is happy he is not going to undergo any surgery at this time.  The viscosupplementation seems to start to work.  It only hurts him around 2/10 when he excessively work.  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  He wants follow-up appointment end of November after things given.  We discussed the pros and cons of viscosupplementation.  He is not in severe pain to require total knee replacements.      No fever no chills no shortness of breath or difficulty with chewing or swallowing loss of bowel bladder control blurry vision double vision loss sense smell or taste     He ambulates without any assistive devices and very comfortably        11/30/2023   Bilateral knee severe arthritis   Patient is having difficulty getting up from sitting position.  He still working as a  going teaching and preaching.  He travels quite a bit.  However complains that getting up from a sitting  position becoming very difficult despite him trying to be very active.  He said the injections in the knees seems to work and drops his pain to 1/10 when he works a lot and walks a lot is goes up to 3/10.  You would like to continue receiving his Orthovisc injections.  He is leaving out of state to pre each and 2 teach.  We did discuss that maybe a power lift chair might help him since he is 83 years of age  You tries to do his own exercise program most of the time he travels can not go to official physical therapy  He states he does take occasional ibuprofen I warned him about taking it at his age because of stage 3 kidney disease he needs to use topicals if any  Past Medical History:   Diagnosis Date    CKD stage 3 due to type 2 diabetes mellitus 2021    DM (diabetes mellitus)     BS didn't check 2019    DM (diabetes mellitus)     BS didn't check 2022    Foreign body, eye     right eye    Hyperlipidemia     Hypertension     Need for shingles vaccine 2019    Pneumonia     Primary osteoarthritis of right knee 10/29/2021    Severe obesity (BMI 35.0-35.9 with comorbidity) 7/10/2019    Type 2 diabetes mellitus     BS didn't check 2018     Past Surgical History:   Procedure Laterality Date    APPENDECTOMY      CATARACT EXTRACTION Bilateral     CPG     Family History   Problem Relation Age of Onset    Heart disease Mother     Hypertension Father     Heart disease Father     Hyperlipidemia Father     Cataracts Father     Hypertension Sister     Hypertension Brother     Cancer Son         colon    Allergic rhinitis Daughter      Social History     Socioeconomic History    Marital status:    Tobacco Use    Smoking status: Former     Current packs/day: 0.00     Average packs/day: 3.0 packs/day for 10.0 years (30.0 ttl pk-yrs)     Types: Cigarettes     Start date:      Quit date:      Years since quittin.9    Smokeless tobacco: Never   Substance and Sexual Activity     Alcohol use: No    Drug use: No    Sexual activity: Never     Social Determinants of Health     Financial Resource Strain: Low Risk  (1/9/2023)    Overall Financial Resource Strain (CARDIA)     Difficulty of Paying Living Expenses: Not hard at all   Food Insecurity: No Food Insecurity (1/9/2023)    Hunger Vital Sign     Worried About Running Out of Food in the Last Year: Never true     Ran Out of Food in the Last Year: Never true   Transportation Needs: No Transportation Needs (1/9/2023)    PRAPARE - Transportation     Lack of Transportation (Medical): No     Lack of Transportation (Non-Medical): No   Physical Activity: Inactive (1/9/2023)    Exercise Vital Sign     Days of Exercise per Week: 0 days     Minutes of Exercise per Session: 0 min   Stress: No Stress Concern Present (1/9/2023)    [employer] - Occupational Stress Questionnaire     Feeling of Stress : Only a little   Social Connections: Moderately Integrated (1/9/2023)    Social Connection and Isolation Panel [NHANES]     Frequency of Communication with Friends and Family: More than three times a week     Frequency of Social Gatherings with Friends and Family: More than three times a week     Attends Yazidism Services: More than 4 times per year     Active Member of Clubs or Organizations: Yes     Attends Club or Organization Meetings: More than 4 times per year     Marital Status:    Housing Stability: Unknown (1/9/2023)    Housing Stability Vital Sign     Unable to Pay for Housing in the Last Year: No     Number of Places Lived in the Last Year: 1     Medication List with Changes/Refills   Current Medications    ASPIRIN 81 MG CHEW    Take 1 tablet (81 mg total) by mouth once daily.    ATORVASTATIN (LIPITOR) 40 MG TABLET    Take 1 tablet (40 mg total) by mouth once daily.    GLIPIZIDE (GLUCOTROL) 5 MG TABLET    Take 1 tablet (5 mg total) by mouth 2 (two) times daily with meals.    HYDROCHLOROTHIAZIDE (HYDRODIURIL)  25 MG TABLET    Take 1 tablet (25 mg total) by mouth once daily.    LEVOCETIRIZINE (XYZAL) 5 MG TABLET    Take 1 tablet (5 mg total) by mouth every evening.    MULTIVITAMIN (THERAGRAN) PER TABLET    Take 1 tablet by mouth once daily.    POTASSIUM CHLORIDE SA (K-DUR,KLOR-CON) 20 MEQ TABLET    Take 1 tablet (20 mEq total) by mouth once daily.     Review of patient's allergies indicates:  No Known Allergies  Review of Systems   Constitutional: Negative for decreased appetite.   HENT:  Negative for tinnitus.    Eyes:  Negative for double vision.   Cardiovascular:  Negative for chest pain.   Respiratory:  Negative for wheezing.    Hematologic/Lymphatic: Negative for bleeding problem.   Skin:  Negative for dry skin.   Musculoskeletal:  Positive for arthritis, joint pain and stiffness. Negative for back pain, gout and neck pain.   Gastrointestinal:  Negative for abdominal pain.   Genitourinary:  Negative for bladder incontinence.   Neurological:  Negative for numbness, paresthesias and sensory change.   Psychiatric/Behavioral:  Negative for altered mental status.        Objective:   Body mass index is 35.73 kg/m².  There were no vitals filed for this visit.       General    Constitutional: He is oriented to person, place, and time. He appears well-developed.   HENT:   Head: Atraumatic.   Eyes: EOM are normal.   Pulmonary/Chest: Effort normal.   Neurological: He is alert and oriented to person, place, and time.   Psychiatric: Judgment normal.             Ambulating without any assistive devices  Pelvis is level  Hips right hip slight decrease in internal rotation compared to the left side but otherwise no pain in the groin.    Hip flexors and abductors and adductors and quads and hamstrings are 5/5   Right knee with medial joint tenderness mild crepitus to compression on the patella.  Active motion 2-120 degrees.  The no defect in the patella or quadriceps tendon.  There is excellent strength.  And very mild swelling   Left  knee with also medial joint tenderness and mild crepitus to compression on the patella.  Active motion is 0-120 degrees no defect in the patella or quadriceps tendon.  She has very mild swelling.  Ankle motion intact   Calves are soft      Relevant imaging results reviewed and interpreted by me, discussed with the patient and / or family today   X-ray 12/01/2022 bilateral knees showing complete loss of joint space on the inside of the knee with marginal osteophytic changes and sclerosis consistent with bilateral knee severe arthritis with varus deformity  Assessment:     Encounter Diagnoses   Name Primary?    Arthritis of knee, right Yes    Acquired varus deformity knee, right     Arthritis of knee, left     Acquired varus deformity knee, left         Plan:   Arthritis of knee, right  -     Large Joint Aspiration/Injection: bilateral knee    Acquired varus deformity knee, right    Arthritis of knee, left  -     Large Joint Aspiration/Injection: bilateral knee    Acquired varus deformity knee, left         Patient Instructions   Please do not take any Advil or Aleve or naproxen over-the-counter because you have stage 3 kidney disease you do not want a Nikky in your kidneys with the anti-inflammatories   You can use the topical anti-inflammatories like Voltaren gel or cream another name is diclofenac gel or cream also you can use Aspercreme with lidocaine all of these are over-the-counter.  You need to apply 2 in on you knees 3 times a day if needed  Proceeded injecting both of her knees today with Orthovisc 1. Out of 3 injections 11/30/23   Will see you weekly to receive series of 3 injections in each knee  You have hard time getting up from a sitting position and you do have severe arthritis in the knees  We will put through for a power lift chair        Disclaimer: This note was prepared using a voice recognition system and is likely to have sound alike errors within the text.

## 2023-12-04 ENCOUNTER — TELEPHONE (OUTPATIENT)
Dept: RADIOLOGY | Facility: HOSPITAL | Age: 83
End: 2023-12-04
Payer: MEDICARE

## 2023-12-04 ENCOUNTER — TELEPHONE (OUTPATIENT)
Dept: OPHTHALMOLOGY | Facility: CLINIC | Age: 83
End: 2023-12-04
Payer: MEDICARE

## 2023-12-04 NOTE — TELEPHONE ENCOUNTER
----- Message from Marilia Pires sent at 12/4/2023  1:13 PM CST -----  Contact: Mark ferraro  [phone]  [email or web address]     Patient is calling for Medical Advice regarding:    How long has patient had these symptoms:    Pharmacy name and phone#:    Would like response via Trendabl:call back    Comments: Pt is calling because he is having blurry vision in his right eye and has had cataracts removed about 4 yr years ago

## 2023-12-04 NOTE — TELEPHONE ENCOUNTER
Ultrasound guided biopsy scheduled for 12/14/23 at 8am, arrival time 7:30. Biopsy instructions given with understandings verbalized. Patient has my contact information.

## 2023-12-05 ENCOUNTER — OFFICE VISIT (OUTPATIENT)
Dept: OPHTHALMOLOGY | Facility: CLINIC | Age: 83
End: 2023-12-05
Payer: MEDICARE

## 2023-12-05 DIAGNOSIS — Z96.1 PSEUDOPHAKIA OF BOTH EYES: ICD-10-CM

## 2023-12-05 DIAGNOSIS — E11.9 DIABETES MELLITUS TYPE 2 WITHOUT RETINOPATHY: ICD-10-CM

## 2023-12-05 DIAGNOSIS — H52.7 REFRACTIVE ERRORS: ICD-10-CM

## 2023-12-05 DIAGNOSIS — H16.9 KERATITIS, BILATERAL: Primary | ICD-10-CM

## 2023-12-05 PROCEDURE — 2023F DILAT RTA XM W/O RTNOPTHY: CPT | Mod: CPTII,S$GLB,, | Performed by: OPTOMETRIST

## 2023-12-05 PROCEDURE — 92015 PR REFRACTION: ICD-10-PCS | Mod: S$GLB,,, | Performed by: OPTOMETRIST

## 2023-12-05 PROCEDURE — 1159F PR MEDICATION LIST DOCUMENTED IN MEDICAL RECORD: ICD-10-PCS | Mod: CPTII,S$GLB,, | Performed by: OPTOMETRIST

## 2023-12-05 PROCEDURE — 1159F MED LIST DOCD IN RCRD: CPT | Mod: CPTII,S$GLB,, | Performed by: OPTOMETRIST

## 2023-12-05 PROCEDURE — 92014 COMPRE OPH EXAM EST PT 1/>: CPT | Mod: S$GLB,,, | Performed by: OPTOMETRIST

## 2023-12-05 PROCEDURE — 99999 PR PBB SHADOW E&M-EST. PATIENT-LVL II: ICD-10-PCS | Mod: PBBFAC,,, | Performed by: OPTOMETRIST

## 2023-12-05 PROCEDURE — 92015 DETERMINE REFRACTIVE STATE: CPT | Mod: S$GLB,,, | Performed by: OPTOMETRIST

## 2023-12-05 PROCEDURE — 2023F PR DILATED RETINAL EXAM W/O EVID OF RETINOPATHY: ICD-10-PCS | Mod: CPTII,S$GLB,, | Performed by: OPTOMETRIST

## 2023-12-05 PROCEDURE — 99999 PR PBB SHADOW E&M-EST. PATIENT-LVL II: CPT | Mod: PBBFAC,,, | Performed by: OPTOMETRIST

## 2023-12-05 PROCEDURE — 92014 PR EYE EXAM, EST PATIENT,COMPREHESV: ICD-10-PCS | Mod: S$GLB,,, | Performed by: OPTOMETRIST

## 2023-12-05 RX ORDER — NEOMYCIN SULFATE, POLYMYXIN B SULFATE AND DEXAMETHASONE 3.5; 10000; 1 MG/ML; [USP'U]/ML; MG/ML
1 SUSPENSION/ DROPS OPHTHALMIC 4 TIMES DAILY
Qty: 10 ML | Refills: 0 | Status: SHIPPED | OUTPATIENT
Start: 2023-12-05 | End: 2023-12-15 | Stop reason: SDUPTHER

## 2023-12-05 NOTE — PROGRESS NOTES
HPI    Last visit with TRF on 12/01/2022    Lab Results       Component                Value               Date                       HGBA1C                   7.1 (H)             11/28/2023              Patient states decrease with overall vision of the right eye only.  No ocular pain/discomfort  Using otc drops prn   Wear PAL glasses   Last edited by Katie Mckeon on 12/5/2023  2:10 PM.            Assessment /Plan     For exam results, see Encounter Report.    Keratitis, bilateral  -     neomycin-polymyxin-dexamethasone (MAXITROL) 3.5mg/mL-10,000 unit/mL-0.1 % DrpS; Place 1 drop into both eyes 4 (four) times daily. for 10 days  Dispense: 10 mL; Refill: 0    Diabetes mellitus type 2 without retinopathy    Pseudophakia of both eyes    Refractive errors      Keratitis OD>OS x 6 months, will try maxitrol OU    No Background Diabetic Retinopathy  Strict BG control, f/u w/ PCP, and annual DFE  Stressed importance of DM control to preserve vision    Stable IOL OU.    OCT normal OU    RTC 10 days recheck keratitis and refraction  Discussed above and answered questions.

## 2023-12-07 ENCOUNTER — OFFICE VISIT (OUTPATIENT)
Dept: ORTHOPEDICS | Facility: CLINIC | Age: 83
End: 2023-12-07
Payer: MEDICARE

## 2023-12-07 DIAGNOSIS — M21.161 ACQUIRED VARUS DEFORMITY KNEE, RIGHT: ICD-10-CM

## 2023-12-07 DIAGNOSIS — M17.0 BILATERAL PRIMARY OSTEOARTHRITIS OF KNEE: Primary | ICD-10-CM

## 2023-12-07 DIAGNOSIS — M21.162 ACQUIRED VARUS DEFORMITY KNEE, LEFT: ICD-10-CM

## 2023-12-07 PROCEDURE — 1160F PR REVIEW ALL MEDS BY PRESCRIBER/CLIN PHARMACIST DOCUMENTED: ICD-10-PCS | Mod: CPTII,S$GLB,, | Performed by: PHYSICIAN ASSISTANT

## 2023-12-07 PROCEDURE — 20610 DRAIN/INJ JOINT/BURSA W/O US: CPT | Mod: 50,S$GLB,, | Performed by: PHYSICIAN ASSISTANT

## 2023-12-07 PROCEDURE — 1160F RVW MEDS BY RX/DR IN RCRD: CPT | Mod: CPTII,S$GLB,, | Performed by: PHYSICIAN ASSISTANT

## 2023-12-07 PROCEDURE — 1159F PR MEDICATION LIST DOCUMENTED IN MEDICAL RECORD: ICD-10-PCS | Mod: CPTII,S$GLB,, | Performed by: PHYSICIAN ASSISTANT

## 2023-12-07 PROCEDURE — 99999 PR PBB SHADOW E&M-EST. PATIENT-LVL II: ICD-10-PCS | Mod: PBBFAC,,, | Performed by: PHYSICIAN ASSISTANT

## 2023-12-07 PROCEDURE — 99214 PR OFFICE/OUTPT VISIT, EST, LEVL IV, 30-39 MIN: ICD-10-PCS | Mod: 25,S$GLB,, | Performed by: PHYSICIAN ASSISTANT

## 2023-12-07 PROCEDURE — 20610 LARGE JOINT ASPIRATION/INJECTION: BILATERAL KNEE: ICD-10-PCS | Mod: 50,S$GLB,, | Performed by: PHYSICIAN ASSISTANT

## 2023-12-07 PROCEDURE — 99999 PR PBB SHADOW E&M-EST. PATIENT-LVL II: CPT | Mod: PBBFAC,,, | Performed by: PHYSICIAN ASSISTANT

## 2023-12-07 PROCEDURE — 99214 OFFICE O/P EST MOD 30 MIN: CPT | Mod: 25,S$GLB,, | Performed by: PHYSICIAN ASSISTANT

## 2023-12-07 PROCEDURE — 1159F MED LIST DOCD IN RCRD: CPT | Mod: CPTII,S$GLB,, | Performed by: PHYSICIAN ASSISTANT

## 2023-12-07 NOTE — PROCEDURES
Large Joint Aspiration/Injection: bilateral knee    Date/Time: 12/7/2023 12:30 PM    Performed by: Deidre Cano PA  Authorized by: Deidre Cano PA    Consent Done?:  Yes (Verbal)  Indications:  Arthritis, joint swelling and pain  Site marked: the procedure site was marked      Local anesthesia used?: Yes    Local anesthetic:  Topical anesthetic    Details:  Needle Size:  22 G  Approach:  Anterolateral  Location:  Knee  Laterality:  Bilateral  Site:  Bilateral knee  Medications (Right):  30 mg sodium hyaluronate (orthovisc) 30 mg/2 mL  Medications (Left):  30 mg sodium hyaluronate (orthovisc) 30 mg/2 mL  Patient tolerance:  Patient tolerated the procedure well with no immediate complications     Verbal consent was obtained  The patient's ID, site, side was verified  The site was sterile prepped in standard fashion  The injection was performed in the vimal-lateral side without complication  A sterile Band-Aid was applied    Patient was directed to apply ice today at roughly 15 minutes at a time as needed.  It was discussed that they may be sore for the next few days or so.  Please avoid strenuous activity over the next 24 hours.  It was also discussed that the patient may have a increase in glucose if diabetic and should monitor levels.  Patient was instructed to call as needed.

## 2023-12-07 NOTE — PROGRESS NOTES
Subjective:     Patient ID: Mark Dickerson Sr. is a 83 y.o. male.    Chief Complaint: No chief complaint on file.  Bilateral knee pain with the right worse than the left     HPI:  02/20/2023  Patient diagnosed with arthritis of both of his knees.  He received Orthovisc in 2021 December and he is doing well with it.  He stated physical therapy made a big difference.  Now he can get up from a sitting position without using his hands.  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  He feels that his pain right now is 0/10 that he does not need to have any injections.  But he knows that he should get a repeated prior to having again.  He wanted to understand what total knee replacement is all about and we showed him on the x-ray how it looks like given brochure we went over things with him.  I did tell him total knee replacement is when everything fails and you are miserable and can not live with what she got is went total knee replacement as needed.  He expressed understanding he feels that he is not there yet.  He occasionally takes an Advil and I did tell him to be careful because he is diabetic and he has stage III kidney.  He started taking natural products like turmeric and it seems to help a little bit.  We went over chondroitin and glucosamine also with him that is something that people take for arthritis.    Denies any pain in the hips or in the pelvis.  Denies any fever or chills or shortness of breath or difficulty with chewing or swallowing loss of bowel bladder control          04/13/2023   Bilateral knee severe arthritis.  He stated the physical therapy seems to have helped .  He does have pain when he ambulates it goes up to around 2/10.  When he sits down the pain is not as bad.  Occasional Advil and occasional Tylenol.  He does have stage 3 kidney disease can not take any inflammatories.  He ambulates without any assistive devices.  Last time we discussed total knee replacement and he is  happy that at this time he is not going to needed because he is not hurting so much.  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  He is continuing with exercise program.  We started Orthovisc series of 3 injections he said the mildly swelled up on him but did not require much of any treatment for it after he received them.  He is ambulating without any assistive devices.  If he sits down his pain is 0/10 when he over does it it goes up to around 3 to 4/10.    No fever no chills no shortness of breath no difficulty with chewing swallowing loss of bowel bladder control blurry vision double vision loss sense smell or taste.  He ambulates without any assistive devices      04/27/2023   Bilateral knee severe arthritis he is happy he is not going to undergo any surgery at this time.  The viscosupplementation seems to start to work.  It only hurts him around 2/10 when he excessively work.  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  He wants follow-up appointment end of November after things given.  We discussed the pros and cons of viscosupplementation.  He is not in severe pain to require total knee replacements.      No fever no chills no shortness of breath or difficulty with chewing or swallowing loss of bowel bladder control blurry vision double vision loss sense smell or taste     He ambulates without any assistive devices and very comfortably        11/30/2023   Bilateral knee severe arthritis   Patient is having difficulty getting up from sitting position.  He still working as a  going teaching and preaching.  He travels quite a bit.  However complains that getting up from a sitting position becoming very  difficult despite him trying to be very active.  He said the injections in the knees seems to work and drops his pain to 1/10 when he works a lot and walks a lot is goes up to 3/10.  You would like to continue receiving his Orthovisc injections.  He is leaving out of state to pre each and 2 teach.  We did discuss that maybe a power lift chair might help him since he is 83 years of age  You tries to do his own exercise program most of the time he travels can not go to official physical therapy  He states he does take occasional ibuprofen I warned him about taking it at his age because of stage 3 kidney disease he needs to use topicals if any    2023        Past Medical History:   Diagnosis Date    CKD stage 3 due to type 2 diabetes mellitus 2021    DM (diabetes mellitus)     BS didn't check 2019    DM (diabetes mellitus)     BS didn't check 2022    Foreign body, eye     right eye    Hyperlipidemia     Hypertension     Need for shingles vaccine 2019    Pneumonia     Primary osteoarthritis of right knee 10/29/2021    Severe obesity (BMI 35.0-35.9 with comorbidity) 7/10/2019    Type 2 diabetes mellitus     BS didn't check 2018     Past Surgical History:   Procedure Laterality Date    APPENDECTOMY      CATARACT EXTRACTION Bilateral     CPG     Family History   Problem Relation Age of Onset    Heart disease Mother     Hypertension Father     Heart disease Father     Hyperlipidemia Father     Cataracts Father     Hypertension Sister     Hypertension Brother     Cancer Son         colon    Allergic rhinitis Daughter      Social History     Socioeconomic History    Marital status:    Tobacco Use    Smoking status: Former     Current packs/day: 0.00     Average packs/day: 3.0 packs/day for 10.0 years (30.0 ttl pk-yrs)     Types: Cigarettes     Start date:      Quit date:      Years since quittin.9    Smokeless tobacco: Never   Substance and Sexual Activity     Alcohol use: No    Drug use: No    Sexual activity: Never     Social Determinants of Health     Financial Resource Strain: Low Risk  (1/9/2023)    Overall Financial Resource Strain (CARDIA)     Difficulty of Paying Living Expenses: Not hard at all   Food Insecurity: No Food Insecurity (1/9/2023)    Hunger Vital Sign     Worried About Running Out of Food in the Last Year: Never true     Ran Out of Food in the Last Year: Never true   Transportation Needs: No Transportation Needs (1/9/2023)    PRAPARE - Transportation     Lack of Transportation (Medical): No     Lack of Transportation (Non-Medical): No   Physical Activity: Inactive (1/9/2023)    Exercise Vital Sign     Days of Exercise per Week: 0 days     Minutes of Exercise per Session: 0 min   Stress: No Stress Concern Present (1/9/2023)    [employer] - Occupational Stress Questionnaire     Feeling of Stress : Only a little   Social Connections: Moderately Integrated (1/9/2023)    Social Connection and Isolation Panel [NHANES]     Frequency of Communication with Friends and Family: More than three times a week     Frequency of Social Gatherings with Friends and Family: More than three times a week     Attends Synagogue Services: More than 4 times per year     Active Member of Clubs or Organizations: Yes     Attends Club or Organization Meetings: More than 4 times per year     Marital Status:    Housing Stability: Unknown (1/9/2023)    Housing Stability Vital Sign     Unable to Pay for Housing in the Last Year: No     Number of Places Lived in the Last Year: 1     Medication List with Changes/Refills   Current Medications    ASPIRIN 81 MG CHEW    Take 1 tablet (81 mg total) by mouth once daily.    ATORVASTATIN (LIPITOR) 40 MG TABLET    Take 1 tablet (40 mg total) by mouth once daily.    CLOTRIMAZOLE (LOTRIMIN) 1 % CREAM    Apply topically 2 (two) times daily.    GLIPIZIDE (GLUCOTROL) 5 MG TABLET    Take 1 tablet (5 mg total) by  mouth 2 (two) times daily with meals.    HYDROCHLOROTHIAZIDE (HYDRODIURIL) 25 MG TABLET    Take 1 tablet (25 mg total) by mouth once daily.    LEVOCETIRIZINE (XYZAL) 5 MG TABLET    Take 1 tablet (5 mg total) by mouth every evening.    MULTIVITAMIN (THERAGRAN) PER TABLET    Take 1 tablet by mouth once daily.    NEOMYCIN-POLYMYXIN-DEXAMETHASONE (MAXITROL) 3.5MG/ML-10,000 UNIT/ML-0.1 % DRPS    Place 1 drop into both eyes 4 (four) times daily. for 10 days    POTASSIUM CHLORIDE SA (K-DUR,KLOR-CON) 20 MEQ TABLET    Take 1 tablet (20 mEq total) by mouth once daily.     Review of patient's allergies indicates:  No Known Allergies  Review of Systems   Constitutional: Negative for decreased appetite.   HENT:  Negative for tinnitus.    Eyes:  Negative for double vision.   Cardiovascular:  Negative for chest pain.   Respiratory:  Negative for wheezing.    Hematologic/Lymphatic: Negative for bleeding problem.   Skin:  Negative for dry skin.   Musculoskeletal:  Positive for arthritis, joint pain and stiffness. Negative for back pain, gout and neck pain.   Gastrointestinal:  Negative for abdominal pain.   Genitourinary:  Negative for bladder incontinence.   Neurological:  Negative for numbness, paresthesias and sensory change.   Psychiatric/Behavioral:  Negative for altered mental status.        Objective:   There is no height or weight on file to calculate BMI.  There were no vitals filed for this visit.       General    Constitutional: He is oriented to person, place, and time. He appears well-developed.   HENT:   Head: Atraumatic.   Eyes: EOM are normal.   Pulmonary/Chest: Effort normal.   Neurological: He is alert and oriented to person, place, and time.   Psychiatric: Judgment normal.             Ambulating without any assistive devices  Pelvis is level  Hips right hip slight decrease in internal rotation compared to the left side but otherwise no pain in the groin.    Hip flexors and abductors and adductors and quads and  hamstrings are 5/5   Right knee with medial joint tenderness mild crepitus to compression on the patella.  Active motion 2-120 degrees.  The no defect in the patella or quadriceps tendon.  There is excellent strength.  And very mild swelling   Left knee with also medial joint tenderness and mild crepitus to compression on the patella.  Active motion is 0-120 degrees no defect in the patella or quadriceps tendon.  She has very mild swelling.  Ankle motion intact   Calves are soft      Relevant imaging results reviewed and interpreted by me, discussed with the patient and / or family today   X-ray 12/01/2022 bilateral knees showing complete loss of joint space on the inside of the knee with marginal osteophytic changes and sclerosis consistent with bilateral knee severe arthritis with varus deformity  Assessment:     Encounter Diagnoses   Name Primary?    Bilateral primary osteoarthritis of knee Yes    Acquired varus deformity knee, right     Acquired varus deformity knee, left         Plan:   Bilateral primary osteoarthritis of knee    Acquired varus deformity knee, right    Acquired varus deformity knee, left         There are no Patient Instructions on file for this visit.        Disclaimer: This note was prepared using a voice recognition system and is likely to have sound alike errors within the text.

## 2023-12-14 ENCOUNTER — HOSPITAL ENCOUNTER (OUTPATIENT)
Dept: RADIOLOGY | Facility: HOSPITAL | Age: 83
Discharge: HOME | End: 2023-12-14
Attending: FAMILY MEDICINE
Payer: MEDICARE

## 2023-12-14 ENCOUNTER — OFFICE VISIT (OUTPATIENT)
Dept: ORTHOPEDICS | Facility: CLINIC | Age: 83
End: 2023-12-14
Payer: MEDICARE

## 2023-12-14 VITALS — WEIGHT: 236 LBS | HEIGHT: 70 IN | BODY MASS INDEX: 33.79 KG/M2

## 2023-12-14 DIAGNOSIS — M17.0 BILATERAL PRIMARY OSTEOARTHRITIS OF KNEE: Primary | ICD-10-CM

## 2023-12-14 DIAGNOSIS — N63.42 SUBAREOLAR MASS OF LEFT BREAST: ICD-10-CM

## 2023-12-14 DIAGNOSIS — M17.12 ARTHRITIS OF KNEE, LEFT: ICD-10-CM

## 2023-12-14 DIAGNOSIS — M21.162 ACQUIRED VARUS DEFORMITY KNEE, LEFT: ICD-10-CM

## 2023-12-14 DIAGNOSIS — M17.11 ARTHRITIS OF KNEE, RIGHT: Primary | ICD-10-CM

## 2023-12-14 DIAGNOSIS — M21.161 ACQUIRED VARUS DEFORMITY KNEE, RIGHT: ICD-10-CM

## 2023-12-14 PROCEDURE — 99999 PR PBB SHADOW E&M-EST. PATIENT-LVL III: ICD-10-PCS | Mod: PBBFAC,,, | Performed by: ORTHOPAEDIC SURGERY

## 2023-12-14 PROCEDURE — 1126F AMNT PAIN NOTED NONE PRSNT: CPT | Mod: CPTII,S$GLB,, | Performed by: ORTHOPAEDIC SURGERY

## 2023-12-14 PROCEDURE — 99213 OFFICE O/P EST LOW 20 MIN: CPT | Mod: 25,S$GLB,, | Performed by: ORTHOPAEDIC SURGERY

## 2023-12-14 PROCEDURE — 38505 NEEDLE BIOPSY LYMPH NODES: CPT | Mod: LT,,, | Performed by: RADIOLOGY

## 2023-12-14 PROCEDURE — 20610 LARGE JOINT ASPIRATION/INJECTION: BILATERAL KNEE: ICD-10-PCS | Mod: 50,S$GLB,, | Performed by: ORTHOPAEDIC SURGERY

## 2023-12-14 PROCEDURE — 19083 US BREAST BIOPSY WITH IMAGING 1ST SITE LEFT: ICD-10-PCS | Mod: LT,,, | Performed by: RADIOLOGY

## 2023-12-14 PROCEDURE — 88360 PR  TUMOR IMMUNOHISTOCHEM/MANUAL: ICD-10-PCS | Mod: 26,,, | Performed by: STUDENT IN AN ORGANIZED HEALTH CARE EDUCATION/TRAINING PROGRAM

## 2023-12-14 PROCEDURE — 88360 TUMOR IMMUNOHISTOCHEM/MANUAL: CPT | Mod: 26,,, | Performed by: STUDENT IN AN ORGANIZED HEALTH CARE EDUCATION/TRAINING PROGRAM

## 2023-12-14 PROCEDURE — 20610 DRAIN/INJ JOINT/BURSA W/O US: CPT | Mod: 50,S$GLB,, | Performed by: ORTHOPAEDIC SURGERY

## 2023-12-14 PROCEDURE — 99999 PR PBB SHADOW E&M-EST. PATIENT-LVL III: CPT | Mod: PBBFAC,,, | Performed by: ORTHOPAEDIC SURGERY

## 2023-12-14 PROCEDURE — 77065 DX MAMMO INCL CAD UNI: CPT | Mod: 26,LT,, | Performed by: RADIOLOGY

## 2023-12-14 PROCEDURE — 88305 TISSUE EXAM BY PATHOLOGIST: CPT | Mod: 59 | Performed by: STUDENT IN AN ORGANIZED HEALTH CARE EDUCATION/TRAINING PROGRAM

## 2023-12-14 PROCEDURE — 1101F PT FALLS ASSESS-DOCD LE1/YR: CPT | Mod: CPTII,S$GLB,, | Performed by: ORTHOPAEDIC SURGERY

## 2023-12-14 PROCEDURE — 3288F FALL RISK ASSESSMENT DOCD: CPT | Mod: CPTII,S$GLB,, | Performed by: ORTHOPAEDIC SURGERY

## 2023-12-14 PROCEDURE — A4648 IMPLANTABLE TISSUE MARKER: HCPCS

## 2023-12-14 PROCEDURE — 88305 TISSUE EXAM BY PATHOLOGIST: ICD-10-PCS | Mod: 26,,, | Performed by: STUDENT IN AN ORGANIZED HEALTH CARE EDUCATION/TRAINING PROGRAM

## 2023-12-14 PROCEDURE — 77065 DX MAMMO INCL CAD UNI: CPT | Mod: TC,LT

## 2023-12-14 PROCEDURE — 38505 US BIOPSY LYMPH NODE AXILLA: ICD-10-PCS | Mod: LT,,, | Performed by: RADIOLOGY

## 2023-12-14 PROCEDURE — 1159F PR MEDICATION LIST DOCUMENTED IN MEDICAL RECORD: ICD-10-PCS | Mod: CPTII,S$GLB,, | Performed by: ORTHOPAEDIC SURGERY

## 2023-12-14 PROCEDURE — 1159F MED LIST DOCD IN RCRD: CPT | Mod: CPTII,S$GLB,, | Performed by: ORTHOPAEDIC SURGERY

## 2023-12-14 PROCEDURE — 19083 BX BREAST 1ST LESION US IMAG: CPT | Mod: LT,,, | Performed by: RADIOLOGY

## 2023-12-14 PROCEDURE — 88360 TUMOR IMMUNOHISTOCHEM/MANUAL: CPT | Performed by: STUDENT IN AN ORGANIZED HEALTH CARE EDUCATION/TRAINING PROGRAM

## 2023-12-14 PROCEDURE — 1101F PR PT FALLS ASSESS DOC 0-1 FALLS W/OUT INJ PAST YR: ICD-10-PCS | Mod: CPTII,S$GLB,, | Performed by: ORTHOPAEDIC SURGERY

## 2023-12-14 PROCEDURE — 99213 PR OFFICE/OUTPT VISIT, EST, LEVL III, 20-29 MIN: ICD-10-PCS | Mod: 25,S$GLB,, | Performed by: ORTHOPAEDIC SURGERY

## 2023-12-14 PROCEDURE — 77065 MAMMO DIGITAL DIAGNOSTIC LEFT: ICD-10-PCS | Mod: 26,LT,, | Performed by: RADIOLOGY

## 2023-12-14 PROCEDURE — 1126F PR PAIN SEVERITY QUANTIFIED, NO PAIN PRESENT: ICD-10-PCS | Mod: CPTII,S$GLB,, | Performed by: ORTHOPAEDIC SURGERY

## 2023-12-14 PROCEDURE — 88305 TISSUE EXAM BY PATHOLOGIST: CPT | Mod: 26,,, | Performed by: STUDENT IN AN ORGANIZED HEALTH CARE EDUCATION/TRAINING PROGRAM

## 2023-12-14 PROCEDURE — 3288F PR FALLS RISK ASSESSMENT DOCUMENTED: ICD-10-PCS | Mod: CPTII,S$GLB,, | Performed by: ORTHOPAEDIC SURGERY

## 2023-12-14 NOTE — PATIENT INSTRUCTIONS
Arthritis both knees   Receiving today Orthovisc 3. Out of 3 12/14/2023   Ice the knees the next several days if they bother you   You can have those injections once every 6 months  If you having pain prior to 6 months please call us we can give you steroid injection into both knees

## 2023-12-14 NOTE — PROGRESS NOTES
Subjective:     Patient ID: Mark Dickerson Sr. is a 83 y.o. male.    Chief Complaint: Pain of the Right Knee and Pain of the Left Knee  Bilateral knee pain with the right worse than the left     HPI:  02/20/2023  Patient diagnosed with arthritis of both of his knees.  He received Orthovisc in 2021 December and he is doing well with it.  He stated physical therapy made a big difference.  Now he can get up from a sitting position without using his hands.  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  He feels that his pain right now is 0/10 that he does not need to have any injections.  But he knows that he should get a repeated prior to having again.  He wanted to understand what total knee replacement is all about and we showed him on the x-ray how it looks like given brochure we went over things with him.  I did tell him total knee replacement is when everything fails and you are miserable and can not live with what she got is went total knee replacement as needed.  He expressed understanding he feels that he is not there yet.  He occasionally takes an Advil and I did tell him to be careful because he is diabetic and he has stage III kidney.  He started taking natural products like turmeric and it seems to help a little bit.  We went over chondroitin and glucosamine also with him that is something that people take for arthritis.    Denies any pain in the hips or in the pelvis.  Denies any fever or chills or shortness of breath or difficulty with chewing or swallowing loss of bowel bladder control          04/13/2023   Bilateral knee severe arthritis.  He stated the physical therapy seems to have helped .  He does have pain when he ambulates it goes up to around 2/10.  When he sits down the pain is not as bad.  Occasional Advil and occasional Tylenol.  He does have stage 3 kidney disease can not take any inflammatories.  He ambulates without any assistive devices.  Last time we discussed total knee  replacement and he is happy that at this time he is not going to needed because he is not hurting so much.  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  He is continuing with exercise program.  We started Orthovisc series of 3 injections he said the mildly swelled up on him but did not require much of any treatment for it after he received them.  He is ambulating without any assistive devices.  If he sits down his pain is 0/10 when he over does it it goes up to around 3 to 4/10.    No fever no chills no shortness of breath no difficulty with chewing swallowing loss of bowel bladder control blurry vision double vision loss sense smell or taste.  He ambulates without any assistive devices      04/27/2023   Bilateral knee severe arthritis he is happy he is not going to undergo any surgery at this time.  The viscosupplementation seems to start to work.  It only hurts him around 2/10 when he excessively work.  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  He wants follow-up appointment end of November after things given.  We discussed the pros and cons of viscosupplementation.  He is not in severe pain to require total knee replacements.      No fever no chills no shortness of breath or difficulty with chewing or swallowing loss of bowel bladder control blurry vision double vision loss sense smell or taste     He ambulates without any assistive devices and very comfortably        11/30/2023   Bilateral knee severe arthritis   Patient is having difficulty getting up from sitting position.  He still working as a  going teaching and preaching.  He travels quite a bit.  However complains that getting up from a sitting  position becoming very difficult despite him trying to be very active.  He said the injections in the knees seems to work and drops his pain to 1/10 when he works a lot and walks a lot is goes up to 3/10.  You would like to continue receiving his Orthovisc injections.  He is leaving out of state to pre each and 2 teach.  We did discuss that maybe a power lift chair might help him since he is 83 years of age  You tries to do his own exercise program most of the time he travels can not go to official physical therapy  He states he does take occasional ibuprofen I warned him about taking it at his age because of stage 3 kidney disease he needs to use topicals if any    12/14/2023   Bilateral knee arthritis he is receiving Orthovisc injections and he said the feel tight and stiffen but after a couple days it goes away.  He is very pleased with what these injections doing for him and to avoid surgery.  His pain is around 0/10 when he sits when he ambulates a lot is when it bothers him up to like 2/10.  He had a biopsy of a breast mass by Radiology today and he said he will do whatever the reports come back.  He still working bridging in going out of town.    No fever no chills no shortness of breath or difficulty with chewing or swallowing loss of bowel control  He still doing physical independent exercises  Past Medical History:   Diagnosis Date    CKD stage 3 due to type 2 diabetes mellitus 2/18/2021    DM (diabetes mellitus) 2014    BS didn't check 12/11/2019    DM (diabetes mellitus) 2014    BS didn't check 05/05/2022    Foreign body, eye     right eye    Hyperlipidemia     Hypertension     Need for shingles vaccine 11/20/2019    Pneumonia     Primary osteoarthritis of right knee 10/29/2021    Severe obesity (BMI 35.0-35.9 with comorbidity) 7/10/2019    Type 2 diabetes mellitus 2014    BS didn't check 12/12/2018     Past Surgical History:   Procedure Laterality Date    APPENDECTOMY      CATARACT EXTRACTION Bilateral      CPG     Family History   Problem Relation Age of Onset    Heart disease Mother     Hypertension Father     Heart disease Father     Hyperlipidemia Father     Cataracts Father     Hypertension Sister     Hypertension Brother     Cancer Son         colon    Allergic rhinitis Daughter      Social History     Socioeconomic History    Marital status:    Tobacco Use    Smoking status: Former     Current packs/day: 0.00     Average packs/day: 3.0 packs/day for 10.0 years (30.0 ttl pk-yrs)     Types: Cigarettes     Start date:      Quit date:      Years since quittin.9    Smokeless tobacco: Never   Substance and Sexual Activity    Alcohol use: No    Drug use: No    Sexual activity: Never     Social Determinants of Health     Financial Resource Strain: Low Risk  (2023)    Overall Financial Resource Strain (CARDIA)     Difficulty of Paying Living Expenses: Not hard at all   Food Insecurity: No Food Insecurity (2023)    Hunger Vital Sign     Worried About Running Out of Food in the Last Year: Never true     Ran Out of Food in the Last Year: Never true   Transportation Needs: No Transportation Needs (2023)    PRAPARE - Transportation     Lack of Transportation (Medical): No     Lack of Transportation (Non-Medical): No   Physical Activity: Inactive (2023)    Exercise Vital Sign     Days of Exercise per Week: 0 days     Minutes of Exercise per Session: 0 min   Stress: No Stress Concern Present (2023)    [employer] - Occupational Stress Questionnaire     Feeling of Stress : Only a little   Social Connections: Moderately Integrated (2023)    Social Connection and Isolation Panel [NHANES]     Frequency of Communication with Friends and Family: More than three times a week     Frequency of Social Gatherings with Friends and Family: More than three times a week     Attends Jewish Services: More than 4 times per year     Active Member of Clubs or  Organizations: Yes     Attends Club or Organization Meetings: More than 4 times per year     Marital Status:    Housing Stability: Unknown (1/9/2023)    Housing Stability Vital Sign     Unable to Pay for Housing in the Last Year: No     Number of Places Lived in the Last Year: 1     Medication List with Changes/Refills   Current Medications    ASPIRIN 81 MG CHEW    Take 1 tablet (81 mg total) by mouth once daily.    ATORVASTATIN (LIPITOR) 40 MG TABLET    Take 1 tablet (40 mg total) by mouth once daily.    CLOTRIMAZOLE (LOTRIMIN) 1 % CREAM    Apply topically 2 (two) times daily.    GLIPIZIDE (GLUCOTROL) 5 MG TABLET    Take 1 tablet (5 mg total) by mouth 2 (two) times daily with meals.    HYDROCHLOROTHIAZIDE (HYDRODIURIL) 25 MG TABLET    Take 1 tablet (25 mg total) by mouth once daily.    LEVOCETIRIZINE (XYZAL) 5 MG TABLET    Take 1 tablet (5 mg total) by mouth every evening.    MULTIVITAMIN (THERAGRAN) PER TABLET    Take 1 tablet by mouth once daily.    NEOMYCIN-POLYMYXIN-DEXAMETHASONE (MAXITROL) 3.5MG/ML-10,000 UNIT/ML-0.1 % DRPS    Place 1 drop into both eyes 4 (four) times daily. for 10 days    POTASSIUM CHLORIDE SA (K-DUR,KLOR-CON) 20 MEQ TABLET    Take 1 tablet (20 mEq total) by mouth once daily.     Review of patient's allergies indicates:  No Known Allergies  Review of Systems   Constitutional: Negative for decreased appetite.   HENT:  Negative for tinnitus.    Eyes:  Negative for double vision.   Cardiovascular:  Negative for chest pain.   Respiratory:  Negative for wheezing.    Hematologic/Lymphatic: Negative for bleeding problem.   Skin:  Negative for dry skin.   Musculoskeletal:  Positive for arthritis, joint pain and stiffness. Negative for back pain, gout and neck pain.   Gastrointestinal:  Negative for abdominal pain.   Genitourinary:  Negative for bladder incontinence.   Neurological:  Negative for numbness, paresthesias and sensory change.   Psychiatric/Behavioral:  Negative for altered mental  status.        Objective:   Body mass index is 33.86 kg/m².  There were no vitals filed for this visit.       General    Constitutional: He is oriented to person, place, and time. He appears well-developed.   HENT:   Head: Atraumatic.   Eyes: EOM are normal.   Pulmonary/Chest: Effort normal.   Neurological: He is alert and oriented to person, place, and time.   Psychiatric: Judgment normal.             Ambulating without any assistive devices  Pelvis is level  Hips right hip slight decrease in internal rotation compared to the left side but otherwise no pain in the groin.    Hip flexors and abductors and adductors and quads and hamstrings are 5/5   Right knee with medial joint tenderness mild crepitus to compression on the patella.  Active motion 2-120 degrees.  The no defect in the patella or quadriceps tendon.  There is excellent strength.  And very mild swelling   Left knee with also medial joint tenderness and mild crepitus to compression on the patella.  Active motion is 0-120 degrees no defect in the patella or quadriceps tendon.  She has very mild swelling.  Ankle motion intact   Calves are soft      Relevant imaging results reviewed and interpreted by me, discussed with the patient and / or family today   X-ray 12/01/2022 bilateral knees showing complete loss of joint space on the inside of the knee with marginal osteophytic changes and sclerosis consistent with bilateral knee severe arthritis with varus deformity  Assessment:     Encounter Diagnoses   Name Primary?    Arthritis of knee, right Yes    Acquired varus deformity knee, right     Arthritis of knee, left     Acquired varus deformity knee, left         Plan:   Arthritis of knee, right  -     Large Joint Aspiration/Injection: bilateral knee    Acquired varus deformity knee, right    Arthritis of knee, left  -     Large Joint Aspiration/Injection: bilateral knee    Acquired varus deformity knee, left         Patient Instructions   Arthritis both knees    Receiving today Orthovisc 3. Out of 3 12/14/2023   Ice the knees the next several days if they bother you   You can have those injections once every 6 months  If you having pain prior to 6 months please call us we can give you steroid injection into both knees        Disclaimer: This note was prepared using a voice recognition system and is likely to have sound alike errors within the text.

## 2023-12-14 NOTE — PROCEDURES
Large Joint Aspiration/Injection: bilateral knee    Date/Time: 12/14/2023 1:00 PM    Performed by: Marco Morales MD  Authorized by: Marco Morales MD    Consent Done?:  Yes (Verbal)  Indications:  Arthritis  Site marked: the procedure site was marked    Timeout: prior to procedure the correct patient, procedure, and site was verified      Local anesthesia used?: Yes    Local anesthetic:  Lidocaine 1% without epinephrine    Details:  Needle Size:  22 G  Ultrasonic Guidance for needle placement?: No    Approach:  Anterolateral  Location:  Knee  Laterality:  Bilateral  Site:  Bilateral knee  Medications (Right):  15 mg sodium hyaluronate (orthovisc) 30 mg/2 mL  Medications (Left):  15 mg sodium hyaluronate (orthovisc) 30 mg/2 mL  Patient tolerance:  Patient tolerated the procedure well with no immediate complications

## 2023-12-15 ENCOUNTER — OFFICE VISIT (OUTPATIENT)
Dept: OPHTHALMOLOGY | Facility: CLINIC | Age: 83
End: 2023-12-15
Payer: MEDICARE

## 2023-12-15 DIAGNOSIS — H16.9 KERATITIS, BILATERAL: ICD-10-CM

## 2023-12-15 PROCEDURE — 99999 PR PBB SHADOW E&M-EST. PATIENT-LVL II: CPT | Mod: PBBFAC,,, | Performed by: OPTOMETRIST

## 2023-12-15 PROCEDURE — 92012 INTRM OPH EXAM EST PATIENT: CPT | Mod: S$GLB,,, | Performed by: OPTOMETRIST

## 2023-12-15 PROCEDURE — 1159F MED LIST DOCD IN RCRD: CPT | Mod: CPTII,S$GLB,, | Performed by: OPTOMETRIST

## 2023-12-15 PROCEDURE — 92012 PR EYE EXAM, EST PATIENT,INTERMED: ICD-10-PCS | Mod: S$GLB,,, | Performed by: OPTOMETRIST

## 2023-12-15 PROCEDURE — 1159F PR MEDICATION LIST DOCUMENTED IN MEDICAL RECORD: ICD-10-PCS | Mod: CPTII,S$GLB,, | Performed by: OPTOMETRIST

## 2023-12-15 PROCEDURE — 99999 PR PBB SHADOW E&M-EST. PATIENT-LVL II: ICD-10-PCS | Mod: PBBFAC,,, | Performed by: OPTOMETRIST

## 2023-12-15 RX ORDER — NEOMYCIN SULFATE, POLYMYXIN B SULFATE AND DEXAMETHASONE 3.5; 10000; 1 MG/ML; [USP'U]/ML; MG/ML
1 SUSPENSION/ DROPS OPHTHALMIC 4 TIMES DAILY
Qty: 10 ML | Refills: 0 | Status: SHIPPED | OUTPATIENT
Start: 2023-12-15 | End: 2023-12-25

## 2023-12-15 NOTE — PROGRESS NOTES
SUBJECTIVE  Mark Dickerson Sr. is 83 y.o. male  Corrected distance visual acuity was 20/20 in the right eye and 20/40 in the left eye. Corrected near visual acuity was J4 in the right eye and J1 in the left eye.   Chief Complaint   Patient presents with    Follow-up     RTC 10 days keratitis and refraction          HPI     Follow-up     Additional comments: RTC 10 days keratitis and refraction           Comments    Last visit with TRF on 12/05/2023  Medication: Maxitrol QID OU    Patient states doing better but cloudy vision with the right eye only. No   ocular pain/discomfort.          Last edited by Katie Mckeon on 12/15/2023  9:10 AM.         Assessment /Plan :  1. Keratitis, bilateral      Improved keratitis OD  Continue Maxitrol qid OD x 2 weeks, bid OD x 2 weeks then daily OD x 4 weeks then d/c    RTC prn

## 2023-12-19 ENCOUNTER — TELEPHONE (OUTPATIENT)
Dept: SURGERY | Facility: CLINIC | Age: 83
End: 2023-12-19
Payer: MEDICARE

## 2023-12-19 LAB
COMMENT: NORMAL
FINAL PATHOLOGIC DIAGNOSIS: NORMAL
GROSS: NORMAL
Lab: NORMAL

## 2023-12-19 NOTE — TELEPHONE ENCOUNTER
Called patient to discuss breast biopsy results- we reviewed the pathology report and that the next step was to meet with a breast surgical oncologist to discuss removing this area and the treatment plan. Patient rescheduled with Dr. Brittnee Briones and Dr. Shaina Garg on 12/21/23. Patient educated on expectations for visit and encouraged to bring support person(s). All questions answered and pt denied any other needs at this time.    Patient placed on [facility] TB docket.   ----- Message from Eddi Kong MD sent at 12/19/2023 11:33 AM CST -----  Malignant and concordant.      Thank you.

## 2023-12-20 DIAGNOSIS — C50.922: Primary | ICD-10-CM

## 2023-12-20 DIAGNOSIS — Z17.0: Primary | ICD-10-CM

## 2023-12-21 ENCOUNTER — OFFICE VISIT (OUTPATIENT)
Dept: HEMATOLOGY/ONCOLOGY | Facility: CLINIC | Age: 83
End: 2023-12-21
Payer: MEDICARE

## 2023-12-21 ENCOUNTER — OFFICE VISIT (OUTPATIENT)
Dept: SURGERY | Facility: CLINIC | Age: 83
End: 2023-12-21
Payer: MEDICARE

## 2023-12-21 DIAGNOSIS — C50.022 MALIGNANT NEOPLASM INVOLVING BOTH NIPPLE AND AREOLA OF LEFT BREAST IN MALE, ESTROGEN RECEPTOR POSITIVE: Primary | ICD-10-CM

## 2023-12-21 DIAGNOSIS — Z17.0 MALIGNANT NEOPLASM INVOLVING BOTH NIPPLE AND AREOLA OF LEFT BREAST IN MALE, ESTROGEN RECEPTOR POSITIVE: Primary | ICD-10-CM

## 2023-12-21 DIAGNOSIS — N63.42 SUBAREOLAR MASS OF LEFT BREAST: ICD-10-CM

## 2023-12-21 PROCEDURE — 99205 PR OFFICE/OUTPT VISIT, NEW, LEVL V, 60-74 MIN: ICD-10-PCS | Mod: S$GLB,,, | Performed by: INTERNAL MEDICINE

## 2023-12-21 PROCEDURE — 99999 PR PBB SHADOW E&M-EST. PATIENT-LVL I: CPT | Mod: PBBFAC,,, | Performed by: INTERNAL MEDICINE

## 2023-12-21 PROCEDURE — 99999 PR PBB SHADOW E&M-EST. PATIENT-LVL II: ICD-10-PCS | Mod: PBBFAC,,, | Performed by: SURGERY

## 2023-12-21 PROCEDURE — 99205 OFFICE O/P NEW HI 60 MIN: CPT | Mod: S$GLB,,, | Performed by: INTERNAL MEDICINE

## 2023-12-21 PROCEDURE — 99205 PR OFFICE/OUTPT VISIT, NEW, LEVL V, 60-74 MIN: ICD-10-PCS | Mod: S$GLB,,, | Performed by: SURGERY

## 2023-12-21 PROCEDURE — 99205 OFFICE O/P NEW HI 60 MIN: CPT | Mod: S$GLB,,, | Performed by: SURGERY

## 2023-12-21 PROCEDURE — 99999 PR PBB SHADOW E&M-EST. PATIENT-LVL I: ICD-10-PCS | Mod: PBBFAC,,, | Performed by: INTERNAL MEDICINE

## 2023-12-21 PROCEDURE — 99999 PR PBB SHADOW E&M-EST. PATIENT-LVL II: CPT | Mod: PBBFAC,,, | Performed by: SURGERY

## 2023-12-21 NOTE — PROGRESS NOTES
Breast Surgical Oncology  [place]    Date of Service: 12/21/2023    SUBJECTIVE:   Chief complaint: left breast cancer    HISTORY OF PRESENT ILLNESS:   Mark Dickerson Sr. is a 83 y.o. male who is kindly referred by Dr. Tatyana Cannon for left breast cancer.    A left breast abnormality was identified on routine screening mammography.  Focused sonographic evaluation revealed a 5 cm hypoechoic mass with calcifications noted within the subareolar breast corresponding to large mammographic mass. Overlying nodular skin involvement present. 1 cm hypoechoic mass within the axillary tail region. Two enlarged abnormal lymph nodes present within the left axilla. Core needle biopsy of the subareolar mass and axillary lymph node confirmed estrogen positive, progesterone negative, [id number] positive invasive ductal carcinoma. He denies breast concerns such as pain, nipple discharge, nipple retraction or lumps under the arm.  He denies prior breast surgery or biopsy.     Family history of cancer is as follows: son with colon cancer, wife had breast cancer.     FAMILY HISTORY:     Family History   Problem Relation Age of Onset    Heart disease Mother     Hypertension Father     Heart disease Father     Hyperlipidemia Father     Cataracts Father     Hypertension Sister     Hypertension Brother     Cancer Son         colon    Allergic rhinitis Daughter         PAST MEDICAL HISTORY:     Past Medical History:   Diagnosis Date    CKD stage 3 due to type 2 diabetes mellitus 2/18/2021    DM (diabetes mellitus) 2014    BS didn't check 12/11/2019    DM (diabetes mellitus) 2014    BS didn't check 05/05/2022    Foreign body, eye     right eye    Hyperlipidemia     Hypertension     Need for shingles vaccine 11/20/2019    Pneumonia     Primary osteoarthritis of right knee 10/29/2021    Severe obesity (BMI 35.0-35.9 with comorbidity) 7/10/2019    Type 2 diabetes mellitus 2014    BS didn't check 12/12/2018       SURGICAL HISTORY:     Past Surgical  History:   Procedure Laterality Date    APPENDECTOMY      CATARACT EXTRACTION Bilateral     CPG       SOCIAL HISTORY:     Social History     Tobacco Use    Smoking status: Former     Current packs/day: 0.00     Average packs/day: 3.0 packs/day for 10.0 years (30.0 ttl pk-yrs)     Types: Cigarettes     Start date:      Quit date:      Years since quittin.0    Smokeless tobacco: Never   Substance Use Topics    Alcohol use: No    Drug use: No        MEDICATIONS/ALLERGIES:     Current Outpatient Medications:     aspirin 81 MG Chew, Take 1 tablet (81 mg total) by mouth once daily., Disp: , Rfl: 0    atorvastatin (LIPITOR) 40 MG tablet, Take 1 tablet (40 mg total) by mouth once daily., Disp: 90 tablet, Rfl: 3    clotrimazole (LOTRIMIN) 1 % cream, Apply topically 2 (two) times daily., Disp: 113 g, Rfl: 1    glipiZIDE (GLUCOTROL) 5 MG tablet, Take 1 tablet (5 mg total) by mouth 2 (two) times daily with meals., Disp: 180 tablet, Rfl: 2    hydroCHLOROthiazide (HYDRODIURIL) 25 MG tablet, Take 1 tablet (25 mg total) by mouth once daily., Disp: 90 tablet, Rfl: 2    levocetirizine (XYZAL) 5 MG tablet, Take 1 tablet (5 mg total) by mouth every evening., Disp: 30 tablet, Rfl: 11    multivitamin (THERAGRAN) per tablet, Take 1 tablet by mouth once daily., Disp: , Rfl:     neomycin-polymyxin-dexamethasone (MAXITROL) 3.5mg/mL-10,000 unit/mL-0.1 % DrpS, Place 1 drop into both eyes 4 (four) times daily. for 10 days, Disp: 10 mL, Rfl: 0    potassium chloride SA (K-DUR,KLOR-CON) 20 MEQ tablet, Take 1 tablet (20 mEq total) by mouth once daily., Disp: 90 tablet, Rfl: 3  Review of patient's allergies indicates:  No Known Allergies    REVIEW OF SYSTEMS:   I have reviewed 12 systems, including 2 points per system. Pertinent reported positives are: none    PHYSICAL EXAM:   General: The patient appears well and is in no acute distress.     Chaperon present for examination.   BREAST EXAM  No Asymmetry  Right:  - Mass: 6 cm abutting  the NAC with skin involvement without erosion  - Skin change: No  - Nipple Discharge: No  - Nipple retraction: yes with crusting  - Axillary LAD: 2 cm LN  Left:   - Mass: No  - Skin change: No  - Nipple Discharge: No  - Nipple retraction: No  - Axillary LAD: No    IMAGING:   Images were personally reviewed.     Results for orders placed during the hospital encounter of 12/01/23    Mammo Digital Diagnostic Bilat with Danny    Narrative  Result:  Mammo Digital Diagnostic Bilat with Danny  US Breast Left Limited    History:  Patient is 83 y.o. and is seen for diagnostic imaging.    Films Compared:  none    Findings:  This procedure was performed using tomosynthesis. Computer-aided detection was utilized in the interpretation of this examination.  The breasts are almost entirely fatty.    Left  Mammo Digital Diagnostic Bilat with Danny  There is a 5 cm mass seen in the retroareolar region of the left breast in the anterior depth. Associated features include nodular skin thickening and axillary adenopathy. There are also associated calcifications. Additional 1 cm mass noted within the axillary tail.    US Breast Left Limited  5 cm hypoechoic mass with calcifications noted within the subareolar breast corresponding to large mammographic mass. Overlying nodular skin involvement present. 1 cm hypoechoic mass within the axillary tail region. Two enlarged abnormal lymph nodes present within the left axilla.    Right  Mammo Digital Diagnostic Bilat with Danny  There is no evidence of suspicious masses, calcifications, or other abnormal findings in the right breast.    Impression  Left  Mass: Left breast 5 cm mass at the retroareolar anterior position. Abnormal intramammary lymph node versus 2nd site of malignancy within the axillary tail. Abnormal axillary adenopathy. Assessment: 5 - Highly suggestive of malignancy. Biopsy is recommended.    Right  Mammo Digital Diagnostic Bilat with Danny  There is no mammographic evidence of  malignancy in the right breast.      BI-RADS Category:  Overall: 5 - Highly Suggestive of Malignancy      Recommendation:  Ultrasound Biopsy is recommended of the dominant mass and axillary lymph node.      PATHOLOGY:     Lab Results   Component Value Date    FPATHDX  12/14/2023     Part 1   Breast, left, subareolar, ultrasound-guided biopsy:   Invasive ductal carcinoma, moderately differentiated  Raul grade 2:  Tubule formation -3, nuclear pleomorphism-2 and mitotic count -2  Invasive carcinoma measures 17 mm in greatest dimension  No carcinoma in Situ or lymphovascular invasion is identified    Tumor biomarkers  Estrogen receptor (ER):  Positive, 80-90%, strong  Progesterone receptor (PGR):  Negative  HER2 (IHC):  Positive, 3+   Ki-67:  20-25%      Part 2   Axilla, left, ultrasound-guided biopsy:   Positive for carcinoma, see comment below      This case was reviewed by Dr. TONI Alcantara who concurs with the above diagnoses and assessment.    Stains were performed with appropriate controls.           ASSESSMENT:     1. 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, estrogen receptor positive          PLAN:     Mark Dickerson Sr. is a 83 y.o. 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  I have personally reviewed all of his imaging and pathology reports and he has been provided with a copy of these for her records. Today, we reviewed reviewed the guidelines of the National [employer] Cancer Network for her diagnosis.    I am referring him for genetic counseling based on his gender and diagnosis.      We have discussed the surgical choices of lumpectomy with radiation and mastectomy with or without radiation.  I have explained that the survival is the same, regardless of the surgery chosen.  We have discussed that the local recurrence rate following mastectomy  is 2-5%.  I have explained that the local recurrence rate was slightly higher following breast conservation in the large trials that compared mastectomy and breast conservation. However, with current medical therapies, local recurrence has been reduced to as low as 6%.  She will be referred to radiation oncology for further discussion.  We briefly discussed reconstruction options, and the [facility] breast cancer treatment brochure was provided.    We have reviewed the general benefits of neoadjuvant chemotherapy including down-sizing the primary tumor to facilitate breast conservation if desired, down-staging the axilla to possibly facilitate less extensive axillary surgery for lower axillary morbidity and finally to gain prognositc information at the time of surgery as to the response to chemotherapy.  I am referring him to a medical oncologist for further discussion of these points and consideration of neoadjuvant chemotherapy.  We discussed that he will need a mediport.     He is interested in a mastectomy following neoadjuvant chemotherapy. He will have a DARELL placed within the lymph node in anticipation of a targeted sentinel lymph node biopsy.    I have provided him with general information regarding the supportive services available here including patient navigation, nutritional counseling, preoperative and postoperative physical therapy programs, and consultation with a genetic counselor.      I spent a total of 60 minutes on this visit. This includes face to face time and non-face to face time preparing to see the patient (eg, review of tests), obtaining and/or reviewing separately obtained history, documenting clinical information in the electronic or other health record, independently interpreting results and communicating results to the patient/family/caregiver, or care coordinator.      Brittnee Briones M.D.

## 2023-12-21 NOTE — PROGRESS NOTES
LASHAUN'milan - Hematol Oncol [facility]  [address] [facility] [address]  Phone: [phone];  Fax: [phone]    Patient ID: Mark Dickerson Sr.   Chief Complaint: Establish Care and Breast Cancer     MRN:  [id number]     Reason for Referral:  Breast Cancer  Subjective   Mark Dickerson Sr. is a 83 y.o. male with past medical history of well-controlled type 2 diabetes, hypertension, CKD 3, history of TIA, and 30 pack-year smoking history who presents to clinic to establish care for new diagnosis of breast cancer.  He is accompanied by his 2 daughters, son and son-in-law.      The patient is very active at baseline and has no functional limitations.  He has a good support system.  He continues to work as an caroline.    He noticed a sore and discolored area to left breast around the nipple approximately 4 months ago.  He presented to his PCP for follow up for this and mammogram was ordered showing a 5 cm mass.  He has already established care with breast surgery.  He has no constitutional symptoms.  His chronic medical conditions are well controlled.    I reviewed the role of medical oncology in his care.  He and his family have many questions regarding treatment regimen, goal of treatment, and sequence of treatment.  I do recommend that he have staging imaging and placement.  I reviewed my recommendation that he had neoadjuvant TCHP with the goal of this being to shrink the tumor and positive lymph node.  We will reassess disease status throughout the treatment.  Once he has completed neoadjuvant chemotherapy he will be re-evaluated for the need for surgery.  I reviewed the most common side effects of each drug.  I provided him with chemocare  handouts that he and his family can read prior to chemo education.    The patient has a few commitments and would like to start treatment after January 15th.  This is only a few weeks away and we do have to get a few things in order prior to starting.  We  would like to start as soon as possible.      Review of Systems:  Review of Systems   Constitutional:  Negative for activity change, appetite change, chills, diaphoresis, fatigue, fever and unexpected weight change.   HENT:  Negative for nosebleeds.    Respiratory:  Negative for shortness of breath.    Cardiovascular:  Negative for chest pain.   Gastrointestinal:  Negative for abdominal distention, abdominal pain, anal bleeding, blood in stool, constipation, diarrhea, nausea and vomiting.   Genitourinary:  Negative for difficulty urinating and hematuria.   Musculoskeletal:  Negative for arthralgias, back pain and myalgias.   Skin:  Negative for rash.   Neurological:  Negative for dizziness, weakness, light-headedness and headaches.   Hematological:  Does not bruise/bleed easily.   Psychiatric/Behavioral:  The patient is not nervous/anxious.      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, estrogen receptor positive     12/21/2023 Cancer Staged    Staging form: Breast, AJCC 8th Edition  - Clinical stage from 12/21/2023: Stage IIA (cT2, cN1(f), cM0, G2, ER+, ID-, HER2+)     1/22/2024 -  Chemotherapy    Treatment Summary   Plan Name: OP BREAST TCHP (pertuzumab trastuzumab DOCEtaxel CARBOplatin) Q3W  Treatment Goal: Curative  Status: Active  Start Date: 1/22/2024 (Planned)  End Date: 12/23/2024 (Planned)  Provider: Shaina Mendoza MD  Chemotherapy: CARBOPLATIN INFUSION (BY AUC), , Intravenous, Clinic/HOD 1 time, 0 of 6 cycles  DOCETAXEL CHEMO INFUSION, 75 mg/m2, Intravenous, Clinic/HOD 1 time, 0 of 6 cycles  PERTUZUMAB MG/250 ML NS IV INFUSION, 840 mg, Intravenous, Clinic/HOD 1 time, 0 of 17 cycles  TRASTUZUMAB-ANNS CHEMO INFUSION, 8 mg/kg, Intravenous, Clinic/HOD 1 time, 0 of 17 cycles         Past Medical History:    Diagnosis Date    CKD stage 3 due to type 2 diabetes mellitus 2021    DM (diabetes mellitus)     BS didn't check 2019    DM (diabetes mellitus)     BS didn't check 2022    Foreign body, eye     right eye    Hyperlipidemia     Hypertension     Need for shingles vaccine 2019    Pneumonia     Primary osteoarthritis of right knee 10/29/2021    Severe obesity (BMI 35.0-35.9 with comorbidity) 7/10/2019    Type 2 diabetes mellitus     BS didn't check 2018       Past Surgical History:   Procedure Laterality Date    APPENDECTOMY      CATARACT EXTRACTION Bilateral     CPG       Family History   Problem Relation Age of Onset    Heart disease Mother     Hypertension Father     Heart disease Father     Hyperlipidemia Father     Cataracts Father     Hypertension Sister     Hypertension Brother     Cancer Son         colon    Allergic rhinitis Daughter        Review of patient's allergies indicates:  No Known Allergies    Social History     Tobacco Use    Smoking status: Former     Current packs/day: 0.00     Average packs/day: 3.0 packs/day for 10.0 years (30.0 ttl pk-yrs)     Types: Cigarettes     Start date:      Quit date:      Years since quittin.0    Smokeless tobacco: Never   Substance Use Topics    Alcohol use: No    Drug use: No       Physical Exam   ECOG:   ECOG SCORE    0 - Fully active-able to carry on all pre-disease performance without restriction          Vitals:  There were no vitals taken for this visit.    Physical Exam:  Physical Exam  Constitutional:       General: He is not in acute distress.     Appearance: Normal appearance. He is normal weight. He is not ill-appearing or toxic-appearing.   HENT:      Head: Normocephalic and atraumatic.   Eyes:      Extraocular Movements: Extraocular movements intact.      Conjunctiva/sclera: Conjunctivae normal.   Cardiovascular:      Rate and Rhythm: Normal rate.   Pulmonary:      Effort: Pulmonary effort is normal.    Abdominal:      Palpations: There is no hepatomegaly or splenomegaly.   Neurological:      General: No focal deficit present.      Mental Status: He is alert and oriented to person, place, and time. Mental status is at baseline.   Psychiatric:         Mood and Affect: Mood normal.         Behavior: Behavior normal.         Thought Content: Thought content normal.        Labs   Labs:  No visits with results within 2 Day(s) from this visit.   Latest known visit with results is:   Hospital Outpatient Visit on 12/14/2023   Component Date Value Ref Range Status    Final Pathologic Diagnosis 12/14/2023    Final                    Value:Part 1   Breast, left, subareolar, ultrasound-guided biopsy:   Invasive ductal carcinoma, moderately differentiated  Belmont grade 2:  Tubule formation -3, nuclear pleomorphism-2 and mitotic count -2  Invasive carcinoma measures 17 mm in greatest dimension  No carcinoma in Situ or lymphovascular invasion is identified    Tumor biomarkers  Estrogen receptor (ER):  Positive, 80-90%, strong  Progesterone receptor (PGR):  Negative  HER2 (IHC):  Positive, 3+   Ki-67:  20-25%      Part 2   Axilla, left, ultrasound-guided biopsy:   Positive for carcinoma, see comment below      This case was reviewed by Dr. TONI Alcantara who concurs with the above diagnoses and assessment.    Stains were performed with appropriate controls.      Interp By Lupe Morel M.D., Signed on 12/19/2023 at 11:03    Gross 12/14/2023    Final                    Value:Pathology ID:  [id number]  Patient ID:  [id number]  Received in 2 parts:  Part 1:  Pathology ID:  [id number]  Patient ID:  [id number]  The specimen is received in formalin labeled &quot;left breast subareolar in formalin 8:21&quot;.  The specimen consists of 4 tan needle biopsy fragments measuring 2.0 x 0.5 x 0.1 cm in aggregate.  The specimen is submitted entirely in cassette   YNK--1-A.    Specimen has been in formalin for more than 6 hours and less than  72 hours.  Ischemic time is not provided.    Part 2:  Pathology ID:  [id number]  Patient ID:  [id number]  The specimen is received in formalin labeled &quot;left axilla in formalin 8:35&quot;.  The specimen consists of 2 yellow needle biopsy fragments measuring 1.6 x 0.2 x 0.1 cm in aggregate.  The specimen is submitted entirely in cassette URJ--2-A.    Specimen has been in formalin for more than 6 hours and less than 72 hours.  Ischemic time is not provided.    Regan Martin      Comment 12/14/2023    Final                    Value:Please be advised for specimen 2, no definitive background of lymphoid tissue is identified.  The carcinoma seen in this biopsy may represent a lymph node completely replaced by metastatic carcinoma or extension of tumor within breast parenchyma.    Radiologic correlation is strongly recommended.      Disclaimer 12/14/2023    Final                    Value:Unless the case is a 'gross only' or additional testing only, the final diagnosis for each specimen is based on a microscopic examination of appropriate tissue sections.  ER immunohistochemical staining (clone SP1, DAB detection method) is performed on formalin-fixed, paraffin embedded tissue sections. The percentage of cell nuclei stained and the strength of staining is reported (weak, moderate, strong), using the 2010   ACSO/CAP scoring guidelines. Tumors used for determining predictive markers are fixed in 10% neutral buffered formalin for 6-72 hours. It has been cleared by the [place] FDA for use as an IVD test. This assay has not been validated on decalcified tissues.   Results should be interpreted with caution given the likelihood of false negativity on decalcified specimens.  HER-2/sergio IHC (4B5) clone, DAB detection method) is done on 10% buffered formalin-fixed (for 6-72 hrs), paraffin embedded tissue sections. The scoring is completed on a 4-tiered scoring system of membrane sta                          ining using the 2014  ASCO/CAP scoring   guidelines. It has been cleared by the [place] FDA for use as an IVD test. This assay has not been validated on decalcified tissues. Results should be interpreted with caution given the likelihood of false negativity on decalcified specimens.    PgR immunohistochemical staining (clone 1E2, DAB detection method) is performed on formalin-fixed, paraffin embedded tissue sections. The percentage of cell nuclei stained and the strength of staining is report (weak, moderate, strong), using 2010   ASCO/CAP scoring guidelines. Tumors used for determining predictive markers are fixed in 10% neutral buffered formalin for 6-72 hours. It has been cleared by the [place] FDA for use as an IVD test. This assay has not been validated on decalcified tissues.   Results should be interpreted with caution given the likelihood of false negativity on decalcified specimens.           Imaging   Diagnostic L Breast US and Bilateral MMG w/Tomosynthesis 12/01/23  Findings:  This procedure was performed using tomosynthesis. Computer-aided detection was utilized in the interpretation of this examination.  The breasts are almost entirely fatty.      Left  Mammo Digital Diagnostic Bilat with Danny  There is a 5 cm mass seen in the retroareolar region of the left breast in the anterior depth. Associated features include nodular skin thickening and axillary adenopathy. There are also associated calcifications. Additional 1 cm mass noted within the axillary tail.      US Breast Left Limited  5 cm hypoechoic mass with calcifications noted within the subareolar breast corresponding to large mammographic mass. Overlying nodular skin involvement present. 1 cm hypoechoic mass within the axillary tail region. Two enlarged abnormal lymph nodes present within the left axilla.      Right  Mammo Digital Diagnostic Bilat with Danny  There is no evidence of suspicious masses, calcifications, or other abnormal findings in the right breast.      Impression:  Left  Mass: Left breast 5 cm mass at the retroareolar anterior position. Abnormal intramammary lymph node versus 2nd site of malignancy within the axillary tail. Abnormal axillary adenopathy. Assessment: 5 - Highly suggestive of malignancy. Biopsy is recommended.      Right  Mammo Digital Diagnostic Bilat with Danny  There is no mammographic evidence of malignancy in the right breast.        Pathology     Left Breast Biopsy 12/14/23      Component 7 d ago   Final Pathologic Diagnosis Part 1  Breast, left, subareolar, ultrasound-guided biopsy:  Invasive ductal carcinoma, moderately differentiated  Raul grade 2:  Tubule formation -3, nuclear pleomorphism-2 and mitotic count -2  Invasive carcinoma measures 17 mm in greatest dimension  No carcinoma in Situ or lymphovascular invasion is identified    Tumor biomarkers  Estrogen receptor (ER):  Positive, 80-90%, strong  Progesterone receptor (PGR):  Negative  HER2 (IHC):  Positive, 3+  Ki-67:  20-25%      Part 2  Axilla, left, ultrasound-guided biopsy:  Positive for carcinoma, see comment below      This case was reviewed by Dr. TONI Alcantara who concurs with the above diagnoses and assessment.    Stains were performed with appropriate controls.            Assessment and Plan   L Breast Invasive Ductal Carcinoma, Stage IIA (T2pN1),  +/-/+, Ki67 20-25%, G2  12/01/23 MMG showed left breast mass, 5 cm, at the retroareolar anterior position and abnormal intramammary lymph node versus 2nd site of malignancy cassidy axillary tail   s/p L breast biopsy 12/14/2023:  Positive for malignancy; left axillary lymph node positive for malignancy  Genetic testing pending  I discussed in detail the role of medical oncology in his care. My treatment recommendations are based on the NCCN Guidelines and have taken in to account his performance status and any comorbid conditions that he has.  Because of the size of the tumor and hormonal make I recommend NA TCHP.  I reviewed  the major side effects.  I recommend a staging scan given male/high-risk breast cancer with PET-CT   He will need to complete genetic testing-Brodie genetic test obtained in clinic today, obtain DEXA scan, and needs PORT placement (refer to IR)  His last  TTE 04/15/2023: EF 60%  He will need chemo education  Patient requests to start treatment after 01/15/23 given prior commitments  TCHP sent for authorization        Chronic Medical Conditions  DM II, controlled  CKD III  HTN  AS - Non-rheumatic  Hx TIA        Med Onc Chart Routing      Follow up with physician . January 22 to start treatment   Follow up with ZULEYMA    Infusion scheduling note   TCHP - to start January 22, 2024   Injection scheduling note    Labs CBC, CMP, phosphorus and magnesium   Scheduling:  Preferred lab:  Lab interval:     Imaging PET scan   Already scheduled.   Pharmacy appointment    Other referrals                       The patient was seen, interviewed and examined. Pertinent lab and radiologic studies were reviewed. Pt instructed to call should they develop concerning signs/symptoms or have further questions.        Portions of the record may have been created with voice recognition software. Occasional wrong-word or sound-a-like substitutions may have occurred due to the inherent limitations of voice recognition software. Read the chart carefully and recognize, using context, where substitutions have occurred.      Shaina Garg MD    Hematology/Oncology

## 2023-12-21 NOTE — PLAN OF CARE
START ON PATHWAY REGIMEN - Breast    [id number]        Pertuzumab (Perjeta)       Trastuzumab-xxxx       Docetaxel (Taxotere)       Carboplatin       Pertuzumab (Perjeta)       Trastuzumab-xxxx       Docetaxel (Taxotere)       Carboplatin           Additional Orders: Assess LVEF prior to initiation of trastuzumab and   pertuzumab and as clinically indicated during and after treatment. See PI for   details. Refer to PI for instructions regarding delayed or missed doses of   pertuzumab and trastuzumab.    **Always confirm dose/schedule in your pharmacy ordering system**    Patient Characteristics:  Preoperative or Nonsurgical Candidate (Clinical Staging), Neoadjuvant Therapy   followed by Surgery, Invasive Disease, Chemotherapy, HER2 Positive, ER Positive  Therapeutic Status: Preoperative or Nonsurgical Candidate (Clinical Staging)  AJCC M Category: cM0  AJCC Grade: G2  Breast Surgical Plan: Neoadjuvant Therapy followed by Surgery  ER Status: Positive (+)  AJCC 8 Stage Grouping: IIA  HER2 Status: Positive (+)  AJCC T Category: cT2  AJCC N Category: cN1  SD Status: Negative (-)  Intent of Therapy:  Curative Intent, Discussed with Patient

## 2023-12-21 NOTE — H&P (VIEW-ONLY)
Breast Surgical Oncology  [place]    Date of Service: 12/21/2023    SUBJECTIVE:   Chief complaint: left breast cancer    HISTORY OF PRESENT ILLNESS:   Mark Dickerson Sr. is a 83 y.o. male who is kindly referred by Dr. Tatyana Cannon for left breast cancer.    A left breast abnormality was identified on routine screening mammography.  Focused sonographic evaluation revealed a 5 cm hypoechoic mass with calcifications noted within the subareolar breast corresponding to large mammographic mass. Overlying nodular skin involvement present. 1 cm hypoechoic mass within the axillary tail region. Two enlarged abnormal lymph nodes present within the left axilla. Core needle biopsy of the subareolar mass and axillary lymph node confirmed estrogen positive, progesterone negative, [id number] positive invasive ductal carcinoma. He denies breast concerns such as pain, nipple discharge, nipple retraction or lumps under the arm.  He denies prior breast surgery or biopsy.     Family history of cancer is as follows: son with colon cancer, wife had breast cancer.     FAMILY HISTORY:     Family History   Problem Relation Age of Onset    Heart disease Mother     Hypertension Father     Heart disease Father     Hyperlipidemia Father     Cataracts Father     Hypertension Sister     Hypertension Brother     Cancer Son         colon    Allergic rhinitis Daughter         PAST MEDICAL HISTORY:     Past Medical History:   Diagnosis Date    CKD stage 3 due to type 2 diabetes mellitus 2/18/2021    DM (diabetes mellitus) 2014    BS didn't check 12/11/2019    DM (diabetes mellitus) 2014    BS didn't check 05/05/2022    Foreign body, eye     right eye    Hyperlipidemia     Hypertension     Need for shingles vaccine 11/20/2019    Pneumonia     Primary osteoarthritis of right knee 10/29/2021    Severe obesity (BMI 35.0-35.9 with comorbidity) 7/10/2019    Type 2 diabetes mellitus 2014    BS didn't check 12/12/2018       SURGICAL HISTORY:     Past Surgical  History:   Procedure Laterality Date    APPENDECTOMY      CATARACT EXTRACTION Bilateral     CPG       SOCIAL HISTORY:     Social History     Tobacco Use    Smoking status: Former     Current packs/day: 0.00     Average packs/day: 3.0 packs/day for 10.0 years (30.0 ttl pk-yrs)     Types: Cigarettes     Start date:      Quit date:      Years since quittin.0    Smokeless tobacco: Never   Substance Use Topics    Alcohol use: No    Drug use: No        MEDICATIONS/ALLERGIES:     Current Outpatient Medications:     aspirin 81 MG Chew, Take 1 tablet (81 mg total) by mouth once daily., Disp: , Rfl: 0    atorvastatin (LIPITOR) 40 MG tablet, Take 1 tablet (40 mg total) by mouth once daily., Disp: 90 tablet, Rfl: 3    clotrimazole (LOTRIMIN) 1 % cream, Apply topically 2 (two) times daily., Disp: 113 g, Rfl: 1    glipiZIDE (GLUCOTROL) 5 MG tablet, Take 1 tablet (5 mg total) by mouth 2 (two) times daily with meals., Disp: 180 tablet, Rfl: 2    hydroCHLOROthiazide (HYDRODIURIL) 25 MG tablet, Take 1 tablet (25 mg total) by mouth once daily., Disp: 90 tablet, Rfl: 2    levocetirizine (XYZAL) 5 MG tablet, Take 1 tablet (5 mg total) by mouth every evening., Disp: 30 tablet, Rfl: 11    multivitamin (THERAGRAN) per tablet, Take 1 tablet by mouth once daily., Disp: , Rfl:     neomycin-polymyxin-dexamethasone (MAXITROL) 3.5mg/mL-10,000 unit/mL-0.1 % DrpS, Place 1 drop into both eyes 4 (four) times daily. for 10 days, Disp: 10 mL, Rfl: 0    potassium chloride SA (K-DUR,KLOR-CON) 20 MEQ tablet, Take 1 tablet (20 mEq total) by mouth once daily., Disp: 90 tablet, Rfl: 3  Review of patient's allergies indicates:  No Known Allergies    REVIEW OF SYSTEMS:   I have reviewed 12 systems, including 2 points per system. Pertinent reported positives are: none    PHYSICAL EXAM:   General: The patient appears well and is in no acute distress.     Chaperon present for examination.   BREAST EXAM  No Asymmetry  Right:  - Mass: 6 cm abutting  the NAC with skin involvement without erosion  - Skin change: No  - Nipple Discharge: No  - Nipple retraction: yes with crusting  - Axillary LAD: 2 cm LN  Left:   - Mass: No  - Skin change: No  - Nipple Discharge: No  - Nipple retraction: No  - Axillary LAD: No    IMAGING:   Images were personally reviewed.     Results for orders placed during the hospital encounter of 12/01/23    Mammo Digital Diagnostic Bilat with Danny    Narrative  Result:  Mammo Digital Diagnostic Bilat with Danny  US Breast Left Limited    History:  Patient is 83 y.o. and is seen for diagnostic imaging.    Films Compared:  none    Findings:  This procedure was performed using tomosynthesis. Computer-aided detection was utilized in the interpretation of this examination.  The breasts are almost entirely fatty.    Left  Mammo Digital Diagnostic Bilat with Danny  There is a 5 cm mass seen in the retroareolar region of the left breast in the anterior depth. Associated features include nodular skin thickening and axillary adenopathy. There are also associated calcifications. Additional 1 cm mass noted within the axillary tail.    US Breast Left Limited  5 cm hypoechoic mass with calcifications noted within the subareolar breast corresponding to large mammographic mass. Overlying nodular skin involvement present. 1 cm hypoechoic mass within the axillary tail region. Two enlarged abnormal lymph nodes present within the left axilla.    Right  Mammo Digital Diagnostic Bilat with Danny  There is no evidence of suspicious masses, calcifications, or other abnormal findings in the right breast.    Impression  Left  Mass: Left breast 5 cm mass at the retroareolar anterior position. Abnormal intramammary lymph node versus 2nd site of malignancy within the axillary tail. Abnormal axillary adenopathy. Assessment: 5 - Highly suggestive of malignancy. Biopsy is recommended.    Right  Mammo Digital Diagnostic Bilat with Danny  There is no mammographic evidence of  malignancy in the right breast.      BI-RADS Category:  Overall: 5 - Highly Suggestive of Malignancy      Recommendation:  Ultrasound Biopsy is recommended of the dominant mass and axillary lymph node.      PATHOLOGY:     Lab Results   Component Value Date    FPATHDX  12/14/2023     Part 1   Breast, left, subareolar, ultrasound-guided biopsy:   Invasive ductal carcinoma, moderately differentiated  Raul grade 2:  Tubule formation -3, nuclear pleomorphism-2 and mitotic count -2  Invasive carcinoma measures 17 mm in greatest dimension  No carcinoma in Situ or lymphovascular invasion is identified    Tumor biomarkers  Estrogen receptor (ER):  Positive, 80-90%, strong  Progesterone receptor (PGR):  Negative  HER2 (IHC):  Positive, 3+   Ki-67:  20-25%      Part 2   Axilla, left, ultrasound-guided biopsy:   Positive for carcinoma, see comment below      This case was reviewed by Dr. TONI Alcantara who concurs with the above diagnoses and assessment.    Stains were performed with appropriate controls.           ASSESSMENT:     1. 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, estrogen receptor positive          PLAN:     Mark Dickerson Sr. is a 83 y.o. 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  I have personally reviewed all of his imaging and pathology reports and he has been provided with a copy of these for her records. Today, we reviewed reviewed the guidelines of the National [employer] Cancer Network for her diagnosis.    I am referring him for genetic counseling based on his gender and diagnosis.      We have discussed the surgical choices of lumpectomy with radiation and mastectomy with or without radiation.  I have explained that the survival is the same, regardless of the surgery chosen.  We have discussed that the local recurrence rate following mastectomy  is 2-5%.  I have explained that the local recurrence rate was slightly higher following breast conservation in the large trials that compared mastectomy and breast conservation. However, with current medical therapies, local recurrence has been reduced to as low as 6%.  She will be referred to radiation oncology for further discussion.  We briefly discussed reconstruction options, and the [facility] breast cancer treatment brochure was provided.    We have reviewed the general benefits of neoadjuvant chemotherapy including down-sizing the primary tumor to facilitate breast conservation if desired, down-staging the axilla to possibly facilitate less extensive axillary surgery for lower axillary morbidity and finally to gain prognositc information at the time of surgery as to the response to chemotherapy.  I am referring him to a medical oncologist for further discussion of these points and consideration of neoadjuvant chemotherapy.  We discussed that he will need a mediport.     He is interested in a mastectomy following neoadjuvant chemotherapy. He will have a DARELL placed within the lymph node in anticipation of a targeted sentinel lymph node biopsy.    I have provided him with general information regarding the supportive services available here including patient navigation, nutritional counseling, preoperative and postoperative physical therapy programs, and consultation with a genetic counselor.      I spent a total of 60 minutes on this visit. This includes face to face time and non-face to face time preparing to see the patient (eg, review of tests), obtaining and/or reviewing separately obtained history, documenting clinical information in the electronic or other health record, independently interpreting results and communicating results to the patient/family/caregiver, or care coordinator.      Brittnee Briones M.D.

## 2023-12-21 NOTE — H&P (VIEW-ONLY)
LASHAUN[facility]ilan - Hematol Oncol [facility]  [address] [address]  Phone: [phone];  Fax: [phone]    Patient ID: Mark Dickerson Sr.   Chief Complaint: Establish Care and Breast Cancer     MRN:  [id number]     Reason for Referral:  Breast Cancer  Subjective   Mark Dickerson Sr. is a 83 y.o. male with past medical history of well-controlled type 2 diabetes, hypertension, CKD 3, history of TIA, and 30 pack-year smoking history who presents to clinic to establish care for new diagnosis of breast cancer.  He is accompanied by his 2 daughters, son and son-in-law.      The patient is very active at baseline and has no functional limitations.  He has a good support system.  He continues to work as an caroline.    He noticed a sore and discolored area to left breast around the nipple approximately 4 months ago.  He presented to his PCP for follow up for this and mammogram was ordered showing a 5 cm mass.  He has already established care with breast surgery.  He has no constitutional symptoms.  His chronic medical conditions are well controlled.    I reviewed the role of medical oncology in his care.  He and his family have many questions regarding treatment regimen, goal of treatment, and sequence of treatment.  I do recommend that he have staging imaging and placement.  I reviewed my recommendation that he had neoadjuvant TCHP with the goal of this being to shrink the tumor and positive lymph node.  We will reassess disease status throughout the treatment.  Once he has completed neoadjuvant chemotherapy he will be re-evaluated for the need for surgery.  I reviewed the most common side effects of each drug.  I provided him with chemocare  handouts that he and his family can read prior to chemo education.    The patient has a few commitments and would like to start treatment after January 15th.  This is only a few weeks away and we do have to get a few things in order prior to starting.  We  would like to start as soon as possible.      Review of Systems:  Review of Systems   Constitutional:  Negative for activity change, appetite change, chills, diaphoresis, fatigue, fever and unexpected weight change.   HENT:  Negative for nosebleeds.    Respiratory:  Negative for shortness of breath.    Cardiovascular:  Negative for chest pain.   Gastrointestinal:  Negative for abdominal distention, abdominal pain, anal bleeding, blood in stool, constipation, diarrhea, nausea and vomiting.   Genitourinary:  Negative for difficulty urinating and hematuria.   Musculoskeletal:  Negative for arthralgias, back pain and myalgias.   Skin:  Negative for rash.   Neurological:  Negative for dizziness, weakness, light-headedness and headaches.   Hematological:  Does not bruise/bleed easily.   Psychiatric/Behavioral:  The patient is not nervous/anxious.      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, estrogen receptor positive     12/21/2023 Cancer Staged    Staging form: Breast, AJCC 8th Edition  - Clinical stage from 12/21/2023: Stage IIA (cT2, cN1(f), cM0, G2, ER+, AZ-, HER2+)     1/22/2024 -  Chemotherapy    Treatment Summary   Plan Name: OP BREAST TCHP (pertuzumab trastuzumab DOCEtaxel CARBOplatin) Q3W  Treatment Goal: Curative  Status: Active  Start Date: 1/22/2024 (Planned)  End Date: 12/23/2024 (Planned)  Provider: Shaina Mendoza MD  Chemotherapy: CARBOPLATIN INFUSION (BY AUC), , Intravenous, Clinic/HOD 1 time, 0 of 6 cycles  DOCETAXEL CHEMO INFUSION, 75 mg/m2, Intravenous, Clinic/HOD 1 time, 0 of 6 cycles  PERTUZUMAB MG/250 ML NS IV INFUSION, 840 mg, Intravenous, Clinic/HOD 1 time, 0 of 17 cycles  TRASTUZUMAB-ANNS CHEMO INFUSION, 8 mg/kg, Intravenous, Clinic/HOD 1 time, 0 of 17 cycles         Past Medical History:    Diagnosis Date    CKD stage 3 due to type 2 diabetes mellitus 2021    DM (diabetes mellitus)     BS didn't check 2019    DM (diabetes mellitus)     BS didn't check 2022    Foreign body, eye     right eye    Hyperlipidemia     Hypertension     Need for shingles vaccine 2019    Pneumonia     Primary osteoarthritis of right knee 10/29/2021    Severe obesity (BMI 35.0-35.9 with comorbidity) 7/10/2019    Type 2 diabetes mellitus     BS didn't check 2018       Past Surgical History:   Procedure Laterality Date    APPENDECTOMY      CATARACT EXTRACTION Bilateral     CPG       Family History   Problem Relation Age of Onset    Heart disease Mother     Hypertension Father     Heart disease Father     Hyperlipidemia Father     Cataracts Father     Hypertension Sister     Hypertension Brother     Cancer Son         colon    Allergic rhinitis Daughter        Review of patient's allergies indicates:  No Known Allergies    Social History     Tobacco Use    Smoking status: Former     Current packs/day: 0.00     Average packs/day: 3.0 packs/day for 10.0 years (30.0 ttl pk-yrs)     Types: Cigarettes     Start date:      Quit date:      Years since quittin.0    Smokeless tobacco: Never   Substance Use Topics    Alcohol use: No    Drug use: No       Physical Exam   ECOG:   ECOG SCORE    0 - Fully active-able to carry on all pre-disease performance without restriction          Vitals:  There were no vitals taken for this visit.    Physical Exam:  Physical Exam  Constitutional:       General: He is not in acute distress.     Appearance: Normal appearance. He is normal weight. He is not ill-appearing or toxic-appearing.   HENT:      Head: Normocephalic and atraumatic.   Eyes:      Extraocular Movements: Extraocular movements intact.      Conjunctiva/sclera: Conjunctivae normal.   Cardiovascular:      Rate and Rhythm: Normal rate.   Pulmonary:      Effort: Pulmonary effort is normal.    Abdominal:      Palpations: There is no hepatomegaly or splenomegaly.   Neurological:      General: No focal deficit present.      Mental Status: He is alert and oriented to person, place, and time. Mental status is at baseline.   Psychiatric:         Mood and Affect: Mood normal.         Behavior: Behavior normal.         Thought Content: Thought content normal.        Labs   Labs:  No visits with results within 2 Day(s) from this visit.   Latest known visit with results is:   Hospital Outpatient Visit on 12/14/2023   Component Date Value Ref Range Status    Final Pathologic Diagnosis 12/14/2023    Final                    Value:Part 1   Breast, left, subareolar, ultrasound-guided biopsy:   Invasive ductal carcinoma, moderately differentiated  [place] grade 2:  Tubule formation -3, nuclear pleomorphism-2 and mitotic count -2  Invasive carcinoma measures 17 mm in greatest dimension  No carcinoma in Situ or lymphovascular invasion is identified    Tumor biomarkers  Estrogen receptor (ER):  Positive, 80-90%, strong  Progesterone receptor (PGR):  Negative  HER2 (IHC):  Positive, 3+   Ki-67:  20-25%      Part 2   Axilla, left, ultrasound-guided biopsy:   Positive for carcinoma, see comment below      This case was reviewed by Dr. TONI Alcantara who concurs with the above diagnoses and assessment.    Stains were performed with appropriate controls.      Interp By Lupe Morel M.D., Signed on 12/19/2023 at 11:03    Gross 12/14/2023    Final                    Value:Pathology ID:  [id number]  Patient ID:  [id number]  Received in 2 parts:  Part 1:  Pathology ID:  [id number]  Patient ID:  [id number]  The specimen is received in formalin labeled &quot;left breast subareolar in formalin 8:21&quot;.  The specimen consists of 4 tan needle biopsy fragments measuring 2.0 x 0.5 x 0.1 cm in aggregate.  The specimen is submitted entirely in cassette   FKI--1-A.    Specimen has been in formalin for more than 6 hours and less than  72 hours.  Ischemic time is not provided.    Part 2:  Pathology ID:  [id number]  Patient ID:  [id number]  The specimen is received in formalin labeled &quot;left axilla in formalin 8:35&quot;.  The specimen consists of 2 yellow needle biopsy fragments measuring 1.6 x 0.2 x 0.1 cm in aggregate.  The specimen is submitted entirely in cassette TQL--2-A.    Specimen has been in formalin for more than 6 hours and less than 72 hours.  Ischemic time is not provided.    Regan Martin      Comment 12/14/2023    Final                    Value:Please be advised for specimen 2, no definitive background of lymphoid tissue is identified.  The carcinoma seen in this biopsy may represent a lymph node completely replaced by metastatic carcinoma or extension of tumor within breast parenchyma.    Radiologic correlation is strongly recommended.      Disclaimer 12/14/2023    Final                    Value:Unless the case is a 'gross only' or additional testing only, the final diagnosis for each specimen is based on a microscopic examination of appropriate tissue sections.  ER immunohistochemical staining (clone SP1, DAB detection method) is performed on formalin-fixed, paraffin embedded tissue sections. The percentage of cell nuclei stained and the strength of staining is reported (weak, moderate, strong), using the 2010   ACSO/CAP scoring guidelines. Tumors used for determining predictive markers are fixed in 10% neutral buffered formalin for 6-72 hours. It has been cleared by the U.S[place] FDA for use as an IVD test. This assay has not been validated on decalcified tissues.   Results should be interpreted with caution given the likelihood of false negativity on decalcified specimens.  HER-2/sergio IHC (4B5) clone, DAB detection method) is done on 10% buffered formalin-fixed (for 6-72 hrs), paraffin embedded tissue sections. The scoring is completed on a 4-tiered scoring system of membrane sta                          ining using the 2014  ASCO/CAP scoring   guidelines. It has been cleared by the [place] FDA for use as an IVD test. This assay has not been validated on decalcified tissues. Results should be interpreted with caution given the likelihood of false negativity on decalcified specimens.    PgR immunohistochemical staining (clone 1E2, DAB detection method) is performed on formalin-fixed, paraffin embedded tissue sections. The percentage of cell nuclei stained and the strength of staining is report (weak, moderate, strong), using 2010   ASCO/CAP scoring guidelines. Tumors used for determining predictive markers are fixed in 10% neutral buffered formalin for 6-72 hours. It has been cleared by the [place].S[place] FDA for use as an IVD test. This assay has not been validated on decalcified tissues.   Results should be interpreted with caution given the likelihood of false negativity on decalcified specimens.           Imaging   Diagnostic L Breast US and Bilateral MMG w/Tomosynthesis 12/01/23  Findings:  This procedure was performed using tomosynthesis. Computer-aided detection was utilized in the interpretation of this examination.  The breasts are almost entirely fatty.      Left  Mammo Digital Diagnostic Bilat with Danny  There is a 5 cm mass seen in the retroareolar region of the left breast in the anterior depth. Associated features include nodular skin thickening and axillary adenopathy. There are also associated calcifications. Additional 1 cm mass noted within the axillary tail.      US Breast Left Limited  5 cm hypoechoic mass with calcifications noted within the subareolar breast corresponding to large mammographic mass. Overlying nodular skin involvement present. 1 cm hypoechoic mass within the axillary tail region. Two enlarged abnormal lymph nodes present within the left axilla.      Right  Mammo Digital Diagnostic Bilat with Danny  There is no evidence of suspicious masses, calcifications, or other abnormal findings in the right breast.      Impression:  Left  Mass: Left breast 5 cm mass at the retroareolar anterior position. Abnormal intramammary lymph node versus 2nd site of malignancy within the axillary tail. Abnormal axillary adenopathy. Assessment: 5 - Highly suggestive of malignancy. Biopsy is recommended.      Right  Mammo Digital Diagnostic Bilat with Danny  There is no mammographic evidence of malignancy in the right breast.        Pathology     Left Breast Biopsy 12/14/23      Component 7 d ago   Final Pathologic Diagnosis Part 1  Breast, left, subareolar, ultrasound-guided biopsy:  Invasive ductal carcinoma, moderately differentiated  [place] grade 2:  Tubule formation -3, nuclear pleomorphism-2 and mitotic count -2  Invasive carcinoma measures 17 mm in greatest dimension  No carcinoma in Situ or lymphovascular invasion is identified    Tumor biomarkers  Estrogen receptor (ER):  Positive, 80-90%, strong  Progesterone receptor (PGR):  Negative  HER2 (IHC):  Positive, 3+  Ki-67:  20-25%      Part 2  Axilla, left, ultrasound-guided biopsy:  Positive for carcinoma, see comment below      This case was reviewed by Dr. TONI Alcantara who concurs with the above diagnoses and assessment.    Stains were performed with appropriate controls.            Assessment and Plan   L Breast Invasive Ductal Carcinoma, Stage IIA (T2pN1),  +/-/+, Ki67 20-25%, G2  12/01/23 MMG showed left breast mass, 5 cm, at the retroareolar anterior position and abnormal intramammary lymph node versus 2nd site of malignancy cassidy axillary tail   s/p L breast biopsy 12/14/2023:  Positive for malignancy; left axillary lymph node positive for malignancy  Genetic testing pending  I discussed in detail the role of medical oncology in his care. My treatment recommendations are based on the NCCN Guidelines and have taken in to account his performance status and any comorbid conditions that he has.  Because of the size of the tumor and hormonal make I recommend NA TCHP.  I reviewed  the major side effects.  I recommend a staging scan given male/high-risk breast cancer with PET-CT   He will need to complete genetic testing-Brodie genetic test obtained in clinic today, obtain DEXA scan, and needs PORT placement (refer to IR)  His last  TTE 04/15/2023: EF 60%  He will need chemo education  Patient requests to start treatment after 01/15/23 given prior commitments  TCHP sent for authorization        Chronic Medical Conditions  DM II, controlled  CKD III  HTN  AS - Non-rheumatic  Hx TIA        Med Onc Chart Routing      Follow up with physician . January 22 to start treatment   Follow up with [facility]    Infusion scheduling note   TCHP - to start January 22, 2024   Injection scheduling note    Labs CBC, CMP, phosphorus and magnesium   Scheduling:  Preferred lab:  Lab interval:     Imaging PET scan   Already scheduled.   Pharmacy appointment    Other referrals                       The patient was seen, interviewed and examined. Pertinent lab and radiologic studies were reviewed. Pt instructed to call should they develop concerning signs/symptoms or have further questions.        Portions of the record may have been created with voice recognition software. Occasional wrong-word or sound-a-like substitutions may have occurred due to the inherent limitations of voice recognition software. Read the chart carefully and recognize, using context, where substitutions have occurred.      Shaina Garg MD    Hematology/Oncology

## 2023-12-22 NOTE — NURSING
Nurse Navigator Note:     Met with patient, and his family members during his consult with Dr. Briones and Dr. Garg.  Patient and I reviewed the information she discussed with Dr. Briones and Dr. Garg, including treatment options, referrals, diagnosis, and future plans for workup. Patient and I went through the new patient booklet, explained some of the information and why it is provided.   Specifically discussed shared services listed in booklet and how to request referral. Discussed the role of the nurse navigator and how I can help him through her breast cancer journey.     Patient was given a copy of her appointments, Dr. Briones and Dr. Garg's card, and my card. Encouraged him to call me if he has any questions or concerns or would like to schedule any additional appointments. Verbalized understanding of all information.      Pt scheduled for PET scan 12/29/23, orders placed for mediport insertion with interventional radiology- confirmed orders received 12/22/23. Referral placed for social work, [facility] updated on treatment plan updates. Orders placed for surgical marker to be placed in his + lymph node prior to his first infusion. Pt aware that he will receive NACT and then proceed to surgery with Dr. Briones- planning for left total mastectomy with left sentinel lymph node biopsy with targeted ignacio sampling. Pt had genetic testing drawn by staff for [employer]- results pending. Pt kindly asks to start chemo after he returns in town 1/15/23.     Oncology Navigation   Intake  Date of Diagnosis: 12/14/23  Cancer Type: Breast  Internal / External Referral: Internal  Date of Referral: 12/19/23  Initial Nurse Navigator Contact: 12/19/23  Referral to Initial Contact Timeline (days): 0  Date Worked: 12/21/23  First Appointment Available: 12/21/23  Appointment Date: 12/21/23  First Available Date vs. Scheduled Date (days): 0  Multiple appointments: Yes     Treatment  Current Status: Active  Date  "Presented to Tumor Board: 23    Surgery: Planned  Surgical Oncologist: Brittnee Briones MD  Type of Surgery: Left total mastectomy with left sentinel lymph node biopsy    Medical Oncologist: Shaina Garg MD  Consult Date: 23  Chemotherapy: Planned  Chemotherapy Regimen: TCHP    Radiation Therapy: Planned    Procedures: PET scan; Diagnostic Mammogram; Genetic test  Genetic Testing Date Sent: 23  Diagnostic Mammo Schedule Date:  (3/4 through NACT)  PET Scan Schedule Date: 23    General Referrals: Social Work; Chemo Education; Support Group  Social Work Referral Date: 23    ER: Positive  NE: Negative  Her2: Postive       Support Systems: Children; Family members; Denominational / sree community  Barriers of Care: Barriers to Care "Assessment completed-no barriers noted"     Acuity  Stage: 1  Systemic Treatment - predicted or initiated: Chemotherapy Regimen with Multiple drugs (+1)  Surgical Procedure Complexity: 2  Treatment Tolerability: Has not started treatment yet/treatment fully completed and side effects resolved  ECO  Comorbidities in Medical History: 1  Hospitalization Within the Past Month: 0   Needed: 0  Support: 0  Verbalizes Financial Concerns: 0  Transportation: 0  Psychological Factors (+1 each): Emotional during conversation  History of noncompliance/frequent no shows and cancellations: 0  Verbalizes the need for more education: 0  Other Factors (+1 for Each): 0  Navigation Acuity: 6     Follow Up  No follow-ups on file.       "

## 2023-12-26 ENCOUNTER — TELEPHONE (OUTPATIENT)
Dept: RADIOLOGY | Facility: HOSPITAL | Age: 83
End: 2023-12-26
Payer: MEDICARE

## 2023-12-26 ENCOUNTER — PATIENT MESSAGE (OUTPATIENT)
Dept: RADIOLOGY | Facility: HOSPITAL | Age: 83
End: 2023-12-26
Payer: MEDICARE

## 2023-12-29 ENCOUNTER — HOSPITAL ENCOUNTER (OUTPATIENT)
Dept: RADIOLOGY | Facility: HOSPITAL | Age: 83
Discharge: HOME | End: 2023-12-29
Attending: INTERNAL MEDICINE
Payer: MEDICARE

## 2023-12-29 DIAGNOSIS — Z17.0 MALIGNANT NEOPLASM INVOLVING BOTH NIPPLE AND AREOLA OF LEFT BREAST IN MALE, ESTROGEN RECEPTOR POSITIVE: Primary | ICD-10-CM

## 2023-12-29 DIAGNOSIS — R59.0 MEDIASTINAL LYMPHADENOPATHY: ICD-10-CM

## 2023-12-29 DIAGNOSIS — C50.022 MALIGNANT NEOPLASM INVOLVING BOTH NIPPLE AND AREOLA OF LEFT BREAST IN MALE, ESTROGEN RECEPTOR POSITIVE: Primary | ICD-10-CM

## 2023-12-29 DIAGNOSIS — C50.022 MALIGNANT NEOPLASM INVOLVING BOTH NIPPLE AND AREOLA OF LEFT BREAST IN MALE, ESTROGEN RECEPTOR POSITIVE: ICD-10-CM

## 2023-12-29 DIAGNOSIS — Z17.0 MALIGNANT NEOPLASM INVOLVING BOTH NIPPLE AND AREOLA OF LEFT BREAST IN MALE, ESTROGEN RECEPTOR POSITIVE: ICD-10-CM

## 2023-12-29 PROCEDURE — A9552 F18 FDG: HCPCS

## 2023-12-29 PROCEDURE — 78815 NM PET CT FDG SKULL BASE TO MID THIGH: ICD-10-PCS | Mod: 26,PI,, | Performed by: RADIOLOGY

## 2023-12-29 PROCEDURE — 78815 PET IMAGE W/CT SKULL-THIGH: CPT | Mod: 26,PI,, | Performed by: RADIOLOGY

## 2024-01-05 ENCOUNTER — HOSPITAL ENCOUNTER (OUTPATIENT)
Dept: RADIOLOGY | Facility: HOSPITAL | Age: 84
Discharge: HOME | End: 2024-01-05
Attending: SURGERY
Payer: MEDICARE

## 2024-01-05 ENCOUNTER — HOSPITAL ENCOUNTER (OUTPATIENT)
Facility: HOSPITAL | Age: 84
Discharge: HOME | End: 2024-01-05
Attending: RADIOLOGY | Admitting: RADIOLOGY
Payer: MEDICARE

## 2024-01-05 VITALS
OXYGEN SATURATION: 96 % | DIASTOLIC BLOOD PRESSURE: 64 MMHG | RESPIRATION RATE: 30 BRPM | SYSTOLIC BLOOD PRESSURE: 110 MMHG | HEART RATE: 97 BPM | WEIGHT: 235.88 LBS | TEMPERATURE: 98 F | BODY MASS INDEX: 33.77 KG/M2 | HEIGHT: 70 IN

## 2024-01-05 DIAGNOSIS — C50.022 MALIGNANT NEOPLASM INVOLVING BOTH NIPPLE AND AREOLA OF LEFT BREAST IN MALE, ESTROGEN RECEPTOR POSITIVE: ICD-10-CM

## 2024-01-05 DIAGNOSIS — Z17.1: ICD-10-CM

## 2024-01-05 DIAGNOSIS — C50.022: ICD-10-CM

## 2024-01-05 DIAGNOSIS — Z17.0 MALIGNANT NEOPLASM INVOLVING BOTH NIPPLE AND AREOLA OF LEFT BREAST IN MALE, ESTROGEN RECEPTOR POSITIVE: ICD-10-CM

## 2024-01-05 LAB
ANION GAP SERPL CALC-SCNC: 15 MMOL/L (ref 8–16)
BASOPHILS # BLD AUTO: 0.07 K/UL (ref 0–0.2)
BASOPHILS NFR BLD: 0.9 % (ref 0–1.9)
BUN SERPL-MCNC: 16 MG/DL (ref 8–23)
CALCIUM SERPL-MCNC: 9.5 MG/DL (ref 8.7–10.5)
CHLORIDE SERPL-SCNC: 99 MMOL/L (ref 95–110)
CO2 SERPL-SCNC: 23 MMOL/L (ref 23–29)
CREAT SERPL-MCNC: 1.1 MG/DL (ref 0.5–1.4)
DIFFERENTIAL METHOD BLD: ABNORMAL
EOSINOPHIL # BLD AUTO: 0.2 K/UL (ref 0–0.5)
EOSINOPHIL NFR BLD: 2.5 % (ref 0–8)
ERYTHROCYTE [DISTWIDTH] IN BLOOD BY AUTOMATED COUNT: 13.7 % (ref 11.5–14.5)
EST. GFR  (NO RACE VARIABLE): >60 ML/MIN/1.73 M^2
GLUCOSE SERPL-MCNC: 165 MG/DL (ref 70–110)
HCT VFR BLD AUTO: 40 % (ref 40–54)
HGB BLD-MCNC: 13.6 G/DL (ref 14–18)
IMM GRANULOCYTES # BLD AUTO: 0.02 K/UL (ref 0–0.04)
IMM GRANULOCYTES NFR BLD AUTO: 0.2 % (ref 0–0.5)
INR PPP: 1.1 (ref 0.8–1.2)
LYMPHOCYTES # BLD AUTO: 2 K/UL (ref 1–4.8)
LYMPHOCYTES NFR BLD: 25.3 % (ref 18–48)
MCH RBC QN AUTO: 29.9 PG (ref 27–31)
MCHC RBC AUTO-ENTMCNC: 34 G/DL (ref 32–36)
MCV RBC AUTO: 88 FL (ref 82–98)
MONOCYTES # BLD AUTO: 0.6 K/UL (ref 0.3–1)
MONOCYTES NFR BLD: 7.4 % (ref 4–15)
NEUTROPHILS # BLD AUTO: 5.1 K/UL (ref 1.8–7.7)
NEUTROPHILS NFR BLD: 63.7 % (ref 38–73)
NRBC BLD-RTO: 0 /100 WBC
PLATELET # BLD AUTO: 323 K/UL (ref 150–450)
PMV BLD AUTO: 9 FL (ref 9.2–12.9)
POTASSIUM SERPL-SCNC: 3.5 MMOL/L (ref 3.5–5.1)
PROTHROMBIN TIME: 11.4 SEC (ref 9–12.5)
RBC # BLD AUTO: 4.55 M/UL (ref 4.6–6.2)
SODIUM SERPL-SCNC: 137 MMOL/L (ref 136–145)
WBC # BLD AUTO: 8.07 K/UL (ref 3.9–12.7)

## 2024-01-05 PROCEDURE — 80048 BASIC METABOLIC PNL TOTAL CA: CPT | Mod: HCNC | Performed by: RADIOLOGY

## 2024-01-05 PROCEDURE — 77001 FLUOROGUIDE FOR VEIN DEVICE: CPT | Mod: 26,HCNC,, | Performed by: RADIOLOGY

## 2024-01-05 PROCEDURE — 63600175 PHARM REV CODE 636 W HCPCS: Mod: HCNC | Performed by: RADIOLOGY

## 2024-01-05 PROCEDURE — 77001 FLUOROGUIDE FOR VEIN DEVICE: CPT | Mod: TC,HCNC | Performed by: RADIOLOGY

## 2024-01-05 PROCEDURE — 76937 US GUIDE VASCULAR ACCESS: CPT | Mod: TC,HCNC | Performed by: RADIOLOGY

## 2024-01-05 PROCEDURE — 25000003 PHARM REV CODE 250: Mod: HCNC | Performed by: RADIOLOGY

## 2024-01-05 PROCEDURE — 85610 PROTHROMBIN TIME: CPT | Mod: HCNC | Performed by: RADIOLOGY

## 2024-01-05 PROCEDURE — 76937 US GUIDE VASCULAR ACCESS: CPT | Mod: 26,HCNC,, | Performed by: RADIOLOGY

## 2024-01-05 PROCEDURE — 99152 MOD SED SAME PHYS/QHP 5/>YRS: CPT | Mod: HCNC,,, | Performed by: RADIOLOGY

## 2024-01-05 PROCEDURE — 85025 COMPLETE CBC W/AUTO DIFF WBC: CPT | Mod: HCNC | Performed by: RADIOLOGY

## 2024-01-05 PROCEDURE — C1788 PORT, INDWELLING, IMP: HCPCS | Mod: HCNC | Performed by: RADIOLOGY

## 2024-01-05 PROCEDURE — 36561 INSERT TUNNELED CV CATH: CPT | Mod: HCNC,RT | Performed by: RADIOLOGY

## 2024-01-05 DEVICE — POWERPORT CLEARVUE IMPLANTABLE PORT WITH ATTACHABLE 8F POLYURETHANE OPEN-ENDED SINGLE-LUMEN VENOUS CATHETER INTERMEDIATE KIT
Type: IMPLANTABLE DEVICE | Site: CHEST | Status: FUNCTIONAL
Brand: POWERPORT CLEARVUE

## 2024-01-05 RX ORDER — CEFAZOLIN SODIUM 1 G/3ML
INJECTION, POWDER, FOR SOLUTION INTRAMUSCULAR; INTRAVENOUS CODE/TRAUMA/SEDATION MEDICATION
Status: COMPLETED | OUTPATIENT
Start: 2024-01-05 | End: 2024-01-05

## 2024-01-05 RX ORDER — CEFAZOLIN SODIUM 1 G/50ML
1 SOLUTION INTRAVENOUS
Status: DISCONTINUED | OUTPATIENT
Start: 2024-01-05 | End: 2024-01-06 | Stop reason: HOSPADM

## 2024-01-05 RX ORDER — LIDOCAINE HCL/EPINEPHRINE/PF 2%-1:200K
1 VIAL (ML) INJECTION ONCE
Status: DISCONTINUED | OUTPATIENT
Start: 2024-01-05 | End: 2024-01-05

## 2024-01-05 RX ORDER — LIDOCAINE HCL/EPINEPHRINE/PF 2%-1:200K
VIAL (ML) INJECTION CODE/TRAUMA/SEDATION MEDICATION
Status: COMPLETED | OUTPATIENT
Start: 2024-01-05 | End: 2024-01-05

## 2024-01-05 RX ORDER — LIDOCAINE HCL/EPINEPHRINE/PF 2%-1:200K
1 VIAL (ML) INJECTION ONCE
Status: DISCONTINUED | OUTPATIENT
Start: 2024-01-05 | End: 2024-01-05 | Stop reason: HOSPADM

## 2024-01-05 RX ORDER — FENTANYL CITRATE 50 UG/ML
INJECTION, SOLUTION INTRAMUSCULAR; INTRAVENOUS CODE/TRAUMA/SEDATION MEDICATION
Status: COMPLETED | OUTPATIENT
Start: 2024-01-05 | End: 2024-01-05

## 2024-01-05 RX ORDER — MIDAZOLAM HYDROCHLORIDE 1 MG/ML
INJECTION INTRAMUSCULAR; INTRAVENOUS CODE/TRAUMA/SEDATION MEDICATION
Status: COMPLETED | OUTPATIENT
Start: 2024-01-05 | End: 2024-01-05

## 2024-01-05 RX ADMIN — LIDOCAINE HYDROCHLORIDE AND EPINEPHRINE 5 ML: 20; 5 INJECTION, SOLUTION EPIDURAL; INFILTRATION; INTRACAUDAL; PERINEURAL at 02:01

## 2024-01-05 RX ADMIN — FENTANYL CITRATE 50 MCG: 50 INJECTION, SOLUTION INTRAMUSCULAR; INTRAVENOUS at 02:01

## 2024-01-05 RX ADMIN — MIDAZOLAM HYDROCHLORIDE 1 MG: 1 INJECTION, SOLUTION INTRAMUSCULAR; INTRAVENOUS at 02:01

## 2024-01-05 RX ADMIN — CEFAZOLIN 1 G: 330 INJECTION, POWDER, FOR SOLUTION INTRAMUSCULAR; INTRAVENOUS at 01:01

## 2024-01-05 NOTE — INTERVAL H&P NOTE
The patient has been examined and the H&P has been reviewed:    I concur with the findings and no changes have occurred since H&P was written.    Here for port placement.     The indications, risks, benefits, and alternatives to this procedure and moderate sedation have been fully reviewed with the patient and written informed consent has been obtained.     There are no hospital problems to display for this patient.

## 2024-01-05 NOTE — DISCHARGE INSTRUCTIONS
Port Placement Discharge Instructions    Due to sedation, do not drive for 24 hrs. Have a responsible adult assist you over 8 hrs post sedation.  You may start taking showers 48 hours after your procedure, no tub baths for 1 week.  Avoid heavy lifting for 2 days after your procedure.  Wound care: The dressing over your port site may be removed after 3-4 days unless instructed otherwise. You may gently wash area daily with mild soap and water. Pat dry. Cover with sterile dressing or bandaid for 7 to 10 days or until site is healed.  If a skin adhesive was used, do not use antibiotic ointment as it can break down the adhesive that is serving as a bandage to hold incision closed.  Observe for and report any signs or symptoms of infection including:          fever greater than 100.5                                   redness, swellling or drainage at the site                     red streaking or pain at port site           Nozin Instructions  Goal: the goal of Nozin is to reduce the risk of post-procedural infections by bacteria in the nasal cavity. Think of it as hand  for your nose.    How to use:    1. Shake Nozin bottle well    2. Take a cotton swab and apply 4 drops to one tip    3. Insert cotton tip into one nostril, being sure not to go deeper into nose than tip of the swab.    4. Swab front pocket of nostril 6 times counterclockwise and 6 times clockwise.   5. Take swab out and apply 2 drops to the same cotton tip. Repeat steps 3 and 4 in the other nostril.        Do steps 1-5 twice a day for 7 days.

## 2024-01-05 NOTE — PLAN OF CARE
Went over discharge instructions with patient.   Stressed importance of making and keeping all follow ups as well as making prescribed medication changes.   Gave education material for safe mobility after discharge.   Walked patient to ensure patient was safe to go home.   Patient verbalized understanding and has no questions in regards to discharge.  IV removed, catheter intact.  Primary nurse notified of pt's discharge status.   Made sure patient has someone to drive them and explained not to drive for 24 hours.   Dressing to right upper chest wall CDI with no signs of bleeding or hematoma.

## 2024-01-05 NOTE — DISCHARGE SUMMARY
[facility] (Hospital)  Discharge Note  Short Stay    IR Tunneled Cath Replacement with Port I      OUTCOME: Patient tolerated treatment/procedure well without complication and is now ready for discharge.    DISPOSITION: Home or Self Care    FINAL DIAGNOSIS:  <principal problem not specified>    FOLLOWUP: With primary care provider    DISCHARGE INSTRUCTIONS:  No discharge procedures on file.     TIME SPENT ON DISCHARGE: 10 minutes

## 2024-01-08 ENCOUNTER — TELEPHONE (OUTPATIENT)
Dept: PULMONOLOGY | Facility: CLINIC | Age: 84
End: 2024-01-08
Payer: MEDICARE

## 2024-01-08 NOTE — TELEPHONE ENCOUNTER
----- Message from Adela Melchor sent at 1/8/2024  9:17 AM CST -----  Pt called to get some questions answered, He has about the biopsy . Like should he stop eating and drinking? [phone]

## 2024-01-09 ENCOUNTER — PATIENT MESSAGE (OUTPATIENT)
Dept: ENDOSCOPY | Facility: HOSPITAL | Age: 84
End: 2024-01-09
Payer: MEDICARE

## 2024-01-09 ENCOUNTER — OFFICE VISIT (OUTPATIENT)
Dept: PULMONOLOGY | Facility: CLINIC | Age: 84
End: 2024-01-09
Payer: MEDICARE

## 2024-01-09 ENCOUNTER — TELEPHONE (OUTPATIENT)
Dept: ENDOSCOPY | Facility: HOSPITAL | Age: 84
End: 2024-01-09
Payer: MEDICARE

## 2024-01-09 ENCOUNTER — E-CONSULT (OUTPATIENT)
Dept: GASTROENTEROLOGY | Facility: HOSPITAL | Age: 84
End: 2024-01-09
Payer: MEDICARE

## 2024-01-09 VITALS
WEIGHT: 232.25 LBS | RESPIRATION RATE: 18 BRPM | OXYGEN SATURATION: 95 % | DIASTOLIC BLOOD PRESSURE: 84 MMHG | SYSTOLIC BLOOD PRESSURE: 120 MMHG | BODY MASS INDEX: 33.25 KG/M2 | HEIGHT: 70 IN | HEART RATE: 100 BPM

## 2024-01-09 DIAGNOSIS — Z17.0 MALIGNANT NEOPLASM INVOLVING BOTH NIPPLE AND AREOLA OF LEFT BREAST IN MALE, ESTROGEN RECEPTOR POSITIVE: ICD-10-CM

## 2024-01-09 DIAGNOSIS — R59.9 ADENOPATHY: Primary | ICD-10-CM

## 2024-01-09 DIAGNOSIS — R59.0 MEDIASTINAL LYMPHADENOPATHY: ICD-10-CM

## 2024-01-09 DIAGNOSIS — R94.8 ABNORMAL PET SCAN OF MEDIASTINUM: Primary | ICD-10-CM

## 2024-01-09 DIAGNOSIS — R93.89 ABNORMAL FINDING ON IMAGING: Primary | ICD-10-CM

## 2024-01-09 DIAGNOSIS — C50.022 MALIGNANT NEOPLASM INVOLVING BOTH NIPPLE AND AREOLA OF LEFT BREAST IN MALE, ESTROGEN RECEPTOR POSITIVE: ICD-10-CM

## 2024-01-09 PROCEDURE — 1160F RVW MEDS BY RX/DR IN RCRD: CPT | Mod: CPTII,S$GLB,, | Performed by: INTERNAL MEDICINE

## 2024-01-09 PROCEDURE — 3072F LOW RISK FOR RETINOPATHY: CPT | Mod: CPTII,S$GLB,, | Performed by: INTERNAL MEDICINE

## 2024-01-09 PROCEDURE — 3074F SYST BP LT 130 MM HG: CPT | Mod: CPTII,S$GLB,, | Performed by: INTERNAL MEDICINE

## 2024-01-09 PROCEDURE — 99205 OFFICE O/P NEW HI 60 MIN: CPT | Mod: S$GLB,,, | Performed by: INTERNAL MEDICINE

## 2024-01-09 PROCEDURE — 1159F MED LIST DOCD IN RCRD: CPT | Mod: CPTII,S$GLB,, | Performed by: INTERNAL MEDICINE

## 2024-01-09 PROCEDURE — 3288F FALL RISK ASSESSMENT DOCD: CPT | Mod: CPTII,S$GLB,, | Performed by: INTERNAL MEDICINE

## 2024-01-09 PROCEDURE — 99999 PR PBB SHADOW E&M-EST. PATIENT-LVL IV: CPT | Mod: PBBFAC,HCNC,, | Performed by: INTERNAL MEDICINE

## 2024-01-09 PROCEDURE — 99451 NTRPROF PH1/NTRNET/EHR 5/>: CPT | Mod: ,,, | Performed by: INTERNAL MEDICINE

## 2024-01-09 PROCEDURE — 3079F DIAST BP 80-89 MM HG: CPT | Mod: CPTII,S$GLB,, | Performed by: INTERNAL MEDICINE

## 2024-01-09 PROCEDURE — 1101F PT FALLS ASSESS-DOCD LE1/YR: CPT | Mod: CPTII,S$GLB,, | Performed by: INTERNAL MEDICINE

## 2024-01-09 NOTE — TELEPHONE ENCOUNTER
René Wagner, MD  [facility] Schedulers  Caller: Unspecified (Today,  1:30 PM)  Pt with abnl PET scan showing suspicious LN in medistinum. Needs upper EUS with bx. Next 1-2 weeks. Can be at [facility] or [place]. 60min case. Any MD.

## 2024-01-09 NOTE — PROGRESS NOTES
Pulmonary Outpatient  Visit     Subjective:       Patient ID: Mark Dickerson Sr. is a 83 y.o. male.    Social History     Tobacco Use   Smoking Status Former    Current packs/day: 0.00    Average packs/day: 3.0 packs/day for 10.0 years (30.0 ttl pk-yrs)    Types: Cigarettes    Start date:     Quit date:     Years since quittin.0   Smokeless Tobacco Never            Chief Complaint: No chief complaint on file.      Mark Dickerson Sr. Is 83 y.o.  Referred by Shaina Mendoza  Daughter [place] present  Recent Dx male breast cancer right side  Recent mediport  Former smoker quit .  Asked to eval for EBUS  PET CT reviewed  Several targets  Azygos esophageal, Right hilar and right pleural abutting pericardium  Meds reviewed  No cough, No wheezing or SOB  Retired [employer]     Extensive left axillary FDG avid lymphadenopathy with the largest left axillary lymph node measuring 3.9 x 2.3 cm with SUV max 7.4.  Another large left axillary lymph node measures 2.4 x 1.5 cm with SUV max 8.4.   The left axillary lymphadenopathy involves all 3 levels which includes of couple of level 3 lymph nodes, 1 measuring 1.2 x 0.7 cm with SUV max 6.6.     Medial pleural based right middle lobe pulmonary nodule measuring 16 x 10 mm with SUV max 6.2. This abutts heart and will have risk with biopsy approach     Right hilar lymph node measuring 1.5 x 1.4 cm with SUV max 6.9.airway appears adjacent to vascular structure     Azygoesophageal recess mediastinal lymph node measuring 1.9 x 1.4 cm with SUV max 6.2.Accessible by EUS                Review of Systems   All other systems reviewed and are negative.      Outpatient Encounter Medications as of 2024   Medication Sig Dispense Refill    aspirin 81 MG Chew Take 1 tablet (81 mg total) by mouth once daily.  0    atorvastatin (LIPITOR) 40 MG tablet Take 1 tablet (40 mg total) by mouth once daily. 90 tablet 3    clotrimazole (LOTRIMIN)  1 % cream Apply topically 2 (two) times daily. 113 g 1    glipiZIDE (GLUCOTROL) 5 MG tablet Take 1 tablet (5 mg total) by mouth 2 (two) times daily with meals. 180 tablet 2    hydroCHLOROthiazide (HYDRODIURIL) 25 MG tablet Take 1 tablet (25 mg total) by mouth once daily. 90 tablet 2    levocetirizine (XYZAL) 5 MG tablet Take 1 tablet (5 mg total) by mouth every evening. 30 tablet 11    multivitamin (THERAGRAN) per tablet Take 1 tablet by mouth once daily.      potassium chloride SA (K-DUR,KLOR-CON) 20 MEQ tablet Take 1 tablet (20 mEq total) by mouth once daily. 90 tablet 3    [DISCONTINUED] ceFAZolin (ANCEF) 1 gram in dextrose 5 % 50 mL IVPB (premix)        No facility-administered encounter medications on file as of 1/9/2024.       The following portions of the patient's history were reviewed and updated as appropriate: He  has a past medical history of CKD stage 3 due to type 2 diabetes mellitus (2/18/2021), DM (diabetes mellitus) (2014), DM (diabetes mellitus) (2014), Foreign body, eye, Hyperlipidemia, Hypertension, Need for shingles vaccine (11/20/2019), Pneumonia, Primary osteoarthritis of right knee (10/29/2021), Severe obesity (BMI 35.0-35.9 with comorbidity) (7/10/2019), and Type 2 diabetes mellitus (2014).  He does not have any pertinent problems on file.  He  has a past surgical history that includes Appendectomy; Cataract extraction (Bilateral); and Fluoroscopy (N/A, 1/5/2024).  His family history includes Allergic rhinitis in his daughter; Cancer in his son; Cataracts in his father; Heart disease in his father and mother; Hyperlipidemia in his father; Hypertension in his brother, father, and sister.  He  reports that he quit smoking about 62 years ago. His smoking use included cigarettes. He started smoking about 72 years ago. He has a 30.0 pack-year smoking history. He has never used smokeless tobacco. He reports that he does not drink alcohol and does not use drugs.  He has a current medication list  "which includes the following prescription(s): aspirin, atorvastatin, clotrimazole, glipizide, hydrochlorothiazide, levocetirizine, multivitamin, and potassium chloride sa.  Current Outpatient Medications on File Prior to Visit   Medication Sig Dispense Refill    aspirin 81 MG Chew Take 1 tablet (81 mg total) by mouth once daily.  0    atorvastatin (LIPITOR) 40 MG tablet Take 1 tablet (40 mg total) by mouth once daily. 90 tablet 3    clotrimazole (LOTRIMIN) 1 % cream Apply topically 2 (two) times daily. 113 g 1    glipiZIDE (GLUCOTROL) 5 MG tablet Take 1 tablet (5 mg total) by mouth 2 (two) times daily with meals. 180 tablet 2    hydroCHLOROthiazide (HYDRODIURIL) 25 MG tablet Take 1 tablet (25 mg total) by mouth once daily. 90 tablet 2    levocetirizine (XYZAL) 5 MG tablet Take 1 tablet (5 mg total) by mouth every evening. 30 tablet 11    multivitamin (THERAGRAN) per tablet Take 1 tablet by mouth once daily.      potassium chloride SA (K-DUR,KLOR-CON) 20 MEQ tablet Take 1 tablet (20 mEq total) by mouth once daily. 90 tablet 3     No current facility-administered medications on file prior to visit.     He has No Known Allergies..      BP Readings from Last 3 Encounters:   01/09/24 120/84   01/05/24 110/64   12/01/23 120/70          MMRC Dyspnea Scale (4 is worst)     [x] MMRC 0: Dyspneic on strenuous excercise (0 points)    [] MMRC 1: Dyspneic on walking a slight hill (0 points)    [] MMRC 2: Dyspneic on walking level ground; must stop occasionally due to breathlessness (1 point)    [] MMRC 3: Must stop for breathlessness after walking 100 yards or after a few minutes (2 points)    [] MMRC 4: Cannot leave house; breathless on dressing/undressing (3 points)                          No data to display                          Objective:     Vital Signs (Most Recent)  Vital Signs  Pulse: 100  Resp: 18  SpO2: 95 %  BP: 120/84  Height and Weight  Height: 5' 10" (177.8 cm)  Weight: 105.3 kg (232 lb 4.1 oz)  BSA (Calculated " - sq m): 2.28 sq meters  BMI (Calculated): 33.3  Weight in (lb) to have BMI = 25: 173.9]  Wt Readings from Last 2 Encounters:   01/09/24 105.3 kg (232 lb 4.1 oz)   01/05/24 107 kg (235 lb 14.3 oz)       Physical Exam  Vitals and nursing note reviewed.   Constitutional:       Appearance: Normal appearance.       HENT:      Head: Normocephalic and atraumatic.      Right Ear: Tympanic membrane normal.      Nose: Nose normal.   Eyes:      Pupils: Pupils are equal, round, and reactive to light.   Cardiovascular:      Rate and Rhythm: Normal rate and regular rhythm.      Pulses: Normal pulses.      Heart sounds: Normal heart sounds.   Pulmonary:      Effort: Pulmonary effort is normal.      Breath sounds: Normal breath sounds.   Abdominal:      General: Bowel sounds are normal.      Palpations: Abdomen is soft.   Musculoskeletal:         General: Normal range of motion.      Cervical back: Normal range of motion.   Skin:     General: Skin is warm and dry.      Capillary Refill: Capillary refill takes less than 2 seconds.   Neurological:      General: No focal deficit present.      Mental Status: He is alert and oriented to person, place, and time.   Psychiatric:         Mood and Affect: Mood normal.          Laboratory  Lab Results   Component Value Date    WBC 8.07 01/05/2024    RBC 4.55 (L) 01/05/2024    HGB 13.6 (L) 01/05/2024    HCT 40.0 01/05/2024    MCV 88 01/05/2024    MCH 29.9 01/05/2024    MCHC 34.0 01/05/2024    RDW 13.7 01/05/2024     01/05/2024    MPV 9.0 (L) 01/05/2024    GRAN 5.1 01/05/2024    GRAN 63.7 01/05/2024    LYMPH 2.0 01/05/2024    LYMPH 25.3 01/05/2024    MONO 0.6 01/05/2024    MONO 7.4 01/05/2024    EOS 0.2 01/05/2024    BASO 0.07 01/05/2024    EOSINOPHIL 2.5 01/05/2024    BASOPHIL 0.9 01/05/2024       BMP  Lab Results   Component Value Date     01/05/2024    K 3.5 01/05/2024    CL 99 01/05/2024    CO2 23 01/05/2024    BUN 16 01/05/2024    CREATININE 1.1 01/05/2024    CALCIUM 9.5  "01/05/2024    ANIONGAP 15 01/05/2024    ESTGFRAFRICA >60.0 05/20/2022    EGFRNONAA 55.2 (A) 05/20/2022    AST 30 11/28/2023    ALT 28 11/28/2023    PROT 7.0 11/28/2023          No results found for: "IGE"     No results found for: "ASPERGILLUS"  No results found for: "AFUMIGATUSCL"     No results found for: "ACE"     Diagnostic Results:  I have personally reviewed today the following studies:    Cardiac catheterization  Procedure performed in the Invasive Lab    - See Procedure Log link below for nursing documentation    - See OpNote on Surgeries Tab for physician findings    - See Imaging Tab for radiologist dictation       EXAMINATION:  NM PET CT FDG SKULL BASE TO MID THIGH     CLINICAL HISTORY:  Initial staging imaging for stage II hormone + male breast cancer; Malignant neoplasm of nipple and areola, left male breast     TECHNIQUE:  11.78 mCi of F18-FDG was administered intravenously in the left antecubital fossa.  After an approximately 60 min distribution time, PET/CT images were acquired from the skull base to the mid thigh. Transmission images were acquired to correct for attenuation using a whole body low-dose CT scan without contrast with the arms positioned above the head. Glycemia at the time of injection was 163 mg/dL.     COMPARISON:  None     FINDINGS:  Quality of the study: Adequate.     SUV max of the liver parenchyma is 3.7     Neck: No FDG abnormal avidity.  No lymphadenopathy or mass.     Chest: Large left breast mass which involves the skin measuring 7.0 x 4.5 cm with SUV max 9.7.     Extensive left axillary FDG avid lymphadenopathy with the largest left axillary lymph node measuring 3.9 x 2.3 cm with SUV max 7.4.  Another large left axillary lymph node measures 2.4 x 1.5 cm with SUV max 8.4.     The left axillary lymphadenopathy involves all 3 levels which includes of couple of level 3 lymph nodes, 1 measuring 1.2 x 0.7 cm with SUV max 6.6.     Medial pleural based right middle lobe pulmonary " nodule measuring 16 x 10 mm with SUV max 6.2.     Right hilar lymph node measuring 1.5 x 1.4 cm with SUV max 6.9.     Azygoesophageal recess mediastinal lymph node measuring 1.9 x 1.4 cm with SUV max 6.2.     No pleural effusion     There are couple of small bilateral lower     Abdomen/pelvis: No lymphadenopathy.  No mass lesion or abnormal focus of FDG avidity.     Skeletal structures: No abnormal FDG avidity.  No focal lytic or sclerotic lesion in the osseous structures.  Lobe pulmonary nodules which show no FDG avidity.     Physiologic uptake of the tracer is present within the brain, salivary glands, myocardium, GI and  tracts.     Incidental CT findings: Coronary artery calcifications.     Impression:     1. Large left breast FDG avid mass which involves the skin.  2. Extensive markedly FDG avid left axillary lymphadenopathy involving all 3 levels of the axilla.  3. FDG avid mediastinal and right hilar lymphadenopathy concerning for intrathoracic metastatic disease.  4. Multiple pulmonary nodules.  Only 1 pulmonary nodules FDG avid which is a pleural based right middle lobe 16 x 10 mm pulmonary nodule.  All of these pulmonary nodules are concerning for metastatic disease.       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          Relevant Orders    Ambulatory referral/consult to Gastroenterology    E-Consult to Gastroenterology (Completed)    Mediastinal lymphadenopathy     Right Hilar and right pleural           Relevant Orders    Ambulatory referral/consult to Gastroenterology    E-Consult to Gastroenterology (Completed)        Will likely benefit from EUS targeting para esophageal FDG avid LN     Follow up in about 4 weeks (around 2/6/2024), or ref GI for EUS.    This  note was prepared using voice recognition system and is likely to have sound alike errors that may have been overlooked even after proof reading.  Please call me with any questions    Discussed diagnosis, its evaluation, treatment and usual course. All questions answered.    Thank you for the courtesy of participating in the care of this patient    Jomar Jarrell MD      Personal Diagnostic Review  []  CXR    []  ECHO    []  ONSAT    []  6MWD    []  LABS    []  CHEST CT    []  PET CT    []  Biopsy results

## 2024-01-09 NOTE — TELEPHONE ENCOUNTER
Spoke to pt to schedule procedure(s) Upper Endoscopy Ultrasound (EUS)       Physician to perform procedure(s) Dr. MATTHIAS Monsalve  Date of Procedure (s) 1/11/24  Arrival Time 7:30 AM  Time of Procedure(s) 8:30 AM   Location of Procedure(s) [address]  Type of Rx Prep sent to patient: N/A  Instructions provided to patient via [facility]    Patient was informed on the following information and verbalized understanding. Screening questionnaire reviewed with patient and complete. If procedure requires anesthesia, a responsible adult needs to be present to accompany the patient home, patient cannot drive after receiving anesthesia. Appointment details are tentative, especially check-in time. Patient will receive a prep-op call 7 days prior to confirm check-in time for procedure. If applicable the patient should contact their pharmacy to verify Rx for procedure prep is ready for pick-up. Patient was advised to call the scheduling department at [phone] if pharmacy states no Rx is available. Patient was advised to call the endoscopy scheduling department if any questions or concerns arise.      [facility] Scheduling Department

## 2024-01-09 NOTE — CONSULTS
[facility] GASTROENTEROLOGY  Response for E-Consult     Patient Name: Mark Dickerson Sr.  MRN: [id number]  Primary Care Provider: Tatyana Cannon MD   Requesting Provider: Jomar Jarrell MD  E-Consult to Gastroenterology  Consult performed by: René Wagner MD  Consult ordered by: Jomar Jarrell MD  Reason for consult: abnormal PET/CT  Assessment/Recommendations: have reviewed PET/CT. Mediastinal LN is likely accessible for EUS tissue sampling. Will arrange for this in the near future.            Recommendation: have reviewed PET/CT. Mediastinal LN is likely accessible for EUS tissue sampling. Will arrange for this in the near future.    Contingency: -    Total time of Consultation: 5 minute    I did not speak to the requesting provider verbally about this.     *This eConsult is based on the clinical data available to me and is furnished without benefit of a physical examination. The eConsult will need to be interpreted in light of any clinical issues or changes in patient status not available to me at the time of filing this eConsults. Significant changes in patient condition or level of acuity should result in immediate formal consultation and reevaluation. Please alert me if you have further questions.    Thank you for this eConsult referral.     René Wagner MD  [facility] GASTROENTEROLOGY

## 2024-01-09 NOTE — CONSULTS
[facility] GASTROENTEROLOGY  Response for E-Consult     Patient Name: Mark Dickerson Sr.  MRN: [id number]  Primary Care Provider: Tatyana Cannon MD   Requesting Provider: Jomar Jarrell MD  E-Consult to Gastroenterology  Consult performed by: René Wagner MD  Consult ordered by: Jomar Jarrell MD          Recommendation: ***    Contingency: ***    Total time of Consultation: {TIME IN MINUTES:18680}    I {did/did not:24025} speak to the requesting provider verbally about this.     *This eConsult is based on the clinical data available to me and is furnished without benefit of a physical examination. The eConsult will need to be interpreted in light of any clinical issues or changes in patient status not available to me at the time of filing this eConsults. Significant changes in patient condition or level of acuity should result in immediate formal consultation and reevaluation. Please alert me if you have further questions.    Thank you for this eConsult referral.     René Wagner MD  [facility] GASTROENTEROLOGY    {If you have any feedback, please reach out to our eConsult clinician lead, Dr. Pipo Magaña ([email or web address]) or , Mera Judge ([email or web address]):[phone]}

## 2024-01-11 ENCOUNTER — ANESTHESIA (OUTPATIENT)
Dept: ENDOSCOPY | Facility: HOSPITAL | Age: 84
End: 2024-01-11
Payer: MEDICARE

## 2024-01-11 ENCOUNTER — ANESTHESIA EVENT (OUTPATIENT)
Dept: ENDOSCOPY | Facility: HOSPITAL | Age: 84
End: 2024-01-11
Payer: MEDICARE

## 2024-01-11 ENCOUNTER — HOSPITAL ENCOUNTER (OUTPATIENT)
Facility: HOSPITAL | Age: 84
Discharge: HOME | End: 2024-01-11
Attending: INTERNAL MEDICINE | Admitting: INTERNAL MEDICINE
Payer: MEDICARE

## 2024-01-11 VITALS
RESPIRATION RATE: 16 BRPM | HEIGHT: 70 IN | OXYGEN SATURATION: 99 % | BODY MASS INDEX: 33.21 KG/M2 | DIASTOLIC BLOOD PRESSURE: 61 MMHG | SYSTOLIC BLOOD PRESSURE: 107 MMHG | TEMPERATURE: 98 F | HEART RATE: 84 BPM | WEIGHT: 232 LBS

## 2024-01-11 DIAGNOSIS — J98.59 MEDIASTINAL MASS: ICD-10-CM

## 2024-01-11 DIAGNOSIS — R59.0 MEDIASTINAL LYMPHADENOPATHY: Primary | ICD-10-CM

## 2024-01-11 DIAGNOSIS — R59.9 ADENOPATHY: ICD-10-CM

## 2024-01-11 LAB
POCT GLUCOSE: 145 MG/DL (ref 70–110)
POCT GLUCOSE: 145 MG/DL (ref 70–110)

## 2024-01-11 PROCEDURE — D9220A PRA ANESTHESIA: Mod: CRNA,,, | Performed by: NURSE ANESTHETIST, CERTIFIED REGISTERED

## 2024-01-11 PROCEDURE — 88177 CYTP FNA EVAL EA ADDL: CPT | Mod: HCNC | Performed by: PATHOLOGY

## 2024-01-11 PROCEDURE — 88342 IMHCHEM/IMCYTCHM 1ST ANTB: CPT | Mod: 26,XU,HCNC, | Performed by: PATHOLOGY

## 2024-01-11 PROCEDURE — 88173 CYTOPATH EVAL FNA REPORT: CPT | Mod: HCNC | Performed by: PATHOLOGY

## 2024-01-11 PROCEDURE — D9220A PRA ANESTHESIA: Mod: ANES,,, | Performed by: ANESTHESIOLOGY

## 2024-01-11 PROCEDURE — 88360 TUMOR IMMUNOHISTOCHEM/MANUAL: CPT | Mod: 26,HCNC,, | Performed by: PATHOLOGY

## 2024-01-11 PROCEDURE — 37000009 HC ANESTHESIA EA ADD 15 MINS: Performed by: INTERNAL MEDICINE

## 2024-01-11 PROCEDURE — 88342 IMHCHEM/IMCYTCHM 1ST ANTB: CPT | Mod: HCNC | Performed by: PATHOLOGY

## 2024-01-11 PROCEDURE — 88341 IMHCHEM/IMCYTCHM EA ADD ANTB: CPT | Mod: 26,59,HCNC, | Performed by: PATHOLOGY

## 2024-01-11 PROCEDURE — 88172 CYTP DX EVAL FNA 1ST EA SITE: CPT | Mod: HCNC | Performed by: PATHOLOGY

## 2024-01-11 PROCEDURE — 88305 TISSUE EXAM BY PATHOLOGIST: CPT | Mod: 26,HCNC,, | Performed by: PATHOLOGY

## 2024-01-11 PROCEDURE — 27202131 HC NEEDLE, FNB SINGLE (ANY): Performed by: INTERNAL MEDICINE

## 2024-01-11 PROCEDURE — 63600175 PHARM REV CODE 636 W HCPCS: Performed by: NURSE ANESTHETIST, CERTIFIED REGISTERED

## 2024-01-11 PROCEDURE — 43242 EGD US FINE NEEDLE BX/ASPIR: CPT | Mod: HCNC | Performed by: INTERNAL MEDICINE

## 2024-01-11 PROCEDURE — 88172 CYTP DX EVAL FNA 1ST EA SITE: CPT | Mod: 26,HCNC,, | Performed by: PATHOLOGY

## 2024-01-11 PROCEDURE — 88177 CYTP FNA EVAL EA ADDL: CPT | Mod: 26,HCNC,, | Performed by: PATHOLOGY

## 2024-01-11 PROCEDURE — 88173 CYTOPATH EVAL FNA REPORT: CPT | Mod: 26,HCNC,, | Performed by: PATHOLOGY

## 2024-01-11 PROCEDURE — 88341 IMHCHEM/IMCYTCHM EA ADD ANTB: CPT | Mod: HCNC,59 | Performed by: PATHOLOGY

## 2024-01-11 PROCEDURE — 88360 TUMOR IMMUNOHISTOCHEM/MANUAL: CPT | Mod: HCNC | Performed by: PATHOLOGY

## 2024-01-11 PROCEDURE — 82962 GLUCOSE BLOOD TEST: CPT | Performed by: INTERNAL MEDICINE

## 2024-01-11 PROCEDURE — 88305 TISSUE EXAM BY PATHOLOGIST: CPT | Mod: HCNC | Performed by: PATHOLOGY

## 2024-01-11 PROCEDURE — 25000003 PHARM REV CODE 250: Performed by: INTERNAL MEDICINE

## 2024-01-11 PROCEDURE — 43242 EGD US FINE NEEDLE BX/ASPIR: CPT | Mod: HCNC,,, | Performed by: INTERNAL MEDICINE

## 2024-01-11 PROCEDURE — 25000003 PHARM REV CODE 250: Performed by: NURSE ANESTHETIST, CERTIFIED REGISTERED

## 2024-01-11 PROCEDURE — 37000008 HC ANESTHESIA 1ST 15 MINUTES: Performed by: INTERNAL MEDICINE

## 2024-01-11 RX ORDER — PROPOFOL 10 MG/ML
VIAL (ML) INTRAVENOUS CONTINUOUS PRN
Status: DISCONTINUED | OUTPATIENT
Start: 2024-01-11 | End: 2024-01-11

## 2024-01-11 RX ORDER — SODIUM CHLORIDE 0.9 % (FLUSH) 0.9 %
10 SYRINGE (ML) INJECTION
Status: DISCONTINUED | OUTPATIENT
Start: 2024-01-11 | End: 2024-01-11 | Stop reason: HOSPADM

## 2024-01-11 RX ORDER — PROPOFOL 10 MG/ML
VIAL (ML) INTRAVENOUS
Status: DISCONTINUED | OUTPATIENT
Start: 2024-01-11 | End: 2024-01-11

## 2024-01-11 RX ORDER — SODIUM CHLORIDE 9 MG/ML
INJECTION, SOLUTION INTRAVENOUS CONTINUOUS
Status: DISCONTINUED | OUTPATIENT
Start: 2024-01-11 | End: 2024-01-11 | Stop reason: HOSPADM

## 2024-01-11 RX ORDER — LIDOCAINE HYDROCHLORIDE 20 MG/ML
INJECTION INTRAVENOUS
Status: DISCONTINUED | OUTPATIENT
Start: 2024-01-11 | End: 2024-01-11

## 2024-01-11 RX ORDER — SODIUM CHLORIDE 0.9 % (FLUSH) 0.9 %
3 SYRINGE (ML) INJECTION
Status: DISCONTINUED | OUTPATIENT
Start: 2024-01-11 | End: 2024-01-11 | Stop reason: HOSPADM

## 2024-01-11 RX ADMIN — PROPOFOL 150 MCG/KG/MIN: 10 INJECTION, EMULSION INTRAVENOUS at 08:01

## 2024-01-11 RX ADMIN — PROPOFOL 100 MG: 10 INJECTION, EMULSION INTRAVENOUS at 08:01

## 2024-01-11 RX ADMIN — SODIUM CHLORIDE: 9 INJECTION, SOLUTION INTRAVENOUS at 07:01

## 2024-01-11 RX ADMIN — SODIUM CHLORIDE: 0.9 INJECTION, SOLUTION INTRAVENOUS at 08:01

## 2024-01-11 RX ADMIN — LIDOCAINE HYDROCHLORIDE 50 MG: 20 INJECTION INTRAVENOUS at 08:01

## 2024-01-11 NOTE — PROVATION PATIENT INSTRUCTIONS
Discharge Summary/Instructions after an Endoscopic Procedure  Patient Name: Mark Dickerson  Patient MRN: [id number]  Patient YOB: 1940 Thursday, January 11, 2024  Evens Monsalve MD  Dear patient,  As a result of recent federal legislation (The Federal Cures Act), you may   receive lab or pathology results from your procedure in your [facility]   account before your physician is able to contact you. Your physician or   their representative will relay the results to you with their   recommendations at their soonest availability.  Thank you,  RESTRICTIONS:  During your procedure today, you received medications for sedation.  These   medications may affect your judgment, balance and coordination.  Therefore,   for 24 hours, you have the following restrictions:   - DO NOT drive a car, operate machinery, make legal/financial decisions,   sign important papers or drink alcohol.    ACTIVITY:  Today: no heavy lifting, straining or running due to procedural   sedation/anesthesia.  The following day: return to full activity including work.  DIET:  Eat and drink normally unless instructed otherwise.     TREATMENT FOR COMMON SIDE EFFECTS:  - Mild abdominal pain, nausea, belching, bloating or excessive gas:  rest,   eat lightly and use a heating pad.  - Sore Throat: treat with throat lozenges and/or gargle with warm salt   water.  - Because air was used during the procedure, expelling large amounts of air   from your rectum or belching is normal.  - If a bowel prep was taken, you may not have a bowel movement for 1-3 days.    This is normal.  SYMPTOMS TO WATCH FOR AND REPORT TO YOUR PHYSICIAN:  1. Abdominal pain or bloating, other than gas cramps.  2. Chest pain.  3. Back pain.  4. Signs of infection such as: chills or fever occurring within 24 hours   after the procedure.  5. Rectal bleeding, which would show as bright red, maroon, or black stools.   (A tablespoon of blood from the rectum is not serious, especially  if   hemorrhoids are present.)  6. Vomiting.  7. Weakness or dizziness.  GO DIRECTLY TO THE NEAREST EMERGENCY ROOM IF YOU HAVE ANY OF THE FOLLOWING:      Difficulty breathing              Chills and/or fever over 101 F   Persistent vomiting and/or vomiting blood   Severe abdominal pain   Severe chest pain   Black, tarry stools   Bleeding- more than one tablespoon   Any other symptom or condition that you feel may need urgent attention  Your doctor recommends these additional instructions:  If any biopsies were taken, your doctors clinic will contact you in 1 to 2   weeks with any results.  - Discharge patient to home.   - Resume previous diet.   - Continue present medications.   - Await cytology results.   - Return to referring physician as previously scheduled.  For questions, problems or results please call your physician - Evens Monsalve MD at Work:  [phone].  [facility] [place], EMERGENCY ROOM PHONE NUMBER: [phone]  IF A COMPLICATION OR EMERGENCY SITUATION ARISES AND YOU ARE UNABLE TO REACH   YOUR PHYSICIAN - GO DIRECTLY TO THE EMERGENCY ROOM.  vEens Monsalve MD  1/11/2024 8:51:59 AM  This report has been verified and signed electronically.  Dear patient,  As a result of recent federal legislation (The Federal Cures Act), you may   receive lab or pathology results from your procedure in your [facility]   account before your physician is able to contact you. Your physician or   their representative will relay the results to you with their   recommendations at their soonest availability.  Thank you,  PROVATION

## 2024-01-11 NOTE — OR NURSING
Specimens and order reviewed with Dr. Cain pathology; Dr. Cain in possession of all specimens    Candelario Marin

## 2024-01-11 NOTE — OR NURSING
Spoke with Skylar in cytology, notified that path staff needed at this time for bx to be taken during EUS.  Path staff en route at this time.     Candelario Marin

## 2024-01-11 NOTE — ANESTHESIA PREPROCEDURE EVALUATION
01/11/2024  [facility]  Anesthesia Pre-Operative Evaluation         Patient Name: Mark Dickerson Sr.  YOB: 1940  MRN: [id number]    SUBJECTIVE:     Pre-operative evaluation for Procedure(s) (LRB):  ULTRASOUND, UPPER GI TRACT, ENDOSCOPIC (N/A)     01/11/2024    Mark Dickerson Sr. is a 83 y.o. male w/ a pertinent PMHx of moderate-severe AS, NIDDM2, CKD3 here for EUS.     Full medical history listed below      LDA: None documented.    Prev airway: None documented.     GTTs: None documented.        Patient Active Problem List   Diagnosis    Hyperlipidemia associated with type 2 diabetes mellitus    Controlled type 2 diabetes mellitus without complication, without long-term current use of insulin    Statin intolerance    Decreased pedal pulses    Diabetic eye exam    CKD stage 3 due to type 2 diabetes mellitus    Primary osteoarthritis of right knee    Hypertension associated with diabetes    Nonrheumatic aortic valve stenosis    Obesity with serious comorbidity    Primary osteoarthritis of knees, bilateral    Acute pain of right knee    Decreased range of motion of right knee    Gait difficulty    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     Current Outpatient Medications on File Prior to Encounter   Medication Sig Dispense Refill    atorvastatin (LIPITOR) 40 MG tablet Take 1 tablet (40 mg total) by mouth once daily. 90 tablet 3    glipiZIDE (GLUCOTROL) 5 MG tablet Take 1 tablet (5 mg total) by mouth 2 (two) times daily with meals. 180 tablet 2    hydroCHLOROthiazide (HYDRODIURIL) 25 MG tablet Take 1  "tablet (25 mg total) by mouth once daily. 90 tablet 2    multivitamin (THERAGRAN) per tablet Take 1 tablet by mouth once daily.      potassium chloride SA (K-DUR,KLOR-CON) 20 MEQ tablet Take 1 tablet (20 mEq total) by mouth once daily. 90 tablet 3    aspirin 81 MG Chew Take 1 tablet (81 mg total) by mouth once daily.  0    clotrimazole (LOTRIMIN) 1 % cream Apply topically 2 (two) times daily. 113 g 1    levocetirizine (XYZAL) 5 MG tablet Take 1 tablet (5 mg total) by mouth every evening. 30 tablet 11       Past Surgical History:   Procedure Laterality Date    APPENDECTOMY      CATARACT EXTRACTION Bilateral     CPG    FLUOROSCOPY N/A 1/5/2024    Procedure: Fluoroscopy/MEDIPORT PLACEMENT;  Surgeon: Jaret Lu MD;  Location: [facility] CATH LAB;  Service: General;  Laterality: N/A;       \      OBJECTIVE:     Vital Signs Range (Last 24H):  Temp:  [36.7 °C (98 °F)]   Pulse:  [91]   Resp:  [16]   BP: (117)/(73)   SpO2:  [96 %]       CBC:   No results for input(s): "WBC", "RBC", "HGB", "HCT", "PLT", "MCV", "MCH", "MCHC" in the last 72 hours.    CMP: No results for input(s): "NA", "K", "CL", "CO2", "BUN", "CREATININE", "GLU", "MG", "PHOS", "CALCIUM", "ALBUMIN", "PROT", "ALKPHOS", "ALT", "AST", "BILITOT" in the last 72 hours.    INR:  No results for input(s): "PT", "INR", "PROTIME", "APTT" in the last 72 hours.    Diagnostic Studies: No relevant studies.    EKG:   Results for orders placed or performed during the hospital encounter of 04/14/23   ECG 12 lead    Collection Time: 04/14/23  5:30 PM    Narrative    Test Reason : I63.9,    Vent. Rate : 103 BPM     Atrial Rate : 103 BPM     P-R Int : 172 ms          QRS Dur : 096 ms      QT Int : 364 ms       P-R-T Axes : 014 -27 071 degrees     QTc Int : 476 ms    Sinus tachycardia with Premature supraventricular complexes  Nonspecific ST and T wave abnormality  Abnormal ECG  When compared with ECG of 14-APR-2023 17:01,  Previous ECG has undetermined rhythm, needs " review  Confirmed by Kat Warren MD ([id number]) on 4/16/2023 7:25:31 AM    Referred By: [email or web address]   SELF           Confirmed By:Kat Warren MD        2D ECHO:   Results for orders placed during the hospital encounter of 04/14/23    Echo    Interpretation Summary  · The left ventricle is normal in size with concentric remodeling and normal systolic function.  · Normal left ventricular diastolic function.  · The estimated PA systolic pressure is 24 mmHg.  · Mild right ventricular enlargement with normal right ventricular systolic function.  · Normal central venous pressure (3 mmHg).  · There is moderate-to-severe aortic valve stenosis.  · Aortic valve area is 0.94 cm2; peak velocity is 3.35 m/s; mean gradient is 31 mmHg.  · The estimated ejection fraction is 60%.  · Mild aortic regurgitation.         ASSESSMENT/PLAN:           Pre-op Assessment    I have reviewed the Patient Summary Reports.    I have reviewed the NPO Status.   I have reviewed the Medications.     Review of Systems  Anesthesia Hx:   History of prior surgery of interest to airway management or planning:          Denies Family Hx of Anesthesia complications.    Denies Personal Hx of Anesthesia complications.                        Physical Exam  General: Well nourished, Cooperative, Alert and Oriented    Airway:  Mallampati: II   Mouth Opening: Normal  TM Distance: Normal  Tongue: Normal  Neck ROM: Normal ROM    Dental:  Intact    Chest/Lungs:  Clear to auscultation, Normal Respiratory Rate    Heart:  Rate: Normal  Rhythm: Regular Rhythm        Anesthesia Plan  Type of Anesthesia, risks & benefits discussed:    Anesthesia Type: Gen Natural Airway  Intra-op Monitoring Plan: Standard ASA Monitors  Induction:  IV  Informed Consent: Informed consent signed with the Patient and all parties understand the risks and agree with anesthesia plan.  All questions answered.   ASA Score: 3  Day of Surgery Review of History & Physical: H&P Update referred to  the surgeon/provider.    Ready For Surgery From Anesthesia Perspective.     .

## 2024-01-11 NOTE — ANESTHESIA POSTPROCEDURE EVALUATION
Anesthesia Post Evaluation    Patient: Mark Dickerson     Procedure(s) Performed: Procedure(s) (LRB):  ULTRASOUND, UPPER GI TRACT, ENDOSCOPIC (N/A)    Final Anesthesia Type: general      Patient location during evaluation: PACU  Patient participation: Yes- Able to Participate  Level of consciousness: awake and alert and oriented  Post-procedure vital signs: reviewed and stable  Pain management: adequate  Airway patency: patent    PONV status at discharge: No PONV  Anesthetic complications: no      Cardiovascular status: blood pressure returned to baseline and hemodynamically stable  Respiratory status: unassisted  Hydration status: euvolemic  Follow-up not needed.              Vitals Value Taken Time   /61 01/11/24 0920   Temp 36.7 °C (98 °F) 01/11/24 0920   Pulse 84 01/11/24 0920   Resp 16 01/11/24 0920   SpO2 99 % 01/11/24 0920         No case tracking events are documented in the log.      Pain/Anam Aria Score: Ana Maria Score: 10 (1/11/2024  9:20 AM)

## 2024-01-11 NOTE — TRANSFER OF CARE
"Anesthesia Transfer of Care Note    Patient: Mark Dickerson Sr.    Procedure(s) Performed: Procedure(s) (LRB):  ULTRASOUND, UPPER GI TRACT, ENDOSCOPIC (N/A)    Patient location: PACU    Anesthesia Type: general    Transport from OR: Transported from OR on 6-10 L/min O2 by face mask with adequate spontaneous ventilation    Post pain: adequate analgesia    Post assessment: no apparent anesthetic complications    Post vital signs: stable    Level of consciousness: awake and lethargic    Nausea/Vomiting: no nausea/vomiting    Complications: none    Transfer of care protocol was followed      Last vitals: Visit Vitals  /65 (BP Location: Left arm, Patient Position: Sitting)   Pulse 97   Temp 36.7 °C (98.1 °F) (Temporal)   Resp 15   Ht 5' 10" (1.778 m)   Wt 105.2 kg (232 lb)   SpO2 97%   BMI 33.29 kg/m²     "

## 2024-01-11 NOTE — H&P
Short Stay Endoscopy History and Physical    PCP - Tatyana Cannon MD  Referring Physician - Evens Monsalve MD  [address]  [address]  [place] [id number]    Procedure - eus  ASA - per anesthesia  Mallampati - per anesthesia  History of Anesthesia problems - no  Family history Anesthesia problems -  no   Plan of anesthesia - General    HPI:  This is a 83 y.o. male here for evaluation of: mediastinal mass    Reflux - no  Dysphagia - no  Abdominal pain - no  Diarrhea - no    ROS:  Constitutional: No fevers, chills, No weight loss  CV: No chest pain  Pulm: No cough, No shortness of breath  Ophtho: No vision changes  GI: see HPI  Derm: No rash    Medical History:  has a past medical history of CKD stage 3 due to type 2 diabetes mellitus (2/18/2021), DM (diabetes mellitus) (2014), DM (diabetes mellitus) (2014), Foreign body, eye, Hyperlipidemia, Hypertension, Need for shingles vaccine (11/20/2019), Pneumonia, Primary osteoarthritis of right knee (10/29/2021), Severe obesity (BMI 35.0-35.9 with comorbidity) (7/10/2019), and Type 2 diabetes mellitus (2014).    Surgical History:  has a past surgical history that includes Appendectomy; Cataract extraction (Bilateral); and Fluoroscopy (N/A, 1/5/2024).    Family History: family history includes Allergic rhinitis in his daughter; Cancer in his son; Cataracts in his father; Heart disease in his father and mother; Hyperlipidemia in his father; Hypertension in his brother, father, and sister..    Social History:  reports that he quit smoking about 62 years ago. His smoking use included cigarettes. He started smoking about 72 years ago. He has a 30.0 pack-year smoking history. He has never used smokeless tobacco. He reports that he does not drink alcohol and does not use drugs.    Review of patient's allergies indicates:  No Known Allergies    Medications:   Medications Prior to Admission   Medication Sig Dispense Refill Last Dose    atorvastatin (LIPITOR) 40 MG  tablet Take 1 tablet (40 mg total) by mouth once daily. 90 tablet 3 1/10/2024    glipiZIDE (GLUCOTROL) 5 MG tablet Take 1 tablet (5 mg total) by mouth 2 (two) times daily with meals. 180 tablet 2 1/10/2024    hydroCHLOROthiazide (HYDRODIURIL) 25 MG tablet Take 1 tablet (25 mg total) by mouth once daily. 90 tablet 2 1/10/2024    multivitamin (THERAGRAN) per tablet Take 1 tablet by mouth once daily.   1/10/2024    potassium chloride SA (K-DUR,KLOR-CON) 20 MEQ tablet Take 1 tablet (20 mEq total) by mouth once daily. 90 tablet 3 Past Week    aspirin 81 MG Chew Take 1 tablet (81 mg total) by mouth once daily.  0 More than a month    clotrimazole (LOTRIMIN) 1 % cream Apply topically 2 (two) times daily. 113 g 1     levocetirizine (XYZAL) 5 MG tablet Take 1 tablet (5 mg total) by mouth every evening. 30 tablet 11        Physical Exam:    Vital Signs:   Vitals:    01/11/24 0738   BP: 117/73   Pulse: 91   Resp: 16   Temp: 98 °F (36.7 °C)       General Appearance: Well appearing in no acute distress    Labs:  Lab Results   Component Value Date    WBC 8.07 01/05/2024    HGB 13.6 (L) 01/05/2024    HCT 40.0 01/05/2024     01/05/2024    CHOL 198 11/28/2023    TRIG 148 11/28/2023    HDL 33 (L) 11/28/2023    ALT 28 11/28/2023    AST 30 11/28/2023     01/05/2024    K 3.5 01/05/2024    CL 99 01/05/2024    CREATININE 1.1 01/05/2024    BUN 16 01/05/2024    CO2 23 01/05/2024    TSH 0.921 04/14/2023    PSA 0.33 12/10/2015    INR 1.1 01/05/2024    HGBA1C 7.1 (H) 11/28/2023       I have explained the risks and benefits of this endoscopic procedure to the patient including but not limited to bleeding, inflammation, infection, perforation, and death.      Evens Monsalve MD

## 2024-01-17 ENCOUNTER — TELEPHONE (OUTPATIENT)
Dept: GASTROENTEROLOGY | Facility: HOSPITAL | Age: 84
End: 2024-01-17
Payer: MEDICARE

## 2024-01-17 ENCOUNTER — TELEPHONE (OUTPATIENT)
Dept: GASTROENTEROLOGY | Facility: CLINIC | Age: 84
End: 2024-01-17
Payer: MEDICARE

## 2024-01-17 ENCOUNTER — TELEPHONE (OUTPATIENT)
Dept: ENDOSCOPY | Facility: HOSPITAL | Age: 84
End: 2024-01-17
Payer: MEDICARE

## 2024-01-17 LAB
ADEQUACY: ABNORMAL
FINAL PATHOLOGIC DIAGNOSIS: ABNORMAL
Lab: ABNORMAL
SUPPLEMENTAL DIAGNOSIS: ABNORMAL

## 2024-01-17 NOTE — TELEPHONE ENCOUNTER
Please try to reach the patient and tell him that he does need to follow-up with oncologist on the results of his biopsy and further treatment discussion.

## 2024-01-17 NOTE — TELEPHONE ENCOUNTER
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  I called to relay the information, but had it went to voice mail

## 2024-01-17 NOTE — TELEPHONE ENCOUNTER
----- Message from Toni Milligan, MA sent at 1/17/2024  1:08 PM CST -----  Contact: Mark    ----- Message -----  From: Shaina Hurtado  Sent: 1/17/2024   1:05 PM CST  To: [facility] Clinical Staff    Type:  Patient Returning Call    Who Called:Mark  Who Left Message for Patient:Violetta Devi LPN  Does the patient know what this is regarding?:Unknown  Would the patient rather a call back or a response via [facility]? callback  Best Call Back Number:[phone]  Additional Information:

## 2024-01-18 ENCOUNTER — OFFICE VISIT (OUTPATIENT)
Dept: HEMATOLOGY/ONCOLOGY | Facility: CLINIC | Age: 84
End: 2024-01-18
Payer: MEDICARE

## 2024-01-18 DIAGNOSIS — Z17.0 MALIGNANT NEOPLASM INVOLVING BOTH NIPPLE AND AREOLA OF LEFT BREAST IN MALE, ESTROGEN RECEPTOR POSITIVE: Primary | ICD-10-CM

## 2024-01-18 DIAGNOSIS — C50.022 MALIGNANT NEOPLASM INVOLVING BOTH NIPPLE AND AREOLA OF LEFT BREAST IN MALE, ESTROGEN RECEPTOR POSITIVE: Primary | ICD-10-CM

## 2024-01-18 DIAGNOSIS — E78.5 HYPERLIPIDEMIA ASSOCIATED WITH TYPE 2 DIABETES MELLITUS: ICD-10-CM

## 2024-01-18 DIAGNOSIS — I15.2 HYPERTENSION ASSOCIATED WITH DIABETES: ICD-10-CM

## 2024-01-18 DIAGNOSIS — E11.69 HYPERLIPIDEMIA ASSOCIATED WITH TYPE 2 DIABETES MELLITUS: ICD-10-CM

## 2024-01-18 DIAGNOSIS — E11.59 HYPERTENSION ASSOCIATED WITH DIABETES: ICD-10-CM

## 2024-01-18 PROCEDURE — 99214 OFFICE O/P EST MOD 30 MIN: CPT | Mod: HCNC,95,, | Performed by: INTERNAL MEDICINE

## 2024-01-18 PROCEDURE — 3072F LOW RISK FOR RETINOPATHY: CPT | Mod: HCNC,CPTII,95, | Performed by: INTERNAL MEDICINE

## 2024-01-18 RX ORDER — ANASTROZOLE 1 MG/1
1 TABLET ORAL DAILY
Qty: 90 TABLET | Refills: 3 | Status: SHIPPED | OUTPATIENT
Start: 2024-01-18 | End: 2025-01-17

## 2024-01-18 NOTE — TELEPHONE ENCOUNTER
No care due was identified.  [facility] Embedded Care Due Messages. Reference number: [id number].   1/18/2024 2:12:13 PM CST

## 2024-01-18 NOTE — PROGRESS NOTES
[place] [facility]  [id number] [place] [id number]  Phone: [phone];  Fax: [phone]    Patient ID: Mark Dickerson Sr.   Chief Complaint: Breast Cancer and Follow-up     MRN:  [id number]     Reason for Referral:  Breast Cancer      The patient location is: Home  The chief complaint leading to consultation is: Follow up biopsy results    Visit type: audiovisual    Face to Face time with patient: 20 minutes  50 minutes of total time spent on the encounter, which includes face to face time and non-face to face time preparing to see the patient (eg, review of tests), Obtaining and/or reviewing separately obtained history, Documenting clinical information in the electronic or other health record, Independently interpreting results (not separately reported) and communicating results to the patient/family/caregiver, or Care coordination (not separately reported).         Each patient to whom he or she provides medical services by telemedicine is:  (1) informed of the relationship between the physician and patient and the respective role of any other health care provider with respect to management of the patient; and (2) notified that he or she may decline to receive medical services by telemedicine and may withdraw from such care at any time.    Notes:   Subjective   The patient presents for follow-up via virtual visit and is accompanied by his son and daughter.  We have been in touch couple of weeks ago regarding his PET-CT referral for biopsy of the suspected metastatic lung lesions.  It was felt that EUS would be a better approach to biopsy the lesions and he had this done 01/11/2024.  Unfortunately the biopsy resulted positive for metastatic breast cancer.  I discussed this in detail with the patient and his family and given this change, surgery is not my treatment plan for now we will proceed with only systemic therapy with elimination of 1 of the chemotherapy drugs.  I  also recommend that he start Arimidex which was prescribed day.  We reviewed the course of treatment, that his disease is not curable and the frequency restaging imaging.  All questions were answered.  We will plan to start next week and the patient is in agreement with the couple visits.  He also wants to know if he can travel wound do other things that he wants.  I told him as long as he feels well he has no restrictions.  If he becomes neutropenic I feel he needs restrictions in place I will communicate that to him at that time.      Review of Systems:  Review of Systems   Constitutional:  Negative for activity change, appetite change, chills, diaphoresis, fatigue, fever and unexpected weight change.   HENT:  Negative for nosebleeds.    Respiratory:  Negative for shortness of breath.    Cardiovascular:  Negative for chest pain.   Gastrointestinal:  Negative for abdominal distention, abdominal pain, anal bleeding, blood in stool, constipation, diarrhea, nausea and vomiting.   Genitourinary:  Negative for difficulty urinating and hematuria.   Musculoskeletal:  Negative for arthralgias, back pain and myalgias.   Skin:  Negative for rash.   Neurological:  Negative for dizziness, weakness, light-headedness and headaches.   Hematological:  Does not bruise/bleed easily.   Psychiatric/Behavioral:  The patient is not nervous/anxious.      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, estrogen receptor positive     12/21/2023 Cancer Staged    Staging form: Breast, AJCC 8th Edition  - Clinical stage from 12/21/2023: Stage IIA (cT2, cN1(f), cM0, G2, ER+, RI-, HER2+)     1/22/2024 -  Chemotherapy    Treatment Summary   Plan Name: OP BREAST THP (pertuzumab trastuzumab DOCEtaxel) Q3W  Treatment Goal: Curative  Status: Active  Start  Date: 1/22/2024 (Planned)  End Date: 12/24/2024 (Planned)  Provider: Shaina Mendoza MD  Chemotherapy: DOCEtaxel (TAXOTERE) 75 mg/m2 in sodium chloride 0.9% 250 mL chemo infusion, 75 mg/m2, Intravenous, Clinic/HOD 1 time, 0 of 17 cycles  pertuzumab (PERJETA) 840 mg in sodium chloride 0.9% 278 mL infusion, 840 mg, Intravenous, Clinic/HOD 1 time, 0 of 17 cycles  trastuzumab-anns (KANJINTI) 8 mg/kg in sodium chloride 0.9% 250 mL chemo infusion, 8 mg/kg, Intravenous, Clinic/HOD 1 time, 0 of 17 cycles         Past Medical History:   Diagnosis Date    CKD stage 3 due to type 2 diabetes mellitus 2/18/2021    DM (diabetes mellitus) 2014    BS didn't check 12/11/2019    DM (diabetes mellitus) 2014    BS didn't check 05/05/2022    Foreign body, eye     right eye    Hyperlipidemia     Hypertension     Need for shingles vaccine 11/20/2019    Pneumonia     Primary osteoarthritis of right knee 10/29/2021    Severe obesity (BMI 35.0-35.9 with comorbidity) 7/10/2019    Type 2 diabetes mellitus 2014    BS didn't check 12/12/2018       Past Surgical History:   Procedure Laterality Date    APPENDECTOMY      CATARACT EXTRACTION Bilateral     CPG    ENDOSCOPIC ULTRASOUND OF UPPER GASTROINTESTINAL TRACT N/A 1/11/2024    Procedure: ULTRASOUND, UPPER GI TRACT, ENDOSCOPIC;  Surgeon: Evens Monsalve MD;  Location: [facility] ENDO [address]);  Service: Endoscopy;  Laterality: N/A;    FLUOROSCOPY N/A 1/5/2024    Procedure: Fluoroscopy/MEDIPORT PLACEMENT;  Surgeon: Jaret Lu MD;  Location: [facility] CATH LAB;  Service: General;  Laterality: N/A;       Family History   Problem Relation Age of Onset    Heart disease Mother     Hypertension Father     Heart disease Father     Hyperlipidemia Father     Cataracts Father     Hypertension Sister     Hypertension Brother     Cancer Son         colon    Allergic rhinitis Daughter        Review of patient's allergies indicates:  No Known Allergies    Social History     Tobacco Use    Smoking status: Former      Current packs/day: 0.00     Average packs/day: 3.0 packs/day for 10.0 years (30.0 ttl pk-yrs)     Types: Cigarettes     Start date:      Quit date:      Years since quittin.0    Smokeless tobacco: Never   Substance Use Topics    Alcohol use: No    Drug use: No        Labs   Labs:  No visits with results within 2 Day(s) from this visit.   Latest known visit with results is:   Admission on 2024, Discharged on 2024   Component Date Value Ref Range Status    POCT Glucose 2024 145 (H)  70 - 110 mg/dL Final    Final Pathologic Diagnosis 2024  (A)   Corrected                    Value:LYMPH NODE, MEDIASTIUM, EBUS FNA-CYTOLOGY WITH CELL BLOCK AND ON-SITE ADEQUACY BY PATHOLOGIST:  Positive for malignancy.  Consistent with metastatic breast adenocarcinoma, see comment    The smears as well cell, block show tumor population which is diffusely and strongly positive for ER, HER 2 (3+), GATA3; and ME stain is faint <10%. Patient's clinical history of breast ductal adenocarcinoma is noticed and this is consistent with   metastatic breast adenocarcinoma. There are no lymphocytes in the background.      Interp By Marci Torres M.D., Signed on 2024 at 10:12    Supplemental Diagnosis 2024 Immunostain Ki67 - 80% nuclei positive. (A)   Corrected    Adequacy 2024  (A)   Corrected                    Value:pass 1 Atypical cells  pass 2  Adequate malignant  passes 3, 4 in formalin       Disclaimer 2024  (A)   Corrected                    Value:Screening was performed at [facility], [address] [place] [address].             ER immunohistochemical staining (clone SP1, DAB detection method) is performed on formalin-fixed, paraffin embedded tissue sections. The percentage of cell nuclei stained and the strength of staining is reported (weak, moderate, strong), using the 2010   ACSO/CAP scoring guidelines. Tumors used for  determining predictive markers are fixed in 10% neutral buffered formalin for 6-72 hours. It has been cleared by the U.S. FDA for use as an IVD test. This assay has not been validated on decalcified tissues.   Results should be interpreted with caution given the likelihood of false negativity on decalcified specimens.  HER-2/sergio IHC (4B5) clone, DAB detection method) is done on 10% buffered formalin-fixed (for 6-72 hrs), paraffin embedded tissue sections. The scoring is completed on a 4-tiered scoring system of membrane staining using the 2014 ASCO/CAP sco                          ring   guidelines. It has been cleared by the [place].S[place] FDA for use as an IVD test. This assay has not been validated on decalcified tissues. Results should be interpreted with caution given the likelihood of false negativity on decalcified specimens.    ER immunohistochemical staining (clone SP1, DAB detection method) is performed on formalin-fixed, paraffin embedded tissue sections. The percentage of cell nuclei stained and the strength of staining is reported (weak, moderate, strong), using the 2010   ACSO/CAP scoring guidelines. Tumors used for determining predictive markers are fixed in 10% neutral buffered formalin for 6-72 hours. It has been cleared by the [place].S[place] FDA for use as an IVD test. This assay has not been validated on decalcified tissues.   Results should be interpreted with caution given the likelihood of false negativity on decalcified specimens.  HER-2/sergio IHC (4B5) clone, DAB detection method) is done on 10% buffered formalin-fixed (for 6-72 hrs), paraffin embedded tissue                           sections. The scoring is completed on a 4-tiered scoring system of membrane staining using the 2014 ASCO/CAP scoring   guidelines. It has been cleared by the U.S. FDA for use as an IVD test. This assay has not been validated on decalcified tissues. Results should be interpreted with caution given the likelihood of false negativity on  decalcified specimens.        POCT Glucose 01/11/2024 145 (H)  70 - 110 mg/dL Final        Imaging   Diagnostic L Breast US and Bilateral MMG w/Tomosynthesis 12/01/23  Findings:  This procedure was performed using tomosynthesis. Computer-aided detection was utilized in the interpretation of this examination.  The breasts are almost entirely fatty.      Left  Mammo Digital Diagnostic Bilat with Danny  There is a 5 cm mass seen in the retroareolar region of the left breast in the anterior depth. Associated features include nodular skin thickening and axillary adenopathy. There are also associated calcifications. Additional 1 cm mass noted within the axillary tail.      US Breast Left Limited  5 cm hypoechoic mass with calcifications noted within the subareolar breast corresponding to large mammographic mass. Overlying nodular skin involvement present. 1 cm hypoechoic mass within the axillary tail region. Two enlarged abnormal lymph nodes present within the left axilla.      Right  Mammo Digital Diagnostic Bilat with Danny  There is no evidence of suspicious masses, calcifications, or other abnormal findings in the right breast.     Impression:  Left  Mass: Left breast 5 cm mass at the retroareolar anterior position. Abnormal intramammary lymph node versus 2nd site of malignancy within the axillary tail. Abnormal axillary adenopathy. Assessment: 5 - Highly suggestive of malignancy. Biopsy is recommended.      Right  Mammo Digital Diagnostic Bilat with Danny  There is no mammographic evidence of malignancy in the right breast.      NM PET CT FDG SKULL BASE TO MID THIGH - 12/29/2023  CLINICAL HISTORY:  Initial staging imaging for stage II hormone + male breast cancer; Malignant neoplasm of nipple and areola, left male breast     TECHNIQUE:  11.78 mCi of F18-FDG was administered intravenously in the left antecubital fossa.  After an approximately 60 min distribution time, PET/CT images were acquired from the skull base to  the mid thigh. Transmission images were acquired to correct for attenuation using a whole body low-dose CT scan without contrast with the arms positioned above the head. Glycemia at the time of injection was 163 mg/dL.     COMPARISON:  None     FINDINGS:  Quality of the study: Adequate.     SUV max of the liver parenchyma is 3.7     Neck: No FDG abnormal avidity.  No lymphadenopathy or mass.     Chest: Large left breast mass which involves the skin measuring 7.0 x 4.5 cm with SUV max 9.7.     Extensive left axillary FDG avid lymphadenopathy with the largest left axillary lymph node measuring 3.9 x 2.3 cm with SUV max 7.4.  Another large left axillary lymph node measures 2.4 x 1.5 cm with SUV max 8.4.     The left axillary lymphadenopathy involves all 3 levels which includes of couple of level 3 lymph nodes, 1 measuring 1.2 x 0.7 cm with SUV max 6.6.     Medial pleural based right middle lobe pulmonary nodule measuring 16 x 10 mm with SUV max 6.2.     Right hilar lymph node measuring 1.5 x 1.4 cm with SUV max 6.9.     Azygoesophageal recess mediastinal lymph node measuring 1.9 x 1.4 cm with SUV max 6.2.     No pleural effusion     There are couple of small bilateral lower     Abdomen/pelvis: No lymphadenopathy.  No mass lesion or abnormal focus of FDG avidity.     Skeletal structures: No abnormal FDG avidity.  No focal lytic or sclerotic lesion in the osseous structures.  Lobe pulmonary nodules which show no FDG avidity.     Physiologic uptake of the tracer is present within the brain, salivary glands, myocardium, GI and  tracts.     Incidental CT findings: Coronary artery calcifications.     Impression:     1. Large left breast FDG avid mass which involves the skin.  2. Extensive markedly FDG avid left axillary lymphadenopathy involving all 3 levels of the axilla.  3. FDG avid mediastinal and right hilar lymphadenopathy concerning for intrathoracic metastatic disease.  4. Multiple pulmonary nodules.  Only 1  pulmonary nodules FDG avid which is a pleural based right middle lobe 16 x 10 mm pulmonary nodule.  All of these pulmonary nodules are concerning for metastatic disease.        Pathology     Left Breast Biopsy 12/14/23      Component 7 d ago   Final Pathologic Diagnosis Part 1  Breast, left, subareolar, ultrasound-guided biopsy:  Invasive ductal carcinoma, moderately differentiated  Raul grade 2:  Tubule formation -3, nuclear pleomorphism-2 and mitotic count -2  Invasive carcinoma measures 17 mm in greatest dimension  No carcinoma in Situ or lymphovascular invasion is identified    Tumor biomarkers  Estrogen receptor (ER):  Positive, 80-90%, strong  Progesterone receptor (PGR):  Negative  HER2 (IHC):  Positive, 3+  Ki-67:  20-25%      Part 2  Axilla, left, ultrasound-guided biopsy:  Positive for carcinoma, see comment below      This case was reviewed by Dr. TONI Alcantara who concurs with the above diagnoses and assessment.    Stains were performed with appropriate controls.            Assessment and Plan   L Breast Invasive Ductal Carcinoma, Stage IIA (T2pN1),  +/-/+, Ki67 20-25%, G2  12/01/23 MMG showed left breast mass, 5 cm, at the retroareolar anterior position and abnormal intramammary lymph node versus 2nd site of malignancy cassidy axillary tail   s/p L breast biopsy 12/14/2023:  Positive for malignancy; left axillary lymph node positive for malignancy  His last  TTE 04/15/2023: EF 60%  PET CT 12/29/2023 unfortunately showed mediastinal and hilar lymphadenopathy suspicious for metastatic disease  EUS with biopsy 01/11/2024 results positive for metastatic adenocarcinoma of the breast  Genetic testing pending  He was initially planned for neoadjuvant TCHP however because PET-CT showed metastatic disease his treatment plan was changed to THP +Arimidex  Plan to start cycle 1 THP 01/23/2024      Chronic Medical Conditions  DM II, controlled  CKD III  HTN  AS - Non-rheumatic  Hx TIA        Med Onc Chart  Routing      Follow up with physician . 01/23/24   Follow up with ZU[facility]    Infusion scheduling note   C1 THP 01/23/24 at [facility]   Injection scheduling note    Labs CBC, CMP, magnesium and phosphorus   Scheduling:  Preferred lab: [facility]  Lab interval: every 3 weeks  Plan for lab on Monday 01/22/24 - Decoupled labs q3w   Imaging    Pharmacy appointment    Other referrals                       The patient was seen, interviewed and examined. Pertinent lab and radiologic studies were reviewed. Pt instructed to call should they develop concerning signs/symptoms or have further questions.        Portions of the record may have been created with voice recognition software. Occasional wrong-word or sound-a-like substitutions may have occurred due to the inherent limitations of voice recognition software. Read the chart carefully and recognize, using context, where substitutions have occurred.      Shaina Garg MD    Hematology/Oncology

## 2024-01-19 RX ORDER — HYDROCHLOROTHIAZIDE 25 MG/1
25 TABLET ORAL
Qty: 90 TABLET | Refills: 3 | Status: SHIPPED | OUTPATIENT
Start: 2024-01-19

## 2024-01-19 RX ORDER — GLIPIZIDE 5 MG/1
5 TABLET ORAL 2 TIMES DAILY WITH MEALS
Qty: 180 TABLET | Refills: 1 | Status: SHIPPED | OUTPATIENT
Start: 2024-01-19

## 2024-01-19 NOTE — TELEPHONE ENCOUNTER
Refill Decision Note   Mark Jayla  is requesting a refill authorization.  Brief Assessment and Rationale for Refill:  Approve     Medication Therapy Plan:  protocols met for 1yr of HCTZ and 6mths max on Glipizide      Comments:     Note composed:7:24 AM 01/19/2024

## 2024-01-22 ENCOUNTER — PATIENT MESSAGE (OUTPATIENT)
Dept: HEMATOLOGY/ONCOLOGY | Facility: CLINIC | Age: 84
End: 2024-01-22
Payer: MEDICARE

## 2024-01-22 ENCOUNTER — DOCUMENTATION ONLY (OUTPATIENT)
Dept: HEMATOLOGY/ONCOLOGY | Facility: CLINIC | Age: 84
End: 2024-01-22
Payer: MEDICARE

## 2024-01-22 ENCOUNTER — TELEPHONE (OUTPATIENT)
Dept: HEMATOLOGY/ONCOLOGY | Facility: CLINIC | Age: 84
End: 2024-01-22
Payer: MEDICARE

## 2024-01-22 DIAGNOSIS — R59.0 MEDIASTINAL LYMPHADENOPATHY: ICD-10-CM

## 2024-01-22 DIAGNOSIS — C50.022 MALIGNANT NEOPLASM INVOLVING BOTH NIPPLE AND AREOLA OF LEFT BREAST IN MALE, ESTROGEN RECEPTOR POSITIVE: Primary | ICD-10-CM

## 2024-01-22 DIAGNOSIS — Z17.0 MALIGNANT NEOPLASM INVOLVING BOTH NIPPLE AND AREOLA OF LEFT BREAST IN MALE, ESTROGEN RECEPTOR POSITIVE: Primary | ICD-10-CM

## 2024-01-23 ENCOUNTER — CLINICAL SUPPORT (OUTPATIENT)
Dept: HEMATOLOGY/ONCOLOGY | Facility: CLINIC | Age: 84
End: 2024-01-23
Payer: MEDICARE

## 2024-01-23 ENCOUNTER — DOCUMENTATION ONLY (OUTPATIENT)
Dept: HEMATOLOGY/ONCOLOGY | Facility: CLINIC | Age: 84
End: 2024-01-23

## 2024-01-23 ENCOUNTER — HOSPITAL ENCOUNTER (OUTPATIENT)
Dept: CARDIOLOGY | Facility: HOSPITAL | Age: 84
Discharge: HOME | End: 2024-01-23
Payer: MEDICARE

## 2024-01-23 VITALS
HEART RATE: 80 BPM | SYSTOLIC BLOOD PRESSURE: 107 MMHG | DIASTOLIC BLOOD PRESSURE: 61 MMHG | HEIGHT: 70 IN | WEIGHT: 232 LBS | BODY MASS INDEX: 33.21 KG/M2

## 2024-01-23 DIAGNOSIS — C50.022 MALIGNANT NEOPLASM INVOLVING BOTH NIPPLE AND AREOLA OF LEFT BREAST IN MALE, ESTROGEN RECEPTOR POSITIVE: Primary | ICD-10-CM

## 2024-01-23 DIAGNOSIS — Z17.0 MALIGNANT NEOPLASM INVOLVING BOTH NIPPLE AND AREOLA OF LEFT BREAST IN MALE, ESTROGEN RECEPTOR POSITIVE: ICD-10-CM

## 2024-01-23 DIAGNOSIS — R59.0 MEDIASTINAL LYMPHADENOPATHY: ICD-10-CM

## 2024-01-23 DIAGNOSIS — Z17.0 MALIGNANT NEOPLASM INVOLVING BOTH NIPPLE AND AREOLA OF LEFT BREAST IN MALE, ESTROGEN RECEPTOR POSITIVE: Primary | ICD-10-CM

## 2024-01-23 DIAGNOSIS — C50.022 MALIGNANT NEOPLASM INVOLVING BOTH NIPPLE AND AREOLA OF LEFT BREAST IN MALE, ESTROGEN RECEPTOR POSITIVE: ICD-10-CM

## 2024-01-23 PROCEDURE — 99999 PR PBB SHADOW E&M-EST. PATIENT-LVL I: CPT | Mod: PBBFAC,,,

## 2024-01-23 PROCEDURE — 93306 TTE W/DOPPLER COMPLETE: CPT

## 2024-01-23 PROCEDURE — 93306 TTE W/DOPPLER COMPLETE: CPT | Mod: 26,,, | Performed by: STUDENT IN AN ORGANIZED HEALTH CARE EDUCATION/TRAINING PROGRAM

## 2024-01-23 NOTE — PROGRESS NOTES
Met with patient  in person_with daughter in law present  to discuss upcoming systemic therapy initiation. Discussed patient diagnosis Breast Cancer including staging information. Also discussed that therapy regimen prescribed involves the following drugs: Perjeta, Herceptin, Taxotere and Neulasta __, and timeline of therapy administration. Went over what to expect on first day of treatment, including what to bring with you, visitor policy, and infusion suite guidelines. Also discussed supportive and shared services available to patient, including social work, financial counseling, oncology nutrition, and oncology psychology.      Covered with patient potential side effects and symptom management. Reviewed supportive medications that will be given before, during, and after treatment. Also stressed that other side effects are possible outside of those listed. Spent additional time on signs of infection, infection prevention, and proper nutrition/hydration.    Education provided to patient via (__ chemotherapy education binder___). Also provided contact information for clinic and information related to [facility] communication. Discussed communication process for after-hours needs and some common scenarios in which patient should call provider for guidance vs. immediately report to the emergency room.     Finally, patient was given my contact information and role of oncology navigator was discussed. Encouraged patient to call with any questions, concerns, or needs. Patient verbalized understanding of all above information.    Took pt and daughter in law on tour of infusion room as requested.

## 2024-01-24 LAB
AORTIC ROOT ANNULUS: 3.55 CM
ASCENDING AORTA: 2.77 CM
AV INDEX (PROSTH): 0.25
AV MEAN GRADIENT: 27 MMHG
AV PEAK GRADIENT: 50 MMHG
AV VALVE AREA BY VELOCITY RATIO: 0.8 CM²
AV VALVE AREA: 0.94 CM²
AV VELOCITY RATIO: 0.21
BSA FOR ECHO PROCEDURE: 2.28 M2
CV ECHO LV RWT: 0.78 CM
DOP CALC AO PEAK VEL: 3.54 M/S
DOP CALC AO VTI: 55.9 CM
DOP CALC LVOT AREA: 3.8 CM2
DOP CALC LVOT DIAMETER: 2.2 CM
DOP CALC LVOT PEAK VEL: 0.75 M/S
DOP CALC LVOT STROKE VOLUME: 52.81 CM3
DOP CALC RVOT PEAK VEL: 0.61 M/S
DOP CALC RVOT VTI: 12.8 CM
DOP CALCLVOT PEAK VEL VTI: 13.9 CM
ECHO LV POSTERIOR WALL: 1.42 CM (ref 0.6–1.1)
EJECTION FRACTION: 55 %
FRACTIONAL SHORTENING: 25 % (ref 28–44)
INTERVENTRICULAR SEPTUM: 1.65 CM (ref 0.6–1.1)
IVRT: 91.34 MSEC
LA MAJOR: 4.37 CM
LA MINOR: 4.37 CM
LA WIDTH: 3.3 CM
LEFT ATRIUM SIZE: 3.4 CM
LEFT ATRIUM VOLUME INDEX MOD: 14.9 ML/M2
LEFT ATRIUM VOLUME INDEX: 18.8 ML/M2
LEFT ATRIUM VOLUME MOD: 33.07 CM3
LEFT ATRIUM VOLUME: 41.68 CM3
LEFT INTERNAL DIMENSION IN SYSTOLE: 2.73 CM (ref 2.1–4)
LEFT VENTRICLE DIASTOLIC VOLUME INDEX: 25.4 ML/M2
LEFT VENTRICLE DIASTOLIC VOLUME: 56.39 ML
LEFT VENTRICLE MASS INDEX: 96 G/M2
LEFT VENTRICLE SYSTOLIC VOLUME INDEX: 12.5 ML/M2
LEFT VENTRICLE SYSTOLIC VOLUME: 27.73 ML
LEFT VENTRICULAR INTERNAL DIMENSION IN DIASTOLE: 3.65 CM (ref 3.5–6)
LEFT VENTRICULAR MASS: 212.62 G
LVOT MG: 1.26 MMHG
LVOT MV: 0.53 CM/S
MV PEAK A VEL: 1.5 M/S
PISA TR MAX VEL: 2.5 M/S
PV MEAN GRADIENT: 1 MMHG
PV PEAK GRADIENT: 7 MMHG
PV PEAK VELOCITY: 1.32 M/S
RA MAJOR: 3.78 CM
RA PRESSURE ESTIMATED: 3 MMHG
RA WIDTH: 3.08 CM
RIGHT VENTRICULAR END-DIASTOLIC DIMENSION: 3.06 CM
RV TB RVSP: 6 MMHG
SINUS: 2.47 CM
STJ: 2.1 CM
TR MAX PG: 25 MMHG
TV REST PULMONARY ARTERY PRESSURE: 28 MMHG
Z-SCORE OF LEFT VENTRICULAR DIMENSION IN END DIASTOLE: -7.53
Z-SCORE OF LEFT VENTRICULAR DIMENSION IN END SYSTOLE: -4.3

## 2024-01-24 NOTE — TELEPHONE ENCOUNTER
Attempted to call patient, there was no answer, message left to call the clinic back at their convenience.     Yes

## 2024-01-24 NOTE — PROGRESS NOTES
Incoming call from pt dtr, Tiffany Byrd. States she has access to pt erika, saw my msg and wanted to introduce herself to me. 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 States once he was dx, he decided to stay in [place] for tx but the plan is still for him to move with them eventually. States she plans on being here for C1D1, told her insurance is still pending but hopefully will have approval by then. Asked her to call me Monday afternoon to check the status b/c I would hate for them to drive here to be told he's not getting tx. She states that is a great idea and will definitely call first. Thanked her for reaching out, encouraged her to call with any needs. She voiced understanding.   Oncology Navigation   Intake  Date of Diagnosis: 12/14/23  Cancer Type: Breast  Internal / External Referral: Internal  Date of Referral: 12/19/23  Initial Nurse Navigator Contact: 12/19/23  Referral to Initial Contact Timeline (days): 0  Date Worked: 01/22/24  First Appointment Available: 12/21/23  Appointment Date: 12/21/23  First Available Date vs. Scheduled Date (days): 0  Multiple appointments: Yes     Treatment  Current Status: Active  Date Presented to Tumor Board: 12/28/23    Surgery: Planned  Surgical Oncologist: Brittnee Briones MD  Type of Surgery: Left total mastectomy with left sentinel lymph node biopsy    Medical Oncologist: Shaina Garg MD  Consult Date: 12/21/23  Chemotherapy: Planned  Chemotherapy Regimen: Taxotere  Immunotherapy: Planned  Immunotherapy Name: Herceptin/Perjeta  Start Date: 01/30/24    Radiation Therapy: Planned    Procedures: Port / PICC  Genetic Testing Date Sent: 12/21/23  Diagnostic Mammo Schedule Date:  (3/4 through NACT)  PET Scan Schedule Date: 12/29/23  Port / PICC Schedule Date: 01/05/24    General Referrals: Chemo Education; Social  "Work  Social Work Referral Date: 24    ER: Positive  FL: Negative  Her2: Postive       Support Systems: Children; Family members; Orthodox / sree community  Barriers of Care: Barriers to Care "Assessment completed-no barriers noted"     Acuity  Stage: 1  Systemic Treatment - predicted or initiated: Chemotherapy Regimen with Multiple drugs (+1)  Surgical Procedure Complexity: 2  Treatment Tolerability: Has not started treatment yet/treatment fully completed and side effects resolved  ECO  Comorbidities in Medical History: 1  Hospitalization Within the Past Month: 0   Needed: 0  Support: 0  Verbalizes Financial Concerns: 0  Transportation: 0  Psychological Factors (+1 each): Emotional during conversation  History of noncompliance/frequent no shows and cancellations: 0  Verbalizes the need for more education: 0  Other Factors (+1 for Each): 0  Navigation Acuity: 1     Follow Up  No follow-ups on file.         "

## 2024-01-24 NOTE — PROGRESS NOTES
Called pt to discuss starting tx for breast cancer. Explained my role as NN. Pt states his DNL will be assist with scheduling and coordinating appts, would like me to call her. Asked that I send my name and ph# to him via my chart. Told him I will call her and send him my contact info. Called and spoke with Italia, pt DNL, explained my role as NN. Discussed the process of insurance approval, chemo education, labs, provider and treatment scheduling. She asked if pt tx can be on Tuesdays, told her yes. Offered to schedule his chemo educ, labs and echo 1/23 [place] locations. C1D1 1/30, MD @ 10am and infusion @ 11am at the . Did explain status of insurance approval will dictate proceeding or r/s tx, to which she states she understands. All questions answered, gave her my call back number for any questions they may have. Moneero sent to pt. FC, SW, RD, pharmD and pre service notified of new pt start.  Oncology Navigation   Intake  Date of Diagnosis: 12/14/23  Cancer Type: Breast  Internal / External Referral: Internal  Date of Referral: 12/19/23  Initial Nurse Navigator Contact: 12/19/23  Referral to Initial Contact Timeline (days): 0  Date Worked: 01/22/24  First Appointment Available: 12/21/23  Appointment Date: 12/21/23  First Available Date vs. Scheduled Date (days): 0  Multiple appointments: Yes     Treatment  Current Status: Active  Date Presented to Tumor Board: 12/28/23    Surgery: Planned  Surgical Oncologist: Brittnee Briones MD  Type of Surgery: Left total mastectomy with left sentinel lymph node biopsy    Medical Oncologist: Shaina Garg MD  Consult Date: 12/21/23  Chemotherapy: Planned  Chemotherapy Regimen: Taxotere  Immunotherapy: Planned  Immunotherapy Name: Herceptin/Perjeta  Start Date: 01/30/24    Radiation Therapy: Planned    Procedures: Port / PICC  Genetic Testing Date Sent: 12/21/23  Diagnostic Mammo Schedule Date:  (3/4 through [facility])  PET Scan Schedule Date: 12/29/23  Port / PICC  "Schedule Date: 24    General Referrals: Chemo Education; Social Work  Social Work Referral Date: 24    ER: Positive  ID: Negative  Her2: Postive       Support Systems: Children; Family members; Catholic / sree community  Barriers of Care: Barriers to Care "Assessment completed-no barriers noted"     Acuity  Stage: 1  Systemic Treatment - predicted or initiated: Chemotherapy Regimen with Multiple drugs (+1)  Surgical Procedure Complexity: 2  Treatment Tolerability: Has not started treatment yet/treatment fully completed and side effects resolved  ECO  Comorbidities in Medical History: 1  Hospitalization Within the Past Month: 0   Needed: 0  Support: 0  Verbalizes Financial Concerns: 0  Transportation: 0  Psychological Factors (+1 each): Emotional during conversation  History of noncompliance/frequent no shows and cancellations: 0  Verbalizes the need for more education: 0  Other Factors (+1 for Each): 0  Navigation Acuity: 1     Follow Up  Follow up in 8 days (on 2024) for C1D1 THP.         "

## 2024-01-29 DIAGNOSIS — B35.9 DERMATOPHYTOSIS: Primary | ICD-10-CM

## 2024-01-29 RX ORDER — CLOTRIMAZOLE 1 %
CREAM (GRAM) TOPICAL 2 TIMES DAILY
Qty: 113 G | Refills: 1 | Status: SHIPPED | OUTPATIENT
Start: 2024-01-29

## 2024-01-30 ENCOUNTER — DOCUMENTATION ONLY (OUTPATIENT)
Dept: HEMATOLOGY/ONCOLOGY | Facility: CLINIC | Age: 84
End: 2024-01-30
Payer: MEDICARE

## 2024-01-30 ENCOUNTER — INFUSION (OUTPATIENT)
Dept: INFUSION THERAPY | Facility: HOSPITAL | Age: 84
End: 2024-01-30
Attending: INTERNAL MEDICINE
Payer: MEDICARE

## 2024-01-30 ENCOUNTER — NURSE TRIAGE (OUTPATIENT)
Dept: ADMINISTRATIVE | Facility: CLINIC | Age: 84
End: 2024-01-30
Payer: MEDICARE

## 2024-01-30 ENCOUNTER — OFFICE VISIT (OUTPATIENT)
Dept: HEMATOLOGY/ONCOLOGY | Facility: CLINIC | Age: 84
End: 2024-01-30
Payer: MEDICARE

## 2024-01-30 VITALS
HEIGHT: 70 IN | BODY MASS INDEX: 34.24 KG/M2 | DIASTOLIC BLOOD PRESSURE: 76 MMHG | HEART RATE: 98 BPM | SYSTOLIC BLOOD PRESSURE: 117 MMHG | TEMPERATURE: 99 F | OXYGEN SATURATION: 95 % | WEIGHT: 239.19 LBS

## 2024-01-30 VITALS
WEIGHT: 239.19 LBS | SYSTOLIC BLOOD PRESSURE: 136 MMHG | OXYGEN SATURATION: 95 % | HEART RATE: 107 BPM | HEIGHT: 70 IN | BODY MASS INDEX: 34.24 KG/M2 | DIASTOLIC BLOOD PRESSURE: 90 MMHG | TEMPERATURE: 98 F | RESPIRATION RATE: 18 BRPM

## 2024-01-30 DIAGNOSIS — Z17.0 MALIGNANT NEOPLASM INVOLVING BOTH NIPPLE AND AREOLA OF LEFT BREAST IN MALE, ESTROGEN RECEPTOR POSITIVE: Primary | ICD-10-CM

## 2024-01-30 DIAGNOSIS — C50.022 MALIGNANT NEOPLASM INVOLVING BOTH NIPPLE AND AREOLA OF LEFT BREAST IN MALE, ESTROGEN RECEPTOR POSITIVE: Primary | ICD-10-CM

## 2024-01-30 PROCEDURE — 3072F LOW RISK FOR RETINOPATHY: CPT | Mod: HCNC,CPTII,S$GLB, | Performed by: INTERNAL MEDICINE

## 2024-01-30 PROCEDURE — 1126F AMNT PAIN NOTED NONE PRSNT: CPT | Mod: HCNC,CPTII,S$GLB, | Performed by: INTERNAL MEDICINE

## 2024-01-30 PROCEDURE — 96413 CHEMO IV INFUSION 1 HR: CPT | Mod: HCNC

## 2024-01-30 PROCEDURE — 3288F FALL RISK ASSESSMENT DOCD: CPT | Mod: HCNC,CPTII,S$GLB, | Performed by: INTERNAL MEDICINE

## 2024-01-30 PROCEDURE — 96375 TX/PRO/DX INJ NEW DRUG ADDON: CPT | Mod: HCNC

## 2024-01-30 PROCEDURE — 25000003 PHARM REV CODE 250: Mod: HCNC | Performed by: INTERNAL MEDICINE

## 2024-01-30 PROCEDURE — 96367 TX/PROPH/DG ADDL SEQ IV INF: CPT | Mod: HCNC

## 2024-01-30 PROCEDURE — 1101F PT FALLS ASSESS-DOCD LE1/YR: CPT | Mod: HCNC,CPTII,S$GLB, | Performed by: INTERNAL MEDICINE

## 2024-01-30 PROCEDURE — 63600175 PHARM REV CODE 636 W HCPCS: Mod: JG,HCNC | Performed by: INTERNAL MEDICINE

## 2024-01-30 PROCEDURE — 99999 PR PBB SHADOW E&M-EST. PATIENT-LVL III: CPT | Mod: PBBFAC,HCNC,, | Performed by: INTERNAL MEDICINE

## 2024-01-30 PROCEDURE — 96417 CHEMO IV INFUS EACH ADDL SEQ: CPT | Mod: HCNC

## 2024-01-30 PROCEDURE — 99215 OFFICE O/P EST HI 40 MIN: CPT | Mod: HCNC,S$GLB,, | Performed by: INTERNAL MEDICINE

## 2024-01-30 RX ORDER — DIPHENHYDRAMINE HYDROCHLORIDE 50 MG/ML
50 INJECTION INTRAMUSCULAR; INTRAVENOUS ONCE AS NEEDED
Status: CANCELLED | OUTPATIENT
Start: 2024-01-30

## 2024-01-30 RX ORDER — HEPARIN 100 UNIT/ML
500 SYRINGE INTRAVENOUS
Status: DISCONTINUED | OUTPATIENT
Start: 2024-01-30 | End: 2024-01-30 | Stop reason: HOSPADM

## 2024-01-30 RX ORDER — HEPARIN 100 UNIT/ML
500 SYRINGE INTRAVENOUS
Status: CANCELLED | OUTPATIENT
Start: 2024-01-30

## 2024-01-30 RX ORDER — EPINEPHRINE 0.3 MG/.3ML
0.3 INJECTION SUBCUTANEOUS ONCE AS NEEDED
Status: DISCONTINUED | OUTPATIENT
Start: 2024-01-30 | End: 2024-01-30 | Stop reason: HOSPADM

## 2024-01-30 RX ORDER — MEPERIDINE HYDROCHLORIDE 25 MG/ML
25 INJECTION INTRAMUSCULAR; INTRAVENOUS; SUBCUTANEOUS ONCE AS NEEDED
Status: COMPLETED | OUTPATIENT
Start: 2024-01-30 | End: 2024-01-30

## 2024-01-30 RX ORDER — PROCHLORPERAZINE EDISYLATE 5 MG/ML
5 INJECTION INTRAMUSCULAR; INTRAVENOUS ONCE AS NEEDED
Status: CANCELLED | OUTPATIENT
Start: 2024-01-31

## 2024-01-30 RX ORDER — PROCHLORPERAZINE EDISYLATE 5 MG/ML
5 INJECTION INTRAMUSCULAR; INTRAVENOUS ONCE AS NEEDED
Status: CANCELLED | OUTPATIENT
Start: 2024-01-30

## 2024-01-30 RX ORDER — EPINEPHRINE 0.3 MG/.3ML
0.3 INJECTION SUBCUTANEOUS ONCE AS NEEDED
Status: CANCELLED | OUTPATIENT
Start: 2024-01-30

## 2024-01-30 RX ORDER — DIPHENHYDRAMINE HYDROCHLORIDE 50 MG/ML
50 INJECTION INTRAMUSCULAR; INTRAVENOUS ONCE AS NEEDED
Status: CANCELLED | OUTPATIENT
Start: 2024-01-31

## 2024-01-30 RX ORDER — DEXAMETHASONE 4 MG/1
TABLET ORAL
Qty: 30 TABLET | Refills: 0 | Status: SHIPPED | OUTPATIENT
Start: 2024-01-30 | End: 2024-02-26

## 2024-01-30 RX ORDER — PROCHLORPERAZINE MALEATE 5 MG
5 TABLET ORAL EVERY 6 HOURS PRN
Qty: 30 TABLET | Refills: 3 | Status: SHIPPED | OUTPATIENT
Start: 2024-01-30 | End: 2025-01-29

## 2024-01-30 RX ORDER — EPINEPHRINE 0.3 MG/.3ML
0.3 INJECTION SUBCUTANEOUS ONCE AS NEEDED
Status: CANCELLED | OUTPATIENT
Start: 2024-01-31

## 2024-01-30 RX ORDER — MEPERIDINE HYDROCHLORIDE 25 MG/ML
25 INJECTION INTRAMUSCULAR; INTRAVENOUS; SUBCUTANEOUS ONCE AS NEEDED
Status: CANCELLED | OUTPATIENT
Start: 2024-01-31

## 2024-01-30 RX ORDER — ONDANSETRON 4 MG/1
4 TABLET, ORALLY DISINTEGRATING ORAL EVERY 8 HOURS PRN
Qty: 90 TABLET | Refills: 3 | Status: SHIPPED | OUTPATIENT
Start: 2024-01-30

## 2024-01-30 RX ORDER — SODIUM CHLORIDE 0.9 % (FLUSH) 0.9 %
10 SYRINGE (ML) INJECTION
Status: CANCELLED | OUTPATIENT
Start: 2024-01-30

## 2024-01-30 RX ORDER — ONDANSETRON HYDROCHLORIDE 2 MG/ML
8 INJECTION, SOLUTION INTRAVENOUS
Status: CANCELLED
Start: 2024-01-31

## 2024-01-30 RX ORDER — LIDOCAINE AND PRILOCAINE 25; 25 MG/G; MG/G
CREAM TOPICAL
Qty: 30 G | Refills: 0 | Status: SHIPPED | OUTPATIENT
Start: 2024-01-30

## 2024-01-30 RX ORDER — PROCHLORPERAZINE EDISYLATE 5 MG/ML
5 INJECTION INTRAMUSCULAR; INTRAVENOUS ONCE AS NEEDED
Status: COMPLETED | OUTPATIENT
Start: 2024-01-30 | End: 2024-01-30

## 2024-01-30 RX ORDER — DIPHENHYDRAMINE HYDROCHLORIDE 50 MG/ML
50 INJECTION INTRAMUSCULAR; INTRAVENOUS ONCE AS NEEDED
Status: DISCONTINUED | OUTPATIENT
Start: 2024-01-30 | End: 2024-01-30 | Stop reason: HOSPADM

## 2024-01-30 RX ORDER — SODIUM CHLORIDE 0.9 % (FLUSH) 0.9 %
10 SYRINGE (ML) INJECTION
Status: DISCONTINUED | OUTPATIENT
Start: 2024-01-30 | End: 2024-01-30 | Stop reason: HOSPADM

## 2024-01-30 RX ADMIN — PROCHLORPERAZINE EDISYLATE 5 MG: 5 INJECTION INTRAMUSCULAR; INTRAVENOUS at 03:01

## 2024-01-30 RX ADMIN — DEXAMETHASONE SODIUM PHOSPHATE 20 MG: 4 INJECTION, SOLUTION INTRA-ARTICULAR; INTRALESIONAL; INTRAMUSCULAR; INTRAVENOUS; SOFT TISSUE at 03:01

## 2024-01-30 RX ADMIN — PERTUZUMAB 840 MG: 30 INJECTION, SOLUTION, CONCENTRATE INTRAVENOUS at 12:01

## 2024-01-30 RX ADMIN — TRASTUZUMAB-ANNS 840 MG: 420 INJECTION, POWDER, LYOPHILIZED, FOR SOLUTION INTRAVENOUS at 02:01

## 2024-01-30 RX ADMIN — SODIUM CHLORIDE: 9 INJECTION, SOLUTION INTRAVENOUS at 11:01

## 2024-01-30 RX ADMIN — MEPERIDINE HYDROCHLORIDE 25 MG: 25 INJECTION INTRAMUSCULAR; INTRAVENOUS; SUBCUTANEOUS at 03:01

## 2024-01-30 NOTE — PLAN OF CARE
Discussed plan of care with pt. Addressed any and ongoing concerns. Pt denies   Problem: Adult Inpatient Plan of Care  Goal: Plan of Care Review  Outcome: Ongoing, Progressing  Goal: Patient-Specific Goal (Individualized)  Outcome: Ongoing, Progressing  Flowsheets (Taken 1/30/2024 1259)  Anxieties, Fears or Concerns: First Chemo treatment  Individualized Care Needs: Allowed family members to sit with him , reclined position with pillow and orange juice  Goal: Absence of Hospital-Acquired Illness or Injury  Outcome: Ongoing, Progressing  Intervention: Identify and Manage Fall Risk  Flowsheets (Taken 1/30/2024 1259)  Safety Promotion/Fall Prevention: in recliner, wheels locked  Intervention: Prevent Infection  Flowsheets (Taken 1/30/2024 1259)  Infection Prevention:   equipment surfaces disinfected   hand hygiene promoted   personal protective equipment utilized  Goal: Optimal Comfort and Wellbeing  Outcome: Ongoing, Progressing  Intervention: Provide Person-Centered Care  Flowsheets (Taken 1/30/2024 1259)  Trust Relationship/Rapport:   care explained   questions encouraged   choices provided   reassurance provided   emotional support provided   thoughts/feelings acknowledged   empathic listening provided   questions answered

## 2024-01-30 NOTE — PROGRESS NOTES
PEARL met with pt and family to introduce self and assess for any needs/concerns that pt may have r/t start of new treatment. PEARL provided a new pt folder and explained contents. Pt stated that he's a  as well as all of his sons, and all of his dtr's  pastors as well. Pt has supportive family and [employer] family. Pt inquired about financial assistance for his $3,800 insurance deductible. SW explained that this could be met by any oop or other medical services received, including current treatments, but he should also receive a quarterly benefit summary from [employer] and can call [employer] as well to inquire about how much of this has been met. PEARL provided the following grants from [email or web address] that pt can apply for: [facility], [place]ed [employer]am, [employer] Bam, [facility] Individual Bam, and [employer] Fund. Pt's family will assist with contacting the above via phone or internet to apply. PEARL will also add emergency contact dt-Tiffany Byrd [phone] per pt's request. PEARL provided # [phone]/flyer to [employer]'s OTC flyer so that pt can check for benefits. Pt stated that he has ordered a life alert pendant via [employer] life line and should receive soon. Pt also gave permission for PEARL to call [employer] to verify transportation benefits. PEARL called [employer] [phone] ([place]) Pt does not have transportation benefits for 2024, but had in 2023. PEARL will call pt/family to inform. Pt will call PEARL at number provided if future needs arise. SW will remain available.

## 2024-01-30 NOTE — PROGRESS NOTES
[place] [facility]  [address] [facility] [address]  Phone: [phone];  Fax: [phone]    Patient ID: Mark Dickerson Sr.   Chief Complaint: Follow-up and Breast Cancer  MRN:  [id number]     Oncologic Diagnosis:  Stage IV Breast Cancer, +/+/+  Previous Treatment:  N/A  Current Treatment:  THP (01/30/2024 - )  Subjective   The patient presents for follow-up and is accompanied by his daughter, son, son/daughters-in-law.    The patient is in good spirits.  We reviewed the antiemetic regimen.  He is ready to start treatment.  He did inquire about pain control.  Currently his pain is controlled with Tylenol or Advil intermittently.  The majority of his pain is from arthritis in his knees.  He has no acute complaints today.  [facility]  We also discussed the effects of chemotherapy on the immune system.  I advised that he can do what he wants to do with to avoid large crowds in ill people.  He should wear his mask and wash his hands frequently.  We will monitor his blood counts very frequently.    Review of Systems:  Review of Systems   Constitutional:  Negative for activity change, appetite change, chills, diaphoresis, fatigue, fever and unexpected weight change.   HENT:  Negative for nosebleeds.    Respiratory:  Negative for shortness of breath.    Cardiovascular:  Negative for chest pain.   Gastrointestinal:  Negative for abdominal distention, abdominal pain, anal bleeding, blood in stool, constipation, diarrhea, nausea and vomiting.   Genitourinary:  Negative for difficulty urinating and hematuria.   Musculoskeletal:  Positive for arthralgias (knees). Negative for back pain and myalgias.   Skin:  Negative for rash.   Neurological:  Negative for dizziness, weakness, light-headedness and headaches.   Hematological:  Does not bruise/bleed easily.   Psychiatric/Behavioral:  The patient is not nervous/anxious.      History     Oncology History   Malignant  neoplasm involving both nipple and areola of left breast in male, estrogen receptor positive   12/21/2023 Initial Diagnosis    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, estrogen receptor positive     12/21/2023 Cancer Staged    Staging form: Breast, AJCC 8th Edition  - Clinical stage from 12/21/2023: Stage IIA (cT2, cN1(f), cM0, G2, ER+, NV-, HER2+)     1/30/2024 -  Chemotherapy    Treatment Summary   Plan Name: OP BREAST THP (pertuzumab trastuzumab DOCEtaxel) Q3W  Treatment Goal: Curative  Status: Active  Start Date: 1/30/2024  End Date: 1/1/2025 (Planned)  Provider: Shaina Mendoza MD  Chemotherapy: DOCEtaxel (TAXOTERE) 75 mg/m2 = 174 mg in sodium chloride 0.9% 258.7 mL chemo infusion, 75 mg/m2 = 174 mg, Intravenous, Clinic/HOD 1 time, 1 of 17 cycles  pertuzumab (PERJETA) 840 mg in sodium chloride 0.9% 278 mL infusion, 840 mg, Intravenous, Clinic/HOD 1 time, 1 of 17 cycles  trastuzumab-anns (KANJINTI) 868 mg in sodium chloride 0.9% 250 mL chemo infusion, 8 mg/kg = 868 mg, Intravenous, Clinic/HOD 1 time, 1 of 17 cycles      Chemotherapy    Treatment Summary   Plan Name: OP BREAST THP (pertuzumab trastuzumab DOCEtaxel) Q3W  Treatment Goal: Curative  Status: Active  Start Date: 1/22/2024 (Planned)  End Date: 12/24/2024 (Planned)  Provider: Shaina Mendoza MD  Chemotherapy: DOCEtaxel (TAXOTERE) 75 mg/m2 in sodium chloride 0.9% 250 mL chemo infusion, 75 mg/m2, Intravenous, Clinic/HOD 1 time, 0 of 17 cycles    pertuzumab (PERJETA) 840 mg in sodium chloride 0.9% 278 mL infusion, 840 mg, Intravenous, Clinic/HOD 1 time, 0 of 17 cycles    trastuzumab-anns (KANJINTI) 8 mg/kg in sodium chloride 0.9% 250 mL chemo infusion, 8 mg/kg, Intravenous, Clinic/HOD 1 time, 0 of 17 cycles           Past Medical History:   Diagnosis Date    CKD stage 3 due to type 2 diabetes mellitus 2/18/2021    DM (diabetes mellitus) 2014    BS didn't check 12/11/2019    DM (diabetes mellitus) 2014    BS didn't  check 2022    Foreign body, eye     right eye    Hyperlipidemia     Hypertension     Need for shingles vaccine 2019    Pneumonia     Primary osteoarthritis of right knee 10/29/2021    Severe obesity (BMI 35.0-35.9 with comorbidity) 7/10/2019    Type 2 diabetes mellitus 2014    BS didn't check 2018       Past Surgical History:   Procedure Laterality Date    APPENDECTOMY      CATARACT EXTRACTION Bilateral     CPG    ENDOSCOPIC ULTRASOUND OF UPPER GASTROINTESTINAL TRACT N/A 2024    Procedure: ULTRASOUND, UPPER GI TRACT, ENDOSCOPIC;  Surgeon: Evens Monsalve MD;  [facility]  Service: Endoscopy;  Laterality: N/A;    FLUOROSCOPY N/A 2024    Procedure: Fluoroscopy/MEDIPORT PLACEMENT;  Surgeon: Jaret Lu MD;  Location: [facility] CATH LAB;  Service: General;  Laterality: N/A;       Family History   Problem Relation Age of Onset    Heart disease Mother     Hypertension Father     Heart disease Father     Hyperlipidemia Father     Cataracts Father     Hypertension Sister     Hypertension Brother     Cancer Son         colon    Allergic rhinitis Daughter        Review of patient's allergies indicates:  No Known Allergies    Social History     Tobacco Use    Smoking status: Former     Current packs/day: 0.00     Average packs/day: 3.0 packs/day for 10.0 years (30.0 ttl pk-yrs)     Types: Cigarettes     Start date:      Quit date:      Years since quittin.1    Smokeless tobacco: Never   Substance Use Topics    Alcohol use: No    Drug use: No        Physical Exam     Vitals:    24 1026   BP: 117/76   Pulse: 98   Temp: 98.8 °F (37.1 °C)     Physical Exam  Constitutional:       General: He is not in acute distress.     Appearance: He is not ill-appearing, toxic-appearing or diaphoretic.   HENT:      Head: Normocephalic and atraumatic.   Cardiovascular:      Rate and Rhythm: Normal rate.   Pulmonary:      Effort: Pulmonary effort is normal.   Chest:   Breasts:      Left: Swelling, mass, skin change and tenderness present. No bleeding or nipple discharge.   Skin:     General: Skin is warm.   Neurological:      Mental Status: He is alert.   Psychiatric:         Mood and Affect: Mood normal.         Behavior: Behavior normal.         Thought Content: Thought content normal.         Judgment: Judgment normal.     Labs   Labs:  No visits with results within 2 Day(s) from this visit.   Latest known visit with results is:   Hospital Outpatient Visit on 01/23/2024   Component Date Value Ref Range Status    RA Width 01/23/2024 3.08  cm Final    LA volume (mod) 01/23/2024 33.07  cm3 Final    Left Atrium Major Axis 01/23/2024 4.37  cm Final    Left Atrium Minor Axis 01/23/2024 4.37  cm Final    RA Major Axis 01/23/2024 3.78  cm Final    LV Diastolic Volume 01/23/2024 56.39  mL Final    LV Systolic Volume 01/23/2024 27.73  mL Final    MV Peak A Can 01/23/2024 1.50  m/s Final    TR Max Acn 01/23/2024 2.50  m/s Final    PV mean gradient 01/23/2024 1  mmHg Final    RVOT peak VTI 01/23/2024 12.8  cm Final    RVOT peak can 01/23/2024 0.61  m/s Final    Ao VTI 01/23/2024 55.90  cm Final    Ao peak can 01/23/2024 3.54  m/s Final    LVOT peak VTI 01/23/2024 13.90  cm Final    LVOT peak can 01/23/2024 0.75  m/s Final    LVOT diameter 01/23/2024 2.20  cm Final    IVRT 01/23/2024 91.34  msec Final    PV peak gradient 01/23/2024 7  mmHg Final    AV mean gradient 01/23/2024 27  mmHg Final    RVDD 01/23/2024 3.06  cm Final    LA size 01/23/2024 3.40  cm Final    Ascending aorta 01/23/2024 2.77  cm Final    STJ 01/23/2024 2.10  cm Final    Sinus 01/23/2024 2.47  cm Final    LVIDs 01/23/2024 2.73  2.1 - 4.0 cm Final    Ao root annulus 01/23/2024 3.55  cm Final    Posterior Wall 01/23/2024 1.42 (A)  0.6 - 1.1 cm Final    IVS 01/23/2024 1.65 (A)  0.6 - 1.1 cm Final    LVIDd 01/23/2024 3.65  3.5 - 6.0 cm Final    PV PEAK VELOCITY 01/23/2024 1.32  m/s Final    Left Ventricular Outflow Tract Montserrat* 01/23/2024  1.26  mmHg Final    Left Ventricular Outflow Tract Montserrat* 01/23/2024 0.53  cm/s Final    FS 01/23/2024 25 (A)  28 - 44 % Final    LV mass 01/23/2024 212.62  g Final    Left Ventricle Relative Wall Thick* 01/23/2024 0.78  cm Final    AV valve area 01/23/2024 0.94  cm² Final    AV Velocity Ratio 01/23/2024 0.21   Final    AV index (prosthetic) 01/23/2024 0.25   Final    LVOT area 01/23/2024 3.8  cm2 Final    LVOT stroke volume 01/23/2024 52.81  cm3 Final    AV peak gradient 01/23/2024 50  mmHg Final    Triscuspid Valve Regurgitation Pea* 01/23/2024 25  mmHg Final    ALEXI by Velocity Ratio 01/23/2024 0.80  cm² Final    BSA 01/23/2024 2.28  m2 Final    LV Systolic Volume Index 01/23/2024 12.5  mL/m2 Final    LV Diastolic Volume Index 01/23/2024 25.40  mL/m2 Final    LV Mass Index 01/23/2024 96  g/m2 Final    LA Volume Index (Mod) 01/23/2024 14.9  mL/m2 Final    ZLVIDS 01/23/2024 -4.30   Final    ZLVIDD 01/23/2024 -7.53   Final    LA Volume Index 01/23/2024 18.8  mL/m2 Final    LA volume 01/23/2024 41.68  cm3 Final    LA WIDTH 01/23/2024 3.3  cm Final    EF 01/23/2024 55  % Final    TV resting pulmonary artery pressu* 01/23/2024 28  mmHg Final    RV TB RVSP 01/23/2024 6  mmHg Final    Est. RA pres 01/23/2024 3  mmHg Final        Imaging   Diagnostic L Breast US and Bilateral MMG w/Tomosynthesis 12/01/23  Findings:  This procedure was performed using tomosynthesis. Computer-aided detection was utilized in the interpretation of this examination.  The breasts are almost entirely fatty.      Left  Mammo Digital Diagnostic Bilat with Danny  There is a 5 cm mass seen in the retroareolar region of the left breast in the anterior depth. Associated features include nodular skin thickening and axillary adenopathy. There are also associated calcifications. Additional 1 cm mass noted within the axillary tail.      US Breast Left Limited  5 cm hypoechoic mass with calcifications noted within the subareolar breast corresponding to large  mammographic mass. Overlying nodular skin involvement present. 1 cm hypoechoic mass within the axillary tail region. Two enlarged abnormal lymph nodes present within the left axilla.      Right  Mammo Digital Diagnostic Bilat with Danny  There is no evidence of suspicious masses, calcifications, or other abnormal findings in the right breast.     Impression:  Left  Mass: Left breast 5 cm mass at the retroareolar anterior position. Abnormal intramammary lymph node versus 2nd site of malignancy within the axillary tail. Abnormal axillary adenopathy. Assessment: 5 - Highly suggestive of malignancy. Biopsy is recommended.      Right  Mammo Digital Diagnostic Bilat with Danny  There is no mammographic evidence of malignancy in the right breast.      NM PET CT FDG SKULL BASE TO MID THIGH - 12/29/2023  CLINICAL HISTORY:  Initial staging imaging for stage II hormone + male breast cancer; Malignant neoplasm of nipple and areola, left male breast     TECHNIQUE:  11.78 mCi of F18-FDG was administered intravenously in the left antecubital fossa.  After an approximately 60 min distribution time, PET/CT images were acquired from the skull base to the mid thigh. Transmission images were acquired to correct for attenuation using a whole body low-dose CT scan without contrast with the arms positioned above the head. Glycemia at the time of injection was 163 mg/dL.     COMPARISON:  None     FINDINGS:  Quality of the study: Adequate.     SUV max of the liver parenchyma is 3.7     Neck: No FDG abnormal avidity.  No lymphadenopathy or mass.     Chest: Large left breast mass which involves the skin measuring 7.0 x 4.5 cm with SUV max 9.7.     Extensive left axillary FDG avid lymphadenopathy with the largest left axillary lymph node measuring 3.9 x 2.3 cm with SUV max 7.4.  Another large left axillary lymph node measures 2.4 x 1.5 cm with SUV max 8.4.     The left axillary lymphadenopathy involves all 3 levels which includes of couple of  level 3 lymph nodes, 1 measuring 1.2 x 0.7 cm with SUV max 6.6.     Medial pleural based right middle lobe pulmonary nodule measuring 16 x 10 mm with SUV max 6.2.     Right hilar lymph node measuring 1.5 x 1.4 cm with SUV max 6.9.     Azygoesophageal recess mediastinal lymph node measuring 1.9 x 1.4 cm with SUV max 6.2.     No pleural effusion     There are couple of small bilateral lower     Abdomen/pelvis: No lymphadenopathy.  No mass lesion or abnormal focus of FDG avidity.     Skeletal structures: No abnormal FDG avidity.  No focal lytic or sclerotic lesion in the osseous structures.  Lobe pulmonary nodules which show no FDG avidity.     Physiologic uptake of the tracer is present within the brain, salivary glands, myocardium, GI and  tracts.     Incidental CT findings: Coronary artery calcifications.     Impression:     1. Large left breast FDG avid mass which involves the skin.  2. Extensive markedly FDG avid left axillary lymphadenopathy involving all 3 levels of the axilla.  3. FDG avid mediastinal and right hilar lymphadenopathy concerning for intrathoracic metastatic disease.  4. Multiple pulmonary nodules.  Only 1 pulmonary nodules FDG avid which is a pleural based right middle lobe 16 x 10 mm pulmonary nodule.  All of these pulmonary nodules are concerning for metastatic disease.        Pathology     Left Breast Biopsy 12/14/23      Component 7 d ago   Final Pathologic Diagnosis Part 1  Breast, left, subareolar, ultrasound-guided biopsy:  Invasive ductal carcinoma, moderately differentiated  Raul grade 2:  Tubule formation -3, nuclear pleomorphism-2 and mitotic count -2  Invasive carcinoma measures 17 mm in greatest dimension  No carcinoma in Situ or lymphovascular invasion is identified    Tumor biomarkers  Estrogen receptor (ER):  Positive, 80-90%, strong  Progesterone receptor (PGR):  Negative  HER2 (IHC):  Positive, 3+  Ki-67:  20-25%      Part 2  Axilla, left, ultrasound-guided  biopsy:  Positive for carcinoma, see comment below      This case was reviewed by Dr. TONI Alcantara who concurs with the above diagnoses and assessment.    Stains were performed with appropriate controls.            Assessment and Plan   L Breast Invasive Ductal Carcinoma, Stage IIA (T2pN1),  +/-/+, Ki67 20-25%, G2  12/01/23 MMG showed left breast mass, 5 cm, at the retroareolar anterior position and abnormal intramammary lymph node versus 2nd site of malignancy cassidy axillary tail   s/p L breast biopsy 12/14/2023:  Positive for malignancy; left axillary lymph node positive for malignancy  His last  TTE 04/15/2023: EF 60%  PET CT 12/29/2023 unfortunately showed mediastinal and hilar lymphadenopathy suspicious for metastatic disease  EUS with biopsy 01/11/2024 results positive for metastatic adenocarcinoma of the breast  Genetic testing pending  He was initially planned for neoadjuvant TCHP however because PET-CT showed metastatic disease his treatment plan was changed to THP +Arimidex  Follow up [facility] Genetic Testing Results  C1 THP 01/30/24:  Patient with reaction to Herceptin with Rigors; Demerol administered with relief.  He then had tremors/jitteriness to Dexamethasone. Treatment aborted.  Plan for Taxotere in the morning without steroids.  On subsequent cycles, plan for 4mg BID dexamethasone the day of chemotherapy to prevent fluid retention.      Chronic Medical Conditions  DM II, controlled  CKD III  HTN  AS - Non-rheumatic  Hx TIA        Med Onc Chart Routing      Follow up with physician 1 week. okay to DB.  Toxicity check   Follow up with ZULEYMA    Infusion scheduling note   IVF with NS   Injection scheduling note    Labs CBC, CMP, phosphorus and magnesium   Scheduling:  Preferred lab:  Lab interval:     Imaging    Pharmacy appointment    Other referrals                       The patient was seen, interviewed and examined. Pertinent lab and radiologic studies were reviewed. Pt instructed to call should they  develop concerning signs/symptoms or have further questions.        Portions of the record may have been created with voice recognition software. Occasional wrong-word or sound-a-like substitutions may have occurred due to the inherent limitations of voice recognition software. Read the chart carefully and recognize, using context, where substitutions have occurred.      Shaina Garg MD    Hematology/Oncology

## 2024-01-30 NOTE — NURSING
1511 Rigors onset during infusion of Kanjinti, Dr. Mendoza notified and infusion placed on hold, flushed line with Normal saline and administered 25mg Demerol at 1515. 1516 Dr. Mendoza at chair side, vital signs as charted per flow .  Compazine given IVP for complaints of nausea at 1521  1600 decided to delay treatment until 1/31/24 pt with elevated pulse and continues to have some rigors treatment will continue on tomorrow with Docetaxel only , pt and family in agreement to change in plan of care

## 2024-01-31 ENCOUNTER — INFUSION (OUTPATIENT)
Dept: INFUSION THERAPY | Facility: HOSPITAL | Age: 84
End: 2024-01-31
Attending: INTERNAL MEDICINE
Payer: MEDICARE

## 2024-01-31 VITALS
HEIGHT: 70 IN | TEMPERATURE: 98 F | WEIGHT: 239.19 LBS | BODY MASS INDEX: 34.24 KG/M2 | RESPIRATION RATE: 18 BRPM | DIASTOLIC BLOOD PRESSURE: 73 MMHG | OXYGEN SATURATION: 97 % | SYSTOLIC BLOOD PRESSURE: 120 MMHG | HEART RATE: 83 BPM

## 2024-01-31 DIAGNOSIS — Z17.0 MALIGNANT NEOPLASM INVOLVING BOTH NIPPLE AND AREOLA OF LEFT BREAST IN MALE, ESTROGEN RECEPTOR POSITIVE: Primary | ICD-10-CM

## 2024-01-31 DIAGNOSIS — C50.022 MALIGNANT NEOPLASM INVOLVING BOTH NIPPLE AND AREOLA OF LEFT BREAST IN MALE, ESTROGEN RECEPTOR POSITIVE: Primary | ICD-10-CM

## 2024-01-31 PROCEDURE — A4216 STERILE WATER/SALINE, 10 ML: HCPCS | Mod: HCNC | Performed by: INTERNAL MEDICINE

## 2024-01-31 PROCEDURE — 63600175 PHARM REV CODE 636 W HCPCS: Mod: HCNC | Performed by: INTERNAL MEDICINE

## 2024-01-31 PROCEDURE — 96413 CHEMO IV INFUSION 1 HR: CPT | Mod: HCNC

## 2024-01-31 PROCEDURE — 25000003 PHARM REV CODE 250: Mod: HCNC | Performed by: INTERNAL MEDICINE

## 2024-01-31 PROCEDURE — 96375 TX/PRO/DX INJ NEW DRUG ADDON: CPT | Mod: HCNC

## 2024-01-31 RX ORDER — MEPERIDINE HYDROCHLORIDE 25 MG/ML
25 INJECTION INTRAMUSCULAR; INTRAVENOUS; SUBCUTANEOUS ONCE AS NEEDED
Status: DISCONTINUED | OUTPATIENT
Start: 2024-01-31 | End: 2024-01-31 | Stop reason: HOSPADM

## 2024-01-31 RX ORDER — SODIUM CHLORIDE 0.9 % (FLUSH) 0.9 %
10 SYRINGE (ML) INJECTION
Status: DISCONTINUED | OUTPATIENT
Start: 2024-01-31 | End: 2024-01-31 | Stop reason: HOSPADM

## 2024-01-31 RX ORDER — HEPARIN 100 UNIT/ML
5 SYRINGE INTRAVENOUS
Status: DISCONTINUED | OUTPATIENT
Start: 2024-01-31 | End: 2024-01-31 | Stop reason: HOSPADM

## 2024-01-31 RX ORDER — EPINEPHRINE 0.3 MG/.3ML
0.3 INJECTION SUBCUTANEOUS ONCE AS NEEDED
Status: DISCONTINUED | OUTPATIENT
Start: 2024-01-31 | End: 2024-01-31 | Stop reason: HOSPADM

## 2024-01-31 RX ORDER — PROCHLORPERAZINE EDISYLATE 5 MG/ML
5 INJECTION INTRAMUSCULAR; INTRAVENOUS ONCE AS NEEDED
Status: DISCONTINUED | OUTPATIENT
Start: 2024-01-31 | End: 2024-01-31 | Stop reason: HOSPADM

## 2024-01-31 RX ORDER — ONDANSETRON HYDROCHLORIDE 2 MG/ML
8 INJECTION, SOLUTION INTRAVENOUS
Status: COMPLETED | OUTPATIENT
Start: 2024-01-31 | End: 2024-01-31

## 2024-01-31 RX ORDER — DIPHENHYDRAMINE HYDROCHLORIDE 50 MG/ML
50 INJECTION INTRAMUSCULAR; INTRAVENOUS ONCE AS NEEDED
Status: DISCONTINUED | OUTPATIENT
Start: 2024-01-31 | End: 2024-01-31 | Stop reason: HOSPADM

## 2024-01-31 RX ADMIN — HEPARIN 500 UNITS: 100 SYRINGE at 10:01

## 2024-01-31 RX ADMIN — Medication 10 ML: at 10:01

## 2024-01-31 RX ADMIN — ONDANSETRON 8 MG: 2 INJECTION INTRAMUSCULAR; INTRAVENOUS at 08:01

## 2024-01-31 RX ADMIN — DOCETAXEL 174 MG: 20 INJECTION, SOLUTION, CONCENTRATE INTRAVENOUS at 09:01

## 2024-01-31 NOTE — PLAN OF CARE
Patient tolerated taxotere infusion today well with no reactions or side effects.      Problem: Adult Inpatient Plan of Care  Goal: Plan of Care Review  Outcome: Ongoing, Progressing  Flowsheets (Taken 1/31/2024 0756)  Plan of Care Reviewed With: (family)   patient   other (see comments)  Goal: Optimal Comfort and Wellbeing  Outcome: Ongoing, Progressing  Intervention: Provide Person-Centered Care  Flowsheets (Taken 1/31/2024 0756)  Trust Relationship/Rapport:   care explained   reassurance provided   choices provided   thoughts/feelings acknowledged   emotional support provided   empathic listening provided   questions answered   questions encouraged     Problem: Nausea and Vomiting (Chemotherapy Effects)  Goal: Fluid and Electrolyte Balance  Outcome: Ongoing, Progressing  Intervention: Prevent and Manage Nausea and Vomiting  Flowsheets (Taken 1/31/2024 0756)  Nausea/Vomiting Interventions: (medicated as ordered) other (see comments)  Environmental Support:   calm environment promoted   caregiver consistency promoted   distractions minimized   environmental consistency promoted

## 2024-01-31 NOTE — TELEPHONE ENCOUNTER
Pt had his first chemo treatment earlier today. Current temp 101.6, gave two tylenol about 10 min ago. Pt not having any other symptoms at this time other than a slight HA and feeling tired. He is resting.    Dispo- see HCP or pcp triage within 4 hours. Successfully reached on call provider, dr. Bragg. Md advised his fever is likely normal side effects of chemo today. Ok to treat with tylenol. Md confirmed most recent wbc count with triager. Advised family accordingly. Pts son also states pts temp is now down to 99.6. son states he has appt in the morning for next chemo infusion. Care advice given.       Reason for Disposition   [1] Fever > 101 F (38.3 C) AND [2] age > 60 years    Additional Information   Negative: Shock suspected (e.g., cold/pale/clammy skin, too weak to stand, low BP, rapid pulse)   Negative: Difficult to awaken or acting confused (e.g., disoriented, slurred speech)   Negative: Bluish (or gray) lips or face now   Negative: New-onset rash with many purple (or blood-colored) spots or dots   Negative: Sounds like a life-threatening emergency to the triager   Negative: Fever > 103 F (39.4 C)   Negative: [1] Neutropenia known or suspected (e.g., recent cancer chemotherapy) AND [2] fever > 100.4 F (38.0 C)   Negative: [1] Neutropenia known or suspected (e.g., recent cancer chemotherapy) AND [2] signs or symptoms of suspected infection are present   Negative: [1] Headache AND [2] stiff neck (can't touch chin to chest)   Negative: Difficulty breathing   Negative: [1] Drinking very little AND [2] dehydration suspected (e.g., no urine > 12 hours, very dry mouth, very lightheaded)   Negative: Patient sounds very sick or weak to the triager  (Exception: Mild weakness and hasn't taken fever medicine.)    Protocols used: Cancer - Fever-A-

## 2024-02-01 ENCOUNTER — INFUSION (OUTPATIENT)
Dept: INFUSION THERAPY | Facility: HOSPITAL | Age: 84
End: 2024-02-01
Attending: INTERNAL MEDICINE
Payer: MEDICARE

## 2024-02-01 VITALS
DIASTOLIC BLOOD PRESSURE: 84 MMHG | HEART RATE: 108 BPM | OXYGEN SATURATION: 95 % | TEMPERATURE: 98 F | SYSTOLIC BLOOD PRESSURE: 126 MMHG | RESPIRATION RATE: 18 BRPM

## 2024-02-01 DIAGNOSIS — Z17.0 MALIGNANT NEOPLASM INVOLVING BOTH NIPPLE AND AREOLA OF LEFT BREAST IN MALE, ESTROGEN RECEPTOR POSITIVE: Primary | ICD-10-CM

## 2024-02-01 DIAGNOSIS — C50.022 MALIGNANT NEOPLASM INVOLVING BOTH NIPPLE AND AREOLA OF LEFT BREAST IN MALE, ESTROGEN RECEPTOR POSITIVE: Primary | ICD-10-CM

## 2024-02-01 PROCEDURE — 63600175 PHARM REV CODE 636 W HCPCS: Mod: JZ,JG,HCNC | Performed by: INTERNAL MEDICINE

## 2024-02-01 PROCEDURE — 96372 THER/PROPH/DIAG INJ SC/IM: CPT | Mod: HCNC

## 2024-02-01 RX ADMIN — PEGFILGRASTIM-CBQV 6 MG: 6 INJECTION, SOLUTION SUBCUTANEOUS at 10:02

## 2024-02-07 ENCOUNTER — DOCUMENTATION ONLY (OUTPATIENT)
Dept: HEMATOLOGY/ONCOLOGY | Facility: CLINIC | Age: 84
End: 2024-02-07
Payer: MEDICARE

## 2024-02-07 ENCOUNTER — TELEPHONE (OUTPATIENT)
Dept: INFUSION THERAPY | Facility: HOSPITAL | Age: 84
End: 2024-02-07
Payer: MEDICARE

## 2024-02-07 ENCOUNTER — INFUSION (OUTPATIENT)
Dept: INFUSION THERAPY | Facility: HOSPITAL | Age: 84
End: 2024-02-07
Attending: INTERNAL MEDICINE
Payer: MEDICARE

## 2024-02-07 ENCOUNTER — OFFICE VISIT (OUTPATIENT)
Dept: HEMATOLOGY/ONCOLOGY | Facility: CLINIC | Age: 84
End: 2024-02-07
Payer: MEDICARE

## 2024-02-07 VITALS
RESPIRATION RATE: 18 BRPM | SYSTOLIC BLOOD PRESSURE: 135 MMHG | OXYGEN SATURATION: 95 % | HEART RATE: 95 BPM | TEMPERATURE: 98 F | DIASTOLIC BLOOD PRESSURE: 63 MMHG

## 2024-02-07 DIAGNOSIS — Z17.0 MALIGNANT NEOPLASM INVOLVING BOTH NIPPLE AND AREOLA OF LEFT BREAST IN MALE, ESTROGEN RECEPTOR POSITIVE: Primary | ICD-10-CM

## 2024-02-07 DIAGNOSIS — C50.022 MALIGNANT NEOPLASM INVOLVING BOTH NIPPLE AND AREOLA OF LEFT BREAST IN MALE, ESTROGEN RECEPTOR POSITIVE: Primary | ICD-10-CM

## 2024-02-07 DIAGNOSIS — K12.30 MUCOSITIS: Primary | ICD-10-CM

## 2024-02-07 PROCEDURE — 3072F LOW RISK FOR RETINOPATHY: CPT | Mod: HCNC,CPTII,95, | Performed by: INTERNAL MEDICINE

## 2024-02-07 PROCEDURE — 25000003 PHARM REV CODE 250: Mod: HCNC | Performed by: INTERNAL MEDICINE

## 2024-02-07 PROCEDURE — 63600175 PHARM REV CODE 636 W HCPCS: Mod: HCNC | Performed by: INTERNAL MEDICINE

## 2024-02-07 PROCEDURE — 96360 HYDRATION IV INFUSION INIT: CPT | Mod: HCNC

## 2024-02-07 PROCEDURE — 99213 OFFICE O/P EST LOW 20 MIN: CPT | Mod: HCNC,95,, | Performed by: INTERNAL MEDICINE

## 2024-02-07 RX ORDER — HEPARIN 100 UNIT/ML
500 SYRINGE INTRAVENOUS
Status: CANCELLED | OUTPATIENT
Start: 2024-02-07

## 2024-02-07 RX ORDER — ONDANSETRON 8 MG/1
8 TABLET, ORALLY DISINTEGRATING ORAL ONCE
Status: CANCELLED
Start: 2024-02-07

## 2024-02-07 RX ORDER — ONDANSETRON 8 MG/1
8 TABLET, ORALLY DISINTEGRATING ORAL ONCE
Status: COMPLETED | OUTPATIENT
Start: 2024-02-07 | End: 2024-02-07

## 2024-02-07 RX ORDER — SODIUM CHLORIDE 0.9 % (FLUSH) 0.9 %
10 SYRINGE (ML) INJECTION
Status: DISCONTINUED | OUTPATIENT
Start: 2024-02-07 | End: 2024-02-07 | Stop reason: HOSPADM

## 2024-02-07 RX ORDER — SODIUM CHLORIDE 0.9 % (FLUSH) 0.9 %
10 SYRINGE (ML) INJECTION
Status: CANCELLED | OUTPATIENT
Start: 2024-02-07

## 2024-02-07 RX ORDER — HEPARIN 100 UNIT/ML
500 SYRINGE INTRAVENOUS
Status: DISCONTINUED | OUTPATIENT
Start: 2024-02-07 | End: 2024-02-07 | Stop reason: HOSPADM

## 2024-02-07 RX ADMIN — ONDANSETRON 8 MG: 8 TABLET, ORALLY DISINTEGRATING ORAL at 12:02

## 2024-02-07 RX ADMIN — HEPARIN 500 UNITS: 100 SYRINGE at 02:02

## 2024-02-07 RX ADMIN — SODIUM CHLORIDE 1000 ML: 9 INJECTION, SOLUTION INTRAVENOUS at 12:02

## 2024-02-07 NOTE — PLAN OF CARE
Discussed plan of care with pt. Addressed any and ongoing concerns. Pt denies   Problem: Adult Inpatient Plan of Care  Goal: Plan of Care Review  Outcome: Ongoing, Progressing  Goal: Patient-Specific Goal (Individualized)  Outcome: Ongoing, Progressing  Flowsheets (Taken 2/7/2024 1304)  Anxieties, Fears or Concerns: Denies  Individualized Care Needs: Reclined position, pillow and warm blanket  Goal: Absence of Hospital-Acquired Illness or Injury  Outcome: Ongoing, Progressing  Intervention: Identify and Manage Fall Risk  Flowsheets (Taken 2/7/2024 1304)  Safety Promotion/Fall Prevention:   in recliner, wheels locked   assistive device/personal item within reach  Intervention: Prevent Infection  Flowsheets (Taken 2/7/2024 1304)  Infection Prevention:   equipment surfaces disinfected   hand hygiene promoted   personal protective equipment utilized  Goal: Optimal Comfort and Wellbeing  Outcome: Ongoing, Progressing  Intervention: Provide Person-Centered Care  Flowsheets (Taken 2/7/2024 1304)  Trust Relationship/Rapport:   care explained   questions encouraged   choices provided   emotional support provided   reassurance provided   thoughts/feelings acknowledged   empathic listening provided   questions answered

## 2024-02-07 NOTE — PROGRESS NOTES
"Incoming call from pt with questions about scheduled infusion tomorrow . States per his visit with MD, next infusion not until . Told him I will confirm and call him back, he voiced understanding.  states she needs to r/s pt, tried calling him but he didn't answer.  states she will contact pt to get him r/s for .   Oncology Navigation   Intake  Date of Diagnosis: 23  Cancer Type: Breast  Type of Referral: Internal  Date of Referral: 23  Initial Nurse Navigator Contact: 23  Referral to Initial Contact Timeline (days): 0  Date Worked: 24  First Appointment Available: 23  Appointment Date: 23  First Available Date vs. Scheduled Date (days): 0  Multiple appointments: Yes     Treatment  Current Status: Active  Date Presented to Tumor Board: 23    Surgery: Planned  Surgical Oncologist: Brittnee Briones MD  Type of Surgery: Left total mastectomy with left sentinel lymph node biopsy    Medical Oncologist: Shaina Garg MD  Consult Date: 23  Chemotherapy: Planned  Chemotherapy Regimen: Taxotere  Immunotherapy: Planned  Immunotherapy Name: Herceptin/Perjeta  Start Date: 24    Radiation Therapy: Planned    Procedures: Port / PICC  Genetic Testing Date Sent: 23  Diagnostic Mammo Schedule Date:  (3/4 through NACT)  PET Scan Schedule Date: 23  Port / PICC Schedule Date: 24    General Referrals: Chemo Education; Social Work  Social Work Referral Date: 24    ER: Positive  [place]: Negative  Her2: Postive       Support Systems: Children; Family members; Muslim / sree community  Barriers of Care: Barriers to Care "Assessment completed-no barriers noted"     Acuity  Stage: 1  Systemic Treatment - predicted or initiated: Chemotherapy Regimen with Multiple drugs (+1)  Surgical Procedure Complexity: 2  Treatment Tolerability: Has not started treatment yet/treatment fully completed and side effects resolved  ECO  Comorbidities in " Medical History: 1  Hospitalization Within the Past Month: 0   Needed: 0  Support: 0  Verbalizes Financial Concerns: 0  Transportation: 0  Psychological Factors (+1 each): Emotional during conversation  History of noncompliance/frequent no shows and cancellations: 0  Verbalizes the need for more education: 0  Other Factors (+1 for Each): 0  Navigation Acuity: 1     Follow Up  Follow up in about 13 days (around 2/20/2024) for C2D1.

## 2024-02-07 NOTE — PROGRESS NOTES
[facility]  [address] [place] [address]  Phone: [phone];  Fax: [phone]    Patient ID: Mark Dickerson Sr.   Chief Complaint: Follow-up  MRN:  [id number]     Oncologic Diagnosis:  Stage IV Breast Cancer, +/+/+  Previous Treatment:  N/A  Current Treatment:  THP (01/30/2024 - )    The patient location is: [facility]  The chief complaint leading to consultation is: Tox Check    Visit type: audiovisual    Face to Face time with patient: 20 min  30 minutes of total time spent on the encounter, which includes face to face time and non-face to face time preparing to see the patient (eg, review of tests), Obtaining and/or reviewing separately obtained history, Documenting clinical information in the electronic or other health record, Independently interpreting results (not separately reported) and communicating results to the patient/family/caregiver, or Care coordination (not separately reported).       Each patient to whom he or she provides medical services by telemedicine is:  (1) informed of the relationship between the physician and patient and the respective role of any other health care provider with respect to management of the patient; and (2) notified that he or she may decline to receive medical services by telemedicine and may withdraw from such care at any time.    Notes:   Subjective   The patient presents for follow-up and is accompanied by his daughter-in-law.    The patient is in good spirits.  He had a rough time with cycle 1; while at home he had some nausea and diarrhea; also poor appetite/PO intake; discussed use of antiemetics and antidiarrheals. Labs reviewed and will administer IVF today.  Also, he feels the mass is smaller.    Review of Systems:  Review of Systems   Constitutional:  Negative for activity change, appetite change, chills, diaphoresis, fatigue, fever and unexpected weight change.   HENT:  Negative for nosebleeds.     Respiratory:  Negative for shortness of breath.    Cardiovascular:  Negative for chest pain.   Gastrointestinal:  Negative for abdominal distention, abdominal pain, anal bleeding, blood in stool, constipation, diarrhea, nausea and vomiting.   Genitourinary:  Negative for difficulty urinating and hematuria.   Musculoskeletal:  Positive for arthralgias (knees). Negative for back pain and myalgias.   Skin:  Negative for rash.   Neurological:  Negative for dizziness, weakness, light-headedness and headaches.   Hematological:  Does not bruise/bleed easily.   Psychiatric/Behavioral:  The patient is not nervous/anxious.      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, estrogen receptor positive     12/21/2023 Cancer Staged    Staging form: Breast, AJCC 8th Edition  - Clinical stage from 12/21/2023: Stage IIA (cT2, cN1(f), cM0, G2, ER+, AK-, HER2+)     1/30/2024 -  Chemotherapy    Treatment Summary   Plan Name: OP BREAST THP (pertuzumab trastuzumab DOCEtaxel) Q3W  Treatment Goal: Curative  Status: Active  Start Date: 1/30/2024  End Date: 1/1/2025 (Planned)  Provider: Shaina Mendoza MD  Chemotherapy: DOCEtaxel 75 mg/m2 = 174 mg in sodium chloride 0.9% 293.7 mL chemo infusion, 75 mg/m2 = 174 mg, Intravenous, Clinic/HOD 1 time, 1 of 17 cycles  Administration: 174 mg (1/31/2024)  pertuzumab (PERJETA) 840 mg in sodium chloride 0.9% 313 mL infusion, 840 mg, Intravenous, Clinic/HOD 1 time, 1 of 17 cycles  Administration: 840 mg (1/30/2024)  trastuzumab-anns (KANJINTI) 840 mg in sodium chloride 0.9% 325 mL chemo infusion, 868 mg, Intravenous, Clinic/HOD 1 time, 1 of 17 cycles  Administration: 840 mg (1/30/2024)      Chemotherapy    Treatment Summary   Plan Name: OP BREAST THP (pertuzumab trastuzumab DOCEtaxel) Q3W  Treatment Goal:  Curative  Status: Active  Start Date: 1/22/2024 (Planned)  End Date: 12/24/2024 (Planned)  Provider: Shaina Mendoza MD  Chemotherapy: DOCEtaxel (TAXOTERE) 75 mg/m2 in sodium chloride 0.9% 250 mL chemo infusion, 75 mg/m2, Intravenous, Clinic/HOD 1 time, 0 of 17 cycles    pertuzumab (PERJETA) 840 mg in sodium chloride 0.9% 278 mL infusion, 840 mg, Intravenous, Clinic/HOD 1 time, 0 of 17 cycles    trastuzumab-anns (KANJINTI) 8 mg/kg in sodium chloride 0.9% 250 mL chemo infusion, 8 mg/kg, Intravenous, Clinic/HOD 1 time, 0 of 17 cycles           Past Medical History:   Diagnosis Date    CKD stage 3 due to type 2 diabetes mellitus 2/18/2021    DM (diabetes mellitus) 2014    BS didn't check 12/11/2019    DM (diabetes mellitus) 2014    BS didn't check 05/05/2022    Foreign body, eye     right eye    Hyperlipidemia     Hypertension     Need for shingles vaccine 11/20/2019    Pneumonia     Primary osteoarthritis of right knee 10/29/2021    Severe obesity (BMI 35.0-35.9 with comorbidity) 7/10/2019    Type 2 diabetes mellitus 2014    BS didn't check 12/12/2018       Past Surgical History:   Procedure Laterality Date    APPENDECTOMY      CATARACT EXTRACTION Bilateral     CPG    ENDOSCOPIC ULTRASOUND OF UPPER GASTROINTESTINAL TRACT N/A 1/11/2024    Procedure: ULTRASOUND, UPPER GI TRACT, ENDOSCOPIC;  Surgeon: Evens Monsalve MD;  Location: [facility] ENDO [address]);  Service: Endoscopy;  Laterality: N/A;    FLUOROSCOPY N/A 1/5/2024    Procedure: Fluoroscopy/MEDIPORT PLACEMENT;  Surgeon: Jaret Lu MD;  Location: [facility] CATH LAB;  Service: General;  Laterality: N/A;       Family History   Problem Relation Age of Onset    Heart disease Mother     Hypertension Father     Heart disease Father     Hyperlipidemia Father     Cataracts Father     Hypertension Sister     Hypertension Brother     Cancer Son         colon    Allergic rhinitis Daughter        Review of patient's allergies indicates:  No Known Allergies    Social History      Tobacco Use    Smoking status: Former     Current packs/day: 0.00     Average packs/day: 3.0 packs/day for 10.0 years (30.0 ttl pk-yrs)     Types: Cigarettes     Start date:      Quit date:      Years since quittin.1    Smokeless tobacco: Never   Substance Use Topics    Alcohol use: No    Drug use: No     Labs   Labs:  Lab Visit on 2024   Component Date Value Ref Range Status    Phosphorus 2024 1.8 (L)  2.7 - 4.5 mg/dL Final    Magnesium 2024 1.8  1.6 - 2.6 mg/dL Final    Sodium 2024 127 (L)  136 - 145 mmol/L Final    Potassium 2024 3.4 (L)  3.5 - 5.1 mmol/L Final    Chloride 2024 92 (L)  95 - 110 mmol/L Final    CO2 2024 23  23 - 29 mmol/L Final    Glucose 2024 196 (H)  70 - 110 mg/dL Final    BUN 2024 24 (H)  8 - 23 mg/dL Final    Creatinine 2024 1.5 (H)  0.5 - 1.4 mg/dL Final    Calcium 2024 9.3  8.7 - 10.5 mg/dL Final    Total Protein 2024 7.2  6.0 - 8.4 g/dL Final    Albumin 2024 2.9 (L)  3.5 - 5.2 g/dL Final    Total Bilirubin 2024 0.7  0.1 - 1.0 mg/dL Final    Comment: For infants and newborns, interpretation of results should be based  on gestational age, weight and in agreement with clinical  observations.    Premature Infant recommended reference ranges:  Up to 24 hours.............<8.0 mg/dL  Up to 48 hours............<12.0 mg/dL  3-5 days..................<15.0 mg/dL  6-29 days.................<15.0 mg/dL      Alkaline Phosphatase 2024 76  55 - 135 U/L Final    AST 2024 55 (H)  10 - 40 U/L Final    ALT 2024 31  10 - 44 U/L Final    eGFR 2024 45 (A)  >60 mL/min/1.73 m^2 Final    Anion Gap 2024 12  8 - 16 mmol/L Final    WBC 2024 24.01 (H)  3.90 - 12.70 K/uL Final    RBC 2024 4.24 (L)  4.60 - 6.20 M/uL Final    Hemoglobin 2024 12.8 (L)  14.0 - 18.0 g/dL Final    Hematocrit 2024 35.7 (L)  40.0 - 54.0 % Final    MCV 2024 84  82 - 98 fL Final    MCH  02/07/2024 30.2  27.0 - 31.0 pg Final    MCHC 02/07/2024 35.9  32.0 - 36.0 g/dL Final    RDW 02/07/2024 13.0  11.5 - 14.5 % Final    Platelets 02/07/2024 277  150 - 450 K/uL Final    MPV 02/07/2024 10.6  9.2 - 12.9 fL Final    Immature Granulocytes 02/07/2024 CANCELED  0.0 - 0.5 % Final    Result canceled by the ancillary.    Immature Grans (Abs) 02/07/2024 CANCELED  0.00 - 0.04 K/uL Final    Comment: Mild elevation in immature granulocytes is non specific and   can be seen in a variety of conditions including stress response,   acute inflammation, trauma and pregnancy. Correlation with other   laboratory and clinical findings is essential.    Result canceled by the ancillary.      nRBC 02/07/2024 0  0 /100 WBC Final    Gran % 02/07/2024 32.0 (L)  38.0 - 73.0 % Final    Lymph % 02/07/2024 22.0  18.0 - 48.0 % Final    Mono % 02/07/2024 14.0  4.0 - 15.0 % Final    Eosinophil % 02/07/2024 2.0  0.0 - 8.0 % Final    Basophil % 02/07/2024 0.0  0.0 - 1.9 % Final    Bands 02/07/2024 20.0  % Final    Metamyelocytes 02/07/2024 2.0  % Final    Myelocytes 02/07/2024 4.0  % Final    Promyelocytes 02/07/2024 1.0  % Final    Other 02/07/2024 3  % Final    Platelet Estimate 02/07/2024 Appears normal   Final    Aniso 02/07/2024 Slight   Final    Poik 02/07/2024 Slight   Final    Poly 02/07/2024 Occasional   Final    Ovalocytes 02/07/2024 Occasional   Final    Tear Drop Cells 02/07/2024 Occasional   Final    Toxic Granulation 02/07/2024 Present   Final    Smudge Cells 02/07/2024 Present   Final    Comment: Smudge cells present;Substantial numbers may affect the   accuracy of the differential.      Large/Giant Platelets 02/07/2024 Present   Final    Differential Method 02/07/2024 Manual   Final    Pathologist Review Peripheral Smear 02/07/2024 REVIEWED   Final    Comment:   Electronically reviewed and signed by:  Lupe Morel M.D.  Signed on 02/07/24 at 15:59  Pathologist's interpretation of peripheral smear:    White cells:  Leukocytosis with left shift.  Rare atypical lymphocytes   identified.  No definitive blast forms identified.    Red Cells:   Normocytic anemia.    Platelets:   Within normal limits.    No additional significant morphologic abnormalities.          Imaging   Diagnostic L Breast US and Bilateral MMG w/Tomosynthesis 12/01/23  Findings:  This procedure was performed using tomosynthesis. Computer-aided detection was utilized in the interpretation of this examination.  The breasts are almost entirely fatty.      Left  Mammo Digital Diagnostic Bilat with Danny  There is a 5 cm mass seen in the retroareolar region of the left breast in the anterior depth. Associated features include nodular skin thickening and axillary adenopathy. There are also associated calcifications. Additional 1 cm mass noted within the axillary tail.      US Breast Left Limited  5 cm hypoechoic mass with calcifications noted within the subareolar breast corresponding to large mammographic mass. Overlying nodular skin involvement present. 1 cm hypoechoic mass within the axillary tail region. Two enlarged abnormal lymph nodes present within the left axilla.      Right  Mammo Digital Diagnostic Bilat with Danny  There is no evidence of suspicious masses, calcifications, or other abnormal findings in the right breast.     Impression:  Left  Mass: Left breast 5 cm mass at the retroareolar anterior position. Abnormal intramammary lymph node versus 2nd site of malignancy within the axillary tail. Abnormal axillary adenopathy. Assessment: 5 - Highly suggestive of malignancy. Biopsy is recommended.      Right  Mammo Digital Diagnostic Bilat with Danny  There is no mammographic evidence of malignancy in the right breast.      NM PET CT FDG SKULL BASE TO MID THIGH - 12/29/2023  CLINICAL HISTORY:  Initial staging imaging for stage II hormone + male breast cancer; Malignant neoplasm of nipple and areola, left male breast     TECHNIQUE:  11.78 mCi of F18-FDG was  administered intravenously in the left antecubital fossa.  After an approximately 60 min distribution time, PET/CT images were acquired from the skull base to the mid thigh. Transmission images were acquired to correct for attenuation using a whole body low-dose CT scan without contrast with the arms positioned above the head. Glycemia at the time of injection was 163 mg/dL.     COMPARISON:  None     FINDINGS:  Quality of the study: Adequate.     SUV max of the liver parenchyma is 3.7     Neck: No FDG abnormal avidity.  No lymphadenopathy or mass.     Chest: Large left breast mass which involves the skin measuring 7.0 x 4.5 cm with SUV max 9.7.     Extensive left axillary FDG avid lymphadenopathy with the largest left axillary lymph node measuring 3.9 x 2.3 cm with SUV max 7.4.  Another large left axillary lymph node measures 2.4 x 1.5 cm with SUV max 8.4.     The left axillary lymphadenopathy involves all 3 levels which includes of couple of level 3 lymph nodes, 1 measuring 1.2 x 0.7 cm with SUV max 6.6.     Medial pleural based right middle lobe pulmonary nodule measuring 16 x 10 mm with SUV max 6.2.     Right hilar lymph node measuring 1.5 x 1.4 cm with SUV max 6.9.     Azygoesophageal recess mediastinal lymph node measuring 1.9 x 1.4 cm with SUV max 6.2.     No pleural effusion     There are couple of small bilateral lower     Abdomen/pelvis: No lymphadenopathy.  No mass lesion or abnormal focus of FDG avidity.     Skeletal structures: No abnormal FDG avidity.  No focal lytic or sclerotic lesion in the osseous structures.  Lobe pulmonary nodules which show no FDG avidity.     Physiologic uptake of the tracer is present within the brain, salivary glands, myocardium, GI and  tracts.     Incidental CT findings: Coronary artery calcifications.     Impression:     1. Large left breast FDG avid mass which involves the skin.  2. Extensive markedly FDG avid left axillary lymphadenopathy involving all 3 levels of the  axilla.  3. FDG avid mediastinal and right hilar lymphadenopathy concerning for intrathoracic metastatic disease.  4. Multiple pulmonary nodules.  Only 1 pulmonary nodules FDG avid which is a pleural based right middle lobe 16 x 10 mm pulmonary nodule.  All of these pulmonary nodules are concerning for metastatic disease.        Pathology     Left Breast Biopsy 12/14/23      Component 7 d ago   Final Pathologic Diagnosis Part 1  Breast, left, subareolar, ultrasound-guided biopsy:  Invasive ductal carcinoma, moderately differentiated  Houston grade 2:  Tubule formation -3, nuclear pleomorphism-2 and mitotic count -2  Invasive carcinoma measures 17 mm in greatest dimension  No carcinoma in Situ or lymphovascular invasion is identified    Tumor biomarkers  Estrogen receptor (ER):  Positive, 80-90%, strong  Progesterone receptor (PGR):  Negative  HER2 (IHC):  Positive, 3+  Ki-67:  20-25%      Part 2  Axilla, left, ultrasound-guided biopsy:  Positive for carcinoma, see comment below      This case was reviewed by Dr. TONI Alcantara who concurs with the above diagnoses and assessment.    Stains were performed with appropriate controls.            Assessment and Plan   L Breast Invasive Ductal Carcinoma, Stage IIA (T2pN1),  +/-/+, Ki67 20-25%, G2  12/01/23 MMG showed left breast mass, 5 cm, at the retroareolar anterior position and abnormal intramammary lymph node versus 2nd site of malignancy cassidy axillary tail   s/p L breast biopsy 12/14/2023:  Positive for malignancy; left axillary lymph node positive for malignancy  His last  TTE 04/15/2023: EF 60%  PET CT 12/29/2023 unfortunately showed mediastinal and hilar lymphadenopathy suspicious for metastatic disease  EUS with biopsy 01/11/2024 results positive for metastatic adenocarcinoma of the breast  Genetic testing pending  He was initially planned for neoadjuvant TCHP however because PET-CT showed metastatic disease his treatment plan was changed to THP  +Arimidex  Follow up [facility] Genetic Testing Results  C1 THP 01/30/24:  Patient with reaction to Herceptin with Rigors; Demerol administered with relief.  He then had tremors/jitteriness to Dexamethasone. Treatment aborted.  Plan for Taxotere in the morning without steroids.  On subsequent cycles, plan for 4mg BID dexamethasone the day of chemotherapy to prevent fluid retention.  Labs reviewed and hyponatremia; will administer IVF today given hyponatremia and poor PO intake      Chronic Medical Conditions  DM II, controlled  CKD III  HTN  AS - Non-rheumatic  Hx TIA        Med Onc Chart Routing      Follow up with physician 2 weeks.   Follow up with ZULEYMA    Infusion scheduling note   THP in 2 weeks   Injection scheduling note    Labs CBC, CMP, phosphorus and magnesium   Scheduling:  Preferred lab:  Lab interval:     Imaging    Pharmacy appointment    Other referrals                   The patient was seen, interviewed and examined. Pertinent lab and radiologic studies were reviewed. Pt instructed to call should they develop concerning signs/symptoms or have further questions.        Portions of the record may have been created with voice recognition software. Occasional wrong-word or sound-a-like substitutions may have occurred due to the inherent limitations of voice recognition software. Read the chart carefully and recognize, using context, where substitutions have occurred.      Shaina Garg MD    Hematology/Oncology

## 2024-02-09 ENCOUNTER — TELEPHONE (OUTPATIENT)
Dept: HEMATOLOGY/ONCOLOGY | Facility: CLINIC | Age: 84
End: 2024-02-09
Payer: MEDICARE

## 2024-02-09 ENCOUNTER — DOCUMENTATION ONLY (OUTPATIENT)
Dept: HEMATOLOGY/ONCOLOGY | Facility: CLINIC | Age: 84
End: 2024-02-09
Payer: MEDICARE

## 2024-02-09 NOTE — TELEPHONE ENCOUNTER
----- Message from Shaina Mendoza MD sent at 2/9/2024  8:03 AM CST -----  Regarding: Genetics  Please call with genetic test results: they were negative!

## 2024-02-09 NOTE — PROGRESS NOTES
Incoming call from pt dnl, [place]. States she asked that labs be scheduled at [facility] ([address]) but they are scheduled @ [facility] ONN attempted to r/s labs 2/19 at [facility] but wasn't able to. Placed [place]alia on hold and reached out to [facility] for assist. Sasha states d/t some consolidation taking place @ [facility] lab, no phlebotomist available on Mondays until possibly mid March. She apologized for the inconvenience. Explained to pt dnl what Sasha told me, offered apology. DNL states she will take pt to [facility] for lab draw on 2/19. Told her if consolidation is completed sooner than expected, will let them know. She voiced understanding and has no other questions at this time.   Oncology Navigation   Intake  Date of Diagnosis: 12/14/23  Cancer Type: Breast  Type of Referral: Internal  Date of Referral: 12/19/23  Initial Nurse Navigator Contact: 12/19/23  Referral to Initial Contact Timeline (days): 0  Date Worked: 02/08/24  First Appointment Available: 12/21/23  Appointment Date: 12/21/23  First Available Date vs. Scheduled Date (days): 0  Multiple appointments: Yes     Treatment  Current Status: Active  Date Presented to Tumor Board: 12/28/23    Surgery: Planned  Surgical Oncologist: Brittnee Briones MD  Type of Surgery: Left total mastectomy with left sentinel lymph node biopsy    Medical Oncologist: Shaina Garg MD  Consult Date: 12/21/23  Chemotherapy: Planned  Chemotherapy Regimen: Taxotere  Immunotherapy: Planned  Immunotherapy Name: Herceptin/Perjeta  Start Date: 01/30/24    Radiation Therapy: Planned    Procedures: Port / PICC  Genetic Testing Date Sent: 12/21/23  Diagnostic Mammo Schedule Date:  (3/4 through [facility])  PET Scan Schedule Date: 12/29/23  Port / PICC Schedule Date: 01/05/24    General Referrals: Chemo Education; Social Work  Social Work Referral Date: 01/22/24    ER: Positive  ME: Negative  Her2: Postive       Support Systems: Children; Family members;  "Catholic / sree community  Barriers of Care: Barriers to Care "Assessment completed-no barriers noted"     Acuity  Stage: 1  Systemic Treatment - predicted or initiated: Chemotherapy Regimen with Multiple drugs (+1)  Surgical Procedure Complexity: 2  Treatment Tolerability: Has not started treatment yet/treatment fully completed and side effects resolved  ECO  Comorbidities in Medical History: 1  Hospitalization Within the Past Month: 0   Needed: 0  Support: 0  Verbalizes Financial Concerns: 0  Transportation: 0  Psychological Factors (+1 each): Emotional during conversation  History of noncompliance/frequent no shows and cancellations: 0  Verbalizes the need for more education: 0  Other Factors (+1 for Each): 0  Navigation Acuity: 1     Follow Up  No follow-ups on file.         "

## 2024-02-09 NOTE — TELEPHONE ENCOUNTER
Nurse placed a call to patient to inform of test results. Patient verbalized all understanding. Call ended well.

## 2024-02-12 ENCOUNTER — TELEPHONE (OUTPATIENT)
Dept: DERMATOLOGY | Facility: CLINIC | Age: 84
End: 2024-02-12
Payer: MEDICARE

## 2024-02-12 ENCOUNTER — TELEPHONE (OUTPATIENT)
Dept: HEMATOLOGY/ONCOLOGY | Facility: CLINIC | Age: 84
End: 2024-02-12
Payer: MEDICARE

## 2024-02-12 NOTE — TELEPHONE ENCOUNTER
Spoke to patient she wants to see provider for a rash called schedulers to get the patient scheduled.

## 2024-02-12 NOTE — TELEPHONE ENCOUNTER
Attempted to contact patient in regards to appointment available tomorrow. No answer. Detailed message left.   ----- Message from Tiffany Wu MA sent at 2/12/2024  9:51 AM CST -----  Good Morning    Pt has rash on face and neck & would like to be seen today or tomorrow, is that possible??

## 2024-02-12 NOTE — TELEPHONE ENCOUNTER
----- Message from Rory Adorno sent at 2/12/2024  8:18 AM CST -----  Contact: [phone]  Italia is requesting a call in regards to getting a prescription for patient. Pt has broken out with a rash and would like to it to go to   [facility] [address] [place] [id number]  Phone: [phone] Fax: [phone]    Pt contact number is [phone]. Thanks KB

## 2024-02-13 ENCOUNTER — OFFICE VISIT (OUTPATIENT)
Dept: DERMATOLOGY | Facility: CLINIC | Age: 84
End: 2024-02-13
Payer: MEDICARE

## 2024-02-13 DIAGNOSIS — D48.5 NEOPLASM OF UNCERTAIN BEHAVIOR OF SKIN: Primary | ICD-10-CM

## 2024-02-13 PROCEDURE — 11102 TANGNTL BX SKIN SINGLE LES: CPT | Mod: HCNC,S$GLB,, | Performed by: STUDENT IN AN ORGANIZED HEALTH CARE EDUCATION/TRAINING PROGRAM

## 2024-02-13 PROCEDURE — 1160F RVW MEDS BY RX/DR IN RCRD: CPT | Mod: HCNC,CPTII,S$GLB, | Performed by: STUDENT IN AN ORGANIZED HEALTH CARE EDUCATION/TRAINING PROGRAM

## 2024-02-13 PROCEDURE — 99999 PR PBB SHADOW E&M-EST. PATIENT-LVL III: CPT | Mod: PBBFAC,HCNC,, | Performed by: STUDENT IN AN ORGANIZED HEALTH CARE EDUCATION/TRAINING PROGRAM

## 2024-02-13 PROCEDURE — 88305 TISSUE EXAM BY PATHOLOGIST: CPT | Mod: 26,HCNC,, | Performed by: DERMATOLOGY

## 2024-02-13 PROCEDURE — 1159F MED LIST DOCD IN RCRD: CPT | Mod: HCNC,CPTII,S$GLB, | Performed by: STUDENT IN AN ORGANIZED HEALTH CARE EDUCATION/TRAINING PROGRAM

## 2024-02-13 PROCEDURE — 3288F FALL RISK ASSESSMENT DOCD: CPT | Mod: HCNC,CPTII,S$GLB, | Performed by: STUDENT IN AN ORGANIZED HEALTH CARE EDUCATION/TRAINING PROGRAM

## 2024-02-13 PROCEDURE — 88305 TISSUE EXAM BY PATHOLOGIST: CPT | Mod: HCNC | Performed by: DERMATOLOGY

## 2024-02-13 PROCEDURE — 1100F PTFALLS ASSESS-DOCD GE2>/YR: CPT | Mod: HCNC,CPTII,S$GLB, | Performed by: STUDENT IN AN ORGANIZED HEALTH CARE EDUCATION/TRAINING PROGRAM

## 2024-02-13 PROCEDURE — 99051 MED SERV EVE/WKEND/HOLIDAY: CPT | Mod: HCNC,S$GLB,, | Performed by: STUDENT IN AN ORGANIZED HEALTH CARE EDUCATION/TRAINING PROGRAM

## 2024-02-13 PROCEDURE — 1126F AMNT PAIN NOTED NONE PRSNT: CPT | Mod: HCNC,CPTII,S$GLB, | Performed by: STUDENT IN AN ORGANIZED HEALTH CARE EDUCATION/TRAINING PROGRAM

## 2024-02-13 PROCEDURE — 99204 OFFICE O/P NEW MOD 45 MIN: CPT | Mod: 25,HCNC,S$GLB, | Performed by: STUDENT IN AN ORGANIZED HEALTH CARE EDUCATION/TRAINING PROGRAM

## 2024-02-13 PROCEDURE — 3072F LOW RISK FOR RETINOPATHY: CPT | Mod: HCNC,CPTII,S$GLB, | Performed by: STUDENT IN AN ORGANIZED HEALTH CARE EDUCATION/TRAINING PROGRAM

## 2024-02-13 RX ORDER — TRIAMCINOLONE ACETONIDE 0.25 MG/G
CREAM TOPICAL
Qty: 80 G | Refills: 0 | Status: SHIPPED | OUTPATIENT
Start: 2024-02-13

## 2024-02-13 NOTE — PROGRESS NOTES
Subjective:       Patient ID:  Mark Dickerson Sr. is a 84 y.o. male who presents for   Chief Complaint   Patient presents with    Rash     Located on the face and scalp. X 2/8/24 s/s redness, dryness Tx tropical cream/ non effective.     History of Present Illness: The patient presents with chief complaint of a rash/lesions, possible drug eruption. Patient has a history of breast cancer of the left breast/chest and recently was started on chemotherapy. He reports after starting treatment, he started to noticed redness, scaling, and crusting on the scalp, forehead and nose. Ongoing for a couple of weeks. Reports not really having these symptoms prior. No treatment. No other similar rash or elsewhere or other concerning skin symptoms.     Rash        Review of Systems   Constitutional:  Negative for fever and chills.   Skin:  Positive for rash.        Objective:    Physical Exam   Constitutional: He appears well-developed and well-nourished. No distress.   Neurological: He is alert and oriented to person, place, and time. He is not disoriented.   Psychiatric: He has a normal mood and affect.   Skin:   Areas Examined (abnormalities noted in diagram):   Scalp / Hair Palpated and Inspected  Head / Face Inspection Performed  Neck Inspection Performed              Diagram Legend     Erythematous scaling macule/papule c/w actinic keratosis       Vascular papule c/w angioma      Pigmented verrucoid papule/plaque c/w seborrheic keratosis      Yellow umbilicated papule c/w sebaceous hyperplasia      Irregularly shaped tan macule c/w lentigo     1-2 mm smooth white papules consistent with Milia      Movable subcutaneous cyst with punctum c/w epidermal inclusion cyst      Subcutaneous movable cyst c/w pilar cyst      Firm pink to brown papule c/w dermatofibroma      Pedunculated fleshy papule(s) c/w skin tag(s)      Evenly pigmented macule c/w junctional nevus     Mildly variegated pigmented, slightly irregular-bordered macule  c/w mildly atypical nevus      Flesh colored to evenly pigmented papule c/w intradermal nevus       Pink pearly papule/plaque c/w basal cell carcinoma      Erythematous hyperkeratotic cursted plaque c/w SCC      Surgical scar with no sign of skin cancer recurrence      Open and closed comedones      Inflammatory papules and pustules      Verrucoid papule consistent consistent with wart     Erythematous eczematous patches and plaques     Dystrophic onycholytic nail with subungual debris c/w onychomycosis     Umbilicated papule    Erythematous-base heme-crusted tan verrucoid plaque consistent with inflamed seborrheic keratosis     Erythematous Silvery Scaling Plaque c/w Psoriasis     See annotation            Assessment / Plan:      Pathology Orders:       Normal Orders This Visit    Specimen to Pathology, Dermatology     Comments:    Number of Specimens:->1  ------------------------->-------------------------  Spec 1 Procedure:->Biopsy  Spec 1 Clinical Impression:->r/o AK vs drug eruption vs other  Spec 1 Source:->top of scalp    Questions:    Procedure Type: Dermatology and skin neoplasms    Number of Specimens: 1    ------------------------: -------------------------    Spec 1 Procedure: Biopsy    Spec 1 Clinical Impression: r/o AK vs drug eruption vs other    Spec 1 Source: top of scalp    Release to patient:           Neoplasm of uncertain behavior of skin  -     Specimen to Pathology, Dermatology  -     triamcinolone acetonide 0.025% (KENALOG) 0.025 % cream; Apply to the affected area twice daily  Dispense: 80 g; Refill: 0    Shave biopsy procedure note:    Shave biopsy performed after verbal consent including risk of infection, scar, recurrence, need for additional treatment of site. Area prepped with alcohol, anesthetized with approximately 1.0cc of 1% lidocaine with epinephrine. Lesional tissue shaved with razor blade. Hemostasis achieved with application of aluminum chloride followed by hyfrecation. No  complications. Dressing applied. Wound care explained.    Further management pending biopsy results.            Follow up in about 3 months (around 5/13/2024).

## 2024-02-14 ENCOUNTER — TELEPHONE (OUTPATIENT)
Dept: HEMATOLOGY/ONCOLOGY | Facility: CLINIC | Age: 84
End: 2024-02-14
Payer: MEDICARE

## 2024-02-15 ENCOUNTER — TELEPHONE (OUTPATIENT)
Dept: HEMATOLOGY/ONCOLOGY | Facility: CLINIC | Age: 84
End: 2024-02-15
Payer: MEDICARE

## 2024-02-15 NOTE — TELEPHONE ENCOUNTER
PEARL received call from dtr in law [place] with questions re: available grants as pt has a $3,800 deductible to meet. Pt will eventually need a w/c and shower chair as well but not right now. Italia stated that she's buying a home and is too overwhelmed to apply for anything on pt's behalf at this time or call [employer] to check OTC benefits. PEARL educated dtr in law on the different grants available via [facility] and [facility]. PEARL also discussed the grants provided from [employer] during 1st visit with pt. PEARL obtained permission to call to check possible OTC benefits with [employer], check on online grants and to initiate [employer] ($250), [employer] ($1,000-receipts required) and [facility] ($150). All are one time grants and PEARL will obtain pt's consent next appt next week. PEARL call [email or web address] [phone] (Sasha) pt has no OTC benefits. PEARL visited the following websites: [facility]-Next round to REQUEST AN APPLICATION is June 1st, 2024 through June 15th, 2024. Unfortunately, we had to decrease the requests by 2 weeks due to an OVERWHELMING response, [employer] [facility]am-Pt/family must create an online account/application, [facility]-Max number of requests accepted, and [facility]-At this time, we are not able to accept new applications. The application will open on Wednesday, March 20, 2024 at 12:00 p.m. (noon) Eastern Time. You must start your application before 12:10 pm Eastern Time. At 12:10 pm Eastern Time, the application button will be removed from the webpage and no additional applications can be started. We receive a very high volume of applications and review them on a first-come, first-served basis. Once we reach our maximum number of applications, no other applications will be reviewed.   PEARL will call dtr-in [employer] next week to discuss the above. PEARL will remain available.

## 2024-02-16 LAB
FINAL PATHOLOGIC DIAGNOSIS: NORMAL
GROSS: NORMAL
Lab: NORMAL
MICROSCOPIC EXAM: NORMAL

## 2024-02-19 ENCOUNTER — TELEPHONE (OUTPATIENT)
Dept: HEMATOLOGY/ONCOLOGY | Facility: CLINIC | Age: 84
End: 2024-02-19
Payer: MEDICARE

## 2024-02-19 ENCOUNTER — LAB VISIT (OUTPATIENT)
Dept: LAB | Facility: HOSPITAL | Age: 84
End: 2024-02-19
Attending: INTERNAL MEDICINE
Payer: MEDICARE

## 2024-02-19 DIAGNOSIS — C50.022 MALIGNANT NEOPLASM INVOLVING BOTH NIPPLE AND AREOLA OF LEFT BREAST IN MALE, ESTROGEN RECEPTOR POSITIVE: ICD-10-CM

## 2024-02-19 DIAGNOSIS — Z17.0 MALIGNANT NEOPLASM INVOLVING BOTH NIPPLE AND AREOLA OF LEFT BREAST IN MALE, ESTROGEN RECEPTOR POSITIVE: ICD-10-CM

## 2024-02-19 LAB
ALBUMIN SERPL BCP-MCNC: 3.2 G/DL (ref 3.5–5.2)
ALP SERPL-CCNC: 64 U/L (ref 55–135)
ALT SERPL W/O P-5'-P-CCNC: 26 U/L (ref 10–44)
ANION GAP SERPL CALC-SCNC: 12 MMOL/L (ref 8–16)
AST SERPL-CCNC: 31 U/L (ref 10–40)
BASOPHILS # BLD AUTO: 0.14 K/UL (ref 0–0.2)
BASOPHILS NFR BLD: 1.7 % (ref 0–1.9)
BILIRUB SERPL-MCNC: 0.6 MG/DL (ref 0.1–1)
BUN SERPL-MCNC: 17 MG/DL (ref 8–23)
CALCIUM SERPL-MCNC: 8.8 MG/DL (ref 8.7–10.5)
CHLORIDE SERPL-SCNC: 98 MMOL/L (ref 95–110)
CO2 SERPL-SCNC: 23 MMOL/L (ref 23–29)
CREAT SERPL-MCNC: 1 MG/DL (ref 0.5–1.4)
DIFFERENTIAL METHOD BLD: ABNORMAL
EOSINOPHIL # BLD AUTO: 0.1 K/UL (ref 0–0.5)
EOSINOPHIL NFR BLD: 1.4 % (ref 0–8)
ERYTHROCYTE [DISTWIDTH] IN BLOOD BY AUTOMATED COUNT: 15 % (ref 11.5–14.5)
EST. GFR  (NO RACE VARIABLE): >60 ML/MIN/1.73 M^2
GLUCOSE SERPL-MCNC: 152 MG/DL (ref 70–110)
HCT VFR BLD AUTO: 37.3 % (ref 40–54)
HGB BLD-MCNC: 12.4 G/DL (ref 14–18)
IMM GRANULOCYTES # BLD AUTO: 0.02 K/UL (ref 0–0.04)
IMM GRANULOCYTES NFR BLD AUTO: 0.2 % (ref 0–0.5)
LYMPHOCYTES # BLD AUTO: 1.8 K/UL (ref 1–4.8)
LYMPHOCYTES NFR BLD: 20.9 % (ref 18–48)
MAGNESIUM SERPL-MCNC: 1.7 MG/DL (ref 1.6–2.6)
MCH RBC QN AUTO: 29.6 PG (ref 27–31)
MCHC RBC AUTO-ENTMCNC: 33.2 G/DL (ref 32–36)
MCV RBC AUTO: 89 FL (ref 82–98)
MONOCYTES # BLD AUTO: 1 K/UL (ref 0.3–1)
MONOCYTES NFR BLD: 11.6 % (ref 4–15)
NEUTROPHILS # BLD AUTO: 5.4 K/UL (ref 1.8–7.7)
NEUTROPHILS NFR BLD: 64.2 % (ref 38–73)
NRBC BLD-RTO: 0 /100 WBC
PHOSPHATE SERPL-MCNC: 2.2 MG/DL (ref 2.7–4.5)
PLATELET # BLD AUTO: 406 K/UL (ref 150–450)
PMV BLD AUTO: 9.5 FL (ref 9.2–12.9)
POTASSIUM SERPL-SCNC: 3.6 MMOL/L (ref 3.5–5.1)
PROT SERPL-MCNC: 6.9 G/DL (ref 6–8.4)
RBC # BLD AUTO: 4.19 M/UL (ref 4.6–6.2)
SODIUM SERPL-SCNC: 133 MMOL/L (ref 136–145)
WBC # BLD AUTO: 8.42 K/UL (ref 3.9–12.7)

## 2024-02-19 PROCEDURE — 85025 COMPLETE CBC W/AUTO DIFF WBC: CPT | Mod: HCNC | Performed by: INTERNAL MEDICINE

## 2024-02-19 PROCEDURE — 83735 ASSAY OF MAGNESIUM: CPT | Mod: HCNC | Performed by: INTERNAL MEDICINE

## 2024-02-19 PROCEDURE — 36415 COLL VENOUS BLD VENIPUNCTURE: CPT | Mod: HCNC,PO | Performed by: INTERNAL MEDICINE

## 2024-02-19 PROCEDURE — 84100 ASSAY OF PHOSPHORUS: CPT | Mod: HCNC | Performed by: INTERNAL MEDICINE

## 2024-02-19 PROCEDURE — 80053 COMPREHEN METABOLIC PANEL: CPT | Mod: HCNC | Performed by: INTERNAL MEDICINE

## 2024-02-19 NOTE — TELEPHONE ENCOUNTER
SW received call from dtr in law [facility]. SW updated her on outcome of research/ support calls made last week. See PEARL note 2/15/24. Dtr in [employer] will visit [employer] website for application. PEARL placed  calendar reminders to follow up with remaining grants when application periods open. PEARL sent secure chat message to  JOHANNY Rosenbaum, informing that pt will need an itemized summary of oop to [facility] for toshia request ([facility]).Pt has an appt tomorrow and  SW will follow up at that time re: grants. SW will remain available.

## 2024-02-20 ENCOUNTER — TELEPHONE (OUTPATIENT)
Dept: FAMILY MEDICINE | Facility: CLINIC | Age: 84
End: 2024-02-20
Payer: MEDICARE

## 2024-02-20 ENCOUNTER — TELEPHONE (OUTPATIENT)
Dept: DERMATOLOGY | Facility: CLINIC | Age: 84
End: 2024-02-20
Payer: MEDICARE

## 2024-02-20 ENCOUNTER — OFFICE VISIT (OUTPATIENT)
Dept: HEMATOLOGY/ONCOLOGY | Facility: CLINIC | Age: 84
End: 2024-02-20
Payer: MEDICARE

## 2024-02-20 ENCOUNTER — INFUSION (OUTPATIENT)
Dept: INFUSION THERAPY | Facility: HOSPITAL | Age: 84
End: 2024-02-20
Attending: INTERNAL MEDICINE
Payer: MEDICARE

## 2024-02-20 ENCOUNTER — DOCUMENTATION ONLY (OUTPATIENT)
Dept: HEMATOLOGY/ONCOLOGY | Facility: CLINIC | Age: 84
End: 2024-02-20
Payer: MEDICARE

## 2024-02-20 VITALS
WEIGHT: 231.13 LBS | OXYGEN SATURATION: 93 % | HEART RATE: 86 BPM | RESPIRATION RATE: 18 BRPM | TEMPERATURE: 98 F | SYSTOLIC BLOOD PRESSURE: 108 MMHG | BODY MASS INDEX: 33.09 KG/M2 | DIASTOLIC BLOOD PRESSURE: 64 MMHG | HEIGHT: 70 IN

## 2024-02-20 VITALS
DIASTOLIC BLOOD PRESSURE: 79 MMHG | OXYGEN SATURATION: 98 % | BODY MASS INDEX: 33.09 KG/M2 | SYSTOLIC BLOOD PRESSURE: 118 MMHG | WEIGHT: 231.13 LBS | HEIGHT: 70 IN | TEMPERATURE: 98 F | RESPIRATION RATE: 16 BRPM | HEART RATE: 87 BPM

## 2024-02-20 DIAGNOSIS — E11.22 CKD STAGE 3 DUE TO TYPE 2 DIABETES MELLITUS: ICD-10-CM

## 2024-02-20 DIAGNOSIS — C50.022 MALIGNANT NEOPLASM INVOLVING BOTH NIPPLE AND AREOLA OF LEFT BREAST IN MALE, ESTROGEN RECEPTOR POSITIVE: Primary | ICD-10-CM

## 2024-02-20 DIAGNOSIS — N18.30 CKD STAGE 3 DUE TO TYPE 2 DIABETES MELLITUS: ICD-10-CM

## 2024-02-20 DIAGNOSIS — Z17.0 MALIGNANT NEOPLASM INVOLVING BOTH NIPPLE AND AREOLA OF LEFT BREAST IN MALE, ESTROGEN RECEPTOR POSITIVE: Primary | ICD-10-CM

## 2024-02-20 DIAGNOSIS — D09.9 SQUAMOUS CELL CARCINOMA IN SITU (SCCIS): Primary | ICD-10-CM

## 2024-02-20 PROCEDURE — 99215 OFFICE O/P EST HI 40 MIN: CPT | Mod: HCNC,95,, | Performed by: INTERNAL MEDICINE

## 2024-02-20 PROCEDURE — 3072F LOW RISK FOR RETINOPATHY: CPT | Mod: HCNC,CPTII,95, | Performed by: INTERNAL MEDICINE

## 2024-02-20 PROCEDURE — 96417 CHEMO IV INFUS EACH ADDL SEQ: CPT | Mod: HCNC

## 2024-02-20 PROCEDURE — 63600175 PHARM REV CODE 636 W HCPCS: Mod: HCNC | Performed by: INTERNAL MEDICINE

## 2024-02-20 PROCEDURE — 25000003 PHARM REV CODE 250: Mod: HCNC | Performed by: INTERNAL MEDICINE

## 2024-02-20 PROCEDURE — A4216 STERILE WATER/SALINE, 10 ML: HCPCS | Mod: HCNC | Performed by: INTERNAL MEDICINE

## 2024-02-20 PROCEDURE — 96411 CHEMO IV PUSH ADDL DRUG: CPT | Mod: HCNC

## 2024-02-20 PROCEDURE — 96375 TX/PRO/DX INJ NEW DRUG ADDON: CPT | Mod: HCNC

## 2024-02-20 PROCEDURE — 96413 CHEMO IV INFUSION 1 HR: CPT | Mod: HCNC

## 2024-02-20 RX ORDER — HEPARIN 100 UNIT/ML
500 SYRINGE INTRAVENOUS
Status: CANCELLED | OUTPATIENT
Start: 2024-02-20

## 2024-02-20 RX ORDER — SODIUM CHLORIDE 0.9 % (FLUSH) 0.9 %
10 SYRINGE (ML) INJECTION
Status: CANCELLED | OUTPATIENT
Start: 2024-02-20

## 2024-02-20 RX ORDER — EPINEPHRINE 0.3 MG/.3ML
0.3 INJECTION SUBCUTANEOUS ONCE AS NEEDED
Status: CANCELLED | OUTPATIENT
Start: 2024-02-20

## 2024-02-20 RX ORDER — PROCHLORPERAZINE EDISYLATE 5 MG/ML
5 INJECTION INTRAMUSCULAR; INTRAVENOUS ONCE AS NEEDED
Status: CANCELLED | OUTPATIENT
Start: 2024-02-20

## 2024-02-20 RX ORDER — DEXAMETHASONE SODIUM PHOSPHATE 4 MG/ML
8 INJECTION, SOLUTION INTRA-ARTICULAR; INTRALESIONAL; INTRAMUSCULAR; INTRAVENOUS; SOFT TISSUE
Status: COMPLETED | OUTPATIENT
Start: 2024-02-20 | End: 2024-02-20

## 2024-02-20 RX ORDER — MEPERIDINE HYDROCHLORIDE 50 MG/ML
25 INJECTION INTRAMUSCULAR; INTRAVENOUS; SUBCUTANEOUS ONCE AS NEEDED
Status: CANCELLED | OUTPATIENT
Start: 2024-02-20

## 2024-02-20 RX ORDER — SODIUM CHLORIDE 0.9 % (FLUSH) 0.9 %
10 SYRINGE (ML) INJECTION
Status: DISCONTINUED | OUTPATIENT
Start: 2024-02-20 | End: 2024-02-20 | Stop reason: HOSPADM

## 2024-02-20 RX ORDER — DEXAMETHASONE SODIUM PHOSPHATE 4 MG/ML
8 INJECTION, SOLUTION INTRA-ARTICULAR; INTRALESIONAL; INTRAMUSCULAR; INTRAVENOUS; SOFT TISSUE
Status: CANCELLED
Start: 2024-02-20

## 2024-02-20 RX ORDER — FLUOROURACIL 50 MG/G
CREAM TOPICAL 2 TIMES DAILY
Qty: 40 G | Refills: 1 | Status: SHIPPED | OUTPATIENT
Start: 2024-02-20

## 2024-02-20 RX ORDER — HEPARIN 100 UNIT/ML
500 SYRINGE INTRAVENOUS
Status: DISCONTINUED | OUTPATIENT
Start: 2024-02-20 | End: 2024-02-20 | Stop reason: HOSPADM

## 2024-02-20 RX ORDER — DIPHENHYDRAMINE HYDROCHLORIDE 50 MG/ML
50 INJECTION INTRAMUSCULAR; INTRAVENOUS ONCE AS NEEDED
Status: CANCELLED | OUTPATIENT
Start: 2024-02-20

## 2024-02-20 RX ADMIN — PERTUZUMAB 420 MG: 30 INJECTION, SOLUTION, CONCENTRATE INTRAVENOUS at 11:02

## 2024-02-20 RX ADMIN — DOCETAXEL 136 MG: 20 INJECTION, SOLUTION, CONCENTRATE INTRAVENOUS at 12:02

## 2024-02-20 RX ADMIN — DEXAMETHASONE SODIUM PHOSPHATE 8 MG: 4 INJECTION INTRA-ARTICULAR; INTRALESIONAL; INTRAMUSCULAR; INTRAVENOUS; SOFT TISSUE at 12:02

## 2024-02-20 RX ADMIN — HEPARIN 500 UNITS: 100 SYRINGE at 01:02

## 2024-02-20 RX ADMIN — Medication 10 ML: at 01:02

## 2024-02-20 NOTE — TELEPHONE ENCOUNTER
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 This is what we discussed at his appointment. These are not invasive cancers, but can progress if not treated. Given the number of lesions, I recommend a trial of Efudex to the scalp. This is the chemotherapy cream I mentioned that is applied to the entire scalp for about 4 weeks to help treat all the lesions. Will cause a lot of redness and irritation during this time (which is expected). Otherwise, Mohs surgery could be an option, but given the number of lesions, more practical and more beneficial to treat with Efudex.     Please let me know what the patient would like to do, and I will send over the medication. Thanks.

## 2024-02-20 NOTE — DISCHARGE INSTRUCTIONS
.[facility]  [address] [facility]  [address]  [phone] phone     [phone] fax  Hours of Operation: Monday- Friday 8:00am- 5:00pm  After hours phone  [phone]  Hematology / Oncology Physicians on call    Dr. Amauri Bragg        Nurse Practitioners:    Gracie Horner, TANMAY Kaur, TANMAY Caicedo, TANMAY Shen, TANMAY Noble, PA      Please don't hesitate to call if you have any concerns.     .WAYS TO HELP PREVENT INFECTION        WASH YOUR HANDS OFTEN DURING THE DAY, ESPECIALLY BEFORE YOU EAT, AFTER USING THE BATHROOM, AND AFTER TOUCHING ANIMALS    STAY AWAY FROM PEOPLE WHO HAVE ILLNESSES YOU CAN CATCH; SUCH AS COLDS, FLU, CHICKEN POX    TRY TO AVOID CROWDS    STAY AWAY FROM CHILDREN WHO RECENTLY HAVE RECEIVED LIVE VIRUS VACCINES    MAINTAIN GOOD MOUTH CARE    DO NOT SQUEEZE OR SCRATCH PIMPLES    CLEAN CUTS & SCRAPES RIGHT AWAY AND DAILY UNTIL HEALED WITH WARM WATER, SOAP & AN ANTISEPTIC    AVOID CONTACT WITH LITTER BOXES, BIRD CAGES, & FISH TANKS    AVOID STANDING WATER, IE., BIRD BATHS, FLOWER POTS/VASES, OR HUMIDIFIERS    WEAR GLOVES WHEN GARDENING OR CLEANING UP AFTER OTHERS, ESPECIALLY BABIES & SMALL CHILDREN    DO NOT EAT RAW FISH, SEAFOOD, MEAT, OR EGGS     .FALL PREVENTION   Falls often occur due to slipping, tripping or losing your balance. Here are ways to reduce your risk of falling again.   Was there anything that caused your fall that can be fixed, removed or replaced?   Make your home safe by keeping walkways clear of objects you may trip over.   Use non-slip pads under rugs.   Do not walk in poorly lit areas.   Do not stand on chairs or wobbly ladders.   Use caution when reaching overhead or looking upward. This position can cause a loss of balance.   Be sure your shoes fit properly, have non-slip bottoms and are in good condition.   Be cautious when going up and down  stairs, curbs, and when walking on uneven sidewalks.   If your balance is poor, consider using a cane or walker.   If your fall was related to alcohol use, stop or limit alcohol intake.   If your fall was related to use of sleeping medicines, talk to your doctor about this. You may need to reduce your dosage at bedtime if you awaken during the night to go to the bathroom.   To reduce the need for nighttime bathroom trips:   Avoid drinking fluids for several hours before going to bed   Empty your bladder before going to bed   Men can keep a urinal at the bedside   © 1451-7003 [facility], [address] [place] [address], [place] [address]. All rights reserved. This information is not intended as a substitute for professional medical care. Always follow your healthcare professional's instructions.

## 2024-02-20 NOTE — PROGRESS NOTES
The medication I gave at his appointment, triamcinolone, is not going to treat any pre cancers or skin cancers (as in his case). The treatment would be Efudex or surgical removal. I strongly recommend Efudex.

## 2024-02-20 NOTE — PROGRESS NOTES
SW met with pt, son/dtr at infusion chairside. Pt signed forms for one-time toshia assistance. $1,000 OCI, $250 CHACON, and $150 [facility]. SW will submit once signed by provider, and pt will receive checks within 2-4 weeks. No other needs expressed. SW will remain available.

## 2024-02-20 NOTE — PROGRESS NOTES
Patient ID: Mark Dickerson Sr.   Chief Complaint: Follow-up (Breast Cancer)  MRN:  [id number]     Oncologic Diagnosis:  Stage IV Breast Cancer, +/+/+  Previous Treatment:  N/A  Current Treatment:  THP (01/30/2024 - )    The patient location is: [facility]  The chief complaint leading to consultation is: Tox Check    Visit type: audiovisual    Face to Face time with patient: 20 min  30 minutes of total time spent on the encounter, which includes face to face time and non-face to face time preparing to see the patient (eg, review of tests), Obtaining and/or reviewing separately obtained history, Documenting clinical information in the electronic or other health record, Independently interpreting results (not separately reported) and communicating results to the patient/family/caregiver, or Care coordination (not separately reported).       Each patient to whom he or she provides medical services by telemedicine is:  (1) informed of the relationship between the physician and patient and the respective role of any other health care provider with respect to management of the patient; and (2) notified that he or she may decline to receive medical services by telemedicine and may withdraw from such care at any time.    Subjective   The patient presents for follow-up and is accompanied by his daughter and son-in-law.    The patient is in good spirits.  We discussed the changes to this cycle and he is in agreement.  He will live in [place] and continue his care with [facility].  He has no acute complaints today.  He did not take his steroids today so we will administer it with his treatment.    Review of Systems   Constitutional:  Negative for activity change, appetite change, chills, diaphoresis, fatigue, fever and unexpected weight change.   HENT:  Negative for nosebleeds.    Respiratory:  Negative for shortness of breath.    Cardiovascular:  Negative for chest pain.   Gastrointestinal:  Negative for  abdominal distention, abdominal pain, anal bleeding, blood in stool, constipation, diarrhea, nausea and vomiting.   Genitourinary:  Negative for difficulty urinating and hematuria.   Musculoskeletal:  Positive for arthralgias (knees). Negative for back pain and myalgias.   Skin:  Negative for rash.   Neurological:  Negative for dizziness, weakness, light-headedness and headaches.   Hematological:  Does not bruise/bleed easily.   Psychiatric/Behavioral:  The patient is not nervous/anxious.      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, estrogen receptor positive     12/21/2023 Cancer Staged    Staging form: Breast, AJCC 8th Edition  - Clinical stage from 12/21/2023: Stage IIA (cT2, cN1(f), cM0, G2, ER+, AZ-, HER2+)     1/30/2024 -  Chemotherapy    Treatment Summary   Plan Name: OP BREAST THP (pertuzumab trastuzumab DOCEtaxel) Q3W  Treatment Goal: Curative  Status: Active  Start Date: 1/30/2024  End Date: 1/1/2025 (Planned)  Provider: Shaina Mendoza MD  Chemotherapy: DOCEtaxel 75 mg/m2 = 174 mg in sodium chloride 0.9% 293.7 mL chemo infusion, 75 mg/m2 = 174 mg, Intravenous, Clinic/HOD 1 time, 2 of 17 cycles  Dose modification: 60 mg/m2 (80 % of original dose 75 mg/m2, Cycle 2, Reason: Dose Not Tolerated, Comment: fatigue, weight loss)  Administration: 174 mg (1/31/2024)  pertuzumab (PERJETA) 840 mg in sodium chloride 0.9% 313 mL infusion, 840 mg, Intravenous, Clinic/HOD 1 time, 2 of 17 cycles  Administration: 840 mg (1/30/2024)  trastuzumab-anns (KANJINTI) 840 mg in sodium chloride 0.9% 325 mL chemo infusion, 868 mg, Intravenous, Clinic/HOD 1 time, 1 of 16 cycles  Administration: 840 mg (1/30/2024)      Chemotherapy    Treatment Summary   Plan Name: OP BREAST THP (pertuzumab trastuzumab DOCEtaxel) Q3W  Treatment  Goal: Curative  Status: Active  Start Date: 1/22/2024 (Planned)  End Date: 12/24/2024 (Planned)  Provider: Shaina Mendoza MD  Chemotherapy: DOCEtaxel (TAXOTERE) 75 mg/m2 in sodium chloride 0.9% 250 mL chemo infusion, 75 mg/m2, Intravenous, Clinic/HOD 1 time, 0 of 17 cycles    pertuzumab (PERJETA) 840 mg in sodium chloride 0.9% 278 mL infusion, 840 mg, Intravenous, Clinic/HOD 1 time, 0 of 17 cycles    trastuzumab-anns (KANJINTI) 8 mg/kg in sodium chloride 0.9% 250 mL chemo infusion, 8 mg/kg, Intravenous, Clinic/HOD 1 time, 0 of 17 cycles           Past Medical History:   Diagnosis Date    CKD stage 3 due to type 2 diabetes mellitus 2/18/2021    DM (diabetes mellitus) 2014    BS didn't check 12/11/2019    DM (diabetes mellitus) 2014    BS didn't check 05/05/2022    Foreign body, eye     right eye    Hyperlipidemia     Hypertension     Need for shingles vaccine 11/20/2019    Pneumonia     Primary osteoarthritis of right knee 10/29/2021    Severe obesity (BMI 35.0-35.9 with comorbidity) 7/10/2019    Type 2 diabetes mellitus 2014    BS didn't check 12/12/2018       Past Surgical History:   Procedure Laterality Date    APPENDECTOMY      CATARACT EXTRACTION Bilateral     CPG    ENDOSCOPIC ULTRASOUND OF UPPER GASTROINTESTINAL TRACT N/A 1/11/2024    Procedure: ULTRASOUND, UPPER GI TRACT, ENDOSCOPIC;  Surgeon: Evens Monsalve MD;  Location: [facility] ENDO [address]);  Service: Endoscopy;  Laterality: N/A;    FLUOROSCOPY N/A 1/5/2024    Procedure: Fluoroscopy/MEDIPORT PLACEMENT;  Surgeon: Jaret Lu MD;  Location: [facility] CATH LAB;  Service: General;  Laterality: N/A;       Family History   Problem Relation Age of Onset    Heart disease Mother     Hypertension Father     Heart disease Father     Hyperlipidemia Father     Cataracts Father     Hypertension Sister     Hypertension Brother     Cancer Son         colon    Allergic rhinitis Daughter        Review of patient's allergies indicates:   Allergen Reactions    Grass  pollen- grass standard        Social History     Tobacco Use    Smoking status: Former     Current packs/day: 0.00     Average packs/day: 3.0 packs/day for 10.0 years (30.0 ttl pk-yrs)     Types: Cigarettes     Start date:      Quit date:      Years since quittin.1    Smokeless tobacco: Never   Substance Use Topics    Alcohol use: No    Drug use: No     Labs   Labs:  Lab Visit on 2024   Component Date Value Ref Range Status    Phosphorus 2024 2.2 (L)  2.7 - 4.5 mg/dL Final    Magnesium 2024 1.7  1.6 - 2.6 mg/dL Final    Sodium 2024 133 (L)  136 - 145 mmol/L Final    Potassium 2024 3.6  3.5 - 5.1 mmol/L Final    Chloride 2024 98  95 - 110 mmol/L Final    CO2 2024 23  23 - 29 mmol/L Final    Glucose 2024 152 (H)  70 - 110 mg/dL Final    BUN 2024 17  8 - 23 mg/dL Final    Creatinine 2024 1.0  0.5 - 1.4 mg/dL Final    Calcium 2024 8.8  8.7 - 10.5 mg/dL Final    Total Protein 2024 6.9  6.0 - 8.4 g/dL Final    Albumin 2024 3.2 (L)  3.5 - 5.2 g/dL Final    Total Bilirubin 2024 0.6  0.1 - 1.0 mg/dL Final    Comment: For infants and newborns, interpretation of results should be based  on gestational age, weight and in agreement with clinical  observations.    Premature Infant recommended reference ranges:  Up to 24 hours.............<8.0 mg/dL  Up to 48 hours............<12.0 mg/dL  3-5 days..................<15.0 mg/dL  6-29 days.................<15.0 mg/dL      Alkaline Phosphatase 2024 64  55 - 135 U/L Final    AST 2024 31  10 - 40 U/L Final    ALT 2024 26  10 - 44 U/L Final    eGFR 2024 >60  >60 mL/min/1.73 m^2 Final    Anion Gap 2024 12  8 - 16 mmol/L Final    WBC 2024 8.42  3.90 - 12.70 K/uL Final    RBC 2024 4.19 (L)  4.60 - 6.20 M/uL Final    Hemoglobin 2024 12.4 (L)  14.0 - 18.0 g/dL Final    Hematocrit 2024 37.3 (L)  40.0 - 54.0 % Final    MCV 2024 89  82 -  98 fL Final    MCH 02/19/2024 29.6  27.0 - 31.0 pg Final    MCHC 02/19/2024 33.2  32.0 - 36.0 g/dL Final    RDW 02/19/2024 15.0 (H)  11.5 - 14.5 % Final    Platelets 02/19/2024 406  150 - 450 K/uL Final    MPV 02/19/2024 9.5  9.2 - 12.9 fL Final    Immature Granulocytes 02/19/2024 0.2  0.0 - 0.5 % Final    Gran # (ANC) 02/19/2024 5.4  1.8 - 7.7 K/uL Final    Immature Grans (Abs) 02/19/2024 0.02  0.00 - 0.04 K/uL Final    Comment: Mild elevation in immature granulocytes is non specific and   can be seen in a variety of conditions including stress response,   acute inflammation, trauma and pregnancy. Correlation with other   laboratory and clinical findings is essential.      Lymph # 02/19/2024 1.8  1.0 - 4.8 K/uL Final    Mono # 02/19/2024 1.0  0.3 - 1.0 K/uL Final    Eos # 02/19/2024 0.1  0.0 - 0.5 K/uL Final    Baso # 02/19/2024 0.14  0.00 - 0.20 K/uL Final    nRBC 02/19/2024 0  0 /100 WBC Final    Gran % 02/19/2024 64.2  38.0 - 73.0 % Final    Lymph % 02/19/2024 20.9  18.0 - 48.0 % Final    Mono % 02/19/2024 11.6  4.0 - 15.0 % Final    Eosinophil % 02/19/2024 1.4  0.0 - 8.0 % Final    Basophil % 02/19/2024 1.7  0.0 - 1.9 % Final    Differential Method 02/19/2024 Automated   Final        Imaging   Diagnostic L Breast US and Bilateral MMG w/Tomosynthesis 12/01/23  Findings:  This procedure was performed using tomosynthesis. Computer-aided detection was utilized in the interpretation of this examination.  The breasts are almost entirely fatty.      Left  Mammo Digital Diagnostic Bilat with Danny  There is a 5 cm mass seen in the retroareolar region of the left breast in the anterior depth. Associated features include nodular skin thickening and axillary adenopathy. There are also associated calcifications. Additional 1 cm mass noted within the axillary tail.      US Breast Left Limited  5 cm hypoechoic mass with calcifications noted within the subareolar breast corresponding to large mammographic mass. Overlying  nodular skin involvement present. 1 cm hypoechoic mass within the axillary tail region. Two enlarged abnormal lymph nodes present within the left axilla.      Right  Mammo Digital Diagnostic Bilat with Danny  There is no evidence of suspicious masses, calcifications, or other abnormal findings in the right breast.     Impression:  Left  Mass: Left breast 5 cm mass at the retroareolar anterior position. Abnormal intramammary lymph node versus 2nd site of malignancy within the axillary tail. Abnormal axillary adenopathy. Assessment: 5 - Highly suggestive of malignancy. Biopsy is recommended.      Right  Mammo Digital Diagnostic Bilat with Danny  There is no mammographic evidence of malignancy in the right breast.      NM PET CT FDG SKULL BASE TO MID THIGH - 12/29/2023  CLINICAL HISTORY:  Initial staging imaging for stage II hormone + male breast cancer; Malignant neoplasm of nipple and areola, left male breast     TECHNIQUE:  11.78 mCi of F18-FDG was administered intravenously in the left antecubital fossa.  After an approximately 60 min distribution time, PET/CT images were acquired from the skull base to the mid thigh. Transmission images were acquired to correct for attenuation using a whole body low-dose CT scan without contrast with the arms positioned above the head. Glycemia at the time of injection was 163 mg/dL.     COMPARISON:  None     FINDINGS:  Quality of the study: Adequate.     SUV max of the liver parenchyma is 3.7     Neck: No FDG abnormal avidity.  No lymphadenopathy or mass.     Chest: Large left breast mass which involves the skin measuring 7.0 x 4.5 cm with SUV max 9.7.     Extensive left axillary FDG avid lymphadenopathy with the largest left axillary lymph node measuring 3.9 x 2.3 cm with SUV max 7.4.  Another large left axillary lymph node measures 2.4 x 1.5 cm with SUV max 8.4.     The left axillary lymphadenopathy involves all 3 levels which includes of couple of level 3 lymph nodes, 1  measuring 1.2 x 0.7 cm with SUV max 6.6.     Medial pleural based right middle lobe pulmonary nodule measuring 16 x 10 mm with SUV max 6.2.     Right hilar lymph node measuring 1.5 x 1.4 cm with SUV max 6.9.     Azygoesophageal recess mediastinal lymph node measuring 1.9 x 1.4 cm with SUV max 6.2.     No pleural effusion     There are couple of small bilateral lower     Abdomen/pelvis: No lymphadenopathy.  No mass lesion or abnormal focus of FDG avidity.     Skeletal structures: No abnormal FDG avidity.  No focal lytic or sclerotic lesion in the osseous structures.  Lobe pulmonary nodules which show no FDG avidity.     Physiologic uptake of the tracer is present within the brain, salivary glands, myocardium, GI and  tracts.     Incidental CT findings: Coronary artery calcifications.     Impression:     1. Large left breast FDG avid mass which involves the skin.  2. Extensive markedly FDG avid left axillary lymphadenopathy involving all 3 levels of the axilla.  3. FDG avid mediastinal and right hilar lymphadenopathy concerning for intrathoracic metastatic disease.  4. Multiple pulmonary nodules.  Only 1 pulmonary nodules FDG avid which is a pleural based right middle lobe 16 x 10 mm pulmonary nodule.  All of these pulmonary nodules are concerning for metastatic disease.        Pathology     Left Breast Biopsy 12/14/23      Component 7 d ago   Final Pathologic Diagnosis Part 1  Breast, left, subareolar, ultrasound-guided biopsy:  Invasive ductal carcinoma, moderately differentiated  Raul grade 2:  Tubule formation -3, nuclear pleomorphism-2 and mitotic count -2  Invasive carcinoma measures 17 mm in greatest dimension  No carcinoma in Situ or lymphovascular invasion is identified    Tumor biomarkers  Estrogen receptor (ER):  Positive, 80-90%, strong  Progesterone receptor (PGR):  Negative  HER2 (IHC):  Positive, 3+  Ki-67:  20-25%      Part 2  Axilla, left, ultrasound-guided biopsy:  Positive for carcinoma, see  comment below      This case was reviewed by Dr. TONI Alcantara who concurs with the above diagnoses and assessment.    Stains were performed with appropriate controls.            Assessment and Plan   L Breast Invasive Ductal Carcinoma, Stage IIA (T2pN1),  +/-/+, Ki67 20-25%, G2  12/01/23 MMG showed left breast mass, 5 cm, at the retroareolar anterior position and abnormal intramammary lymph node versus 2nd site of malignancy cassidy axillary tail   s/p L breast biopsy 12/14/2023:  Positive for malignancy; left axillary lymph node positive for malignancy  His last  TTE 04/15/2023: EF 60%  PET CT 12/29/2023 unfortunately showed mediastinal and hilar lymphadenopathy suspicious for metastatic disease  EUS with biopsy 01/11/2024 results positive for metastatic adenocarcinoma of the breast  Genetic testing pending  He was initially planned for neoadjuvant TCHP however because PET-CT showed metastatic disease his treatment plan was changed to THP +Arimidex  Follow up [facility] Genetic Testing Results  C1 THP 01/30/24:  Patient with reaction to Herceptin with Rigors; Demerol administered with relief.  He then had tremors/jitteriness to Dexamethasone. Treatment aborted.  Plan for Taxotere in the morning without steroids.  On subsequent cycles, plan for 4mg BID dexamethasone the day of chemotherapy to prevent fluid retention.  Here for C2; will administer without Kanjinti and dose reduce docetaxel 20% due to toxicity; will plan to add brand form of trastuzumab with C3      Chronic Medical Conditions  DM II, controlled  CKD III  HTN  AS - Non-rheumatic  Hx TIA        Med Onc Chart Routing      Follow up with physician 3 weeks.   Follow up with ZULEYMA 1 week. Assess tolerance of treatment   Infusion scheduling note   Docetaxel and Perjeta only today; GCSF tomorrow; Docetaxel, Perjeta and Trastuzumab in 3 weeks   Injection scheduling note IVF in 1 week   Labs CMP, CBC, magnesium and phosphorus   Scheduling:  Preferred lab:  Lab  interval:     Imaging    Pharmacy appointment    Other referrals                       The patient was seen, interviewed and examined. Pertinent lab and radiologic studies were reviewed. Pt instructed to call should they develop concerning signs/symptoms or have further questions.        Portions of the record may have been created with voice recognition software. Occasional wrong-word or sound-a-like substitutions may have occurred due to the inherent limitations of voice recognition software. Read the chart carefully and recognize, using context, where substitutions have occurred.      Shaina Garg MD    Hematology/Oncology

## 2024-02-20 NOTE — TELEPHONE ENCOUNTER
Spoke with the patient to have new medication sent in to pharmacy to begin treatment of affected area       ----- Message from Darinel Soriano MA sent at 2/20/2024  8:53 AM CST -----  Regarding: Returnig  call/appt  Contact: pt [phone]  Type:  Patient Returning Call    Who Called:Mark   Who Left Message for Patient: Rand   Does the patient know what this is regarding?:appt   Would the patient rather a call back or a response via [facility]?  Call back   Best Call Back Number:[phone]   Additional Information:   called  was  lost in the middle  of  assisting patient rep call patient back and  left a  voicemail

## 2024-02-20 NOTE — TELEPHONE ENCOUNTER
----- Message from Nia Savage sent at 2/20/2024  2:05 PM CST -----  Contact: Mark  Type:  Sooner Apoointment Request    Caller is requesting a sooner appointment. Caller will not accept being placed on the waitlist and is requesting a message be sent to doctor.  Name of Caller:Mark  When is the first available appointment?unknown  Symptoms:personal concern  Would the patient rather a call back or a response via [facility]? call  Best Call Back Number:[phone]  Additional Information: Patient request to discuss personal scheduling with provider or staff. Please give patient a call back to assist.  Thank you,  GH

## 2024-02-20 NOTE — PLAN OF CARE
Patient reports feeling weak and tired today. Tolerated treatment well with no adverse reactions. Patient to return tomorrow for injection.

## 2024-02-21 ENCOUNTER — DOCUMENTATION ONLY (OUTPATIENT)
Dept: HEMATOLOGY/ONCOLOGY | Facility: CLINIC | Age: 84
End: 2024-02-21
Payer: MEDICARE

## 2024-02-21 ENCOUNTER — INFUSION (OUTPATIENT)
Dept: INFUSION THERAPY | Facility: HOSPITAL | Age: 84
End: 2024-02-21
Attending: INTERNAL MEDICINE
Payer: MEDICARE

## 2024-02-21 VITALS
DIASTOLIC BLOOD PRESSURE: 80 MMHG | SYSTOLIC BLOOD PRESSURE: 119 MMHG | RESPIRATION RATE: 18 BRPM | HEART RATE: 96 BPM | TEMPERATURE: 98 F | OXYGEN SATURATION: 98 %

## 2024-02-21 DIAGNOSIS — Z17.0 MALIGNANT NEOPLASM INVOLVING BOTH NIPPLE AND AREOLA OF LEFT BREAST IN MALE, ESTROGEN RECEPTOR POSITIVE: Primary | ICD-10-CM

## 2024-02-21 DIAGNOSIS — C50.022 MALIGNANT NEOPLASM INVOLVING BOTH NIPPLE AND AREOLA OF LEFT BREAST IN MALE, ESTROGEN RECEPTOR POSITIVE: Primary | ICD-10-CM

## 2024-02-21 PROCEDURE — 96372 THER/PROPH/DIAG INJ SC/IM: CPT | Mod: HCNC

## 2024-02-21 PROCEDURE — 63600175 PHARM REV CODE 636 W HCPCS: Mod: JZ,JG,HCNC | Performed by: INTERNAL MEDICINE

## 2024-02-21 RX ADMIN — PEGFILGRASTIM-CBQV 6 MG: 6 INJECTION, SOLUTION SUBCUTANEOUS at 03:02

## 2024-02-21 NOTE — PROGRESS NOTES
Returned call to pt who has questions about script from dermatology being transferred from [facility] to [facility]. Would like to see if it can be ready for p/u today, he has an injection scheduled @ 330pm at . Told him I will call [facility] to see if script can be transferred there and call him back, he will wait to hear from me. Called [facility] pharmacy, spoke with Jane for script transfer. States they will transfer script to be ready for p/u this afternoon once they're able to contact [facility]. Thanked her for her help. Called pt back to let him know script will be transferred and ready for p/u today if possible. He voiced understanding, has no other questions at this time.   Oncology Navigation   Intake  Date of Diagnosis: 12/14/23  Cancer Type: Breast  Type of Referral: Internal  Date of Referral: 12/19/23  Initial Nurse Navigator Contact: 12/19/23  Referral to Initial Contact Timeline (days): 0  Date Worked: 02/21/24  First Appointment Available: 12/21/23  Appointment Date: 12/21/23  First Available Date vs. Scheduled Date (days): 0  Multiple appointments: Yes     Treatment  Current Status: Active  Date Presented to Tumor Board: 12/28/23    Surgery: Planned  Surgical Oncologist: Brittnee Briones MD  Type of Surgery: Left total mastectomy with left sentinel lymph node biopsy    Medical Oncologist: Shaina Garg MD  Consult Date: 12/21/23  Chemotherapy: Planned  Chemotherapy Regimen: Taxotere  Immunotherapy: Planned  Immunotherapy Name: Herceptin/Perjeta  Start Date: 01/30/24    Radiation Therapy: Planned    Procedures: Port / PICC  Genetic Testing Date Sent: 12/21/23  Diagnostic Mammo Schedule Date:  (3/4 through NACT)  PET Scan Schedule Date: 12/29/23  Port / PICC Schedule Date: 01/05/24    General Referrals: Chemo Education; Social Work  Social Work Referral Date: 01/22/24    ER: Positive  [place]: Negative  Her2: Postive       Support Systems: Children; Family members; [employer] / sree  "community  Barriers of Care: Barriers to Care "Assessment completed-no barriers noted"     Acuity  Stage: 1  Systemic Treatment - predicted or initiated: Chemotherapy Regimen with Multiple drugs (+1)  Surgical Procedure Complexity: 2  Treatment Tolerability: Has not started treatment yet/treatment fully completed and side effects resolved  ECO  Comorbidities in Medical History: 1  Hospitalization Within the Past Month: 0   Needed: 0  Support: 0  Verbalizes Financial Concerns: 0  Transportation: 0  Psychological Factors (+1 each): Emotional during conversation  History of noncompliance/frequent no shows and cancellations: 0  Verbalizes the need for more education: 0  Other Factors (+1 for Each): 0  Navigation Acuity: 1     Follow Up  Follow up in 20 days (on 3/12/2024).         "

## 2024-02-22 ENCOUNTER — DOCUMENTATION ONLY (OUTPATIENT)
Dept: HEMATOLOGY/ONCOLOGY | Facility: CLINIC | Age: 84
End: 2024-02-22
Payer: MEDICARE

## 2024-02-22 NOTE — PROGRESS NOTES
PEARL submitted check requests for one-time grants [employer] $1,000, [employer] $250. PEARL faxed [facility] referral form on yesterday. P. [phone], F. [phone]. PEARL will remain available.

## 2024-02-23 ENCOUNTER — DOCUMENTATION ONLY (OUTPATIENT)
Dept: HEMATOLOGY/ONCOLOGY | Facility: CLINIC | Age: 84
End: 2024-02-23
Payer: MEDICARE

## 2024-02-23 NOTE — PROGRESS NOTES
"Incoming call from pt DNL, notifying me that pt will be moving with them to [place] and would like to know if they will still have to come separate days for labs, provider and tx. Told her no we will schedule appts same day, noted in chart but asked her to remind me/nurse when he comes 3/12. Voiced understanding and has no other questions for NN. Blue sticky created.   Oncology Navigation   Intake  Date of Diagnosis: 12/14/23  Cancer Type: Breast  Type of Referral: Internal  Date of Referral: 12/19/23  Initial Nurse Navigator Contact: 12/19/23  Referral to Initial Contact Timeline (days): 0  Date Worked: 02/23/24  First Appointment Available: 12/21/23  Appointment Date: 12/21/23  First Available Date vs. Scheduled Date (days): 0  Multiple appointments: Yes     Treatment  Current Status: Active  Date Presented to Tumor Board: 12/28/23    Surgery: Planned  Surgical Oncologist: Brittnee Briones MD  Type of Surgery: Left total mastectomy with left sentinel lymph node biopsy    Medical Oncologist: Shaina Garg MD  Consult Date: 12/21/23  Chemotherapy: Planned  Chemotherapy Regimen: Taxotere  Immunotherapy: Planned  Immunotherapy Name: Herceptin/Perjeta  Start Date: 01/30/24    Radiation Therapy: Planned    Procedures: Port / PICC  Genetic Testing Date Sent: 12/21/23  Diagnostic Mammo Schedule Date:  (3/4 through NACT)  PET Scan Schedule Date: 12/29/23  Port / PICC Schedule Date: 01/05/24    General Referrals: Chemo Education; Social Work  Social Work Referral Date: 01/22/24    ER: Positive  KS: Negative  Her2: Postive       Support Systems: Children; Family members; [employer] / sree community  Barriers of Care: Barriers to Care "Assessment completed-no barriers noted"     Acuity  Stage: 1  Systemic Treatment - predicted or initiated: Chemotherapy Regimen with Multiple drugs (+1)  Surgical Procedure Complexity: 2  Treatment Tolerability: Has not started treatment yet/treatment fully completed and side effects " resolved  ECO  Comorbidities in Medical History: 1  Hospitalization Within the Past Month: 0   Needed: 0  Support: 0  Verbalizes Financial Concerns: 0  Transportation: 0  Psychological Factors (+1 each): Emotional during conversation  History of noncompliance/frequent no shows and cancellations: 0  Verbalizes the need for more education: 0  Other Factors (+1 for Each): 0  Navigation Acuity: 1     Follow Up  Follow up in 18 days (on 3/12/2024).

## 2024-02-26 ENCOUNTER — HOSPITAL ENCOUNTER (INPATIENT)
Facility: HOSPITAL | Age: 84
LOS: 16 days | Discharge: HOSPICE | DRG: 280 | End: 2024-03-14
Attending: EMERGENCY MEDICINE | Admitting: HOSPITALIST
Payer: MEDICARE

## 2024-02-26 DIAGNOSIS — R41.0 CONFUSION: ICD-10-CM

## 2024-02-26 DIAGNOSIS — R79.89 ELEVATED TROPONIN I LEVEL: ICD-10-CM

## 2024-02-26 DIAGNOSIS — R00.0 TACHYCARDIA: ICD-10-CM

## 2024-02-26 DIAGNOSIS — A41.9 SEPSIS: ICD-10-CM

## 2024-02-26 DIAGNOSIS — I35.0 NONRHEUMATIC AORTIC VALVE STENOSIS: ICD-10-CM

## 2024-02-26 DIAGNOSIS — E87.6 HYPOKALEMIA: ICD-10-CM

## 2024-02-26 DIAGNOSIS — E83.42 HYPOMAGNESEMIA: ICD-10-CM

## 2024-02-26 DIAGNOSIS — R07.9 CHEST PAIN: ICD-10-CM

## 2024-02-26 DIAGNOSIS — R79.89 ELEVATED TROPONIN: Primary | ICD-10-CM

## 2024-02-26 DIAGNOSIS — M25.661 DECREASED RANGE OF MOTION OF RIGHT KNEE: ICD-10-CM

## 2024-02-26 DIAGNOSIS — I21.4 NSTEMI (NON-ST ELEVATED MYOCARDIAL INFARCTION): ICD-10-CM

## 2024-02-26 PROBLEM — R94.31 ABNORMAL EKG: Status: ACTIVE | Noted: 2024-02-26

## 2024-02-26 PROBLEM — D72.829 LEUKOCYTOSIS: Status: ACTIVE | Noted: 2024-02-26

## 2024-02-26 LAB
ALBUMIN SERPL BCP-MCNC: 2.9 G/DL (ref 3.5–5.2)
ALP SERPL-CCNC: 77 U/L (ref 55–135)
ALT SERPL W/O P-5'-P-CCNC: 22 U/L (ref 10–44)
ANION GAP SERPL CALC-SCNC: 13 MMOL/L (ref 8–16)
ANISOCYTOSIS BLD QL SMEAR: SLIGHT
APTT PPP: 33.3 SEC (ref 21–32)
APTT PPP: 56.7 SEC (ref 21–32)
AST SERPL-CCNC: 38 U/L (ref 10–40)
BASOPHILS # BLD AUTO: 0.11 K/UL (ref 0–0.2)
BASOPHILS NFR BLD: 0.8 % (ref 0–1.9)
BILIRUB SERPL-MCNC: 0.7 MG/DL (ref 0.1–1)
BILIRUB UR QL STRIP: NEGATIVE
BNP SERPL-MCNC: 124 PG/ML (ref 0–99)
BUN SERPL-MCNC: 13 MG/DL (ref 8–23)
CALCIUM SERPL-MCNC: 9.1 MG/DL (ref 8.7–10.5)
CHLORIDE SERPL-SCNC: 94 MMOL/L (ref 95–110)
CLARITY UR: CLEAR
CO2 SERPL-SCNC: 23 MMOL/L (ref 23–29)
COLOR UR: YELLOW
CREAT SERPL-MCNC: 0.8 MG/DL (ref 0.5–1.4)
CV ECHO LV RWT: 0.44 CM
D DIMER PPP IA.FEU-MCNC: 0.95 MG/L FEU
DACRYOCYTES BLD QL SMEAR: ABNORMAL
DIFFERENTIAL METHOD BLD: ABNORMAL
ECHO LV POSTERIOR WALL: 1.08 CM (ref 0.6–1.1)
EOSINOPHIL # BLD AUTO: 0 K/UL (ref 0–0.5)
EOSINOPHIL NFR BLD: 0.3 % (ref 0–8)
ERYTHROCYTE [DISTWIDTH] IN BLOOD BY AUTOMATED COUNT: 14.6 % (ref 11.5–14.5)
EST. GFR  (NO RACE VARIABLE): >60 ML/MIN/1.73 M^2
FRACTIONAL SHORTENING: 27 % (ref 28–44)
GLUCOSE SERPL-MCNC: 97 MG/DL (ref 70–110)
GLUCOSE UR QL STRIP: NEGATIVE
HCT VFR BLD AUTO: 30.3 % (ref 40–54)
HGB BLD-MCNC: 10.7 G/DL (ref 14–18)
HGB UR QL STRIP: NEGATIVE
IMM GRANULOCYTES # BLD AUTO: 0.25 K/UL (ref 0–0.04)
IMM GRANULOCYTES NFR BLD AUTO: 1.7 % (ref 0–0.5)
INR PPP: 1 (ref 0.8–1.2)
INTERVENTRICULAR SEPTUM: 1.04 CM (ref 0.6–1.1)
IVC DIAMETER: 2.08 CM
KETONES UR QL STRIP: ABNORMAL
LEFT ATRIUM SIZE: 4.18 CM
LEFT INTERNAL DIMENSION IN SYSTOLE: 3.56 CM (ref 2.1–4)
LEFT VENTRICLE DIASTOLIC VOLUME INDEX: 49.06 ML/M2
LEFT VENTRICLE DIASTOLIC VOLUME: 111.86 ML
LEFT VENTRICLE MASS INDEX: 83 G/M2
LEFT VENTRICLE SYSTOLIC VOLUME INDEX: 23.2 ML/M2
LEFT VENTRICLE SYSTOLIC VOLUME: 52.86 ML
LEFT VENTRICULAR INTERNAL DIMENSION IN DIASTOLE: 4.88 CM (ref 3.5–6)
LEFT VENTRICULAR MASS: 189.29 G
LEUKOCYTE ESTERASE UR QL STRIP: NEGATIVE
LYMPHOCYTES # BLD AUTO: 1.5 K/UL (ref 1–4.8)
LYMPHOCYTES NFR BLD: 10.7 % (ref 18–48)
MAGNESIUM SERPL-MCNC: 1.5 MG/DL (ref 1.6–2.6)
MAGNESIUM SERPL-MCNC: 1.5 MG/DL (ref 1.6–2.6)
MCH RBC QN AUTO: 30.3 PG (ref 27–31)
MCHC RBC AUTO-ENTMCNC: 35.3 G/DL (ref 32–36)
MCV RBC AUTO: 86 FL (ref 82–98)
MONOCYTES # BLD AUTO: 1.5 K/UL (ref 0.3–1)
MONOCYTES NFR BLD: 10.4 % (ref 4–15)
NEUTROPHILS # BLD AUTO: 10.9 K/UL (ref 1.8–7.7)
NEUTROPHILS NFR BLD: 76.1 % (ref 38–73)
NITRITE UR QL STRIP: NEGATIVE
NRBC BLD-RTO: 0 /100 WBC
OHS QRS DURATION: 114 MS
OHS QTC CALCULATION: 440 MS
PH UR STRIP: 6 [PH] (ref 5–8)
PISA TR MAX VEL: 2.91 M/S
PLATELET # BLD AUTO: 251 K/UL (ref 150–450)
PLATELET BLD QL SMEAR: ABNORMAL
PMV BLD AUTO: 10.6 FL (ref 9.2–12.9)
POCT GLUCOSE: 102 MG/DL (ref 70–110)
POCT GLUCOSE: 153 MG/DL (ref 70–110)
POTASSIUM SERPL-SCNC: 3 MMOL/L (ref 3.5–5.1)
PROT SERPL-MCNC: 6.5 G/DL (ref 6–8.4)
PROT UR QL STRIP: NEGATIVE
PROTHROMBIN TIME: 11.9 SEC (ref 9–12.5)
RA PRESSURE ESTIMATED: 3 MMHG
RBC # BLD AUTO: 3.53 M/UL (ref 4.6–6.2)
RIGHT VENTRICULAR END-DIASTOLIC DIMENSION: 2.81 CM
RV TB RVSP: 6 MMHG
SODIUM SERPL-SCNC: 130 MMOL/L (ref 136–145)
SP GR UR STRIP: >1.03 (ref 1–1.03)
TR MAX PG: 34 MMHG
TROPONIN I SERPL DL<=0.01 NG/ML-MCNC: 0.68 NG/ML (ref 0–0.03)
TROPONIN I SERPL DL<=0.01 NG/ML-MCNC: 0.72 NG/ML (ref 0–0.03)
TROPONIN I SERPL DL<=0.01 NG/ML-MCNC: 0.73 NG/ML (ref 0–0.03)
TV REST PULMONARY ARTERY PRESSURE: 37 MMHG
URN SPEC COLLECT METH UR: ABNORMAL
UROBILINOGEN UR STRIP-ACNC: NEGATIVE EU/DL
WBC # BLD AUTO: 14.36 K/UL (ref 3.9–12.7)
Z-SCORE OF LEFT VENTRICULAR DIMENSION IN END DIASTOLE: -5.65
Z-SCORE OF LEFT VENTRICULAR DIMENSION IN END SYSTOLE: -2.98

## 2024-02-26 PROCEDURE — G0378 HOSPITAL OBSERVATION PER HR: HCPCS | Mod: HCNC

## 2024-02-26 PROCEDURE — 25000003 PHARM REV CODE 250: Mod: HCNC | Performed by: NURSE PRACTITIONER

## 2024-02-26 PROCEDURE — 93005 ELECTROCARDIOGRAM TRACING: CPT | Mod: HCNC

## 2024-02-26 PROCEDURE — 85379 FIBRIN DEGRADATION QUANT: CPT | Mod: HCNC | Performed by: INTERNAL MEDICINE

## 2024-02-26 PROCEDURE — 85730 THROMBOPLASTIN TIME PARTIAL: CPT | Mod: 91 | Performed by: FAMILY MEDICINE

## 2024-02-26 PROCEDURE — 82962 GLUCOSE BLOOD TEST: CPT | Mod: HCNC

## 2024-02-26 PROCEDURE — 85025 COMPLETE CBC W/AUTO DIFF WBC: CPT | Mod: HCNC | Performed by: EMERGENCY MEDICINE

## 2024-02-26 PROCEDURE — 96365 THER/PROPH/DIAG IV INF INIT: CPT | Mod: HCNC

## 2024-02-26 PROCEDURE — 63600175 PHARM REV CODE 636 W HCPCS: Mod: HCNC | Performed by: EMERGENCY MEDICINE

## 2024-02-26 PROCEDURE — 81003 URINALYSIS AUTO W/O SCOPE: CPT | Performed by: NURSE PRACTITIONER

## 2024-02-26 PROCEDURE — 25000003 PHARM REV CODE 250: Mod: HCNC | Performed by: EMERGENCY MEDICINE

## 2024-02-26 PROCEDURE — 99223 1ST HOSP IP/OBS HIGH 75: CPT | Mod: 25,HCNC,, | Performed by: INTERNAL MEDICINE

## 2024-02-26 PROCEDURE — 85610 PROTHROMBIN TIME: CPT | Mod: HCNC | Performed by: NURSE PRACTITIONER

## 2024-02-26 PROCEDURE — 99285 EMERGENCY DEPT VISIT HI MDM: CPT | Mod: 25,HCNC

## 2024-02-26 PROCEDURE — 63600175 PHARM REV CODE 636 W HCPCS: Mod: HCNC | Performed by: NURSE PRACTITIONER

## 2024-02-26 PROCEDURE — 36415 COLL VENOUS BLD VENIPUNCTURE: CPT | Mod: XB | Performed by: FAMILY MEDICINE

## 2024-02-26 PROCEDURE — 80053 COMPREHEN METABOLIC PANEL: CPT | Mod: HCNC | Performed by: EMERGENCY MEDICINE

## 2024-02-26 PROCEDURE — 96375 TX/PRO/DX INJ NEW DRUG ADDON: CPT | Mod: HCNC

## 2024-02-26 PROCEDURE — 85730 THROMBOPLASTIN TIME PARTIAL: CPT | Mod: HCNC | Performed by: NURSE PRACTITIONER

## 2024-02-26 PROCEDURE — 83735 ASSAY OF MAGNESIUM: CPT | Mod: 91,HCNC | Performed by: NURSE PRACTITIONER

## 2024-02-26 PROCEDURE — 83735 ASSAY OF MAGNESIUM: CPT | Mod: HCNC | Performed by: EMERGENCY MEDICINE

## 2024-02-26 PROCEDURE — 84484 ASSAY OF TROPONIN QUANT: CPT | Mod: HCNC | Performed by: EMERGENCY MEDICINE

## 2024-02-26 PROCEDURE — 96366 THER/PROPH/DIAG IV INF ADDON: CPT | Mod: HCNC

## 2024-02-26 PROCEDURE — 84484 ASSAY OF TROPONIN QUANT: CPT | Mod: 91 | Performed by: NURSE PRACTITIONER

## 2024-02-26 PROCEDURE — 25500020 PHARM REV CODE 255: Mod: HCNC | Performed by: FAMILY MEDICINE

## 2024-02-26 PROCEDURE — 93010 ELECTROCARDIOGRAM REPORT: CPT | Mod: HCNC,,, | Performed by: INTERNAL MEDICINE

## 2024-02-26 PROCEDURE — 83880 ASSAY OF NATRIURETIC PEPTIDE: CPT | Mod: HCNC | Performed by: EMERGENCY MEDICINE

## 2024-02-26 PROCEDURE — 36415 COLL VENOUS BLD VENIPUNCTURE: CPT | Performed by: NURSE PRACTITIONER

## 2024-02-26 RX ORDER — SODIUM CHLORIDE 9 MG/ML
INJECTION, SOLUTION INTRAVENOUS CONTINUOUS
Status: DISCONTINUED | OUTPATIENT
Start: 2024-02-26 | End: 2024-02-27

## 2024-02-26 RX ORDER — SODIUM CHLORIDE 0.9 % (FLUSH) 0.9 %
10 SYRINGE (ML) INJECTION EVERY 12 HOURS PRN
Status: DISCONTINUED | OUTPATIENT
Start: 2024-02-26 | End: 2024-03-14 | Stop reason: HOSPADM

## 2024-02-26 RX ORDER — MAGNESIUM SULFATE 1 G/100ML
1 INJECTION INTRAVENOUS
Status: DISCONTINUED | OUTPATIENT
Start: 2024-02-26 | End: 2024-02-26

## 2024-02-26 RX ORDER — NALOXONE HCL 0.4 MG/ML
0.02 VIAL (ML) INJECTION
Status: DISCONTINUED | OUTPATIENT
Start: 2024-02-26 | End: 2024-03-14 | Stop reason: HOSPADM

## 2024-02-26 RX ORDER — HYDROCODONE BITARTRATE AND ACETAMINOPHEN 5; 325 MG/1; MG/1
1 TABLET ORAL EVERY 6 HOURS PRN
Status: DISCONTINUED | OUTPATIENT
Start: 2024-02-26 | End: 2024-03-14 | Stop reason: HOSPADM

## 2024-02-26 RX ORDER — ENOXAPARIN SODIUM 100 MG/ML
40 INJECTION SUBCUTANEOUS EVERY 24 HOURS
Status: DISCONTINUED | OUTPATIENT
Start: 2024-02-26 | End: 2024-02-26

## 2024-02-26 RX ORDER — POTASSIUM CHLORIDE 20 MEQ/1
40 TABLET, EXTENDED RELEASE ORAL
Status: COMPLETED | OUTPATIENT
Start: 2024-02-26 | End: 2024-02-26

## 2024-02-26 RX ORDER — GLUCAGON 1 MG
1 KIT INJECTION
Status: DISCONTINUED | OUTPATIENT
Start: 2024-02-26 | End: 2024-03-14 | Stop reason: HOSPADM

## 2024-02-26 RX ORDER — ANASTROZOLE 1 MG/1
1 TABLET ORAL DAILY
Status: DISCONTINUED | OUTPATIENT
Start: 2024-02-26 | End: 2024-03-14 | Stop reason: HOSPADM

## 2024-02-26 RX ORDER — ONDANSETRON HYDROCHLORIDE 2 MG/ML
4 INJECTION, SOLUTION INTRAVENOUS
Status: COMPLETED | OUTPATIENT
Start: 2024-02-26 | End: 2024-02-26

## 2024-02-26 RX ORDER — SODIUM CHLORIDE 9 MG/ML
1000 INJECTION, SOLUTION INTRAVENOUS
Status: COMPLETED | OUTPATIENT
Start: 2024-02-26 | End: 2024-02-26

## 2024-02-26 RX ORDER — IBUPROFEN 200 MG
16 TABLET ORAL
Status: DISCONTINUED | OUTPATIENT
Start: 2024-02-26 | End: 2024-03-14 | Stop reason: HOSPADM

## 2024-02-26 RX ORDER — POTASSIUM CHLORIDE 20 MEQ/1
20 TABLET, EXTENDED RELEASE ORAL DAILY
Status: DISCONTINUED | OUTPATIENT
Start: 2024-02-27 | End: 2024-02-28

## 2024-02-26 RX ORDER — IBUPROFEN 200 MG
24 TABLET ORAL
Status: DISCONTINUED | OUTPATIENT
Start: 2024-02-26 | End: 2024-03-14 | Stop reason: HOSPADM

## 2024-02-26 RX ORDER — NAPROXEN SODIUM 220 MG/1
81 TABLET, FILM COATED ORAL DAILY
Status: DISCONTINUED | OUTPATIENT
Start: 2024-02-26 | End: 2024-02-26

## 2024-02-26 RX ORDER — INSULIN ASPART 100 [IU]/ML
0-5 INJECTION, SOLUTION INTRAVENOUS; SUBCUTANEOUS
Status: DISCONTINUED | OUTPATIENT
Start: 2024-02-26 | End: 2024-03-12

## 2024-02-26 RX ORDER — HEPARIN SODIUM,PORCINE/D5W 25000/250
0-40 INTRAVENOUS SOLUTION INTRAVENOUS CONTINUOUS
Status: DISCONTINUED | OUTPATIENT
Start: 2024-02-26 | End: 2024-03-01

## 2024-02-26 RX ORDER — POTASSIUM CHLORIDE 20 MEQ/1
40 TABLET, EXTENDED RELEASE ORAL ONCE
Status: COMPLETED | OUTPATIENT
Start: 2024-02-26 | End: 2024-02-26

## 2024-02-26 RX ORDER — TALC
6 POWDER (GRAM) TOPICAL NIGHTLY PRN
Status: DISCONTINUED | OUTPATIENT
Start: 2024-02-26 | End: 2024-03-14 | Stop reason: HOSPADM

## 2024-02-26 RX ORDER — MAGNESIUM SULFATE HEPTAHYDRATE 40 MG/ML
4 INJECTION, SOLUTION INTRAVENOUS ONCE
Status: COMPLETED | OUTPATIENT
Start: 2024-02-26 | End: 2024-02-26

## 2024-02-26 RX ORDER — ONDANSETRON HYDROCHLORIDE 2 MG/ML
4 INJECTION, SOLUTION INTRAVENOUS EVERY 8 HOURS PRN
Status: DISCONTINUED | OUTPATIENT
Start: 2024-02-26 | End: 2024-03-14 | Stop reason: HOSPADM

## 2024-02-26 RX ORDER — ACETAMINOPHEN 325 MG/1
650 TABLET ORAL EVERY 4 HOURS PRN
Status: DISCONTINUED | OUTPATIENT
Start: 2024-02-26 | End: 2024-03-14 | Stop reason: HOSPADM

## 2024-02-26 RX ORDER — ATORVASTATIN CALCIUM 40 MG/1
40 TABLET, FILM COATED ORAL DAILY
Status: DISCONTINUED | OUTPATIENT
Start: 2024-02-26 | End: 2024-03-03

## 2024-02-26 RX ADMIN — ONDANSETRON 4 MG: 2 INJECTION INTRAMUSCULAR; INTRAVENOUS at 11:02

## 2024-02-26 RX ADMIN — MAGNESIUM SULFATE HEPTAHYDRATE 4 G: 40 INJECTION, SOLUTION INTRAVENOUS at 01:02

## 2024-02-26 RX ADMIN — ATORVASTATIN CALCIUM 40 MG: 40 TABLET, FILM COATED ORAL at 01:02

## 2024-02-26 RX ADMIN — POTASSIUM CHLORIDE 40 MEQ: 1500 TABLET, EXTENDED RELEASE ORAL at 11:02

## 2024-02-26 RX ADMIN — IOHEXOL 100 ML: 350 INJECTION, SOLUTION INTRAVENOUS at 06:02

## 2024-02-26 RX ADMIN — POTASSIUM CHLORIDE 40 MEQ: 1500 TABLET, EXTENDED RELEASE ORAL at 09:02

## 2024-02-26 RX ADMIN — ANASTROZOLE 1 MG: 1 TABLET, COATED ORAL at 01:02

## 2024-02-26 RX ADMIN — HEPARIN SODIUM 12 UNITS/KG/HR: 10000 INJECTION, SOLUTION INTRAVENOUS at 04:02

## 2024-02-26 RX ADMIN — ACETAMINOPHEN 650 MG: 325 TABLET ORAL at 11:02

## 2024-02-26 RX ADMIN — SODIUM CHLORIDE 1000 ML: 9 INJECTION, SOLUTION INTRAVENOUS at 11:02

## 2024-02-26 NOTE — ASSESSMENT & PLAN NOTE
Patient's FSGs are controlled on current medication regimen.  Last A1c reviewed-   Lab Results   Component Value Date    HGBA1C 7.1 (H) 11/28/2023     Most recent fingerstick glucose reviewed-   Recent Labs   Lab 02/26/24  1233   POCTGLUCOSE 102     Current correctional scale  Low  Maintain anti-hyperglycemic dose as follows-   Antihyperglycemics (From admission, onward)      Start     Stop Route Frequency Ordered    02/26/24 1301  insulin aspart U-100 pen 0-5 Units         -- SubQ Before meals & nightly PRN 02/26/24 1204          Hold Oral hypoglycemics while patient is in the hospital.

## 2024-02-26 NOTE — Clinical Note
130 ml of contrast were injected throughout the case. 20 mL of contrast was the total wasted during the case. 150 mL was the total amount used during the case.

## 2024-02-26 NOTE — ASSESSMENT & PLAN NOTE
- mild - reactive?  - chest xray with no acute processes  - UA pending  - afebrile  - will trend

## 2024-02-26 NOTE — Clinical Note
An angiography of the  abdominal aorta was performed with the catheter and power injected with 15 mL contrast at at 8 mL/s.

## 2024-02-26 NOTE — HPI
"Mark Dickerson Sr. is a 84 y.o. male patient with a PMHx of metastatic breast cancer (on chemo), HLD, HTN, DM II, CKD who presents to the ED for evaluation of AMS which onset this morning PTA. Per daughter in law she visited the pt early this morning and states that he was normal for a short period before becoming extremely disoriented with jumbled speech.  Patient mental status returned to baseline piror to ed arrival.  Patient reports he feels "dehydrated", he also reports diarrhea earlier last week but this has since resolved.  Patient denies any fever, chills, N/V, SOB and chest pain.   In the ED, work up notable for WBC 14, Na 130, K 3.0, mag 1.5, , Trop 0.7.  EKG with SR PACs, ST and T wave abnormality. CT head negative for acute processes.  Chest xray no acute processes. Patient started on IVF and given zofran for nausea.  Patient will be admitted to observation for electrolyte imbalances, elevated trop.  Cards consulted.     Code Status DNR  Surrogate Decision maker daughter  "

## 2024-02-26 NOTE — SUBJECTIVE & OBJECTIVE
Past Medical History:   Diagnosis Date    Chest pain 2/26/2024    CKD stage 3 due to type 2 diabetes mellitus 2/18/2021    DM (diabetes mellitus) 2014    BS didn't check 12/11/2019    DM (diabetes mellitus) 2014    BS didn't check 05/05/2022    Foreign body, eye     right eye    Hyperlipidemia     Hypertension     Need for shingles vaccine 11/20/2019    Pneumonia     Primary osteoarthritis of right knee 10/29/2021    Severe obesity (BMI 35.0-35.9 with comorbidity) 7/10/2019    Type 2 diabetes mellitus 2014    BS didn't check 12/12/2018       Past Surgical History:   Procedure Laterality Date    APPENDECTOMY      CATARACT EXTRACTION Bilateral     CPG    ENDOSCOPIC ULTRASOUND OF UPPER GASTROINTESTINAL TRACT N/A 1/11/2024    Procedure: ULTRASOUND, UPPER GI TRACT, ENDOSCOPIC;  Surgeon: Evens Monsalve MD;  Location: [facility] ENDO ([address] [place] [address]);  Service: Endoscopy;  Laterality: N/A;    FLUOROSCOPY N/A 1/5/2024    Procedure: Fluoroscopy/MEDIPORT PLACEMENT;  Surgeon: Jaret Lu MD;  Location: [facility] CATH LAB;  Service: General;  Laterality: N/A;       Review of patient's allergies indicates:   Allergen Reactions    Grass pollen-anai grass standard        No current facility-administered medications on file prior to encounter.     Current Outpatient Medications on File Prior to Encounter   Medication Sig    anastrozole (ARIMIDEX) 1 mg Tab Take 1 tablet (1 mg total) by mouth once daily.    atorvastatin (LIPITOR) 40 MG tablet Take 1 tablet (40 mg total) by mouth once daily.    clotrimazole (LOTRIMIN) 1 % cream Apply topically 2 (two) times daily.    dexAMETHasone (DECADRON) 4 MG Tab Take 1 tablet by mouth twice per day the days of chemotherapy only    diphenhydrAMINE-aluminum-magnesium hydroxide-simethicone-LIDOcaine viscous HCl 2% Swish and spit 15 mLs every 4 (four) hours as needed.    fluorouraciL (EFUDEX) 5 % cream APPLY TO THE SCALP TWICE A DAY FOR 4 WEEKS WILL CAUSE REDNESS AND IRRITATION    glipiZIDE (GLUCOTROL) 5 MG  tablet TAKE 1 TABLET BY MOUTH TWICE DAILY WITH MEALS    hydroCHLOROthiazide (HYDRODIURIL) 25 MG tablet Take 1 tablet by mouth once daily    levocetirizine (XYZAL) 5 MG tablet Take 1 tablet (5 mg total) by mouth every evening.    LIDOcaine-prilocaine (EMLA) cream Apply topically as needed. Over port at least 30 minutes before treatment    magic mouthwash diphen/antac/lidoc swish and spit 15 mLs every 4 (four) hours as needed.    multivitamin (THERAGRAN) per tablet Take 1 tablet by mouth once daily.    ondansetron (ZOFRAN-ODT) 4 MG TbDL Take 1 tablet (4 mg total) by mouth every 8 (eight) hours as needed.    potassium chloride SA (K-DUR,KLOR-CON) 20 MEQ tablet Take 1 tablet (20 mEq total) by mouth once daily.    prochlorperazine (COMPAZINE) 5 MG tablet Take 1 tablet (5 mg total) by mouth every 6 (six) hours as needed for Nausea.    triamcinolone acetonide 0.025% (KENALOG) 0.025 % cream Apply to the affected area twice daily    [DISCONTINUED] aspirin 81 MG Chew Take 1 tablet (81 mg total) by mouth once daily.     Family History       Problem Relation (Age of Onset)    Allergic rhinitis Daughter    Cancer Son    Cataracts Father    Heart disease Mother, Father    Hyperlipidemia Father    Hypertension Father, Sister, Brother          Tobacco Use    Smoking status: Former     Current packs/day: 0.00     Average packs/day: 3.0 packs/day for 10.0 years (30.0 ttl pk-yrs)     Types: Cigarettes     Start date:      Quit date:      Years since quittin.1    Smokeless tobacco: Never   Substance and Sexual Activity    Alcohol use: No    Drug use: No    Sexual activity: Never     Review of Systems   Constitutional:  Positive for appetite change.   Gastrointestinal:  Positive for nausea.   Psychiatric/Behavioral:  Positive for confusion.      Objective:     Vital Signs (Most Recent):  Pulse: 89 (24 1100)  Resp: 17 (24 1100)  BP: (!) 101/56 (24 1100)  SpO2: (!) 93 % (24 1100) Vital Signs (24h  Range):  Pulse:  [62-94] 89  Resp:  [16-19] 17  SpO2:  [93 %-94 %] 93 %  BP: (101-119)/(56-65) 101/56        There is no height or weight on file to calculate BMI.     Physical Exam  Vitals and nursing note reviewed.   Constitutional:       Appearance: He is obese.   Cardiovascular:      Rate and Rhythm: Normal rate and regular rhythm.      Pulses: Normal pulses.      Heart sounds: Normal heart sounds.   Pulmonary:      Effort: Pulmonary effort is normal.      Breath sounds: Normal breath sounds.   Abdominal:      General: Bowel sounds are normal. There is no distension.      Palpations: Abdomen is soft.      Tenderness: There is no abdominal tenderness.   Musculoskeletal:         General: No swelling. Normal range of motion.   Skin:     General: Skin is warm and dry.   Neurological:      General: No focal deficit present.      Mental Status: He is alert and oriented to person, place, and time.           Significant Labs: All pertinent labs within the past 24 hours have been reviewed.    Significant Imaging: I have reviewed all pertinent imaging results/findings within the past 24 hours.

## 2024-02-26 NOTE — ASSESSMENT & PLAN NOTE
- denies chest pain  - EKG with SR PACS and ST-Twave abnormalities  - continue cardiac monitoring  - Trp 0.7, will trend  - recent echo in 1/2024 with EF 50 - 55%, normal diastolic function, Aortic Valve moderate to severe stenosis.  - cards consulted will follow-up further recs

## 2024-02-26 NOTE — SUBJECTIVE & OBJECTIVE
Past Medical History:   Diagnosis Date    Chest pain 2024    CKD stage 3 due to type 2 diabetes mellitus 2021    DM (diabetes mellitus)     BS didn't check 2019    DM (diabetes mellitus)     BS didn't check 2022    Foreign body, eye     right eye    Hyperlipidemia     Hypertension     Need for shingles vaccine 2019    Pneumonia     Primary osteoarthritis of right knee 10/29/2021    Severe obesity (BMI 35.0-35.9 with comorbidity) 7/10/2019    Type 2 diabetes mellitus     BS didn't check 2018       Past Surgical History:   Procedure Laterality Date    APPENDECTOMY      CATARACT EXTRACTION Bilateral     CPG    ENDOSCOPIC ULTRASOUND OF UPPER GASTROINTESTINAL TRACT N/A 2024    Procedure: ULTRASOUND, UPPER GI TRACT, ENDOSCOPIC;  Surgeon: Evens Monsalve MD;  Location: [facility] ENDO ([address] [place] [address]);  Service: Endoscopy;  Laterality: N/A;    FLUOROSCOPY N/A 2024    Procedure: Fluoroscopy/MEDIPORT PLACEMENT;  Surgeon: Jaret Lu MD;  Location: [facility] CATH LAB;  Service: General;  Laterality: N/A;       Review of patient's allergies indicates:   Allergen Reactions    Grass pollen-anai grass standard            Family History       Problem Relation (Age of Onset)    Allergic rhinitis Daughter    Cancer Son    Cataracts Father    Heart disease Mother, Father    Hyperlipidemia Father    Hypertension Father, Sister, Brother          Tobacco Use    Smoking status: Former     Current packs/day: 0.00     Average packs/day: 3.0 packs/day for 10.0 years (30.0 ttl pk-yrs)     Types: Cigarettes     Start date:      Quit date:      Years since quittin.1    Smokeless tobacco: Never   Substance and Sexual Activity    Alcohol use: No    Drug use: No    Sexual activity: Never     Review of Systems   Constitutional: Positive for malaise/fatigue.   Eyes: Negative.    Cardiovascular: Negative.    Respiratory: Negative.     Skin: Negative.    Musculoskeletal:  Positive for arthritis  and joint pain.   Gastrointestinal:  Positive for diarrhea (last week).   Genitourinary: Negative.    Neurological:  Positive for weakness.   Allergic/Immunologic: Negative.      Objective:     Vital Signs (Most Recent):  Temp: 98.3 °F (36.8 °C) (02/26/24 1314)  Pulse: 98 (02/26/24 1400)  Resp: 19 (02/26/24 1400)  BP: (!) 104/54 (02/26/24 1400)  SpO2: (!) 93 % (02/26/24 1400) Vital Signs (24h Range):  Temp:  [98.3 °F (36.8 °C)] 98.3 °F (36.8 °C)  Pulse:  [62-98] 98  Resp:  [16-31] 19  SpO2:  [92 %-96 %] 93 %  BP: (101-119)/(54-65) 104/54     Weight: 111.3 kg (245 lb 4.8 oz)  Body mass index is 35.2 kg/m².    SpO2: (!) 93 %       No intake or output data in the 24 hours ending 02/26/24 1519    Lines/Drains/Airways       Central Venous Catheter Line  Duration                  PowerPort A Cath Single Lumen 01/05/24 1415 Internal Jugular Right 52 days              Peripheral Intravenous Line  Duration                  Peripheral IV - Single Lumen 02/26/24 20 G Posterior;Right Hand <1 day                     Physical Exam  Vitals and nursing note reviewed.   Constitutional:       General: He is not in acute distress.     Appearance: Normal appearance. He is well-developed. He is not diaphoretic.   HENT:      Head: Normocephalic and atraumatic.   Eyes:      General:         Right eye: No discharge.         Left eye: No discharge.      Pupils: Pupils are equal, round, and reactive to light.   Neck:      Vascular: No JVD.      Trachea: No tracheal deviation.   Cardiovascular:      Rate and Rhythm: Normal rate and regular rhythm.      Heart sounds: Normal heart sounds, S1 normal and S2 normal. No murmur heard.     No friction rub.   Pulmonary:      Effort: Pulmonary effort is normal. No respiratory distress.      Breath sounds: Normal breath sounds. No wheezing or rales.   Abdominal:      General: There is no distension.   Musculoskeletal:      Right lower leg: No edema.      Left lower leg: No edema.   Skin:     General:  Skin is warm and dry.      Findings: No erythema.   Neurological:      General: No focal deficit present.      Mental Status: He is alert and oriented to person, place, and time.   Psychiatric:         Mood and Affect: Mood normal.         Behavior: Behavior normal.          Significant Labs: CMP   Recent Labs   Lab 02/26/24  0952   *   K 3.0*   CL 94*   CO2 23   GLU 97   BUN 13   CREATININE 0.8   CALCIUM 9.1   PROT 6.5   ALBUMIN 2.9*   BILITOT 0.7   ALKPHOS 77   AST 38   ALT 22   ANIONGAP 13   , CBC   Recent Labs   Lab 02/26/24  0952   WBC 14.36*   HGB 10.7*   HCT 30.3*      , Troponin   Recent Labs   Lab 02/26/24  0952 02/26/24  1232   TROPONINI 0.725* 0.735*   , and All pertinent lab results from the last 24 hours have been reviewed.    Significant Imaging: Echocardiogram: Transthoracic echo (TTE) complete (Cupid Only):   Results for orders placed or performed during the hospital encounter of 02/26/24   Echo   Result Value Ref Range    LVIDd 4.88 3.5 - 6.0 cm    LV Systolic Volume 52.86 mL    LVIDs 3.56 2.1 - 4.0 cm    LV Diastolic Volume 111.86 mL    IVS 1.04 0.6 - 1.1 cm    FS 27 (A) 28 - 44 %    Left Ventricle Relative Wall Thickness 0.44 cm    Posterior Wall 1.08 0.6 - 1.1 cm    LV mass 189.29 g    TR Max Can 2.91 m/s    RVDD 2.81 cm    LA size 4.18 cm    Triscuspid Valve Regurgitation Peak Gradient 34 mmHg    IVC diameter 2.08 cm   , EKG: Reviewed, and X-Ray: CXR: X-Ray Chest 1 View (CXR): No results found for this visit on 02/26/24. and X-Ray Chest PA and Lateral (CXR): No results found for this visit on 02/26/24.

## 2024-02-26 NOTE — ED PROVIDER NOTES
"SCRIBE #1 NOTE: I, Vimal Samuel, am scribing for, and in the presence of, Frank Gibson MD. I have scribed the entire note.       History     Chief Complaint   Patient presents with    Altered Mental Status     Daughter in law states that she was talking to him on the phone and she noticed he kept speaking in circles     Review of patient's allergies indicates:   Allergen Reactions    Grass pollen-june grass standard          History of Present Illness     HPI    2/26/2024, 10:40 AM  History obtained from the patient and [facility]      History of Present Illness: Mark Dickerson Sr. is a 84 y.o. male patient with a PMHx of breast cancer, HLD, HTN, DM II, CKD who presents to the Emergency Department for evaluation of AMS which onset this morning PTA. Per daughter in law she visited the pt early this morning and states that he was normal for a short period before becoming extremely disoriented with jumbled speech. Pt has never had an episode like this before and says he "couldn't think" and that he feels dehydrated. Speech is currently at baseline in the ED and pt is no longer disoriented. His last chemotherapy infusion was one week ago. Pt is calm now but [facility] reported that he was hostile en route, per son this is abnormal for him. Symptoms are intermittent and moderate in severity. No mitigating or exacerbating factors reported. Associated sxs include agitation and diarrhea. Patient denies any fever, chills, N/V, SOB, and all other sxs at this time. Pt received 500 mL bolus fluids en route by [facility]. No further complaints or concerns at this time.       Arrival mode: [facility]    PCP: Tatyana Cannon MD        Past Medical History:  Past Medical History:   Diagnosis Date    Chest pain 2/26/2024    CKD stage 3 due to type 2 diabetes mellitus 2/18/2021    DM (diabetes mellitus) 2014    BS didn't check 12/11/2019    DM (diabetes mellitus) 2014    BS didn't check 05/05/2022    Foreign body, eye     right eye    Hyperlipidemia "     Hypertension     Need for shingles vaccine 2019    Pneumonia     Primary osteoarthritis of right knee 10/29/2021    Severe obesity (BMI 35.0-35.9 with comorbidity) 7/10/2019    Type 2 diabetes mellitus     BS didn't check 2018       Past Surgical History:  Past Surgical History:   Procedure Laterality Date    APPENDECTOMY      CATARACT EXTRACTION Bilateral     CPG    ENDOSCOPIC ULTRASOUND OF UPPER GASTROINTESTINAL TRACT N/A 2024    Procedure: ULTRASOUND, UPPER GI TRACT, ENDOSCOPIC;  Surgeon: Evens Monsalve MD;  Location: [facility] ENDO ([address] [place] [address]);  Service: Endoscopy;  Laterality: N/A;    FLUOROSCOPY N/A 2024    Procedure: Fluoroscopy/MEDIPORT PLACEMENT;  Surgeon: Jaret Lu MD;  Location: [facility] CATH LAB;  Service: General;  Laterality: N/A;         Family History:  Family History   Problem Relation Age of Onset    Heart disease Mother     Hypertension Father     Heart disease Father     Hyperlipidemia Father     Cataracts Father     Hypertension Sister     Hypertension Brother     Cancer Son         colon    Allergic rhinitis Daughter        Social History:  Social History     Tobacco Use    Smoking status: Former     Current packs/day: 0.00     Average packs/day: 3.0 packs/day for 10.0 years (30.0 ttl pk-yrs)     Types: Cigarettes     Start date:      Quit date:      Years since quittin.1    Smokeless tobacco: Never   Substance and Sexual Activity    Alcohol use: No    Drug use: No    Sexual activity: Never        Review of Systems     Review of Systems   Constitutional:  Negative for chills and fever.        (+) dehydrated     HENT:  Negative for sore throat.    Respiratory:  Negative for shortness of breath.    Cardiovascular:  Negative for chest pain.   Gastrointestinal:  Positive for diarrhea. Negative for nausea and vomiting.   Genitourinary:  Negative for dysuria.   Musculoskeletal:  Negative for back pain.   Skin:  Negative for rash.   Neurological:  Positive for  speech difficulty (resolved). Negative for weakness.   Hematological:  Does not bruise/bleed easily.   Psychiatric/Behavioral:  Positive for agitation and confusion (resolved).    All other systems reviewed and are negative.       Physical Exam     Initial Vitals   BP Pulse Resp Temp SpO2   02/26/24 0908 02/26/24 0908 02/26/24 0908 02/26/24 1314 02/26/24 0908   119/64 62 18 98.3 °F (36.8 °C) (!) 94 %      MAP       --                 Physical Exam  Nursing Notes and Vital Signs Reviewed.  Constitutional: Patient is in no acute distress. Pt is chronically weak.  Head: Atraumatic. Normocephalic.  Eyes: PERRL. EOM intact. Conjunctivae are not pale. No scleral icterus.  ENT: Mucous membranes are moist. Oropharynx is clear and symmetric.    Neck: Supple. Full ROM. No lymphadenopathy.  Cardiovascular: Regular rate. Regular rhythm. Has a heart murmur. Distal pulses are 2+ and symmetric.  Pulmonary/Chest: No respiratory distress. Clear to auscultation bilaterally. No wheezing or rales.  Abdominal: Soft and non-distended.  There is no tenderness.  No rebound, guarding, or rigidity. Good bowel sounds.  Genitourinary: No CVA tenderness  Musculoskeletal: Moves all extremities. No obvious deformities. No edema. No calf tenderness.  Skin: Warm and dry.  Neurological:  Alert, awake, and appropriate.  Normal speech.  No acute focal neurological deficits are appreciated.  Psychiatric: Normal affect. Good eye contact. Appropriate in content.     ED Course   Critical Care    Date/Time: 2/26/2024 1:14 PM    Performed by: Frank Gibson MD  Authorized by: Frank Gibson MD  Direct patient critical care time: 15 minutes  Additional history critical care time: 5 minutes  Ordering / reviewing critical care time: 10 minutes  Documentation critical care time: 15 minutes  Consulting other physicians critical care time: 5 minutes  Total critical care time (exclusive of procedural time) : 50 minutes  Critical care was necessary to  "treat or prevent imminent or life-threatening deterioration of the following conditions: dehydration (hypokalemia, low magnesium).  Critical care was time spent personally by me on the following activities: blood draw for specimens, development of treatment plan with patient or surrogate, discussions with consultants, interpretation of cardiac output measurements, evaluation of patient's response to treatment, examination of patient, obtaining history from patient or surrogate, ordering and performing treatments and interventions, ordering and review of laboratory studies, ordering and review of radiographic studies, pulse oximetry, re-evaluation of patient's condition and review of old charts.        ED Vital Signs:  Vitals:    02/26/24 0908 02/26/24 0915 02/26/24 0945 02/26/24 1000   BP: 119/64 111/61 114/65 (!) 101/58   Pulse: 62 94 90 90   Resp: 18  16 19   Temp:       TempSrc:       SpO2: (!) 94% (!) 93% (!) 94% (!) 94%   Weight:       Height:        02/26/24 1100 02/26/24 1200 02/26/24 1230 02/26/24 1314   BP: (!) 101/56 (!) 104/59 (!) 106/58    Pulse: 89 95 83 87   Resp: 17 17 (!) 31 (!) 26   Temp:    98.3 °F (36.8 °C)   TempSrc:    Oral   SpO2: (!) 93% (!) 92% 96% (!) 93%   Weight:       Height:   5' 10" (1.778 m)     02/26/24 1330 02/26/24 1400 02/26/24 1430   BP: 108/61 (!) 104/54    Pulse: 89 98    Resp: 20 19    Temp:      TempSrc:      SpO2: (!) 94% (!) 93%    Weight:   111.3 kg (245 lb 4.8 oz)   Height:          Abnormal Lab Results:  Labs Reviewed   CBC W/ AUTO DIFFERENTIAL - Abnormal; Notable for the following components:       Result Value    WBC 14.36 (*)     RBC 3.53 (*)     Hemoglobin 10.7 (*)     Hematocrit 30.3 (*)     RDW 14.6 (*)     Immature Granulocytes 1.7 (*)     Gran # (ANC) 10.9 (*)     Immature Grans (Abs) 0.25 (*)     Mono # 1.5 (*)     Gran % 76.1 (*)     Lymph % 10.7 (*)     All other components within normal limits   COMPREHENSIVE METABOLIC PANEL - Abnormal; Notable for the " following components:    Sodium 130 (*)     Potassium 3.0 (*)     Chloride 94 (*)     Albumin 2.9 (*)     All other components within normal limits   TROPONIN I - Abnormal; Notable for the following components:    Troponin I 0.725 (*)     All other components within normal limits   B-TYPE NATRIURETIC PEPTIDE - Abnormal; Notable for the following components:     (*)     All other components within normal limits   TROPONIN I - Abnormal; Notable for the following components:    Troponin I 0.735 (*)     All other components within normal limits   MAGNESIUM - Abnormal; Notable for the following components:    Magnesium 1.5 (*)     All other components within normal limits   MAGNESIUM - Abnormal; Notable for the following components:    Magnesium 1.5 (*)     All other components within normal limits   D DIMER, QUANTITATIVE - Abnormal; Notable for the following components:    D-Dimer 0.95 (*)     All other components within normal limits   APTT - Abnormal; Notable for the following components:    aPTT 33.3 (*)     All other components within normal limits    Narrative:     Draw baseline aPTT prior to starting the heparin bolus or  infusion  (if patient is on warfarin prior to heparin therapy)   MAGNESIUM   MAGNESIUM   PROTIME-INR    Narrative:     Draw baseline aPTT prior to starting the heparin bolus or  infusion  (if patient is on warfarin prior to heparin therapy)   URINALYSIS, REFLEX TO URINE CULTURE   POCT GLUCOSE        All Lab Results:  Results for orders placed or performed during the hospital encounter of 02/26/24   CBC auto differential   Result Value Ref Range    WBC 14.36 (H) 3.90 - 12.70 K/uL    RBC 3.53 (L) 4.60 - 6.20 M/uL    Hemoglobin 10.7 (L) 14.0 - 18.0 g/dL    Hematocrit 30.3 (L) 40.0 - 54.0 %    MCV 86 82 - 98 fL    MCH 30.3 27.0 - 31.0 pg    MCHC 35.3 32.0 - 36.0 g/dL    RDW 14.6 (H) 11.5 - 14.5 %    Platelets 251 150 - 450 K/uL    MPV 10.6 9.2 - 12.9 fL    Immature Granulocytes 1.7 (H) 0.0 -  0.5 %    Gran # (ANC) 10.9 (H) 1.8 - 7.7 K/uL    Immature Grans (Abs) 0.25 (H) 0.00 - 0.04 K/uL    Lymph # 1.5 1.0 - 4.8 K/uL    Mono # 1.5 (H) 0.3 - 1.0 K/uL    Eos # 0.0 0.0 - 0.5 K/uL    Baso # 0.11 0.00 - 0.20 K/uL    nRBC 0 0 /100 WBC    Gran % 76.1 (H) 38.0 - 73.0 %    Lymph % 10.7 (L) 18.0 - 48.0 %    Mono % 10.4 4.0 - 15.0 %    Eosinophil % 0.3 0.0 - 8.0 %    Basophil % 0.8 0.0 - 1.9 %    Platelet Estimate Appears normal     Aniso Slight     Tear Drop Cells Occasional     Differential Method Automated    Comprehensive metabolic panel   Result Value Ref Range    Sodium 130 (L) 136 - 145 mmol/L    Potassium 3.0 (L) 3.5 - 5.1 mmol/L    Chloride 94 (L) 95 - 110 mmol/L    CO2 23 23 - 29 mmol/L    Glucose 97 70 - 110 mg/dL    BUN 13 8 - 23 mg/dL    Creatinine 0.8 0.5 - 1.4 mg/dL    Calcium 9.1 8.7 - 10.5 mg/dL    Total Protein 6.5 6.0 - 8.4 g/dL    Albumin 2.9 (L) 3.5 - 5.2 g/dL    Total Bilirubin 0.7 0.1 - 1.0 mg/dL    Alkaline Phosphatase 77 55 - 135 U/L    AST 38 10 - 40 U/L    ALT 22 10 - 44 U/L    eGFR >60 >60 mL/min/1.73 m^2    Anion Gap 13 8 - 16 mmol/L   Troponin I #1   Result Value Ref Range    Troponin I 0.725 (H) 0.000 - 0.026 ng/mL   BNP   Result Value Ref Range     (H) 0 - 99 pg/mL   Troponin I #2   Result Value Ref Range    Troponin I 0.735 (H) 0.000 - 0.026 ng/mL   Magnesium   Result Value Ref Range    Magnesium 1.5 (L) 1.6 - 2.6 mg/dL   Magnesium   Result Value Ref Range    Magnesium 1.5 (L) 1.6 - 2.6 mg/dL   D dimer, quantitative   Result Value Ref Range    D-Dimer 0.95 (H) <0.50 mg/L FEU   APTT   Result Value Ref Range    aPTT 33.3 (H) 21.0 - 32.0 sec   Protime-INR   Result Value Ref Range    Prothrombin Time 11.9 9.0 - 12.5 sec    INR 1.0 0.8 - 1.2   EKG 12-lead   Result Value Ref Range    QRS Duration 114 ms    [facility] QTC Calculation 440 ms   Echo   Result Value Ref Range    LVIDd 4.88 3.5 - 6.0 cm    LV Systolic Volume 52.86 mL    LVIDs 3.56 2.1 - 4.0 cm    LV Diastolic Volume 111.86 mL     IVS 1.04 0.6 - 1.1 cm    FS 27 (A) 28 - 44 %    Left Ventricle Relative Wall Thickness 0.44 cm    Posterior Wall 1.08 0.6 - 1.1 cm    LV mass 189.29 g    TR Max Can 2.91 m/s    RVDD 2.81 cm    LA size 4.18 cm    Triscuspid Valve Regurgitation Peak Gradient 34 mmHg    IVC diameter 2.08 cm   POCT glucose   Result Value Ref Range    POCT Glucose 102 70 - 110 mg/dL         Imaging Results:  Imaging Results              CT Head Without Contrast (Final result)  Result time 02/26/24 10:36:36      Final result by Earl Mendez MD (02/26/24 10:36:36)                   Impression:      Chronic microvascular ischemic changes.  No intracranial hemorrhage.    All CT scans at this facility use dose modulation, iterative reconstruction, and/or weight based dosing when appropriate to reduce radiation dose to as low as reasonable achievable.      Electronically signed by: Earl Mendez MD  Date:    02/26/2024  Time:    10:36               Narrative:    EXAMINATION:  CT HEAD WITHOUT CONTRAST    CLINICAL HISTORY:  Mental status change, unknown cause;    TECHNIQUE:  Low dose axial CT images obtained throughout the head without intravenous contrast. Sagittal and coronal reconstructions were performed.    COMPARISON:  04/14/2023    FINDINGS:  Intracranial compartment:    The brain parenchyma demonstrates areas of decreased attenuation with mild to moderate periventricular white matter consistent with chronic microvascular ischemic changes..  No parenchymal mass, hemorrhage, edema or major vascular distribution infarct.  Vascular calcifications are noted.    Mild prominence of the sulci and ventricles are consistent with age-related involutional changes.    No extra-axial blood or fluid collections.    Skull/extracranial contents (limited evaluation): No fracture.  Stable right mastoid effusion.  Left mastoid air cells and paranasal sinuses are essentially clear.                                       X-Ray Chest AP Portable (Final  result)  Result time 02/26/24 10:02:52      Final result by Earl Mendez MD (02/26/24 10:02:52)                   Impression:      No acute process seen.      Electronically signed by: Earl Mendez MD  Date:    02/26/2024  Time:    10:02               Narrative:    EXAMINATION:  XR CHEST AP PORTABLE    CLINICAL HISTORY:  Chest Pain;    FINDINGS:  Single view of the chest.  Comparison 04/14/2023.    Right-sided chest port tip overlies the SVC.    Cardiomegaly with pulmonary interstitial edema suspected.  No consolidation.  Suspect trace pleural effusions.  No pneumothorax..  Bones appear intact.  Moderate degenerative changes and moderate atherosclerotic disease.                                       The EKG was ordered, reviewed, and independently interpreted by the ED provider.  Interpretation time: 10:01  Rate: 79 BPM  Rhythm: normal sinus rhythm  Interpretation: Premature atrial complexes ST and T wave abnormality, consider inferolateral ischemia Abnormal ECG   When compared with ECG of 14-APR-2023 17:30, The axis Shifted right ST now depressed in Inferior leads ST now depressed in Anterior leads T wave inversion now evident in Inferior leads T wave inversion now evident in Anterior-lateral leads             The Emergency Provider reviewed the vital signs and test results, which are outlined above.     ED Discussion       12:02 PM: Discussed case with Dr. Yanez ([facility]). Dr. Yanez agrees with current care and management of pt and accepts admission.   Admitting Service: Hospital Medicine  Admitting Physician: Dr. Yanez  Admit to: obs med tele     12:02 PM: Re-evaluated pt. I have discussed test results, shared treatment plan, and the need for admission with patient and family at bedside. Pt and family express understanding at this time and agree with all information. All questions answered. Pt and family have no further questions or concerns at this time. Pt is ready for admit.        Medical Decision  Making  Patient with history breast cancer with Mets getting chemotherapy l,ast treatment was 1 week ago. This morning he had episode where he is very weak, family said he was altered, he did get fluids by EMS - 500 ccIV. Sodium is a little low at 130 potassium 3.0, Mag 1.5 low,  his troponin is very elevated. I just went back to talk to him he reports he does not remember having any chest pain.     Consult Hospital medicine services to admit him for chest pain workup , IV hydration potassium replacement     Amount and/or Complexity of Data Reviewed  Independent Historian:      Details: Son and daughter-in-law  External Data Reviewed: labs, radiology and notes.  Labs: ordered. Decision-making details documented in ED Course.  Radiology: ordered. Decision-making details documented in ED Course.  ECG/medicine tests: ordered and independent interpretation performed. Decision-making details documented in ED Course.    Risk  Prescription drug management.  Risk Details: Differential diagnoses: Myocardial infarction, angina, pneumonia, asthma, infection, pneumothorax, pericarditis , pleural effusion,, pancreatitis, gallstone pain, cholelithiasis or cholecystitis, esophageal rupture, bowel perforation, gastritis, nonspecific chest pain, musculoskeletal chest pain, costochondritis, electrolyte abnormality                  ED Medication(s):  Medications   anastrozole tablet 1 mg (1 mg Oral Given 2/26/24 1347)   atorvastatin tablet 40 mg (40 mg Oral Given 2/26/24 1348)   potassium chloride SA CR tablet 20 mEq (has no administration in time range)   sodium chloride 0.9% flush 10 mL (has no administration in time range)   naloxone 0.4 mg/mL injection 0.02 mg (has no administration in time range)   glucose chewable tablet 16 g (has no administration in time range)   glucose chewable tablet 24 g (has no administration in time range)   glucagon (human recombinant) injection 1 mg (has no administration in time range)   acetaminophen  tablet 650 mg (has no administration in time range)   HYDROcodone-acetaminophen 5-325 mg per tablet 1 tablet (has no administration in time range)   ondansetron injection 4 mg (has no administration in time range)   insulin aspart U-100 pen 0-5 Units (has no administration in time range)   melatonin tablet 6 mg (has no administration in time range)   dextrose 10% bolus 125 mL 125 mL (has no administration in time range)   dextrose 10% bolus 250 mL 250 mL (has no administration in time range)   0.9%  NaCl infusion (has no administration in time range)   potassium chloride SA CR tablet 40 mEq (has no administration in time range)   magnesium sulfate 2g in water 50mL IVPB (premix) (4 g Intravenous New Bag 2/26/24 1352)   heparin 25,000 units in dextrose 5% (100 units/ml) IV bolus from bag LOW INTENSITY nomogram - [facility] (has no administration in time range)   heparin 25,000 units in dextrose 5% 250 mL (100 units/mL) infusion LOW INTENSITY nomogram - [facility] (has no administration in time range)   heparin 25,000 units in dextrose 5% (100 units/ml) IV bolus from bag LOW INTENSITY nomogram - [facility] (has no administration in time range)   heparin 25,000 units in dextrose 5% (100 units/ml) IV bolus from bag LOW INTENSITY nomogram - [facility] (has no administration in time range)   potassium chloride SA CR tablet 40 mEq (40 mEq Oral Given 2/26/24 1155)   0.9%  NaCl infusion (1,000 mLs Intravenous New Bag 2/26/24 1154)   ondansetron injection 4 mg (4 mg Intravenous Given 2/26/24 1158)       New Prescriptions    No medications on file               Scribe Attestation:   Scribe #1: I performed the above scribed service and the documentation accurately describes the services I performed. I attest to the accuracy of the note.     Attending:   Physician Attestation Statement for Scribe #1: I, Frank Gibson MD, personally performed the services described in this documentation, as scribed by Vimal Samuel, in my presence, and it is both  accurate and complete.           Clinical Impression       ICD-10-CM ICD-9-CM   1. Elevated troponin  R79.89 790.6   2. Chest pain  R07.9 786.50   3. Confusion  R41.0 298.9   4. Hypokalemia  E87.6 276.8   5. Hypomagnesemia  E83.42 275.2       Disposition:   Disposition: Placed in Observation  Condition: Frank Mcelroy Do, MD  02/26/24 1529

## 2024-02-26 NOTE — ASSESSMENT & PLAN NOTE
Patient has Abnormal Magnesium: hypomagnesemia. Will continue to monitor electrolytes closely. Will replace the affected electrolytes and repeat labs to be done after interventions completed. The patient's magnesium results have been reviewed and are listed below.  Recent Labs   Lab 02/26/24  0952   MG 1.5*

## 2024-02-26 NOTE — HPI
Mr. Dickerson is an 84 year old male patient whose current medical conditions include metastatic breast cancer (on chemo), HTN, hyperlipidemia, DM type II, and CKD who presented to [facility] ED this AM due to AMS/confusion. Patient's DIL reported patient seemed disoriented and had jumbled speech when she visited him earlier this AM. He returned to baseline prior to ED arrival. He denied any associated SOB, chest pain, palpitations, near syncope, or syncope. Initial workup in ED revealed leukocytosis (WBC 14,000), hyponatremia (Na 130), hypomagnesemia (1.5), and elevated troponin (0.725>0.735) and patient was subsequently admitted for further evaluation and treatment. Cardiology consulted to assist with management. Patient seen and examined today, resting in bed. Remains chest pain free. No other CV complaints. States he felt dehydrated earlier today. No prior CV history. He reports compliance with his medications. EKG reviewed, SR with T wave inversions inferiorly and anteriorly. Limited echo and repeat troponin pending.

## 2024-02-26 NOTE — ASSESSMENT & PLAN NOTE
- follows with Dr. Mendoza at [facility], last chemo session one week prior  - follow up outpatient

## 2024-02-26 NOTE — Clinical Note
The catheter was inserted into the right coronary artery. An angiography was performed of the right coronary arteries.

## 2024-02-26 NOTE — ASSESSMENT & PLAN NOTE
Creatine stable for now. BMP reviewed- noted CrCl cannot be calculated (Unknown ideal weight.). according to latest data. Based on current GFR, CKD stage is stage 3 - GFR 30-59.  Monitor UOP and serial BMP and adjust therapy as needed. Renally dose meds. Avoid nephrotoxic medications and procedures.  - creatinine WNL

## 2024-02-26 NOTE — PHARMACY MED REC
"Admission Medication History     The home medication history was taken by Zonia Reynoso.    You may go to "Admission" then "Reconcile Home Medications" tabs to review and/or act upon these items.     The home medication list has been updated by the Pharmacy department.   Please read ALL comments highlighted in yellow.   Please address this information as you see fit.    Feel free to contact us if you have any questions or require assistance.      The medications listed below were removed from the home medication list. Please reorder if appropriate:  Patient reports no longer taking the following medication(s):  Asprin 81 mg  Decadron 4 mg      Medications listed below were obtained from: Patient/family and Analytic software- [employer]  (Not in a hospital admission)      LAST MED REC COMPLETED:         Zonia Reynoso  [id number]    Current Outpatient Medications on File Prior to Encounter   Medication Sig Dispense Refill Last Dose    atorvastatin (LIPITOR) 40 MG tablet Take 1 tablet (40 mg total) by mouth once daily. 90 tablet 3 2/25/2024    clotrimazole (LOTRIMIN) 1 % cream Apply topically 2 (two) times daily. 113 g 1 2/25/2024    fluorouraciL (EFUDEX) 5 % cream APPLY TO THE SCALP TWICE A DAY FOR 4 WEEKS WILL CAUSE REDNESS AND IRRITATION 40 g 1 2/25/2024    glipiZIDE (GLUCOTROL) 5 MG tablet TAKE 1 TABLET BY MOUTH TWICE DAILY WITH MEALS 180 tablet 1 2/25/2024    hydroCHLOROthiazide (HYDRODIURIL) 25 MG tablet Take 1 tablet by mouth once daily 90 tablet 3 2/25/2024    LIDOcaine-prilocaine (EMLA) cream Apply topically as needed. Over port at least 30 minutes before treatment 30 g 0 2/25/2024    multivitamin (THERAGRAN) per tablet Take 1 tablet by mouth once daily.   2/25/2024    potassium chloride SA (K-DUR,KLOR-CON) 20 MEQ tablet Take 1 tablet (20 mEq total) by mouth once daily. 90 tablet 3 Past Week    triamcinolone acetonide 0.025% (KENALOG) 0.025 % cream Apply to the affected area twice daily 80 g 0 Past Week    " anastrozole (ARIMIDEX) 1 mg Tab Take 1 tablet (1 mg total) by mouth once daily. 90 tablet 3     diphenhydrAMINE-aluminum-magnesium hydroxide-simethicone-LIDOcaine viscous HCl 2% Swish and spit 15 mLs every 4 (four) hours as needed. 473 each 0     levocetirizine (XYZAL) 5 MG tablet Take 1 tablet (5 mg total) by mouth every evening. (Patient not taking: Reported on 2/26/2024) 30 tablet 11 Not Taking    magic mouthwash diphen/antac/lidoc swish and spit 15 mLs every 4 (four) hours as needed. 450 mL 0 Unknown    ondansetron (ZOFRAN-ODT) 4 MG TbDL Take 1 tablet (4 mg total) by mouth every 8 (eight) hours as needed. 90 tablet 3 Unknown    prochlorperazine (COMPAZINE) 5 MG tablet Take 1 tablet (5 mg total) by mouth every 6 (six) hours as needed for Nausea. 30 tablet 3                            .

## 2024-02-26 NOTE — ASSESSMENT & PLAN NOTE
-Troponin 0.725>0.735, continue to trend  -No CP symptoms/SOB/angina  -No prior CV history  -Continue ASA, heparin gtt, statin  -EKG reviewed, T wave inversions inferiorly and laterally  -Limited TTE pending  -Will consider ischemic workup with MPI stress test this admission

## 2024-02-26 NOTE — H&P (VIEW-ONLY)
[facility] - Emergency Dept.  Cardiology  Consult Note    Patient Name: Mark Dickerson Sr.  MRN: [id number]  Admission Date: 2/26/2024  Hospital Length of Stay: 0 days  Code Status: DNR   Attending Provider: Jacinto Yanez MD   Consulting Provider: Sandy Rodriguez PA-C  Primary Care Physician: Tatyana Cannon MD  Principal Problem:Elevated troponin    Patient information was obtained from patient, past medical records, and ER records.     Inpatient consult to Cardiology  Consult performed by: Sandy Rodriguez PA-C  Consult ordered by: Yaima Lema NP        Subjective:     Chief Complaint:  AMS/confusion    HPI:   Mr. Dickerson is an 84 year old male patient whose current medical conditions include metastatic breast cancer (on chemo), HTN, hyperlipidemia, DM type II, and CKD who presented to [facility] ED this AM due to AMS/confusion. Patient's DIL reported patient seemed disoriented and had jumbled speech when she visited him earlier this AM. He returned to baseline prior to ED arrival. He denied any associated SOB, chest pain, palpitations, near syncope, or syncope. Initial workup in ED revealed leukocytosis (WBC 14,000), hyponatremia (Na 130), hypomagnesemia (1.5), and elevated troponin (0.725>0.735) and patient was subsequently admitted for further evaluation and treatment. Cardiology consulted to assist with management. Patient seen and examined today, resting in bed. Remains chest pain free. No other CV complaints. States he felt dehydrated earlier today. No prior CV history. He reports compliance with his medications. EKG reviewed, SR with T wave inversions inferiorly and anteriorly. Limited echo and repeat troponin pending. CT of head NAF.         Past Medical History:   Diagnosis Date    Chest pain 2/26/2024    CKD stage 3 due to type 2 diabetes mellitus 2/18/2021    DM (diabetes mellitus) 2014    BS didn't check 12/11/2019    DM (diabetes mellitus) 2014    BS didn't check 05/05/2022    Foreign body, eye     right  eye    Hyperlipidemia     Hypertension     Need for shingles vaccine 2019    Pneumonia     Primary osteoarthritis of right knee 10/29/2021    Severe obesity (BMI 35.0-35.9 with comorbidity) 7/10/2019    Type 2 diabetes mellitus     BS didn't check 2018       Past Surgical History:   Procedure Laterality Date    APPENDECTOMY      CATARACT EXTRACTION Bilateral     CPG    ENDOSCOPIC ULTRASOUND OF UPPER GASTROINTESTINAL TRACT N/A 2024    Procedure: ULTRASOUND, UPPER GI TRACT, ENDOSCOPIC;  Surgeon: Evens Monsalve MD;  Location: [facility] ENDO ([address] [place] [address]);  Service: Endoscopy;  Laterality: N/A;    FLUOROSCOPY N/A 2024    Procedure: Fluoroscopy/MEDIPORT PLACEMENT;  Surgeon: Jaret Lu MD;  Location: [facility] CATH LAB;  Service: General;  Laterality: N/A;       Review of patient's allergies indicates:   Allergen Reactions    Grass pollen-anai grass standard            Family History       Problem Relation (Age of Onset)    Allergic rhinitis Daughter    Cancer Son    Cataracts Father    Heart disease Mother, Father    Hyperlipidemia Father    Hypertension Father, Sister, Brother          Tobacco Use    Smoking status: Former     Current packs/day: 0.00     Average packs/day: 3.0 packs/day for 10.0 years (30.0 ttl pk-yrs)     Types: Cigarettes     Start date:      Quit date:      Years since quittin.1    Smokeless tobacco: Never   Substance and Sexual Activity    Alcohol use: No    Drug use: No    Sexual activity: Never     Review of Systems   Constitutional: Positive for malaise/fatigue.   Eyes: Negative.    Cardiovascular: Negative.    Respiratory: Negative.     Skin: Negative.    Musculoskeletal:  Positive for arthritis and joint pain.   Gastrointestinal:  Positive for diarrhea (last week).   Genitourinary: Negative.    Neurological:  Positive for weakness.   Allergic/Immunologic: Negative.      Objective:     Vital Signs (Most Recent):  Temp: 98.3 °F (36.8 °C) (24 1314)  Pulse: 98  (02/26/24 1400)  Resp: 19 (02/26/24 1400)  BP: (!) 104/54 (02/26/24 1400)  SpO2: (!) 93 % (02/26/24 1400) Vital Signs (24h Range):  Temp:  [98.3 °F (36.8 °C)] 98.3 °F (36.8 °C)  Pulse:  [62-98] 98  Resp:  [16-31] 19  SpO2:  [92 %-96 %] 93 %  BP: (101-119)/(54-65) 104/54     Weight: 111.3 kg (245 lb 4.8 oz)  Body mass index is 35.2 kg/m².    SpO2: (!) 93 %       No intake or output data in the 24 hours ending 02/26/24 1519    Lines/Drains/Airways       Central Venous Catheter Line  Duration                  PowerPort A Cath Single Lumen 01/05/24 1415 Internal Jugular Right 52 days              Peripheral Intravenous Line  Duration                  Peripheral IV - Single Lumen 02/26/24 20 G Posterior;Right Hand <1 day                     Physical Exam  Vitals and nursing note reviewed.   Constitutional:       General: He is not in acute distress.     Appearance: Normal appearance. He is well-developed. He is not diaphoretic.   HENT:      Head: Normocephalic and atraumatic.   Eyes:      General:         Right eye: No discharge.         Left eye: No discharge.      Pupils: Pupils are equal, round, and reactive to light.   Neck:      Vascular: No JVD.      Trachea: No tracheal deviation.   Cardiovascular:      Rate and Rhythm: Normal rate and regular rhythm.      Heart sounds: Normal heart sounds, S1 normal and S2 normal. No murmur heard.     No friction rub.   Pulmonary:      Effort: Pulmonary effort is normal. No respiratory distress.      Breath sounds: Normal breath sounds. No wheezing or rales.   Abdominal:      General: There is no distension.   Musculoskeletal:      Right lower leg: No edema.      Left lower leg: No edema.   Skin:     General: Skin is warm and dry.      Findings: No erythema.   Neurological:      General: No focal deficit present.      Mental Status: He is alert and oriented to person, place, and time.   Psychiatric:         Mood and Affect: Mood normal.         Behavior: Behavior normal.           Significant Labs: CMP   Recent Labs   Lab 02/26/24  0952   *   K 3.0*   CL 94*   CO2 23   GLU 97   BUN 13   CREATININE 0.8   CALCIUM 9.1   PROT 6.5   ALBUMIN 2.9*   BILITOT 0.7   ALKPHOS 77   AST 38   ALT 22   ANIONGAP 13   , CBC   Recent Labs   Lab 02/26/24  0952   WBC 14.36*   HGB 10.7*   HCT 30.3*      , Troponin   Recent Labs   Lab 02/26/24  0952 02/26/24  1232   TROPONINI 0.725* 0.735*   , and All pertinent lab results from the last 24 hours have been reviewed.    Significant Imaging: Echocardiogram: Transthoracic echo (TTE) complete (Cupid Only):   Results for orders placed or performed during the hospital encounter of 02/26/24   Echo   Result Value Ref Range    LVIDd 4.88 3.5 - 6.0 cm    LV Systolic Volume 52.86 mL    LVIDs 3.56 2.1 - 4.0 cm    LV Diastolic Volume 111.86 mL    IVS 1.04 0.6 - 1.1 cm    FS 27 (A) 28 - 44 %    Left Ventricle Relative Wall Thickness 0.44 cm    Posterior Wall 1.08 0.6 - 1.1 cm    LV mass 189.29 g    TR Max Can 2.91 m/s    RVDD 2.81 cm    LA size 4.18 cm    Triscuspid Valve Regurgitation Peak Gradient 34 mmHg    IVC diameter 2.08 cm   , EKG: Reviewed, and X-Ray: CXR: X-Ray Chest 1 View (CXR): No results found for this visit on 02/26/24. and X-Ray Chest PA and Lateral (CXR): No results found for this visit on 02/26/24.  Assessment and Plan:   Patient who presents with episode of confusion/AMS and elevated troponin. Stable, CP free. Trend troponin. Continue OMT. Check limited TTE. Rule out PE if D-dimer positive. Replete electrolytes. Will consider MPI stress test this admission.    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, estrogen receptor positive  -Mgmt as per heme/onc    CKD stage 3 due to type 2 diabetes mellitus  -Monitor        VTE Risk Mitigation (From admission, onward)           Ordered     heparin 25,000 units in dextrose 5% (100 units/ml) IV bolus from bag LOW INTENSITY nomogram - [facility]  As needed (PRN)        Question:  Heparin Infusion Adjustment (DO NOT MODIFY ANSWER)  Answer:  [email or web address]\epic\Images\Pharmacy\HeparinInfusions\heparin LOW INTENSITY nomogram for [facility] [id number].pdf    02/26/24 1404     heparin 25,000 units in dextrose 5% (100 units/ml) IV bolus from bag LOW INTENSITY nomogram - [facility]  As needed (PRN)        Question:  Heparin Infusion Adjustment (DO NOT MODIFY ANSWER)  Answer:  [email or web address]\epic\Images\Pharmacy\HeparinInfusions\heparin LOW INTENSITY nomogram for [facility] [id number]    02/26/24 1404     heparin 25,000 units in dextrose 5% (100 units/ml) IV bolus from bag LOW INTENSITY nomogram - [facility]  Once        Question:  Heparin Infusion Adjustment (DO NOT MODIFY ANSWER)  Answer:  [email or web address]\epic\Images\Pharmacy\HeparinInfusions\heparin LOW INTENSITY nomogram for [facility] [id number].pdf    02/26/24 1404     heparin 25,000 units in dextrose 5% 250 mL (100 units/mL) infusion LOW INTENSITY nomogram - [facility]  Continuous        Question:  Begin at (units/kg/hr)  Answer:  12    02/26/24 1404     IP VTE HIGH RISK PATIENT  Once         02/26/24 1204     Place sequential compression device  Until discontinued         02/26/24 1204                    Thank you for your consult. I will follow-up with patient. Please contact us if you have any additional questions.    Sandy Rodriguez PA-C  Cardiology   [facility] - Emergency Dept.

## 2024-02-26 NOTE — CONSULTS
[facility] - Emergency Dept.  Cardiology  Consult Note    Patient Name: Mark Dickerson Sr.  MRN: [id number]  Admission Date: 2/26/2024  Hospital Length of Stay: 0 days  Code Status: DNR   Attending Provider: Jacinto Yanez MD   Consulting Provider: Sandy Rodriguez PA-C  Primary Care Physician: Tatyana Cannon MD  Principal Problem:Elevated troponin    Patient information was obtained from patient, past medical records, and ER records.     Inpatient consult to Cardiology  Consult performed by: Sandy Rodriguez PA-C  Consult ordered by: Yaima Lema NP        Subjective:     Chief Complaint:  AMS/confusion    HPI:   Mr. Dickerson is an 84 year old male patient whose current medical conditions include metastatic breast cancer (on chemo), HTN, hyperlipidemia, DM type II, and CKD who presented to [facility] ED this AM due to AMS/confusion. Patient's DIL reported patient seemed disoriented and had jumbled speech when she visited him earlier this AM. He returned to baseline prior to ED arrival. He denied any associated SOB, chest pain, palpitations, near syncope, or syncope. Initial workup in ED revealed leukocytosis (WBC 14,000), hyponatremia (Na 130), hypomagnesemia (1.5), and elevated troponin (0.725>0.735) and patient was subsequently admitted for further evaluation and treatment. Cardiology consulted to assist with management. Patient seen and examined today, resting in bed. Remains chest pain free. No other CV complaints. States he felt dehydrated earlier today. No prior CV history. He reports compliance with his medications. EKG reviewed, SR with T wave inversions inferiorly and anteriorly. Limited echo and repeat troponin pending. CT of head NAF.         Past Medical History:   Diagnosis Date    Chest pain 2/26/2024    CKD stage 3 due to type 2 diabetes mellitus 2/18/2021    DM (diabetes mellitus) 2014    BS didn't check 12/11/2019    DM (diabetes mellitus) 2014    BS didn't check 05/05/2022    Foreign body, eye     right  eye    Hyperlipidemia     Hypertension     Need for shingles vaccine 2019    Pneumonia     Primary osteoarthritis of right knee 10/29/2021    Severe obesity (BMI 35.0-35.9 with comorbidity) 7/10/2019    Type 2 diabetes mellitus     BS didn't check 2018       Past Surgical History:   Procedure Laterality Date    APPENDECTOMY      CATARACT EXTRACTION Bilateral     CPG    ENDOSCOPIC ULTRASOUND OF UPPER GASTROINTESTINAL TRACT N/A 2024    Procedure: ULTRASOUND, UPPER GI TRACT, ENDOSCOPIC;  Surgeon: Evens Monsalve MD;  Location: [facility] ENDO ([address] [place] [address]);  Service: Endoscopy;  Laterality: N/A;    FLUOROSCOPY N/A 2024    Procedure: Fluoroscopy/MEDIPORT PLACEMENT;  Surgeon: Jaret Lu MD;  Location: [facility] CATH LAB;  Service: General;  Laterality: N/A;       Review of patient's allergies indicates:   Allergen Reactions    Grass pollen-anai grass standard            Family History       Problem Relation (Age of Onset)    Allergic rhinitis Daughter    Cancer Son    Cataracts Father    Heart disease Mother, Father    Hyperlipidemia Father    Hypertension Father, Sister, Brother          Tobacco Use    Smoking status: Former     Current packs/day: 0.00     Average packs/day: 3.0 packs/day for 10.0 years (30.0 ttl pk-yrs)     Types: Cigarettes     Start date:      Quit date:      Years since quittin.1    Smokeless tobacco: Never   Substance and Sexual Activity    Alcohol use: No    Drug use: No    Sexual activity: Never     Review of Systems   Constitutional: Positive for malaise/fatigue.   Eyes: Negative.    Cardiovascular: Negative.    Respiratory: Negative.     Skin: Negative.    Musculoskeletal:  Positive for arthritis and joint pain.   Gastrointestinal:  Positive for diarrhea (last week).   Genitourinary: Negative.    Neurological:  Positive for weakness.   Allergic/Immunologic: Negative.      Objective:     Vital Signs (Most Recent):  Temp: 98.3 °F (36.8 °C) (24 1314)  Pulse: 98  (02/26/24 1400)  Resp: 19 (02/26/24 1400)  BP: (!) 104/54 (02/26/24 1400)  SpO2: (!) 93 % (02/26/24 1400) Vital Signs (24h Range):  Temp:  [98.3 °F (36.8 °C)] 98.3 °F (36.8 °C)  Pulse:  [62-98] 98  Resp:  [16-31] 19  SpO2:  [92 %-96 %] 93 %  BP: (101-119)/(54-65) 104/54     Weight: 111.3 kg (245 lb 4.8 oz)  Body mass index is 35.2 kg/m².    SpO2: (!) 93 %       No intake or output data in the 24 hours ending 02/26/24 1519    Lines/Drains/Airways       Central Venous Catheter Line  Duration                  PowerPort A Cath Single Lumen 01/05/24 1415 Internal Jugular Right 52 days              Peripheral Intravenous Line  Duration                  Peripheral IV - Single Lumen 02/26/24 20 G Posterior;Right Hand <1 day                     Physical Exam  Vitals and nursing note reviewed.   Constitutional:       General: He is not in acute distress.     Appearance: Normal appearance. He is well-developed. He is not diaphoretic.   HENT:      Head: Normocephalic and atraumatic.   Eyes:      General:         Right eye: No discharge.         Left eye: No discharge.      Pupils: Pupils are equal, round, and reactive to light.   Neck:      Vascular: No JVD.      Trachea: No tracheal deviation.   Cardiovascular:      Rate and Rhythm: Normal rate and regular rhythm.      Heart sounds: Normal heart sounds, S1 normal and S2 normal. No murmur heard.     No friction rub.   Pulmonary:      Effort: Pulmonary effort is normal. No respiratory distress.      Breath sounds: Normal breath sounds. No wheezing or rales.   Abdominal:      General: There is no distension.   Musculoskeletal:      Right lower leg: No edema.      Left lower leg: No edema.   Skin:     General: Skin is warm and dry.      Findings: No erythema.   Neurological:      General: No focal deficit present.      Mental Status: He is alert and oriented to person, place, and time.   Psychiatric:         Mood and Affect: Mood normal.         Behavior: Behavior normal.           Significant Labs: CMP   Recent Labs   Lab 02/26/24  0952   *   K 3.0*   CL 94*   CO2 23   GLU 97   BUN 13   CREATININE 0.8   CALCIUM 9.1   PROT 6.5   ALBUMIN 2.9*   BILITOT 0.7   ALKPHOS 77   AST 38   ALT 22   ANIONGAP 13   , CBC   Recent Labs   Lab 02/26/24  0952   WBC 14.36*   HGB 10.7*   HCT 30.3*      , Troponin   Recent Labs   Lab 02/26/24  0952 02/26/24  1232   TROPONINI 0.725* 0.735*   , and All pertinent lab results from the last 24 hours have been reviewed.    Significant Imaging: Echocardiogram: Transthoracic echo (TTE) complete (Cupid Only):   Results for orders placed or performed during the hospital encounter of 02/26/24   Echo   Result Value Ref Range    LVIDd 4.88 3.5 - 6.0 cm    LV Systolic Volume 52.86 mL    LVIDs 3.56 2.1 - 4.0 cm    LV Diastolic Volume 111.86 mL    IVS 1.04 0.6 - 1.1 cm    FS 27 (A) 28 - 44 %    Left Ventricle Relative Wall Thickness 0.44 cm    Posterior Wall 1.08 0.6 - 1.1 cm    LV mass 189.29 g    TR Max Can 2.91 m/s    RVDD 2.81 cm    LA size 4.18 cm    Triscuspid Valve Regurgitation Peak Gradient 34 mmHg    IVC diameter 2.08 cm   , EKG: Reviewed, and X-Ray: CXR: X-Ray Chest 1 View (CXR): No results found for this visit on 02/26/24. and X-Ray Chest PA and Lateral (CXR): No results found for this visit on 02/26/24.  Assessment and Plan:   Patient who presents with episode of confusion/AMS and elevated troponin. Stable, CP free. Trend troponin. Continue OMT. Check limited TTE. Rule out PE if D-dimer positive. Replete electrolytes. Will consider MPI stress test this admission.    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, estrogen receptor positive  -Mgmt as per heme/onc    CKD stage 3 due to type 2 diabetes mellitus  -Monitor        VTE Risk Mitigation (From admission, onward)           Ordered     heparin 25,000 units in dextrose 5% (100 units/ml) IV bolus from bag LOW INTENSITY nomogram - [facility]  As needed (PRN)        Question:  Heparin Infusion Adjustment (DO NOT MODIFY ANSWER)  Answer:  [email or web address]\epic\Images\Pharmacy\HeparinInfusions\heparin LOW INTENSITY nomogram for [facility] [id number].pdf    02/26/24 1404     heparin 25,000 units in dextrose 5% (100 units/ml) IV bolus from bag LOW INTENSITY nomogram - [facility]  As needed (PRN)        Question:  Heparin Infusion Adjustment (DO NOT MODIFY ANSWER)  Answer:  [email or web address]\epic\Images\Pharmacy\HeparinInfusions\heparin LOW INTENSITY nomogram for [facility] [id number].pdf    02/26/24 1404     heparin 25,000 units in dextrose 5% (100 units/ml) IV bolus from bag LOW INTENSITY nomogram - [facility]  Once        Question:  Heparin Infusion Adjustment (DO NOT MODIFY ANSWER)  Answer:  [email or web address]\epic\Images\Pharmacy\HeparinInfusions\heparin LOW INTENSITY nomogram for [facility] [id number]    02/26/24 1404     heparin 25,000 units in dextrose 5% 250 mL (100 units/mL) infusion LOW INTENSITY nomogram - [facility]  Continuous        Question:  Begin at (units/kg/hr)  Answer:  12    02/26/24 1404     IP VTE HIGH RISK PATIENT  Once         02/26/24 1204     Place sequential compression device  Until discontinued         02/26/24 1204                    Thank you for your consult. I will follow-up with patient. Please contact us if you have any additional questions.    Sandy Rodriguez PA-C  Cardiology   [facility] - Emergency Dept.

## 2024-02-26 NOTE — H&P
"  [facility] - Emergency Dept.  [facility] Medicine  History & Physical    Patient Name: Mark Dickerson Sr.  MRN: [id number]  Patient Class: OP- Observation  Admission Date: 2/26/2024  Attending Physician: Jacinto Yanez MD   Primary Care Provider: Tatyana Cannon MD         Patient information was obtained from patient, relative(s), past medical records, and ER records.     Subjective:     Principal Problem:Elevated troponin    Chief Complaint:   Chief Complaint   Patient presents with    Altered Mental Status     Daughter in law states that she was talking to him on the phone and she noticed he kept speaking in circles        HPI: Mark Dickerson Sr. is a 84 y.o. male patient with a PMHx of metastatic breast cancer (on chemo), HLD, HTN, DM II, CKD who presents to the ED for evaluation of AMS which onset this morning PTA. Per daughter in law she visited the pt early this morning and states that he was normal for a short period before becoming extremely disoriented with jumbled speech.  Patient mental status returned to baseline piror to ed arrival.  Patient reports he feels "dehydrated", he also reports diarrhea earlier last week but this has since resolved.  Patient denies any fever, chills, N/V, SOB and chest pain.   In the ED, work up notable for WBC 14, Na 130, K 3.0, mag 1.5, , Trop 0.7.  EKG with SR PACs, ST and T wave abnormality. CT head negative for acute processes.  Chest xray no acute processes. Patient started on IVF and given zofran for nausea.  Patient will be admitted to observation for electrolyte imbalances, elevated trop.  Cards consulted.     Code Status DNR  Surrogate Decision maker daughter    Past Medical History:   Diagnosis Date    Chest pain 2/26/2024    CKD stage 3 due to type 2 diabetes mellitus 2/18/2021    DM (diabetes mellitus) 2014    BS didn't check 12/11/2019    DM (diabetes mellitus) 2014    BS didn't check 05/05/2022    Foreign body, eye     right eye    Hyperlipidemia     Hypertension     " Need for shingles vaccine 11/20/2019    Pneumonia     Primary osteoarthritis of right knee 10/29/2021    Severe obesity (BMI 35.0-35.9 with comorbidity) 7/10/2019    Type 2 diabetes mellitus 2014    BS didn't check 12/12/2018       Past Surgical History:   Procedure Laterality Date    APPENDECTOMY      CATARACT EXTRACTION Bilateral     CPG    ENDOSCOPIC ULTRASOUND OF UPPER GASTROINTESTINAL TRACT N/A 1/11/2024    Procedure: ULTRASOUND, UPPER GI TRACT, ENDOSCOPIC;  Surgeon: Evens Monsalve MD;  Location: [facility] ENDO ([address] [place] [address]);  Service: Endoscopy;  Laterality: N/A;    FLUOROSCOPY N/A 1/5/2024    Procedure: Fluoroscopy/MEDIPORT PLACEMENT;  Surgeon: Jaret Lu MD;  Location: [facility] CATH LAB;  Service: General;  Laterality: N/A;       Review of patient's allergies indicates:   Allergen Reactions    Grass pollen-anai grass standard        No current facility-administered medications on file prior to encounter.     Current Outpatient Medications on File Prior to Encounter   Medication Sig    anastrozole (ARIMIDEX) 1 mg Tab Take 1 tablet (1 mg total) by mouth once daily.    atorvastatin (LIPITOR) 40 MG tablet Take 1 tablet (40 mg total) by mouth once daily.    clotrimazole (LOTRIMIN) 1 % cream Apply topically 2 (two) times daily.    dexAMETHasone (DECADRON) 4 MG Tab Take 1 tablet by mouth twice per day the days of chemotherapy only    diphenhydrAMINE-aluminum-magnesium hydroxide-simethicone-LIDOcaine viscous HCl 2% Swish and spit 15 mLs every 4 (four) hours as needed.    fluorouraciL (EFUDEX) 5 % cream APPLY TO THE SCALP TWICE A DAY FOR 4 WEEKS WILL CAUSE REDNESS AND IRRITATION    glipiZIDE (GLUCOTROL) 5 MG tablet TAKE 1 TABLET BY MOUTH TWICE DAILY WITH MEALS    hydroCHLOROthiazide (HYDRODIURIL) 25 MG tablet Take 1 tablet by mouth once daily    levocetirizine (XYZAL) 5 MG tablet Take 1 tablet (5 mg total) by mouth every evening.    LIDOcaine-prilocaine (EMLA) cream Apply topically as needed. Over port at least 30 minutes  before treatment    magic mouthwash diphen/antac/lidoc swish and spit 15 mLs every 4 (four) hours as needed.    multivitamin (THERAGRAN) per tablet Take 1 tablet by mouth once daily.    ondansetron (ZOFRAN-ODT) 4 MG TbDL Take 1 tablet (4 mg total) by mouth every 8 (eight) hours as needed.    potassium chloride SA (K-DUR,KLOR-CON) 20 MEQ tablet Take 1 tablet (20 mEq total) by mouth once daily.    prochlorperazine (COMPAZINE) 5 MG tablet Take 1 tablet (5 mg total) by mouth every 6 (six) hours as needed for Nausea.    triamcinolone acetonide 0.025% (KENALOG) 0.025 % cream Apply to the affected area twice daily    [DISCONTINUED] aspirin 81 MG Chew Take 1 tablet (81 mg total) by mouth once daily.     Family History       Problem Relation (Age of Onset)    Allergic rhinitis Daughter    Cancer Son    Cataracts Father    Heart disease Mother, Father    Hyperlipidemia Father    Hypertension Father, Sister, Brother          Tobacco Use    Smoking status: Former     Current packs/day: 0.00     Average packs/day: 3.0 packs/day for 10.0 years (30.0 ttl pk-yrs)     Types: Cigarettes     Start date:      Quit date:      Years since quittin.1    Smokeless tobacco: Never   Substance and Sexual Activity    Alcohol use: No    Drug use: No    Sexual activity: Never     Review of Systems   Constitutional:  Positive for appetite change.   Gastrointestinal:  Positive for nausea.   Psychiatric/Behavioral:  Positive for confusion.      Objective:     Vital Signs (Most Recent):  Pulse: 89 (24 1100)  Resp: 17 (24 1100)  BP: (!) 101/56 (24 1100)  SpO2: (!) 93 % (24 1100) Vital Signs (24h Range):  Pulse:  [62-94] 89  Resp:  [16-19] 17  SpO2:  [93 %-94 %] 93 %  BP: (101-119)/(56-65) 101/56        There is no height or weight on file to calculate BMI.     Physical Exam  Vitals and nursing note reviewed.   Constitutional:       Appearance: He is obese.   Cardiovascular:      Rate and Rhythm: Normal rate and  regular rhythm.      Pulses: Normal pulses.      Heart sounds: Normal heart sounds.   Pulmonary:      Effort: Pulmonary effort is normal.      Breath sounds: Normal breath sounds.   Abdominal:      General: Bowel sounds are normal. There is no distension.      Palpations: Abdomen is soft.      Tenderness: There is no abdominal tenderness.   Musculoskeletal:         General: No swelling. Normal range of motion.   Skin:     General: Skin is warm and dry.   Neurological:      General: No focal deficit present.      Mental Status: He is alert and oriented to person, place, and time.           Significant Labs: All pertinent labs within the past 24 hours have been reviewed.    Significant Imaging: I have reviewed all pertinent imaging results/findings within the past 24 hours.  Assessment/Plan:     * Elevated troponin  - denies chest pain  - EKG with SR PACS and ST-Twave abnormalities  - continue cardiac monitoring  - Trp 0.7, will trend  - recent echo in 1/2024 with EF 50 - 55%, normal diastolic function, Aortic Valve moderate to severe stenosis.  - cards consulted will follow-up further recs       Leukocytosis  - mild - reactive?  - chest xray with no acute processes  - UA pending  - afebrile  - will trend      Hypokalemia  Patient has hypokalemia which is Acute on Chronic and currently uncontrolled. Most recent potassium levels reviewed-   Lab Results   Component Value Date    K 3.0 (L) 02/26/2024   . Will continue potassium replacement per protocol and recheck repeat levels after replacement completed.     Hypomagnesemia  Patient has Abnormal Magnesium: hypomagnesemia. Will continue to monitor electrolytes closely. Will replace the affected electrolytes and repeat labs to be done after interventions completed. The patient's magnesium results have been reviewed and are listed below.  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 Mendoza at [facility], last chemo session one week prior  - follow up outpatient         CKD stage 3 due to type 2 diabetes mellitus  Creatine stable for now. BMP reviewed- noted CrCl cannot be calculated (Unknown ideal weight.). according to latest data. Based on current GFR, CKD stage is stage 3 - GFR 30-59.  Monitor UOP and serial BMP and adjust therapy as needed. Renally dose meds. Avoid nephrotoxic medications and procedures.  - creatinine WNL    Controlled type 2 diabetes mellitus without complication, without long-term current use of insulin  Patient's FSGs are controlled on current medication regimen.  Last A1c reviewed-   Lab Results   Component Value Date    HGBA1C 7.1 (H) 11/28/2023     Most recent fingerstick glucose reviewed-   Recent Labs   Lab 02/26/24  1233   POCTGLUCOSE 102     Current correctional scale  Low  Maintain anti-hyperglycemic dose as follows-   Antihyperglycemics (From admission, onward)      Start     Stop Route Frequency Ordered    02/26/24 1301  insulin aspart U-100 pen 0-5 Units         -- SubQ Before meals & nightly PRN 02/26/24 1204          Hold Oral hypoglycemics while patient is in the hospital.      VTE Risk Mitigation (From admission, onward)           Ordered     enoxaparin injection 40 mg  Daily         02/26/24 1204     IP VTE HIGH RISK PATIENT  Once         02/26/24 1204     Place sequential compression device  Until discontinued         02/26/24 1204                         On 02/26/2024, patient should be placed in hospital observation services under my care in collaboration with Dr. Yanez.           Yaima eLma NP  Department of Hospital Medicine  '[facility] - Emergency Dept.

## 2024-02-26 NOTE — Clinical Note
The left ventricle was injected. The injected rate was 8 mL/sec. The total injected volume was 15 mL.

## 2024-02-26 NOTE — ASSESSMENT & PLAN NOTE
Patient has hypokalemia which is Acute on Chronic and currently uncontrolled. Most recent potassium levels reviewed-   Lab Results   Component Value Date    K 3.0 (L) 02/26/2024   . Will continue potassium replacement per protocol and recheck repeat levels after replacement completed.

## 2024-02-27 PROBLEM — I21.4 NSTEMI (NON-ST ELEVATED MYOCARDIAL INFARCTION): Status: ACTIVE | Noted: 2024-02-27

## 2024-02-27 LAB
ACANTHOCYTES BLD QL SMEAR: PRESENT
ALBUMIN SERPL BCP-MCNC: 2.9 G/DL (ref 3.5–5.2)
ALP SERPL-CCNC: 97 U/L (ref 55–135)
ALT SERPL W/O P-5'-P-CCNC: 34 U/L (ref 10–44)
ANION GAP SERPL CALC-SCNC: 12 MMOL/L (ref 8–16)
ANION GAP SERPL CALC-SCNC: 9 MMOL/L (ref 8–16)
ANISOCYTOSIS BLD QL SMEAR: SLIGHT
ANISOCYTOSIS BLD QL SMEAR: SLIGHT
APTT PPP: 40.2 SEC (ref 21–32)
AST SERPL-CCNC: 57 U/L (ref 10–40)
BASOPHILS # BLD AUTO: ABNORMAL K/UL (ref 0–0.2)
BASOPHILS NFR BLD: 0 % (ref 0–1.9)
BASOPHILS NFR BLD: 0 % (ref 0–1.9)
BILIRUB SERPL-MCNC: 0.5 MG/DL (ref 0.1–1)
BUN SERPL-MCNC: 11 MG/DL (ref 8–23)
BUN SERPL-MCNC: 12 MG/DL (ref 8–23)
CALCIUM SERPL-MCNC: 8.8 MG/DL (ref 8.7–10.5)
CALCIUM SERPL-MCNC: 9.2 MG/DL (ref 8.7–10.5)
CHLORIDE SERPL-SCNC: 96 MMOL/L (ref 95–110)
CHLORIDE SERPL-SCNC: 97 MMOL/L (ref 95–110)
CO2 SERPL-SCNC: 23 MMOL/L (ref 23–29)
CO2 SERPL-SCNC: 23 MMOL/L (ref 23–29)
CREAT SERPL-MCNC: 0.9 MG/DL (ref 0.5–1.4)
CREAT SERPL-MCNC: 1.1 MG/DL (ref 0.5–1.4)
DACRYOCYTES BLD QL SMEAR: ABNORMAL
DIFFERENTIAL METHOD BLD: ABNORMAL
DIFFERENTIAL METHOD BLD: ABNORMAL
EOSINOPHIL # BLD AUTO: ABNORMAL K/UL (ref 0–0.5)
EOSINOPHIL NFR BLD: 0 % (ref 0–8)
EOSINOPHIL NFR BLD: 2 % (ref 0–8)
ERYTHROCYTE [DISTWIDTH] IN BLOOD BY AUTOMATED COUNT: 15.2 % (ref 11.5–14.5)
ERYTHROCYTE [DISTWIDTH] IN BLOOD BY AUTOMATED COUNT: 15.2 % (ref 11.5–14.5)
EST. GFR  (NO RACE VARIABLE): >60 ML/MIN/1.73 M^2
EST. GFR  (NO RACE VARIABLE): >60 ML/MIN/1.73 M^2
GLUCOSE SERPL-MCNC: 142 MG/DL (ref 70–110)
GLUCOSE SERPL-MCNC: 211 MG/DL (ref 70–110)
HCT VFR BLD AUTO: 30.1 % (ref 40–54)
HCT VFR BLD AUTO: 31.7 % (ref 40–54)
HGB BLD-MCNC: 10.4 G/DL (ref 14–18)
HGB BLD-MCNC: 10.9 G/DL (ref 14–18)
IMM GRANULOCYTES # BLD AUTO: ABNORMAL K/UL (ref 0–0.04)
IMM GRANULOCYTES # BLD AUTO: ABNORMAL K/UL (ref 0–0.04)
IMM GRANULOCYTES NFR BLD AUTO: ABNORMAL % (ref 0–0.5)
IMM GRANULOCYTES NFR BLD AUTO: ABNORMAL % (ref 0–0.5)
INFLUENZA A, MOLECULAR: NEGATIVE
INFLUENZA B, MOLECULAR: NEGATIVE
LACTATE SERPL-SCNC: 1 MMOL/L (ref 0.5–2.2)
LYMPHOCYTES # BLD AUTO: ABNORMAL K/UL (ref 1–4.8)
LYMPHOCYTES NFR BLD: 18 % (ref 18–48)
LYMPHOCYTES NFR BLD: 4 % (ref 18–48)
MAGNESIUM SERPL-MCNC: 1.7 MG/DL (ref 1.6–2.6)
MCH RBC QN AUTO: 30.1 PG (ref 27–31)
MCH RBC QN AUTO: 30.5 PG (ref 27–31)
MCHC RBC AUTO-ENTMCNC: 34.4 G/DL (ref 32–36)
MCHC RBC AUTO-ENTMCNC: 34.6 G/DL (ref 32–36)
MCV RBC AUTO: 88 FL (ref 82–98)
MCV RBC AUTO: 88 FL (ref 82–98)
METAMYELOCYTES NFR BLD MANUAL: 4 %
MONOCYTES # BLD AUTO: ABNORMAL K/UL (ref 0.3–1)
MONOCYTES NFR BLD: 17 % (ref 4–15)
MONOCYTES NFR BLD: 4 % (ref 4–15)
MYELOCYTES NFR BLD MANUAL: 3 %
NEUTROPHILS NFR BLD: 65 % (ref 38–73)
NEUTROPHILS NFR BLD: 77 % (ref 38–73)
NEUTS BAND NFR BLD MANUAL: 6 %
NRBC BLD-RTO: 0 /100 WBC
NRBC BLD-RTO: 0 /100 WBC
PHOSPHATE SERPL-MCNC: 2.3 MG/DL (ref 2.7–4.5)
PLATELET # BLD AUTO: 269 K/UL (ref 150–450)
PLATELET # BLD AUTO: 288 K/UL (ref 150–450)
PLATELET BLD QL SMEAR: ABNORMAL
PLATELET BLD QL SMEAR: ABNORMAL
PMV BLD AUTO: 10.7 FL (ref 9.2–12.9)
PMV BLD AUTO: 10.9 FL (ref 9.2–12.9)
POCT GLUCOSE: 154 MG/DL (ref 70–110)
POCT GLUCOSE: 167 MG/DL (ref 70–110)
POCT GLUCOSE: 188 MG/DL (ref 70–110)
POCT GLUCOSE: 245 MG/DL (ref 70–110)
POIKILOCYTOSIS BLD QL SMEAR: SLIGHT
POTASSIUM SERPL-SCNC: 3.5 MMOL/L (ref 3.5–5.1)
POTASSIUM SERPL-SCNC: 3.8 MMOL/L (ref 3.5–5.1)
PROT SERPL-MCNC: 6.6 G/DL (ref 6–8.4)
RBC # BLD AUTO: 3.41 M/UL (ref 4.6–6.2)
RBC # BLD AUTO: 3.62 M/UL (ref 4.6–6.2)
SARS-COV-2 RDRP RESP QL NAA+PROBE: NEGATIVE
SCHISTOCYTES BLD QL SMEAR: PRESENT
SODIUM SERPL-SCNC: 129 MMOL/L (ref 136–145)
SODIUM SERPL-SCNC: 131 MMOL/L (ref 136–145)
SPECIMEN SOURCE: NORMAL
TROPONIN I SERPL DL<=0.01 NG/ML-MCNC: 1.29 NG/ML (ref 0–0.03)
TROPONIN I SERPL DL<=0.01 NG/ML-MCNC: 2.6 NG/ML (ref 0–0.03)
TROPONIN I SERPL DL<=0.01 NG/ML-MCNC: 2.79 NG/ML (ref 0–0.03)
WBC # BLD AUTO: 26.05 K/UL (ref 3.9–12.7)
WBC # BLD AUTO: 32.33 K/UL (ref 3.9–12.7)

## 2024-02-27 PROCEDURE — 21400001 HC TELEMETRY ROOM: Mod: HCNC

## 2024-02-27 PROCEDURE — 25000003 PHARM REV CODE 250: Mod: HCNC | Performed by: NURSE PRACTITIONER

## 2024-02-27 PROCEDURE — 83735 ASSAY OF MAGNESIUM: CPT | Mod: HCNC | Performed by: NURSE PRACTITIONER

## 2024-02-27 PROCEDURE — 84484 ASSAY OF TROPONIN QUANT: CPT | Mod: 91,HCNC | Performed by: HOSPITALIST

## 2024-02-27 PROCEDURE — 80053 COMPREHEN METABOLIC PANEL: CPT | Mod: HCNC | Performed by: HOSPITALIST

## 2024-02-27 PROCEDURE — 63600175 PHARM REV CODE 636 W HCPCS: Mod: HCNC | Performed by: HOSPITALIST

## 2024-02-27 PROCEDURE — 36415 COLL VENOUS BLD VENIPUNCTURE: CPT | Mod: HCNC,XB | Performed by: HOSPITALIST

## 2024-02-27 PROCEDURE — U0002 COVID-19 LAB TEST NON-CDC: HCPCS | Mod: HCNC | Performed by: HOSPITALIST

## 2024-02-27 PROCEDURE — 85007 BL SMEAR W/DIFF WBC COUNT: CPT | Mod: 91,HCNC | Performed by: HOSPITALIST

## 2024-02-27 PROCEDURE — 84100 ASSAY OF PHOSPHORUS: CPT | Mod: HCNC | Performed by: NURSE PRACTITIONER

## 2024-02-27 PROCEDURE — 97116 GAIT TRAINING THERAPY: CPT | Mod: HCNC

## 2024-02-27 PROCEDURE — 97166 OT EVAL MOD COMPLEX 45 MIN: CPT | Mod: HCNC

## 2024-02-27 PROCEDURE — 97162 PT EVAL MOD COMPLEX 30 MIN: CPT | Mod: HCNC

## 2024-02-27 PROCEDURE — 80048 BASIC METABOLIC PNL TOTAL CA: CPT | Mod: HCNC | Performed by: NURSE PRACTITIONER

## 2024-02-27 PROCEDURE — 63600175 PHARM REV CODE 636 W HCPCS: Mod: HCNC | Performed by: NURSE PRACTITIONER

## 2024-02-27 PROCEDURE — 84484 ASSAY OF TROPONIN QUANT: CPT | Mod: 91,HCNC | Performed by: INTERNAL MEDICINE

## 2024-02-27 PROCEDURE — 85027 COMPLETE CBC AUTOMATED: CPT | Mod: 91,HCNC | Performed by: HOSPITALIST

## 2024-02-27 PROCEDURE — 87502 INFLUENZA DNA AMP PROBE: CPT | Mod: HCNC | Performed by: HOSPITALIST

## 2024-02-27 PROCEDURE — 85007 BL SMEAR W/DIFF WBC COUNT: CPT | Mod: HCNC | Performed by: NURSE PRACTITIONER

## 2024-02-27 PROCEDURE — 97530 THERAPEUTIC ACTIVITIES: CPT | Mod: HCNC

## 2024-02-27 PROCEDURE — 93005 ELECTROCARDIOGRAM TRACING: CPT | Mod: HCNC

## 2024-02-27 PROCEDURE — 36415 COLL VENOUS BLD VENIPUNCTURE: CPT | Mod: HCNC,XB | Performed by: INTERNAL MEDICINE

## 2024-02-27 PROCEDURE — 36415 COLL VENOUS BLD VENIPUNCTURE: CPT | Performed by: FAMILY MEDICINE

## 2024-02-27 PROCEDURE — 83605 ASSAY OF LACTIC ACID: CPT | Mod: HCNC | Performed by: HOSPITALIST

## 2024-02-27 PROCEDURE — 85730 THROMBOPLASTIN TIME PARTIAL: CPT | Performed by: FAMILY MEDICINE

## 2024-02-27 PROCEDURE — 85027 COMPLETE CBC AUTOMATED: CPT | Mod: HCNC | Performed by: NURSE PRACTITIONER

## 2024-02-27 PROCEDURE — 93010 ELECTROCARDIOGRAM REPORT: CPT | Mod: HCNC,,, | Performed by: INTERNAL MEDICINE

## 2024-02-27 PROCEDURE — 99233 SBSQ HOSP IP/OBS HIGH 50: CPT | Mod: HCNC,,, | Performed by: INTERNAL MEDICINE

## 2024-02-27 PROCEDURE — 87040 BLOOD CULTURE FOR BACTERIA: CPT | Mod: HCNC | Performed by: HOSPITALIST

## 2024-02-27 RX ORDER — FUROSEMIDE 10 MG/ML
20 INJECTION INTRAMUSCULAR; INTRAVENOUS ONCE
Status: COMPLETED | OUTPATIENT
Start: 2024-02-27 | End: 2024-02-27

## 2024-02-27 RX ADMIN — ATORVASTATIN CALCIUM 40 MG: 40 TABLET, FILM COATED ORAL at 09:02

## 2024-02-27 RX ADMIN — HEPARIN SODIUM 12 UNITS/KG/HR: 10000 INJECTION, SOLUTION INTRAVENOUS at 11:02

## 2024-02-27 RX ADMIN — ONDANSETRON 4 MG: 2 INJECTION INTRAMUSCULAR; INTRAVENOUS at 12:02

## 2024-02-27 RX ADMIN — INSULIN ASPART 1 UNITS: 100 INJECTION, SOLUTION INTRAVENOUS; SUBCUTANEOUS at 09:02

## 2024-02-27 RX ADMIN — ANASTROZOLE 1 MG: 1 TABLET, COATED ORAL at 09:02

## 2024-02-27 RX ADMIN — Medication 6 MG: at 08:02

## 2024-02-27 RX ADMIN — FUROSEMIDE 20 MG: 10 INJECTION, SOLUTION INTRAMUSCULAR; INTRAVENOUS at 11:02

## 2024-02-27 RX ADMIN — SODIUM CHLORIDE: 9 INJECTION, SOLUTION INTRAVENOUS at 08:02

## 2024-02-27 RX ADMIN — POTASSIUM CHLORIDE 20 MEQ: 1500 TABLET, EXTENDED RELEASE ORAL at 09:02

## 2024-02-27 NOTE — PLAN OF CARE
Inpatient Upgrade Note    Mark Dickerson . has warranted treatment spanning two or more midnights of hospital level care for the management of  Leukocytosis, Elevated troponin, Altered mental status . He continues to require daily labs, further testing/imaging, monitoring of vital signs, medication adjustments, further evaluation by consultants, and IV Anticoagulation  . His condition is also complicated by the following comorbidities: Diabetes, Malignancy, and CKD .

## 2024-02-27 NOTE — PT/OT/SLP EVAL
"Occupational Therapy   Evaluation    Name: Mark Dickerson Sr.  MRN: [id number]  Admitting Diagnosis: Elevated troponin  Recent Surgery: Procedure(s) (LRB):  Left heart cath (Left)      Recommendations:     Discharge Recommendations: Low Intensity Therapy  Discharge Equipment Recommendations:  none (pt requesting wheelchair for longer distances)  Barriers to discharge:  None    Assessment:     Mark Dickerson Sr. is a 84 y.o. male with a medical diagnosis of Elevated troponin.  He presents with the following performance deficits affecting function: weakness, impaired endurance, impaired self care skills, impaired functional mobility, gait instability, impaired balance, decreased coordination, decreased safety awareness, impaired cardiopulmonary response to activity.      Rehab Prognosis: Good; patient would benefit from acute skilled OT services to address these deficits and reach maximum level of function.       Plan:     Patient to be seen 2 x/week to address the above listed problems via self-care/home management, therapeutic activities, therapeutic exercises  Plan of Care Expires: 03/12/24  Plan of Care Reviewed with: patient, family    Subjective     Chief Complaint: fatigue with exertion  Patient/Family Comments/goals: return to PLOF    Occupational Profile:  Living Environment: lives alone in a mobile home with 5 steps and B railings to enter. Pt's family checks in multiple times a day.  Previous level of function: Pt (I) with ADLs. Pt Mod (I) with functional mobility using SPC household distances.   Roles and Routines: pt reports "I don't drive for now"  Equipment Used at Home: cane, straight, grab bar  Assistance upon Discharge: family    Pain/Comfort:  Pain Rating 1: 0/10  Objective:     Communicated with: Nurse and epic chart review prior to session.  Patient found up in chair with peripheral IV, telemetry, oxygen upon OT entry to room.    General Precautions: Standard, fall, hearing impaired, " diabetic  Orthopedic Precautions: N/A  Braces: N/A  Respiratory Status: Nasal cannula, flow 2 L/min    Functional Mobility/Transfers:  Patient completed Sit <> Stand Transfer with contact guard assistance  with  rolling walker   Patient completed Bed <> Chair Transfer using Stand Pivot technique with contact guard assistance with rolling walker  Functional Mobility: Patient completed x40ft x2 functional mobility with CGA and RW to increase dynamic standing balance and activity tolerance needed for ADL completion.     Activities of Daily Living:  Toileting: supervision pt standing at toilet to urinate, able to bend over to pull up pants from ground    Cognitive/Visual Perceptual:  Cognitive/Psychosocial Skills:     -       Oriented to: Person, Place, Time, and Situation   -       Follows Commands/attention:Inattentive and Follows two-step commands  -       Communication: clear/fluent  -       Memory: No Deficits noted  -       Safety awareness/insight to disability: impaired   Pt is hard of hearing.    Physical Exam:  Sensation:    -       Intact  Upper Extremity Range of Motion:     -       Right Upper Extremity: WNL  -       Left Upper Extremity: WNL  Upper Extremity Strength:    -       Right Upper Extremity: 4+/5 grossly  -       Left Upper Extremity: 4+/5 grossly   Strength:    -       Right Upper Extremity: WFL  -       Left Upper Extremity: WFL    AMPAC 6 Click ADL:  AMPAC Total Score: 22    Treatment & Education:  Patient educated on role of OT in acute setting and benefits of participation. Educated on techniques to use to increase independence and decrease fall risk with functional transfers. Educated on importance of OOB activity and calling for A to transfer back to bed. Encouraged completion of B UE AROM therex throughout the day to tolerance to increase functional strength and activity tolerance. Educated patient on importance of increased tolerance to upright position and direct impact on CV  endurance and strength. Patient encouraged to sit up in chair for a minimum of 2 consecutive hours per day and for meals. Patient stated understanding and in agreement with POC.     Patient left up in chair with all lines intact, call button in reach, and family present    GOALS:   Multidisciplinary Problems       Occupational Therapy Goals          Problem: Occupational Therapy    Goal Priority Disciplines Outcome Interventions   Occupational Therapy Goal     OT, PT/OT     Description: Goals to be met by: 3/12/24     Patient will increase functional independence with ADLs by performing:    UE Dressing with [place].  Grooming while standing at sink with [place].  Toileting from toilet with [place] for hygiene and clothing management.   Toilet transfer to toilet with Modified [place].  Upper extremity exercise program x15 reps per handout, with independence.                         History:     Past Medical History:   Diagnosis Date    Chest pain 2/26/2024    CKD stage 3 due to type 2 diabetes mellitus 2/18/2021    DM (diabetes mellitus) 2014    BS didn't check 12/11/2019    DM (diabetes mellitus) 2014    BS didn't check 05/05/2022    Foreign body, eye     right eye    Hyperlipidemia     Hypertension     Need for shingles vaccine 11/20/2019    Pneumonia     Primary osteoarthritis of right knee 10/29/2021    Severe obesity (BMI 35.0-35.9 with comorbidity) 7/10/2019    Type 2 diabetes mellitus 2014    BS didn't check 12/12/2018         Past Surgical History:   Procedure Laterality Date    APPENDECTOMY      CATARACT EXTRACTION Bilateral     CPG    ENDOSCOPIC ULTRASOUND OF UPPER GASTROINTESTINAL TRACT N/A 1/11/2024    Procedure: ULTRASOUND, UPPER GI TRACT, ENDOSCOPIC;  Surgeon: Evens Monsalve MD;  Location: [facility] ENDO [address]);  Service: Endoscopy;  Laterality: N/A;    FLUOROSCOPY N/A 1/5/2024    Procedure: Fluoroscopy/MEDIPORT PLACEMENT;  Surgeon: Jaret Lu MD;  Location: [facility] CATH LAB;   Service: General;  Laterality: N/A;       Time Tracking:     OT Date of Treatment: 02/27/24  OT Start Time: 1455  OT Stop Time: 1525  OT Total Time (min): 30 min    Billable Minutes:Evaluation 15  Therapeutic Activity 15    2/27/2024  Susie Tillman OT

## 2024-02-27 NOTE — PROGRESS NOTES
[facility] - Telemetry ([facility])  Cardiology  Progress Note    Patient Name: Mark Dickerson Sr.  MRN: [id number]  Admission Date: 2/26/2024  Hospital Length of Stay: 0 days  Code Status: DNR   Attending Physician: Judah Oden MD   Primary Care Physician: Tatyana Cannon MD  Expected Discharge Date:   Principal Problem:Elevated troponin    Subjective:   HPI:  Mr. Dickerson is an 84 year old male patient whose current medical conditions include metastatic breast cancer (on chemo), HTN, hyperlipidemia, DM type II, and CKD who presented to [facility] ED this AM due to AMS/confusion. Patient's DIL reported patient seemed disoriented and had jumbled speech when she visited him earlier this AM. He returned to baseline prior to ED arrival. He denied any associated SOB, chest pain, palpitations, near syncope, or syncope. Initial workup in ED revealed leukocytosis (WBC 14,000), hyponatremia (Na 130), hypomagnesemia (1.5), and elevated troponin (0.725>0.735) and patient was subsequently admitted for further evaluation and treatment. Cardiology consulted to assist with management. Patient seen and examined today, resting in bed. Remains chest pain free. No other CV complaints. States he felt dehydrated earlier today. No prior CV history. He reports compliance with his medications. EKG reviewed, SR with T wave inversions inferiorly and anteriorly. Limited echo and repeat troponin pending. CT of head NAF.       Hospital Course:   2/27/24-Patient seen and examined today, sitting up in bed. Appears more SOB/weak. No CP symptoms. WBC bumped to 26,000. CXR with bilateral infiltrates/worsening appearance. IV Lasix given and BC drawn. Troponin bumped to 1.293. [facility] initially planned today but cancelled due to lab abnormalities, CXR, and patient's clinical status.      Review of Systems   Constitutional: Positive for malaise/fatigue.   HENT: Negative.     Eyes: Negative.    Cardiovascular:  Positive for dyspnea on exertion.   Respiratory:  Positive  for cough and shortness of breath.    Endocrine: Negative.    Hematologic/Lymphatic: Negative.    Skin: Negative.    Musculoskeletal:  Positive for arthritis.   Gastrointestinal: Negative.    Genitourinary: Negative.    Neurological:  Positive for weakness.   Psychiatric/Behavioral: Negative.     Allergic/Immunologic: Negative.      Objective:     Vital Signs (Most Recent):  Temp: 98.4 °F (36.9 °C) (02/27/24 1151)  Pulse: 60 (02/27/24 1151)  Resp: 18 (02/27/24 1151)  BP: (!) 113/53 (02/27/24 1151)  SpO2: 96 % (02/27/24 1151) Vital Signs (24h Range):  Temp:  [97.6 °F (36.4 °C)-98.8 °F (37.1 °C)] 98.4 °F (36.9 °C)  Pulse:  [] 60  Resp:  [16-21] 18  SpO2:  [76 %-96 %] 96 %  BP: ()/(48-76) 113/53     Weight: 111.3 kg (245 lb 4.8 oz)  Body mass index is 35.2 kg/m².     SpO2: 96 %       No intake or output data in the 24 hours ending 02/27/24 1350    Lines/Drains/Airways       Central Venous Catheter Line  Duration                  PowerPort A Cath Single Lumen 01/05/24 1415 Internal Jugular Right 52 days              Peripheral Intravenous Line  Duration                  Peripheral IV - Single Lumen 02/26/24 20 G Posterior;Right Hand 1 day         Peripheral IV - Single Lumen 02/27/24 0805 22 G Anterior;Distal;Left Forearm <1 day                       Physical Exam  Vitals and nursing note reviewed.   Constitutional:       General: He is not in acute distress.     Appearance: He is well-developed. He is not diaphoretic.      Comments: ON supplemental O2 via NC   HENT:      Head: Normocephalic and atraumatic.   Eyes:      General:         Right eye: No discharge.         Left eye: No discharge.      Pupils: Pupils are equal, round, and reactive to light.   Cardiovascular:      Rate and Rhythm: Normal rate and regular rhythm.      Heart sounds: Normal heart sounds, S1 normal and S2 normal. No murmur heard.  Pulmonary:      Effort: Pulmonary effort is normal. No respiratory distress.      Breath sounds: Rhonchi  and rales present. No wheezing.   Abdominal:      General: There is no distension.   Musculoskeletal:      Right lower leg: No edema.      Left lower leg: No edema.   Skin:     General: Skin is warm and dry.      Findings: No erythema.   Neurological:      General: No focal deficit present.      Mental Status: He is alert and oriented to person, place, and time.   Psychiatric:         Mood and Affect: Mood normal.         Behavior: Behavior normal.            Significant Labs: CMP   Recent Labs   Lab 02/26/24  0952 02/27/24  0420   * 129*   K 3.0* 3.8   CL 94* 97   CO2 23 23   GLU 97 142*   BUN 13 11   CREATININE 0.8 0.9   CALCIUM 9.1 8.8   PROT 6.5  --    ALBUMIN 2.9*  --    BILITOT 0.7  --    ALKPHOS 77  --    AST 38  --    ALT 22  --    ANIONGAP 13 9   , CBC   Recent Labs   Lab 02/26/24  0952 02/27/24  0420   WBC 14.36* 26.05*   HGB 10.7* 10.4*   HCT 30.3* 30.1*    269   , Troponin   Recent Labs   Lab 02/26/24  1232 02/26/24  1950 02/27/24  0420   TROPONINI 0.735* 0.678* 1.293*   , and All pertinent lab results from the last 24 hours have been reviewed.    Significant Imaging: Echocardiogram: Transthoracic echo (TTE) complete (Cupid Only):   Results for orders placed or performed during the hospital encounter of 02/26/24   Echo   Result Value Ref Range    LVIDd 4.88 3.5 - 6.0 cm    LV Systolic Volume 52.86 mL    LVIDs 3.56 2.1 - 4.0 cm    LV Diastolic Volume 111.86 mL    IVS 1.04 0.6 - 1.1 cm    FS 27 (A) 28 - 44 %    Left Ventricle Relative Wall Thickness 0.44 cm    Posterior Wall 1.08 0.6 - 1.1 cm    LV mass 189.29 g    TR Max Can 2.91 m/s    RVDD 2.81 cm    LA size 4.18 cm    Triscuspid Valve Regurgitation Peak Gradient 34 mmHg    IVC diameter 2.08 cm    LV Systolic Volume Index 23.2 mL/m2    LV Diastolic Volume Index 49.06 mL/m2    LV Mass Index 83 g/m2    ZLVIDS -2.98     ZLVIDD -5.65     TV resting pulmonary artery pressure 37 mmHg    RV TB RVSP 6 mmHg    Est. RA pres 3 mmHg    Narrative       Left Ventricle: The left ventricle is normal in size. There is   concentric remodeling. There is normal systolic function with a visually   estimated ejection fraction of 55 - 60%. There is indeterminate diastolic   function.    Right Ventricle: Right ventricle was not well visualized due to poor   acoustic window. Normal right ventricular cavity size. Systolic function   is normal.    Pulmonary Artery: The estimated pulmonary artery systolic pressure is   37 mmHg.    IVC/SVC: Normal venous pressure at 3 mmHg.     , EKG: Reviewed, and X-Ray: CXR: X-Ray Chest 1 View (CXR): No results found for this visit on 02/26/24. and X-Ray Chest PA and Lateral (CXR): No results found for this visit on 02/26/24.  Assessment and Plan:   Patient who presents with weakness/elevated troponin. More SOB this AM. CXR worse. WBC bumped. Given IV Lasix, BC drawn. Continue OMT in interim. LHC once stable respiratory wise/infection ruled out.    * Elevated troponin  -Troponin 0.725>0.735, continue to trend  -No CP symptoms/SOB/angina  -No prior CV history  -Continue ASA, heparin gtt, statin  -EKG reviewed, T wave inversions inferiorly and laterally  -Limited TTE pending  -Will consider ischemic workup with MPI stress test this admission    2/27/24  -Troponin bumped to 1.2 overnight  -Remains CP free  -Continue ASA, heparin gtt, statin  -TTE with normal EF  -LHC planned once more stable respiratory wise/infection ruled out    Abnormal EKG  -See plan under elevated troponin    Leukocytosis  -Mgmt as per primary team    Hypomagnesemia  -Repletion as per primary team    Hypokalemia  -Repletion as per primary team      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, estrogen receptor positive  -Mgmt as per heme/onc    CKD stage 3 due to type 2 diabetes mellitus  -Monitor        VTE Risk Mitigation (From admission, onward)           Ordered     heparin 25,000 units in dextrose 5% (100 units/ml) IV bolus from bag LOW  INTENSITY nomogram - [facility]  As needed (PRN)        Question:  Heparin Infusion Adjustment (DO NOT MODIFY ANSWER)  Answer:  [email or web address]\epic\Images\Pharmacy\HeparinInfusions\heparin LOW INTENSITY nomogram for [facility] [id number]    02/26/24 1404     heparin 25,000 units in dextrose 5% (100 units/ml) IV bolus from bag LOW INTENSITY nomogram - [facility]  As needed (PRN)        Question:  Heparin Infusion Adjustment (DO NOT MODIFY ANSWER)  Answer:  [email or web address]\epic\Images\Pharmacy\HeparinInfusions\heparin LOW INTENSITY nomogram for [facility] [id number]    02/26/24 1404     heparin 25,000 units in dextrose 5% 250 mL (100 units/mL) infusion LOW INTENSITY nomogram - [facility]  Continuous        Question:  Begin at (units/kg/hr)  Answer:  12    02/26/24 1404     IP VTE HIGH RISK PATIENT  Once         02/26/24 1204     Place sequential compression device  Until discontinued         02/26/24 1204                    Sandy Rodriguez PA-C  Cardiology  [facility] - Telemetry ([facility])

## 2024-02-27 NOTE — PLAN OF CARE
EVAL AND TX COMPLETED: facilitated transfers with CGA. Ambulated 40 ft x 2 CGA with RW. Recommend low intensity therapy with 24/7 supervision

## 2024-02-27 NOTE — ASSESSMENT & PLAN NOTE
-Troponin 0.725>0.735, continue to trend  -No CP symptoms/SOB/angina  -No prior CV history  -Continue ASA, heparin gtt, statin  -EKG reviewed, T wave inversions inferiorly and laterally  -Limited TTE pending  -Will consider ischemic workup with MPI stress test this admission    2/27/24  -Troponin bumped to 1.2 overnight  -Remains CP free  -Continue ASA, heparin gtt, statin  -TTE with normal EF  -LHC planned once more stable respiratory wise/infection ruled out

## 2024-02-27 NOTE — PLAN OF CARE
A225/A225 JOHANNY Dickerson . is a 84 y.o.male admitted on 2/26/2024 for Elevated troponin   Code Status: DNR MRN: [id number]   Review of patient's allergies indicates:   Allergen Reactions    Grass pollen-anai grass standard      Past Medical History:   Diagnosis Date    Chest pain 2/26/2024    CKD stage 3 due to type 2 diabetes mellitus 2/18/2021    DM (diabetes mellitus) 2014    BS didn't check 12/11/2019    DM (diabetes mellitus) 2014    BS didn't check 05/05/2022    Foreign body, eye     right eye    Hyperlipidemia     Hypertension     Need for shingles vaccine 11/20/2019    Pneumonia     Primary osteoarthritis of right knee 10/29/2021    Severe obesity (BMI 35.0-35.9 with comorbidity) 7/10/2019    Type 2 diabetes mellitus 2014    BS didn't check 12/12/2018      PRN meds    acetaminophen, 650 mg, Q4H PRN  dextrose 10%, 12.5 g, PRN  dextrose 10%, 25 g, PRN  glucagon (human recombinant), 1 mg, PRN  glucose, 16 g, PRN  glucose, 24 g, PRN  heparin (PORCINE), 45.3 Units/kg (Adjusted), PRN  heparin (PORCINE), 30 Units/kg (Adjusted), PRN  HYDROcodone-acetaminophen, 1 tablet, Q6H PRN  insulin aspart U-100, 0-5 Units, QID (AC + HS) PRN  melatonin, 6 mg, Nightly PRN  naloxone, 0.02 mg, PRN  ondansetron, 4 mg, Q8H PRN  sodium chloride 0.9%, 10 mL, Q12H PRN      Chart check completed. Will continue plan of care.         [place] Coma Scale Score: 15     Lead Monitored: Lead I Rhythm: normal sinus rhythm    Cardiac/Telemetry Box Number: [id number]    Last Bowel Movement: 02/25/24  Diet diabetic 2000 Calorie     Sunil Score: 18  Fall Risk Score: 11  Accucheck []   Freq?      Lines/Drains/Airways       Central Venous Catheter Line  Duration                  PowerPort A Cath Single Lumen 01/05/24 1415 Internal Jugular Right 52 days              Peripheral Intravenous Line  Duration                  Peripheral IV - Single Lumen 02/26/24 20 G Posterior;Right Hand 1 day         Peripheral IV - Single Lumen 02/26/24 1728 20 G Left  Antecubital <1 day

## 2024-02-27 NOTE — SUBJECTIVE & OBJECTIVE
Review of Systems   Constitutional: Positive for malaise/fatigue.   HENT: Negative.     Eyes: Negative.    Cardiovascular:  Positive for dyspnea on exertion.   Respiratory:  Positive for cough and shortness of breath.    Endocrine: Negative.    Hematologic/Lymphatic: Negative.    Skin: Negative.    Musculoskeletal:  Positive for arthritis.   Gastrointestinal: Negative.    Genitourinary: Negative.    Neurological:  Positive for weakness.   Psychiatric/Behavioral: Negative.     Allergic/Immunologic: Negative.      Objective:     Vital Signs (Most Recent):  Temp: 98.4 °F (36.9 °C) (02/27/24 1151)  Pulse: 60 (02/27/24 1151)  Resp: 18 (02/27/24 1151)  BP: (!) 113/53 (02/27/24 1151)  SpO2: 96 % (02/27/24 1151) Vital Signs (24h Range):  Temp:  [97.6 °F (36.4 °C)-98.8 °F (37.1 °C)] 98.4 °F (36.9 °C)  Pulse:  [] 60  Resp:  [16-21] 18  SpO2:  [76 %-96 %] 96 %  BP: ()/(48-76) 113/53     Weight: 111.3 kg (245 lb 4.8 oz)  Body mass index is 35.2 kg/m².     SpO2: 96 %       No intake or output data in the 24 hours ending 02/27/24 1350    Lines/Drains/Airways       Central Venous Catheter Line  Duration                  PowerPort A Cath Single Lumen 01/05/24 1415 Internal Jugular Right 52 days              Peripheral Intravenous Line  Duration                  Peripheral IV - Single Lumen 02/26/24 20 G Posterior;Right Hand 1 day         Peripheral IV - Single Lumen 02/27/24 0805 22 G Anterior;Distal;Left Forearm <1 day                       Physical Exam  Vitals and nursing note reviewed.   Constitutional:       General: He is not in acute distress.     Appearance: He is well-developed. He is not diaphoretic.      Comments: ON supplemental O2 via NC   HENT:      Head: Normocephalic and atraumatic.   Eyes:      General:         Right eye: No discharge.         Left eye: No discharge.      Pupils: Pupils are equal, round, and reactive to light.   Cardiovascular:      Rate and Rhythm: Normal rate and regular rhythm.       Heart sounds: Normal heart sounds, S1 normal and S2 normal. No murmur heard.  Pulmonary:      Effort: Pulmonary effort is normal. No respiratory distress.      Breath sounds: Rhonchi and rales present. No wheezing.   Abdominal:      General: There is no distension.   Musculoskeletal:      Right lower leg: No edema.      Left lower leg: No edema.   Skin:     General: Skin is warm and dry.      Findings: No erythema.   Neurological:      General: No focal deficit present.      Mental Status: He is alert and oriented to person, place, and time.   Psychiatric:         Mood and Affect: Mood normal.         Behavior: Behavior normal.            Significant Labs: CMP   Recent Labs   Lab 02/26/24  0952 02/27/24  0420   * 129*   K 3.0* 3.8   CL 94* 97   CO2 23 23   GLU 97 142*   BUN 13 11   CREATININE 0.8 0.9   CALCIUM 9.1 8.8   PROT 6.5  --    ALBUMIN 2.9*  --    BILITOT 0.7  --    ALKPHOS 77  --    AST 38  --    ALT 22  --    ANIONGAP 13 9   , CBC   Recent Labs   Lab 02/26/24  0952 02/27/24  0420   WBC 14.36* 26.05*   HGB 10.7* 10.4*   HCT 30.3* 30.1*    269   , Troponin   Recent Labs   Lab 02/26/24  1232 02/26/24  1950 02/27/24  0420   TROPONINI 0.735* 0.678* 1.293*   , and All pertinent lab results from the last 24 hours have been reviewed.    Significant Imaging: Echocardiogram: Transthoracic echo (TTE) complete (Cupid Only):   Results for orders placed or performed during the hospital encounter of 02/26/24   Echo   Result Value Ref Range    LVIDd 4.88 3.5 - 6.0 cm    LV Systolic Volume 52.86 mL    LVIDs 3.56 2.1 - 4.0 cm    LV Diastolic Volume 111.86 mL    IVS 1.04 0.6 - 1.1 cm    FS 27 (A) 28 - 44 %    Left Ventricle Relative Wall Thickness 0.44 cm    Posterior Wall 1.08 0.6 - 1.1 cm    LV mass 189.29 g    TR Max Can 2.91 m/s    RVDD 2.81 cm    LA size 4.18 cm    Triscuspid Valve Regurgitation Peak Gradient 34 mmHg    IVC diameter 2.08 cm    LV Systolic Volume Index 23.2 mL/m2    LV Diastolic Volume  Index 49.06 mL/m2    LV Mass Index 83 g/m2    ZLVIDS -2.98     ZLVIDD -5.65     TV resting pulmonary artery pressure 37 mmHg    RV TB RVSP 6 mmHg    Est. RA pres 3 mmHg    Narrative      Left Ventricle: The left ventricle is normal in size. There is   concentric remodeling. There is normal systolic function with a visually   estimated ejection fraction of 55 - 60%. There is indeterminate diastolic   function.    Right Ventricle: Right ventricle was not well visualized due to poor   acoustic window. Normal right ventricular cavity size. Systolic function   is normal.    Pulmonary Artery: The estimated pulmonary artery systolic pressure is   37 mmHg.    IVC/SVC: Normal venous pressure at 3 mmHg.     , EKG: Reviewed, and X-Ray: CXR: X-Ray Chest 1 View (CXR): No results found for this visit on 02/26/24. and X-Ray Chest PA and Lateral (CXR): No results found for this visit on 02/26/24.

## 2024-02-27 NOTE — PLAN OF CARE
[facility] - Telemetry (Hospital)  Initial Discharge Assessment       Primary Care Provider: Tatyana Cannon MD    Admission Diagnosis: Hypokalemia [E87.6]  Hypomagnesemia [E83.42]  Confusion [R41.0]  Elevated troponin [R79.89]  Chest pain [R07.9]    Admission Date: 2/26/2024  Expected Discharge Date:     Transition of Care Barriers: None    Payor: [employer] MANAGED MEDICARE / Plan: [employer] MEDICARE HMO / Product Type: Capitation /     Extended Emergency Contact Information  Primary Emergency Contact: Italia Dickerson   [place]  Mobile Phone: [phone]  Relation: Daughter  Secondary Emergency Contact: Tiffany Byrd  Mobile Phone: [phone]  Relation: Daughter    Discharge Plan A: Home Health  Discharge Plan B: Home with family      [facility]  [address] [place] [address]  Phone: [phone] Fax: [phone]      Initial Assessment (most recent)       Adult Discharge Assessment - 02/27/24 0950          Discharge Assessment    Assessment Type Discharge Planning Assessment     Confirmed/corrected address, phone number and insurance Yes     Confirmed Demographics Correct on Facesheet     Source of Information patient;family     When was your last doctors appointment? 02/20/24   Shaina Mendoza MD - Hematology and Oncology    Communicated DE with patient/caregiver Date not available/Unable to determine     Reason For Admission Elevated troponin     People in Home alone     Facility Arrived From: home     Do you expect to return to your current living situation? Other (see comments)   Patient plan to move in with Italia taylor.    Do you have help at home or someone to help you manage your care at home? Yes     Who are your caregiver(s) and their phone number(s)? Italia Dickerson - daughter and other family (grandchildren, nieces/ nephews)     Prior to hospitilization cognitive status: Alert/Oriented     Current cognitive status: Alert/Oriented     Walking or  Climbing Stairs Difficulty yes     Walking or Climbing Stairs ambulation difficulty, requires equipment     Mobility Management cane     Dressing/Bathing Difficulty no     Home Accessibility wheelchair accessible     Equipment Currently Used at Home cane, straight     Readmission within 30 days? No     Patient currently being followed by outpatient case management? No     Do you currently have service(s) that help you manage your care at home? No     Do you take prescription medications? Yes     Do you have prescription coverage? Yes     Coverage [employer] Managed Medicare     Do you have any problems affording any of your prescribed medications? No     Is the patient taking medications as prescribed? yes     Who is going to help you get home at discharge? Italia Dickerson - daughter and other family (grandchildren, nieces/ nephews)     How do you get to doctors appointments? family or friend will provide     Are you on dialysis? No     Do you take coumadin? No     Discharge Plan A Home Health     Discharge Plan B Home with family     DME Needed Upon Discharge  bedside commode;rollator;walker, rolling     Discharge Plan discussed with: Patient;Adult children     Transition of Care Barriers None        Transportation Needs    In the past 12 months, has lack of transportation kept you from medical appointments or from getting medications? No     In the past 12 months, has lack of transportation kept you from meetings, work, or from getting things needed for daily living? No                   Anticipated DC dispo: home health  Prior Level of Function: independent, but reports weakness, daughter is help at home  People in home: alone    Comments:  SW met with patient, daughter - Italia, and nephew - Rafi, at bedside to introduce role and discuss discharge planning. Patient's daughter and family will be help at home and can provide transport at time of discharge. Confirmed demographics, insurance, and emergency contacts.  Patient currently lives alone, but reports he will be moving in with daughter, Italia, soon after discharge. Patient's family is transportation to and from doctor appointments and Patient reports weakness when performing ADLs, but still independent. Patient uses cane at home. Family is requesting Home Health, BSC, and walker for home use upon discharge. SW updated Patient's whiteboard with contact information and anticipated DC plan. CM discharge needs depends on hospital progress. SW will continue following to assist with other needs.

## 2024-02-27 NOTE — HOSPITAL COURSE
"Patient is currently admitted for NSTEMI, SIRS criteria, severe leukocytosis, hypokalemia, hypomagnesemia, chronic kidney disease stage 3, diabetes mellitus type 2, left breast cancer in a male patient, acute systolic CHF exacerbation, normocytic anemia, transaminitis, and severe aortic stenosis.  Consults on this admission include Cardiology, Hematology Oncology, Pulmonary, and Wound Care.  Echocardiogram 02/26/2024 with EF 55-60% with indeterminate diastolic function.  Right and left heart catheterization 03/01/2024 with elevated filling pressures, severe aortic stenosis,  RCA with collaterals, EF 30%.  Cardiology recommends continuing medical management for NSTEMI and will discuss outpatient TAVR evaluation for severe aortic stenosis.  Initiated IV Lasix diuresis for CHF exacerbation with evidence of volume overload.  Chest x-ray 03/03/2024 with interval worsening with moderate interstitial and alveolar opacities of bilateral lungs consistent with pulmonary edema.  BNP had trended upward and is currently 590-->repeat 505 on 3/6.  Hematology oncology has seen the patient in consultation and felt that leukocytosis is related to Neulasta and acute cardiac event. Patient did receive empiric IV Rocephin for 4 days 2/28-03/02/2024.  No localizing source of infection was identified.  COVID-19 negative (x2), influenza a/B negative, bcx were negative, urinalysis negative, lactic acid level 1.0, procalcitonin level 0.14.  CTA of chest 2/24/24 with no evidence of PE, pulmonary fibrosis versus CHF findings with recommendation for follow-up imaging.  Repeat chest x-ray 03/06/2024 with diffuse pulmonary infiltrates seen throughout lungs concerning for interstitial edema or interstitial pneumonia, no pleural effusions appreciated.  CT scan of chest without contrast 3/6  with "interval worsening from comparison CT with diffuse multifocal airspace opacification superimposed upon interstitial thickening, considerations are " "multifocal pneumonia, pulmonary edema, pulmonary hemorrhage; new small left pleural effusion." Pulmonary did see the patient in consultation and feels that abnormal CT scan of chest most likely related to fluid however okay with continued IV antibiotics for now.  Continue IV cefepime.  IV vancomycin was discontinued as MRSA culture was negative.  Continue to replace electrolytes.  Patient with episodes of desaturation with hypoxia.  Continue O2 supplementation, continuous pulse oximetry monitoring, frequent incentive spirometry, Xopenex/Atrovent nebs q.6 hours, IV Solu-Medrol 60 mg q.6 hours (steroids started by Pulmonary on 03/08/2024).  IV Lasix diuresis had to be discontinued due to severe aortic stenosis and hypotension.  Will use p.r.n. Lasix and fluid restriction as per Cardiology.    3/12  4 L NC this AM, weaning as able. Continue cefepime, IV steroids. Lasix as needed. Not medically stable for discharge to SNF. Cardiology and Pulmonary following.     3/13  Now increased to 6 L NC.   No signficiant improvement in condition after several days of IV steroids, transitioned to PO  Restart IV diuretics  Discussed with patient and nephew at bedside, overall poor prognosis, DNR status  Plan for hospice when arraignments made    3/14  Overnight events reviewed, accelerated HR and started on amiodarone drip, placed on HFNC  Patient sitting in chair, reports feeling comfortable  Daughter at bedside who is POA, confirms DNR status and discharge plans for hospice  "

## 2024-02-27 NOTE — PT/OT/SLP EVAL
"Physical Therapy Evaluation    Patient Name:  Mark Dickerson Sr.   MRN:  [id number]    Recommendations:     Discharge Recommendations: Low Intensity Therapy   Discharge Equipment Recommendations: wheelchair   Barriers to discharge: None    Assessment:     Mark Dickerson Sr. is a 84 y.o. male admitted with a medical diagnosis of Elevated troponin.  He presents with the following impairments/functional limitations: weakness, impaired endurance, impaired functional mobility, gait instability, impaired balance, decreased coordination, decreased lower extremity function, decreased safety awareness (hard of hearing) which negatively impacts functional status, increases risk of falls, and increases risk of issues related to immobility. Pt participation limited by fatigue and hearing impairment, pt impulsive at times but no s/s of distress. Pt has good. Pt will benefit from continued PT services at low intensity in order to return to baseline.    Rehab Prognosis: Good; patient would benefit from acute skilled PT services to address these deficits and reach maximum level of function.    Recent Surgery: Procedure(s) (LRB):  Left heart cath (Left)      Plan:     During this hospitalization, patient to be seen 3 x/week to address the identified rehab impairments via therapeutic activities, gait training, therapeutic exercises, neuromuscular re-education and progress toward the following goals:    Plan of Care Expires:  03/12/24    Subjective     Chief Complaint: no major complaints  Patient/Family Comments/goals: to go home/get better "I need a wheelchair"  Pain/Comfort:  Pain Rating 1: 0/10  Pain Rating Post-Intervention 1: 0/10    Patients cultural, spiritual, Church conflicts given the current situation: no    Living Environment:  Pt lives alone in mobile home with 5-6 steps at entry, bilateral rails presents  Prior to admission, patients level of function was independent with ADLs, requires assist intermittently. " Ambulatory primarily in home, limited to no community distances.  Equipment used at home: cane, straight, grab bars.  DME owned (not currently used): none.  Upon discharge, patient will have assistance from family.    Objective:     Communicated with nursing (Mike) and performed chart review via epic system prior to session.  Patient found up in chair with peripheral IV, telemetry  upon PT entry to room. Family at bedside, (grand daughter x 2, son-in law, daughter in law x 2, son and daughter)    General Precautions: Standard, fall  Orthopedic Precautions:N/A   Braces: N/A  Respiratory Status: Nasal cannula 2L O2    Exams:  Cognitive Exam:  Patient is oriented to Person, Place, Time, and Situation  Gross Motor Coordination:  WFL  Postural Exam:  Patient presented with the following abnormalities:    -       Rounded shoulders  -       Forward head  RLE ROM: WFL  RLE Strength: WFL  LLE ROM: WFL  LLE Strength: WFL    Functional Mobility:  Transfers:     Sit to Stand:  contact guard assistance with rolling walker  Bed to Chair: minimum assistance with  rolling walker  using  Stand Pivot  Gait: *gait belt donned prior to OOB mobility* 40 ft x 2 CGA with RW, demonstrated slow pace, flexed posture, wide SAMI, cues for RW management, directions and line management. Supplemental O2  Balance: fair dynamic standing balance      AM-PAC 6 CLICK MOBILITY  Total Score:16       Treatment & Education:  Educated pt on benefits of consistent participation in PT services to meet functional goals. Educated pt on seated therex to promote strength, circulation and joint mobility. Exercises included AP, LAQ, marching. Educated to perform exercises intermittently throughout day to tolerance. Educated pt on importance of sitting OOB to promote endurance and overall activity tolerance. Educated pt on call don't fall policy and use of call button to alert nursing staff of needs (including to assist with returning back to bed). Pt and family  expressed understanding.     Patient left supine with all lines intact, call button in reach, chair alarm on, nursing notified, and family present.    GOALS:   Multidisciplinary Problems       Physical Therapy Goals          Problem: Physical Therapy    Goal Priority Disciplines Outcome Goal Variances Interventions   Physical Therapy Goal     PT, PT/OT      Description: Pt will perform bed mobility with SPV in order to participate in EOB activity.  Pt will perform transfers with SPV in order to participate in OOB activity.  Pt will ambulate 150 ft SBA with LRAD in order to participate in daily tasks.                        History:     Past Medical History:   Diagnosis Date    Chest pain 2/26/2024    CKD stage 3 due to type 2 diabetes mellitus 2/18/2021    DM (diabetes mellitus) 2014    BS didn't check 12/11/2019    DM (diabetes mellitus) 2014    BS didn't check 05/05/2022    Foreign body, eye     right eye    Hyperlipidemia     Hypertension     Need for shingles vaccine 11/20/2019    Pneumonia     Primary osteoarthritis of right knee 10/29/2021    Severe obesity (BMI 35.0-35.9 with comorbidity) 7/10/2019    Type 2 diabetes mellitus 2014    BS didn't check 12/12/2018       Past Surgical History:   Procedure Laterality Date    APPENDECTOMY      CATARACT EXTRACTION Bilateral     CPG    ENDOSCOPIC ULTRASOUND OF UPPER GASTROINTESTINAL TRACT N/A 1/11/2024    Procedure: ULTRASOUND, UPPER GI TRACT, ENDOSCOPIC;  Surgeon: Evens Monsalve MD;  Location: [facility] ENDO ([address] [place] [address]);  Service: Endoscopy;  Laterality: N/A;    FLUOROSCOPY N/A 1/5/2024    Procedure: Fluoroscopy/MEDIPORT PLACEMENT;  Surgeon: Jaret Lu MD;  Location: [facility] CATH LAB;  Service: General;  Laterality: N/A;       Time Tracking:     PT Received On: 02/27/24  PT Start Time: 1455     PT Stop Time: 1520  PT Total Time (min): 25 min     Billable Minutes: Evaluation 10 and Gait Training 15      02/27/2024

## 2024-02-27 NOTE — HOSPITAL COURSE
Mr. Dickerson is an 84 year old male patient whose current medical conditions include metastatic breast cancer (on chemo), HTN, hyperlipidemia, DM type II, and CKD who presented to [facility] ED this AM due to AMS/confusion. Patient's DIL reported patient seemed disoriented and had jumbled speech when she visited him earlier this AM. He returned to baseline prior to ED arrival. He denied any associated SOB, chest pain, palpitations, near syncope, or syncope. Initial workup in ED revealed leukocytosis (WBC 14,000), hyponatremia (Na 130), hypomagnesemia (1.5), and elevated troponin (0.725>0.735) and patient was subsequently admitted for further evaluation and treatment. Cardiology consulted to assist with management. Patient seen and examined today, resting in bed. Remains chest pain free. No other CV complaints. States he felt dehydrated earlier today. No prior CV history. He reports compliance with his medications. EKG reviewed, SR with T wave inversions inferiorly and anteriorly. Limited echo and repeat troponin pending.    3/1/24-Patient seen and examined today, lying in bed. Stable overnight. No CV complaints. CP free. R/LHC today by Dr. Aguillon.    3/2/24 - s/p R/LHC - with elevated filling pressures,  RCA with collaterals - will cont med management and discuss outpt TAVR eval for severe AS    3/5/24-Patient seen and examined today, sitting up in bedside chair. Feels ok. Still with SOB/BLE edema. No CP. Heme/onc note reviewed. Needs diuresis. BP soft, BB d/c'd to allow diuresis. Discussed OP evaluation for TAVR/ballon valvuloplasty.    3/6/24-Patient seen and examined today, sitting up in bedside chair. Doing ok. SOB stable. Diuresed well overnight. BNP still elevated. CT of chest pending.    3/7/24-Patient seen and examined today, resting in bedside chair. Still SOB, especially with exertion. CT of chest reviewed-interval worsening from comparison CT with diffuse multifocal airspace opacification superimposed upon  interstitial thickening. Considerations are multifocal pneumonia, pulmonary edema, pulmonary hemorrhage. Pulmonary following, being covered with abx. Diuretics continued---limited with higher dose/aggressive diuresis due to severe AS/hypotension.    03/08/24 Cr stable and BNP remains at 500 improved volume status. Held lasix today due to soft BP. Breathing Rx started by pulm. For possible pneumonitis.     03/09/24 leg swelling improved, + PND. Continue Rx for pneumonitis per pulm service, no arrhythmia    3/10/24 F/U WITH PULM FOR CHEST CT FINDING, showed Interval worsening from comparison CT with diffuse multifocal airspace opacification superimposed upon interstitial thickening.  Considerations are multifocal pneumonia, pulmonary edema, pulmonary hemorrhage. New small left pleural effusion.  On NC O2.   Metastatic breast ca Rx on hold due to severe AS.    3/13/24-Cardiology asked to re-evaluate patient. Worsening SOB/hypoxia overnight (reportedly pulled off his oxygen). Feels ok this AM. No CP. Coughed up some blood-tinged/green sputum during exam. Diuretics re-initiated. Troponin stable. BNP in similar range as admission. CXR reviewed. Pulmonary also following.

## 2024-02-28 PROBLEM — R65.10 SIRS (SYSTEMIC INFLAMMATORY RESPONSE SYNDROME): Status: ACTIVE | Noted: 2024-02-26

## 2024-02-28 LAB
ANION GAP SERPL CALC-SCNC: 11 MMOL/L (ref 8–16)
ANISOCYTOSIS BLD QL SMEAR: SLIGHT
APTT PPP: 47.4 SEC (ref 21–32)
APTT PPP: 55.5 SEC (ref 21–32)
BASOPHILS # BLD AUTO: ABNORMAL K/UL (ref 0–0.2)
BASOPHILS NFR BLD: 0 % (ref 0–1.9)
BILIRUB UR QL STRIP: NEGATIVE
BNP SERPL-MCNC: 312 PG/ML (ref 0–99)
BUN SERPL-MCNC: 15 MG/DL (ref 8–23)
CALCIUM SERPL-MCNC: 8.5 MG/DL (ref 8.7–10.5)
CHLORIDE SERPL-SCNC: 97 MMOL/L (ref 95–110)
CLARITY UR: CLEAR
CO2 SERPL-SCNC: 24 MMOL/L (ref 23–29)
COLOR UR: YELLOW
CREAT SERPL-MCNC: 1 MG/DL (ref 0.5–1.4)
DACRYOCYTES BLD QL SMEAR: ABNORMAL
DIFFERENTIAL METHOD BLD: ABNORMAL
EOSINOPHIL # BLD AUTO: ABNORMAL K/UL (ref 0–0.5)
EOSINOPHIL NFR BLD: 0 % (ref 0–8)
ERYTHROCYTE [DISTWIDTH] IN BLOOD BY AUTOMATED COUNT: 15.6 % (ref 11.5–14.5)
EST. GFR  (NO RACE VARIABLE): >60 ML/MIN/1.73 M^2
GLUCOSE SERPL-MCNC: 150 MG/DL (ref 70–110)
GLUCOSE UR QL STRIP: NEGATIVE
HCT VFR BLD AUTO: 30.6 % (ref 40–54)
HGB BLD-MCNC: 10.2 G/DL (ref 14–18)
HGB UR QL STRIP: NEGATIVE
IMM GRANULOCYTES # BLD AUTO: ABNORMAL K/UL (ref 0–0.04)
IMM GRANULOCYTES NFR BLD AUTO: ABNORMAL % (ref 0–0.5)
KETONES UR QL STRIP: NEGATIVE
LEUKOCYTE ESTERASE UR QL STRIP: NEGATIVE
LYMPHOCYTES # BLD AUTO: ABNORMAL K/UL (ref 1–4.8)
LYMPHOCYTES NFR BLD: 19 % (ref 18–48)
MAGNESIUM SERPL-MCNC: 1.7 MG/DL (ref 1.6–2.6)
MCH RBC QN AUTO: 29.5 PG (ref 27–31)
MCHC RBC AUTO-ENTMCNC: 33.3 G/DL (ref 32–36)
MCV RBC AUTO: 88 FL (ref 82–98)
MONOCYTES # BLD AUTO: ABNORMAL K/UL (ref 0.3–1)
MONOCYTES NFR BLD: 14 % (ref 4–15)
NEUTROPHILS NFR BLD: 67 % (ref 38–73)
NITRITE UR QL STRIP: NEGATIVE
NRBC BLD-RTO: 0 /100 WBC
OHS QRS DURATION: 106 MS
OHS QTC CALCULATION: 471 MS
PH UR STRIP: 6 [PH] (ref 5–8)
PHOSPHATE SERPL-MCNC: 2.5 MG/DL (ref 2.7–4.5)
PLATELET # BLD AUTO: 293 K/UL (ref 150–450)
PLATELET BLD QL SMEAR: ABNORMAL
PMV BLD AUTO: 10.9 FL (ref 9.2–12.9)
POCT GLUCOSE: 167 MG/DL (ref 70–110)
POCT GLUCOSE: 234 MG/DL (ref 70–110)
POTASSIUM SERPL-SCNC: 3.3 MMOL/L (ref 3.5–5.1)
PROT UR QL STRIP: NEGATIVE
RBC # BLD AUTO: 3.46 M/UL (ref 4.6–6.2)
SCHISTOCYTES BLD QL SMEAR: PRESENT
SODIUM SERPL-SCNC: 132 MMOL/L (ref 136–145)
SP GR UR STRIP: 1.01 (ref 1–1.03)
URN SPEC COLLECT METH UR: NORMAL
UROBILINOGEN UR STRIP-ACNC: NEGATIVE EU/DL
WBC # BLD AUTO: 34.56 K/UL (ref 3.9–12.7)

## 2024-02-28 PROCEDURE — 97530 THERAPEUTIC ACTIVITIES: CPT | Mod: HCNC

## 2024-02-28 PROCEDURE — 84100 ASSAY OF PHOSPHORUS: CPT | Mod: HCNC | Performed by: NURSE PRACTITIONER

## 2024-02-28 PROCEDURE — 36415 COLL VENOUS BLD VENIPUNCTURE: CPT | Mod: HCNC | Performed by: HOSPITALIST

## 2024-02-28 PROCEDURE — 85730 THROMBOPLASTIN TIME PARTIAL: CPT | Mod: 91,HCNC | Performed by: HOSPITALIST

## 2024-02-28 PROCEDURE — 25000003 PHARM REV CODE 250: Mod: HCNC | Performed by: INTERNAL MEDICINE

## 2024-02-28 PROCEDURE — 84145 PROCALCITONIN (PCT): CPT | Mod: HCNC | Performed by: HOSPITALIST

## 2024-02-28 PROCEDURE — 85007 BL SMEAR W/DIFF WBC COUNT: CPT | Mod: HCNC | Performed by: NURSE PRACTITIONER

## 2024-02-28 PROCEDURE — 85730 THROMBOPLASTIN TIME PARTIAL: CPT | Mod: HCNC | Performed by: FAMILY MEDICINE

## 2024-02-28 PROCEDURE — 63600175 PHARM REV CODE 636 W HCPCS: Mod: HCNC | Performed by: INTERNAL MEDICINE

## 2024-02-28 PROCEDURE — 21400001 HC TELEMETRY ROOM: Mod: HCNC

## 2024-02-28 PROCEDURE — 63600175 PHARM REV CODE 636 W HCPCS: Mod: HCNC | Performed by: NURSE PRACTITIONER

## 2024-02-28 PROCEDURE — 25000003 PHARM REV CODE 250: Mod: HCNC | Performed by: HOSPITALIST

## 2024-02-28 PROCEDURE — 81003 URINALYSIS AUTO W/O SCOPE: CPT | Mod: HCNC | Performed by: HOSPITALIST

## 2024-02-28 PROCEDURE — 63600175 PHARM REV CODE 636 W HCPCS: Mod: HCNC | Performed by: HOSPITALIST

## 2024-02-28 PROCEDURE — 36415 COLL VENOUS BLD VENIPUNCTURE: CPT | Mod: HCNC,XB | Performed by: FAMILY MEDICINE

## 2024-02-28 PROCEDURE — 25000003 PHARM REV CODE 250: Mod: HCNC | Performed by: NURSE PRACTITIONER

## 2024-02-28 PROCEDURE — 99233 SBSQ HOSP IP/OBS HIGH 50: CPT | Mod: HCNC,,, | Performed by: INTERNAL MEDICINE

## 2024-02-28 PROCEDURE — 97116 GAIT TRAINING THERAPY: CPT | Mod: HCNC

## 2024-02-28 PROCEDURE — 80048 BASIC METABOLIC PNL TOTAL CA: CPT | Mod: HCNC | Performed by: NURSE PRACTITIONER

## 2024-02-28 PROCEDURE — 83735 ASSAY OF MAGNESIUM: CPT | Mod: HCNC | Performed by: NURSE PRACTITIONER

## 2024-02-28 PROCEDURE — 85027 COMPLETE CBC AUTOMATED: CPT | Mod: HCNC | Performed by: NURSE PRACTITIONER

## 2024-02-28 PROCEDURE — 83880 ASSAY OF NATRIURETIC PEPTIDE: CPT | Mod: HCNC | Performed by: INTERNAL MEDICINE

## 2024-02-28 RX ORDER — LANOLIN ALCOHOL/MO/W.PET/CERES
400 CREAM (GRAM) TOPICAL DAILY
Status: DISCONTINUED | OUTPATIENT
Start: 2024-02-28 | End: 2024-03-14 | Stop reason: HOSPADM

## 2024-02-28 RX ORDER — CYCLOBENZAPRINE HCL 10 MG
10 TABLET ORAL 3 TIMES DAILY PRN
Status: DISCONTINUED | OUTPATIENT
Start: 2024-02-28 | End: 2024-03-14 | Stop reason: HOSPADM

## 2024-02-28 RX ORDER — ASPIRIN 81 MG/1
81 TABLET ORAL DAILY
Status: DISCONTINUED | OUTPATIENT
Start: 2024-02-28 | End: 2024-03-03

## 2024-02-28 RX ORDER — MIDODRINE HYDROCHLORIDE 5 MG/1
5 TABLET ORAL
Status: DISCONTINUED | OUTPATIENT
Start: 2024-02-28 | End: 2024-02-29

## 2024-02-28 RX ORDER — FUROSEMIDE 10 MG/ML
20 INJECTION INTRAMUSCULAR; INTRAVENOUS 2 TIMES DAILY
Status: DISCONTINUED | OUTPATIENT
Start: 2024-02-28 | End: 2024-03-01

## 2024-02-28 RX ORDER — METOPROLOL TARTRATE 25 MG/1
25 TABLET, FILM COATED ORAL 2 TIMES DAILY
Status: DISCONTINUED | OUTPATIENT
Start: 2024-02-28 | End: 2024-03-05

## 2024-02-28 RX ORDER — FUROSEMIDE 10 MG/ML
40 INJECTION INTRAMUSCULAR; INTRAVENOUS ONCE
Status: COMPLETED | OUTPATIENT
Start: 2024-02-28 | End: 2024-02-28

## 2024-02-28 RX ORDER — POTASSIUM CHLORIDE 20 MEQ/1
20 TABLET, EXTENDED RELEASE ORAL 2 TIMES DAILY
Status: DISCONTINUED | OUTPATIENT
Start: 2024-02-28 | End: 2024-03-03

## 2024-02-28 RX ORDER — MUPIROCIN 20 MG/G
OINTMENT TOPICAL 2 TIMES DAILY
Status: DISPENSED | OUTPATIENT
Start: 2024-02-28 | End: 2024-03-04

## 2024-02-28 RX ADMIN — ACETAMINOPHEN 650 MG: 325 TABLET ORAL at 08:02

## 2024-02-28 RX ADMIN — CEFTRIAXONE 1 G: 1 INJECTION, POWDER, FOR SOLUTION INTRAMUSCULAR; INTRAVENOUS at 10:02

## 2024-02-28 RX ADMIN — METOPROLOL TARTRATE 25 MG: 25 TABLET, FILM COATED ORAL at 08:02

## 2024-02-28 RX ADMIN — INSULIN ASPART 2 UNITS: 100 INJECTION, SOLUTION INTRAVENOUS; SUBCUTANEOUS at 12:02

## 2024-02-28 RX ADMIN — HEPARIN SODIUM 12 UNITS/KG/HR: 10000 INJECTION, SOLUTION INTRAVENOUS at 08:02

## 2024-02-28 RX ADMIN — POTASSIUM CHLORIDE 20 MEQ: 1500 TABLET, EXTENDED RELEASE ORAL at 08:02

## 2024-02-28 RX ADMIN — FUROSEMIDE 40 MG: 10 INJECTION, SOLUTION INTRAMUSCULAR; INTRAVENOUS at 08:02

## 2024-02-28 RX ADMIN — Medication 400 MG: at 08:02

## 2024-02-28 RX ADMIN — HYDROCODONE BITARTRATE AND ACETAMINOPHEN 1 TABLET: 5; 325 TABLET ORAL at 01:02

## 2024-02-28 RX ADMIN — CYCLOBENZAPRINE HYDROCHLORIDE 10 MG: 10 TABLET, FILM COATED ORAL at 10:02

## 2024-02-28 RX ADMIN — ANASTROZOLE 1 MG: 1 TABLET, COATED ORAL at 08:02

## 2024-02-28 RX ADMIN — ACETAMINOPHEN 650 MG: 325 TABLET ORAL at 10:02

## 2024-02-28 RX ADMIN — MUPIROCIN: 20 OINTMENT TOPICAL at 08:02

## 2024-02-28 RX ADMIN — ASPIRIN 81 MG: 81 TABLET, COATED ORAL at 08:02

## 2024-02-28 RX ADMIN — ATORVASTATIN CALCIUM 40 MG: 40 TABLET, FILM COATED ORAL at 08:02

## 2024-02-28 RX ADMIN — MIDODRINE HYDROCHLORIDE 5 MG: 5 TABLET ORAL at 06:02

## 2024-02-28 NOTE — ASSESSMENT & PLAN NOTE
No prior hx of CAD  Troponin 0.7 --> 1.3 --> 2.8  EKG with SR PACS and ST-Twave abnormalities  Cardiology following  On heparin drip  TTE as below  Plans for [facility] pending    Echo    Result Date: 2/26/2024    Left Ventricle: The left ventricle is normal in size. There is   concentric remodeling. There is normal systolic function with a visually   estimated ejection fraction of 55 - 60%. There is indeterminate diastolic   function.    Right Ventricle: Right ventricle was not well visualized due to poor   acoustic window. Normal right ventricular cavity size. Systolic function   is normal.    Pulmonary Artery: The estimated pulmonary artery systolic pressure is   37 mmHg.    IVC/SVC: Normal venous pressure at 3 mmHg.

## 2024-02-28 NOTE — PLAN OF CARE
TX COMPLETED: facilitated transfers with CGA, ambulated 10 ft x 2 min A with HHA. Cues for safety and pacing, pt impulsive. Cont POC

## 2024-02-28 NOTE — PLAN OF CARE
A225/A225 JOHANNY Dickerson . is a 84 y.o.male admitted on 2/26/2024 for NSTEMI (non-ST elevated myocardial infarction)   Code Status: DNR MRN: [id number]   Review of patient's allergies indicates:   Allergen Reactions    Grass pollen-anai grass standard      Past Medical History:   Diagnosis Date    Chest pain 2/26/2024    CKD stage 3 due to type 2 diabetes mellitus 2/18/2021    DM (diabetes mellitus) 2014    BS didn't check 12/11/2019    DM (diabetes mellitus) 2014    BS didn't check 05/05/2022    Foreign body, eye     right eye    Hyperlipidemia     Hypertension     Need for shingles vaccine 11/20/2019    NSTEMI (non-ST elevated myocardial infarction) 2/27/2024    Pneumonia     Primary osteoarthritis of right knee 10/29/2021    Severe obesity (BMI 35.0-35.9 with comorbidity) 7/10/2019    Type 2 diabetes mellitus 2014    BS didn't check 12/12/2018      PRN meds    acetaminophen, 650 mg, Q4H PRN  dextrose 10%, 12.5 g, PRN  dextrose 10%, 25 g, PRN  glucagon (human recombinant), 1 mg, PRN  glucose, 16 g, PRN  glucose, 24 g, PRN  heparin (PORCINE), 45.3 Units/kg (Adjusted), PRN  heparin (PORCINE), 30 Units/kg (Adjusted), PRN  HYDROcodone-acetaminophen, 1 tablet, Q6H PRN  insulin aspart U-100, 0-5 Units, QID (AC + HS) PRN  melatonin, 6 mg, Nightly PRN  naloxone, 0.02 mg, PRN  ondansetron, 4 mg, Q8H PRN  sodium chloride 0.9%, 10 mL, Q12H PRN      Awaiting stool sample; patient and night shift nurse aware. Chart check completed. Will continue plan of care.      Orientation: oriented x 4  [place] Coma Scale Score: 15     Lead Monitored: Lead II Rhythm: normal sinus rhythm Frequency/Ectopy: PVCs  Cardiac/Telemetry Box Number: [id number]  VTE Required Core Measure: Pharmacological prophylaxis initiated/maintained Last Bowel Movement: 02/25/24  Diet Cardiac Standard Tray  Diet NPO Except for: Ice Chips, Sips with Medication     Sunil Score: 17  Fall Risk Score: 11  Accucheck [x]   Freq?      Lines/Drains/Airways       Central  Venous Catheter Line  Duration                  PowerPort A Cath Single Lumen 01/05/24 1415 Internal Jugular Right 53 days              Peripheral Intravenous Line  Duration                  Peripheral IV - Single Lumen 02/27/24 0805 22 G Anterior;Distal;Left Forearm <1 day                       Problem: Adult Inpatient Plan of Care  Goal: Plan of Care Review  Outcome: Ongoing, Progressing  Goal: Patient-Specific Goal (Individualized)  Outcome: Ongoing, Progressing  Goal: Absence of Hospital-Acquired Illness or Injury  Outcome: Ongoing, Progressing  Goal: Optimal Comfort and Wellbeing  Outcome: Ongoing, Progressing  Goal: Readiness for Transition of Care  Outcome: Ongoing, Progressing

## 2024-02-28 NOTE — NURSING
Patient's HR noted to be in the 130s bpm. MD notified. Blood pressure reassessed per MD request; BP stable.

## 2024-02-28 NOTE — PROGRESS NOTES
[facility] - Telemetry ([facility])  Cardiology  Progress Note    Patient Name: Mark Dickerson Sr.  MRN: [id number]  Admission Date: 2/26/2024  Hospital Length of Stay: 1 days  Code Status: DNR   Attending Physician: Judah Oden MD   Primary Care Physician: Tatyana Cannon MD  Expected Discharge Date:   Principal Problem:NSTEMI (non-ST elevated myocardial infarction)    Subjective:   HPI:  Mr. Dickerson is an 84 year old male patient whose current medical conditions include metastatic breast cancer (on chemo), HTN, hyperlipidemia, DM type II, and CKD who presented to [facility] ED this AM due to AMS/confusion. Patient's DIL reported patient seemed disoriented and had jumbled speech when she visited him earlier this AM. He returned to baseline prior to ED arrival. He denied any associated SOB, chest pain, palpitations, near syncope, or syncope. Initial workup in ED revealed leukocytosis (WBC 14,000), hyponatremia (Na 130), hypomagnesemia (1.5), and elevated troponin (0.725>0.735) and patient was subsequently admitted for further evaluation and treatment. Cardiology consulted to assist with management. Patient seen and examined today, resting in bed. Remains chest pain free. No other CV complaints. States he felt dehydrated earlier today. No prior CV history. He reports compliance with his medications. EKG reviewed, SR with T wave inversions inferiorly and anteriorly. Limited echo and repeat troponin pending. CT of head NAF     Hospital Course:   2/27/24-Patient seen and examined today, sitting up in bed. Appears more SOB/weak. No CP symptoms. WBC bumped to 26,000. CXR with bilateral infiltrates/worsening appearance. IV Lasix given and BC drawn. Troponin bumped to 1.293. [facility] initially planned today but cancelled due to lab abnormalities, CXR, and patient's clinical status.    2/28/24-Patient seen and examined today, sitting up in bedside chair. Complains of chills/weakness. WBC up to 34,000. CXR showed bilateral opacities. Being  diuresed. K 3.3, being repleted. [facility] deferred for now.        Review of Systems   Constitutional: Positive for chills and malaise/fatigue.   HENT: Negative.     Eyes: Negative.    Cardiovascular:  Positive for dyspnea on exertion.   Respiratory:  Positive for shortness of breath.    Endocrine: Negative.    Hematologic/Lymphatic: Negative.    Skin: Negative.    Musculoskeletal: Negative.    Gastrointestinal: Negative.    Genitourinary: Negative.    Neurological: Negative.    Psychiatric/Behavioral: Negative.     Allergic/Immunologic: Negative.      Objective:     Vital Signs (Most Recent):  Temp: 98.1 °F (36.7 °C) (02/28/24 1151)  Pulse: 109 (02/28/24 1151)  Resp: 18 (02/28/24 1343)  BP: (!) 107/58 (02/28/24 1151)  SpO2: 97 % (02/28/24 1151) Vital Signs (24h Range):  Temp:  [97.3 °F (36.3 °C)-99.1 °F (37.3 °C)] 98.1 °F (36.7 °C)  Pulse:  [] 109  Resp:  [16-18] 18  SpO2:  [93 %-97 %] 97 %  BP: ()/(56-65) 107/58     Weight: 111.3 kg (245 lb 4.8 oz)  Body mass index is 35.2 kg/m².     SpO2: 97 %         Intake/Output Summary (Last 24 hours) at 2/28/2024 1528  Last data filed at 2/27/2024 2007  Gross per 24 hour   Intake 505.93 ml   Output --   Net 505.93 ml       Lines/Drains/Airways       Central Venous Catheter Line  Duration                  PowerPort A Cath Single Lumen 01/05/24 1415 Internal Jugular Right 54 days              Peripheral Intravenous Line  Duration                  Peripheral IV - Single Lumen 02/27/24 0805 22 G Anterior;Distal;Left Forearm 1 day                       Physical Exam  Vitals and nursing note reviewed.   Constitutional:       General: He is not in acute distress.     Appearance: He is well-developed. He is ill-appearing. He is not diaphoretic.      Comments: On supplemental O2 via NC   HENT:      Head: Normocephalic and atraumatic.   Eyes:      General:         Right eye: No discharge.         Left eye: No discharge.      Pupils: Pupils are equal, round, and reactive to  light.   Cardiovascular:      Rate and Rhythm: Normal rate and regular rhythm.      Heart sounds: Normal heart sounds, S1 normal and S2 normal. No murmur heard.     No S4 sounds.   Pulmonary:      Effort: Pulmonary effort is normal. No respiratory distress.      Breath sounds: Rhonchi and rales present. No wheezing.   Abdominal:      General: There is no distension.      Tenderness: There is no rebound.   Musculoskeletal:      Right lower leg: No edema.      Left lower leg: No edema.   Skin:     General: Skin is warm and dry.      Findings: No erythema.   Neurological:      General: No focal deficit present.      Mental Status: He is alert and oriented to person, place, and time.   Psychiatric:         Mood and Affect: Mood normal.         Behavior: Behavior normal.            Significant Labs: CMP   Recent Labs   Lab 02/27/24  0420 02/27/24 1856 02/28/24  0535   * 131* 132*   K 3.8 3.5 3.3*   CL 97 96 97   CO2 23 23 24   * 211* 150*   BUN 11 12 15   CREATININE 0.9 1.1 1.0   CALCIUM 8.8 9.2 8.5*   PROT  --  6.6  --    ALBUMIN  --  2.9*  --    BILITOT  --  0.5  --    ALKPHOS  --  97  --    AST  --  57*  --    ALT  --  34  --    ANIONGAP 9 12 11   , CBC   Recent Labs   Lab 02/27/24 0420 02/27/24 1856 02/28/24  0535   WBC 26.05* 32.33* 34.56*   HGB 10.4* 10.9* 10.2*   HCT 30.1* 31.7* 30.6*    288 293   , Troponin   Recent Labs   Lab 02/27/24  0420 02/27/24  1630 02/27/24 1856   TROPONINI 1.293* 2.789* 2.601*   , and All pertinent lab results from the last 24 hours have been reviewed.    Significant Imaging: Echocardiogram: Transthoracic echo (TTE) complete (Cupid Only):   Results for orders placed or performed during the hospital encounter of 02/26/24   Echo   Result Value Ref Range    LVIDd 4.88 3.5 - 6.0 cm    LV Systolic Volume 52.86 mL    LVIDs 3.56 2.1 - 4.0 cm    LV Diastolic Volume 111.86 mL    IVS 1.04 0.6 - 1.1 cm    FS 27 (A) 28 - 44 %    Left Ventricle Relative Wall Thickness 0.44 cm     Posterior Wall 1.08 0.6 - 1.1 cm    LV mass 189.29 g    TR Max Can 2.91 m/s    RVDD 2.81 cm    LA size 4.18 cm    Triscuspid Valve Regurgitation Peak Gradient 34 mmHg    IVC diameter 2.08 cm    LV Systolic Volume Index 23.2 mL/m2    LV Diastolic Volume Index 49.06 mL/m2    LV Mass Index 83 g/m2    ZLVIDS -2.98     ZLVIDD -5.65     TV resting pulmonary artery pressure 37 mmHg    RV TB RVSP 6 mmHg    Est. RA pres 3 mmHg    Narrative      Left Ventricle: The left ventricle is normal in size. There is   concentric remodeling. There is normal systolic function with a visually   estimated ejection fraction of 55 - 60%. There is indeterminate diastolic   function.    Right Ventricle: Right ventricle was not well visualized due to poor   acoustic window. Normal right ventricular cavity size. Systolic function   is normal.    Pulmonary Artery: The estimated pulmonary artery systolic pressure is   37 mmHg.    IVC/SVC: Normal venous pressure at 3 mmHg.     , EKG: Reviewed, and X-Ray: CXR: X-Ray Chest 1 View (CXR):   Results for orders placed or performed during the hospital encounter of 02/26/24   X-Ray Chest 1 View    Narrative    EXAMINATION:  XR CHEST 1 VIEW    CLINICAL HISTORY:  SOB, elevated WBC;.    TECHNIQUE:  Single frontal portable view of the chest was performed.    COMPARISON:  02/26/2024    FINDINGS:  Support devices: Right IJ MediPort unchanged.    Ground-glass and peripheral linear reticular interstitial opacification similar to recent prior exams, nonspecific.  Heart normal size.  No pneumothorax or pleural effusion.      Impression    As above.      Electronically signed by: Spenser Stallworth  Date:    02/28/2024  Time:    11:07    and X-Ray Chest PA and Lateral (CXR): No results found for this visit on 02/26/24.  Assessment and Plan:   Patient who presents with weakness/NSTEMI. Now with worsening leukocytosis/chills. BC NGTD. CXR still with bilateral opacities, being diuresed. Continue OMT. BB added. [facility] deferred  for now, will reassess in AM.    * NSTEMI (non-ST elevated myocardial infarction)  -See plan under elevated troponin    Abnormal EKG  -See plan under elevated troponin    Leukocytosis  -Mgmt as per primary team  -BC pending      Hypomagnesemia  -Repletion as per primary team    Hypokalemia  -Repletion as per primary team      Elevated troponin  -Troponin 0.725>0.735, continue to trend  -No CP symptoms/SOB/angina  -No prior CV history  -Continue ASA, heparin gtt, statin  -EKG reviewed, T wave inversions inferiorly and laterally  -Limited TTE pending  -Will consider ischemic workup with MPI stress test this admission    2/27/24  -Troponin bumped to 1.2 overnight  -Remains CP free  -Continue ASA, heparin gtt, statin  -TTE with normal EF  -LHC planned once more stable respiratory wise/infection ruled out    2/28/24  -CP free  -Continue ASA, heparin gtt, statin  -BB added  -LHC deferred for now given leukocytosis/SOB    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, estrogen receptor positive  -Mgmt as per heme/onc    CKD stage 3 due to type 2 diabetes mellitus  -Monitor        VTE Risk Mitigation (From admission, onward)           Ordered     heparin 25,000 units in dextrose 5% (100 units/ml) IV bolus from bag LOW INTENSITY nomogram - [facility]  As needed (PRN)        Question:  Heparin Infusion Adjustment (DO NOT MODIFY ANSWER)  Answer:  \[email or web address]\epic\Images\Pharmacy\HeparinInfusions\heparin LOW INTENSITY nomogram for [facility] [id number]    02/26/24 1404     heparin 25,000 units in dextrose 5% (100 units/ml) IV bolus from bag LOW INTENSITY nomogram - [facility]  As needed (PRN)        Question:  Heparin Infusion Adjustment (DO NOT MODIFY ANSWER)  Answer:  \\[email or web address]\epic\Images\Pharmacy\HeparinInfusions\heparin LOW INTENSITY nomogram for [facility] [id number]    02/26/24 1404     heparin 25,000 units in dextrose 5% 250 mL (100 units/mL) infusion LOW INTENSITY nomogram - [facility]  Continuous        Question:   Begin at (units/kg/hr)  Answer:  12    02/26/24 1404     IP VTE HIGH RISK PATIENT  Once         02/26/24 1204     Place sequential compression device  Until discontinued         02/26/24 1204                    Sandy Rodriguez PA-C  Cardiology  [facility] - Telemetry ([facility])

## 2024-02-28 NOTE — SUBJECTIVE & OBJECTIVE
Review of Systems   Constitutional: Positive for chills and malaise/fatigue.   HENT: Negative.     Eyes: Negative.    Cardiovascular:  Positive for dyspnea on exertion.   Respiratory:  Positive for shortness of breath.    Endocrine: Negative.    Hematologic/Lymphatic: Negative.    Skin: Negative.    Musculoskeletal: Negative.    Gastrointestinal: Negative.    Genitourinary: Negative.    Neurological: Negative.    Psychiatric/Behavioral: Negative.     Allergic/Immunologic: Negative.      Objective:     Vital Signs (Most Recent):  Temp: 98.1 °F (36.7 °C) (02/28/24 1151)  Pulse: 109 (02/28/24 1151)  Resp: 18 (02/28/24 1343)  BP: (!) 107/58 (02/28/24 1151)  SpO2: 97 % (02/28/24 1151) Vital Signs (24h Range):  Temp:  [97.3 °F (36.3 °C)-99.1 °F (37.3 °C)] 98.1 °F (36.7 °C)  Pulse:  [] 109  Resp:  [16-18] 18  SpO2:  [93 %-97 %] 97 %  BP: ()/(56-65) 107/58     Weight: 111.3 kg (245 lb 4.8 oz)  Body mass index is 35.2 kg/m².     SpO2: 97 %         Intake/Output Summary (Last 24 hours) at 2/28/2024 1528  Last data filed at 2/27/2024 2007  Gross per 24 hour   Intake 505.93 ml   Output --   Net 505.93 ml       Lines/Drains/Airways       Central Venous Catheter Line  Duration                  PowerPort A Cath Single Lumen 01/05/24 1415 Internal Jugular Right 54 days              Peripheral Intravenous Line  Duration                  Peripheral IV - Single Lumen 02/27/24 0805 22 G Anterior;Distal;Left Forearm 1 day                       Physical Exam  Vitals and nursing note reviewed.   Constitutional:       General: He is not in acute distress.     Appearance: He is well-developed. He is ill-appearing. He is not diaphoretic.      Comments: On supplemental O2 via NC   HENT:      Head: Normocephalic and atraumatic.   Eyes:      General:         Right eye: No discharge.         Left eye: No discharge.      Pupils: Pupils are equal, round, and reactive to light.   Cardiovascular:      Rate and Rhythm: Normal rate and  regular rhythm.      Heart sounds: Normal heart sounds, S1 normal and S2 normal. No murmur heard.     No S4 sounds.   Pulmonary:      Effort: Pulmonary effort is normal. No respiratory distress.      Breath sounds: Rhonchi and rales present. No wheezing.   Abdominal:      General: There is no distension.      Tenderness: There is no rebound.   Musculoskeletal:      Right lower leg: No edema.      Left lower leg: No edema.   Skin:     General: Skin is warm and dry.      Findings: No erythema.   Neurological:      General: No focal deficit present.      Mental Status: He is alert and oriented to person, place, and time.   Psychiatric:         Mood and Affect: Mood normal.         Behavior: Behavior normal.            Significant Labs: CMP   Recent Labs   Lab 02/27/24  0420 02/27/24  1856 02/28/24  0535   * 131* 132*   K 3.8 3.5 3.3*   CL 97 96 97   CO2 23 23 24   * 211* 150*   BUN 11 12 15   CREATININE 0.9 1.1 1.0   CALCIUM 8.8 9.2 8.5*   PROT  --  6.6  --    ALBUMIN  --  2.9*  --    BILITOT  --  0.5  --    ALKPHOS  --  97  --    AST  --  57*  --    ALT  --  34  --    ANIONGAP 9 12 11   , CBC   Recent Labs   Lab 02/27/24 0420 02/27/24  1856 02/28/24  0535   WBC 26.05* 32.33* 34.56*   HGB 10.4* 10.9* 10.2*   HCT 30.1* 31.7* 30.6*    288 293   , Troponin   Recent Labs   Lab 02/27/24  0420 02/27/24  1630 02/27/24  1856   TROPONINI 1.293* 2.789* 2.601*   , and All pertinent lab results from the last 24 hours have been reviewed.    Significant Imaging: Echocardiogram: Transthoracic echo (TTE) complete (Cupid Only):   Results for orders placed or performed during the hospital encounter of 02/26/24   Echo   Result Value Ref Range    LVIDd 4.88 3.5 - 6.0 cm    LV Systolic Volume 52.86 mL    LVIDs 3.56 2.1 - 4.0 cm    LV Diastolic Volume 111.86 mL    IVS 1.04 0.6 - 1.1 cm    FS 27 (A) 28 - 44 %    Left Ventricle Relative Wall Thickness 0.44 cm    Posterior Wall 1.08 0.6 - 1.1 cm    LV mass 189.29 g    TR  Max Can 2.91 m/s    RVDD 2.81 cm    LA size 4.18 cm    Triscuspid Valve Regurgitation Peak Gradient 34 mmHg    IVC diameter 2.08 cm    LV Systolic Volume Index 23.2 mL/m2    LV Diastolic Volume Index 49.06 mL/m2    LV Mass Index 83 g/m2    ZLVIDS -2.98     ZLVIDD -5.65     TV resting pulmonary artery pressure 37 mmHg    RV TB RVSP 6 mmHg    Est. RA pres 3 mmHg    Narrative      Left Ventricle: The left ventricle is normal in size. There is   concentric remodeling. There is normal systolic function with a visually   estimated ejection fraction of 55 - 60%. There is indeterminate diastolic   function.    Right Ventricle: Right ventricle was not well visualized due to poor   acoustic window. Normal right ventricular cavity size. Systolic function   is normal.    Pulmonary Artery: The estimated pulmonary artery systolic pressure is   37 mmHg.    IVC/SVC: Normal venous pressure at 3 mmHg.     , EKG: Reviewed, and X-Ray: CXR: X-Ray Chest 1 View (CXR):   Results for orders placed or performed during the hospital encounter of 02/26/24   X-Ray Chest 1 View    Narrative    EXAMINATION:  XR CHEST 1 VIEW    CLINICAL HISTORY:  SOB, elevated WBC;.    TECHNIQUE:  Single frontal portable view of the chest was performed.    COMPARISON:  02/26/2024    FINDINGS:  Support devices: Right IJ MediPort unchanged.    Ground-glass and peripheral linear reticular interstitial opacification similar to recent prior exams, nonspecific.  Heart normal size.  No pneumothorax or pleural effusion.      Impression    As above.      Electronically signed by: Spenser Stallworth  Date:    02/28/2024  Time:    11:07    and X-Ray Chest PA and Lateral (CXR): No results found for this visit on 02/26/24.

## 2024-02-28 NOTE — ASSESSMENT & PLAN NOTE
- denies chest pain  -   - continue cardiac monitoring  - Trp 0.7, will trend  - recent echo in 1/2024 with EF 50 - 55%, normal diastolic function, Aortic Valve moderate to severe stenosis.  - cards consulted will follow-up further recs

## 2024-02-28 NOTE — ASSESSMENT & PLAN NOTE
Patient has hypokalemia which is Acute on Chronic and currently uncontrolled. Most recent potassium levels reviewed-   Lab Results   Component Value Date    K 3.8 02/27/2024   . Will continue potassium replacement per protocol and recheck repeat levels after replacement completed.

## 2024-02-28 NOTE — PROGRESS NOTES
"[facility] ([facility] Medicine  Progress Note    Patient Name: Mark Dickerson Sr.  MRN: [id number]  Patient Class: IP- Inpatient   Admission Date: 2/26/2024  Length of Stay: 0 days  Attending Physician: Judah Oden MD  Primary Care Provider: Tatyana Cannon MD        Subjective:     Principal Problem:NSTEMI (non-ST elevated myocardial infarction)        HPI:  Mark Dickerson Sr. is a 84 y.o. male patient with a PMHx of metastatic breast cancer (on chemo), HLD, HTN, DM II, CKD who presents to the ED for evaluation of AMS which onset this morning PTA. Per daughter in law she visited the pt early this morning and states that he was normal for a short period before becoming extremely disoriented with jumbled speech.  Patient mental status returned to baseline piror to ed arrival.  Patient reports he feels "dehydrated", he also reports diarrhea earlier last week but this has since resolved.  Patient denies any fever, chills, N/V, SOB and chest pain.   In the ED, work up notable for WBC 14, Na 130, K 3.0, mag 1.5, , Trop 0.7.  EKG with SR PACs, ST and T wave abnormality. CT head negative for acute processes.  Chest xray no acute processes. Patient started on IVF and given zofran for nausea.  Patient will be admitted to observation for electrolyte imbalances, elevated trop.  Cards consulted.     Code Status DNR  Surrogate Decision maker daughter    Overview/Hospital Course:  2/27  Admitted for NSTEMI, currently on heparin drip  Patient awake, alert, oriented, denies fevers/chills, chest pain  WBC 26k, BP stable low-normal range, afebrile  Blood cultures ordered, lactic acid 1.0  CXR today with worsening bilateral infiltrates  Covid and influenza negative  Lasix 20 mg IV one time  Cardiology following, possible LHC tomorrow pending progression      Review of Systems   All other systems reviewed and are negative.    Objective:     Vital Signs (Most Recent):  Temp: 97.3 °F (36.3 °C) (02/28/24 " 0725)  Pulse: 99 (02/28/24 0725)  Resp: 18 (02/28/24 0725)  BP: 127/64 (02/28/24 0725)  SpO2: 96 % (02/28/24 0725) Vital Signs (24h Range):  Temp:  [97.3 °F (36.3 °C)-99.1 °F (37.3 °C)] 97.3 °F (36.3 °C)  Pulse:  [] 99  Resp:  [18] 18  SpO2:  [89 %-96 %] 96 %  BP: ()/(53-65) 127/64     Weight: 111.3 kg (245 lb 4.8 oz)  Body mass index is 35.2 kg/m².    Intake/Output Summary (Last 24 hours) at 2/28/2024 0827  Last data filed at 2/27/2024 2007  Gross per 24 hour   Intake 505.93 ml   Output --   Net 505.93 ml         Physical Exam  Constitutional:       General: He is not in acute distress.     Appearance: Normal appearance. He is obese.   Cardiovascular:      Rate and Rhythm: Normal rate and regular rhythm.      Heart sounds: No murmur heard.  Pulmonary:      Effort: Pulmonary effort is normal. No respiratory distress.      Breath sounds: Normal breath sounds. No wheezing.   Abdominal:      General: There is no distension.      Palpations: Abdomen is soft.      Tenderness: There is no abdominal tenderness.   Skin:     Coloration: Skin is pale.   Neurological:      Mental Status: He is alert.             Significant Labs: All pertinent labs within the past 24 hours have been reviewed.  Recent Lab Results  (Last 5 results in the past 24 hours)        02/28/24  0535   02/28/24  0105   02/27/24  2107   02/27/24  1856   02/27/24  1630        Influenza A, Molecular               Influenza B, Molecular               Albumin       2.9         ALP       97         ALT       34         Anion Gap 11       12         Aniso Slight       Slight         PTT 55.5  Comment: Refer to local heparin nomogram for intensity/dose specific   therapeutic   range.     47.4  Comment: Refer to local heparin nomogram for intensity/dose specific   therapeutic   range.               AST       57         Bands       6.0         Baso # CANCELED  Comment: Result canceled by the ancillary.               Basophil % 0.0       0.0          BILIRUBIN TOTAL       0.5  Comment: For infants and newborns, interpretation of results should be based  on gestational age, weight and in agreement with clinical  observations.    Premature Infant recommended reference ranges:  Up to 24 hours.............<8.0 mg/dL  Up to 48 hours............<12.0 mg/dL  3-5 days..................<15.0 mg/dL  6-29 days.................<15.0 mg/dL             Comment: Values of less than 100 pg/ml are consistent with non-CHF populations.               BUN 15       12         Calcium 8.5       9.2         Chloride 97       96         CO2 24       23         Creatinine 1.0       1.1         Differential Method Manual       Manual         eGFR >60       >60         Eos # CANCELED  Comment: Result canceled by the ancillary.               Eos % 0.0       2.0         Flu A & B Source               Glucose 150       211         Gran % 67.0       77.0         Hematocrit 30.6       31.7         Hemoglobin 10.2       10.9         Immature Grans (Abs) CANCELED  Comment: Mild elevation in immature granulocytes is non specific and   can be seen in a variety of conditions including stress response,   acute inflammation, trauma and pregnancy. Correlation with other   laboratory and clinical findings is essential.    Result canceled by the ancillary.         CANCELED  Comment: Mild elevation in immature granulocytes is non specific and   can be seen in a variety of conditions including stress response,   acute inflammation, trauma and pregnancy. Correlation with other   laboratory and clinical findings is essential.    Result canceled by the ancillary.           Immature Granulocytes CANCELED  Comment: Result canceled by the ancillary.       CANCELED  Comment: Result canceled by the ancillary.         Lymph # CANCELED  Comment: Result canceled by the ancillary.               Lymph % 19.0       4.0         Magnesium  1.7               MCH 29.5       30.1         MCHC 33.3       34.4          MCV 88       88         Metamyelocytes       4.0         Mono # CANCELED  Comment: Result canceled by the ancillary.               Mono % 14.0       4.0         MPV 10.9       10.7         Myelocytes       3.0         nRBC 0       0         Phosphorus Level 2.5               Platelet Estimate Appears normal       Appears normal         Platelet Count 293       288         POCT Glucose     245           Poikilocytosis       Slight         Potassium 3.3       3.5         PROTEIN TOTAL       6.6         RBC 3.46       3.62         RDW 15.6       15.2         SARS-CoV-2 RNA, Amplification, Qual               Schistocytes Present               Sodium 132       131         Teardrop Cells Occasional               Troponin I       2.601  Comment: The reference interval for Troponin I represents the 99th percentile   cutoff   for our facility and is consistent with 3rd generation assay   performance.     2.789  Comment: The reference interval for Troponin I represents the 99th percentile   cutoff   for our facility and is consistent with 3rd generation assay   performance.         WBC 34.56       32.33                                Significant Imaging: I have reviewed all pertinent imaging results/findings within the past 24 hours.    X-Ray Chest AP Portable   Final Result      Worsening moderate bilateral infiltrates.  Follow-up is recommended.         Electronically signed by: Shyla Giles MD   Date:    02/27/2024   Time:    10:18      CTA Chest Non-Coronary (PE Studies)   Final Result      No pulmonary embolism.  No dissection.  Atherosclerotic changes.  Mild interstitial opacities.  Correlate clinically for element of fibrosis versus CHF.  Pulmonary micronodularity.  Recommend follow-up.      All CT scans   are performed using dose optimization techniques including the following: automated exposure control; adjustment of the mA and/or kV; use of iterative reconstruction technique.  Dose modulation was employed for  ALARA by means of: Automated exposure control; adjustment of the mA and/or kV according to patient size (this includes techniques or standardized protocols for targeted exams where dose is matched to indication/reason for exam; i.e. extremities or head); and/or use of iterative reconstructive technique.         Electronically signed by: Jhoan Roman   Date:    02/26/2024   Time:    19:11      CT Head Without Contrast   Final Result      Chronic microvascular ischemic changes.  No intracranial hemorrhage.      All CT scans at this facility use dose modulation, iterative reconstruction, and/or weight based dosing when appropriate to reduce radiation dose to as low as reasonable achievable.         Electronically signed by: Earl Mendez MD   Date:    02/26/2024   Time:    10:36      X-Ray Chest AP Portable   Final Result      No acute process seen.         Electronically signed by: Earl Mendez MD   Date:    02/26/2024   Time:    10:02            Assessment/Plan:      * NSTEMI (non-ST elevated myocardial infarction)  No prior hx of CAD  Troponin 0.7 --> 1.3 --> 2.8  EKG with SR PACS and ST-Twave abnormalities  Cardiology following  On heparin drip  TTE as below  Plans for [facility] pending    Echo    Result Date: 2/26/2024    Left Ventricle: The left ventricle is normal in size. There is   concentric remodeling. There is normal systolic function with a visually   estimated ejection fraction of 55 - 60%. There is indeterminate diastolic   function.    Right Ventricle: Right ventricle was not well visualized due to poor   acoustic window. Normal right ventricular cavity size. Systolic function   is normal.    Pulmonary Artery: The estimated pulmonary artery systolic pressure is   37 mmHg.    IVC/SVC: Normal venous pressure at 3 mmHg.           Leukocytosis  - mild - reactive?  - chest xray with no acute processes  - UA pending  - afebrile  - will trend    2/27  Trending up 26k  Blood cultures ordered, lactic acid 1.0  Covid  and influenza negative  Trend labs for now, possibly reactive  Low threshold to start empiric antibiotics    Hypokalemia  Patient has hypokalemia which is Acute on Chronic and currently uncontrolled. Most recent potassium levels reviewed-   Lab Results   Component Value Date    K 3.8 02/27/2024   . Will continue potassium replacement per protocol and recheck repeat levels after replacement completed.     Hypomagnesemia  Patient has Abnormal Magnesium: hypomagnesemia. Will continue to monitor electrolytes closely. Will replace the affected electrolytes and repeat labs to be done after interventions completed. The patient's magnesium results have been reviewed and are listed below.  Recent Labs   Lab 02/27/24  0420   MG 1.7          CKD stage 3 due to type 2 diabetes mellitus  Creatine stable for now. BMP reviewed- noted Estimated Creatinine Clearance: 73.6 mL/min (based on SCr of 0.9 mg/dL). according to latest data. Based on current GFR, CKD stage is stage 3 - GFR 30-59.  Monitor UOP and serial BMP and adjust therapy as needed. Renally dose meds. Avoid nephrotoxic medications and procedures.  - creatinine WNL    Controlled type 2 diabetes mellitus without complication, without long-term current use of insulin  Patient's FSGs are controlled on current medication regimen.  Last A1c reviewed-   Lab Results   Component Value Date    HGBA1C 7.1 (H) 11/28/2023     Most recent fingerstick glucose reviewed-   Recent Labs   Lab 02/27/24  0640 02/27/24  1155 02/27/24  1559   POCTGLUCOSE 154* 188* 167*       Current correctional scale  Low  Maintain anti-hyperglycemic dose as follows-   Antihyperglycemics (From admission, onward)      Start     Stop Route Frequency Ordered    02/26/24 1301  insulin aspart U-100 pen 0-5 Units         -- SubQ Before meals & nightly PRN 02/26/24 1204          Hold Oral hypoglycemics while patient is in the hospital.    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 Mendoza at [facility], last chemo session one week prior  - follow up outpatient         Elevated troponin  - denies chest pain  - EKG with SR PACS and ST-Twave abnormalities  - continue cardiac monitoring  - Trp 0.7, will trend  - recent echo in 1/2024 with EF 50 - 55%, normal diastolic function, Aortic Valve moderate to severe stenosis.  - cards consulted will follow-up further recs         VTE Risk Mitigation (From admission, onward)           Ordered     heparin 25,000 units in dextrose 5% (100 units/ml) IV bolus from bag LOW INTENSITY nomogram - [facility]  As needed (PRN)        Question:  Heparin Infusion Adjustment (DO NOT MODIFY ANSWER)  Answer:  \\[facility]\epic\Images\Pharmacy\HeparinInfusions\heparin LOW INTENSITY nomogram for [facility] [id number]    02/26/24 1404     heparin 25,000 units in dextrose 5% (100 units/ml) IV bolus from bag LOW INTENSITY nomogram - [facility]  As needed (PRN)        Question:  Heparin Infusion Adjustment (DO NOT MODIFY ANSWER)  Answer:  \\[email or web address]\epic\Images\Pharmacy\HeparinInfusions\heparin LOW INTENSITY nomogram for [facility] [id number].pdf    02/26/24 1404     heparin 25,000 units in dextrose 5% 250 mL (100 units/mL) infusion LOW INTENSITY nomogram - [facility]  Continuous        Question:  Begin at (units/kg/hr)  Answer:  12    02/26/24 1404     IP VTE HIGH RISK PATIENT  Once         02/26/24 1204     Place sequential compression device  Until discontinued         02/26/24 1204                    Discharge Planning   DE:      Code Status: DNR   Is the patient medically ready for discharge?:     Reason for patient still in hospital (select all that apply): Patient new problem, Patient trending condition, Laboratory test, Treatment, and Consult recommendations  Discharge Plan A: Home Health                  Judah Oden MD  Department of Hospital Medicine   [facility] - Telemetry ([facility])

## 2024-02-28 NOTE — ASSESSMENT & PLAN NOTE
- mild - reactive?  - chest xray with no acute processes  - UA pending  - afebrile  - will trend    2/27  Trending up 26k  Blood cultures ordered, lactic acid 1.0  Covid and influenza negative  Trend labs for now, possibly reactive  Low threshold to start empiric antibiotics

## 2024-02-28 NOTE — ASSESSMENT & PLAN NOTE
-Troponin 0.725>0.735, continue to trend  -No CP symptoms/SOB/angina  -No prior CV history  -Continue ASA, heparin gtt, statin  -EKG reviewed, T wave inversions inferiorly and laterally  -Limited TTE pending  -Will consider ischemic workup with MPI stress test this admission    2/27/24  -Troponin bumped to 1.2 overnight  -Remains CP free  -Continue ASA, heparin gtt, statin  -TTE with normal EF  -LHC planned once more stable respiratory wise/infection ruled out    2/28/24  -CP free  -Continue ASA, heparin gtt, statin  -BB added  -LHC deferred for now given leukocytosis/SOB

## 2024-02-28 NOTE — SUBJECTIVE & OBJECTIVE
Review of Systems   All other systems reviewed and are negative.    Objective:     Vital Signs (Most Recent):  Temp: 97.3 °F (36.3 °C) (02/28/24 0725)  Pulse: 99 (02/28/24 0725)  Resp: 18 (02/28/24 0725)  BP: 127/64 (02/28/24 0725)  SpO2: 96 % (02/28/24 0725) Vital Signs (24h Range):  Temp:  [97.3 °F (36.3 °C)-99.1 °F (37.3 °C)] 97.3 °F (36.3 °C)  Pulse:  [] 99  Resp:  [18] 18  SpO2:  [89 %-96 %] 96 %  BP: ()/(53-65) 127/64     Weight: 111.3 kg (245 lb 4.8 oz)  Body mass index is 35.2 kg/m².    Intake/Output Summary (Last 24 hours) at 2/28/2024 0827  Last data filed at 2/27/2024 2007  Gross per 24 hour   Intake 505.93 ml   Output --   Net 505.93 ml         Physical Exam  Constitutional:       General: He is not in acute distress.     Appearance: Normal appearance. He is obese.   Cardiovascular:      Rate and Rhythm: Normal rate and regular rhythm.      Heart sounds: Murmur heard.   Pulmonary:      Effort: Pulmonary effort is normal. No respiratory distress.      Breath sounds: Normal breath sounds. No wheezing.      Comments: Decreased BS  Abdominal:      General: There is no distension.      Palpations: Abdomen is soft.      Tenderness: There is no abdominal tenderness.   Skin:     Coloration: Skin is pale.   Neurological:      Mental Status: He is alert.             Significant Labs: All pertinent labs within the past 24 hours have been reviewed.  Recent Lab Results  (Last 5 results in the past 24 hours)        02/28/24  0535   02/28/24  0105   02/27/24  2107   02/27/24  1856   02/27/24  1630        Influenza A, Molecular               Influenza B, Molecular               Albumin       2.9         ALP       97         ALT       34         Anion Gap 11       12         Aniso Slight       Slight         PTT 55.5  Comment: Refer to local heparin nomogram for intensity/dose specific   therapeutic   range.     47.4  Comment: Refer to local heparin nomogram for intensity/dose specific   therapeutic    range.               AST       57         Bands       6.0         Baso # CANCELED  Comment: Result canceled by the ancillary.               Basophil % 0.0       0.0         BILIRUBIN TOTAL       0.5  Comment: For infants and newborns, interpretation of results should be based  on gestational age, weight and in agreement with clinical  observations.    Premature Infant recommended reference ranges:  Up to 24 hours.............<8.0 mg/dL  Up to 48 hours............<12.0 mg/dL  3-5 days..................<15.0 mg/dL  6-29 days.................<15.0 mg/dL             Comment: Values of less than 100 pg/ml are consistent with non-CHF populations.               BUN 15       12         Calcium 8.5       9.2         Chloride 97       96         CO2 24       23         Creatinine 1.0       1.1         Differential Method Manual       Manual         eGFR >60       >60         Eos # CANCELED  Comment: Result canceled by the ancillary.               Eos % 0.0       2.0         Flu A & B Source               Glucose 150       211         Gran % 67.0       77.0         Hematocrit 30.6       31.7         Hemoglobin 10.2       10.9         Immature Grans (Abs) CANCELED  Comment: Mild elevation in immature granulocytes is non specific and   can be seen in a variety of conditions including stress response,   acute inflammation, trauma and pregnancy. Correlation with other   laboratory and clinical findings is essential.    Result canceled by the ancillary.         CANCELED  Comment: Mild elevation in immature granulocytes is non specific and   can be seen in a variety of conditions including stress response,   acute inflammation, trauma and pregnancy. Correlation with other   laboratory and clinical findings is essential.    Result canceled by the ancillary.           Immature Granulocytes CANCELED  Comment: Result canceled by the ancillary.       CANCELED  Comment: Result canceled by the ancillary.         Lymph #  CANCELED  Comment: Result canceled by the ancillary.               Lymph % 19.0       4.0         Magnesium  1.7               MCH 29.5       30.1         MCHC 33.3       34.4         MCV 88       88         Metamyelocytes       4.0         Mono # CANCELED  Comment: Result canceled by the ancillary.               Mono % 14.0       4.0         MPV 10.9       10.7         Myelocytes       3.0         nRBC 0       0         Phosphorus Level 2.5               Platelet Estimate Appears normal       Appears normal         Platelet Count 293       288         POCT Glucose     245           Poikilocytosis       Slight         Potassium 3.3       3.5         PROTEIN TOTAL       6.6         RBC 3.46       3.62         RDW 15.6       15.2         SARS-CoV-2 RNA, Amplification, Qual               Schistocytes Present               Sodium 132       131         Teardrop Cells Occasional               Troponin I       2.601  Comment: The reference interval for Troponin I represents the 99th percentile   cutoff   for our facility and is consistent with 3rd generation assay   performance.     2.789  Comment: The reference interval for Troponin I represents the 99th percentile   cutoff   for our facility and is consistent with 3rd generation assay   performance.         WBC 34.56       32.33                                Significant Imaging: I have reviewed all pertinent imaging results/findings within the past 24 hours.    X-Ray Chest AP Portable   Final Result      Worsening moderate bilateral infiltrates.  Follow-up is recommended.         Electronically signed by: Shyla Giles MD   Date:    02/27/2024   Time:    10:18      CTA Chest Non-Coronary (PE Studies)   Final Result      No pulmonary embolism.  No dissection.  Atherosclerotic changes.  Mild interstitial opacities.  Correlate clinically for element of fibrosis versus CHF.  Pulmonary micronodularity.  Recommend follow-up.      All CT scans   are performed using dose  optimization techniques including the following: automated exposure control; adjustment of the mA and/or kV; use of iterative reconstruction technique.  Dose modulation was employed for ALARA by means of: Automated exposure control; adjustment of the mA and/or kV according to patient size (this includes techniques or standardized protocols for targeted exams where dose is matched to indication/reason for exam; i.e. extremities or head); and/or use of iterative reconstructive technique.         Electronically signed by: Jhoan Roman   Date:    02/26/2024   Time:    19:11      CT Head Without Contrast   Final Result      Chronic microvascular ischemic changes.  No intracranial hemorrhage.      All CT scans at this facility use dose modulation, iterative reconstruction, and/or weight based dosing when appropriate to reduce radiation dose to as low as reasonable achievable.         Electronically signed by: Earl Mendez MD   Date:    02/26/2024   Time:    10:36      X-Ray Chest AP Portable   Final Result      No acute process seen.         Electronically signed by: Earl Mendez MD   Date:    02/26/2024   Time:    10:02

## 2024-02-28 NOTE — ASSESSMENT & PLAN NOTE
Patient has Abnormal Magnesium: hypomagnesemia. Will continue to monitor electrolytes closely. Will replace the affected electrolytes and repeat labs to be done after interventions completed. The patient's magnesium results have been reviewed and are listed below.  Recent Labs   Lab 02/27/24  0420   MG 1.7

## 2024-02-28 NOTE — ASSESSMENT & PLAN NOTE
Creatine stable for now. BMP reviewed- noted Estimated Creatinine Clearance: 73.6 mL/min (based on SCr of 0.9 mg/dL). according to latest data. Based on current GFR, CKD stage is stage 3 - GFR 30-59.  Monitor UOP and serial BMP and adjust therapy as needed. Renally dose meds. Avoid nephrotoxic medications and procedures.  - creatinine WNL

## 2024-02-28 NOTE — ASSESSMENT & PLAN NOTE
Patient's FSGs are controlled on current medication regimen.  Last A1c reviewed-   Lab Results   Component Value Date    HGBA1C 7.1 (H) 11/28/2023     Most recent fingerstick glucose reviewed-   Recent Labs   Lab 02/27/24  0640 02/27/24  1155 02/27/24  1559   POCTGLUCOSE 154* 188* 167*       Current correctional scale  Low  Maintain anti-hyperglycemic dose as follows-   Antihyperglycemics (From admission, onward)    Start     Stop Route Frequency Ordered    02/26/24 1301  insulin aspart U-100 pen 0-5 Units         -- SubQ Before meals & nightly PRN 02/26/24 1204        Hold Oral hypoglycemics while patient is in the hospital.

## 2024-02-28 NOTE — PT/OT/SLP PROGRESS
Physical Therapy  Treatment    Mark Dickerson Sr.   MRN: [id number]   Admitting Diagnosis: NSTEMI (non-ST elevated myocardial infarction)    PT Received On: 02/27/24  PT Start Time: 1055     PT Stop Time: 1120    PT Total Time (min): 25 min       Billable Minutes:  Gait Training 10 and Therapeutic Activity 15    Treatment Type: Treatment  PT/PTA: PT     Number of PTA visits since last PT visit: 0       General Precautions: Standard, fall  Orthopedic Precautions: N/A  Braces: N/A  Respiratory Status: Room air    Spiritual, Cultural Beliefs, Christianity Practices, Values that Affect Care: no    Subjective:  Communicated with nursing (Mike) and performed chart review via epic system prior to session.  Pt a    Pain/Comfort  Pain Rating 1: 7/10 (headache pain via faces scale, nsg addressed prior to PT arrival)  Pain Addressed 1: Pre-medicate for activity, Reposition, Distraction  Pain Rating Post-Intervention 1: 7/10    Objective:   Patient found with: peripheral IV    Functional Mobility:  Bed Mobility:    Presented standing at EOB with aide preparing toilet.    Transfers:   Facilitated sit<>stand with CGA HHA, stand pivot with CGA HHA    Gait:    Gait activity 10ft x 2 min A with HHA, impulsive, slow pace, flexed posture, wide SAMI, reaching for furniture, unsteadiness on feet. Following gait activity to return from bathroom, pt refused further gait activity due to fatigue. Pt required seated rest break prior to transfer to chair      Balance:   Dynamic Sit: FAIR+: Maintains balance through MINIMAL excursions of active trunk motion  Dynamic stand: POOR+: Needs MIN (minimal ) assist during gait       Treatment and Education:  Educated pt on benefits of consistent participation in PT services to meet functional goals. Educated pt on importance of sitting OOB to promote endurance and overall activity tolerance. Educated pt on call don't fall policy and use of call button to alert nursing staff of needs (including to assist  with returning back to bed). Pt expressed understanding.     AM-PAC 6 CLICK MOBILITY  How much help from another person does this patient currently need?   1 = Unable, Total/Dependent Assistance  2 = A lot, Maximum/Moderate Assistance  3 = A little, Minimum/Contact Guard/Supervision  4 = None, Modified [place]/Independent    Turning over in bed (including adjusting bedclothes, sheets and blankets)?: 4 (standing at bedside)  Sitting down on and standing up from a chair with arms (e.g., wheelchair, bedside commode, etc.): 3  Moving from lying on back to sitting on the side of the bed?: 4 (sitting in chair)  Moving to and from a bed to a chair (including a wheelchair)?: 3  Need to walk in hospital room?: 3  Climbing 3-5 steps with a railing?: 1  Basic Mobility Total Score: 18    AM-PAC Raw Score CMS G-Code Modifier Level of Impairment Assistance   6 % Total / Unable   7 - 9 CM 80 - 100% Maximal Assist   10 - 14 CL 60 - 80% Moderate Assist   15 - 19 CK 40 - 60% Moderate Assist   20 - 22 CJ 20 - 40% Minimal Assist   23 CI 1-20% SBA / CGA   24 CH 0% Independent/ Mod I     Patient left up in chair with all lines intact, call button in reach, chair alarm on, nursing notified, and family present.    Assessment:  Mark Dickerson  is a 84 y.o. male with a medical diagnosis of NSTEMI (non-ST elevated myocardial infarction) and presents with deficits listed below which limits overall mobility. Pt will benefit from continued PT services in order to progress toward baseline.    Rehab identified problem list/impairments: weakness, impaired endurance, impaired functional mobility, gait instability, impaired balance, decreased coordination, decreased lower extremity function, decreased safety awareness, pain    Rehab potential is fair.    Activity tolerance: Fair    Discharge recommendations: Low Intensity Therapy      Barriers to discharge:      Equipment recommendations: none     GOALS:   Multidisciplinary Problems        Physical Therapy Goals          Problem: Physical Therapy    Goal Priority Disciplines Outcome Goal Variances Interventions   Physical Therapy Goal     PT, PT/OT Ongoing, Progressing     Description: Pt will perform bed mobility with SPV in order to participate in EOB activity.  Pt will perform transfers with SPV in order to participate in OOB activity.  Pt will ambulate 150 ft SBA with LRAD in order to participate in daily tasks.                        PLAN:    Patient to be seen 3 x/week to address the above listed problems via gait training, therapeutic activities, therapeutic exercises, neuromuscular re-education  Plan of Care expires: 03/12/24  Plan of Care reviewed with: patient, family         02/28/2024

## 2024-02-28 NOTE — CONSULTS
[facility] - Telemetry ([facility])  Wound Care    Patient Name:  Mark Dickerson Sr.   MRN:  [id number]  Date: 2024  Diagnosis: NSTEMI (non-ST elevated myocardial infarction)    History:     Past Medical History:   Diagnosis Date    Chest pain 2024    CKD stage 3 due to type 2 diabetes mellitus 2021    DM (diabetes mellitus)     BS didn't check 2019    DM (diabetes mellitus)     BS didn't check 2022    Foreign body, eye     right eye    Hyperlipidemia     Hypertension     Need for shingles vaccine 2019    NSTEMI (non-ST elevated myocardial infarction) 2024    Pneumonia     Primary osteoarthritis of right knee 10/29/2021    Severe obesity (BMI 35.0-35.9 with comorbidity) 7/10/2019    Type 2 diabetes mellitus     BS didn't check 2018       Social History     Socioeconomic History    Marital status:    Tobacco Use    Smoking status: Former     Current packs/day: 0.00     Average packs/day: 3.0 packs/day for 10.0 years (30.0 ttl pk-yrs)     Types: Cigarettes     Start date:      Quit date:      Years since quittin.2    Smokeless tobacco: Never   Substance and Sexual Activity    Alcohol use: No    Drug use: No    Sexual activity: Never     Social Determinants of Health     Financial Resource Strain: Low Risk  (2024)    Overall Financial Resource Strain (CARDIA)     Difficulty of Paying Living Expenses: Not hard at all   Food Insecurity: No Food Insecurity (2024)    Hunger Vital Sign     Worried About Running Out of Food in the Last Year: Never true     Ran Out of Food in the Last Year: Never true   Transportation Needs: No Transportation Needs (2024)    PRAPARE - Transportation     Lack of Transportation (Medical): No     Lack of Transportation (Non-Medical): No   Physical Activity: Unknown (2024)    Exercise Vital Sign     Days of Exercise per Week: 0 days   Stress: No Stress Concern Present (2024)    [employer] [place] [employer] - Occupational Stress Questionnaire     Feeling of Stress : Not at all   Social Connections: Unknown (1/18/2024)    Social Connection and Isolation Panel [NHANES]     Frequency of Communication with Friends and Family: More than three times a week     Frequency of Social Gatherings with Friends and Family: More than three times a week     Active Member of Clubs or Organizations: Yes     Attends Club or Organization Meetings: More than 4 times per year     Marital Status:    Housing Stability: Low Risk  (1/18/2024)    Housing Stability Vital Sign     Unable to Pay for Housing in the Last Year: No     Number of Places Lived in the Last Year: 1     Unstable Housing in the Last Year: No       Precautions:     Allergies as of 02/26/2024 - Reviewed 02/26/2024   Allergen Reaction Noted    Grass pollen-june grass standard  02/13/2024       WOC Assessment Details/Treatment     Consulted on this 83 y/o M patient due to present on admission altered skin integrity. Patient admitted with NSTEMI, PMH significant for metastatic breast cancer, HTN, DM2, CKD. DIL at bedside during visit. Stand-by assist for patient to ambulate to bath room, voided 450mL yellow urine. Sacrum assessed in bathroom while standing. IAD to coccyx/medial buttock region noted with redness, skin maceration, scattered areas of partial thickness tissue loss noted, entire area measures 4x4x0.1cm, just proximal to anus within gluteal fold. Cleansed and clinical protect moisture barrier paste applied. Recommend no diapers, apply paste BID and prn, reposition frequently while in bed. DIL reports she has patient sitting on a donut cushion that she purchased for him and reports improvement in pain to site with cushion. Discussed with patient and DIL that donut cushions are not recommended due to restricting blood flow to center and can actually increase risk of pressure injury to site. Alternate suggestion is memory foam cushion to sit on,  or even pillow. Patient does remain on personal donut cushion at this time.  Attention then turned to left breast. Red and dry yellow tissue noted. Patient denies drainage, no odor noted. He reports he does nothing to it at home and cancer doctor has not recommended any care at this time. Discussed with patient that since area is not open or draining, OK to leave alone, however if opens and starts to develop drainage, we can re-visit any wound care needs at that time.         02/28/24 1002   WOCN Assessment   WOCN Total Time (mins) 30   Visit Date 02/28/24   Visit Time 0945   Consult Type New   WOCN Speciality Wound   Wound moisture;other   Intervention assessed;applied;chart review;coordination of care;team conference;orders   Teaching on-going;complication;discharge        Altered Skin Integrity 02/27/24 0705 Left Chest Other (comment)   Date First Assessed/Time First Assessed: 02/27/24 0705   Altered Skin Integrity Present on Admission - Did Patient arrive to the hospital with altered skin?: yes  Side: Left  Location: Chest  Primary Wound Type: (c) Other (comment)   Wound Image    Dressing Appearance Open to air   Drainage Amount None   Appearance Red;Yellow;Dry   Tissue loss description Not applicable   Periwound Area Redness   Wound Edges Defined   Wound Length (cm) 3 cm   Wound Width (cm) 6 cm   Wound Surface Area (cm^2) 18 cm^2        Altered Skin Integrity 02/27/24 0705 Coccyx Incontinence associated dermatitis   Date First Assessed/Time First Assessed: 02/27/24 0705   Altered Skin Integrity Present on Admission - Did Patient arrive to the hospital with altered skin?: yes  Location: Coccyx  Primary Wound Type: Incontinence associated dermatitis   Description of Altered Skin Integrity   (not pressure-related skin injury)   Dressing Appearance Open to air   Drainage Amount None   Appearance Pink;Red   Tissue loss description Partial thickness   Periwound Area Intact   Wound Edges Defined   Wound Length (cm) 4  cm   Wound Width (cm) 4 cm   Wound Depth (cm) 0.1 cm   Wound Volume (cm^3) 1.6 cm^3   Wound Surface Area (cm^2) 16 cm^2   Care Cleansed with:;Soap and water;Applied:;Skin Barrier  (clinical protect paste)       Recommendations made to primary team for wound care and moisture management, avoid diapers, avoid donut cushion . Orders placed.     02/28/2024

## 2024-02-29 LAB
ANION GAP SERPL CALC-SCNC: 10 MMOL/L (ref 8–16)
APTT PPP: 51.5 SEC (ref 21–32)
BASOPHILS NFR BLD: 0 % (ref 0–1.9)
BUN SERPL-MCNC: 15 MG/DL (ref 8–23)
CALCIUM SERPL-MCNC: 8.7 MG/DL (ref 8.7–10.5)
CHLORIDE SERPL-SCNC: 95 MMOL/L (ref 95–110)
CO2 SERPL-SCNC: 28 MMOL/L (ref 23–29)
CREAT SERPL-MCNC: 1 MG/DL (ref 0.5–1.4)
DACRYOCYTES BLD QL SMEAR: ABNORMAL
DIFFERENTIAL METHOD BLD: ABNORMAL
EOSINOPHIL NFR BLD: 1 % (ref 0–8)
ERYTHROCYTE [DISTWIDTH] IN BLOOD BY AUTOMATED COUNT: 15.4 % (ref 11.5–14.5)
EST. GFR  (NO RACE VARIABLE): >60 ML/MIN/1.73 M^2
GLUCOSE SERPL-MCNC: 145 MG/DL (ref 70–110)
HCT VFR BLD AUTO: 29.7 % (ref 40–54)
HGB BLD-MCNC: 10 G/DL (ref 14–18)
IMM GRANULOCYTES # BLD AUTO: ABNORMAL K/UL (ref 0–0.04)
IMM GRANULOCYTES NFR BLD AUTO: ABNORMAL % (ref 0–0.5)
LYMPHOCYTES NFR BLD: 8 % (ref 18–48)
MAGNESIUM SERPL-MCNC: 1.5 MG/DL (ref 1.6–2.6)
MCH RBC QN AUTO: 29.9 PG (ref 27–31)
MCHC RBC AUTO-ENTMCNC: 33.7 G/DL (ref 32–36)
MCV RBC AUTO: 89 FL (ref 82–98)
METAMYELOCYTES NFR BLD MANUAL: 1 %
MONOCYTES NFR BLD: 5 % (ref 4–15)
MYELOCYTES NFR BLD MANUAL: 2 %
NEUTROPHILS NFR BLD: 83 % (ref 38–73)
NRBC BLD-RTO: 0 /100 WBC
PHOSPHATE SERPL-MCNC: 3 MG/DL (ref 2.7–4.5)
PLATELET # BLD AUTO: 275 K/UL (ref 150–450)
PLATELET BLD QL SMEAR: ABNORMAL
PMV BLD AUTO: 10.6 FL (ref 9.2–12.9)
POCT GLUCOSE: 138 MG/DL (ref 70–110)
POCT GLUCOSE: 152 MG/DL (ref 70–110)
POCT GLUCOSE: 168 MG/DL (ref 70–110)
POLYCHROMASIA BLD QL SMEAR: ABNORMAL
POTASSIUM SERPL-SCNC: 3.7 MMOL/L (ref 3.5–5.1)
PROCALCITONIN SERPL IA-MCNC: 0.14 NG/ML
RBC # BLD AUTO: 3.35 M/UL (ref 4.6–6.2)
SODIUM SERPL-SCNC: 133 MMOL/L (ref 136–145)
STOMATOCYTES BLD QL SMEAR: PRESENT
TOXIC GRANULES BLD QL SMEAR: PRESENT
WBC # BLD AUTO: 33.01 K/UL (ref 3.9–12.7)

## 2024-02-29 PROCEDURE — 36415 COLL VENOUS BLD VENIPUNCTURE: CPT | Mod: HCNC | Performed by: FAMILY MEDICINE

## 2024-02-29 PROCEDURE — 84100 ASSAY OF PHOSPHORUS: CPT | Mod: HCNC | Performed by: NURSE PRACTITIONER

## 2024-02-29 PROCEDURE — 80048 BASIC METABOLIC PNL TOTAL CA: CPT | Mod: HCNC | Performed by: NURSE PRACTITIONER

## 2024-02-29 PROCEDURE — 63600175 PHARM REV CODE 636 W HCPCS: Mod: HCNC | Performed by: INTERNAL MEDICINE

## 2024-02-29 PROCEDURE — 83735 ASSAY OF MAGNESIUM: CPT | Mod: HCNC | Performed by: NURSE PRACTITIONER

## 2024-02-29 PROCEDURE — 85027 COMPLETE CBC AUTOMATED: CPT | Mod: HCNC | Performed by: NURSE PRACTITIONER

## 2024-02-29 PROCEDURE — 63600175 PHARM REV CODE 636 W HCPCS: Mod: HCNC | Performed by: HOSPITALIST

## 2024-02-29 PROCEDURE — 99233 SBSQ HOSP IP/OBS HIGH 50: CPT | Mod: HCNC,,, | Performed by: INTERNAL MEDICINE

## 2024-02-29 PROCEDURE — 21400001 HC TELEMETRY ROOM: Mod: HCNC

## 2024-02-29 PROCEDURE — 25000003 PHARM REV CODE 250: Mod: HCNC | Performed by: INTERNAL MEDICINE

## 2024-02-29 PROCEDURE — 25000003 PHARM REV CODE 250: Mod: HCNC | Performed by: HOSPITALIST

## 2024-02-29 PROCEDURE — 25000003 PHARM REV CODE 250: Mod: HCNC | Performed by: NURSE PRACTITIONER

## 2024-02-29 PROCEDURE — 85730 THROMBOPLASTIN TIME PARTIAL: CPT | Mod: HCNC | Performed by: FAMILY MEDICINE

## 2024-02-29 PROCEDURE — 85007 BL SMEAR W/DIFF WBC COUNT: CPT | Mod: HCNC | Performed by: NURSE PRACTITIONER

## 2024-02-29 PROCEDURE — 63600175 PHARM REV CODE 636 W HCPCS: Mod: HCNC | Performed by: NURSE PRACTITIONER

## 2024-02-29 PROCEDURE — 97530 THERAPEUTIC ACTIVITIES: CPT | Mod: HCNC,CQ

## 2024-02-29 RX ORDER — MIDODRINE HYDROCHLORIDE 5 MG/1
10 TABLET ORAL
Status: DISCONTINUED | OUTPATIENT
Start: 2024-02-29 | End: 2024-03-14 | Stop reason: HOSPADM

## 2024-02-29 RX ADMIN — MIDODRINE HYDROCHLORIDE 10 MG: 5 TABLET ORAL at 10:02

## 2024-02-29 RX ADMIN — FUROSEMIDE 20 MG: 10 INJECTION, SOLUTION INTRAMUSCULAR; INTRAVENOUS at 10:02

## 2024-02-29 RX ADMIN — METOPROLOL TARTRATE 25 MG: 25 TABLET, FILM COATED ORAL at 10:02

## 2024-02-29 RX ADMIN — MIDODRINE HYDROCHLORIDE 10 MG: 5 TABLET ORAL at 04:02

## 2024-02-29 RX ADMIN — ANASTROZOLE 1 MG: 1 TABLET, COATED ORAL at 10:02

## 2024-02-29 RX ADMIN — POTASSIUM CHLORIDE 20 MEQ: 1500 TABLET, EXTENDED RELEASE ORAL at 10:02

## 2024-02-29 RX ADMIN — MUPIROCIN: 20 OINTMENT TOPICAL at 09:02

## 2024-02-29 RX ADMIN — MIDODRINE HYDROCHLORIDE 5 MG: 5 TABLET ORAL at 08:02

## 2024-02-29 RX ADMIN — MUPIROCIN: 20 OINTMENT TOPICAL at 10:02

## 2024-02-29 RX ADMIN — HYDROCODONE BITARTRATE AND ACETAMINOPHEN 1 TABLET: 5; 325 TABLET ORAL at 10:02

## 2024-02-29 RX ADMIN — POTASSIUM CHLORIDE 20 MEQ: 1500 TABLET, EXTENDED RELEASE ORAL at 09:02

## 2024-02-29 RX ADMIN — ATORVASTATIN CALCIUM 40 MG: 40 TABLET, FILM COATED ORAL at 10:02

## 2024-02-29 RX ADMIN — FUROSEMIDE 20 MG: 10 INJECTION, SOLUTION INTRAMUSCULAR; INTRAVENOUS at 09:02

## 2024-02-29 RX ADMIN — Medication 400 MG: at 10:02

## 2024-02-29 RX ADMIN — CEFTRIAXONE 1 G: 1 INJECTION, POWDER, FOR SOLUTION INTRAMUSCULAR; INTRAVENOUS at 09:02

## 2024-02-29 RX ADMIN — HEPARIN SODIUM 12 UNITS/KG/HR: 10000 INJECTION, SOLUTION INTRAVENOUS at 10:02

## 2024-02-29 RX ADMIN — METOPROLOL TARTRATE 25 MG: 25 TABLET, FILM COATED ORAL at 09:02

## 2024-02-29 NOTE — ASSESSMENT & PLAN NOTE
Patient's FSGs are controlled on current medication regimen.  Last A1c reviewed-   Lab Results   Component Value Date    HGBA1C 7.1 (H) 11/28/2023     Most recent fingerstick glucose reviewed-   Recent Labs   Lab 02/28/24  1208 02/28/24  1646   POCTGLUCOSE 234* 167*       Current correctional scale  Low  Maintain anti-hyperglycemic dose as follows-   Antihyperglycemics (From admission, onward)    Start     Stop Route Frequency Ordered    02/26/24 1301  insulin aspart U-100 pen 0-5 Units         -- SubQ Before meals & nightly PRN 02/26/24 1204        Hold Oral hypoglycemics while patient is in the hospital.

## 2024-02-29 NOTE — PROGRESS NOTES
[facility] - Telemetry ([facility])  Cardiology  Progress Note    Patient Name: Mark Dickerson Sr.  MRN: [id number]  Admission Date: 2/26/2024  Hospital Length of Stay: 2 days  Code Status: DNR   Attending Physician: Judah Oden MD   Primary Care Physician: Tatyana Cannon MD  Expected Discharge Date:   Principal Problem:NSTEMI (non-ST elevated myocardial infarction)    Subjective:   HPI:  Mr. Dickerson is an 84 year old male patient whose current medical conditions include metastatic breast cancer (on chemo), HTN, hyperlipidemia, DM type II, and CKD who presented to [facility] ED this AM due to AMS/confusion. Patient's DIL reported patient seemed disoriented and had jumbled speech when she visited him earlier this AM. He returned to baseline prior to ED arrival. He denied any associated SOB, chest pain, palpitations, near syncope, or syncope. Initial workup in ED revealed leukocytosis (WBC 14,000), hyponatremia (Na 130), hypomagnesemia (1.5), and elevated troponin (0.725>0.735) and patient was subsequently admitted for further evaluation and treatment. Cardiology consulted to assist with management. Patient seen and examined today, resting in bed. Remains chest pain free. No other CV complaints. States he felt dehydrated earlier today. No prior CV history. He reports compliance with his medications. EKG reviewed, SR with T wave inversions inferiorly and anteriorly. Limited echo and repeat troponin pending. CT of head NAF         Hospital Course:   2/27/24-Patient seen and examined today, sitting up in bed. Appears more SOB/weak. No CP symptoms. WBC bumped to 26,000. CXR with bilateral infiltrates/worsening appearance. IV Lasix given and BC drawn. Troponin bumped to 1.293. [facility] initially planned today but cancelled due to lab abnormalities, CXR, and patient's clinical status.    2/28/24-Patient seen and examined today, sitting up in bedside chair. Complains of chills/weakness. WBC up to 34,000. CXR showed bilateral opacities.  Being diuresed. K 3.3, being repleted. [facility] deferred for now.    2/29/24-Patient seen and examined today, resting in bed. Feels/looks better. WBC trending down. Denies CP. BC NGTD. Tentative plans for [facility] tmw.        Review of Systems   Constitutional: Positive for malaise/fatigue.   HENT: Negative.     Eyes: Negative.    Cardiovascular: Negative.    Respiratory: Negative.     Endocrine: Negative.    Hematologic/Lymphatic: Negative.    Skin: Negative.    Musculoskeletal:  Positive for arthritis and joint pain.   Gastrointestinal: Negative.    Genitourinary: Negative.    Neurological: Negative.    Psychiatric/Behavioral: Negative.     Allergic/Immunologic: Negative.      Objective:     Vital Signs (Most Recent):  Temp: 98.1 °F (36.7 °C) (02/29/24 1219)  Pulse: 98 (02/29/24 1219)  Resp: 16 (02/29/24 1219)  BP: (!) 104/56 (02/29/24 1300)  SpO2: 96 % (02/29/24 1219) Vital Signs (24h Range):  Temp:  [97.5 °F (36.4 °C)-98.6 °F (37 °C)] 98.1 °F (36.7 °C)  Pulse:  [] 98  Resp:  [15-20] 16  SpO2:  [92 %-97 %] 96 %  BP: ()/(51-68) 104/56     Weight: 105.2 kg (231 lb 14.8 oz)  Body mass index is 33.28 kg/m².     SpO2: 96 %         Intake/Output Summary (Last 24 hours) at 2/29/2024 1411  Last data filed at 2/28/2024 1927  Gross per 24 hour   Intake 534.34 ml   Output 2 ml   Net 532.34 ml       Lines/Drains/Airways       Central Venous Catheter Line  Duration                  PowerPort A Cath Single Lumen 01/05/24 1415 Internal Jugular Right 54 days              Peripheral Intravenous Line  Duration                  Peripheral IV - Single Lumen 02/27/24 0805 22 G Anterior;Distal;Left Forearm 2 days                       Physical Exam  Vitals and nursing note reviewed.   Constitutional:       General: He is not in acute distress.     Appearance: He is well-developed. He is not ill-appearing or diaphoretic.      Comments: On supplemental O2   HENT:      Head: Normocephalic and atraumatic.   Eyes:      General:          Right eye: No discharge.         Left eye: No discharge.      Pupils: Pupils are equal, round, and reactive to light.   Cardiovascular:      Rate and Rhythm: Normal rate and regular rhythm.      Heart sounds: Normal heart sounds, S1 normal and S2 normal. No murmur heard.  Pulmonary:      Effort: Pulmonary effort is normal. No respiratory distress.      Comments: Diminished at bases  Abdominal:      General: There is no distension.      Tenderness: There is no rebound.   Musculoskeletal:      Right lower leg: No edema.      Left lower leg: No edema.   Skin:     General: Skin is warm and dry.      Findings: No erythema.   Neurological:      General: No focal deficit present.      Mental Status: He is alert and oriented to person, place, and time.   Psychiatric:         Mood and Affect: Mood normal.         Behavior: Behavior normal.            Significant Labs: CMP   Recent Labs   Lab 02/27/24 1856 02/28/24  0535 02/29/24  0444   * 132* 133*   K 3.5 3.3* 3.7   CL 96 97 95   CO2 23 24 28   * 150* 145*   BUN 12 15 15   CREATININE 1.1 1.0 1.0   CALCIUM 9.2 8.5* 8.7   PROT 6.6  --   --    ALBUMIN 2.9*  --   --    BILITOT 0.5  --   --    ALKPHOS 97  --   --    AST 57*  --   --    ALT 34  --   --    ANIONGAP 12 11 10   , CBC   Recent Labs   Lab 02/27/24 1856 02/28/24  0535 02/29/24  0443   WBC 32.33* 34.56* 33.01*   HGB 10.9* 10.2* 10.0*   HCT 31.7* 30.6* 29.7*    293 275   , Troponin   Recent Labs   Lab 02/27/24  1630 02/27/24  1856   TROPONINI 2.789* 2.601*   , and All pertinent lab results from the last 24 hours have been reviewed.    Significant Imaging: Echocardiogram: Transthoracic echo (TTE) complete (Cupid Only):   Results for orders placed or performed during the hospital encounter of 02/26/24   Echo   Result Value Ref Range    LVIDd 4.88 3.5 - 6.0 cm    LV Systolic Volume 52.86 mL    LVIDs 3.56 2.1 - 4.0 cm    LV Diastolic Volume 111.86 mL    IVS 1.04 0.6 - 1.1 cm    FS 27 (A) 28 - 44 %     Left Ventricle Relative Wall Thickness 0.44 cm    Posterior Wall 1.08 0.6 - 1.1 cm    LV mass 189.29 g    TR Max Can 2.91 m/s    RVDD 2.81 cm    LA size 4.18 cm    Triscuspid Valve Regurgitation Peak Gradient 34 mmHg    IVC diameter 2.08 cm    LV Systolic Volume Index 23.2 mL/m2    LV Diastolic Volume Index 49.06 mL/m2    LV Mass Index 83 g/m2    ZLVIDS -2.98     ZLVIDD -5.65     TV resting pulmonary artery pressure 37 mmHg    RV TB RVSP 6 mmHg    Est. RA pres 3 mmHg    Narrative      Left Ventricle: The left ventricle is normal in size. There is   concentric remodeling. There is normal systolic function with a visually   estimated ejection fraction of 55 - 60%. There is indeterminate diastolic   function.    Right Ventricle: Right ventricle was not well visualized due to poor   acoustic window. Normal right ventricular cavity size. Systolic function   is normal.    Pulmonary Artery: The estimated pulmonary artery systolic pressure is   37 mmHg.    IVC/SVC: Normal venous pressure at 3 mmHg.     , EKG: Reviewed, and X-Ray: CXR: X-Ray Chest 1 View (CXR):   Results for orders placed or performed during the hospital encounter of 02/26/24   X-Ray Chest 1 View    Narrative    EXAMINATION:  XR CHEST 1 VIEW    CLINICAL HISTORY:  SOB, elevated WBC;.    TECHNIQUE:  Single frontal portable view of the chest was performed.    COMPARISON:  02/26/2024    FINDINGS:  Support devices: Right IJ MediPort unchanged.    Ground-glass and peripheral linear reticular interstitial opacification similar to recent prior exams, nonspecific.  Heart normal size.  No pneumothorax or pleural effusion.      Impression    As above.      Electronically signed by: Spenser Stallworth  Date:    02/28/2024  Time:    11:07    and X-Ray Chest PA and Lateral (CXR): No results found for this visit on 02/26/24.  Assessment and Plan:   Patient who presents with NSTEMI/?SIRS. Stable CV wise. Continue OMT. Tentative plans for [facility] tmw.    * NSTEMI (non-ST elevated  myocardial infarction)  -See plan under elevated troponin    Abnormal EKG  -See plan under elevated troponin    SIRS (systemic inflammatory response syndrome)  -Mgmt as per primary team  -BC pending      Hypomagnesemia  -Repletion as per primary team    Hypokalemia  -Repletion as per primary team      Elevated troponin  -Troponin 0.725>0.735, continue to trend  -No CP symptoms/SOB/angina  -No prior CV history  -Continue ASA, heparin gtt, statin  -EKG reviewed, T wave inversions inferiorly and laterally  -Limited TTE pending  -Will consider ischemic workup with MPI stress test this admission    2/27/24  -Troponin bumped to 1.2 overnight  -Remains CP free  -Continue ASA, heparin gtt, statin  -TTE with normal EF  -LHC planned once more stable respiratory wise/infection ruled out    2/28/24  -CP free  -Continue ASA, heparin gtt, statin  -BB added  -LHC deferred for now given leukocytosis/SOB    2/29/24  -Stable  -WBC count improving  -Continue BB, ASA, heparin gtt, statin  -Tentative plans for [facility] tmw, keep NPO after MN    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, estrogen receptor positive  -Mgmt as per heme/onc    CKD stage 3 due to type 2 diabetes mellitus  -Monitor        VTE Risk Mitigation (From admission, onward)           Ordered     heparin 25,000 units in dextrose 5% (100 units/ml) IV bolus from bag LOW INTENSITY nomogram - [facility]  As needed (PRN)        Question:  Heparin Infusion Adjustment (DO NOT MODIFY ANSWER)  Answer:  \\[facility]\epic\Images\Pharmacy\HeparinInfusions\heparin LOW INTENSITY nomogram for [facility] [id number]    02/26/24 1404     heparin 25,000 units in dextrose 5% (100 units/ml) IV bolus from bag LOW INTENSITY nomogram - [facility]  As needed (PRN)        Question:  Heparin Infusion Adjustment (DO NOT MODIFY ANSWER)  Answer:  \\[facility]\epic\Images\Pharmacy\HeparinInfusions\heparin LOW INTENSITY nomogram for [facility] [id number]    02/26/24 1404     heparin 25,000 units in  dextrose 5% 250 mL (100 units/mL) infusion LOW INTENSITY nomogram - [facility]  Continuous        Question:  Begin at (units/kg/hr)  Answer:  12    02/26/24 1404     IP VTE HIGH RISK PATIENT  Once         02/26/24 1204     Place sequential compression device  Until discontinued         02/26/24 1204                    Sandy Rodriguez PA-C  Cardiology  [facility] - Telemetry ([facility])

## 2024-02-29 NOTE — ASSESSMENT & PLAN NOTE
No prior hx of CAD  Troponin 0.7 --> 1.3 --> 2.8  EKG with SR PACS and ST-Twave abnormalities  Cardiology following  On heparin drip  TTE as below  Plans for [facility] pending    Echo    Result Date: 2/26/2024    Left Ventricle: The left ventricle is normal in size. There is   concentric remodeling. There is normal systolic function with a visually   estimated ejection fraction of 55 - 60%. There is indeterminate diastolic   function.    Right Ventricle: Right ventricle was not well visualized due to poor   acoustic window. Normal right ventricular cavity size. Systolic function   is normal.    Pulmonary Artery: The estimated pulmonary artery systolic pressure is   37 mmHg.    IVC/SVC: Normal venous pressure at 3 mmHg.

## 2024-02-29 NOTE — PROGRESS NOTES
"[facility] [facility] ([facility] Medicine  Progress Note    Patient Name: Mark Dickerson Sr.  MRN: [id number]  Patient Class: IP- Inpatient   Admission Date: 2/26/2024  Length of Stay: 1 days  Attending Physician: Judah Oden MD  Primary Care Provider: Tatyana Cannon MD        Subjective:     Principal Problem:NSTEMI (non-ST elevated myocardial infarction)        HPI:  Mark Dickerson Sr. is a 84 y.o. male patient with a PMHx of metastatic breast cancer (on chemo), HLD, HTN, DM II, CKD who presents to the ED for evaluation of AMS which onset this morning PTA. Per daughter in law she visited the pt early this morning and states that he was normal for a short period before becoming extremely disoriented with jumbled speech.  Patient mental status returned to baseline piror to ed arrival.  Patient reports he feels "dehydrated", he also reports diarrhea earlier last week but this has since resolved.  Patient denies any fever, chills, N/V, SOB and chest pain.   In the ED, work up notable for WBC 14, Na 130, K 3.0, mag 1.5, , Trop 0.7.  EKG with SR PACs, ST and T wave abnormality. CT head negative for acute processes.  Chest xray no acute processes. Patient started on IVF and given zofran for nausea.  Patient will be admitted to observation for electrolyte imbalances, elevated trop.  Cards consulted.     Code Status DNR  Surrogate Decision maker daughter    Overview/Hospital Course:  2/27  Admitted for NSTEMI, currently on heparin drip  Patient awake, alert, oriented, denies fevers/chills, chest pain  WBC 26k, BP stable low-normal range, afebrile  Blood cultures ordered, lactic acid 1.0  CXR today with worsening bilateral infiltrates  Covid and influenza negative  Lasix 20 mg IV one time  Cardiology following, possible LHC tomorrow pending progression    2/28  Tmax 99.1 F, reports chills, WBC trending up 34k  CXR reivewed, non-specific ground glass opacities  Currently O2 sats stable on RA  Blood cultures " pending, check procalcitonin  Start empiric coverage with ceftriaxone  Cardiology following, on heparin drip, [facility] deferred      Review of Systems   All other systems reviewed and are negative.    Objective:     Vital Signs (Most Recent):  Temp: 97.3 °F (36.3 °C) (02/28/24 0725)  Pulse: 99 (02/28/24 0725)  Resp: 18 (02/28/24 0725)  BP: 127/64 (02/28/24 0725)  SpO2: 96 % (02/28/24 0725) Vital Signs (24h Range):  Temp:  [97.3 °F (36.3 °C)-99.1 °F (37.3 °C)] 97.3 °F (36.3 °C)  Pulse:  [] 99  Resp:  [18] 18  SpO2:  [89 %-96 %] 96 %  BP: ()/(53-65) 127/64     Weight: 111.3 kg (245 lb 4.8 oz)  Body mass index is 35.2 kg/m².    Intake/Output Summary (Last 24 hours) at 2/28/2024 0827  Last data filed at 2/27/2024 2007  Gross per 24 hour   Intake 505.93 ml   Output --   Net 505.93 ml         Physical Exam  Constitutional:       General: He is not in acute distress.     Appearance: Normal appearance. He is obese.   Cardiovascular:      Rate and Rhythm: Normal rate and regular rhythm.      Heart sounds: Murmur heard.   Pulmonary:      Effort: Pulmonary effort is normal. No respiratory distress.      Breath sounds: Normal breath sounds. No wheezing.      Comments: Decreased BS  Abdominal:      General: There is no distension.      Palpations: Abdomen is soft.      Tenderness: There is no abdominal tenderness.   Skin:     Coloration: Skin is pale.   Neurological:      Mental Status: He is alert.             Significant Labs: All pertinent labs within the past 24 hours have been reviewed.  Recent Lab Results  (Last 5 results in the past 24 hours)        02/28/24  0535   02/28/24  0105   02/27/24  2107   02/27/24  1856   02/27/24  1630        Influenza A, Molecular               Influenza B, Molecular               Albumin       2.9         ALP       97         ALT       34         Anion Gap 11       12         Aniso Slight       Slight         PTT 55.5  Comment: Refer to local heparin nomogram for intensity/dose  specific   therapeutic   range.     47.4  Comment: Refer to local heparin nomogram for intensity/dose specific   therapeutic   range.               AST       57         Bands       6.0         Baso # CANCELED  Comment: Result canceled by the ancillary.               Basophil % 0.0       0.0         BILIRUBIN TOTAL       0.5  Comment: For infants and newborns, interpretation of results should be based  on gestational age, weight and in agreement with clinical  observations.    Premature Infant recommended reference ranges:  Up to 24 hours.............<8.0 mg/dL  Up to 48 hours............<12.0 mg/dL  3-5 days..................<15.0 mg/dL  6-29 days.................<15.0 mg/dL             Comment: Values of less than 100 pg/ml are consistent with non-CHF populations.               BUN 15       12         Calcium 8.5       9.2         Chloride 97       96         CO2 24       23         Creatinine 1.0       1.1         Differential Method Manual       Manual         eGFR >60       >60         Eos # CANCELED  Comment: Result canceled by the ancillary.               Eos % 0.0       2.0         Flu A & B Source               Glucose 150       211         Gran % 67.0       77.0         Hematocrit 30.6       31.7         Hemoglobin 10.2       10.9         Immature Grans (Abs) CANCELED  Comment: Mild elevation in immature granulocytes is non specific and   can be seen in a variety of conditions including stress response,   acute inflammation, trauma and pregnancy. Correlation with other   laboratory and clinical findings is essential.    Result canceled by the ancillary.         CANCELED  Comment: Mild elevation in immature granulocytes is non specific and   can be seen in a variety of conditions including stress response,   acute inflammation, trauma and pregnancy. Correlation with other   laboratory and clinical findings is essential.    Result canceled by the ancillary.           Immature Granulocytes  CANCELED  Comment: Result canceled by the ancillary.       CANCELED  Comment: Result canceled by the ancillary.         Lymph # CANCELED  Comment: Result canceled by the ancillary.               Lymph % 19.0       4.0         Magnesium  1.7               MCH 29.5       30.1         MCHC 33.3       34.4         MCV 88       88         Metamyelocytes       4.0         Mono # CANCELED  Comment: Result canceled by the ancillary.               Mono % 14.0       4.0         MPV 10.9       10.7         Myelocytes       3.0         nRBC 0       0         Phosphorus Level 2.5               Platelet Estimate Appears normal       Appears normal         Platelet Count 293       288         POCT Glucose     245           Poikilocytosis       Slight         Potassium 3.3       3.5         PROTEIN TOTAL       6.6         RBC 3.46       3.62         RDW 15.6       15.2         SARS-CoV-2 RNA, Amplification, Qual               Schistocytes Present               Sodium 132       131         Teardrop Cells Occasional               Troponin I       2.601  Comment: The reference interval for Troponin I represents the 99th percentile   cutoff   for our facility and is consistent with 3rd generation assay   performance.     2.789  Comment: The reference interval for Troponin I represents the 99th percentile   cutoff   for our facility and is consistent with 3rd generation assay   performance.         WBC 34.56       32.33                                Significant Imaging: I have reviewed all pertinent imaging results/findings within the past 24 hours.    X-Ray Chest AP Portable   Final Result      Worsening moderate bilateral infiltrates.  Follow-up is recommended.         Electronically signed by: Shyla Giles MD   Date:    02/27/2024   Time:    10:18      CTA Chest Non-Coronary (PE Studies)   Final Result      No pulmonary embolism.  No dissection.  Atherosclerotic changes.  Mild interstitial opacities.  Correlate clinically for  element of fibrosis versus CHF.  Pulmonary micronodularity.  Recommend follow-up.      All CT scans   are performed using dose optimization techniques including the following: automated exposure control; adjustment of the mA and/or kV; use of iterative reconstruction technique.  Dose modulation was employed for JD by means of: Automated exposure control; adjustment of the mA and/or kV according to patient size (this includes techniques or standardized protocols for targeted exams where dose is matched to indication/reason for exam; i.e. extremities or head); and/or use of iterative reconstructive technique.         Electronically signed by: Jhoan Roman   Date:    02/26/2024   Time:    19:11      CT Head Without Contrast   Final Result      Chronic microvascular ischemic changes.  No intracranial hemorrhage.      All CT scans at this facility use dose modulation, iterative reconstruction, and/or weight based dosing when appropriate to reduce radiation dose to as low as reasonable achievable.         Electronically signed by: Earl Mendez MD   Date:    02/26/2024   Time:    10:36      X-Ray Chest AP Portable   Final Result      No acute process seen.         Electronically signed by: Earl Mendez MD   Date:    02/26/2024   Time:    10:02            Assessment/Plan:      * NSTEMI (non-ST elevated myocardial infarction)  No prior hx of CAD  Troponin 0.7 --> 1.3 --> 2.8  EKG with SR PACS and ST-Twave abnormalities  Cardiology following  On heparin drip  TTE as below  Plans for [facility] pending    Echo    Result Date: 2/26/2024    Left Ventricle: The left ventricle is normal in size. There is   concentric remodeling. There is normal systolic function with a visually   estimated ejection fraction of 55 - 60%. There is indeterminate diastolic   function.    Right Ventricle: Right ventricle was not well visualized due to poor   acoustic window. Normal right ventricular cavity size. Systolic function   is normal.     Pulmonary Artery: The estimated pulmonary artery systolic pressure is   37 mmHg.    IVC/SVC: Normal venous pressure at 3 mmHg.           Elevated troponin  As above    SIRS (systemic inflammatory response syndrome)  2/27  Trending up 26k  Blood cultures ordered, lactic acid 1.0  Covid and influenza negative  Trend labs for now, possibly reactive  Low threshold to start empiric antibiotics    2/28  WBC trending up 34k, tachycardic, reports chills, SOB  CXR with possible early pneumonia, check procalcitonin  Start empiric coverage with ceftriaxone    Hypokalemia  Patient has hypokalemia which is Acute on Chronic and currently uncontrolled. Most recent potassium levels reviewed-   Lab Results   Component Value Date    K 3.3 (L) 02/28/2024   . Will continue potassium replacement per protocol and recheck repeat levels after replacement completed.     Hypomagnesemia  Patient has Abnormal Magnesium: hypomagnesemia. Will continue to monitor electrolytes closely. Will replace the affected electrolytes and repeat labs to be done after interventions completed. The patient's magnesium results have been reviewed and are listed below.  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 BMP reviewed- noted Estimated Creatinine Clearance: 66.2 mL/min (based on SCr of 1 mg/dL). according to latest data. Based on current GFR, CKD stage is stage 3 - GFR 30-59.  Monitor UOP and serial BMP and adjust therapy as needed. Renally dose meds. Avoid nephrotoxic medications and procedures    Controlled type 2 diabetes mellitus without complication, without long-term current use of insulin  Patient's FSGs are controlled on current medication regimen.  Last A1c reviewed-   Lab Results   Component Value Date    HGBA1C 7.1 (H) 11/28/2023     Most recent fingerstick glucose reviewed-   Recent Labs   Lab 02/28/24  1208 02/28/24  1646   POCTGLUCOSE 234* 167*       Current  correctional scale  Low  Maintain anti-hyperglycemic dose as follows-   Antihyperglycemics (From admission, onward)      Start     Stop Route Frequency Ordered    02/26/24 1301  insulin aspart U-100 pen 0-5 Units         -- SubQ Before meals & nightly PRN 02/26/24 1204          Hold Oral hypoglycemics while patient is in the hospital.    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 Mendoza at [facility], last chemo session one week prior  Follow up outpatient       VTE Risk Mitigation (From admission, onward)           Ordered     heparin 25,000 units in dextrose 5% (100 units/ml) IV bolus from bag LOW INTENSITY nomogram - [facility]  As needed (PRN)        Question:  Heparin Infusion Adjustment (DO NOT MODIFY ANSWER)  Answer:  \\[facility].[employer]\epic\Images\Pharmacy\HeparinInfusions\heparin LOW INTENSITY nomogram for [facility] [id number].pdf    02/26/24 1404     heparin 25,000 units in dextrose 5% (100 units/ml) IV bolus from bag LOW INTENSITY nomogram - [facility]  As needed (PRN)        Question:  Heparin Infusion Adjustment (DO NOT MODIFY ANSWER)  Answer:  \\[facility]\epic\Images\Pharmacy\HeparinInfusions\heparin LOW INTENSITY nomogram for [facility] [id number].pdf    02/26/24 1404     heparin 25,000 units in dextrose 5% 250 mL (100 units/mL) infusion LOW INTENSITY nomogram - [facility]  Continuous        Question:  Begin at (units/kg/hr)  Answer:  12    02/26/24 1404     IP VTE HIGH RISK PATIENT  Once         02/26/24 1204     Place sequential compression device  Until discontinued         02/26/24 1204                    Discharge Planning   DE:      Code Status: DNR   Is the patient medically ready for discharge?:     Reason for patient still in hospital (select all that apply): Patient trending condition, Laboratory test, Treatment, Imaging, and Consult recommendations  Discharge Plan A: Home Health                  Judah Oden MD  Department of Hospital Medicine   [facility] -  Telemetry ([facility])

## 2024-02-29 NOTE — ASSESSMENT & PLAN NOTE
2/27  Trending up 26k  Blood cultures ordered, lactic acid 1.0  Covid and influenza negative  Trend labs for now, possibly reactive  Low threshold to start empiric antibiotics    2/28  WBC trending up 34k, tachycardic, reports chills, SOB  CXR with possible early pneumonia, check procalcitonin  Start empiric coverage with ceftriaxone

## 2024-02-29 NOTE — PLAN OF CARE
A225/A225 JOHANNY Dickerson . is a 84 y.o.male admitted on 2/26/2024 for NSTEMI (non-ST elevated myocardial infarction)   Code Status: DNR MRN: [id number]   Review of patient's allergies indicates:   Allergen Reactions    Grass pollen-anai grass standard      Past Medical History:   Diagnosis Date    Chest pain 2/26/2024    CKD stage 3 due to type 2 diabetes mellitus 2/18/2021    DM (diabetes mellitus) 2014    BS didn't check 12/11/2019    DM (diabetes mellitus) 2014    BS didn't check 05/05/2022    Foreign body, eye     right eye    Hyperlipidemia     Hypertension     Need for shingles vaccine 11/20/2019    NSTEMI (non-ST elevated myocardial infarction) 2/27/2024    Pneumonia     Primary osteoarthritis of right knee 10/29/2021    Severe obesity (BMI 35.0-35.9 with comorbidity) 7/10/2019    Type 2 diabetes mellitus 2014    BS didn't check 12/12/2018      PRN meds    acetaminophen, 650 mg, Q4H PRN  cyclobenzaprine, 10 mg, TID PRN  dextrose 10%, 12.5 g, PRN  dextrose 10%, 25 g, PRN  glucagon (human recombinant), 1 mg, PRN  glucose, 16 g, PRN  glucose, 24 g, PRN  heparin (PORCINE), 45.3 Units/kg (Adjusted), PRN  heparin (PORCINE), 30 Units/kg (Adjusted), PRN  HYDROcodone-acetaminophen, 1 tablet, Q6H PRN  insulin aspart U-100, 0-5 Units, QID (AC + HS) PRN  melatonin, 6 mg, Nightly PRN  naloxone, 0.02 mg, PRN  ondansetron, 4 mg, Q8H PRN  sodium chloride 0.9%, 10 mL, Q12H PRN      Chart check completed. Will continue plan of care.      Orientation: oriented x 4  William Coma Scale Score: 15     Lead Monitored: Lead II Rhythm: normal sinus rhythm Frequency/Ectopy: PVCs  Cardiac/Telemetry Box Number: [id number]  VTE Required Core Measure: Pharmacological prophylaxis initiated/maintained Last Bowel Movement: 02/25/24  Diet Cardiac     Sunil Score: 17  Fall Risk Score: 11  Accucheck []   Freq?      Lines/Drains/Airways       Central Venous Catheter Line  Duration                  PowerPort A Cath Single Lumen 01/05/24 0162  Internal Jugular Right 54 days              Peripheral Intravenous Line  Duration                  Peripheral IV - Single Lumen 02/27/24 0805 22 G Anterior;Distal;Left Forearm 1 day                       Problem: Adult Inpatient Plan of Care  Goal: Plan of Care Review  Outcome: Ongoing, Progressing  Goal: Patient-Specific Goal (Individualized)  Outcome: Ongoing, Progressing  Goal: Absence of Hospital-Acquired Illness or Injury  Outcome: Ongoing, Progressing  Goal: Optimal Comfort and Wellbeing  Outcome: Ongoing, Progressing  Goal: Readiness for Transition of Care  Outcome: Ongoing, Progressing

## 2024-02-29 NOTE — SUBJECTIVE & OBJECTIVE
Review of Systems   All other systems reviewed and are negative.    Objective:     Vital Signs (Most Recent):  Temp: 98.1 °F (36.7 °C) (02/29/24 1219)  Pulse: 98 (02/29/24 1219)  Resp: 16 (02/29/24 1219)  BP: (!) 104/56 (02/29/24 1300)  SpO2: 96 % (02/29/24 1219) Vital Signs (24h Range):  Temp:  [97.5 °F (36.4 °C)-98.6 °F (37 °C)] 98.1 °F (36.7 °C)  Pulse:  [] 98  Resp:  [15-20] 16  SpO2:  [92 %-97 %] 96 %  BP: ()/(51-68) 104/56     Weight: 105.2 kg (231 lb 14.8 oz)  Body mass index is 33.28 kg/m².    Intake/Output Summary (Last 24 hours) at 2/29/2024 1527  Last data filed at 2/28/2024 1927  Gross per 24 hour   Intake 534.34 ml   Output 1 ml   Net 533.34 ml           Physical Exam  Constitutional:       General: He is not in acute distress.     Appearance: Normal appearance. He is obese.   Cardiovascular:      Rate and Rhythm: Normal rate and regular rhythm.      Heart sounds: Murmur heard.   Pulmonary:      Effort: Pulmonary effort is normal. No respiratory distress.      Breath sounds: Normal breath sounds. No wheezing.      Comments: Decreased BS  Abdominal:      General: There is no distension.      Palpations: Abdomen is soft.      Tenderness: There is no abdominal tenderness.   Skin:     Coloration: Skin is pale.   Neurological:      Mental Status: He is alert.             Significant Labs: All pertinent labs within the past 24 hours have been reviewed.  Recent Lab Results  (Last 5 results in the past 24 hours)        02/29/24  1159   02/29/24  0444   02/29/24  0443   02/28/24  2240   02/28/24  1646        Procalcitonin       0.14  Comment: A concentration < 0.25 ng/mL represents a low risk of bacterial   infection.  Procalcitonin may not be accurate among patients with localized   infection, recent trauma or major surgery, immunosuppressed state,   invasive fungal infection, renal dysfunction. Decisions regarding   initiation or continuation of antibiotic therapy should not be based   solely on  procalcitonin levels.           Anion Gap   10             PTT     51.5  Comment: Refer to local heparin nomogram for intensity/dose specific   therapeutic   range.             Basophil %     0.0           BUN   15             Calcium   8.7             Chloride   95             CO2   28             Creatinine   1.0             Differential Method     Manual           eGFR   >60             Eos %     1.0           Glucose   145             Gran %     83.0           Hematocrit     29.7           Hemoglobin     10.0           Immature Grans (Abs)     CANCELED  Comment: Mild elevation in immature granulocytes is non specific and   can be seen in a variety of conditions including stress response,   acute inflammation, trauma and pregnancy. Correlation with other   laboratory and clinical findings is essential.    Result canceled by the ancillary.             Immature Granulocytes     CANCELED  Comment: Result canceled by the ancillary.           Lymph %     8.0           Magnesium    1.5             MCH     29.9           MCHC     33.7           MCV     89           Metamyelocytes     1.0           Mono %     5.0           MPV     10.6           Myelocytes     2.0           nRBC     0           Phosphorus Level   3.0             Platelet Estimate     Appears normal           Platelet Count     275           POCT Glucose 168         167       Poly     Occasional           Potassium   3.7             RBC     3.35           RDW     15.4           Sodium   133             Stomatocytes     Present           Teardrop Cells     Occasional           Toxic Granulation     Present           WBC     33.01                                  Significant Imaging: I have reviewed all pertinent imaging results/findings within the past 24 hours.    X-Ray Chest 1 View   Final Result      As above.         Electronically signed by: Spenser Stallworth   Date:    02/28/2024   Time:    11:07      X-Ray Chest AP Portable   Final Result      Worsening  moderate bilateral infiltrates.  Follow-up is recommended.         Electronically signed by: Shyla Giles MD   Date:    02/27/2024   Time:    10:18      CTA Chest Non-Coronary (PE Studies)   Final Result      No pulmonary embolism.  No dissection.  Atherosclerotic changes.  Mild interstitial opacities.  Correlate clinically for element of fibrosis versus CHF.  Pulmonary micronodularity.  Recommend follow-up.      All CT scans   are performed using dose optimization techniques including the following: automated exposure control; adjustment of the mA and/or kV; use of iterative reconstruction technique.  Dose modulation was employed for ALARA by means of: Automated exposure control; adjustment of the mA and/or kV according to patient size (this includes techniques or standardized protocols for targeted exams where dose is matched to indication/reason for exam; i.e. extremities or head); and/or use of iterative reconstructive technique.         Electronically signed by: Jhoan Roman   Date:    02/26/2024   Time:    19:11      CT Head Without Contrast   Final Result      Chronic microvascular ischemic changes.  No intracranial hemorrhage.      All CT scans at this facility use dose modulation, iterative reconstruction, and/or weight based dosing when appropriate to reduce radiation dose to as low as reasonable achievable.         Electronically signed by: Earl Mendez MD   Date:    02/26/2024   Time:    10:36      X-Ray Chest AP Portable   Final Result      No acute process seen.         Electronically signed by: Earl Mendez MD   Date:    02/26/2024   Time:    10:02

## 2024-02-29 NOTE — ASSESSMENT & PLAN NOTE
Creatine stable for now. BMP reviewed- noted Estimated Creatinine Clearance: 66.2 mL/min (based on SCr of 1 mg/dL). according to latest data. Based on current GFR, CKD stage is stage 3 - GFR 30-59.  Monitor UOP and serial BMP and adjust therapy as needed. Renally dose meds. Avoid nephrotoxic medications and procedures

## 2024-02-29 NOTE — PLAN OF CARE
A225/A225 JOHANNY Dickerson . is a 84 y.o.male admitted on 2/26/2024 for NSTEMI (non-ST elevated myocardial infarction)   Code Status: DNR MRN: [id number]   Review of patient's allergies indicates:   Allergen Reactions    Grass pollen-anai grass standard      Past Medical History:   Diagnosis Date    Chest pain 2/26/2024    CKD stage 3 due to type 2 diabetes mellitus 2/18/2021    DM (diabetes mellitus) 2014    BS didn't check 12/11/2019    DM (diabetes mellitus) 2014    BS didn't check 05/05/2022    Foreign body, eye     right eye    Hyperlipidemia     Hypertension     Need for shingles vaccine 11/20/2019    NSTEMI (non-ST elevated myocardial infarction) 2/27/2024    Pneumonia     Primary osteoarthritis of right knee 10/29/2021    Severe obesity (BMI 35.0-35.9 with comorbidity) 7/10/2019    Type 2 diabetes mellitus 2014    BS didn't check 12/12/2018      PRN meds    acetaminophen, 650 mg, Q4H PRN  cyclobenzaprine, 10 mg, TID PRN  dextrose 10%, 12.5 g, PRN  dextrose 10%, 25 g, PRN  glucagon (human recombinant), 1 mg, PRN  glucose, 16 g, PRN  glucose, 24 g, PRN  heparin (PORCINE), 45.3 Units/kg (Adjusted), PRN  heparin (PORCINE), 30 Units/kg (Adjusted), PRN  HYDROcodone-acetaminophen, 1 tablet, Q6H PRN  insulin aspart U-100, 0-5 Units, QID (AC + HS) PRN  melatonin, 6 mg, Nightly PRN  naloxone, 0.02 mg, PRN  ondansetron, 4 mg, Q8H PRN  sodium chloride 0.9%, 10 mL, Q12H PRN      Chart check completed. Will continue plan of care.      Orientation: oriented x 4  William Coma Scale Score: 15     Lead Monitored: Lead II Rhythm: normal sinus rhythm Frequency/Ectopy: PVCs  Cardiac/Telemetry Box Number: [id number]  VTE Required Core Measure: Pharmacological prophylaxis initiated/maintained Last Bowel Movement: 02/25/24  Diet Cardiac     Sunil Score: 17  Fall Risk Score: 11  Accucheck [x]   Freq?      Lines/Drains/Airways       Central Venous Catheter Line  Duration                  PowerPort A Cath Single Lumen 01/05/24 8934  Internal Jugular Right 55 days              Peripheral Intravenous Line  Duration                  Peripheral IV - Single Lumen 02/27/24 0805 22 G Anterior;Distal;Left Forearm 2 days                       Problem: Adult Inpatient Plan of Care  Goal: Plan of Care Review  Outcome: Ongoing, Progressing  Goal: Patient-Specific Goal (Individualized)  Outcome: Ongoing, Progressing  Goal: Absence of Hospital-Acquired Illness or Injury  Outcome: Ongoing, Progressing  Goal: Optimal Comfort and Wellbeing  Outcome: Ongoing, Progressing  Goal: Readiness for Transition of Care  Outcome: Ongoing, Progressing

## 2024-02-29 NOTE — ASSESSMENT & PLAN NOTE
-Troponin 0.725>0.735, continue to trend  -No CP symptoms/SOB/angina  -No prior CV history  -Continue ASA, heparin gtt, statin  -EKG reviewed, T wave inversions inferiorly and laterally  -Limited TTE pending  -Will consider ischemic workup with MPI stress test this admission    2/27/24  -Troponin bumped to 1.2 overnight  -Remains CP free  -Continue ASA, heparin gtt, statin  -TTE with normal EF  -LHC planned once more stable respiratory wise/infection ruled out    2/28/24  -CP free  -Continue ASA, heparin gtt, statin  -BB added  -LHC deferred for now given leukocytosis/SOB    2/29/24  -Stable  -WBC count improving  -Continue BB, ASA, heparin gtt, statin  -Tentative plans for LHC tmw, keep NPO after MN

## 2024-02-29 NOTE — ASSESSMENT & PLAN NOTE
Patient has hypokalemia which is Acute on Chronic and currently uncontrolled. Most recent potassium levels reviewed-   Lab Results   Component Value Date    K 3.3 (L) 02/28/2024   . Will continue potassium replacement per protocol and recheck repeat levels after replacement completed.

## 2024-02-29 NOTE — PROGRESS NOTES
"[facility] ([facility] Medicine  Progress Note    Patient Name: Mark Dickerson Sr.  MRN: [id number]  Patient Class: IP- Inpatient   Admission Date: 2/26/2024  Length of Stay: 2 days  Attending Physician: Judah Oden MD  Primary Care Provider: Tatyana Cannon MD        Subjective:     Principal Problem:NSTEMI (non-ST elevated myocardial infarction)        HPI:  Mark Dickerson Sr. is a 84 y.o. male patient with a PMHx of metastatic breast cancer (on chemo), HLD, HTN, DM II, CKD who presents to the ED for evaluation of AMS which onset this morning PTA. Per daughter in law she visited the pt early this morning and states that he was normal for a short period before becoming extremely disoriented with jumbled speech.  Patient mental status returned to baseline piror to ed arrival.  Patient reports he feels "dehydrated", he also reports diarrhea earlier last week but this has since resolved.  Patient denies any fever, chills, N/V, SOB and chest pain.   In the ED, work up notable for WBC 14, Na 130, K 3.0, mag 1.5, , Trop 0.7.  EKG with SR PACs, ST and T wave abnormality. CT head negative for acute processes.  Chest xray no acute processes. Patient started on IVF and given zofran for nausea.  Patient will be admitted to observation for electrolyte imbalances, elevated trop.  Cards consulted.     Code Status DNR  Surrogate Decision maker daughter    Overview/Hospital Course:  2/27  Admitted for NSTEMI, currently on heparin drip  Patient awake, alert, oriented, denies fevers/chills, chest pain  WBC 26k, BP stable low-normal range, afebrile  Blood cultures ordered, lactic acid 1.0  CXR today with worsening bilateral infiltrates  Covid and influenza negative  Lasix 20 mg IV one time  Cardiology following, possible LHC tomorrow pending progression    2/28  Tmax 99.1 F, reports chills, WBC trending up 34k  CXR reivewed, non-specific ground glass opacities  Currently O2 sats stable on RA  Blood cultures " pending, check procalcitonin  Start empiric coverage with ceftriaxone  Cardiology following, on heparin drip, LHC deferred    2/29  Afebrile, tachycardic, WBC 33k  Procalcitonin 0.14, blood cultures NGTD  Continue empiric coverage with ceftriaxone  Cardiology following, possible LHC tomorrow  Patient sitting in chair, denies complaints, updated family at bedside      Review of Systems   All other systems reviewed and are negative.    Objective:     Vital Signs (Most Recent):  Temp: 98.1 °F (36.7 °C) (02/29/24 1219)  Pulse: 98 (02/29/24 1219)  Resp: 16 (02/29/24 1219)  BP: (!) 104/56 (02/29/24 1300)  SpO2: 96 % (02/29/24 1219) Vital Signs (24h Range):  Temp:  [97.5 °F (36.4 °C)-98.6 °F (37 °C)] 98.1 °F (36.7 °C)  Pulse:  [] 98  Resp:  [15-20] 16  SpO2:  [92 %-97 %] 96 %  BP: ()/(51-68) 104/56     Weight: 105.2 kg (231 lb 14.8 oz)  Body mass index is 33.28 kg/m².    Intake/Output Summary (Last 24 hours) at 2/29/2024 1527  Last data filed at 2/28/2024 1927  Gross per 24 hour   Intake 534.34 ml   Output 1 ml   Net 533.34 ml           Physical Exam  Constitutional:       General: He is not in acute distress.     Appearance: Normal appearance. He is obese.   Cardiovascular:      Rate and Rhythm: Normal rate and regular rhythm.      Heart sounds: Murmur heard.   Pulmonary:      Effort: Pulmonary effort is normal. No respiratory distress.      Breath sounds: Normal breath sounds. No wheezing.      Comments: Decreased BS  Abdominal:      General: There is no distension.      Palpations: Abdomen is soft.      Tenderness: There is no abdominal tenderness.   Skin:     Coloration: Skin is pale.   Neurological:      Mental Status: He is alert.             Significant Labs: All pertinent labs within the past 24 hours have been reviewed.  Recent Lab Results  (Last 5 results in the past 24 hours)        02/29/24  1159   02/29/24  0444   02/29/24  0443   02/28/24  2240   02/28/24  1646        Procalcitonin        0.14  Comment: A concentration < 0.25 ng/mL represents a low risk of bacterial   infection.  Procalcitonin may not be accurate among patients with localized   infection, recent trauma or major surgery, immunosuppressed state,   invasive fungal infection, renal dysfunction. Decisions regarding   initiation or continuation of antibiotic therapy should not be based   solely on procalcitonin levels.           Anion Gap   10             PTT     51.5  Comment: Refer to local heparin nomogram for intensity/dose specific   therapeutic   range.             Basophil %     0.0           BUN   15             Calcium   8.7             Chloride   95             CO2   28             Creatinine   1.0             Differential Method     Manual           eGFR   >60             Eos %     1.0           Glucose   145             Gran %     83.0           Hematocrit     29.7           Hemoglobin     10.0           Immature Grans (Abs)     CANCELED  Comment: Mild elevation in immature granulocytes is non specific and   can be seen in a variety of conditions including stress response,   acute inflammation, trauma and pregnancy. Correlation with other   laboratory and clinical findings is essential.    Result canceled by the ancillary.             Immature Granulocytes     CANCELED  Comment: Result canceled by the ancillary.           Lymph %     8.0           Magnesium    1.5             MCH     29.9           MCHC     33.7           MCV     89           Metamyelocytes     1.0           Mono %     5.0           MPV     10.6           Myelocytes     2.0           nRBC     0           Phosphorus Level   3.0             Platelet Estimate     Appears normal           Platelet Count     275           POCT Glucose 168         167       Poly     Occasional           Potassium   3.7             RBC     3.35           RDW     15.4           Sodium   133             Stomatocytes     Present           Teardrop Cells     Occasional           Toxic  Granulation     Present           WBC     33.01                                  Significant Imaging: I have reviewed all pertinent imaging results/findings within the past 24 hours.    X-Ray Chest 1 View   Final Result      As above.         Electronically signed by: Spenser Stallworth   Date:    02/28/2024   Time:    11:07      X-Ray Chest AP Portable   Final Result      Worsening moderate bilateral infiltrates.  Follow-up is recommended.         Electronically signed by: Shyla Giles MD   Date:    02/27/2024   Time:    10:18      CTA Chest Non-Coronary (PE Studies)   Final Result      No pulmonary embolism.  No dissection.  Atherosclerotic changes.  Mild interstitial opacities.  Correlate clinically for element of fibrosis versus CHF.  Pulmonary micronodularity.  Recommend follow-up.      All CT scans   are performed using dose optimization techniques including the following: automated exposure control; adjustment of the mA and/or kV; use of iterative reconstruction technique.  Dose modulation was employed for ALARA by means of: Automated exposure control; adjustment of the mA and/or kV according to patient size (this includes techniques or standardized protocols for targeted exams where dose is matched to indication/reason for exam; i.e. extremities or head); and/or use of iterative reconstructive technique.         Electronically signed by: Jhoan Roman   Date:    02/26/2024   Time:    19:11      CT Head Without Contrast   Final Result      Chronic microvascular ischemic changes.  No intracranial hemorrhage.      All CT scans at this facility use dose modulation, iterative reconstruction, and/or weight based dosing when appropriate to reduce radiation dose to as low as reasonable achievable.         Electronically signed by: Earl Mendez MD   Date:    02/26/2024   Time:    10:36      X-Ray Chest AP Portable   Final Result      No acute process seen.         Electronically signed by: Earl Mendez MD    Date:    02/26/2024   Time:    10:02            Assessment/Plan:      * NSTEMI (non-ST elevated myocardial infarction)  No prior hx of CAD  Troponin 0.7 --> 1.3 --> 2.8  EKG with SR PACS and ST-Twave abnormalities  Cardiology following  On heparin drip  TTE as below  Plans for [facility] pending    Echo    Result Date: 2/26/2024    Left Ventricle: The left ventricle is normal in size. There is   concentric remodeling. There is normal systolic function with a visually   estimated ejection fraction of 55 - 60%. There is indeterminate diastolic   function.    Right Ventricle: Right ventricle was not well visualized due to poor   acoustic window. Normal right ventricular cavity size. Systolic function   is normal.    Pulmonary Artery: The estimated pulmonary artery systolic pressure is   37 mmHg.    IVC/SVC: Normal venous pressure at 3 mmHg.           Elevated troponin  As above    SIRS (systemic inflammatory response syndrome)  2/27  Trending up 26k  Blood cultures ordered, lactic acid 1.0  Covid and influenza negative  Trend labs for now, possibly reactive  Low threshold to start empiric antibiotics    2/28  WBC trending up 34k, tachycardic, reports chills, SOB  CXR with possible early pneumonia, check procalcitonin  Start empiric coverage with ceftriaxone    Hypokalemia  Patient has hypokalemia which is Acute on Chronic and currently uncontrolled. Most recent potassium levels reviewed-   Lab Results   Component Value Date    K 3.3 (L) 02/28/2024   . Will continue potassium replacement per protocol and recheck repeat levels after replacement completed.     Hypomagnesemia  Patient has Abnormal Magnesium: hypomagnesemia. Will continue to monitor electrolytes closely. Will replace the affected electrolytes and repeat labs to be done after interventions completed. The patient's magnesium results have been reviewed and are listed below.  Recent Labs   Lab 02/28/24  0535   MG 1.7          CKD stage 3 due to type 2 diabetes  mellitus  Creatine stable for now. BMP reviewed- noted Estimated Creatinine Clearance: 66.2 mL/min (based on SCr of 1 mg/dL). according to latest data. Based on current GFR, CKD stage is stage 3 - GFR 30-59.  Monitor UOP and serial BMP and adjust therapy as needed. Renally dose meds. Avoid nephrotoxic medications and procedures    Controlled type 2 diabetes mellitus without complication, without long-term current use of insulin  Patient's FSGs are controlled on current medication regimen.  Last A1c reviewed-   Lab Results   Component Value Date    HGBA1C 7.1 (H) 11/28/2023     Most recent fingerstick glucose reviewed-   Recent Labs   Lab 02/28/24  1208 02/28/24  1646   POCTGLUCOSE 234* 167*       Current correctional scale  Low  Maintain anti-hyperglycemic dose as follows-   Antihyperglycemics (From admission, onward)      Start     Stop Route Frequency Ordered    02/26/24 1301  insulin aspart U-100 pen 0-5 Units         -- SubQ Before meals & nightly PRN 02/26/24 1204          Hold Oral hypoglycemics while patient is in the hospital.    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 Mendoza at [facility], last chemo session one week prior  Follow up outpatient       VTE Risk Mitigation (From admission, onward)           Ordered     heparin 25,000 units in dextrose 5% (100 units/ml) IV bolus from bag LOW INTENSITY nomogram - [facility]  As needed (PRN)        Question:  Heparin Infusion Adjustment (DO NOT MODIFY ANSWER)  Answer:  \\[email or web address]\epic\Images\Pharmacy\HeparinInfusions\heparin LOW INTENSITY nomogram for [facility] [id number]    02/26/24 1404     heparin 25,000 units in dextrose 5% (100 units/ml) IV bolus from bag LOW INTENSITY nomogram - [facility]  As needed (PRN)        Question:  Heparin Infusion Adjustment (DO NOT MODIFY ANSWER)  Answer:  \\[email or web address]\epic\Images\Pharmacy\HeparinInfusions\heparin LOW INTENSITY nomogram for [facility] [id number]    02/26/24  1404     heparin 25,000 units in dextrose 5% 250 mL (100 units/mL) infusion LOW INTENSITY nomogram - [facility]  Continuous        Question:  Begin at (units/kg/hr)  Answer:  12    02/26/24 1404     IP VTE HIGH RISK PATIENT  Once         02/26/24 1204     Place sequential compression device  Until discontinued         02/26/24 1204                    Discharge Planning   DE:      Code Status: DNR   Is the patient medically ready for discharge?:     Reason for patient still in hospital (select all that apply): Patient trending condition, Laboratory test, Treatment, Imaging, and Consult recommendations  Discharge Plan A: Home Health                  Judah Oden MD  Department of Hospital Medicine   [facility] - Telemetry ([facility])

## 2024-02-29 NOTE — SUBJECTIVE & OBJECTIVE
Review of Systems   Constitutional: Positive for malaise/fatigue.   HENT: Negative.     Eyes: Negative.    Cardiovascular: Negative.    Respiratory: Negative.     Endocrine: Negative.    Hematologic/Lymphatic: Negative.    Skin: Negative.    Musculoskeletal:  Positive for arthritis and joint pain.   Gastrointestinal: Negative.    Genitourinary: Negative.    Neurological: Negative.    Psychiatric/Behavioral: Negative.     Allergic/Immunologic: Negative.      Objective:     Vital Signs (Most Recent):  Temp: 98.1 °F (36.7 °C) (02/29/24 1219)  Pulse: 98 (02/29/24 1219)  Resp: 16 (02/29/24 1219)  BP: (!) 104/56 (02/29/24 1300)  SpO2: 96 % (02/29/24 1219) Vital Signs (24h Range):  Temp:  [97.5 °F (36.4 °C)-98.6 °F (37 °C)] 98.1 °F (36.7 °C)  Pulse:  [] 98  Resp:  [15-20] 16  SpO2:  [92 %-97 %] 96 %  BP: ()/(51-68) 104/56     Weight: 105.2 kg (231 lb 14.8 oz)  Body mass index is 33.28 kg/m².     SpO2: 96 %         Intake/Output Summary (Last 24 hours) at 2/29/2024 1411  Last data filed at 2/28/2024 1927  Gross per 24 hour   Intake 534.34 ml   Output 2 ml   Net 532.34 ml       Lines/Drains/Airways       Central Venous Catheter Line  Duration                  PowerPort A Cath Single Lumen 01/05/24 1415 Internal Jugular Right 54 days              Peripheral Intravenous Line  Duration                  Peripheral IV - Single Lumen 02/27/24 0805 22 G Anterior;Distal;Left Forearm 2 days                       Physical Exam  Vitals and nursing note reviewed.   Constitutional:       General: He is not in acute distress.     Appearance: He is well-developed. He is not ill-appearing or diaphoretic.      Comments: On supplemental O2   HENT:      Head: Normocephalic and atraumatic.   Eyes:      General:         Right eye: No discharge.         Left eye: No discharge.      Pupils: Pupils are equal, round, and reactive to light.   Cardiovascular:      Rate and Rhythm: Normal rate and regular rhythm.      Heart sounds:  Normal heart sounds, S1 normal and S2 normal. No murmur heard.  Pulmonary:      Effort: Pulmonary effort is normal. No respiratory distress.      Comments: Diminished at bases  Abdominal:      General: There is no distension.      Tenderness: There is no rebound.   Musculoskeletal:      Right lower leg: No edema.      Left lower leg: No edema.   Skin:     General: Skin is warm and dry.      Findings: No erythema.   Neurological:      General: No focal deficit present.      Mental Status: He is alert and oriented to person, place, and time.   Psychiatric:         Mood and Affect: Mood normal.         Behavior: Behavior normal.            Significant Labs: CMP   Recent Labs   Lab 02/27/24 1856 02/28/24  0535 02/29/24  0444   * 132* 133*   K 3.5 3.3* 3.7   CL 96 97 95   CO2 23 24 28   * 150* 145*   BUN 12 15 15   CREATININE 1.1 1.0 1.0   CALCIUM 9.2 8.5* 8.7   PROT 6.6  --   --    ALBUMIN 2.9*  --   --    BILITOT 0.5  --   --    ALKPHOS 97  --   --    AST 57*  --   --    ALT 34  --   --    ANIONGAP 12 11 10   , CBC   Recent Labs   Lab 02/27/24 1856 02/28/24  0535 02/29/24  0443   WBC 32.33* 34.56* 33.01*   HGB 10.9* 10.2* 10.0*   HCT 31.7* 30.6* 29.7*    293 275   , Troponin   Recent Labs   Lab 02/27/24  1630 02/27/24  1856   TROPONINI 2.789* 2.601*   , and All pertinent lab results from the last 24 hours have been reviewed.    Significant Imaging: Echocardiogram: Transthoracic echo (TTE) complete ([facility] Only):   Results for orders placed or performed during the hospital encounter of 02/26/24   Echo   Result Value Ref Range    LVIDd 4.88 3.5 - 6.0 cm    LV Systolic Volume 52.86 mL    LVIDs 3.56 2.1 - 4.0 cm    LV Diastolic Volume 111.86 mL    IVS 1.04 0.6 - 1.1 cm    FS 27 (A) 28 - 44 %    Left Ventricle Relative Wall Thickness 0.44 cm    Posterior Wall 1.08 0.6 - 1.1 cm    LV mass 189.29 g    TR Max Can 2.91 m/s    RVDD 2.81 cm    LA size 4.18 cm    Triscuspid Valve Regurgitation Peak Gradient 34  mmHg    IVC diameter 2.08 cm    LV Systolic Volume Index 23.2 mL/m2    LV Diastolic Volume Index 49.06 mL/m2    LV Mass Index 83 g/m2    ZLVIDS -2.98     ZLVIDD -5.65     TV resting pulmonary artery pressure 37 mmHg    RV TB RVSP 6 mmHg    Est. RA pres 3 mmHg    Narrative      Left Ventricle: The left ventricle is normal in size. There is   concentric remodeling. There is normal systolic function with a visually   estimated ejection fraction of 55 - 60%. There is indeterminate diastolic   function.    Right Ventricle: Right ventricle was not well visualized due to poor   acoustic window. Normal right ventricular cavity size. Systolic function   is normal.    Pulmonary Artery: The estimated pulmonary artery systolic pressure is   37 mmHg.    IVC/SVC: Normal venous pressure at 3 mmHg.     , EKG: Reviewed, and X-Ray: CXR: X-Ray Chest 1 View (CXR):   Results for orders placed or performed during the hospital encounter of 02/26/24   X-Ray Chest 1 View    Narrative    EXAMINATION:  XR CHEST 1 VIEW    CLINICAL HISTORY:  SOB, elevated WBC;.    TECHNIQUE:  Single frontal portable view of the chest was performed.    COMPARISON:  02/26/2024    FINDINGS:  Support devices: Right IJ MediPort unchanged.    Ground-glass and peripheral linear reticular interstitial opacification similar to recent prior exams, nonspecific.  Heart normal size.  No pneumothorax or pleural effusion.      Impression    As above.      Electronically signed by: Spenser Stallworth  Date:    02/28/2024  Time:    11:07    and X-Ray Chest PA and Lateral (CXR): No results found for this visit on 02/26/24.

## 2024-02-29 NOTE — PT/OT/SLP PROGRESS
Physical Therapy  Treatment    Mark Dickerson Sr.   MRN: [id number]   Admitting Diagnosis: NSTEMI (non-ST elevated myocardial infarction)    PT Received On: 02/29/24  PT Start Time: 1310     PT Stop Time: 1325    PT Total Time (min): 15 min       Billable Minutes:  Therapeutic Activity 15    Treatment Type: Treatment  PT/PTA: PTA     Number of PTA visits since last PT visit: 1       General Precautions: Standard, fall  Orthopedic Precautions: N/A  Braces: N/A  Respiratory Status: Nasal cannula, flow 2 L/min    Spiritual, Cultural Beliefs, Quaker Practices, Values that Affect Care: no    Subjective:  Communicated with patient's nurse, Mike, and completed Epic chart review prior to session.  Patient declined to participate in PT session at this time due to fatigue. Patient's family member reports patient has been up in chair twice today and she assisted him back to bed the 2nd time.     Pain/Comfort  Pain Rating 1: 0/10  Pain Rating Post-Intervention 1: 0/10    Objective:   Patient found with: telemetry, peripheral IV    Educated patient on importance of full and active participation in functional mobility training to prevent further loss of ROM and strength.   Encouraged patient to request assistance from nursing staff to get OOB at least 3x/day with all meals in addition to ambulating to/from the bathroom to promote recovery of CV endurance. Also encouraged patient to perform AROM TE to BLE throughout the day within all available planes of motion. Re enforced importance of utilizing call light to meet needs in room and not attempt to get up without staff assistance. Patient verbalized understanding of all education given and agreed to comply.    Supine scoot towards HOB: Modified Independent    Roll towards R side: Modified Independent       AM-PAC 6 CLICK MOBILITY  How much help from another person does this patient currently need?   1 = Unable, Total/Dependent Assistance  2 = A lot, Maximum/Moderate Assistance  3  = A little, Minimum/Contact Guard/Supervision  4 = None, Modified [place]/Independent    Turning over in bed (including adjusting bedclothes, sheets and blankets)?: 4  Sitting down on and standing up from a chair with arms (e.g., wheelchair, bedside commode, etc.): 1 (REF)  Moving from lying on back to sitting on the side of the bed?: 1 (REF)  Moving to and from a bed to a chair (including a wheelchair)?: 1 (REF)  Need to walk in hospital room?: 1 (REF)  Climbing 3-5 steps with a railing?: 1 (NT)  Basic Mobility Total Score: 9    AM-PAC Raw Score CMS G-Code Modifier Level of Impairment Assistance   6 % Total / Unable   7 - 9 CM 80 - 100% Maximal Assist   10 - 14 CL 60 - 80% Moderate Assist   15 - 19 CK 40 - 60% Moderate Assist   20 - 22 CJ 20 - 40% Minimal Assist   23 CI 1-20% SBA / CGA   24 CH 0% Independent/ Mod I     Patient left right sidelying with call button in reach and family member present.    Assessment:  Mark Dickerson  is a 84 y.o. male with a medical diagnosis of NSTEMI (non-ST elevated myocardial infarction) and presents with overall decline in functional mobility. Patient would continue to benefit from skilled PT to address functional limitations listed below in order to return to PLOF/decrease caregiver burden.     Rehab identified problem list/impairments: weakness, impaired endurance, impaired functional mobility, gait instability, impaired balance, decreased coordination, decreased lower extremity function, decreased safety awareness, pain    Rehab potential is fair.    Activity tolerance: unable to determine    Discharge recommendations: Low Intensity Therapy      Barriers to discharge:      Equipment recommendations: wheelchair     GOALS:   Multidisciplinary Problems       Physical Therapy Goals          Problem: Physical Therapy    Goal Priority Disciplines Outcome Goal Variances Interventions   Physical Therapy Goal     PT, PT/OT Ongoing, Progressing     Description: Pt will  perform bed mobility with SPV in order to participate in EOB activity.  Pt will perform transfers with SPV in order to participate in OOB activity.  Pt will ambulate 150 ft SBA with LRAD in order to participate in daily tasks.                        PLAN:    Patient to be seen 3 x/week to address the above listed problems via gait training, therapeutic activities, therapeutic exercises  Plan of Care expires: 03/12/24  Plan of Care reviewed with: patient, family         02/29/2024

## 2024-03-01 LAB
ALLENS TEST: ABNORMAL
ANION GAP SERPL CALC-SCNC: 12 MMOL/L (ref 8–16)
APTT PPP: 47.5 SEC (ref 21–32)
BASO STIPL BLD QL SMEAR: ABNORMAL
BASOPHILS NFR BLD: 0 % (ref 0–1.9)
BUN SERPL-MCNC: 16 MG/DL (ref 8–23)
CALCIUM SERPL-MCNC: 9.2 MG/DL (ref 8.7–10.5)
CATH EF ESTIMATED: 35 %
CATH EF QUANTITATIVE: 30 %
CHLORIDE SERPL-SCNC: 93 MMOL/L (ref 95–110)
CO2 SERPL-SCNC: 28 MMOL/L (ref 23–29)
CREAT SERPL-MCNC: 1.2 MG/DL (ref 0.5–1.4)
DELSYS: ABNORMAL
DIFFERENTIAL METHOD BLD: ABNORMAL
EOSINOPHIL NFR BLD: 0 % (ref 0–8)
ERYTHROCYTE [DISTWIDTH] IN BLOOD BY AUTOMATED COUNT: 15.4 % (ref 11.5–14.5)
EST. GFR  (NO RACE VARIABLE): 59 ML/MIN/1.73 M^2
FLOW: 2
GLUCOSE SERPL-MCNC: 141 MG/DL (ref 70–110)
HCO3 UR-SCNC: 29.5 MMOL/L (ref 24–28)
HCT VFR BLD AUTO: 29.3 % (ref 40–54)
HGB BLD-MCNC: 9.8 G/DL (ref 14–18)
IMM GRANULOCYTES # BLD AUTO: ABNORMAL K/UL (ref 0–0.04)
IMM GRANULOCYTES NFR BLD AUTO: ABNORMAL % (ref 0–0.5)
LYMPHOCYTES NFR BLD: 6 % (ref 18–48)
MAGNESIUM SERPL-MCNC: 1.4 MG/DL (ref 1.6–2.6)
MCH RBC QN AUTO: 29.4 PG (ref 27–31)
MCHC RBC AUTO-ENTMCNC: 33.4 G/DL (ref 32–36)
MCV RBC AUTO: 88 FL (ref 82–98)
MODE: ABNORMAL
MONOCYTES NFR BLD: 1 % (ref 4–15)
NEUTROPHILS NFR BLD: 91 % (ref 38–73)
NEUTS BAND NFR BLD MANUAL: 2 %
NRBC BLD-RTO: 0 /100 WBC
PCO2 BLDA: 45.3 MMHG (ref 35–45)
PH SMN: 7.42 [PH] (ref 7.35–7.45)
PLATELET # BLD AUTO: 298 K/UL (ref 150–450)
PLATELET BLD QL SMEAR: ABNORMAL
PMV BLD AUTO: 10.7 FL (ref 9.2–12.9)
PO2 BLDA: 26 MMHG (ref 40–60)
POC ACTIVATED CLOTTING TIME K: 174 SEC (ref 74–137)
POC ACTIVATED CLOTTING TIME K: 185 SEC (ref 74–137)
POC BE: 5 MMOL/L
POC SATURATED O2: 49 % (ref 95–100)
POCT GLUCOSE: 122 MG/DL (ref 70–110)
POCT GLUCOSE: 137 MG/DL (ref 70–110)
POCT GLUCOSE: 137 MG/DL (ref 70–110)
POCT GLUCOSE: 146 MG/DL (ref 70–110)
POLYCHROMASIA BLD QL SMEAR: ABNORMAL
POTASSIUM SERPL-SCNC: 3.9 MMOL/L (ref 3.5–5.1)
RBC # BLD AUTO: 3.33 M/UL (ref 4.6–6.2)
SAMPLE: ABNORMAL
SITE: ABNORMAL
SODIUM SERPL-SCNC: 133 MMOL/L (ref 136–145)
STOMATOCYTES BLD QL SMEAR: PRESENT
TARGETS BLD QL SMEAR: ABNORMAL
WBC # BLD AUTO: 32.45 K/UL (ref 3.9–12.7)

## 2024-03-01 PROCEDURE — 25500020 PHARM REV CODE 255: Mod: HCNC | Performed by: INTERNAL MEDICINE

## 2024-03-01 PROCEDURE — 97530 THERAPEUTIC ACTIVITIES: CPT | Mod: HCNC

## 2024-03-01 PROCEDURE — B4101ZZ FLUOROSCOPY OF ABDOMINAL AORTA USING LOW OSMOLAR CONTRAST: ICD-10-PCS | Performed by: INTERNAL MEDICINE

## 2024-03-01 PROCEDURE — 25000003 PHARM REV CODE 250: Mod: HCNC | Performed by: INTERNAL MEDICINE

## 2024-03-01 PROCEDURE — 99233 SBSQ HOSP IP/OBS HIGH 50: CPT | Mod: HCNC,,, | Performed by: INTERNAL MEDICINE

## 2024-03-01 PROCEDURE — 27201423 OPTIME MED/SURG SUP & DEVICES STERILE SUPPLY: Mod: HCNC | Performed by: INTERNAL MEDICINE

## 2024-03-01 PROCEDURE — 99152 MOD SED SAME PHYS/QHP 5/>YRS: CPT | Mod: HCNC,,, | Performed by: INTERNAL MEDICINE

## 2024-03-01 PROCEDURE — C1894 INTRO/SHEATH, NON-LASER: HCPCS | Mod: HCNC | Performed by: INTERNAL MEDICINE

## 2024-03-01 PROCEDURE — 4A023N8 MEASUREMENT OF CARDIAC SAMPLING AND PRESSURE, BILATERAL, PERCUTANEOUS APPROACH: ICD-10-PCS | Performed by: INTERNAL MEDICINE

## 2024-03-01 PROCEDURE — 85027 COMPLETE CBC AUTOMATED: CPT | Mod: HCNC | Performed by: HOSPITALIST

## 2024-03-01 PROCEDURE — B2111ZZ FLUOROSCOPY OF MULTIPLE CORONARY ARTERIES USING LOW OSMOLAR CONTRAST: ICD-10-PCS | Performed by: INTERNAL MEDICINE

## 2024-03-01 PROCEDURE — 75630 X-RAY AORTA LEG ARTERIES: CPT | Mod: 26,59,HCNC, | Performed by: INTERNAL MEDICINE

## 2024-03-01 PROCEDURE — 93460 R&L HRT ART/VENTRICLE ANGIO: CPT | Mod: HCNC | Performed by: INTERNAL MEDICINE

## 2024-03-01 PROCEDURE — 27000221 HC OXYGEN, UP TO 24 HOURS: Mod: HCNC

## 2024-03-01 PROCEDURE — 99153 MOD SED SAME PHYS/QHP EA: CPT | Mod: HCNC | Performed by: INTERNAL MEDICINE

## 2024-03-01 PROCEDURE — 36415 COLL VENOUS BLD VENIPUNCTURE: CPT | Mod: HCNC | Performed by: FAMILY MEDICINE

## 2024-03-01 PROCEDURE — 82803 BLOOD GASES ANY COMBINATION: CPT | Mod: HCNC

## 2024-03-01 PROCEDURE — 99900035 HC TECH TIME PER 15 MIN (STAT): Mod: HCNC

## 2024-03-01 PROCEDURE — 83735 ASSAY OF MAGNESIUM: CPT | Mod: HCNC | Performed by: HOSPITALIST

## 2024-03-01 PROCEDURE — C1751 CATH, INF, PER/CENT/MIDLINE: HCPCS | Mod: HCNC | Performed by: INTERNAL MEDICINE

## 2024-03-01 PROCEDURE — 25000003 PHARM REV CODE 250: Mod: HCNC | Performed by: NURSE PRACTITIONER

## 2024-03-01 PROCEDURE — 63600175 PHARM REV CODE 636 W HCPCS: Mod: HCNC | Performed by: NURSE PRACTITIONER

## 2024-03-01 PROCEDURE — 25000003 PHARM REV CODE 250: Mod: HCNC | Performed by: HOSPITALIST

## 2024-03-01 PROCEDURE — 93460 R&L HRT ART/VENTRICLE ANGIO: CPT | Mod: 26,HCNC,, | Performed by: INTERNAL MEDICINE

## 2024-03-01 PROCEDURE — 63600175 PHARM REV CODE 636 W HCPCS: Mod: HCNC | Performed by: HOSPITALIST

## 2024-03-01 PROCEDURE — 63600175 PHARM REV CODE 636 W HCPCS: Mod: HCNC | Performed by: INTERNAL MEDICINE

## 2024-03-01 PROCEDURE — 85007 BL SMEAR W/DIFF WBC COUNT: CPT | Mod: HCNC | Performed by: HOSPITALIST

## 2024-03-01 PROCEDURE — C1769 GUIDE WIRE: HCPCS | Mod: HCNC | Performed by: INTERNAL MEDICINE

## 2024-03-01 PROCEDURE — 85730 THROMBOPLASTIN TIME PARTIAL: CPT | Mod: HCNC | Performed by: FAMILY MEDICINE

## 2024-03-01 PROCEDURE — 80048 BASIC METABOLIC PNL TOTAL CA: CPT | Mod: HCNC | Performed by: HOSPITALIST

## 2024-03-01 PROCEDURE — B2151ZZ FLUOROSCOPY OF LEFT HEART USING LOW OSMOLAR CONTRAST: ICD-10-PCS | Performed by: INTERNAL MEDICINE

## 2024-03-01 PROCEDURE — 75630 X-RAY AORTA LEG ARTERIES: CPT | Mod: 59,HCNC | Performed by: INTERNAL MEDICINE

## 2024-03-01 PROCEDURE — 94761 N-INVAS EAR/PLS OXIMETRY MLT: CPT | Mod: HCNC,XB

## 2024-03-01 PROCEDURE — 21400001 HC TELEMETRY ROOM: Mod: HCNC

## 2024-03-01 PROCEDURE — 99152 MOD SED SAME PHYS/QHP 5/>YRS: CPT | Mod: HCNC | Performed by: INTERNAL MEDICINE

## 2024-03-01 RX ORDER — ONDANSETRON 8 MG/1
8 TABLET, ORALLY DISINTEGRATING ORAL EVERY 8 HOURS PRN
Status: DISCONTINUED | OUTPATIENT
Start: 2024-03-01 | End: 2024-03-14 | Stop reason: HOSPADM

## 2024-03-01 RX ORDER — LIDOCAINE HYDROCHLORIDE 20 MG/ML
INJECTION, SOLUTION INFILTRATION; PERINEURAL
Status: DISCONTINUED | OUTPATIENT
Start: 2024-03-01 | End: 2024-03-01 | Stop reason: HOSPADM

## 2024-03-01 RX ORDER — MIDAZOLAM HYDROCHLORIDE 1 MG/ML
INJECTION, SOLUTION INTRAMUSCULAR; INTRAVENOUS
Status: DISCONTINUED | OUTPATIENT
Start: 2024-03-01 | End: 2024-03-01 | Stop reason: HOSPADM

## 2024-03-01 RX ORDER — SODIUM CHLORIDE 9 MG/ML
INJECTION, SOLUTION INTRAVENOUS CONTINUOUS
Status: ACTIVE | OUTPATIENT
Start: 2024-03-01 | End: 2024-03-01

## 2024-03-01 RX ORDER — MAGNESIUM SULFATE HEPTAHYDRATE 40 MG/ML
2 INJECTION, SOLUTION INTRAVENOUS ONCE
Status: COMPLETED | OUTPATIENT
Start: 2024-03-01 | End: 2024-03-01

## 2024-03-01 RX ORDER — PHENYLEPHRINE HYDROCHLORIDE 10 MG/ML
INJECTION INTRAVENOUS
Status: DISCONTINUED | OUTPATIENT
Start: 2024-03-01 | End: 2024-03-01 | Stop reason: HOSPADM

## 2024-03-01 RX ORDER — FENTANYL CITRATE 50 UG/ML
INJECTION, SOLUTION INTRAMUSCULAR; INTRAVENOUS
Status: DISCONTINUED | OUTPATIENT
Start: 2024-03-01 | End: 2024-03-01 | Stop reason: HOSPADM

## 2024-03-01 RX ORDER — SODIUM CHLORIDE 9 MG/ML
INJECTION, SOLUTION INTRAVENOUS
Status: DISCONTINUED | OUTPATIENT
Start: 2024-03-01 | End: 2024-03-14 | Stop reason: HOSPADM

## 2024-03-01 RX ORDER — HEPARIN SODIUM 1000 [USP'U]/ML
INJECTION, SOLUTION INTRAVENOUS; SUBCUTANEOUS
Status: DISCONTINUED | OUTPATIENT
Start: 2024-03-01 | End: 2024-03-01 | Stop reason: HOSPADM

## 2024-03-01 RX ORDER — ACETAMINOPHEN 325 MG/1
650 TABLET ORAL EVERY 4 HOURS PRN
Status: DISCONTINUED | OUTPATIENT
Start: 2024-03-01 | End: 2024-03-14 | Stop reason: HOSPADM

## 2024-03-01 RX ADMIN — HEPARIN SODIUM 12 UNITS/KG/HR: 10000 INJECTION, SOLUTION INTRAVENOUS at 10:03

## 2024-03-01 RX ADMIN — Medication 400 MG: at 08:03

## 2024-03-01 RX ADMIN — ATORVASTATIN CALCIUM 40 MG: 40 TABLET, FILM COATED ORAL at 08:03

## 2024-03-01 RX ADMIN — MIDODRINE HYDROCHLORIDE 10 MG: 5 TABLET ORAL at 06:03

## 2024-03-01 RX ADMIN — POTASSIUM CHLORIDE 20 MEQ: 1500 TABLET, EXTENDED RELEASE ORAL at 08:03

## 2024-03-01 RX ADMIN — MUPIROCIN: 20 OINTMENT TOPICAL at 08:03

## 2024-03-01 RX ADMIN — MAGNESIUM SULFATE HEPTAHYDRATE 2 G: 40 INJECTION, SOLUTION INTRAVENOUS at 10:03

## 2024-03-01 RX ADMIN — CEFTRIAXONE 1 G: 1 INJECTION, POWDER, FOR SOLUTION INTRAMUSCULAR; INTRAVENOUS at 10:03

## 2024-03-01 RX ADMIN — METOPROLOL TARTRATE 25 MG: 25 TABLET, FILM COATED ORAL at 08:03

## 2024-03-01 RX ADMIN — MIDODRINE HYDROCHLORIDE 10 MG: 5 TABLET ORAL at 11:03

## 2024-03-01 RX ADMIN — SODIUM CHLORIDE: 9 INJECTION, SOLUTION INTRAVENOUS at 06:03

## 2024-03-01 RX ADMIN — ANASTROZOLE 1 MG: 1 TABLET, COATED ORAL at 08:03

## 2024-03-01 RX ADMIN — MIDODRINE HYDROCHLORIDE 10 MG: 5 TABLET ORAL at 08:03

## 2024-03-01 RX ADMIN — HYDROCODONE BITARTRATE AND ACETAMINOPHEN 1 TABLET: 5; 325 TABLET ORAL at 08:03

## 2024-03-01 NOTE — INTERVAL H&P NOTE
The patient has been examined and the H&P has been reviewed:    I concur with the findings and no changes have occurred since H&P was written.    Anesthesia/Surgery risks, benefits and alternative options discussed and understood by patient/family.        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

## 2024-03-01 NOTE — INTERVAL H&P NOTE
The patient has been examined and the H&P has been reviewed:    I concur with the findings and no changes have occurred since H&P was written.    Anesthesia/Surgery risks, benefits and alternative options discussed and understood by patient/family.      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

## 2024-03-01 NOTE — NURSING
Received in bed talking with nephew at bedside. Assessment per flowsheet. Denies any pain or discomfort. Plan of care discussed, pt verbalized understanding. Heparin infusing. Bed low, call light within reach. Will continue to monitor.

## 2024-03-01 NOTE — SUBJECTIVE & OBJECTIVE
Review of Systems   Constitutional: Positive for malaise/fatigue.   HENT: Negative.     Eyes: Negative.    Cardiovascular:  Positive for dyspnea on exertion.   Respiratory:  Positive for shortness of breath.    Endocrine: Negative.    Hematologic/Lymphatic: Negative.    Skin: Negative.    Musculoskeletal:  Positive for arthritis and joint pain.   Gastrointestinal: Negative.    Genitourinary: Negative.    Neurological: Negative.    Psychiatric/Behavioral: Negative.     Allergic/Immunologic: Negative.      Objective:     Vital Signs (Most Recent):  Temp: 97.8 °F (36.6 °C) (03/01/24 1119)  Pulse: 92 (03/01/24 1119)  Resp: 12 (03/01/24 1119)  BP: (!) 111/58 (03/01/24 1125)  SpO2: (!) 90 % (03/01/24 1125) Vital Signs (24h Range):  Temp:  [97.6 °F (36.4 °C)-99.1 °F (37.3 °C)] 97.8 °F (36.6 °C)  Pulse:  [92-99] 92  Resp:  [12-19] 12  SpO2:  [87 %-98 %] 90 %  BP: ()/(54-62) 111/58     Weight: 104.9 kg (231 lb 4.2 oz)  Body mass index is 33.18 kg/m².     SpO2: (!) 90 %         Intake/Output Summary (Last 24 hours) at 3/1/2024 1401  Last data filed at 2/29/2024 1821  Gross per 24 hour   Intake 315.9 ml   Output 175 ml   Net 140.9 ml       Lines/Drains/Airways       Central Venous Catheter Line  Duration                  PowerPort A Cath Single Lumen 01/05/24 1415 Internal Jugular Right 55 days              Peripheral Intravenous Line  Duration                  Peripheral IV - Single Lumen 02/27/24 0805 22 G Anterior;Distal;Left Forearm 3 days         Peripheral IV - Single Lumen 02/27/24 1800 22 G Posterior;Right Hand 2 days                       Physical Exam  Vitals and nursing note reviewed.   Constitutional:       General: He is not in acute distress.     Appearance: Normal appearance. He is well-developed. He is not diaphoretic.   HENT:      Head: Normocephalic and atraumatic.   Eyes:      General:         Right eye: No discharge.         Left eye: No discharge.      Pupils: Pupils are equal, round, and  "reactive to light.   Cardiovascular:      Rate and Rhythm: Normal rate and regular rhythm.      Heart sounds: Normal heart sounds, S1 normal and S2 normal. No murmur heard.  Pulmonary:      Effort: Pulmonary effort is normal. No respiratory distress.      Comments: Slightly diminished at bases  Abdominal:      General: There is no distension.   Musculoskeletal:      Right lower leg: No edema.      Left lower leg: No edema.   Skin:     General: Skin is warm and dry.      Findings: No erythema.   Neurological:      General: No focal deficit present.      Mental Status: He is alert and oriented to person, place, and time.   Psychiatric:         Mood and Affect: Mood normal.         Behavior: Behavior normal.            Significant Labs: CMP   Recent Labs   Lab 02/29/24  0444 03/01/24  0430   * 133*   K 3.7 3.9   CL 95 93*   CO2 28 28   * 141*   BUN 15 16   CREATININE 1.0 1.2   CALCIUM 8.7 9.2   ANIONGAP 10 12   , CBC   Recent Labs   Lab 02/29/24 0443 03/01/24  0430   WBC 33.01* 32.45*   HGB 10.0* 9.8*   HCT 29.7* 29.3*    298   , Troponin No results for input(s): "TROPONINI" in the last 48 hours., and All pertinent lab results from the last 24 hours have been reviewed.    Significant Imaging: Echocardiogram: Transthoracic echo (TTE) complete ([facility] Only):   Results for orders placed or performed during the hospital encounter of 02/26/24   Echo   Result Value Ref Range    LVIDd 4.88 3.5 - 6.0 cm    LV Systolic Volume 52.86 mL    LVIDs 3.56 2.1 - 4.0 cm    LV Diastolic Volume 111.86 mL    IVS 1.04 0.6 - 1.1 cm    FS 27 (A) 28 - 44 %    Left Ventricle Relative Wall Thickness 0.44 cm    Posterior Wall 1.08 0.6 - 1.1 cm    LV mass 189.29 g    TR Max Can 2.91 m/s    RVDD 2.81 cm    LA size 4.18 cm    Triscuspid Valve Regurgitation Peak Gradient 34 mmHg    IVC diameter 2.08 cm    LV Systolic Volume Index 23.2 mL/m2    LV Diastolic Volume Index 49.06 mL/m2    LV Mass Index 83 g/m2    ZLVIDS -2.98     " ZLVIDD -5.65     TV resting pulmonary artery pressure 37 mmHg    RV TB RVSP 6 mmHg    Est. RA pres 3 mmHg    Narrative      Left Ventricle: The left ventricle is normal in size. There is   concentric remodeling. There is normal systolic function with a visually   estimated ejection fraction of 55 - 60%. There is indeterminate diastolic   function.    Right Ventricle: Right ventricle was not well visualized due to poor   acoustic window. Normal right ventricular cavity size. Systolic function   is normal.    Pulmonary Artery: The estimated pulmonary artery systolic pressure is   37 mmHg.    IVC/SVC: Normal venous pressure at 3 mmHg.      and EKG: Reviewed

## 2024-03-01 NOTE — SUBJECTIVE & OBJECTIVE
Review of Systems   All other systems reviewed and are negative.    Objective:     Vital Signs (Most Recent):  Temp: 97.8 °F (36.6 °C) (03/01/24 1119)  Pulse: 92 (03/01/24 1119)  Resp: 12 (03/01/24 1119)  BP: (!) 111/56 (03/01/24 1119)  SpO2: (!) 87 % (RN notified) (03/01/24 1119) Vital Signs (24h Range):  Temp:  [97.6 °F (36.4 °C)-99.1 °F (37.3 °C)] 97.8 °F (36.6 °C)  Pulse:  [92-99] 92  Resp:  [12-19] 12  SpO2:  [87 %-98 %] 87 %  BP: ()/(54-62) 111/56     Weight: 104.9 kg (231 lb 4.2 oz)  Body mass index is 33.18 kg/m².    Intake/Output Summary (Last 24 hours) at 3/1/2024 1149  Last data filed at 2/29/2024 1821  Gross per 24 hour   Intake 415.9 ml   Output 175 ml   Net 240.9 ml           Physical Exam  Constitutional:       General: He is not in acute distress.     Appearance: Normal appearance. He is obese.   Cardiovascular:      Rate and Rhythm: Normal rate and regular rhythm.      Heart sounds: Murmur heard.   Pulmonary:      Effort: Pulmonary effort is normal. No respiratory distress.      Breath sounds: Normal breath sounds. No wheezing.      Comments: Decreased BS  Abdominal:      General: There is no distension.      Palpations: Abdomen is soft.      Tenderness: There is no abdominal tenderness.   Skin:     Coloration: Skin is pale.   Neurological:      Mental Status: He is alert.             Significant Labs: All pertinent labs within the past 24 hours have been reviewed.  Recent Lab Results  (Last 5 results in the past 24 hours)        03/01/24  1139   03/01/24  0550   03/01/24  0430   02/29/24  2128   02/29/24  1634        Anion Gap     12           PTT     47.5  Comment: Refer to local heparin nomogram for intensity/dose specific   therapeutic   range.             Bands     2.0           Basophilic Stippling     Occasional           Basophil %     0.0           BUN     16           Calcium     9.2           Chloride     93           CO2     28           Creatinine     1.2           Differential  Method     Manual           eGFR     59           Eos %     0.0           Glucose     141           Gran %     91.0           Hematocrit     29.3           Hemoglobin     9.8           Immature Grans (Abs)     CANCELED  Comment: Mild elevation in immature granulocytes is non specific and   can be seen in a variety of conditions including stress response,   acute inflammation, trauma and pregnancy. Correlation with other   laboratory and clinical findings is essential.    Result canceled by the ancillary.             Immature Granulocytes     CANCELED  Comment: Result canceled by the ancillary.           Lymph %     6.0           Magnesium      1.4           MCH     29.4           MCHC     33.4           MCV     88           Mono %     1.0           MPV     10.7           nRBC     0           Platelet Estimate     Decreased           Platelet Count     298           POCT Glucose 146   137     138   152       Poly     Occasional           Potassium     3.9           RBC     3.33           RDW     15.4           Sodium     133           Stomatocytes     Present           Target Cells     Occasional           WBC     32.45                                  Significant Imaging: I have reviewed all pertinent imaging results/findings within the past 24 hours.    X-Ray Chest 1 View   Final Result      As above.         Electronically signed by: Spenser Stallworth   Date:    02/28/2024   Time:    11:07      X-Ray Chest AP Portable   Final Result      Worsening moderate bilateral infiltrates.  Follow-up is recommended.         Electronically signed by: Shyla Giles MD   Date:    02/27/2024   Time:    10:18      CTA Chest Non-Coronary (PE Studies)   Final Result      No pulmonary embolism.  No dissection.  Atherosclerotic changes.  Mild interstitial opacities.  Correlate clinically for element of fibrosis versus CHF.  Pulmonary micronodularity.  Recommend follow-up.      All CT scans   are performed using dose optimization  techniques including the following: automated exposure control; adjustment of the mA and/or kV; use of iterative reconstruction technique.  Dose modulation was employed for ALARA by means of: Automated exposure control; adjustment of the mA and/or kV according to patient size (this includes techniques or standardized protocols for targeted exams where dose is matched to indication/reason for exam; i.e. extremities or head); and/or use of iterative reconstructive technique.         Electronically signed by: Jhoan Roman   Date:    02/26/2024   Time:    19:11      CT Head Without Contrast   Final Result      Chronic microvascular ischemic changes.  No intracranial hemorrhage.      All CT scans at this facility use dose modulation, iterative reconstruction, and/or weight based dosing when appropriate to reduce radiation dose to as low as reasonable achievable.         Electronically signed by: Earl Mendez MD   Date:    02/26/2024   Time:    10:36      X-Ray Chest AP Portable   Final Result      No acute process seen.         Electronically signed by: Earl Mendez MD   Date:    02/26/2024   Time:    10:02

## 2024-03-01 NOTE — PT/OT/SLP PROGRESS
Physical Therapy Treatment    Patient Name:  Mark Dickerson Sr.   MRN:  [id number]    Recommendations:     Discharge Recommendations: Low Intensity Therapy  Discharge Equipment Recommendations:  (TBD)  Barriers to discharge: None    Assessment:     Mark Dickerson Sr. is a 84 y.o. male admitted with a medical diagnosis of NSTEMI (non-ST elevated myocardial infarction).  He presents with the following impairments/functional limitations: weakness, impaired endurance, impaired functional mobility, gait instability, impaired balance, decreased coordination.    Rehab Prognosis: Fair; patient would benefit from acute skilled PT services to address these deficits and reach maximum level of function.    Recent Surgery: Procedure(s) (LRB):  CATHETERIZATION, HEART, BOTH LEFT AND RIGHT (N/A)  Aortogram, Aortic Arch  Valve study-aortic  AORTOGRAM, ABDOMINAL (N/A) Day of Surgery    Plan:     During this hospitalization, patient to be seen 3 x/week to address the identified rehab impairments via gait training, therapeutic activities, therapeutic exercises and progress toward the following goals:    Plan of Care Expires:  03/12/24    Subjective     Chief Complaint: C/O FEELING TIRED, PT REFUSED GAIT TRIAL  Patient/Family Comments/goals:   Pain/Comfort:  Pain Rating 1: 0/10      Objective:     Communicated with NURSE KISER prior to session.  Patient found  STANDING IN ROOM WITH AIDE  with telemetry, peripheral IV upon PT entry to room.     General Precautions: Standard, fall  Orthopedic Precautions: N/A  Braces: N/A  Respiratory Status: Room air     Functional Mobility:  Bed Mobility:     Rolling Left:  stand by assistance  Scooting: stand by assistance  Sit to Supine: stand by assistance  Transfers:     Sit to Stand:  contact guard assistance with no AD  Gait: PT REFUSED GAIT TRIAL AT THIS TIME DUE TO FEELING TIRED  Balance: GOOD SITTING BALANCE, FAIR DYNAMIC BALANCE DURING TF  PT REQUIRED MAXA FOR CHANGING GOWN IN STANDING, NO LOB  PT  "ASSISTED IN GROOMING PER HIS REQUEST, RINSED MOUTH WITH MOUTH WASH    AM-PAC 6 CLICK MOBILITY  Turning over in bed (including adjusting bedclothes, sheets and blankets)?: 4  Sitting down on and standing up from a chair with arms (e.g., wheelchair, bedside commode, etc.): 3  Moving from lying on back to sitting on the side of the bed?: 4  Moving to and from a bed to a chair (including a wheelchair)?: 3  Need to walk in hospital room?: 1  Climbing 3-5 steps with a railing?: 1  Basic Mobility Total Score: 16     Treatment & Education:  PT EDUCATION:  - ROLE OF P.T. AND POC IN ACUTE CARE HOSPITAL SETTING  - ENCOURAGED TO INCREASE TIME OOB IN CHAIR TO TOLERANCE   - TO CONTINUE THERAPUETIC EXERCISES THROUGHOUT THE DAY TO INCREASE ACTIVITY TOLERANCE AND DECREASE RISK FOR PNEUMONIA AND BLOOD CLOTS: HIP FLEX/EXT, HIP ABD/ADD, QUAD SET, HEEL SLIDE, AP  - RISK FOR FALLS DUE TO GENERALIZED WEAKNESS, EDUCATED ON "CALL DON'T FALL", ENCOURAGED TO CALL FOR ASSISTANCE WITH ALL NEEDS SUCH AS BED<>CHAIR TRANSFERS OR TRIPS TO BATHROOM, PT AGREEABLE TO SAFETY PRECAUTIONS    Patient left up in chair with all lines intact, call button in reach, NURSE notified, and SON present..    GOALS:   Multidisciplinary Problems       Physical Therapy Goals          Problem: Physical Therapy    Goal Priority Disciplines Outcome Goal Variances Interventions   Physical Therapy Goal     PT, PT/OT Ongoing, Progressing     Description: Pt will perform bed mobility with SPV in order to participate in EOB activity.  Pt will perform transfers with SPV in order to participate in OOB activity.  Pt will ambulate 150 ft SBA with LRAD in order to participate in daily tasks.                        Time Tracking:     PT Received On: 03/01/24  PT Start Time: 0815     PT Stop Time: 0830  PT Total Time (min): 15 min     Billable Minutes: Therapeutic Activity 15    Treatment Type: Treatment  PT/PTA: PT     Number of PTA visits since last PT visit: 0     03/01/2024  "

## 2024-03-01 NOTE — PT/OT/SLP PROGRESS
Occupational Therapy   Treatment    Name: Mark Dickerson Sr.  MRN: [id number]  Admitting Diagnosis:  NSTEMI (non-ST elevated myocardial infarction)  Day of Surgery    Recommendations:     Discharge Recommendations: Low Intensity Therapy  Discharge Equipment Recommendations:  wheelchair  Barriers to discharge:  None    Assessment:     Mark Dickerson Sr. is a 84 y.o. male with a medical diagnosis of NSTEMI (non-ST elevated myocardial infarction).  Performance deficits affecting function are weakness, impaired functional mobility, decreased safety awareness, impaired endurance, gait instability, impaired balance, decreased upper extremity function, decreased lower extremity function, impaired self care skills.     Rehab Prognosis:  Good; patient would benefit from acute skilled OT services to address these deficits and reach maximum level of function.       Plan:     Patient to be seen 2 x/week to address the above listed problems via self-care/home management, therapeutic activities, therapeutic exercises  Plan of Care Expires: 04/12/24  Plan of Care Reviewed with: patient, family    Subjective     Chief Complaint: Hunger  Patient/Family Comments/goals: Return to PLOF  Pain/Comfort:  Pain Rating 1: 0/10  Pain Rating Post-Intervention 1: 0/10    Objective:     Communicated with: Nurse prior to session.  Patient found HOB elevated with telemetry, peripheral IV upon OT entry to room.    General Precautions: Standard, fall    Orthopedic Precautions:N/A  Braces: N/A  Respiratory Status: Room air     Occupational Performance:     [facility] 6 Click ADL: 21    Treatment & Education:  Pt supine in bed with HOB elevated with multiple family members present, as well as nursing. Pt stating he prefers not to get OOB again due to undergoing procedure soon after lunch. Pt educated on performing UB exercises while in bed recovering from procedure to ensure further functional strength gains. Pt demonstrated and verbalized understanding.      Pt encouraged to utilize call button for assistance.    Patient left HOB elevated with all lines intact, call button in reach, and nursing and family present    GOALS:   Multidisciplinary Problems       Occupational Therapy Goals          Problem: Occupational Therapy    Goal Priority Disciplines Outcome Interventions   Occupational Therapy Goal     OT, PT/OT Ongoing, Progressing    Description: Goals to be met by: 3/12/24     Patient will increase functional independence with ADLs by performing:    UE Dressing with [place].  Grooming while standing at sink with [place].  Toileting from toilet with [place] for hygiene and clothing management.   Toilet transfer to toilet with Modified [place].  Upper extremity exercise program x15 reps per handout, with independence.                         Time Tracking:     OT Date of Treatment: 03/01/24  OT Start Time: 1135  OT Stop Time: 1150  OT Total Time (min): 15 min    Billable Minutes:Therapeutic Activity 15    MILEY Keenan  OT/SMITH: OT          3/1/2024

## 2024-03-01 NOTE — PROGRESS NOTES
"[facility] ([facility] Medicine  Progress Note    Patient Name: Mark Dickerson Sr.  MRN: [id number]  Patient Class: IP- Inpatient   Admission Date: 2/26/2024  Length of Stay: 3 days  Attending Physician: Judah Oden MD  Primary Care Provider: Tatyana Cannon MD        Subjective:     Principal Problem:NSTEMI (non-ST elevated myocardial infarction)        HPI:  Mark Dickerson Sr. is a 84 y.o. male patient with a PMHx of metastatic breast cancer (on chemo), HLD, HTN, DM II, CKD who presents to the ED for evaluation of AMS which onset this morning PTA. Per daughter in law she visited the pt early this morning and states that he was normal for a short period before becoming extremely disoriented with jumbled speech.  Patient mental status returned to baseline piror to ed arrival.  Patient reports he feels "dehydrated", he also reports diarrhea earlier last week but this has since resolved.  Patient denies any fever, chills, N/V, SOB and chest pain.   In the ED, work up notable for WBC 14, Na 130, K 3.0, mag 1.5, , Trop 0.7.  EKG with SR PACs, ST and T wave abnormality. CT head negative for acute processes.  Chest xray no acute processes. Patient started on IVF and given zofran for nausea.  Patient will be admitted to observation for electrolyte imbalances, elevated trop.  Cards consulted.     Code Status DNR  Surrogate Decision maker daughter    Overview/Hospital Course:  2/27  Admitted for NSTEMI, currently on heparin drip  Patient awake, alert, oriented, denies fevers/chills, chest pain  WBC 26k, BP stable low-normal range, afebrile  Blood cultures ordered, lactic acid 1.0  CXR today with worsening bilateral infiltrates  Covid and influenza negative  Lasix 20 mg IV one time  Cardiology following, possible LHC tomorrow pending progression    2/28  Tmax 99.1 F, reports chills, WBC trending up 34k  CXR reivewed, non-specific ground glass opacities  Currently O2 sats stable on RA  Blood cultures " pending, check procalcitonin  Start empiric coverage with ceftriaxone  Cardiology following, on heparin drip, LHC deferred    2/29  Afebrile, tachycardic, WBC 33k  Procalcitonin 0.14, blood cultures NGTD  Continue empiric coverage with ceftriaxone  Cardiology following, possible LHC tomorrow  Patient sitting in chair, denies complaints, updated family at bedside    3/1  NAEON, Mg 1.4, replete IV; WBC 32.4k, slightly tachycardic, remains afebrile  Cardiology following, plans for LHC today, currently NPO  Consult Hematology for leukocytosis unclear etiology and metastatic breast cancer      Review of Systems   All other systems reviewed and are negative.    Objective:     Vital Signs (Most Recent):  Temp: 97.8 °F (36.6 °C) (03/01/24 1119)  Pulse: 92 (03/01/24 1119)  Resp: 12 (03/01/24 1119)  BP: (!) 111/56 (03/01/24 1119)  SpO2: (!) 87 % (RN notified) (03/01/24 1119) Vital Signs (24h Range):  Temp:  [97.6 °F (36.4 °C)-99.1 °F (37.3 °C)] 97.8 °F (36.6 °C)  Pulse:  [92-99] 92  Resp:  [12-19] 12  SpO2:  [87 %-98 %] 87 %  BP: ()/(54-62) 111/56     Weight: 104.9 kg (231 lb 4.2 oz)  Body mass index is 33.18 kg/m².    Intake/Output Summary (Last 24 hours) at 3/1/2024 1149  Last data filed at 2/29/2024 1821  Gross per 24 hour   Intake 415.9 ml   Output 175 ml   Net 240.9 ml           Physical Exam  Constitutional:       General: He is not in acute distress.     Appearance: Normal appearance. He is obese.   Cardiovascular:      Rate and Rhythm: Normal rate and regular rhythm.      Heart sounds: Murmur heard.   Pulmonary:      Effort: Pulmonary effort is normal. No respiratory distress.      Breath sounds: Normal breath sounds. No wheezing.      Comments: Decreased BS  Abdominal:      General: There is no distension.      Palpations: Abdomen is soft.      Tenderness: There is no abdominal tenderness.   Skin:     Coloration: Skin is pale.   Neurological:      Mental Status: He is alert.             Significant Labs: All  pertinent labs within the past 24 hours have been reviewed.  Recent Lab Results  (Last 5 results in the past 24 hours)        03/01/24  1139   03/01/24  0550   03/01/24  0430   02/29/24  2128   02/29/24  1634        Anion Gap     12           PTT     47.5  Comment: Refer to local heparin nomogram for intensity/dose specific   therapeutic   range.             Bands     2.0           Basophilic Stippling     Occasional           Basophil %     0.0           BUN     16           Calcium     9.2           Chloride     93           CO2     28           Creatinine     1.2           Differential Method     Manual           eGFR     59           Eos %     0.0           Glucose     141           Gran %     91.0           Hematocrit     29.3           Hemoglobin     9.8           Immature Grans (Abs)     CANCELED  Comment: Mild elevation in immature granulocytes is non specific and   can be seen in a variety of conditions including stress response,   acute inflammation, trauma and pregnancy. Correlation with other   laboratory and clinical findings is essential.    Result canceled by the ancillary.             Immature Granulocytes     CANCELED  Comment: Result canceled by the ancillary.           Lymph %     6.0           Magnesium      1.4           MCH     29.4           MCHC     33.4           MCV     88           Mono %     1.0           MPV     10.7           nRBC     0           Platelet Estimate     Decreased           Platelet Count     298           POCT Glucose 146   137     138   152       Poly     Occasional           Potassium     3.9           RBC     3.33           RDW     15.4           Sodium     133           Stomatocytes     Present           Target Cells     Occasional           WBC     32.45                                  Significant Imaging: I have reviewed all pertinent imaging results/findings within the past 24 hours.    X-Ray Chest 1 View   Final Result      As above.         Electronically signed  by: Spenser Stallworth   Date:    02/28/2024   Time:    11:07      X-Ray Chest AP Portable   Final Result      Worsening moderate bilateral infiltrates.  Follow-up is recommended.         Electronically signed by: Shyla Giles MD   Date:    02/27/2024   Time:    10:18      CTA Chest Non-Coronary (PE Studies)   Final Result      No pulmonary embolism.  No dissection.  Atherosclerotic changes.  Mild interstitial opacities.  Correlate clinically for element of fibrosis versus CHF.  Pulmonary micronodularity.  Recommend follow-up.      All CT scans   are performed using dose optimization techniques including the following: automated exposure control; adjustment of the mA and/or kV; use of iterative reconstruction technique.  Dose modulation was employed for ALARA by means of: Automated exposure control; adjustment of the mA and/or kV according to patient size (this includes techniques or standardized protocols for targeted exams where dose is matched to indication/reason for exam; i.e. extremities or head); and/or use of iterative reconstructive technique.         Electronically signed by: Jhoan Roman   Date:    02/26/2024   Time:    19:11      CT Head Without Contrast   Final Result      Chronic microvascular ischemic changes.  No intracranial hemorrhage.      All CT scans at this facility use dose modulation, iterative reconstruction, and/or weight based dosing when appropriate to reduce radiation dose to as low as reasonable achievable.         Electronically signed by: Earl Mendez MD   Date:    02/26/2024   Time:    10:36      X-Ray Chest AP Portable   Final Result      No acute process seen.         Electronically signed by: Earl Mendez MD   Date:    02/26/2024   Time:    10:02            Assessment/Plan:      * NSTEMI (non-ST elevated myocardial infarction)  No prior hx of CAD  Troponin 0.7 --> 1.3 --> 2.8  EKG with SR PACS and ST-Twave abnormalities  Cardiology following  On heparin drip  TTE as below  Plans  for [facility] pending    Echo    Result Date: 2/26/2024    Left Ventricle: The left ventricle is normal in size. There is   concentric remodeling. There is normal systolic function with a visually   estimated ejection fraction of 55 - 60%. There is indeterminate diastolic   function.    Right Ventricle: Right ventricle was not well visualized due to poor   acoustic window. Normal right ventricular cavity size. Systolic function   is normal.    Pulmonary Artery: The estimated pulmonary artery systolic pressure is   37 mmHg.    IVC/SVC: Normal venous pressure at 3 mmHg.           Elevated troponin  As above    SIRS (systemic inflammatory response syndrome)  2/27  Trending up 26k  Blood cultures ordered, lactic acid 1.0  Covid and influenza negative  Trend labs for now, possibly reactive  Low threshold to start empiric antibiotics    2/28  WBC trending up 34k, tachycardic, reports chills, SOB  CXR with possible early pneumonia, check procalcitonin  Start empiric coverage with ceftriaxone    Hypokalemia  Patient has hypokalemia which is Acute on Chronic and currently uncontrolled. Most recent potassium levels reviewed-   Lab Results   Component Value Date    K 3.3 (L) 02/28/2024   . Will continue potassium replacement per protocol and recheck repeat levels after replacement completed.     Hypomagnesemia  Patient has Abnormal Magnesium: hypomagnesemia. Will continue to monitor electrolytes closely. Will replace the affected electrolytes and repeat labs to be done after interventions completed. The patient's magnesium results have been reviewed and are listed below.  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 BMP reviewed- noted Estimated Creatinine Clearance: 66.2 mL/min (based on SCr of 1 mg/dL). according to latest data. Based on current GFR, CKD stage is stage 3 - GFR 30-59.  Monitor UOP and serial BMP and adjust therapy as needed. Renally dose  meds. Avoid nephrotoxic medications and procedures    Controlled type 2 diabetes mellitus without complication, without long-term current use of insulin  Patient's FSGs are controlled on current medication regimen.  Last A1c reviewed-   Lab Results   Component Value Date    HGBA1C 7.1 (H) 11/28/2023     Most recent fingerstick glucose reviewed-   Recent Labs   Lab 02/28/24  1208 02/28/24  1646   POCTGLUCOSE 234* 167*       Current correctional scale  Low  Maintain anti-hyperglycemic dose as follows-   Antihyperglycemics (From admission, onward)      Start     Stop Route Frequency Ordered    02/26/24 1301  insulin aspart U-100 pen 0-5 Units         -- SubQ Before meals & nightly PRN 02/26/24 1204          Hold Oral hypoglycemics while patient is in the hospital.    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 Mendoza at [facility], last chemo session one week prior  Follow up outpatient       VTE Risk Mitigation (From admission, onward)           Ordered     heparin 25,000 units in dextrose 5% (100 units/ml) IV bolus from bag LOW INTENSITY nomogram - [facility]  As needed (PRN)        Question:  Heparin Infusion Adjustment (DO NOT MODIFY ANSWER)  Answer:  \\[email or web address]\epic\Images\Pharmacy\HeparinInfusions\heparin LOW INTENSITY nomogram for [facility] [id number].pdf    02/26/24 1404     heparin 25,000 units in dextrose 5% (100 units/ml) IV bolus from bag LOW INTENSITY nomogram - [facility]  As needed (PRN)        Question:  Heparin Infusion Adjustment (DO NOT MODIFY ANSWER)  Answer:  \[email or web address]\epic\Images\Pharmacy\HeparinInfusions\heparin LOW INTENSITY nomogram for [facility] [id number].pdf    02/26/24 1404     heparin 25,000 units in dextrose 5% 250 mL (100 units/mL) infusion LOW INTENSITY nomogram - [facility]  Continuous        Question:  Begin at (units/kg/hr)  Answer:  12    02/26/24 1404     IP VTE HIGH RISK PATIENT  Once         02/26/24 1204     Place sequential compression  device  Until discontinued         02/26/24 1204                    Discharge Planning   DE:      Code Status: DNR   Is the patient medically ready for discharge?:     Reason for patient still in hospital (select all that apply): Patient trending condition, Laboratory test, Treatment, Imaging, and Consult recommendations  Discharge Plan A: Home Health                  Judah Oden MD  Department of Hospital Medicine   [facility] ([facility])

## 2024-03-01 NOTE — PROGRESS NOTES
[facility] ([facility])  Cardiology  Progress Note    Patient Name: Mark Dickerson Sr.  MRN: [id number]  Admission Date: 2/26/2024  Hospital Length of Stay: 3 days  Code Status: DNR   Attending Physician: Judah Oden MD   Primary Care Physician: Tatyana Cannon MD  Expected Discharge Date:   Principal Problem:NSTEMI (non-ST elevated myocardial infarction)    Subjective:   HPI:  Mr. Dickerson is an 84 year old male patient whose current medical conditions include metastatic breast cancer (on chemo), HTN, hyperlipidemia, DM type II, and CKD who presented to [facility] ED this AM due to AMS/confusion. Patient's DIL reported patient seemed disoriented and had jumbled speech when she visited him earlier this AM. He returned to baseline prior to ED arrival. He denied any associated SOB, chest pain, palpitations, near syncope, or syncope. Initial workup in ED revealed leukocytosis (WBC 14,000), hyponatremia (Na 130), hypomagnesemia (1.5), and elevated troponin (0.725>0.735) and patient was subsequently admitted for further evaluation and treatment. Cardiology consulted to assist with management. Patient seen and examined today, resting in bed. Remains chest pain free. No other CV complaints. States he felt dehydrated earlier today. No prior CV history. He reports compliance with his medications. EKG reviewed, SR with T wave inversions inferiorly and anteriorly. Limited echo and repeat troponin pending. CT of head NAF             Hospital Course:   2/27/24-Patient seen and examined today, sitting up in bed. Appears more SOB/weak. No CP symptoms. WBC bumped to 26,000. CXR with bilateral infiltrates/worsening appearance. IV Lasix given and BC drawn. Troponin bumped to 1.293. [facility] initially planned today but cancelled due to lab abnormalities, CXR, and patient's clinical status.    2/28/24-Patient seen and examined today, sitting up in bedside chair. Complains of chills/weakness. WBC up to 34,000. CXR showed bilateral opacities.  Being diuresed. K 3.3, being repleted. [facility] deferred for now.    2/29/24-Patient seen and examined today, resting in bed. Feels/looks better. WBC trending down. Denies CP. BC NGTD. Tentative plans for [facility] tmw.    3/1/24-Patient seen and examined today, lying in bed. Stable overnight. No CV complaints. CP free. R/LHC today by Dr. Aguillon.        Review of Systems   Constitutional: Positive for malaise/fatigue.   HENT: Negative.     Eyes: Negative.    Cardiovascular:  Positive for dyspnea on exertion.   Respiratory:  Positive for shortness of breath.    Endocrine: Negative.    Hematologic/Lymphatic: Negative.    Skin: Negative.    Musculoskeletal:  Positive for arthritis and joint pain.   Gastrointestinal: Negative.    Genitourinary: Negative.    Neurological: Negative.    Psychiatric/Behavioral: Negative.     Allergic/Immunologic: Negative.      Objective:     Vital Signs (Most Recent):  Temp: 97.8 °F (36.6 °C) (03/01/24 1119)  Pulse: 92 (03/01/24 1119)  Resp: 12 (03/01/24 1119)  BP: (!) 111/58 (03/01/24 1125)  SpO2: (!) 90 % (03/01/24 1125) Vital Signs (24h Range):  Temp:  [97.6 °F (36.4 °C)-99.1 °F (37.3 °C)] 97.8 °F (36.6 °C)  Pulse:  [92-99] 92  Resp:  [12-19] 12  SpO2:  [87 %-98 %] 90 %  BP: ()/(54-62) 111/58     Weight: 104.9 kg (231 lb 4.2 oz)  Body mass index is 33.18 kg/m².     SpO2: (!) 90 %         Intake/Output Summary (Last 24 hours) at 3/1/2024 1401  Last data filed at 2/29/2024 1821  Gross per 24 hour   Intake 315.9 ml   Output 175 ml   Net 140.9 ml       Lines/Drains/Airways       Central Venous Catheter Line  Duration                  PowerPort A Cath Single Lumen 01/05/24 1415 Internal Jugular Right 55 days              Peripheral Intravenous Line  Duration                  Peripheral IV - Single Lumen 02/27/24 0805 22 G Anterior;Distal;Left Forearm 3 days         Peripheral IV - Single Lumen 02/27/24 1800 22 G Posterior;Right Hand 2 days                       Physical Exam  Vitals and nursing  "note reviewed.   Constitutional:       General: He is not in acute distress.     Appearance: Normal appearance. He is well-developed. He is not diaphoretic.   HENT:      Head: Normocephalic and atraumatic.   Eyes:      General:         Right eye: No discharge.         Left eye: No discharge.      Pupils: Pupils are equal, round, and reactive to light.   Cardiovascular:      Rate and Rhythm: Normal rate and regular rhythm.      Heart sounds: Normal heart sounds, S1 normal and S2 normal. No murmur heard.  Pulmonary:      Effort: Pulmonary effort is normal. No respiratory distress.      Comments: Slightly diminished at bases  Abdominal:      General: There is no distension.   Musculoskeletal:      Right lower leg: No edema.      Left lower leg: No edema.   Skin:     General: Skin is warm and dry.      Findings: No erythema.   Neurological:      General: No focal deficit present.      Mental Status: He is alert and oriented to person, place, and time.   Psychiatric:         Mood and Affect: Mood normal.         Behavior: Behavior normal.            Significant Labs: CMP   Recent Labs   Lab 02/29/24  0444 03/01/24  0430   * 133*   K 3.7 3.9   CL 95 93*   CO2 28 28   * 141*   BUN 15 16   CREATININE 1.0 1.2   CALCIUM 8.7 9.2   ANIONGAP 10 12   , CBC   Recent Labs   Lab 02/29/24  0443 03/01/24  0430   WBC 33.01* 32.45*   HGB 10.0* 9.8*   HCT 29.7* 29.3*    298   , Troponin No results for input(s): "TROPONINI" in the last 48 hours., and All pertinent lab results from the last 24 hours have been reviewed.    Significant Imaging: Echocardiogram: Transthoracic echo (TTE) complete (Cupid Only):   Results for orders placed or performed during the hospital encounter of 02/26/24   Echo   Result Value Ref Range    LVIDd 4.88 3.5 - 6.0 cm    LV Systolic Volume 52.86 mL    LVIDs 3.56 2.1 - 4.0 cm    LV Diastolic Volume 111.86 mL    IVS 1.04 0.6 - 1.1 cm    FS 27 (A) 28 - 44 %    Left Ventricle Relative Wall " Thickness 0.44 cm    Posterior Wall 1.08 0.6 - 1.1 cm    LV mass 189.29 g    TR Max Can 2.91 m/s    RVDD 2.81 cm    LA size 4.18 cm    Triscuspid Valve Regurgitation Peak Gradient 34 mmHg    IVC diameter 2.08 cm    LV Systolic Volume Index 23.2 mL/m2    LV Diastolic Volume Index 49.06 mL/m2    LV Mass Index 83 g/m2    ZLVIDS -2.98     ZLVIDD -5.65     TV resting pulmonary artery pressure 37 mmHg    RV TB RVSP 6 mmHg    Est. RA pres 3 mmHg    Narrative      Left Ventricle: The left ventricle is normal in size. There is   concentric remodeling. There is normal systolic function with a visually   estimated ejection fraction of 55 - 60%. There is indeterminate diastolic   function.    Right Ventricle: Right ventricle was not well visualized due to poor   acoustic window. Normal right ventricular cavity size. Systolic function   is normal.    Pulmonary Artery: The estimated pulmonary artery systolic pressure is   37 mmHg.    IVC/SVC: Normal venous pressure at 3 mmHg.      and EKG: Reviewed  Assessment and Plan:   Patient who presents with elevated troponin/weakness. Infectious workup unremarkable. Continue OMT. R/LHC today by Dr. Aguillon.    * NSTEMI (non-ST elevated myocardial infarction)  -See plan under elevated troponin    Abnormal EKG  -See plan under elevated troponin    SIRS (systemic inflammatory response syndrome)  -Mgmt as per primary team  -BC show NGTD    Hypomagnesemia  -Repletion as per primary team    Hypokalemia  -Repletion as per primary team      Elevated troponin  -Troponin 0.725>0.735, continue to trend  -No CP symptoms/SOB/angina  -No prior CV history  -Continue ASA, heparin gtt, statin  -EKG reviewed, T wave inversions inferiorly and laterally  -Limited TTE pending  -Will consider ischemic workup with MPI stress test this admission    2/27/24  -Troponin bumped to 1.2 overnight  -Remains CP free  -Continue ASA, heparin gtt, statin  -TTE with normal EF  -LHC planned once more stable respiratory  wise/infection ruled out    2/28/24  -CP free  -Continue ASA, heparin gtt, statin  -BB added  -LHC deferred for now given leukocytosis/SOB    2/29/24  -Stable  -WBC count improving  -Continue BB, ASA, heparin gtt, statin  -Tentative plans for LHC tmw, keep NPO after MN    3/1/24  -Stable, no CP  -Continue BB, ASA, heparin gtt, statin  -R/LHC today by Dr. Aguillon. All risks, benefits, and treatment alternatives explained to patient in detail. All questions answered. He has agreed to proceed.    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, estrogen receptor positive  -Mgmt as per heme/onc    CKD stage 3 due to type 2 diabetes mellitus  -Monitor        VTE Risk Mitigation (From admission, onward)           Ordered     heparin 25,000 units in dextrose 5% (100 units/ml) IV bolus from bag LOW INTENSITY nomogram - [facility]  As needed (PRN)        Question:  Heparin Infusion Adjustment (DO NOT MODIFY ANSWER)  Answer:  \\[email or web address]\epic\Images\Pharmacy\HeparinInfusions\heparin LOW INTENSITY nomogram for [facility] [id number].pdf    02/26/24 1404     heparin 25,000 units in dextrose 5% (100 units/ml) IV bolus from bag LOW INTENSITY nomogram - [facility]  As needed (PRN)        Question:  Heparin Infusion Adjustment (DO NOT MODIFY ANSWER)  Answer:  \\[email or web address]\epic\Images\Pharmacy\HeparinInfusions\heparin LOW INTENSITY nomogram for [facility] [id number].pdf    02/26/24 1404     heparin 25,000 units in dextrose 5% 250 mL (100 units/mL) infusion LOW INTENSITY nomogram - [facility]  Continuous        Question:  Begin at (units/kg/hr)  Answer:  12    02/26/24 1404     IP VTE HIGH RISK PATIENT  Once         02/26/24 1204     Place sequential compression device  Until discontinued         02/26/24 1204                    Sandy Rodriguez PA-C  Cardiology  [facility] ([facility])

## 2024-03-01 NOTE — PLAN OF CARE
Pt declined OOB activity due to undergoing procedure soon. Pt educated on UB exercises to perform throughout the day to maintain functional strength.   Rec low intensity therapy

## 2024-03-01 NOTE — ASSESSMENT & PLAN NOTE
-Troponin 0.725>0.735, continue to trend  -No CP symptoms/SOB/angina  -No prior CV history  -Continue ASA, heparin gtt, statin  -EKG reviewed, T wave inversions inferiorly and laterally  -Limited TTE pending  -Will consider ischemic workup with MPI stress test this admission    2/27/24  -Troponin bumped to 1.2 overnight  -Remains CP free  -Continue ASA, heparin gtt, statin  -TTE with normal EF  -LHC planned once more stable respiratory wise/infection ruled out    2/28/24  -CP free  -Continue ASA, heparin gtt, statin  -BB added  -LHC deferred for now given leukocytosis/SOB    2/29/24  -Stable  -WBC count improving  -Continue BB, ASA, heparin gtt, statin  -Tentative plans for LHC tmw, keep NPO after MN    3/1/24  -Stable, no CP  -Continue BB, ASA, heparin gtt, statin  -R/LHC today by Dr. Aguillon. All risks, benefits, and treatment alternatives explained to patient in detail. All questions answered. He has agreed to proceed.

## 2024-03-02 LAB
ALBUMIN SERPL BCP-MCNC: 2.6 G/DL (ref 3.5–5.2)
ALP SERPL-CCNC: 133 U/L (ref 55–135)
ALT SERPL W/O P-5'-P-CCNC: 74 U/L (ref 10–44)
ANION GAP SERPL CALC-SCNC: 13 MMOL/L (ref 8–16)
ANISOCYTOSIS BLD QL SMEAR: SLIGHT
AST SERPL-CCNC: 76 U/L (ref 10–40)
BASOPHILS # BLD AUTO: 0.19 K/UL (ref 0–0.2)
BASOPHILS NFR BLD: 0.7 % (ref 0–1.9)
BILIRUB SERPL-MCNC: 0.6 MG/DL (ref 0.1–1)
BNP SERPL-MCNC: 590 PG/ML (ref 0–99)
BUN SERPL-MCNC: 17 MG/DL (ref 8–23)
CALCIUM SERPL-MCNC: 9.1 MG/DL (ref 8.7–10.5)
CHLORIDE SERPL-SCNC: 96 MMOL/L (ref 95–110)
CO2 SERPL-SCNC: 22 MMOL/L (ref 23–29)
CREAT SERPL-MCNC: 1.2 MG/DL (ref 0.5–1.4)
DIFFERENTIAL METHOD BLD: ABNORMAL
EOSINOPHIL # BLD AUTO: 0.1 K/UL (ref 0–0.5)
EOSINOPHIL NFR BLD: 0.2 % (ref 0–8)
ERYTHROCYTE [DISTWIDTH] IN BLOOD BY AUTOMATED COUNT: 15.8 % (ref 11.5–14.5)
EST. GFR  (NO RACE VARIABLE): 59 ML/MIN/1.73 M^2
GLUCOSE SERPL-MCNC: 211 MG/DL (ref 70–110)
HCT VFR BLD AUTO: 29 % (ref 40–54)
HGB BLD-MCNC: 9.8 G/DL (ref 14–18)
IMM GRANULOCYTES # BLD AUTO: 0.94 K/UL (ref 0–0.04)
IMM GRANULOCYTES NFR BLD AUTO: 3.6 % (ref 0–0.5)
LYMPHOCYTES # BLD AUTO: 1.2 K/UL (ref 1–4.8)
LYMPHOCYTES NFR BLD: 4.6 % (ref 18–48)
MAGNESIUM SERPL-MCNC: 1.9 MG/DL (ref 1.6–2.6)
MCH RBC QN AUTO: 30.2 PG (ref 27–31)
MCHC RBC AUTO-ENTMCNC: 33.8 G/DL (ref 32–36)
MCV RBC AUTO: 89 FL (ref 82–98)
MONOCYTES # BLD AUTO: 0.9 K/UL (ref 0.3–1)
MONOCYTES NFR BLD: 3.3 % (ref 4–15)
NEUTROPHILS # BLD AUTO: 22.6 K/UL (ref 1.8–7.7)
NEUTROPHILS NFR BLD: 87.6 % (ref 38–73)
NRBC BLD-RTO: 0 /100 WBC
PLATELET # BLD AUTO: 273 K/UL (ref 150–450)
PLATELET BLD QL SMEAR: ABNORMAL
PMV BLD AUTO: 10.6 FL (ref 9.2–12.9)
POCT GLUCOSE: 153 MG/DL (ref 70–110)
POCT GLUCOSE: 177 MG/DL (ref 70–110)
POCT GLUCOSE: 204 MG/DL (ref 70–110)
POCT GLUCOSE: 206 MG/DL (ref 70–110)
POTASSIUM SERPL-SCNC: 4.5 MMOL/L (ref 3.5–5.1)
PROT SERPL-MCNC: 6.8 G/DL (ref 6–8.4)
RBC # BLD AUTO: 3.25 M/UL (ref 4.6–6.2)
SODIUM SERPL-SCNC: 131 MMOL/L (ref 136–145)
WBC # BLD AUTO: 25.84 K/UL (ref 3.9–12.7)

## 2024-03-02 PROCEDURE — 99900035 HC TECH TIME PER 15 MIN (STAT): Mod: HCNC

## 2024-03-02 PROCEDURE — 85025 COMPLETE CBC W/AUTO DIFF WBC: CPT | Mod: HCNC | Performed by: HOSPITALIST

## 2024-03-02 PROCEDURE — 36415 COLL VENOUS BLD VENIPUNCTURE: CPT | Mod: HCNC,XB | Performed by: HOSPITALIST

## 2024-03-02 PROCEDURE — 99233 SBSQ HOSP IP/OBS HIGH 50: CPT | Mod: HCNC,,, | Performed by: INTERNAL MEDICINE

## 2024-03-02 PROCEDURE — 83735 ASSAY OF MAGNESIUM: CPT | Mod: HCNC | Performed by: INTERNAL MEDICINE

## 2024-03-02 PROCEDURE — 25000003 PHARM REV CODE 250: Mod: HCNC | Performed by: NURSE PRACTITIONER

## 2024-03-02 PROCEDURE — 63600175 PHARM REV CODE 636 W HCPCS: Mod: HCNC | Performed by: HOSPITALIST

## 2024-03-02 PROCEDURE — 99232 SBSQ HOSP IP/OBS MODERATE 35: CPT | Mod: HCNC,,, | Performed by: INTERNAL MEDICINE

## 2024-03-02 PROCEDURE — 25000003 PHARM REV CODE 250: Mod: HCNC | Performed by: INTERNAL MEDICINE

## 2024-03-02 PROCEDURE — 80053 COMPREHEN METABOLIC PANEL: CPT | Mod: HCNC | Performed by: INTERNAL MEDICINE

## 2024-03-02 PROCEDURE — 83880 ASSAY OF NATRIURETIC PEPTIDE: CPT | Mod: HCNC | Performed by: INTERNAL MEDICINE

## 2024-03-02 PROCEDURE — 25000003 PHARM REV CODE 250: Mod: HCNC | Performed by: HOSPITALIST

## 2024-03-02 PROCEDURE — 94761 N-INVAS EAR/PLS OXIMETRY MLT: CPT | Mod: HCNC

## 2024-03-02 PROCEDURE — 21400001 HC TELEMETRY ROOM: Mod: HCNC

## 2024-03-02 PROCEDURE — 27000221 HC OXYGEN, UP TO 24 HOURS: Mod: HCNC

## 2024-03-02 PROCEDURE — 36415 COLL VENOUS BLD VENIPUNCTURE: CPT | Mod: HCNC | Performed by: INTERNAL MEDICINE

## 2024-03-02 PROCEDURE — 63600175 PHARM REV CODE 636 W HCPCS: Mod: HCNC | Performed by: NURSE PRACTITIONER

## 2024-03-02 RX ADMIN — MIDODRINE HYDROCHLORIDE 10 MG: 5 TABLET ORAL at 12:03

## 2024-03-02 RX ADMIN — CEFTRIAXONE 1 G: 1 INJECTION, POWDER, FOR SOLUTION INTRAMUSCULAR; INTRAVENOUS at 10:03

## 2024-03-02 RX ADMIN — METOPROLOL TARTRATE 25 MG: 25 TABLET, FILM COATED ORAL at 09:03

## 2024-03-02 RX ADMIN — HYDROCODONE BITARTRATE AND ACETAMINOPHEN 1 TABLET: 5; 325 TABLET ORAL at 09:03

## 2024-03-02 RX ADMIN — ANASTROZOLE 1 MG: 1 TABLET, COATED ORAL at 09:03

## 2024-03-02 RX ADMIN — ASPIRIN 81 MG: 81 TABLET, COATED ORAL at 09:03

## 2024-03-02 RX ADMIN — MIDODRINE HYDROCHLORIDE 10 MG: 5 TABLET ORAL at 05:03

## 2024-03-02 RX ADMIN — MIDODRINE HYDROCHLORIDE 10 MG: 5 TABLET ORAL at 09:03

## 2024-03-02 RX ADMIN — INSULIN ASPART 2 UNITS: 100 INJECTION, SOLUTION INTRAVENOUS; SUBCUTANEOUS at 03:03

## 2024-03-02 RX ADMIN — ATORVASTATIN CALCIUM 40 MG: 40 TABLET, FILM COATED ORAL at 09:03

## 2024-03-02 RX ADMIN — POTASSIUM CHLORIDE 20 MEQ: 1500 TABLET, EXTENDED RELEASE ORAL at 09:03

## 2024-03-02 RX ADMIN — Medication 400 MG: at 09:03

## 2024-03-02 RX ADMIN — INSULIN ASPART 2 UNITS: 100 INJECTION, SOLUTION INTRAVENOUS; SUBCUTANEOUS at 12:03

## 2024-03-02 NOTE — NURSING
1750 Transferred via bed to 225 on portable moniter and 02.  Report called to Aicha at 1730.  IV fluids on pump at 150/hr.  R groin site soft and flat.  Drsg dry and intact.  1755 Care handed off to Aicha.

## 2024-03-02 NOTE — PROGRESS NOTES
[facility])  Hematology  Bone Marrow Transplant  Progress Note    Patient Name: Mark Dickerson Sr.  Admission Date: 2/26/2024  Hospital Length of Stay: 4 days  Code Status: DNR     Subjective:     Interval History: Consult for leukocytosis in setting of NSTEMI and breast cancer. Stage IIA breast cancer, T2pN1, +/-/+, Ki67 20%, G2. Received cycle 2 Kanjinti 2/20/2024. Presented 2/26/2024 with confusion. Initial WBC 14, now 33 with 90% granulocytes. Elevated troponin and trend, NSTEMI.    Objective:     Vital Signs (Most Recent):  Temp: 98.2 °F (36.8 °C) (03/02/24 1128)  Pulse: 102 (03/02/24 1128)  Resp: 18 (03/02/24 1128)  BP: 119/68 (03/02/24 1128)  SpO2: 97 % (03/02/24 1128) Vital Signs (24h Range):  Temp:  [97.6 °F (36.4 °C)-98.6 °F (37 °C)] 98.2 °F (36.8 °C)  Pulse:  [] 102  Resp:  [18-29] 18  SpO2:  [90 %-97 %] 97 %  BP: ()/(48-68) 119/68     Weight: 108.8 kg (239 lb 13.8 oz)  Body mass index is 34.42 kg/m².  Body surface area is 2.32 meters squared.    ECOG SCORE           ECOG PS 1    Intake/Output - Last 3 Shifts         02/29 0700  03/01 0659 03/01 0700  03/02 0659 03/02 0700  03/03 0659    P.O. 500      I.V. (mL/kg) 115.9 (1.1)      Total Intake(mL/kg) 615.9 (5.9)      Urine (mL/kg/hr) 575 (0.2) 675 (0.3)     Stool 0      Total Output 575 675     Net +40.9 -675            Urine Occurrence 2 x 1 x     Stool Occurrence 0 x 0 x             Physical Exam  Vitals and nursing note reviewed.   Constitutional:       Appearance: He is well-developed.   HENT:      Head: Normocephalic and atraumatic.   Eyes:      General: No scleral icterus.     Conjunctiva/sclera: Conjunctivae normal.   Cardiovascular:      Rate and Rhythm: Normal rate.   Pulmonary:      Effort: Pulmonary effort is normal. No respiratory distress.   Chest:       Abdominal:      General: There is no distension.      Palpations: Abdomen is soft.      Tenderness: There is no abdominal tenderness.   Musculoskeletal:          General: Normal range of motion.      Cervical back: Normal range of motion and neck supple.   Skin:     General: Skin is warm and dry.   Neurological:      Mental Status: He is alert and oriented to person, place, and time.      Cranial Nerves: No cranial nerve deficit.   Psychiatric:         Behavior: Behavior normal.         Significant Labs:   CBC:   Recent Labs   Lab 03/01/24 0430   WBC 32.45*   HGB 9.8*   HCT 29.3*       and CMP:   Recent Labs   Lab 03/01/24 0430 03/02/24  1126   * 131*   K 3.9 4.5   CL 93* 96   CO2 28 22*   * 211*   BUN 16 17   CREATININE 1.2 1.2   CALCIUM 9.2 9.1   PROT  --  6.8   ALBUMIN  --  2.6*   BILITOT  --  0.6   ALKPHOS  --  133   AST  --  76*   ALT  --  74*   ANIONGAP 12 13       Diagnostic Results:  I have reviewed all pertinent imaging results/findings within the past 24 hours.    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, estrogen receptor positive [C50.022, Z17.0] 12/21/2023 Not Applicable    CKD stage 3 due to type 2 diabetes mellitus [E11.22, N18.30] 02/18/2021 Yes    Controlled type 2 diabetes mellitus without complication, without long-term current use of insulin [E11.9] 07/28/2017 Yes      Problems Resolved During this Admission:       Reactive Leukocytosis  - suspect leukocytosis with granulocyte predominance, resolution of immature cells is evidence of immune response to ischemic myocardial tissue in response to NSTEMI  - no intervention needed  - continue supportive care  - NSTEMI care per cardiology    VTE Risk Mitigation  (From admission, onward)           Ordered     IP VTE HIGH RISK PATIENT  Once         02/26/24 1204     Place sequential compression device  Until discontinued         02/26/24 1204                    Disposition: inpatient    Gabriela Puga MD  Bone Marrow Transplant  [facility] Tele[facility] ([facility])

## 2024-03-02 NOTE — SUBJECTIVE & OBJECTIVE
Review of Systems   Constitutional: Positive for malaise/fatigue.   HENT: Negative.     Eyes: Negative.    Cardiovascular:  Positive for dyspnea on exertion.   Respiratory:  Positive for shortness of breath.    Endocrine: Negative.    Hematologic/Lymphatic: Negative.    Skin: Negative.    Musculoskeletal:  Positive for arthritis and joint pain.   Gastrointestinal: Negative.    Genitourinary: Negative.    Neurological: Negative.    Psychiatric/Behavioral: Negative.     Allergic/Immunologic: Negative.      Objective:     Vital Signs (Most Recent):  Temp: 98.7 °F (37.1 °C) (03/02/24 1533)  Pulse: 93 (03/02/24 1533)  Resp: 18 (03/02/24 1533)  BP: (!) 118/58 (03/02/24 1533)  SpO2: 96 % (03/02/24 1533) Vital Signs (24h Range):  Temp:  [97.6 °F (36.4 °C)-98.7 °F (37.1 °C)] 98.7 °F (37.1 °C)  Pulse:  [] 93  Resp:  [18-26] 18  SpO2:  [90 %-97 %] 96 %  BP: ()/(54-68) 118/58     Weight: 108.8 kg (239 lb 13.8 oz)  Body mass index is 34.42 kg/m².     SpO2: 96 %         Intake/Output Summary (Last 24 hours) at 3/2/2024 1736  Last data filed at 3/1/2024 2225  Gross per 24 hour   Intake --   Output 675 ml   Net -675 ml         Lines/Drains/Airways       Central Venous Catheter Line  Duration                  PowerPort A Cath Single Lumen 01/05/24 1415 Internal Jugular Right 57 days              Peripheral Intravenous Line  Duration                  Peripheral IV - Single Lumen 02/27/24 1800 22 G Posterior;Right Hand 3 days                       Physical Exam  Vitals and nursing note reviewed.   Constitutional:       General: He is not in acute distress.     Appearance: Normal appearance. He is well-developed. He is not diaphoretic.   HENT:      Head: Normocephalic and atraumatic.   Eyes:      General:         Right eye: No discharge.         Left eye: No discharge.      Pupils: Pupils are equal, round, and reactive to light.   Cardiovascular:      Rate and Rhythm: Normal rate and regular rhythm.      Heart sounds:  "Normal heart sounds, S1 normal and S2 normal. No murmur heard.  Pulmonary:      Effort: Pulmonary effort is normal. No respiratory distress.      Comments: Slightly diminished at bases  Abdominal:      General: There is no distension.   Musculoskeletal:      Right lower leg: No edema.      Left lower leg: No edema.   Skin:     General: Skin is warm and dry.      Findings: No erythema.   Neurological:      General: No focal deficit present.      Mental Status: He is alert and oriented to person, place, and time.   Psychiatric:         Mood and Affect: Mood normal.         Behavior: Behavior normal.            Significant Labs: CMP   Recent Labs   Lab 03/01/24  0430 03/02/24  1126   * 131*   K 3.9 4.5   CL 93* 96   CO2 28 22*   * 211*   BUN 16 17   CREATININE 1.2 1.2   CALCIUM 9.2 9.1   PROT  --  6.8   ALBUMIN  --  2.6*   BILITOT  --  0.6   ALKPHOS  --  133   AST  --  76*   ALT  --  74*   ANIONGAP 12 13     , CBC   Recent Labs   Lab 03/01/24 0430 03/02/24  1422   WBC 32.45* 25.84*   HGB 9.8* 9.8*   HCT 29.3* 29.0*    273     , Troponin No results for input(s): "TROPONINI" in the last 48 hours., and All pertinent lab results from the last 24 hours have been reviewed.    Significant Imaging: Echocardiogram: Transthoracic echo (TTE) complete (Cupid Only):   Results for orders placed or performed during the hospital encounter of 02/26/24   Echo   Result Value Ref Range    LVIDd 4.88 3.5 - 6.0 cm    LV Systolic Volume 52.86 mL    LVIDs 3.56 2.1 - 4.0 cm    LV Diastolic Volume 111.86 mL    IVS 1.04 0.6 - 1.1 cm    FS 27 (A) 28 - 44 %    Left Ventricle Relative Wall Thickness 0.44 cm    Posterior Wall 1.08 0.6 - 1.1 cm    LV mass 189.29 g    TR Max Can 2.91 m/s    RVDD 2.81 cm    LA size 4.18 cm    Triscuspid Valve Regurgitation Peak Gradient 34 mmHg    IVC diameter 2.08 cm    LV Systolic Volume Index 23.2 mL/m2    LV Diastolic Volume Index 49.06 mL/m2    LV Mass Index 83 g/m2    ZLVIDS -2.98     ZLVIDD " -5.65     TV resting pulmonary artery pressure 37 mmHg    RV TB RVSP 6 mmHg    Est. RA pres 3 mmHg    Narrative      Left Ventricle: The left ventricle is normal in size. There is   concentric remodeling. There is normal systolic function with a visually   estimated ejection fraction of 55 - 60%. There is indeterminate diastolic   function.    Right Ventricle: Right ventricle was not well visualized due to poor   acoustic window. Normal right ventricular cavity size. Systolic function   is normal.    Pulmonary Artery: The estimated pulmonary artery systolic pressure is   37 mmHg.    IVC/SVC: Normal venous pressure at 3 mmHg.      and EKG: Reviewed

## 2024-03-02 NOTE — PROGRESS NOTES
[facility] ([facility])  Cardiology  Progress Note    Patient Name: Mark Dickerson Sr.  MRN: [id number]  Admission Date: 2/26/2024  Hospital Length of Stay: 4 days  Code Status: DNR   Attending Physician: Judah Oden MD   Primary Care Physician: Tatyana Cannon MD  Expected Discharge Date:   Principal Problem:NSTEMI (non-ST elevated myocardial infarction)    Subjective:     Hospital Course:   2/27/24-Patient seen and examined today, sitting up in bed. Appears more SOB/weak. No CP symptoms. WBC bumped to 26,000. CXR with bilateral infiltrates/worsening appearance. IV Lasix given and BC drawn. Troponin bumped to 1.293. LHC initially planned today but cancelled due to lab abnormalities, CXR, and patient's clinical status.    2/28/24-Patient seen and examined today, sitting up in bedside chair. Complains of chills/weakness. WBC up to 34,000. CXR showed bilateral opacities. Being diuresed. K 3.3, being repleted. LHC deferred for now.    2/29/24-Patient seen and examined today, resting in bed. Feels/looks better. WBC trending down. Denies CP. BC NGTD. Tentative plans for [facility] tmw.    3/1/24-Patient seen and examined today, lying in bed. Stable overnight. No CV complaints. CP free. R/LHC today by Dr. Aguillon.    3/2/24 - s/p R/LHC - with elevated filling pressures,  RCA with collaterals - will cont med management and discuss outpt TAVR eval for severe AS        Review of Systems   Constitutional: Positive for malaise/fatigue.   HENT: Negative.     Eyes: Negative.    Cardiovascular:  Positive for dyspnea on exertion.   Respiratory:  Positive for shortness of breath.    Endocrine: Negative.    Hematologic/Lymphatic: Negative.    Skin: Negative.    Musculoskeletal:  Positive for arthritis and joint pain.   Gastrointestinal: Negative.    Genitourinary: Negative.    Neurological: Negative.    Psychiatric/Behavioral: Negative.     Allergic/Immunologic: Negative.      Objective:     Vital Signs (Most Recent):  Temp: 98.7  °F (37.1 °C) (03/02/24 1533)  Pulse: 93 (03/02/24 1533)  Resp: 18 (03/02/24 1533)  BP: (!) 118/58 (03/02/24 1533)  SpO2: 96 % (03/02/24 1533) Vital Signs (24h Range):  Temp:  [97.6 °F (36.4 °C)-98.7 °F (37.1 °C)] 98.7 °F (37.1 °C)  Pulse:  [] 93  Resp:  [18-26] 18  SpO2:  [90 %-97 %] 96 %  BP: ()/(54-68) 118/58     Weight: 108.8 kg (239 lb 13.8 oz)  Body mass index is 34.42 kg/m².     SpO2: 96 %         Intake/Output Summary (Last 24 hours) at 3/2/2024 1736  Last data filed at 3/1/2024 2225  Gross per 24 hour   Intake --   Output 675 ml   Net -675 ml         Lines/Drains/Airways       Central Venous Catheter Line  Duration                  PowerPort A Cath Single Lumen 01/05/24 1415 Internal Jugular Right 57 days              Peripheral Intravenous Line  Duration                  Peripheral IV - Single Lumen 02/27/24 1800 22 G Posterior;Right Hand 3 days                       Physical Exam  Vitals and nursing note reviewed.   Constitutional:       General: He is not in acute distress.     Appearance: Normal appearance. He is well-developed. He is not diaphoretic.   HENT:      Head: Normocephalic and atraumatic.   Eyes:      General:         Right eye: No discharge.         Left eye: No discharge.      Pupils: Pupils are equal, round, and reactive to light.   Cardiovascular:      Rate and Rhythm: Normal rate and regular rhythm.      Heart sounds: Normal heart sounds, S1 normal and S2 normal. No murmur heard.  Pulmonary:      Effort: Pulmonary effort is normal. No respiratory distress.      Comments: Slightly diminished at bases  Abdominal:      General: There is no distension.   Musculoskeletal:      Right lower leg: No edema.      Left lower leg: No edema.   Skin:     General: Skin is warm and dry.      Findings: No erythema.   Neurological:      General: No focal deficit present.      Mental Status: He is alert and oriented to person, place, and time.   Psychiatric:         Mood and Affect: Mood normal.  "        Behavior: Behavior normal.            Significant Labs: CMP   Recent Labs   Lab 03/01/24  0430 03/02/24  1126   * 131*   K 3.9 4.5   CL 93* 96   CO2 28 22*   * 211*   BUN 16 17   CREATININE 1.2 1.2   CALCIUM 9.2 9.1   PROT  --  6.8   ALBUMIN  --  2.6*   BILITOT  --  0.6   ALKPHOS  --  133   AST  --  76*   ALT  --  74*   ANIONGAP 12 13     , CBC   Recent Labs   Lab 03/01/24  0430 03/02/24  1422   WBC 32.45* 25.84*   HGB 9.8* 9.8*   HCT 29.3* 29.0*    273     , Troponin No results for input(s): "TROPONINI" in the last 48 hours., and All pertinent lab results from the last 24 hours have been reviewed.    Significant Imaging: Echocardiogram: Transthoracic echo (TTE) complete (Cupid Only):   Results for orders placed or performed during the hospital encounter of 02/26/24   Echo   Result Value Ref Range    LVIDd 4.88 3.5 - 6.0 cm    LV Systolic Volume 52.86 mL    LVIDs 3.56 2.1 - 4.0 cm    LV Diastolic Volume 111.86 mL    IVS 1.04 0.6 - 1.1 cm    FS 27 (A) 28 - 44 %    Left Ventricle Relative Wall Thickness 0.44 cm    Posterior Wall 1.08 0.6 - 1.1 cm    LV mass 189.29 g    TR Max Can 2.91 m/s    RVDD 2.81 cm    LA size 4.18 cm    Triscuspid Valve Regurgitation Peak Gradient 34 mmHg    IVC diameter 2.08 cm    LV Systolic Volume Index 23.2 mL/m2    LV Diastolic Volume Index 49.06 mL/m2    LV Mass Index 83 g/m2    ZLVIDS -2.98     ZLVIDD -5.65     TV resting pulmonary artery pressure 37 mmHg    RV TB RVSP 6 mmHg    Est. RA pres 3 mmHg    Narrative      Left Ventricle: The left ventricle is normal in size. There is   concentric remodeling. There is normal systolic function with a visually   estimated ejection fraction of 55 - 60%. There is indeterminate diastolic   function.    Right Ventricle: Right ventricle was not well visualized due to poor   acoustic window. Normal right ventricular cavity size. Systolic function   is normal.    Pulmonary Artery: The estimated pulmonary artery systolic " pressure is   37 mmHg.    IVC/SVC: Normal venous pressure at 3 mmHg.      and EKG: Reviewed  Assessment and Plan:     Brief HPI: s/p R/LHC - with elevated filling pressures,  RCA with collaterals - will cont med management and discuss outpt TAVR eval for severe AS    * NSTEMI (non-ST elevated myocardial infarction)  -See plan under elevated troponin    Abnormal EKG  -See plan under elevated troponin    SIRS (systemic inflammatory response syndrome)  -Mgmt as per primary team  -BC show NGTD    Hypomagnesemia  -Repletion as per primary team    Hypokalemia  -Repletion as per primary team      Elevated troponin  -Troponin 0.725>0.735, continue to trend  -No CP symptoms/SOB/angina  -No prior CV history  -Continue ASA, heparin gtt, statin  -EKG reviewed, T wave inversions inferiorly and laterally  -Limited TTE pending  -Will consider ischemic workup with MPI stress test this admission    2/27/24  -Troponin bumped to 1.2 overnight  -Remains CP free  -Continue ASA, heparin gtt, statin  -TTE with normal EF  -LHC planned once more stable respiratory wise/infection ruled out    2/28/24  -CP free  -Continue ASA, heparin gtt, statin  -BB added  -LHC deferred for now given leukocytosis/SOB    2/29/24  -Stable  -WBC count improving  -Continue BB, ASA, heparin gtt, statin  -Tentative plans for LHC tmw, keep NPO after MN    3/1/24  -Stable, no CP  -Continue BB, ASA, heparin gtt, statin  -R/LHC today by Dr. Aguillon. All risks, benefits, and treatment alternatives explained to patient in detail. All questions answered. He has agreed to proceed.    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

## 2024-03-02 NOTE — NURSING
Patient requesting someone look at PIV site because it is bleeding.  Site assessed and PIV removed d/t not being patent.  Catheter intact and patient tolerated well.

## 2024-03-02 NOTE — ASSESSMENT & PLAN NOTE
-Troponin 0.725>0.735, continue to trend  -No CP symptoms/SOB/angina  -No prior CV history  -Continue ASA, heparin gtt, statin  -EKG reviewed, T wave inversions inferiorly and laterally  -Limited TTE pending  -Will consider ischemic workup with MPI stress test this admission    2/27/24  -Troponin bumped to 1.2 overnight  -Remains CP free  -Continue ASA, heparin gtt, statin  -TTE with normal EF  -LHC planned once more stable respiratory wise/infection ruled out    2/28/24  -CP free  -Continue ASA, heparin gtt, statin  -BB added  -LHC deferred for now given leukocytosis/SOB    2/29/24  -Stable  -WBC count improving  -Continue BB, ASA, heparin gtt, statin  -Tentative plans for LHC tmw, keep NPO after MN    3/1/24  -Stable, no CP  -Continue BB, ASA, heparin gtt, statin  -R/LHC today by Dr. Aguillon. All risks, benefits, and treatment alternatives explained to patient in detail. All questions answered. He has agreed to proceed.    3/2/24 s/p R/LHC - with elevated filling pressures,  RCA with collaterals - will cont med management and discuss outpt TAVR eval for severe AS   Detail Level: Simple Biopsy Method: Personna blade X Size Of Lesion In Cm: 0 Destruction After The Procedure: No Wound Care: Polysporin ointment Lab: [id number] Consent: Verbal consent was obtained and risks were reviewed including but not limited to scarring, infection, bleeding, scabbing, incomplete removal, nerve damage and allergy to anesthesia. Electrodesiccation And Curettage Text: The wound bed was treated with electrodesiccation and curettage after the biopsy was performed. Electrodesiccation Text: The wound bed was treated with electrodesiccation after the biopsy was performed. Dressing: bandage Curettage Text: The wound bed was treated with cautery after the biopsy was performed. Silver Nitrate Text: The wound bed was treated with silver nitrate after the biopsy was performed. Cryotherapy Text: The wound bed was treated with cryotherapy after the biopsy was performed. Billing Type: Third-Party Bill Notification Instructions: Patient will be notified of biopsy results. However, patient instructed to call the office if not contacted within 2 weeks. Hemostasis: Aluminum Chloride Anesthesia Volume In Cc: 3 Anesthesia Type: 1% lidocaine with 1:200,000 epinephrine Type Of Destruction Used: Curettage Biopsy Type: H and E Body Location Override (Optional - Billing Will Still Be Based On Selected Body Map Location If Applicable): Left superior forehead Post-Care Instructions: I reviewed with the patient in detail post-care instructions. Patient is to keep the biopsy site dry overnight, and then apply bacitracin twice daily until healed. Patient may apply hydrogen peroxide soaks to remove any crusting.

## 2024-03-02 NOTE — PLAN OF CARE
Updated patient on plan of care. R groin CDI, no hematoma. Bedrest up at 21:00. Instructed patient to use call light for assistance, call light in reach. Hourly rounding performed. Vitals q4 hours. Education provided, questions answered/encouraged. Chart check complete. NSR    Problem: Adult Inpatient Plan of Care  Goal: Plan of Care Review  Outcome: Ongoing, Progressing

## 2024-03-02 NOTE — NURSING
Received in bed talking with family at bedside. Assessment per flowsheet. Denies any pain or discomfort. Plan of care discussed, pt verbalized understanding. Bed low, call light within reach. Will continue to monitor.

## 2024-03-03 LAB
ALBUMIN SERPL BCP-MCNC: 2.5 G/DL (ref 3.5–5.2)
ALP SERPL-CCNC: 118 U/L (ref 55–135)
ALT SERPL W/O P-5'-P-CCNC: 70 U/L (ref 10–44)
ANION GAP SERPL CALC-SCNC: 11 MMOL/L (ref 8–16)
AST SERPL-CCNC: 66 U/L (ref 10–40)
BACTERIA BLD CULT: NORMAL
BACTERIA BLD CULT: NORMAL
BASOPHILS # BLD AUTO: 0.11 K/UL (ref 0–0.2)
BASOPHILS NFR BLD: 0.5 % (ref 0–1.9)
BILIRUB SERPL-MCNC: 0.5 MG/DL (ref 0.1–1)
BUN SERPL-MCNC: 18 MG/DL (ref 8–23)
CALCIUM SERPL-MCNC: 8.8 MG/DL (ref 8.7–10.5)
CHLORIDE SERPL-SCNC: 96 MMOL/L (ref 95–110)
CO2 SERPL-SCNC: 23 MMOL/L (ref 23–29)
CREAT SERPL-MCNC: 1 MG/DL (ref 0.5–1.4)
DIFFERENTIAL METHOD BLD: ABNORMAL
EOSINOPHIL # BLD AUTO: 0.1 K/UL (ref 0–0.5)
EOSINOPHIL NFR BLD: 0.2 % (ref 0–8)
ERYTHROCYTE [DISTWIDTH] IN BLOOD BY AUTOMATED COUNT: 15.5 % (ref 11.5–14.5)
EST. GFR  (NO RACE VARIABLE): >60 ML/MIN/1.73 M^2
GLUCOSE SERPL-MCNC: 194 MG/DL (ref 70–110)
HCT VFR BLD AUTO: 27.9 % (ref 40–54)
HGB BLD-MCNC: 9.5 G/DL (ref 14–18)
IMM GRANULOCYTES # BLD AUTO: 0.35 K/UL (ref 0–0.04)
IMM GRANULOCYTES NFR BLD AUTO: 1.7 % (ref 0–0.5)
LYMPHOCYTES # BLD AUTO: 1.4 K/UL (ref 1–4.8)
LYMPHOCYTES NFR BLD: 6.8 % (ref 18–48)
MCH RBC QN AUTO: 30 PG (ref 27–31)
MCHC RBC AUTO-ENTMCNC: 34.1 G/DL (ref 32–36)
MCV RBC AUTO: 88 FL (ref 82–98)
MONOCYTES # BLD AUTO: 0.7 K/UL (ref 0.3–1)
MONOCYTES NFR BLD: 3.5 % (ref 4–15)
NEUTROPHILS # BLD AUTO: 17.7 K/UL (ref 1.8–7.7)
NEUTROPHILS NFR BLD: 87.3 % (ref 38–73)
NRBC BLD-RTO: 0 /100 WBC
PLATELET # BLD AUTO: 277 K/UL (ref 150–450)
PMV BLD AUTO: 10.8 FL (ref 9.2–12.9)
POCT GLUCOSE: 188 MG/DL (ref 70–110)
POCT GLUCOSE: 195 MG/DL (ref 70–110)
POCT GLUCOSE: 204 MG/DL (ref 70–110)
POTASSIUM SERPL-SCNC: 4.7 MMOL/L (ref 3.5–5.1)
PROT SERPL-MCNC: 6.4 G/DL (ref 6–8.4)
RBC # BLD AUTO: 3.17 M/UL (ref 4.6–6.2)
SODIUM SERPL-SCNC: 130 MMOL/L (ref 136–145)
WBC # BLD AUTO: 20.33 K/UL (ref 3.9–12.7)

## 2024-03-03 PROCEDURE — 25000003 PHARM REV CODE 250: Mod: HCNC | Performed by: NURSE PRACTITIONER

## 2024-03-03 PROCEDURE — 25000003 PHARM REV CODE 250: Mod: HCNC | Performed by: HOSPITALIST

## 2024-03-03 PROCEDURE — 36415 COLL VENOUS BLD VENIPUNCTURE: CPT | Mod: HCNC | Performed by: INTERNAL MEDICINE

## 2024-03-03 PROCEDURE — 85025 COMPLETE CBC W/AUTO DIFF WBC: CPT | Mod: HCNC | Performed by: INTERNAL MEDICINE

## 2024-03-03 PROCEDURE — 97168 OT RE-EVAL EST PLAN CARE: CPT | Mod: HCNC

## 2024-03-03 PROCEDURE — 99233 SBSQ HOSP IP/OBS HIGH 50: CPT | Mod: HCNC,,, | Performed by: INTERNAL MEDICINE

## 2024-03-03 PROCEDURE — 97530 THERAPEUTIC ACTIVITIES: CPT | Mod: HCNC

## 2024-03-03 PROCEDURE — 80053 COMPREHEN METABOLIC PANEL: CPT | Mod: HCNC | Performed by: INTERNAL MEDICINE

## 2024-03-03 PROCEDURE — 97116 GAIT TRAINING THERAPY: CPT | Mod: HCNC

## 2024-03-03 PROCEDURE — 63600175 PHARM REV CODE 636 W HCPCS: Mod: HCNC | Performed by: INTERNAL MEDICINE

## 2024-03-03 PROCEDURE — 27000221 HC OXYGEN, UP TO 24 HOURS: Mod: HCNC

## 2024-03-03 PROCEDURE — 99900035 HC TECH TIME PER 15 MIN (STAT): Mod: HCNC

## 2024-03-03 PROCEDURE — 21400001 HC TELEMETRY ROOM: Mod: HCNC

## 2024-03-03 PROCEDURE — 25000003 PHARM REV CODE 250: Mod: HCNC | Performed by: INTERNAL MEDICINE

## 2024-03-03 PROCEDURE — 94761 N-INVAS EAR/PLS OXIMETRY MLT: CPT | Mod: HCNC

## 2024-03-03 RX ORDER — FUROSEMIDE 10 MG/ML
20 INJECTION INTRAMUSCULAR; INTRAVENOUS 2 TIMES DAILY
Status: DISCONTINUED | OUTPATIENT
Start: 2024-03-03 | End: 2024-03-04

## 2024-03-03 RX ORDER — ATORVASTATIN CALCIUM 10 MG/1
20 TABLET, FILM COATED ORAL NIGHTLY
Status: DISCONTINUED | OUTPATIENT
Start: 2024-03-04 | End: 2024-03-14 | Stop reason: HOSPADM

## 2024-03-03 RX ORDER — ASPIRIN 81 MG/1
81 TABLET ORAL DAILY
Status: DISCONTINUED | OUTPATIENT
Start: 2024-03-03 | End: 2024-03-10

## 2024-03-03 RX ADMIN — FUROSEMIDE 20 MG: 10 INJECTION, SOLUTION INTRAMUSCULAR; INTRAVENOUS at 12:03

## 2024-03-03 RX ADMIN — HYDROCODONE BITARTRATE AND ACETAMINOPHEN 1 TABLET: 5; 325 TABLET ORAL at 09:03

## 2024-03-03 RX ADMIN — METOPROLOL TARTRATE 25 MG: 25 TABLET, FILM COATED ORAL at 08:03

## 2024-03-03 RX ADMIN — POTASSIUM CHLORIDE 20 MEQ: 1500 TABLET, EXTENDED RELEASE ORAL at 08:03

## 2024-03-03 RX ADMIN — INSULIN ASPART 2 UNITS: 100 INJECTION, SOLUTION INTRAVENOUS; SUBCUTANEOUS at 04:03

## 2024-03-03 RX ADMIN — MIDODRINE HYDROCHLORIDE 10 MG: 5 TABLET ORAL at 12:03

## 2024-03-03 RX ADMIN — MIDODRINE HYDROCHLORIDE 10 MG: 5 TABLET ORAL at 04:03

## 2024-03-03 RX ADMIN — Medication 400 MG: at 08:03

## 2024-03-03 RX ADMIN — MIDODRINE HYDROCHLORIDE 10 MG: 5 TABLET ORAL at 08:03

## 2024-03-03 RX ADMIN — MUPIROCIN: 20 OINTMENT TOPICAL at 08:03

## 2024-03-03 RX ADMIN — ATORVASTATIN CALCIUM 40 MG: 40 TABLET, FILM COATED ORAL at 08:03

## 2024-03-03 RX ADMIN — FUROSEMIDE 20 MG: 10 INJECTION, SOLUTION INTRAMUSCULAR; INTRAVENOUS at 08:03

## 2024-03-03 RX ADMIN — ANASTROZOLE 1 MG: 1 TABLET, COATED ORAL at 08:03

## 2024-03-03 NOTE — ASSESSMENT & PLAN NOTE
Creatine stable for now. BMP reviewed- noted Estimated Creatinine Clearance: 65.5 mL/min (based on SCr of 1 mg/dL). according to latest data. Based on current GFR, CKD stage is stage 3 - GFR 30-59.  Monitor UOP and serial BMP and adjust therapy as needed. Renally dose meds. Avoid nephrotoxic medications and procedures

## 2024-03-03 NOTE — PROGRESS NOTES
[facility] - Telemetry ([facility])  Cardiology  Progress Note    Patient Name: Mark Dickerson Sr.  MRN: [id number]  Admission Date: 2/26/2024  Hospital Length of Stay: 5 days  Code Status: DNR   Attending Physician: Judah Oden MD   Primary Care Physician: Tatyana Cannon MD  Expected Discharge Date:   Principal Problem:NSTEMI (non-ST elevated myocardial infarction)    Subjective:     Hospital Course:   2/27/24-Patient seen and examined today, sitting up in bed. Appears more SOB/weak. No CP symptoms. WBC bumped to 26,000. CXR with bilateral infiltrates/worsening appearance. IV Lasix given and BC drawn. Troponin bumped to 1.293. LHC initially planned today but cancelled due to lab abnormalities, CXR, and patient's clinical status.    2/28/24-Patient seen and examined today, sitting up in bedside chair. Complains of chills/weakness. WBC up to 34,000. CXR showed bilateral opacities. Being diuresed. K 3.3, being repleted. LHC deferred for now.    2/29/24-Patient seen and examined today, resting in bed. Feels/looks better. WBC trending down. Denies CP. BC NGTD. Tentative plans for [facility] tmw.    3/1/24-Patient seen and examined today, lying in bed. Stable overnight. No CV complaints. CP free. R/LHC today by Dr. Aguillon.    3/2/24 - s/p R/LHC - with elevated filling pressures,  RCA with collaterals - will cont med management and discuss outpt TAVR eval for severe AS    3/3/24- Pt is awaiting SNF. Vitals are stable. Labs are pending this AM. BNP is elevated. Will restart lasix IV BID.    Review of Systems   Constitutional: Positive for malaise/fatigue.   HENT: Negative.     Eyes: Negative.    Cardiovascular:  Positive for dyspnea on exertion.   Respiratory:  Positive for shortness of breath.    Endocrine: Negative.    Hematologic/Lymphatic: Negative.    Skin: Negative.    Musculoskeletal:  Positive for arthritis and joint pain.   Gastrointestinal: Negative.    Genitourinary: Negative.    Neurological: Negative.     Psychiatric/Behavioral: Negative.     Allergic/Immunologic: Negative.       Objective:      Vital Signs (Most Recent):  Temp: 98.7 °F (37.1 °C) (03/02/24 1533)  Pulse: 93 (03/02/24 1533)  Resp: 18 (03/02/24 1533)  BP: (!) 118/58 (03/02/24 1533)  SpO2: 96 % (03/02/24 1533) Vital Signs (24h Range):  Temp:  [97.6 °F (36.4 °C)-98.7 °F (37.1 °C)] 98.7 °F (37.1 °C)  Pulse:  [] 93  Resp:  [18-26] 18  SpO2:  [90 %-97 %] 96 %  BP: ()/(54-68) 118/58      Weight: 108.8 kg (239 lb 13.8 oz)  Body mass index is 34.42 kg/m².     SpO2: 96 %           Intake/Output Summary (Last 24 hours) at 3/2/2024 1736  Last data filed at 3/1/2024 2225      Gross per 24 hour   Intake --   Output 675 ml   Net -675 ml            Lines/Drains/Airways         Central Venous Catheter Line  Duration                     PowerPort A Cath Single Lumen 01/05/24 1415 Internal Jugular Right 57 days                  Peripheral Intravenous Line  Duration                     Peripheral IV - Single Lumen 02/27/24 1800 22 G Posterior;Right Hand 3 days                             Physical Exam  Vitals and nursing note reviewed.   Constitutional:       General: He is not in acute distress.     Appearance: Normal appearance. He is well-developed. He is not diaphoretic.   HENT:      Head: Normocephalic and atraumatic.   Eyes:      General:         Right eye: No discharge.         Left eye: No discharge.      Pupils: Pupils are equal, round, and reactive to light.   Cardiovascular:      Rate and Rhythm: Normal rate and regular rhythm.      Heart sounds: Normal heart sounds, S1 normal and S2 normal. No murmur heard.  Pulmonary:      Effort: Pulmonary effort is normal. No respiratory distress.      Comments: Slightly diminished at bases  Abdominal:      General: There is no distension.   Musculoskeletal:      Right lower leg: No edema.      Left lower leg: No edema.   Skin:     General: Skin is warm and dry.      Findings: No erythema.   Neurological:       General: No focal deficit present.      Mental Status: He is alert and oriented to person, place, and time.   Psychiatric:         Mood and Affect: Mood normal.         Behavior: Behavior normal.   Assessment and Plan:     Brief HPI: s/p R/LHC - with elevated filling pressures,  RCA with collaterals - will cont med management and discuss outpt TAVR eval for severe AS    - stable CV status, ok to follow up in CHF clinic and TAVR clinic upon DC;     - will see PRN - call with changes in CV status    * NSTEMI (non-ST elevated myocardial infarction)  -See plan under elevated troponin    Abnormal EKG  -See plan under elevated troponin    SIRS (systemic inflammatory response syndrome)  -Mgmt as per primary team  -BC show NGTD    Hypomagnesemia  -Repletion as per primary team    Hypokalemia  -Repletion as per primary team      Elevated troponin  -Troponin 0.725>0.735, continue to trend  -No CP symptoms/SOB/angina  -No prior CV history  -Continue ASA, heparin gtt, statin  -EKG reviewed, T wave inversions inferiorly and laterally  -Limited TTE pending  -Will consider ischemic workup with MPI stress test this admission    2/27/24  -Troponin bumped to 1.2 overnight  -Remains CP free  -Continue ASA, heparin gtt, statin  -TTE with normal EF  -LHC planned once more stable respiratory wise/infection ruled out    2/28/24  -CP free  -Continue ASA, heparin gtt, statin  -BB added  -LHC deferred for now given leukocytosis/SOB    2/29/24  -Stable  -WBC count improving  -Continue BB, ASA, heparin gtt, statin  -Tentative plans for LHC tmw, keep NPO after MN    3/1/24  -Stable, no CP  -Continue BB, ASA, heparin gtt, statin  -R/LHC today by Dr. Aguillon. All risks, benefits, and treatment alternatives explained to patient in detail. All questions answered. He has agreed to proceed.    3/2/24 s/p R/LHC - with elevated filling pressures,  RCA with collaterals - will cont med management and discuss outpt TAVR eval for severe  AS    3/3/24  -Awaiting SNF.   -Vitals are stable.  -BNP elevated; will restart lasix IV BID - monitor renal function and BP closely.    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 I attest to the accuracy of the note.     Attending:   Physician Attestation Statement for Scribe #1: I, Rommel Cummins MD, personally performed the services described in this documentation, as scribed by Armida Coppola, in my presence, and it is both accurate and complete.

## 2024-03-03 NOTE — ASSESSMENT & PLAN NOTE
"Patient has Abnormal Magnesium: hypomagnesemia. Will continue to monitor electrolytes closely. Will replace the affected electrolytes and repeat labs to be done after interventions completed. The patient's magnesium results have been reviewed and are listed below.  No results for input(s): "MG" in the last 24 hours.     "

## 2024-03-03 NOTE — SUBJECTIVE & OBJECTIVE
Review of Systems   All other systems reviewed and are negative.    Objective:     Vital Signs (Most Recent):  Temp: 98.7 °F (37.1 °C) (03/02/24 1533)  Pulse: 93 (03/02/24 1533)  Resp: 18 (03/02/24 1533)  BP: (!) 118/58 (03/02/24 1533)  SpO2: 96 % (03/02/24 1533) Vital Signs (24h Range):  Temp:  [97.6 °F (36.4 °C)-98.7 °F (37.1 °C)] 98.7 °F (37.1 °C)  Pulse:  [] 93  Resp:  [18-20] 18  SpO2:  [92 %-97 %] 96 %  BP: ()/(54-68) 118/58     Weight: 108.8 kg (239 lb 13.8 oz)  Body mass index is 34.42 kg/m².    Intake/Output Summary (Last 24 hours) at 3/2/2024 1806  Last data filed at 3/1/2024 2225  Gross per 24 hour   Intake --   Output 675 ml   Net -675 ml           Physical Exam  Constitutional:       General: He is not in acute distress.     Appearance: Normal appearance. He is obese.   Cardiovascular:      Rate and Rhythm: Normal rate and regular rhythm.      Heart sounds: Murmur heard.   Pulmonary:      Effort: Pulmonary effort is normal. No respiratory distress.      Breath sounds: Normal breath sounds. No wheezing.      Comments: Decreased BS  Abdominal:      General: There is no distension.      Palpations: Abdomen is soft.      Tenderness: There is no abdominal tenderness.   Skin:     Coloration: Skin is pale.   Neurological:      Mental Status: He is alert.             Significant Labs: All pertinent labs within the past 24 hours have been reviewed.  Recent Lab Results  (Last 5 results in the past 24 hours)        03/02/24  1516   03/02/24  1422   03/02/24  1126   03/02/24  1125   03/02/24  0621        Albumin     2.6           ALP     133           ALT     74           Anion Gap     13           Aniso   Slight             AST     76           Baso #   0.19             Basophil %   0.7             BILIRUBIN TOTAL     0.6  Comment: For infants and newborns, interpretation of results should be based  on gestational age, weight and in agreement with clinical  observations.    Premature Infant  recommended reference ranges:  Up to 24 hours.............<8.0 mg/dL  Up to 48 hours............<12.0 mg/dL  3-5 days..................<15.0 mg/dL  6-29 days.................<15.0 mg/dL             BNP     590  Comment: Values of less than 100 pg/ml are consistent with non-CHF populations.           BUN     17           Calcium     9.1           Chloride     96           CO2     22           Creatinine     1.2           Differential Method   Automated             eGFR     59           Eos #   0.1             Eos %   0.2             Glucose     211           Gran # (ANC)   22.6             Gran %   87.6             Hematocrit   29.0             Hemoglobin   9.8             Immature Grans (Abs)   0.94  Comment: Mild elevation in immature granulocytes is non specific and   can be seen in a variety of conditions including stress response,   acute inflammation, trauma and pregnancy. Correlation with other   laboratory and clinical findings is essential.               Immature Granulocytes   3.6             Lymph #   1.2             Lymph %   4.6             Magnesium      1.9           MCH   30.2             MCHC   33.8             MCV   89             Mono #   0.9             Mono %   3.3             MPV   10.6             nRBC   0             Platelet Estimate   Appears normal             Platelet Count   273             POCT Glucose 206       204   153       Potassium     4.5           PROTEIN TOTAL     6.8           RBC   3.25             RDW   15.8             Sodium     131           WBC   25.84                                    Significant Imaging: I have reviewed all pertinent imaging results/findings within the past 24 hours.    X-Ray Chest 1 View   Final Result      As above.         Electronically signed by: Spenser Stallworth   Date:    02/28/2024   Time:    11:07      X-Ray Chest AP Portable   Final Result      Worsening moderate bilateral infiltrates.  Follow-up is recommended.         Electronically signed  by: Shyla Giles MD   Date:    02/27/2024   Time:    10:18      CTA Chest Non-Coronary (PE Studies)   Final Result      No pulmonary embolism.  No dissection.  Atherosclerotic changes.  Mild interstitial opacities.  Correlate clinically for element of fibrosis versus CHF.  Pulmonary micronodularity.  Recommend follow-up.      All CT scans   are performed using dose optimization techniques including the following: automated exposure control; adjustment of the mA and/or kV; use of iterative reconstruction technique.  Dose modulation was employed for ALARA by means of: Automated exposure control; adjustment of the mA and/or kV according to patient size (this includes techniques or standardized protocols for targeted exams where dose is matched to indication/reason for exam; i.e. extremities or head); and/or use of iterative reconstructive technique.         Electronically signed by: Jhoan Roman   Date:    02/26/2024   Time:    19:11      CT Head Without Contrast   Final Result      Chronic microvascular ischemic changes.  No intracranial hemorrhage.      All CT scans at this facility use dose modulation, iterative reconstruction, and/or weight based dosing when appropriate to reduce radiation dose to as low as reasonable achievable.         Electronically signed by: Earl Mendez MD   Date:    02/26/2024   Time:    10:36      X-Ray Chest AP Portable   Final Result      No acute process seen.         Electronically signed by: Earl Mendez MD   Date:    02/26/2024   Time:    10:02

## 2024-03-03 NOTE — ASSESSMENT & PLAN NOTE
Patient has hypokalemia which is Acute on Chronic and currently uncontrolled. Most recent potassium levels reviewed-   Lab Results   Component Value Date    K 4.7 03/03/2024   . Will continue potassium replacement per protocol and recheck repeat levels after replacement completed.

## 2024-03-03 NOTE — PT/OT/SLP PROGRESS
"Occupational Therapy  Treatment    Mark Dickerson Sr.   MRN: [id number]   Admitting Diagnosis: NSTEMI (non-ST elevated myocardial infarction)    OT Date of Treatment: 03/03/24   OT Start Time: 0935  OT Stop Time: 1000  OT Total Time (min): 25 min    Billable Minutes:  Therapeutic Activity 25    OT/SMITH: OT     Number of SMITH visits since last OT visit: 0    General Precautions: Standard, fall  Orthopedic Precautions: N/A  Braces: N/A  Respiratory Status: Nasal cannula, flow 5 L/min    Subjective:  Communicated with nurse prior to session.  Pt was seated in bedside chair when OT arrived. He was willing to participate in treatment.  Pain/Comfort  Pain Rating 1: 0/10  Pain Rating Post-Intervention 1: 0/10    Objective:  Patient found with: telemetry, peripheral IV     Functional Mobility:  Bed Mobility: not observed.  Transfers: CGA sit/stand  Functional Ambulation: min A for 40'    Balance:   Static Sit: GOOD: Takes MODERATE challenges from all directions  Dynamic Sit: GOOD: Maintains balance through MODERATE excursions of active trunk movement  Static Stand: POOR+: Needs MINIMAL assist to maintain  Dynamic stand: POOR+: Needs MIN (minimal ) assist during gait    AM-PAC 6 CLICK ADL   How much help from another person does this patient currently need?   1 = Unable, Total/Dependent Assistance  2 = A lot, Maximum/Moderate Assistance  3 = A little, Minimum/Contact Guard/Supervision  4 = None, Modified [place]/Independent    Putting on and taking off regular lower body clothing? : 3  Bathing (including washing, rinsing, drying)?: 3  Toileting, which includes using toilet, bedpan, or urinal? : 3  Putting on and taking off regular upper body clothing?: 4  Taking care of personal grooming such as brushing teeth?: 4  Eating meals?: 4  Daily Activity Total Score: 21     AM-PAC Raw Score CMS "G-Code Modifier Level of Impairment Assistance   6 % Total / Unable   7 - 8 CM 80 - 100% Maximal Assist   9-13 CL 60 - 80% Moderate " Assist   14 - 19 CK 40 - 60% Moderate Assist   20 - 22 CJ 20 - 40% Minimal Assist   23 CI 1-20% SBA / CGA   24 CH 0% Independent/ Mod I       Patient left up in chair with all lines intact, call button in reach, and chair alarm on    ASSESSMENT:  Mark Dickerson Sr. is a 84 y.o. male with a medical diagnosis of NSTEMI (non-ST elevated myocardial infarction) and presents with decline in ADL independence.    Rehab identified problem list/impairments:  weakness, impaired endurance, impaired self care skills, impaired functional mobility, impaired balance, decreased lower extremity function, decreased safety awareness, pain, impaired cardiopulmonary response to activity    Rehab potential is good.    Activity tolerance: Fair    Discharge recommendations: Moderate Intensity Therapy   Barriers to discharge: Barriers to Discharge: None    Equipment recommendations: to be determined by next level of care    GOALS:   Multidisciplinary Problems       Occupational Therapy Goals          Problem: Occupational Therapy    Goal Priority Disciplines Outcome Interventions   Occupational Therapy Goal     OT, PT/OT Ongoing, Progressing    Description: Goals to be met by: 3/17/24     Patient will increase functional independence with ADLs by performing:    UE Dressing with [place].  Grooming while standing at sink with [place].  Toileting from toilet with [place] for hygiene and clothing management.   Toilet transfer to toilet with Modified [place].  Upper extremity exercise program x15 reps per handout, with independence.                       Plan:  Patient to be seen 2 x/week to address the above listed problems via self-care/home management, therapeutic activities, therapeutic exercises, neuromuscular re-education  Plan of Care expires: 03/12/24  Plan of Care reviewed with: patient, family         03/03/2024

## 2024-03-03 NOTE — ASSESSMENT & PLAN NOTE
No prior hx of CAD  Troponin 0.7 --> 1.3 --> 2.8  EKG with SR PACS and ST-Twave abnormalities  Cardiology following  On heparin drip  TTE as below      Echo    Result Date: 2/26/2024    Left Ventricle: The left ventricle is normal in size. There is   concentric remodeling. There is normal systolic function with a visually   estimated ejection fraction of 55 - 60%. There is indeterminate diastolic   function.    Right Ventricle: Right ventricle was not well visualized due to poor   acoustic window. Normal right ventricular cavity size. Systolic function   is normal.    Pulmonary Artery: The estimated pulmonary artery systolic pressure is   37 mmHg.    IVC/SVC: Normal venous pressure at 3 mmHg.       s/p R/LHC (3/1/24) -- noted elevated filling pressures,  RCA with collaterals  Cardiology recc med management, outpatient TAVR eval for severe AS

## 2024-03-03 NOTE — ASSESSMENT & PLAN NOTE
Patient's FSGs are controlled on current medication regimen.  Last A1c reviewed-   Lab Results   Component Value Date    HGBA1C 7.1 (H) 11/28/2023     Most recent fingerstick glucose reviewed-   Recent Labs   Lab 03/02/24  1953 03/03/24  0632 03/03/24  1127 03/03/24  1529   POCTGLUCOSE 177* 188* 195* 204*       Current correctional scale  Low  Maintain anti-hyperglycemic dose as follows-   Antihyperglycemics (From admission, onward)    Start     Stop Route Frequency Ordered    02/26/24 1301  insulin aspart U-100 pen 0-5 Units         -- SubQ Before meals & nightly PRN 02/26/24 1204        Hold Oral hypoglycemics while patient is in the hospital.

## 2024-03-03 NOTE — PLAN OF CARE
Goals to be met by: 3/17/24     Patient will increase functional independence with ADLs by performing:    UE Dressing with [place].  Grooming while standing at sink with [place].  Toileting from toilet with [place] for hygiene and clothing management.   Toilet transfer to toilet with Modified [place].  Upper extremity exercise program x15 reps per handout, with independence.    Pt is a good candidate to DC with moderate intensity OT.

## 2024-03-03 NOTE — PROGRESS NOTES
"[place] - On license of [facility] ([facility] Medicine  Progress Note    Patient Name: Mark Dickerson Sr.  MRN: [id number]  Patient Class: IP- Inpatient   Admission Date: 2/26/2024  Length of Stay: 4 days  Attending Physician: Judah Oden MD  Primary Care Provider: Tatyana Cannon MD        Subjective:     Principal Problem:NSTEMI (non-ST elevated myocardial infarction)        HPI:  Mark Dickerson Sr. is a 84 y.o. male patient with a PMHx of metastatic breast cancer (on chemo), HLD, HTN, DM II, CKD who presents to the ED for evaluation of AMS which onset this morning PTA. Per daughter in law she visited the pt early this morning and states that he was normal for a short period before becoming extremely disoriented with jumbled speech.  Patient mental status returned to baseline piror to ed arrival.  Patient reports he feels "dehydrated", he also reports diarrhea earlier last week but this has since resolved.  Patient denies any fever, chills, N/V, SOB and chest pain.   In the ED, work up notable for WBC 14, Na 130, K 3.0, mag 1.5, , Trop 0.7.  EKG with SR PACs, ST and T wave abnormality. CT head negative for acute processes.  Chest xray no acute processes. Patient started on IVF and given zofran for nausea.  Patient will be admitted to observation for electrolyte imbalances, elevated trop.  Cards consulted.     Code Status DNR  Surrogate Decision maker daughter    Overview/Hospital Course:  2/27  Admitted for NSTEMI, currently on heparin drip  Patient awake, alert, oriented, denies fevers/chills, chest pain  WBC 26k, BP stable low-normal range, afebrile  Blood cultures ordered, lactic acid 1.0  CXR today with worsening bilateral infiltrates  Covid and influenza negative  Lasix 20 mg IV one time  Cardiology following, possible LHC tomorrow pending progression    2/28  Tmax 99.1 F, reports chills, WBC trending up 34k  CXR reivewed, non-specific ground glass opacities  Currently O2 sats stable on RA  Blood cultures " pending, check procalcitonin  Start empiric coverage with ceftriaxone  Cardiology following, on heparin drip, LHC deferred    2/29  Afebrile, tachycardic, WBC 33k  Procalcitonin 0.14, blood cultures NGTD  Continue empiric coverage with ceftriaxone  Cardiology following, possible LHC tomorrow  Patient sitting in chair, denies complaints, updated family at bedside    3/1  NAEON, Mg 1.4, replete IV; WBC 32.4k, slightly tachycardic, remains afebrile  Cardiology following, plans for LHC today, currently NPO  Consult Hematology for leukocytosis unclear etiology and metastatic breast cancer    3/2  WBC 32k,   S/p LHC yesterday - noted elevated filling pressures,  RCA with collaterals  Cardiology recc continuing med management and discuss outpt TAVR eval for severe AS       Review of Systems   All other systems reviewed and are negative.    Objective:     Vital Signs (Most Recent):  Temp: 98.7 °F (37.1 °C) (03/02/24 1533)  Pulse: 93 (03/02/24 1533)  Resp: 18 (03/02/24 1533)  BP: (!) 118/58 (03/02/24 1533)  SpO2: 96 % (03/02/24 1533) Vital Signs (24h Range):  Temp:  [97.6 °F (36.4 °C)-98.7 °F (37.1 °C)] 98.7 °F (37.1 °C)  Pulse:  [] 93  Resp:  [18-20] 18  SpO2:  [92 %-97 %] 96 %  BP: ()/(54-68) 118/58     Weight: 108.8 kg (239 lb 13.8 oz)  Body mass index is 34.42 kg/m².    Intake/Output Summary (Last 24 hours) at 3/2/2024 1806  Last data filed at 3/1/2024 2225  Gross per 24 hour   Intake --   Output 675 ml   Net -675 ml           Physical Exam  Constitutional:       General: He is not in acute distress.     Appearance: Normal appearance. He is obese.   Cardiovascular:      Rate and Rhythm: Normal rate and regular rhythm.      Heart sounds: Murmur heard.   Pulmonary:      Effort: Pulmonary effort is normal. No respiratory distress.      Breath sounds: Normal breath sounds. No wheezing.      Comments: Decreased BS  Abdominal:      General: There is no distension.      Palpations: Abdomen is soft.       Tenderness: There is no abdominal tenderness.   Skin:     Coloration: Skin is pale.   Neurological:      Mental Status: He is alert.             Significant Labs: All pertinent labs within the past 24 hours have been reviewed.  Recent Lab Results  (Last 5 results in the past 24 hours)        03/02/24  1516   03/02/24  1422   03/02/24  1126   03/02/24  1125   03/02/24  0621        Albumin     2.6           ALP     133           ALT     74           Anion Gap     13           Aniso   Slight             AST     76           Baso #   0.19             Basophil %   0.7             BILIRUBIN TOTAL     0.6  Comment: For infants and newborns, interpretation of results should be based  on gestational age, weight and in agreement with clinical  observations.    Premature Infant recommended reference ranges:  Up to 24 hours.............<8.0 mg/dL  Up to 48 hours............<12.0 mg/dL  3-5 days..................<15.0 mg/dL  6-29 days.................<15.0 mg/dL             BNP     590  Comment: Values of less than 100 pg/ml are consistent with non-CHF populations.           BUN     17           Calcium     9.1           Chloride     96           CO2     22           Creatinine     1.2           Differential Method   Automated             eGFR     59           Eos #   0.1             Eos %   0.2             Glucose     211           Gran # (ANC)   22.6             Gran %   87.6             Hematocrit   29.0             Hemoglobin   9.8             Immature Grans (Abs)   0.94  Comment: Mild elevation in immature granulocytes is non specific and   can be seen in a variety of conditions including stress response,   acute inflammation, trauma and pregnancy. Correlation with other   laboratory and clinical findings is essential.               Immature Granulocytes   3.6             Lymph #   1.2             Lymph %   4.6             Magnesium      1.9           MCH   30.2             MCHC   33.8             MCV   89             Mono  #   0.9             Mono %   3.3             MPV   10.6             nRBC   0             Platelet Estimate   Appears normal             Platelet Count   273             POCT Glucose 206       204   153       Potassium     4.5           PROTEIN TOTAL     6.8           RBC   3.25             RDW   15.8             Sodium     131           WBC   25.84                                    Significant Imaging: I have reviewed all pertinent imaging results/findings within the past 24 hours.    X-Ray Chest 1 View   Final Result      As above.         Electronically signed by: Spenser Stallworth   Date:    02/28/2024   Time:    11:07      X-Ray Chest AP Portable   Final Result      Worsening moderate bilateral infiltrates.  Follow-up is recommended.         Electronically signed by: Shyla Giles MD   Date:    02/27/2024   Time:    10:18      CTA Chest Non-Coronary (PE Studies)   Final Result      No pulmonary embolism.  No dissection.  Atherosclerotic changes.  Mild interstitial opacities.  Correlate clinically for element of fibrosis versus CHF.  Pulmonary micronodularity.  Recommend follow-up.      All CT scans   are performed using dose optimization techniques including the following: automated exposure control; adjustment of the mA and/or kV; use of iterative reconstruction technique.  Dose modulation was employed for ALARA by means of: Automated exposure control; adjustment of the mA and/or kV according to patient size (this includes techniques or standardized protocols for targeted exams where dose is matched to indication/reason for exam; i.e. extremities or head); and/or use of iterative reconstructive technique.         Electronically signed by: Jhoan Roman   Date:    02/26/2024   Time:    19:11      CT Head Without Contrast   Final Result      Chronic microvascular ischemic changes.  No intracranial hemorrhage.      All CT scans at this facility use dose modulation, iterative reconstruction, and/or weight based dosing  when appropriate to reduce radiation dose to as low as reasonable achievable.         Electronically signed by: Earl Mendez MD   Date:    02/26/2024   Time:    10:36      X-Ray Chest AP Portable   Final Result      No acute process seen.         Electronically signed by: Earl Mendez MD   Date:    02/26/2024   Time:    10:02            Assessment/Plan:      * NSTEMI (non-ST elevated myocardial infarction)  No prior hx of CAD  Troponin 0.7 --> 1.3 --> 2.8  EKG with SR PACS and ST-Twave abnormalities  Cardiology following  On heparin drip  TTE as below  Plans for [facility] pending    Echo    Result Date: 2/26/2024    Left Ventricle: The left ventricle is normal in size. There is   concentric remodeling. There is normal systolic function with a visually   estimated ejection fraction of 55 - 60%. There is indeterminate diastolic   function.    Right Ventricle: Right ventricle was not well visualized due to poor   acoustic window. Normal right ventricular cavity size. Systolic function   is normal.    Pulmonary Artery: The estimated pulmonary artery systolic pressure is   37 mmHg.    IVC/SVC: Normal venous pressure at 3 mmHg.           Elevated troponin  As above    SIRS (systemic inflammatory response syndrome)  2/27  Trending up 26k  Blood cultures ordered, lactic acid 1.0  Covid and influenza negative  Trend labs for now, possibly reactive  Low threshold to start empiric antibiotics    2/28  WBC trending up 34k, tachycardic, reports chills, SOB  CXR with possible early pneumonia, check procalcitonin  Start empiric coverage with ceftriaxone    Hypokalemia  Patient has hypokalemia which is Acute on Chronic and currently uncontrolled. Most recent potassium levels reviewed-   Lab Results   Component Value Date    K 3.3 (L) 02/28/2024   . Will continue potassium replacement per protocol and recheck repeat levels after replacement completed.     Hypomagnesemia  Patient has Abnormal Magnesium: hypomagnesemia. Will continue to  monitor electrolytes closely. Will replace the affected electrolytes and repeat labs to be done after interventions completed. The patient's magnesium results have been reviewed and are listed below.  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 BMP reviewed- noted Estimated Creatinine Clearance: 66.2 mL/min (based on SCr of 1 mg/dL). according to latest data. Based on current GFR, CKD stage is stage 3 - GFR 30-59.  Monitor UOP and serial BMP and adjust therapy as needed. Renally dose meds. Avoid nephrotoxic medications and procedures    Controlled type 2 diabetes mellitus without complication, without long-term current use of insulin  Patient's FSGs are controlled on current medication regimen.  Last A1c reviewed-   Lab Results   Component Value Date    HGBA1C 7.1 (H) 11/28/2023     Most recent fingerstick glucose reviewed-   Recent Labs   Lab 02/28/24  1208 02/28/24  1646   POCTGLUCOSE 234* 167*       Current correctional scale  Low  Maintain anti-hyperglycemic dose as follows-   Antihyperglycemics (From admission, onward)      Start     Stop Route Frequency Ordered    02/26/24 1301  insulin aspart U-100 pen 0-5 Units         -- SubQ Before meals & nightly PRN 02/26/24 1204          Hold Oral hypoglycemics while patient is in the hospital.    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 Mendoza at [facility], last chemo session one week prior  Follow up outpatient       VTE Risk Mitigation (From admission, onward)           Ordered     IP VTE HIGH RISK PATIENT  Once         02/26/24 1204     Place sequential compression device  Until discontinued         02/26/24 1204                    Discharge Planning   DE:      Code Status: DNR   Is the patient medically ready for discharge?:     Reason for patient still in hospital (select all that apply): Patient trending  condition, Laboratory test, Treatment, Consult recommendations, PT / OT recommendations, and Pending disposition  Discharge Plan A: Home Health                  Judah Oden MD  Department of Hospital Medicine   '[place] - Telemetry ([facility])

## 2024-03-03 NOTE — ASSESSMENT & PLAN NOTE
2/27  Trending up 26k  Blood cultures ordered, lactic acid 1.0  Covid and influenza negative  Trend labs for now, possibly reactive  Low threshold to start empiric antibiotics    2/28  WBC trending up 34k, tachycardic, reports chills, SOB  CXR with possible early pneumonia, check procalcitonin  Start empiric coverage with ceftriaxone    3/3  WBC trending down 20k  Hematology consulted - leukocytosis due to NSTEMI  Will stop antibiotic today

## 2024-03-03 NOTE — PT/OT/SLP PROGRESS
"Physical Therapy Treatment    Patient Name:  Mark Dickerson Sr.   MRN:  [id number]    Recommendations:     Discharge Recommendations: Moderate Intensity Therapy  Discharge Equipment Recommendations: to be determined by next level of care  Barriers to discharge: None    Assessment:     Mark Dickerson Sr. is a 84 y.o. male admitted with a medical diagnosis of NSTEMI (non-ST elevated myocardial infarction).  He presents with the following impairments/functional limitations: weakness, impaired balance, decreased safety awareness, impaired endurance, impaired self care skills, decreased coordination, decreased ROM, impaired functional mobility, decreased upper extremity function, gait instability, decreased lower extremity function.    Rehab Prognosis: Good; patient would benefit from acute skilled PT services to address these deficits and reach maximum level of function.    Recent Surgery: Procedure(s) (LRB):  CATHETERIZATION, HEART, BOTH LEFT AND RIGHT (N/A)  Aortogram, Aortic Arch  Valve study-aortic  AORTOGRAM, ABDOMINAL (N/A) 2 Days Post-Op    Plan:     During this hospitalization, patient to be seen 3 x/week to address the identified rehab impairments via gait training, therapeutic activities, therapeutic exercises and progress toward the following goals:    Plan of Care Expires:  03/12/24    Subjective     Chief Complaint: "I got short of breath and dizzy with getting to the restroom earlier."  Patient/Family Comments/goals: Return to prior level of function.  Pain/Comfort:  Pain Rating 1: 0/10      Objective:     Communicated with MIYA Quiroz prior to session.  Patient found up in chair with telemetry, peripheral IV upon PT entry to room.     General Precautions: Standard, fall  Orthopedic Precautions: N/A  Braces: N/A  Respiratory Status: Nasal cannula, flow 4 L/min     Functional Mobility:  Transfers:     Sit to Stand:  contact guard assistance with rolling walker  Gait: 40 feet CGA-Min A with RW      AM-PAC 6 CLICK " MOBILITY  Turning over in bed (including adjusting bedclothes, sheets and blankets)?: 1 (NT)  Sitting down on and standing up from a chair with arms (e.g., wheelchair, bedside commode, etc.): 3  Moving from lying on back to sitting on the side of the bed?: 1 (NT)  Moving to and from a bed to a chair (including a wheelchair)?: 3  Need to walk in hospital room?: 3  Climbing 3-5 steps with a railing?: 1 (NT)  Basic Mobility Total Score: 12       Treatment & Education:    Patient found seated in bedside chair upon PT arrival to room. Patient eager to work with therapy.     Patient completed:     STS: CGA with RW and verbal cues for hand placement for safety with transfer    Gait: 40 feet, CGA with RW. Patient had one slight LOB episode while ambulating, requiring Min A to correct.     Chair tx: CGA and cues to reach back for arm rest of chair with lowering body into chair.     Patient left up in chair with all lines intact, call button in reach, and family present..    GOALS:   Multidisciplinary Problems       Physical Therapy Goals          Problem: Physical Therapy    Goal Priority Disciplines Outcome Goal Variances Interventions   Physical Therapy Goal     PT, PT/OT Ongoing, Progressing     Description: Pt will perform bed mobility with SPV in order to participate in EOB activity.  Pt will perform transfers with SPV in order to participate in OOB activity.  Pt will ambulate 150 ft SBA with LRAD in order to participate in daily tasks.                        Time Tracking:     PT Received On: 03/03/24  PT Start Time: 0915     PT Stop Time: 0938  PT Total Time (min): 23 min     Billable Minutes: Gait Training 11 and Therapeutic Activity 12    Treatment Type: Treatment  PT/PTA: PT     Number of PTA visits since last PT visit: 0     03/03/2024

## 2024-03-03 NOTE — PLAN OF CARE
A225/A225 JOHANNY Dickerson . is a 84 y.o.male admitted on 2/26/2024 for NSTEMI (non-ST elevated myocardial infarction)   Code Status: DNR MRN: [id number]   Review of patient's allergies indicates:   Allergen Reactions    Grass pollen-anai grass standard      Past Medical History:   Diagnosis Date    Chest pain 2/26/2024    CKD stage 3 due to type 2 diabetes mellitus 2/18/2021    DM (diabetes mellitus) 2014    BS didn't check 12/11/2019    DM (diabetes mellitus) 2014    BS didn't check 05/05/2022    Foreign body, eye     right eye    Hyperlipidemia     Hypertension     Need for shingles vaccine 11/20/2019    NSTEMI (non-ST elevated myocardial infarction) 2/27/2024    Pneumonia     Primary osteoarthritis of right knee 10/29/2021    Severe obesity (BMI 35.0-35.9 with comorbidity) 7/10/2019    Type 2 diabetes mellitus 2014    BS didn't check 12/12/2018      PRN meds    sodium chloride 0.9%, , Continuous PRN  acetaminophen, 650 mg, Q4H PRN  acetaminophen, 650 mg, Q4H PRN  cyclobenzaprine, 10 mg, TID PRN  dextrose 10%, 12.5 g, PRN  dextrose 10%, 25 g, PRN  glucagon (human recombinant), 1 mg, PRN  glucose, 16 g, PRN  glucose, 24 g, PRN  HYDROcodone-acetaminophen, 1 tablet, Q6H PRN  insulin aspart U-100, 0-5 Units, QID (AC + HS) PRN  melatonin, 6 mg, Nightly PRN  naloxone, 0.02 mg, PRN  ondansetron, 8 mg, Q8H PRN  ondansetron, 4 mg, Q8H PRN  sodium chloride 0.9%, 10 mL, Q12H PRN      Chart check completed. Will continue plan of care.      Orientation: oriented x 4  [place] Coma Scale Score: 15     Lead Monitored: Lead II Rhythm: sinus tachycardia Frequency/Ectopy: PVCs, PACs  Cardiac/Telemetry Box Number: [id number]  VTE Required Core Measure: Pharmacological prophylaxis initiated/maintained Last Bowel Movement: 03/03/24  Diet Cardiac Standard Tray  Voiding Characteristics: hesitancy  Sunil Score: 19  Fall Risk Score: 16  Accucheck [x]   Freq? ACHS     Lines/Drains/Airways       Central Venous Catheter Line  Duration                   PowerPort A Cath Single Lumen 01/05/24 1415 Internal Jugular Right 58 days              Peripheral Intravenous Line  Duration                  Peripheral IV - Single Lumen 03/02/24 2201 22 G Posterior;Right Hand <1 day

## 2024-03-03 NOTE — PLAN OF CARE
03/03/24 1046   Post-Acute Status   Post-Acute Authorization Placement   Post-Acute Placement Status Referrals Sent   Discharge Plan   Discharge Plan A Skilled Nursing Facility         SW spoke with PT who stated that recommendation for patient is SNF.  Patient requested [facility].  SW sent referral to [facility]. PEARL/SHANELLE to complete LOCET and PASRR on Monday.     Violetta Gong LMSW 3/3/2024 10:47 AM

## 2024-03-03 NOTE — PROGRESS NOTES
"[place] - Pending sale to [facility] ([facility]  Progress Note    Patient Name: Mark Dickerson Sr.  MRN: [id number]  Patient Class: IP- Inpatient   Admission Date: 2/26/2024  Length of Stay: 5 days  Attending Physician: Judah Oden MD  Primary Care Provider: Tatyana Cannon MD        Subjective:     Principal Problem:NSTEMI (non-ST elevated myocardial infarction)        HPI:  Mark Dickerson Sr. is a 84 y.o. male patient with a PMHx of metastatic breast cancer (on chemo), HLD, HTN, DM II, CKD who presents to the ED for evaluation of AMS which onset this morning PTA. Per daughter in law she visited the pt early this morning and states that he was normal for a short period before becoming extremely disoriented with jumbled speech.  Patient mental status returned to baseline piror to ed arrival.  Patient reports he feels "dehydrated", he also reports diarrhea earlier last week but this has since resolved.  Patient denies any fever, chills, N/V, SOB and chest pain.   In the ED, work up notable for WBC 14, Na 130, K 3.0, mag 1.5, , Trop 0.7.  EKG with SR PACs, ST and T wave abnormality. CT head negative for acute processes.  Chest xray no acute processes. Patient started on IVF and given zofran for nausea.  Patient will be admitted to observation for electrolyte imbalances, elevated trop.  Cards consulted.     Code Status DNR  Surrogate Decision maker daughter    Overview/Hospital Course:  2/27  Admitted for NSTEMI, currently on heparin drip  Patient awake, alert, oriented, denies fevers/chills, chest pain  WBC 26k, BP stable low-normal range, afebrile  Blood cultures ordered, lactic acid 1.0  CXR today with worsening bilateral infiltrates  Covid and influenza negative  Lasix 20 mg IV one time  Cardiology following, possible LHC tomorrow pending progression    2/28  Tmax 99.1 F, reports chills, WBC trending up 34k  CXR reivewed, non-specific ground glass opacities  Currently O2 sats stable on RA  Blood cultures " pending, check procalcitonin  Start empiric coverage with ceftriaxone  Cardiology following, on heparin drip, LHC deferred    2/29  Afebrile, tachycardic, WBC 33k  Procalcitonin 0.14, blood cultures NGTD  Continue empiric coverage with ceftriaxone  Cardiology following, possible LHC tomorrow  Patient sitting in chair, denies complaints, updated family at bedside    3/1  NAEON, Mg 1.4, replete IV; WBC 32.4k, slightly tachycardic, remains afebrile  Cardiology following, plans for LHC today, currently NPO  Consult Hematology for leukocytosis unclear etiology and metastatic breast cancer    3/2  WBC 32k,   S/p LHC yesterday - noted elevated filling pressures,  RCA with collaterals  Cardiology recc continuing med management and discuss outpt TAVR eval for severe AS    3/3  On 4L NC, increasing over past several days, CXR pending  , started on Lasix IV - Cardiology following  WBC trending down 20k, patient remains afebrile, stop abx  PT/OT recc IVETTE, SNF placement pending      Review of Systems   All other systems reviewed and are negative.    Objective:     Vital Signs (Most Recent):  Temp: 98.7 °F (37.1 °C) (03/03/24 1551)  Pulse: 68 (03/03/24 1551)  Resp: 18 (03/03/24 1551)  BP: 107/62 (03/03/24 1551)  SpO2: 97 % (03/03/24 1551) Vital Signs (24h Range):  Temp:  [97.6 °F (36.4 °C)-99.2 °F (37.3 °C)] 98.7 °F (37.1 °C)  Pulse:  [] 68  Resp:  [16-20] 18  SpO2:  [92 %-100 %] 97 %  BP: ()/(53-62) 107/62     Weight: 108.8 kg (239 lb 13.8 oz)  Body mass index is 34.42 kg/m².    Intake/Output Summary (Last 24 hours) at 3/3/2024 1707  Last data filed at 3/3/2024 1512  Gross per 24 hour   Intake 380 ml   Output 700 ml   Net -320 ml           Physical Exam  Constitutional:       General: He is not in acute distress.     Appearance: Normal appearance. He is obese.   Cardiovascular:      Rate and Rhythm: Normal rate and regular rhythm.      Heart sounds: Murmur heard.   Pulmonary:      Effort: Pulmonary  effort is normal. No respiratory distress.      Breath sounds: Rales present.      Comments: Decreased BS  Abdominal:      General: There is no distension.      Palpations: Abdomen is soft.      Tenderness: There is no abdominal tenderness.   Skin:     Coloration: Skin is pale.   Neurological:      Mental Status: He is alert.             Significant Labs: All pertinent labs within the past 24 hours have been reviewed.  Recent Lab Results  (Last 5 results in the past 24 hours)        03/03/24  1529   03/03/24  1127   03/03/24  1119   03/03/24  0632   03/02/24  1953        Albumin     2.5           ALP     118           ALT     70           Anion Gap     11           AST     66           Baso #     0.11           Basophil %     0.5           BILIRUBIN TOTAL     0.5  Comment: For infants and newborns, interpretation of results should be based  on gestational age, weight and in agreement with clinical  observations.    Premature Infant recommended reference ranges:  Up to 24 hours.............<8.0 mg/dL  Up to 48 hours............<12.0 mg/dL  3-5 days..................<15.0 mg/dL  6-29 days.................<15.0 mg/dL             BUN     18           Calcium     8.8           Chloride     96           CO2     23           Creatinine     1.0           Differential Method     Automated           eGFR     >60           Eos #     0.1           Eos %     0.2           Glucose     194           Gran # (ANC)     17.7           Gran %     87.3           Hematocrit     27.9           Hemoglobin     9.5           Immature Grans (Abs)     0.35  Comment: Mild elevation in immature granulocytes is non specific and   can be seen in a variety of conditions including stress response,   acute inflammation, trauma and pregnancy. Correlation with other   laboratory and clinical findings is essential.             Immature Granulocytes     1.7           Lymph #     1.4           Lymph %     6.8           MCH     30.0           MCHC      34.1           MCV     88           Mono #     0.7           Mono %     3.5           MPV     10.8           nRBC     0           Platelet Count     277           POCT Glucose 204   195     188   177       Potassium     4.7           PROTEIN TOTAL     6.4           RBC     3.17           RDW     15.5           Sodium     130           WBC     20.33                                  Significant Imaging: I have reviewed all pertinent imaging results/findings within the past 24 hours.    X-Ray Chest 1 View   Final Result      As above.         Electronically signed by: Spenser Stallworth   Date:    02/28/2024   Time:    11:07      X-Ray Chest AP Portable   Final Result      Worsening moderate bilateral infiltrates.  Follow-up is recommended.         Electronically signed by: Shyla Giles MD   Date:    02/27/2024   Time:    10:18      CTA Chest Non-Coronary (PE Studies)   Final Result      No pulmonary embolism.  No dissection.  Atherosclerotic changes.  Mild interstitial opacities.  Correlate clinically for element of fibrosis versus CHF.  Pulmonary micronodularity.  Recommend follow-up.      All CT scans   are performed using dose optimization techniques including the following: automated exposure control; adjustment of the mA and/or kV; use of iterative reconstruction technique.  Dose modulation was employed for ALARA by means of: Automated exposure control; adjustment of the mA and/or kV according to patient size (this includes techniques or standardized protocols for targeted exams where dose is matched to indication/reason for exam; i.e. extremities or head); and/or use of iterative reconstructive technique.         Electronically signed by: Jhoan Roman   Date:    02/26/2024   Time:    19:11      CT Head Without Contrast   Final Result      Chronic microvascular ischemic changes.  No intracranial hemorrhage.      All CT scans at this facility use dose modulation, iterative reconstruction, and/or weight based dosing  when appropriate to reduce radiation dose to as low as reasonable achievable.         Electronically signed by: Earl Mendez MD   Date:    02/26/2024   Time:    10:36      X-Ray Chest AP Portable   Final Result      No acute process seen.         Electronically signed by: Earl Mendez MD   Date:    02/26/2024   Time:    10:02      X-Ray Chest AP Portable    (Results Pending)         Assessment/Plan:      * NSTEMI (non-ST elevated myocardial infarction)  No prior hx of CAD  Troponin 0.7 --> 1.3 --> 2.8  EKG with SR PACS and ST-Twave abnormalities  Cardiology following  On heparin drip  TTE as below      Echo    Result Date: 2/26/2024    Left Ventricle: The left ventricle is normal in size. There is   concentric remodeling. There is normal systolic function with a visually   estimated ejection fraction of 55 - 60%. There is indeterminate diastolic   function.    Right Ventricle: Right ventricle was not well visualized due to poor   acoustic window. Normal right ventricular cavity size. Systolic function   is normal.    Pulmonary Artery: The estimated pulmonary artery systolic pressure is   37 mmHg.    IVC/SVC: Normal venous pressure at 3 mmHg.       s/p R/LHC (3/1/24) -- noted elevated filling pressures,  RCA with collaterals  Cardiology recc med management, outpatient TAVR eval for severe AS    Elevated troponin  As above    SIRS (systemic inflammatory response syndrome)  2/27  Trending up 26k  Blood cultures ordered, lactic acid 1.0  Covid and influenza negative  Trend labs for now, possibly reactive  Low threshold to start empiric antibiotics    2/28  WBC trending up 34k, tachycardic, reports chills, SOB  CXR with possible early pneumonia, check procalcitonin  Start empiric coverage with ceftriaxone    3/3  WBC trending down 20k  Hematology consulted - leukocytosis due to NSTEMI  Will stop antibiotic today    Hypokalemia  Patient has hypokalemia which is Acute on Chronic and currently uncontrolled. Most recent  "potassium levels reviewed-   Lab Results   Component Value Date    K 4.7 03/03/2024   . Will continue potassium replacement per protocol and recheck repeat levels after replacement completed.     Hypomagnesemia  Patient has Abnormal Magnesium: hypomagnesemia. Will continue to monitor electrolytes closely. Will replace the affected electrolytes and repeat labs to be done after interventions completed. The patient's magnesium results have been reviewed and are listed below.  No results for input(s): "MG" in the last 24 hours.       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 BMP reviewed- noted Estimated Creatinine Clearance: 65.5 mL/min (based on SCr of 1 mg/dL). according to latest data. Based on current GFR, CKD stage is stage 3 - GFR 30-59.  Monitor UOP and serial BMP and adjust therapy as needed. Renally dose meds. Avoid nephrotoxic medications and procedures    Controlled type 2 diabetes mellitus without complication, without long-term current use of insulin  Patient's FSGs are controlled on current medication regimen.  Last A1c reviewed-   Lab Results   Component Value Date    HGBA1C 7.1 (H) 11/28/2023     Most recent fingerstick glucose reviewed-   Recent Labs   Lab 03/02/24  1953 03/03/24  0632 03/03/24  1127 03/03/24  1529   POCTGLUCOSE 177* 188* 195* 204*       Current correctional scale  Low  Maintain anti-hyperglycemic dose as follows-   Antihyperglycemics (From admission, onward)      Start     Stop Route Frequency Ordered    02/26/24 1301  insulin aspart U-100 pen 0-5 Units         -- SubQ Before meals & nightly PRN 02/26/24 1204          Hold Oral hypoglycemics while patient is in the hospital.    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 Mendoza at [facility], last chemo session one week prior  Follow up outpatient       VTE Risk Mitigation (From admission, onward)           Ordered     IP VTE HIGH " RISK PATIENT  Once         02/26/24 1204     Place sequential compression device  Until discontinued         02/26/24 1204                    Discharge Planning   DE:      Code Status: DNR   Is the patient medically ready for discharge?:     Reason for patient still in hospital (select all that apply): Patient trending condition, Laboratory test, Treatment, and Consult recommendations  Discharge Plan A: Skilled Nursing Facility                  Judah Oden MD  Department of Hospital Medicine   [facility] ([facility])

## 2024-03-03 NOTE — SUBJECTIVE & OBJECTIVE
Review of Systems   All other systems reviewed and are negative.    Objective:     Vital Signs (Most Recent):  Temp: 98.7 °F (37.1 °C) (03/03/24 1551)  Pulse: 68 (03/03/24 1551)  Resp: 18 (03/03/24 1551)  BP: 107/62 (03/03/24 1551)  SpO2: 97 % (03/03/24 1551) Vital Signs (24h Range):  Temp:  [97.6 °F (36.4 °C)-99.2 °F (37.3 °C)] 98.7 °F (37.1 °C)  Pulse:  [] 68  Resp:  [16-20] 18  SpO2:  [92 %-100 %] 97 %  BP: ()/(53-62) 107/62     Weight: 108.8 kg (239 lb 13.8 oz)  Body mass index is 34.42 kg/m².    Intake/Output Summary (Last 24 hours) at 3/3/2024 1707  Last data filed at 3/3/2024 1512  Gross per 24 hour   Intake 380 ml   Output 700 ml   Net -320 ml           Physical Exam  Constitutional:       General: He is not in acute distress.     Appearance: Normal appearance. He is obese.   Cardiovascular:      Rate and Rhythm: Normal rate and regular rhythm.      Heart sounds: Murmur heard.   Pulmonary:      Effort: Pulmonary effort is normal. No respiratory distress.      Breath sounds: Rales present.      Comments: Decreased BS  Abdominal:      General: There is no distension.      Palpations: Abdomen is soft.      Tenderness: There is no abdominal tenderness.   Skin:     Coloration: Skin is pale.   Neurological:      Mental Status: He is alert.             Significant Labs: All pertinent labs within the past 24 hours have been reviewed.  Recent Lab Results  (Last 5 results in the past 24 hours)        03/03/24  1529   03/03/24  1127   03/03/24  1119   03/03/24  0632   03/02/24  1953        Albumin     2.5           ALP     118           ALT     70           Anion Gap     11           AST     66           Baso #     0.11           Basophil %     0.5           BILIRUBIN TOTAL     0.5  Comment: For infants and newborns, interpretation of results should be based  on gestational age, weight and in agreement with clinical  observations.    Premature Infant recommended reference ranges:  Up to 24  hours.............<8.0 mg/dL  Up to 48 hours............<12.0 mg/dL  3-5 days..................<15.0 mg/dL  6-29 days.................<15.0 mg/dL             BUN     18           Calcium     8.8           Chloride     96           CO2     23           Creatinine     1.0           Differential Method     Automated           eGFR     >60           Eos #     0.1           Eos %     0.2           Glucose     194           Gran # (ANC)     17.7           Gran %     87.3           Hematocrit     27.9           Hemoglobin     9.5           Immature Grans (Abs)     0.35  Comment: Mild elevation in immature granulocytes is non specific and   can be seen in a variety of conditions including stress response,   acute inflammation, trauma and pregnancy. Correlation with other   laboratory and clinical findings is essential.             Immature Granulocytes     1.7           Lymph #     1.4           Lymph %     6.8           MCH     30.0           MCHC     34.1           MCV     88           Mono #     0.7           Mono %     3.5           MPV     10.8           nRBC     0           Platelet Count     277           POCT Glucose 204   195     188   177       Potassium     4.7           PROTEIN TOTAL     6.4           RBC     3.17           RDW     15.5           Sodium     130           WBC     20.33                                  Significant Imaging: I have reviewed all pertinent imaging results/findings within the past 24 hours.    X-Ray Chest 1 View   Final Result      As above.         Electronically signed by: Spenser Stallworth   Date:    02/28/2024   Time:    11:07      X-Ray Chest AP Portable   Final Result      Worsening moderate bilateral infiltrates.  Follow-up is recommended.         Electronically signed by: Shyla Giles MD   Date:    02/27/2024   Time:    10:18      CTA Chest Non-Coronary (PE Studies)   Final Result      No pulmonary embolism.  No dissection.  Atherosclerotic changes.  Mild interstitial opacities.   Correlate clinically for element of fibrosis versus CHF.  Pulmonary micronodularity.  Recommend follow-up.      All CT scans   are performed using dose optimization techniques including the following: automated exposure control; adjustment of the mA and/or kV; use of iterative reconstruction technique.  Dose modulation was employed for ALARA by means of: Automated exposure control; adjustment of the mA and/or kV according to patient size (this includes techniques or standardized protocols for targeted exams where dose is matched to indication/reason for exam; i.e. extremities or head); and/or use of iterative reconstructive technique.         Electronically signed by: Jhoan Roman   Date:    02/26/2024   Time:    19:11      CT Head Without Contrast   Final Result      Chronic microvascular ischemic changes.  No intracranial hemorrhage.      All CT scans at this facility use dose modulation, iterative reconstruction, and/or weight based dosing when appropriate to reduce radiation dose to as low as reasonable achievable.         Electronically signed by: Earl Mendez MD   Date:    02/26/2024   Time:    10:36      X-Ray Chest AP Portable   Final Result      No acute process seen.         Electronically signed by: Earl Mendez MD   Date:    02/26/2024   Time:    10:02      X-Ray Chest AP Portable    (Results Pending)

## 2024-03-04 ENCOUNTER — TELEPHONE (OUTPATIENT)
Dept: HEMATOLOGY/ONCOLOGY | Facility: CLINIC | Age: 84
End: 2024-03-04
Payer: MEDICARE

## 2024-03-04 PROBLEM — I50.31 ACUTE DIASTOLIC CHF (CONGESTIVE HEART FAILURE): Status: ACTIVE | Noted: 2024-03-04

## 2024-03-04 PROBLEM — D64.9 NORMOCYTIC ANEMIA: Status: ACTIVE | Noted: 2024-03-04

## 2024-03-04 PROBLEM — R74.01 TRANSAMINITIS: Status: ACTIVE | Noted: 2024-03-04

## 2024-03-04 LAB
ALBUMIN SERPL BCP-MCNC: 2.6 G/DL (ref 3.5–5.2)
ALP SERPL-CCNC: 124 U/L (ref 55–135)
ALT SERPL W/O P-5'-P-CCNC: 110 U/L (ref 10–44)
ANION GAP SERPL CALC-SCNC: 12 MMOL/L (ref 8–16)
AST SERPL-CCNC: 120 U/L (ref 10–40)
BASOPHILS # BLD AUTO: 0.09 K/UL (ref 0–0.2)
BASOPHILS NFR BLD: 0.5 % (ref 0–1.9)
BILIRUB SERPL-MCNC: 0.6 MG/DL (ref 0.1–1)
BUN SERPL-MCNC: 20 MG/DL (ref 8–23)
CALCIUM SERPL-MCNC: 8.5 MG/DL (ref 8.7–10.5)
CHLORIDE SERPL-SCNC: 97 MMOL/L (ref 95–110)
CO2 SERPL-SCNC: 25 MMOL/L (ref 23–29)
CREAT SERPL-MCNC: 1.2 MG/DL (ref 0.5–1.4)
DIFFERENTIAL METHOD BLD: ABNORMAL
EOSINOPHIL # BLD AUTO: 0.1 K/UL (ref 0–0.5)
EOSINOPHIL NFR BLD: 0.3 % (ref 0–8)
ERYTHROCYTE [DISTWIDTH] IN BLOOD BY AUTOMATED COUNT: 15.6 % (ref 11.5–14.5)
EST. GFR  (NO RACE VARIABLE): 59 ML/MIN/1.73 M^2
GLUCOSE SERPL-MCNC: 157 MG/DL (ref 70–110)
HCT VFR BLD AUTO: 28.6 % (ref 40–54)
HGB BLD-MCNC: 9.6 G/DL (ref 14–18)
IMM GRANULOCYTES # BLD AUTO: 0.22 K/UL (ref 0–0.04)
IMM GRANULOCYTES NFR BLD AUTO: 1.3 % (ref 0–0.5)
LYMPHOCYTES # BLD AUTO: 1.8 K/UL (ref 1–4.8)
LYMPHOCYTES NFR BLD: 10.8 % (ref 18–48)
MAGNESIUM SERPL-MCNC: 1.8 MG/DL (ref 1.6–2.6)
MCH RBC QN AUTO: 30.1 PG (ref 27–31)
MCHC RBC AUTO-ENTMCNC: 33.6 G/DL (ref 32–36)
MCV RBC AUTO: 90 FL (ref 82–98)
MONOCYTES # BLD AUTO: 0.7 K/UL (ref 0.3–1)
MONOCYTES NFR BLD: 3.9 % (ref 4–15)
NEUTROPHILS # BLD AUTO: 14.2 K/UL (ref 1.8–7.7)
NEUTROPHILS NFR BLD: 83.2 % (ref 38–73)
NRBC BLD-RTO: 0 /100 WBC
OB PNL STL: NEGATIVE
PLATELET # BLD AUTO: 296 K/UL (ref 150–450)
PMV BLD AUTO: 10.8 FL (ref 9.2–12.9)
POCT GLUCOSE: 178 MG/DL (ref 70–110)
POCT GLUCOSE: 188 MG/DL (ref 70–110)
POCT GLUCOSE: 189 MG/DL (ref 70–110)
POTASSIUM SERPL-SCNC: 4.9 MMOL/L (ref 3.5–5.1)
PROT SERPL-MCNC: 5.9 G/DL (ref 6–8.4)
RBC # BLD AUTO: 3.19 M/UL (ref 4.6–6.2)
SODIUM SERPL-SCNC: 134 MMOL/L (ref 136–145)
WBC # BLD AUTO: 17.02 K/UL (ref 3.9–12.7)

## 2024-03-04 PROCEDURE — 83735 ASSAY OF MAGNESIUM: CPT | Mod: HCNC | Performed by: HOSPITALIST

## 2024-03-04 PROCEDURE — 97110 THERAPEUTIC EXERCISES: CPT | Mod: HCNC

## 2024-03-04 PROCEDURE — 36415 COLL VENOUS BLD VENIPUNCTURE: CPT | Mod: HCNC | Performed by: INTERNAL MEDICINE

## 2024-03-04 PROCEDURE — 25000003 PHARM REV CODE 250: Mod: HCNC | Performed by: INTERNAL MEDICINE

## 2024-03-04 PROCEDURE — 97116 GAIT TRAINING THERAPY: CPT | Mod: HCNC

## 2024-03-04 PROCEDURE — 94761 N-INVAS EAR/PLS OXIMETRY MLT: CPT | Mod: HCNC

## 2024-03-04 PROCEDURE — 21400001 HC TELEMETRY ROOM: Mod: HCNC

## 2024-03-04 PROCEDURE — 25000003 PHARM REV CODE 250: Mod: HCNC | Performed by: NURSE PRACTITIONER

## 2024-03-04 PROCEDURE — 63600175 PHARM REV CODE 636 W HCPCS: Mod: HCNC | Performed by: INTERNAL MEDICINE

## 2024-03-04 PROCEDURE — 99900035 HC TECH TIME PER 15 MIN (STAT): Mod: HCNC

## 2024-03-04 PROCEDURE — 99233 SBSQ HOSP IP/OBS HIGH 50: CPT | Mod: HCNC,,, | Performed by: INTERNAL MEDICINE

## 2024-03-04 PROCEDURE — 82272 OCCULT BLD FECES 1-3 TESTS: CPT | Mod: HCNC | Performed by: HOSPITALIST

## 2024-03-04 PROCEDURE — 97530 THERAPEUTIC ACTIVITIES: CPT | Mod: HCNC

## 2024-03-04 PROCEDURE — 80053 COMPREHEN METABOLIC PANEL: CPT | Mod: HCNC | Performed by: INTERNAL MEDICINE

## 2024-03-04 PROCEDURE — 85025 COMPLETE CBC W/AUTO DIFF WBC: CPT | Mod: HCNC | Performed by: INTERNAL MEDICINE

## 2024-03-04 PROCEDURE — 27000221 HC OXYGEN, UP TO 24 HOURS: Mod: HCNC

## 2024-03-04 RX ORDER — FUROSEMIDE 10 MG/ML
40 INJECTION INTRAMUSCULAR; INTRAVENOUS 2 TIMES DAILY
Status: DISCONTINUED | OUTPATIENT
Start: 2024-03-04 | End: 2024-03-08

## 2024-03-04 RX ADMIN — FUROSEMIDE 40 MG: 10 INJECTION, SOLUTION INTRAMUSCULAR; INTRAVENOUS at 08:03

## 2024-03-04 RX ADMIN — ATORVASTATIN CALCIUM 20 MG: 10 TABLET, FILM COATED ORAL at 08:03

## 2024-03-04 RX ADMIN — Medication 400 MG: at 09:03

## 2024-03-04 RX ADMIN — MIDODRINE HYDROCHLORIDE 10 MG: 5 TABLET ORAL at 09:03

## 2024-03-04 RX ADMIN — MIDODRINE HYDROCHLORIDE 10 MG: 5 TABLET ORAL at 05:03

## 2024-03-04 RX ADMIN — METOPROLOL TARTRATE 25 MG: 25 TABLET, FILM COATED ORAL at 08:03

## 2024-03-04 RX ADMIN — MIDODRINE HYDROCHLORIDE 10 MG: 5 TABLET ORAL at 01:03

## 2024-03-04 RX ADMIN — Medication 6 MG: at 08:03

## 2024-03-04 RX ADMIN — METOPROLOL TARTRATE 25 MG: 25 TABLET, FILM COATED ORAL at 09:03

## 2024-03-04 RX ADMIN — FUROSEMIDE 40 MG: 10 INJECTION, SOLUTION INTRAMUSCULAR; INTRAVENOUS at 10:03

## 2024-03-04 RX ADMIN — HYDROCODONE BITARTRATE AND ACETAMINOPHEN 1 TABLET: 5; 325 TABLET ORAL at 08:03

## 2024-03-04 RX ADMIN — ANASTROZOLE 1 MG: 1 TABLET, COATED ORAL at 09:03

## 2024-03-04 RX ADMIN — ASPIRIN 81 MG: 81 TABLET, COATED ORAL at 09:03

## 2024-03-04 NOTE — SUBJECTIVE & OBJECTIVE
Oncology Treatment Plan:   OP BREAST THP (pertuzumab trastuzumab DOCEtaxel) Q3W    Medications:  Continuous Infusions:   sodium chloride 0.9% 100 mL/hr (03/01/24 1334)     Scheduled Meds:   anastrozole  1 mg Oral Daily    aspirin  81 mg Oral Daily    atorvastatin  20 mg Oral QHS    furosemide (LASIX) injection  20 mg Intravenous BID    magnesium oxide  400 mg Oral Daily    metoprolol tartrate  25 mg Oral BID    midodrine  10 mg Oral TID WM    mupirocin   Nasal BID     PRN Meds:sodium chloride 0.9%, acetaminophen, acetaminophen, cyclobenzaprine, dextrose 10%, dextrose 10%, glucagon (human recombinant), glucose, glucose, HYDROcodone-acetaminophen, insulin aspart U-100, melatonin, naloxone, ondansetron, ondansetron, sodium chloride 0.9%     Review of patient's allergies indicates:   Allergen Reactions    Grass pollen-anai grass standard         Past Medical History:   Diagnosis Date    Chest pain 2/26/2024    CKD stage 3 due to type 2 diabetes mellitus 2/18/2021    DM (diabetes mellitus) 2014    BS didn't check 12/11/2019    DM (diabetes mellitus) 2014    BS didn't check 05/05/2022    Foreign body, eye     right eye    Hyperlipidemia     Hypertension     Need for shingles vaccine 11/20/2019    NSTEMI (non-ST elevated myocardial infarction) 2/27/2024    Pneumonia     Primary osteoarthritis of right knee 10/29/2021    Severe obesity (BMI 35.0-35.9 with comorbidity) 7/10/2019    Type 2 diabetes mellitus 2014    BS didn't check 12/12/2018     Past Surgical History:   Procedure Laterality Date    ABDOMINAL AORTOGRAPHY N/A 3/1/2024    Procedure: AORTOGRAM, ABDOMINAL;  Surgeon: Sebastian Aguillon MD;  Location: [facility] ([place] CATH LAB;  Service: Cardiology;  Laterality: N/A;    ANGIOGRAPHY OF AORTIC ARCH  3/1/2024    Procedure: Aortogram, Aortic Arch;  Surgeon: Sebastian Aguillon MD;  Location: [facility] CATH LAB;  Service: Cardiology;;    APPENDECTOMY      CATARACT EXTRACTION Bilateral     CPG    CATHETERIZATION OF BOTH LEFT AND RIGHT  HEART N/A 3/1/2024    Procedure: CATHETERIZATION, HEART, BOTH LEFT AND RIGHT;  Surgeon: Sebastian Aguillon MD;  Location: [facility] CATH LAB;  Service: Cardiology;  Laterality: N/A;    ENDOSCOPIC ULTRASOUND OF UPPER GASTROINTESTINAL TRACT N/A 2024    Procedure: ULTRASOUND, UPPER GI TRACT, ENDOSCOPIC;  Surgeon: Evens Monsalve MD;  Location: [facility] ENDO (2ND FLR);  Service: Endoscopy;  Laterality: N/A;    FLUOROSCOPY N/A 2024    Procedure: Fluoroscopy/MEDIPORT PLACEMENT;  Surgeon: Jaret Lu MD;  Location: [facility] CATH LAB;  Service: General;  Laterality: N/A;    VALVE STUDY-AORTIC  3/1/2024    Procedure: Valve study-aortic;  Surgeon: Sebastian Aguillon MD;  Location: [facility] CATH LAB;  Service: Cardiology;;     Family History       Problem Relation (Age of Onset)    Allergic rhinitis Daughter    Cancer Son    Cataracts Father    Heart disease Mother, Father    Hyperlipidemia Father    Hypertension Father, Sister, Brother          Tobacco Use    Smoking status: Former     Current packs/day: 0.00     Average packs/day: 3.0 packs/day for 10.0 years (30.0 ttl pk-yrs)     Types: Cigarettes     Start date:      Quit date:      Years since quittin.2    Smokeless tobacco: Never   Substance and Sexual Activity    Alcohol use: No    Drug use: No    Sexual activity: Never       Review of Systems   Constitutional:  Positive for activity change and fatigue. Negative for appetite change, chills, diaphoresis, fever and unexpected weight change.   HENT:  Negative for congestion, dental problem, drooling, ear discharge, ear pain, facial swelling, hearing loss, mouth sores, nosebleeds, postnasal drip, rhinorrhea, sinus pressure, sneezing, sore throat, tinnitus, trouble swallowing and voice change.    Eyes:  Negative for photophobia, pain, discharge, redness, itching and visual disturbance.   Respiratory:  Negative for apnea, cough, choking, chest tightness, shortness of breath, wheezing and stridor.    Cardiovascular:   Negative for chest pain, palpitations and leg swelling.   Gastrointestinal:  Negative for abdominal distention, abdominal pain, anal bleeding, blood in stool, constipation, diarrhea, nausea, rectal pain and vomiting.   Endocrine: Negative for cold intolerance, heat intolerance, polydipsia, polyphagia and polyuria.   Genitourinary:  Negative for decreased urine volume, difficulty urinating, dysuria, enuresis, flank pain, frequency, genital sores, hematuria, penile discharge, penile pain, penile swelling, scrotal swelling, testicular pain and urgency.   Musculoskeletal:  Negative for arthralgias, back pain, gait problem, joint swelling, myalgias, neck pain and neck stiffness.   Skin:  Negative for color change, pallor, rash and wound.   Allergic/Immunologic: Negative for environmental allergies, food allergies and immunocompromised state.   Neurological:  Positive for weakness. Negative for dizziness, tremors, seizures, syncope, facial asymmetry, speech difficulty, light-headedness, numbness and headaches.   Hematological:  Negative for adenopathy. Does not bruise/bleed easily.   Psychiatric/Behavioral:  Negative for agitation, behavioral problems, confusion, decreased concentration, dysphoric mood, hallucinations, self-injury, sleep disturbance and suicidal ideas. The patient is not nervous/anxious and is not hyperactive.      Objective:     Vital Signs (Most Recent):  Temp: 98 °F (36.7 °C) (03/04/24 0739)  Pulse: 83 (03/04/24 0739)  Resp: 18 (03/04/24 0739)  BP: 114/77 (03/04/24 0739)  SpO2: 98 % (03/04/24 0739) Vital Signs (24h Range):  Temp:  [98 °F (36.7 °C)-98.7 °F (37.1 °C)] 98 °F (36.7 °C)  Pulse:  [] 83  Resp:  [18-20] 18  SpO2:  [97 %-100 %] 98 %  BP: ()/(53-77) 114/77     Weight: 108.8 kg (239 lb 13.8 oz)  Body mass index is 34.42 kg/m².  Body surface area is 2.32 meters squared.      Intake/Output Summary (Last 24 hours) at 3/4/2024 0815  Last data filed at 3/3/2024 1741  Gross per 24 hour    Intake 620 ml   Output 701 ml   Net -81 ml        Physical Exam  Vitals reviewed.   Constitutional:       General: He is not in acute distress.     Appearance: He is well-developed. He is not diaphoretic.   HENT:      Head: Normocephalic.      Right Ear: External ear normal.      Left Ear: External ear normal.      Nose: Nose normal.      Right Sinus: No maxillary sinus tenderness or frontal sinus tenderness.      Left Sinus: No maxillary sinus tenderness or frontal sinus tenderness.      Mouth/Throat:      Pharynx: No oropharyngeal exudate.   Eyes:      General: Lids are normal. No scleral icterus.        Right eye: No discharge.         Left eye: No discharge.      Extraocular Movements:      Right eye: Normal extraocular motion.      Left eye: Normal extraocular motion.      Conjunctiva/sclera:      Right eye: Right conjunctiva is not injected. No hemorrhage.     Left eye: Left conjunctiva is not injected. No hemorrhage.     Pupils: Pupils are equal, round, and reactive to light.   Neck:      Thyroid: No thyromegaly.      Vascular: No JVD.      Trachea: No tracheal deviation.   Cardiovascular:      Rate and Rhythm: Normal rate.   Pulmonary:      Effort: Pulmonary effort is normal. No respiratory distress.      Breath sounds: No stridor.   Chest:          Comments: Dramatic shrinkage in left chest wall mass  Abdominal:      General: Bowel sounds are normal.      Palpations: Abdomen is soft. There is no hepatomegaly, splenomegaly or mass.      Tenderness: There is no abdominal tenderness.   Musculoskeletal:         General: No tenderness. Normal range of motion.      Cervical back: Normal range of motion and neck supple.   Lymphadenopathy:      Head:      Right side of head: No posterior auricular or occipital adenopathy.      Left side of head: No posterior auricular or occipital adenopathy.      Cervical: No cervical adenopathy.      Right cervical: No superficial, deep or posterior cervical adenopathy.      "Left cervical: No superficial, deep or posterior cervical adenopathy.      Upper Body:      Right upper body: No supraclavicular adenopathy.      Left upper body: No supraclavicular adenopathy.   Skin:     General: Skin is dry.      Findings: No erythema or rash.      Nails: There is no clubbing.   Neurological:      Mental Status: He is alert and oriented to person, place, and time.      Cranial Nerves: No cranial nerve deficit.      Coordination: Coordination normal.   Psychiatric:         Behavior: Behavior normal.         Thought Content: Thought content normal.         Judgment: Judgment normal.          Significant Labs:   BMP:   Recent Labs   Lab 03/02/24  1126 03/03/24  1119 03/04/24  0432   * 194* 157*   * 130* 134*   K 4.5 4.7 4.9   CL 96 96 97   CO2 22* 23 25   BUN 17 18 20   CREATININE 1.2 1.0 1.2   CALCIUM 9.1 8.8 8.5*   MG 1.9  --  1.8   , CBC:   Recent Labs   Lab 03/02/24  1422 03/03/24  1119 03/04/24  0432   WBC 25.84* 20.33* 17.02*   HGB 9.8* 9.5* 9.6*   HCT 29.0* 27.9* 28.6*    277 296   , CMP:   Recent Labs   Lab 03/02/24  1126 03/03/24  1119 03/04/24  0432   * 130* 134*   K 4.5 4.7 4.9   CL 96 96 97   CO2 22* 23 25   * 194* 157*   BUN 17 18 20   CREATININE 1.2 1.0 1.2   CALCIUM 9.1 8.8 8.5*   PROT 6.8 6.4 5.9*   ALBUMIN 2.6* 2.5* 2.6*   BILITOT 0.6 0.5 0.6   ALKPHOS 133 118 124   AST 76* 66* 120*   ALT 74* 70* 110*   ANIONGAP 13 11 12   , Coagulation: No results for input(s): "PT", "INR", "APTT" in the last 48 hours., Haptoglobin: No results for input(s): "HAPTOGLOBIN" in the last 48 hours., Immunology: No results for input(s): "SPEP", "ARIEL", "PRIYA", "FREELAMBDALI" in the last 48 hours., LDH: No results for input(s): "LDHCSF", "BFSOURCE" in the last 48 hours., LFTs:   Recent Labs   Lab 03/02/24  1126 03/03/24  1119 03/04/24  0432   ALT 74* 70* 110*   AST 76* 66* 120*   ALKPHOS 133 118 124   BILITOT 0.6 0.5 0.6   PROT 6.8 6.4 5.9*   ALBUMIN 2.6* 2.5* 2.6*   , " "Reticulocytes: No results for input(s): "RETIC" in the last 48 hours., Tumor Markers: No results for input(s): "PSA", "CEA", "", "AFPTM", "[id number]", "" in the last 48 hours.    Invalid input(s): "ALGTM", Uric Acid No results for input(s): "URICACID" in the last 48 hours., and Urine Studies: No results for input(s): "COLORU", "APPEARANCEUA", "PHUR", "SPECGRAV", "PROTEINUA", "GLUCUA", "KETONESU", "BILIRUBINUA", "OCCULTUA", "NITRITE", "UROBILINOGEN", "LEUKOCYTESUR", "RBCUA", "WBCUA", "BACTERIA", "SQUAMEPITHEL", "HYALINECASTS" in the last 48 hours.    Invalid input(s): "WRIGHTSUR"    Diagnostic Results:  I have reviewed all pertinent imaging results/findings within the past 24 hours.  "

## 2024-03-04 NOTE — HPI
84-year-old male history of locally advanced HER2 positive breast carcinoma patient has received Taxotere Herceptin and Perjeta.  PET scan showed presumptive findings of metastatic disease in lung.  Patient is admitted to the hospital with a non STEMI.  Decrease in ejection fraction asked to see the patient for further evaluation because of elevated white cell count.

## 2024-03-04 NOTE — ASSESSMENT & PLAN NOTE
Patient's FSGs are controlled on current medication regimen.  Last A1c reviewed-   Lab Results   Component Value Date    HGBA1C 7.1 (H) 11/28/2023     Most recent fingerstick glucose reviewed-   Recent Labs   Lab 03/03/24  1529 03/04/24  1131   POCTGLUCOSE 204* 178*       Current correctional scale  Low  Maintain anti-hyperglycemic dose as follows-   Antihyperglycemics (From admission, onward)    Start     Stop Route Frequency Ordered    02/26/24 1301  insulin aspart U-100 pen 0-5 Units         -- SubQ Before meals & nightly PRN 02/26/24 1204        Hold Oral hypoglycemics while patient is in the hospital.

## 2024-03-04 NOTE — SUBJECTIVE & OBJECTIVE
Interval History:  Patient does complain of shortness of breath and lower extremity swelling.  He currently denies any chest pain.  Currently on Lasix 20 mg IV b.i.d. which has been increased to 40 mg dosing regimen as of this a.m..    Review of Systems   Constitutional:  Negative for chills and fever.   Respiratory:  Positive for shortness of breath.    Cardiovascular:  Positive for leg swelling. Negative for chest pain.   All other systems reviewed and are negative.    Objective:     Vital Signs (Most Recent):  Temp: 98 °F (36.7 °C) (03/04/24 0739)  Pulse: 91 (03/04/24 1113)  Resp: 18 (03/04/24 0739)  BP: 114/77 (03/04/24 0739)  SpO2: 98 % (03/04/24 0739) Vital Signs (24h Range):  Temp:  [98 °F (36.7 °C)-98.7 °F (37.1 °C)] 98 °F (36.7 °C)  Pulse:  [68-98] 91  Resp:  [18-20] 18  SpO2:  [97 %-98 %] 98 %  BP: (107-114)/(62-77) 114/77     Weight: 108.8 kg (239 lb 13.8 oz)  Body mass index is 34.42 kg/m².    Intake/Output Summary (Last 24 hours) at 3/4/2024 1207  Last data filed at 3/4/2024 0849  Gross per 24 hour   Intake 380 ml   Output 352 ml   Net 28 ml         Physical Exam  Vitals reviewed.   Constitutional:       General: He is not in acute distress.     Appearance: He is not diaphoretic.   HENT:      Nose: Nose normal.   Eyes:      Conjunctiva/sclera: Conjunctivae normal.   Cardiovascular:      Rate and Rhythm: Normal rate.   Pulmonary:      Effort: Pulmonary effort is normal. No respiratory distress.      Comments: Bibasilar crackles  Abdominal:      Tenderness: There is no abdominal tenderness. There is no guarding.   Musculoskeletal:         General: Swelling present.      Right lower leg: Edema present.      Left lower leg: Edema present.   Skin:     General: Skin is warm and dry.   Neurological:      Mental Status: He is alert and oriented to person, place, and time.   Psychiatric:         Mood and Affect: Mood normal.         Behavior: Behavior normal.             Significant Labs: All pertinent labs  within the past 24 hours have been reviewed.  Recent Lab Results         03/04/24  1131   03/04/24  0432   03/03/24  1529        Albumin   2.6         ALP   124         ALT   110         Anion Gap   12         AST   120         Baso #   0.09         Basophil %   0.5         BILIRUBIN TOTAL   0.6  Comment: For infants and newborns, interpretation of results should be based  on gestational age, weight and in agreement with clinical  observations.    Premature Infant recommended reference ranges:  Up to 24 hours.............<8.0 mg/dL  Up to 48 hours............<12.0 mg/dL  3-5 days..................<15.0 mg/dL  6-29 days.................<15.0 mg/dL           BUN   20         Calcium   8.5         Chloride   97         CO2   25         Creatinine   1.2         Differential Method   Automated         eGFR   59         Eos #   0.1         Eos %   0.3         Glucose   157         Gran # (ANC)   14.2         Gran %   83.2         Hematocrit   28.6         Hemoglobin   9.6         Immature Grans (Abs)   0.22  Comment: Mild elevation in immature granulocytes is non specific and   can be seen in a variety of conditions including stress response,   acute inflammation, trauma and pregnancy. Correlation with other   laboratory and clinical findings is essential.           Immature Granulocytes   1.3         Lymph #   1.8         Lymph %   10.8         Magnesium    1.8         MCH   30.1         MCHC   33.6         MCV   90         Mono #   0.7         Mono %   3.9         MPV   10.8         nRBC   0         Platelet Count   296         POCT Glucose 178     204       Potassium   4.9         PROTEIN TOTAL   5.9         RBC   3.19         RDW   15.6         Sodium   134         WBC   17.02                 Significant Imaging: I have reviewed all pertinent imaging results/findings within the past 24 hours.  X-Ray Chest AP Portable   Final Result      There has been interval worsening of the appearance of the lungs. There is a  moderate amount of interstitial and alveolar opacities seen in both lungs.  This is characteristic of pulmonary edema.         Electronically signed by: Marco Elliott MD   Date:    03/03/2024   Time:    19:00      X-Ray Chest 1 View   Final Result      As above.         Electronically signed by: Spenser Stallworth   Date:    02/28/2024   Time:    11:07      X-Ray Chest AP Portable   Final Result      Worsening moderate bilateral infiltrates.  Follow-up is recommended.         Electronically signed by: Shyla Giles MD   Date:    02/27/2024   Time:    10:18      CTA Chest Non-Coronary (PE Studies)   Final Result      No pulmonary embolism.  No dissection.  Atherosclerotic changes.  Mild interstitial opacities.  Correlate clinically for element of fibrosis versus CHF.  Pulmonary micronodularity.  Recommend follow-up.      All CT scans   are performed using dose optimization techniques including the following: automated exposure control; adjustment of the mA and/or kV; use of iterative reconstruction technique.  Dose modulation was employed for ALARA by means of: Automated exposure control; adjustment of the mA and/or kV according to patient size (this includes techniques or standardized protocols for targeted exams where dose is matched to indication/reason for exam; i.e. extremities or head); and/or use of iterative reconstructive technique.         Electronically signed by: Jhoan Roman   Date:    02/26/2024   Time:    19:11      CT Head Without Contrast   Final Result      Chronic microvascular ischemic changes.  No intracranial hemorrhage.      All CT scans at this facility use dose modulation, iterative reconstruction, and/or weight based dosing when appropriate to reduce radiation dose to as low as reasonable achievable.         Electronically signed by: Earl Mendez MD   Date:    02/26/2024   Time:    10:36      X-Ray Chest AP Portable   Final Result      No acute process seen.         Electronically signed  by: Earl Mendez MD   Date:    02/26/2024   Time:    10:02

## 2024-03-04 NOTE — ASSESSMENT & PLAN NOTE
Follows with Dr. Mendoza at [facility], last chemo session one week prior.  Patient was seen by Hematology Oncology due to significant leukocytosis felt to be related to Neulasta and recent cardiac event. Follow up outpatient.

## 2024-03-04 NOTE — PROGRESS NOTES
"[facility] ([facility] Medicine  Progress Note    Patient Name: Mark Dickerson Sr.  MRN: [id number]  Patient Class: IP- Inpatient   Admission Date: 2/26/2024  Length of Stay: 6 days  Attending Physician: Antonia Kapoor MD  Primary Care Provider: Tatyana Cannon MD        Subjective:     Principal Problem:NSTEMI (non-ST elevated myocardial infarction)        HPI:  Mark Dickerson Sr. is a 84 y.o. male patient with a PMHx of metastatic breast cancer (on chemo), HLD, HTN, DM II, CKD who presents to the ED for evaluation of AMS which onset this morning PTA. Per daughter in law she visited the pt early this morning and states that he was normal for a short period before becoming extremely disoriented with jumbled speech.  Patient mental status returned to baseline piror to ed arrival.  Patient reports he feels "dehydrated", he also reports diarrhea earlier last week but this has since resolved.  Patient denies any fever, chills, N/V, SOB and chest pain.   In the ED, work up notable for WBC 14, Na 130, K 3.0, mag 1.5, , Trop 0.7.  EKG with SR PACs, ST and T wave abnormality. CT head negative for acute processes.  Chest xray no acute processes. Patient started on IVF and given zofran for nausea.  Patient will be admitted to observation for electrolyte imbalances, elevated trop.  Cards consulted.     Code Status DNR  Surrogate Decision maker daughter    Overview/Hospital Course:  Patient is currently admitted for NSTEMI, SIRS criteria, severe leukocytosis, hypokalemia, hypomagnesemia, chronic kidney disease stage 3, diabetes mellitus type 2, left breast cancer in a male patient, acute diastolic CHF exacerbation, normocytic anemia, transaminitis, and severe aortic stenosis.  Consults on this admission include Cardiology, Hematology Oncology, and Wound Care.  Echocardiogram 02/26/2024 with EF 55-60% with indeterminate diastolic function.  Right and left heart catheterization 03/01/2024 with elevated " filling pressures, severe aortic stenosis,  RCA with collaterals.  Cardiology recommends continuing medical management for NSTEMI and we will discuss outpatient TAVR evaluation for severe aortic stenosis.  Continue IV Lasix diuresis for CHF exacerbation with evidence of volume overload.  Patient is noted to have pitting edema on exam.  Chest x-ray 03/03/2024 with interval worsening with moderate interstitial and alveolar opacities of bilateral lungs consistent with pulmonary edema.  BNP has trended upward and is currently 590.  Hematology oncology has seen the patient in consultation and feels that leukocytosis is related to Neulasta and acute cardiac event.  White blood cell count is trending downward.  Patient did receive empiric IV Rocephin for 4 days 2/28-03/02/2024.  No localizing source of infection was identified.  COVID-19 negative, influenza a/B negative, bcx were negative, urinalysis negative, lactic acid level 1.0, procalcitonin level 0.14.  CTA of chest 2/24/24 with no evidence of PE, pulmonary fibrosis versus CHF findings with recommendation for follow-up imaging.    Interval History:  Patient does complain of shortness of breath and lower extremity swelling.  He currently denies any chest pain.  Currently on Lasix 20 mg IV b.i.d. which has been increased to 40 mg dosing regimen as of this a.m..    Review of Systems   Constitutional:  Negative for chills and fever.   Respiratory:  Positive for shortness of breath.    Cardiovascular:  Positive for leg swelling. Negative for chest pain.   All other systems reviewed and are negative.    Objective:     Vital Signs (Most Recent):  Temp: 98 °F (36.7 °C) (03/04/24 0739)  Pulse: 91 (03/04/24 1113)  Resp: 18 (03/04/24 0739)  BP: 114/77 (03/04/24 0739)  SpO2: 98 % (03/04/24 0739) Vital Signs (24h Range):  Temp:  [98 °F (36.7 °C)-98.7 °F (37.1 °C)] 98 °F (36.7 °C)  Pulse:  [68-98] 91  Resp:  [18-20] 18  SpO2:  [97 %-98 %] 98 %  BP: (107-114)/(62-77) 114/77      Weight: 108.8 kg (239 lb 13.8 oz)  Body mass index is 34.42 kg/m².    Intake/Output Summary (Last 24 hours) at 3/4/2024 1207  Last data filed at 3/4/2024 0849  Gross per 24 hour   Intake 380 ml   Output 352 ml   Net 28 ml         Physical Exam  Vitals reviewed.   Constitutional:       General: He is not in acute distress.     Appearance: He is not diaphoretic.   HENT:      Nose: Nose normal.   Eyes:      Conjunctiva/sclera: Conjunctivae normal.   Cardiovascular:      Rate and Rhythm: Normal rate.   Pulmonary:      Effort: Pulmonary effort is normal. No respiratory distress.      Comments: Bibasilar crackles  Abdominal:      Tenderness: There is no abdominal tenderness. There is no guarding.   Musculoskeletal:         General: Swelling present.      Right lower leg: Edema present.      Left lower leg: Edema present.   Skin:     General: Skin is warm and dry.   Neurological:      Mental Status: He is alert and oriented to person, place, and time.   Psychiatric:         Mood and Affect: Mood normal.         Behavior: Behavior normal.             Significant Labs: All pertinent labs within the past 24 hours have been reviewed.  Recent Lab Results         03/04/24  1131   03/04/24  0432   03/03/24  1529        Albumin   2.6         ALP   124         ALT   110         Anion Gap   12         AST   120         Baso #   0.09         Basophil %   0.5         BILIRUBIN TOTAL   0.6  Comment: For infants and newborns, interpretation of results should be based  on gestational age, weight and in agreement with clinical  observations.    Premature Infant recommended reference ranges:  Up to 24 hours.............<8.0 mg/dL  Up to 48 hours............<12.0 mg/dL  3-5 days..................<15.0 mg/dL  6-29 days.................<15.0 mg/dL           BUN   20         Calcium   8.5         Chloride   97         CO2   25         Creatinine   1.2         Differential Method   Automated         eGFR   59         Eos #   0.1         Eos  %   0.3         Glucose   157         Gran # (ANC)   14.2         Gran %   83.2         Hematocrit   28.6         Hemoglobin   9.6         Immature Grans (Abs)   0.22  Comment: Mild elevation in immature granulocytes is non specific and   can be seen in a variety of conditions including stress response,   acute inflammation, trauma and pregnancy. Correlation with other   laboratory and clinical findings is essential.           Immature Granulocytes   1.3         Lymph #   1.8         Lymph %   10.8         Magnesium    1.8         MCH   30.1         MCHC   33.6         MCV   90         Mono #   0.7         Mono %   3.9         MPV   10.8         nRBC   0         Platelet Count   296         POCT Glucose 178     204       Potassium   4.9         PROTEIN TOTAL   5.9         RBC   3.19         RDW   15.6         Sodium   134         WBC   17.02                 Significant Imaging: I have reviewed all pertinent imaging results/findings within the past 24 hours.  X-Ray Chest AP Portable   Final Result      There has been interval worsening of the appearance of the lungs. There is a moderate amount of interstitial and alveolar opacities seen in both lungs.  This is characteristic of pulmonary edema.         Electronically signed by: Marco Elliott MD   Date:    03/03/2024   Time:    19:00      X-Ray Chest 1 View   Final Result      As above.         Electronically signed by: Spenser Stallworth   Date:    02/28/2024   Time:    11:07      X-Ray Chest AP Portable   Final Result      Worsening moderate bilateral infiltrates.  Follow-up is recommended.         Electronically signed by: Shyla Giles MD   Date:    02/27/2024   Time:    10:18      CTA Chest Non-Coronary (PE Studies)   Final Result      No pulmonary embolism.  No dissection.  Atherosclerotic changes.  Mild interstitial opacities.  Correlate clinically for element of fibrosis versus CHF.  Pulmonary micronodularity.  Recommend follow-up.      All CT scans   are  performed using dose optimization techniques including the following: automated exposure control; adjustment of the mA and/or kV; use of iterative reconstruction technique.  Dose modulation was employed for ALARA by means of: Automated exposure control; adjustment of the mA and/or kV according to patient size (this includes techniques or standardized protocols for targeted exams where dose is matched to indication/reason for exam; i.e. extremities or head); and/or use of iterative reconstructive technique.         Electronically signed by: Jhoan Roman   Date:    02/26/2024   Time:    19:11      CT Head Without Contrast   Final Result      Chronic microvascular ischemic changes.  No intracranial hemorrhage.      All CT scans at this facility use dose modulation, iterative reconstruction, and/or weight based dosing when appropriate to reduce radiation dose to as low as reasonable achievable.         Electronically signed by: Earl Mendez MD   Date:    02/26/2024   Time:    10:36      X-Ray Chest AP Portable   Final Result      No acute process seen.         Electronically signed by: Earl Mendez MD   Date:    02/26/2024   Time:    10:02           Assessment/Plan:      * NSTEMI (non-ST elevated myocardial infarction)  -No prior hx of CAD  -Troponin max 2.789  -EKG with SR PACS and ST-Twave abnormalities  -echocardiogram 02/26/2024 with EF 55-60% with indeterminate diastolic function   -right and left heart catheterization 03/01/2024 with increased filling pressures, severe aortic stenosis,  RCA with collaterals  -continue medical management; continue aspirin, Lipitor, Lopressor, and midodrine  -Cardiology following/managing        Acute diastolic CHF (congestive heart failure)  Patient is identified as having Diastolic (HFpEF) heart failure that is Acute. CHF is currently uncontrolled due to Pulmonary edema/pleural effusion on CXR. Latest ECHO performed and demonstrates- Results for orders placed during the  hospital encounter of 02/26/24    Echo    Interpretation Summary    Left Ventricle: The left ventricle is normal in size. There is concentric remodeling. There is normal systolic function with a visually estimated ejection fraction of 55 - 60%. There is indeterminate diastolic function.    Right Ventricle: Right ventricle was not well visualized due to poor acoustic window. Normal right ventricular cavity size. Systolic function is normal.    Pulmonary Artery: The estimated pulmonary artery systolic pressure is 37 mmHg.    IVC/SVC: Normal venous pressure at 3 mmHg.  . Continue Beta Blocker and Furosemide and monitor clinical status closely. Monitor on telemetry. Monitor strict Is&Os and daily weights.  Cardiology on board already for NSTEMI. Continue to stress to patient importance of self efficacy and  on diet for CHF. Last BNP reviewed- and noted below   Recent Labs   Lab 03/02/24  1126   *   Increase lasix to 40 mg IV bid due to pitting edema to BLE, dyspnea requiring O2 supplementation, and abnormal CXR with pulmonary edema.  Fluid restrict 1200 mL.  Strict Is&Os and daily weights.    SIRS (systemic inflammatory response syndrome)  -WBC 34.56-->17.02, leukocytosis trending down   -COVID-19 negative, influenza a/B negative, procalcitonin level 0.14, lactic acid level 1.0, urinalysis negative, blood cultures negative  -CTA of chest 02/24/2024 with no evidence of PE, pulmonary fibrosis versus CHF findings with recommendations for follow-up imaging  -patient did receive empiric IV ceftriaxone for 4 days 2/28-03/02/2024  -hematology oncology has seen the patient in consultation and feels severe leukocytosis related to recent Neulasta and acute cardiac event    Transaminitis    Transaminitis with trend upward, current  and .  Patient denies abdominal pain and has no tenderness to palpation on exam.  Suspect related to hepatic congestion with CHF exacerbation.  Check a.m. labs.       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 Mendoza at [facility], last chemo session one week prior.  Patient was seen by Hematology Oncology due to significant leukocytosis felt to be related to Neulasta and recent cardiac event. Follow up outpatient.    Severe aortic stenosis  Cardiology plans to discuss outpatient TAVR evaluation.  Monitor volume status with patient receiving IV diuresis.      Normocytic anemia  Patient's anemia is currently controlled. Has not received any PRBCs to date. Etiology likely d/t chronic disease due to Malignancy  Current CBC reviewed-   Lab Results   Component Value Date    HGB 9.6 (L) 03/04/2024    HCT 28.6 (L) 03/04/2024     Monitor serial CBC and transfuse if patient becomes hemodynamically unstable, symptomatic or H/H drops below 7/21.    Hypomagnesemia  Patient has Abnormal Magnesium: hypomagnesemia. Will continue to monitor electrolytes closely. Will replace the affected electrolytes and repeat labs to be done after interventions completed. The patient's magnesium results have been reviewed and are listed below.  Recent Labs   Lab 03/04/24  0432   MG 1.8          Hypokalemia  Patient has hypokalemia which is Acute on Chronic and currently uncontrolled. Most recent potassium levels reviewed-   Lab Results   Component Value Date    K 4.9 03/04/2024   . Will continue potassium replacement per protocol and recheck repeat levels after replacement completed.     Elevated troponin  As above, see discussion for NSTEMI    CKD stage 3 due to type 2 diabetes mellitus  Creatine stable for now. BMP reviewed- noted Estimated Creatinine Clearance: 54.6 mL/min (based on SCr of 1.2 mg/dL). according to latest data. Based on current GFR, CKD stage is stage 3 - GFR 30-59.  Monitor UOP and serial BMP and adjust therapy as needed. Renally dose meds. Avoid nephrotoxic medications and procedures    Controlled type 2  diabetes mellitus without complication, without long-term current use of insulin  Patient's FSGs are controlled on current medication regimen.  Last A1c reviewed-   Lab Results   Component Value Date    HGBA1C 7.1 (H) 11/28/2023     Most recent fingerstick glucose reviewed-   Recent Labs   Lab 03/03/24  1529 03/04/24  1131   POCTGLUCOSE 204* 178*       Current correctional scale  Low  Maintain anti-hyperglycemic dose as follows-   Antihyperglycemics (From admission, onward)      Start     Stop Route Frequency Ordered    02/26/24 1301  insulin aspart U-100 pen 0-5 Units         -- SubQ Before meals & nightly PRN 02/26/24 1204          Hold Oral hypoglycemics while patient is in the hospital.      VTE Risk Mitigation (From admission, onward)           Ordered     IP VTE HIGH RISK PATIENT  Once         02/26/24 1204     Place sequential compression device  Until discontinued         02/26/24 1204                    Discharge Planning   DE:      Code Status: DNR   Is the patient medically ready for discharge?:     Reason for patient still in hospital (select all that apply): Patient trending condition, Laboratory test, Treatment, and Consult recommendations  Discharge Plan A: Skilled Nursing Facility   Discharge Delays: None known at this time              Antonia Kapoor MD  Department of Hospital Medicine   [facility] ([facility])

## 2024-03-04 NOTE — ASSESSMENT & PLAN NOTE
Cardiology plans to discuss outpatient TAVR evaluation.  Monitor volume status with patient receiving IV diuresis.

## 2024-03-04 NOTE — NURSING
Returned call to pt who is still admitted to [facility]. States he wants to make sure Dr. Mendoza talks with cardiology about possible changes in his tx regimen. Told him per Dr. Baugh note, they will discuss tx plan with cards. Dr. Mendoza is not in today but Dr. Baugh will discuss with her when she returns. Pt states he's pleased with how well the med has worked so far and he's eager to re start once d/c from hospital. Assured him many in depth conversations b/t all HCP involved in his care will be had prior to resuming tx. Pt voiced understanding this information, has no other questions at this time.   Pt DNL, [place] called back, states she wasn't in the room when MD came in this am but was told pt will restart tx in wks and is concerned that pt will still be in rehab. Explained to her that I just spoke with pt, he's aware that several providers will review/discuss his case along with his input, before deciding on future tx plans. Assured her that pt will be able to make informed decision before any tx is started, stopped, discontinued, changed. She states pt may go to a rehab facility, they may not have control over decisions made. Told her they will have to be as involved and present as they can be. She thanked me for   returning her call, asked her to call with any other concerns.   Oncology Navigation   Intake  Date of Diagnosis: 12/14/23  Cancer Type: Breast  Type of Referral: Internal  Date of Referral: 12/19/23  Initial Nurse Navigator Contact: 12/19/23  Referral to Initial Contact Timeline (days): 0  Date Worked: 03/04/24  First Appointment Available: 12/21/23  Appointment Date: 12/21/23  First Available Date vs. Scheduled Date (days): 0  Multiple appointments: Yes     Treatment  Current Status: Active  Date Presented to Tumor Board: 12/28/23    Surgery: Planned  Surgical Oncologist: Brittnee Briones MD  Type of Surgery: Left total mastectomy with left sentinel lymph node biopsy    Medical Oncologist:  "Shaina Garg MD  Consult Date: 23  Chemotherapy: Planned  Chemotherapy Regimen: Taxotere  Immunotherapy: Planned  Immunotherapy Name: Herceptin/Perjeta  Start Date: 24    Radiation Therapy: Planned    Procedures: Port / PICC  Genetic Testing Date Sent: 23  Diagnostic Mammo Schedule Date:  (3/4 through NACT)  PET Scan Schedule Date: 23  Port / PICC Schedule Date: 24    General Referrals: Chemo Education; Social Work  Social Work Referral Date: 24    ER: Positive  SC: Negative  Her2: Postive       Support Systems: Children; Family members; [employer]ist / sree community  Barriers of Care: Barriers to Care "Assessment completed-no barriers noted"     Acuity  Stage: 1  Systemic Treatment - predicted or initiated: Chemotherapy Regimen with Multiple drugs (+1)  Surgical Procedure Complexity: 2  Treatment Tolerability: Has not started treatment yet/treatment fully completed and side effects resolved  ECO  Comorbidities in Medical History: 1  Hospitalization Within the Past Month: 0   Needed: 0  Support: 0  Verbalizes Financial Concerns: 0  Transportation: 0  Psychological Factors (+1 each): Emotional during conversation  History of noncompliance/frequent no shows and cancellations: 0  Verbalizes the need for more education: 0  Other Factors (+1 for Each): 0  Navigation Acuity: 1     Follow Up  No follow-ups on file.       "

## 2024-03-04 NOTE — CONSULTS
[facility] - Telemetry ([facility])  Hematology/Oncology  Consult Note    Patient Name: Mark Dickerson Sr.  MRN: [id number]  Admission Date: 2/26/2024  Hospital Length of Stay: 6 days  Code Status: DNR   Attending Provider: Antonia Kapoor MD  Consulting Provider: Wayne Baugh MD  Primary Care Physician: Tatyana Cannon MD  Principal Problem:NSTEMI (non-ST elevated myocardial infarction)    Consults  Subjective:     HPI:  84-year-old male history of locally advanced HER2 positive breast carcinoma patient has received Taxotere Herceptin and Perjeta.  PET scan showed presumptive findings of metastatic disease in lung.  Patient is admitted to the hospital with a non STEMI.  Decrease in ejection fraction asked to see the patient for further evaluation because of elevated white cell count.    Oncology Treatment Plan:   OP BREAST THP (pertuzumab trastuzumab DOCEtaxel) Q3W    Medications:  Continuous Infusions:   sodium chloride 0.9% 100 mL/hr (03/01/24 1334)     Scheduled Meds:   anastrozole  1 mg Oral Daily    aspirin  81 mg Oral Daily    atorvastatin  20 mg Oral QHS    furosemide (LASIX) injection  20 mg Intravenous BID    magnesium oxide  400 mg Oral Daily    metoprolol tartrate  25 mg Oral BID    midodrine  10 mg Oral TID WM    mupirocin   Nasal BID     PRN Meds:sodium chloride 0.9%, acetaminophen, acetaminophen, cyclobenzaprine, dextrose 10%, dextrose 10%, glucagon (human recombinant), glucose, glucose, HYDROcodone-acetaminophen, insulin aspart U-100, melatonin, naloxone, ondansetron, ondansetron, sodium chloride 0.9%     Review of patient's allergies indicates:   Allergen Reactions    Grass pollen-anai grass standard         Past Medical History:   Diagnosis Date    Chest pain 2/26/2024    CKD stage 3 due to type 2 diabetes mellitus 2/18/2021    DM (diabetes mellitus) 2014    BS didn't check 12/11/2019    DM (diabetes mellitus) 2014    BS didn't check 05/05/2022    Foreign body, eye     right eye    Hyperlipidemia      Hypertension     Need for shingles vaccine 11/20/2019    NSTEMI (non-ST elevated myocardial infarction) 2/27/2024    Pneumonia     Primary osteoarthritis of right knee 10/29/2021    Severe obesity (BMI 35.0-35.9 with comorbidity) 7/10/2019    Type 2 diabetes mellitus 2014    BS didn't check 12/12/2018     Past Surgical History:   Procedure Laterality Date    ABDOMINAL AORTOGRAPHY N/A 3/1/2024    Procedure: AORTOGRAM, ABDOMINAL;  Surgeon: Sebastian Aguillon MD;  Location: [facility] CATH LAB;  Service: Cardiology;  Laterality: N/A;    ANGIOGRAPHY OF AORTIC ARCH  3/1/2024    Procedure: Aortogram, Aortic Arch;  Surgeon: Sebastian Aguillon MD;  Location: [facility] CATH LAB;  Service: Cardiology;;    APPENDECTOMY      CATARACT EXTRACTION Bilateral     CPG    CATHETERIZATION OF BOTH LEFT AND RIGHT HEART N/A 3/1/2024    Procedure: CATHETERIZATION, HEART, BOTH LEFT AND RIGHT;  Surgeon: Sebastian Aguillon MD;  Location: [facility] CATH LAB;  Service: Cardiology;  Laterality: N/A;    ENDOSCOPIC ULTRASOUND OF UPPER GASTROINTESTINAL TRACT N/A 1/11/2024    Procedure: ULTRASOUND, UPPER GI TRACT, ENDOSCOPIC;  Surgeon: Evens Monsalve MD;  Location: [facility] ENDO ([address] [place] [address]);  Service: Endoscopy;  Laterality: N/A;    FLUOROSCOPY N/A 1/5/2024    Procedure: Fluoroscopy/MEDIPORT PLACEMENT;  Surgeon: Jaret Lu MD;  Location: [facility] CATH LAB;  Service: General;  Laterality: N/A;    VALVE STUDY-AORTIC  3/1/2024    Procedure: Valve study-aortic;  Surgeon: Sebastian Aguillon MD;  Location: [facility] CATH LAB;  Service: Cardiology;;     Family History       Problem Relation (Age of Onset)    Allergic rhinitis Daughter    Cancer Son    Cataracts Father    Heart disease Mother, Father    Hyperlipidemia Father    Hypertension Father, Sister, Brother          Tobacco Use    Smoking status: Former     Current packs/day: 0.00     Average packs/day: 3.0 packs/day for 10.0 years (30.0 ttl pk-yrs)     Types: Cigarettes     Start date: 1952     Quit date: 1962     Years  since quittin.2    Smokeless tobacco: Never   Substance and Sexual Activity    Alcohol use: No    Drug use: No    Sexual activity: Never       Review of Systems   Constitutional:  Positive for activity change and fatigue. Negative for appetite change, chills, diaphoresis, fever and unexpected weight change.   HENT:  Negative for congestion, dental problem, drooling, ear discharge, ear pain, facial swelling, hearing loss, mouth sores, nosebleeds, postnasal drip, rhinorrhea, sinus pressure, sneezing, sore throat, tinnitus, trouble swallowing and voice change.    Eyes:  Negative for photophobia, pain, discharge, redness, itching and visual disturbance.   Respiratory:  Negative for apnea, cough, choking, chest tightness, shortness of breath, wheezing and stridor.    Cardiovascular:  Negative for chest pain, palpitations and leg swelling.   Gastrointestinal:  Negative for abdominal distention, abdominal pain, anal bleeding, blood in stool, constipation, diarrhea, nausea, rectal pain and vomiting.   Endocrine: Negative for cold intolerance, heat intolerance, polydipsia, polyphagia and polyuria.   Genitourinary:  Negative for decreased urine volume, difficulty urinating, dysuria, enuresis, flank pain, frequency, genital sores, hematuria, penile discharge, penile pain, penile swelling, scrotal swelling, testicular pain and urgency.   Musculoskeletal:  Negative for arthralgias, back pain, gait problem, joint swelling, myalgias, neck pain and neck stiffness.   Skin:  Negative for color change, pallor, rash and wound.   Allergic/Immunologic: Negative for environmental allergies, food allergies and immunocompromised state.   Neurological:  Positive for weakness. Negative for dizziness, tremors, seizures, syncope, facial asymmetry, speech difficulty, light-headedness, numbness and headaches.   Hematological:  Negative for adenopathy. Does not bruise/bleed easily.   Psychiatric/Behavioral:  Negative for agitation, behavioral  problems, confusion, decreased concentration, dysphoric mood, hallucinations, self-injury, sleep disturbance and suicidal ideas. The patient is not nervous/anxious and is not hyperactive.      Objective:     Vital Signs (Most Recent):  Temp: 98 °F (36.7 °C) (03/04/24 0739)  Pulse: 83 (03/04/24 0739)  Resp: 18 (03/04/24 0739)  BP: 114/77 (03/04/24 0739)  SpO2: 98 % (03/04/24 0739) Vital Signs (24h Range):  Temp:  [98 °F (36.7 °C)-98.7 °F (37.1 °C)] 98 °F (36.7 °C)  Pulse:  [] 83  Resp:  [18-20] 18  SpO2:  [97 %-100 %] 98 %  BP: ()/(53-77) 114/77     Weight: 108.8 kg (239 lb 13.8 oz)  Body mass index is 34.42 kg/m².  Body surface area is 2.32 meters squared.      Intake/Output Summary (Last 24 hours) at 3/4/2024 0815  Last data filed at 3/3/2024 1741  Gross per 24 hour   Intake 620 ml   Output 701 ml   Net -81 ml        Physical Exam  Vitals reviewed.   Constitutional:       General: He is not in acute distress.     Appearance: He is well-developed. He is not diaphoretic.   HENT:      Head: Normocephalic.      Right Ear: External ear normal.      Left Ear: External ear normal.      Nose: Nose normal.      Right Sinus: No maxillary sinus tenderness or frontal sinus tenderness.      Left Sinus: No maxillary sinus tenderness or frontal sinus tenderness.      Mouth/Throat:      Pharynx: No oropharyngeal exudate.   Eyes:      General: Lids are normal. No scleral icterus.        Right eye: No discharge.         Left eye: No discharge.      Extraocular Movements:      Right eye: Normal extraocular motion.      Left eye: Normal extraocular motion.      Conjunctiva/sclera:      Right eye: Right conjunctiva is not injected. No hemorrhage.     Left eye: Left conjunctiva is not injected. No hemorrhage.     Pupils: Pupils are equal, round, and reactive to light.   Neck:      Thyroid: No thyromegaly.      Vascular: No JVD.      Trachea: No tracheal deviation.   Cardiovascular:      Rate and Rhythm: Normal rate.    Pulmonary:      Effort: Pulmonary effort is normal. No respiratory distress.      Breath sounds: No stridor.   Chest:          Comments: Dramatic shrinkage in left chest wall mass  Abdominal:      General: Bowel sounds are normal.      Palpations: Abdomen is soft. There is no hepatomegaly, splenomegaly or mass.      Tenderness: There is no abdominal tenderness.   Musculoskeletal:         General: No tenderness. Normal range of motion.      Cervical back: Normal range of motion and neck supple.   Lymphadenopathy:      Head:      Right side of head: No posterior auricular or occipital adenopathy.      Left side of head: No posterior auricular or occipital adenopathy.      Cervical: No cervical adenopathy.      Right cervical: No superficial, deep or posterior cervical adenopathy.     Left cervical: No superficial, deep or posterior cervical adenopathy.      Upper Body:      Right upper body: No supraclavicular adenopathy.      Left upper body: No supraclavicular adenopathy.   Skin:     General: Skin is dry.      Findings: No erythema or rash.      Nails: There is no clubbing.   Neurological:      Mental Status: He is alert and oriented to person, place, and time.      Cranial Nerves: No cranial nerve deficit.      Coordination: Coordination normal.   Psychiatric:         Behavior: Behavior normal.         Thought Content: Thought content normal.         Judgment: Judgment normal.          Significant Labs:   BMP:   Recent Labs   Lab 03/02/24  1126 03/03/24  1119 03/04/24  0432   * 194* 157*   * 130* 134*   K 4.5 4.7 4.9   CL 96 96 97   CO2 22* 23 25   BUN 17 18 20   CREATININE 1.2 1.0 1.2   CALCIUM 9.1 8.8 8.5*   MG 1.9  --  1.8   , CBC:   Recent Labs   Lab 03/02/24  1422 03/03/24  1119 03/04/24  0432   WBC 25.84* 20.33* 17.02*   HGB 9.8* 9.5* 9.6*   HCT 29.0* 27.9* 28.6*    277 296   , CMP:   Recent Labs   Lab 03/02/24  1126 03/03/24  1119 03/04/24  0432   * 130* 134*   K 4.5 4.7 4.9   CL  "96 96 97   CO2 22* 23 25   * 194* 157*   BUN 17 18 20   CREATININE 1.2 1.0 1.2   CALCIUM 9.1 8.8 8.5*   PROT 6.8 6.4 5.9*   ALBUMIN 2.6* 2.5* 2.6*   BILITOT 0.6 0.5 0.6   ALKPHOS 133 118 124   AST 76* 66* 120*   ALT 74* 70* 110*   ANIONGAP 13 11 12   , Coagulation: No results for input(s): "PT", "INR", "APTT" in the last 48 hours., Haptoglobin: No results for input(s): "HAPTOGLOBIN" in the last 48 hours., Immunology: No results for input(s): "SPEP", "ARIEL", "PRIYA", "FREELAMBDALI" in the last 48 hours., LDH: No results for input(s): "LDHCSF", "BFSOURCE" in the last 48 hours., LFTs:   Recent Labs   Lab 03/02/24  1126 03/03/24  1119 03/04/24  0432   ALT 74* 70* 110*   AST 76* 66* 120*   ALKPHOS 133 118 124   BILITOT 0.6 0.5 0.6   PROT 6.8 6.4 5.9*   ALBUMIN 2.6* 2.5* 2.6*   , Reticulocytes: No results for input(s): "RETIC" in the last 48 hours., Tumor Markers: No results for input(s): "PSA", "CEA", "", "AFPTM", "CW8610", "" in the last 48 hours.    Invalid input(s): "ALGTM", Uric Acid No results for input(s): "URICACID" in the last 48 hours., and Urine Studies: No results for input(s): "COLORU", "APPEARANCEUA", "PHUR", "SPECGRAV", "PROTEINUA", "GLUCUA", "KETONESU", "BILIRUBINUA", "OCCULTUA", "NITRITE", "UROBILINOGEN", "LEUKOCYTESUR", "RBCUA", "WBCUA", "BACTERIA", "SQUAMEPITHEL", "HYALINECASTS" in the last 48 hours.    Invalid input(s): "WRIGHTSUR"    Diagnostic Results:  I have reviewed all pertinent imaging results/findings within the past 24 hours.  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  Will need to reimage to see whether not disease in chest is responsive as well with similar findings.  " Or could be potential other malignancy but will follow.  Will need to discuss with Cardiology to see what modifications need to be made in his treatment record regimen since he is receiving Taxotere Herceptin and Perjeta.  With decreasing ejection fraction    CKD stage 3 due to type 2 diabetes mellitus  Cared for by primary care team    Controlled type 2 diabetes mellitus without complication, without long-term current use of insulin  Cared for by primary care team        Thank you for your consult. I will follow-up with patient. Please contact us if you have any additional questions.    Wayne Baugh MD  Hematology/Oncology  [facility] - Telemetry ([facility])

## 2024-03-04 NOTE — ASSESSMENT & PLAN NOTE
Patient's anemia is currently controlled. Has not received any PRBCs to date. Etiology likely d/t chronic disease due to Malignancy  Current CBC reviewed-   Lab Results   Component Value Date    HGB 9.6 (L) 03/04/2024    HCT 28.6 (L) 03/04/2024     Monitor serial CBC and transfuse if patient becomes hemodynamically unstable, symptomatic or H/H drops below 7/21.

## 2024-03-04 NOTE — PLAN OF CARE
03/04/24 0938   Post-Acute Status   Post-Acute Authorization Placement   Post-Acute Placement Status Referrals Sent   Coverage [facility] Managed Medicare   Discharge Delays None known at this time   Discharge Plan   Discharge Plan A Skilled Nursing Facility       SW meet with Patient and son, at bedside, to discuss SNF placement. SW stated that [facility] did not have a bed available for Patient. SW provided a list of additional SNF placements in network with Patient's insurance. PEARL stated that [facility] was the other SNF in [place] and [facility] was the next closest. SW inquired about if referral could be sent to [facility] and [facility]. Patient and son were agreeable to referral being sent. SW sent referrals and will meet back with Patient and family this afternoon, regarding placement.    PASRR/ 142 called in and pending.    16 07 SW meet with patient and family at bedside to discuss SNF placement. PEARL stated that the 2 SNF's in [place] are full, with no beds available and [facility] cannot accommodate Chemo for the Patient. SW stated she would reach out to additional liaisons regarding Patient's referral for accepting facility. Patient and family were agreeable to DC plan. SW stated she would touch base with liaison and Patient/ family in the morning.     SW will continue to follow and assist as needed.

## 2024-03-04 NOTE — ASSESSMENT & PLAN NOTE
Patient has hypokalemia which is Acute on Chronic and currently uncontrolled. Most recent potassium levels reviewed-   Lab Results   Component Value Date    K 4.9 03/04/2024   . Will continue potassium replacement per protocol and recheck repeat levels after replacement completed.

## 2024-03-04 NOTE — ASSESSMENT & PLAN NOTE
Creatine stable for now. BMP reviewed- noted Estimated Creatinine Clearance: 54.6 mL/min (based on SCr of 1.2 mg/dL). according to latest data. Based on current GFR, CKD stage is stage 3 - GFR 30-59.  Monitor UOP and serial BMP and adjust therapy as needed. Renally dose meds. Avoid nephrotoxic medications and procedures

## 2024-03-04 NOTE — PLAN OF CARE
Pt transfers CGA. Gait trained 2x35' with RW, with one seated rest break due to reports of dizziness. Pt transferred back to bedside chair CGA with oxygen donned. Family present.   Rec moderate intensity therapy

## 2024-03-04 NOTE — ASSESSMENT & PLAN NOTE
Patient has Abnormal Magnesium: hypomagnesemia. Will continue to monitor electrolytes closely. Will replace the affected electrolytes and repeat labs to be done after interventions completed. The patient's magnesium results have been reviewed and are listed below.  Recent Labs   Lab 03/04/24  0432   MG 1.8

## 2024-03-04 NOTE — ASSESSMENT & PLAN NOTE
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  Will need to reimage to see whether not disease in chest is responsive as well with similar findings.  Or could be potential other malignancy but will follow.  Will need to discuss with Cardiology to see what modifications need to be made in his treatment record regimen since he is receiving Taxotere Herceptin and Perjeta.  With decreasing ejection fraction

## 2024-03-04 NOTE — ASSESSMENT & PLAN NOTE
-No prior hx of CAD  -Troponin max 2.789  -EKG with SR PACS and ST-Twave abnormalities  -echocardiogram 02/26/2024 with EF 55-60% with indeterminate diastolic function   -right and left heart catheterization 03/01/2024 with increased filling pressures, severe aortic stenosis,  RCA with collaterals  -continue medical management; continue aspirin, Lipitor, Lopressor, and midodrine  -Cardiology following/managing

## 2024-03-04 NOTE — ASSESSMENT & PLAN NOTE
Patient is identified as having Diastolic (HFpEF) heart failure that is Acute. CHF is currently uncontrolled due to Pulmonary edema/pleural effusion on CXR. Latest ECHO performed and demonstrates- Results for orders placed during the hospital encounter of 02/26/24    Echo    Interpretation Summary    Left Ventricle: The left ventricle is normal in size. There is concentric remodeling. There is normal systolic function with a visually estimated ejection fraction of 55 - 60%. There is indeterminate diastolic function.    Right Ventricle: Right ventricle was not well visualized due to poor acoustic window. Normal right ventricular cavity size. Systolic function is normal.    Pulmonary Artery: The estimated pulmonary artery systolic pressure is 37 mmHg.    IVC/SVC: Normal venous pressure at 3 mmHg.  . Continue Beta Blocker and Furosemide and monitor clinical status closely. Monitor on telemetry. Monitor strict Is&Os and daily weights.  Cardiology on board already for NSTEMI. Continue to stress to patient importance of self efficacy and  on diet for CHF. Last BNP reviewed- and noted below   Recent Labs   Lab 03/02/24  1126   *   Increase lasix to 40 mg IV bid due to pitting edema to BLE, dyspnea requiring O2 supplementation, and abnormal CXR with pulmonary edema.  Fluid restrict 1200 mL.  Strict Is&Os and daily weights.

## 2024-03-04 NOTE — ASSESSMENT & PLAN NOTE
-WBC 34.56-->17.02, leukocytosis trending down   -COVID-19 negative, influenza a/B negative, procalcitonin level 0.14, lactic acid level 1.0, urinalysis negative, blood cultures negative  -CTA of chest 02/24/2024 with no evidence of PE, pulmonary fibrosis versus CHF findings with recommendations for follow-up imaging  -patient did receive empiric IV ceftriaxone for 4 days 2/28-03/02/2024  -hematology oncology has seen the patient in consultation and feels severe leukocytosis related to recent Neulasta and acute cardiac event

## 2024-03-04 NOTE — PT/OT/SLP PROGRESS
Physical Therapy Treatment    Patient Name:  Mark Dickerson Sr.   MRN:  [id number]    Recommendations:     Discharge Recommendations: Moderate Intensity Therapy  Discharge Equipment Recommendations: to be determined by next level of care  Barriers to discharge: None    Assessment:     Mark Dickerson Sr. is a 84 y.o. male admitted with a medical diagnosis of NSTEMI (non-ST elevated myocardial infarction).  He presents with the following impairments/functional limitations: weakness, impaired endurance, impaired functional mobility, gait instability, impaired balance, decreased safety awareness, decreased coordination.    Rehab Prognosis: Good; patient would benefit from acute skilled PT services to address these deficits and reach maximum level of function.    Recent Surgery: Procedure(s) (LRB):  CATHETERIZATION, HEART, BOTH LEFT AND RIGHT (N/A)  Aortogram, Aortic Arch  Valve study-aortic  AORTOGRAM, ABDOMINAL (N/A) 3 Days Post-Op    Plan:     During this hospitalization, patient to be seen 3 x/week to address the identified rehab impairments via gait training, therapeutic activities, therapeutic exercises and progress toward the following goals:    Plan of Care Expires:  03/12/24    Subjective     Chief Complaint: C/O WEAKNESS, DIZZINESS DURING GAIT  Patient/Family Comments/goals: AGREEABLE TO TX  Pain/Comfort:  Pain Rating 1: 0/10      Objective:     Communicated with NURSE  prior to session.  Patient found up in chair with telemetry, peripheral IV, oxygen upon PT entry to room.     General Precautions: Standard, fall, respiratory  Orthopedic Precautions: N/A  Braces: N/A  Respiratory Status: Nasal cannula, flow 2 L/min     Functional Mobility:  Bed Mobility:     Scooting: stand by assistance  Transfers:     Sit to Stand:  contact guard assistance with rolling walker  Stand to sit: contact guard assistance with  rolling walker  using  Step Transfer  Gait: PT AMB 35' X 2 TRIALS WITH RW AND CGA, SLOW STEADY GAIT, C/O  "DIZZINESS SO SEATED REST FOR APPROX. 4 MINUTES, DIZZINESS RESOLVED WITH REST, HR AT 93 BPM, RETURN TO ROOM-GOOD EFFORT  Balance: FAIR SITTING BALANCE, FAIR- DYNAMIC BALANCE DURING GAIT    AM-PAC 6 CLICK MOBILITY  Turning over in bed (including adjusting bedclothes, sheets and blankets)?: 3  Sitting down on and standing up from a chair with arms (e.g., wheelchair, bedside commode, etc.): 3  Moving from lying on back to sitting on the side of the bed?: 3  Moving to and from a bed to a chair (including a wheelchair)?: 3  Need to walk in hospital room?: 3  Climbing 3-5 steps with a railing?: 1  Basic Mobility Total Score: 16     Treatment & Education:  PT EDUCATION:  - ROLE OF P.T. AND POC IN ACUTE CARE HOSPITAL SETTING  - RW USE AND SAFETY DURING TF'S AND GAIT  - ENCOURAGED TO INCREASE TIME OOB IN CHAIR TO TOLERANCE   - TO CONTINUE THERAPUETIC EXERCISES THROUGHOUT THE DAY TO INCREASE ACTIVITY TOLERANCE AND DECREASE RISK FOR PNEUMONIA AND BLOOD CLOTS: HIP FLEX/EXT, HIP ABD/ADD, QUAD SET, HEEL SLIDE, AP  - RISK FOR FALLS DUE TO GENERALIZED WEAKNESS, EDUCATED ON "CALL DON'T FALL", ENCOURAGED TO CALL FOR ASSISTANCE WITH ALL NEEDS SUCH AS BED<>CHAIR TRANSFERS OR TRIPS TO BATHROOM, PT AGREEABLE TO SAFETY PRECAUTIONS    Patient left up in chair with all lines intact, call button in reach, NURSE notified, and FAMILY present..    GOALS:   Multidisciplinary Problems       Physical Therapy Goals          Problem: Physical Therapy    Goal Priority Disciplines Outcome Goal Variances Interventions   Physical Therapy Goal     PT, PT/OT Ongoing, Progressing     Description: Pt will perform bed mobility with SPV in order to participate in EOB activity.  Pt will perform transfers with SPV in order to participate in OOB activity.  Pt will ambulate 150 ft SBA with LRAD in order to participate in daily tasks.                        Time Tracking:     PT Received On: 03/04/24  PT Start Time: 0945     PT Stop Time: 1010  PT Total Time (min): " 25 min     Billable Minutes: Gait Training 10 and Therapeutic Activity 15    Treatment Type: Treatment  PT/PTA: PT     Number of PTA visits since last PT visit: 0     03/04/2024

## 2024-03-04 NOTE — PT/OT/SLP PROGRESS
Occupational Therapy   Treatment    Name: Mark Dickerson Sr.  MRN: [id number]  Admitting Diagnosis:  NSTEMI (non-ST elevated myocardial infarction)  3 Days Post-Op    Recommendations:     Discharge Recommendations: Moderate Intensity Therapy  Discharge Equipment Recommendations:  to be determined by next level of care  Barriers to discharge:  None    Assessment:     Mark Dickerson Sr. is a 84 y.o. male with a medical diagnosis of NSTEMI (non-ST elevated myocardial infarction).  Performance deficits affecting function are weakness, impaired functional mobility, decreased safety awareness, impaired cardiopulmonary response to activity, gait instability, impaired endurance, decreased upper extremity function, impaired balance, impaired self care skills, decreased lower extremity function.     Rehab Prognosis:  Good; patient would benefit from acute skilled OT services to address these deficits and reach maximum level of function.       Plan:     Patient to be seen 2 x/week to address the above listed problems via self-care/home management, therapeutic activities, therapeutic exercises  Plan of Care Expires: 04/12/24  Plan of Care Reviewed with: patient, family    Subjective     Chief Complaint: Dizzienss  Patient/Family Comments/goals: return to PLOF  Pain/Comfort:  Pain Rating 1: 0/10  Pain Rating Post-Intervention 1: 0/10    Objective:     Communicated with: Nurse prior to session.  Patient found up in chair with telemetry, peripheral IV, oxygen upon OT entry to room.    General Precautions: Standard, fall, respiratory    Orthopedic Precautions:N/A  Braces: N/A  Respiratory Status: Nasal cannula, flow 2 L/min     Occupational Performance:     Bed Mobility:    Patient completed Scooting/Bridging with stand by assistance     Functional Mobility/Transfers:  Patient completed Sit <> Stand Transfer with contact guard assistance  with  rolling walker   Patient completed Bed <> Chair Transfer using Stand Pivot technique with  contact guard assistance with rolling walker  Functional Mobility: Pt ambulated with RW 2x35 feet, with extended seated rest break after first trial due to reports of increased dizziness. Pt educated on deep breathing techniques. HR WNL seated.     [facility] 6 Click ADL: 21    Treatment & Education:  Pt returned to room following gait training, transferring to bedside chair CGA. Pt educated on, and demonstrated understanding of UB exercises to perform throughout the day to further improve functional strength/endurance needed for daily activity. Encouraged to utilize call button when ready to return to bed. Pt verbalized understanding.    Patient left up in chair with all lines intact, call button in reach, and family present    GOALS:   Multidisciplinary Problems       Occupational Therapy Goals          Problem: Occupational Therapy    Goal Priority Disciplines Outcome Interventions   Occupational Therapy Goal     OT, PT/OT Ongoing, Progressing    Description: Goals to be met by: 3/17/24     Patient will increase functional independence with ADLs by performing:    UE Dressing with [place].  Grooming while standing at sink with [place].  Toileting from toilet with [place] for hygiene and clothing management.   Toilet transfer to toilet with Modified [place].  Upper extremity exercise program x15 reps per handout, with independence.                       Time Tracking:     OT Date of Treatment: 03/04/24  OT Start Time: 0940  OT Stop Time: 1005  OT Total Time (min): 25 min    Billable Minutes:Therapeutic Activity 15  Therapeutic Exercise 10    MILEY Keenan  OT/SMITH: OT     Number of SMITH visits since last OT visit: 0    3/4/2024

## 2024-03-05 ENCOUNTER — DOCUMENTATION ONLY (OUTPATIENT)
Dept: HEMATOLOGY/ONCOLOGY | Facility: CLINIC | Age: 84
End: 2024-03-05
Payer: MEDICARE

## 2024-03-05 LAB
ALBUMIN SERPL BCP-MCNC: 2.4 G/DL (ref 3.5–5.2)
ALP SERPL-CCNC: 115 U/L (ref 55–135)
ALT SERPL W/O P-5'-P-CCNC: 118 U/L (ref 10–44)
ANION GAP SERPL CALC-SCNC: 13 MMOL/L (ref 8–16)
ANISOCYTOSIS BLD QL SMEAR: SLIGHT
AST SERPL-CCNC: 120 U/L (ref 10–40)
BASOPHILS # BLD AUTO: 0.11 K/UL (ref 0–0.2)
BASOPHILS NFR BLD: 0.6 % (ref 0–1.9)
BILIRUB SERPL-MCNC: 0.6 MG/DL (ref 0.1–1)
BUN SERPL-MCNC: 23 MG/DL (ref 8–23)
CALCIUM SERPL-MCNC: 8.8 MG/DL (ref 8.7–10.5)
CHLORIDE SERPL-SCNC: 96 MMOL/L (ref 95–110)
CO2 SERPL-SCNC: 24 MMOL/L (ref 23–29)
CREAT SERPL-MCNC: 1.1 MG/DL (ref 0.5–1.4)
DACRYOCYTES BLD QL SMEAR: ABNORMAL
DIFFERENTIAL METHOD BLD: ABNORMAL
EOSINOPHIL # BLD AUTO: 0.1 K/UL (ref 0–0.5)
EOSINOPHIL NFR BLD: 0.5 % (ref 0–8)
ERYTHROCYTE [DISTWIDTH] IN BLOOD BY AUTOMATED COUNT: 15.6 % (ref 11.5–14.5)
EST. GFR  (NO RACE VARIABLE): >60 ML/MIN/1.73 M^2
GLUCOSE SERPL-MCNC: 132 MG/DL (ref 70–110)
HCT VFR BLD AUTO: 29.3 % (ref 40–54)
HGB BLD-MCNC: 9.6 G/DL (ref 14–18)
IMM GRANULOCYTES # BLD AUTO: 0.52 K/UL (ref 0–0.04)
IMM GRANULOCYTES NFR BLD AUTO: 3.1 % (ref 0–0.5)
LYMPHOCYTES # BLD AUTO: 2.2 K/UL (ref 1–4.8)
LYMPHOCYTES NFR BLD: 12.7 % (ref 18–48)
MAGNESIUM SERPL-MCNC: 1.6 MG/DL (ref 1.6–2.6)
MCH RBC QN AUTO: 29.4 PG (ref 27–31)
MCHC RBC AUTO-ENTMCNC: 32.8 G/DL (ref 32–36)
MCV RBC AUTO: 90 FL (ref 82–98)
MONOCYTES # BLD AUTO: 0.9 K/UL (ref 0.3–1)
MONOCYTES NFR BLD: 5.5 % (ref 4–15)
NEUTROPHILS # BLD AUTO: 13.2 K/UL (ref 1.8–7.7)
NEUTROPHILS NFR BLD: 77.6 % (ref 38–73)
NRBC BLD-RTO: 0 /100 WBC
PLATELET # BLD AUTO: 303 K/UL (ref 150–450)
PLATELET BLD QL SMEAR: ABNORMAL
PMV BLD AUTO: 11 FL (ref 9.2–12.9)
POCT GLUCOSE: 149 MG/DL (ref 70–110)
POCT GLUCOSE: 161 MG/DL (ref 70–110)
POCT GLUCOSE: 213 MG/DL (ref 70–110)
POTASSIUM SERPL-SCNC: 4 MMOL/L (ref 3.5–5.1)
PROT SERPL-MCNC: 6.4 G/DL (ref 6–8.4)
RBC # BLD AUTO: 3.27 M/UL (ref 4.6–6.2)
SARS-COV-2 RDRP RESP QL NAA+PROBE: NEGATIVE
SCHISTOCYTES BLD QL SMEAR: PRESENT
SODIUM SERPL-SCNC: 133 MMOL/L (ref 136–145)
WBC # BLD AUTO: 17.02 K/UL (ref 3.9–12.7)

## 2024-03-05 PROCEDURE — 25000003 PHARM REV CODE 250: Mod: HCNC | Performed by: NURSE PRACTITIONER

## 2024-03-05 PROCEDURE — 63600175 PHARM REV CODE 636 W HCPCS: Mod: HCNC | Performed by: INTERNAL MEDICINE

## 2024-03-05 PROCEDURE — 99233 SBSQ HOSP IP/OBS HIGH 50: CPT | Mod: HCNC,,, | Performed by: INTERNAL MEDICINE

## 2024-03-05 PROCEDURE — 21400001 HC TELEMETRY ROOM: Mod: HCNC

## 2024-03-05 PROCEDURE — 36415 COLL VENOUS BLD VENIPUNCTURE: CPT | Mod: HCNC | Performed by: INTERNAL MEDICINE

## 2024-03-05 PROCEDURE — U0002 COVID-19 LAB TEST NON-CDC: HCPCS | Mod: HCNC | Performed by: INTERNAL MEDICINE

## 2024-03-05 PROCEDURE — 27000221 HC OXYGEN, UP TO 24 HOURS: Mod: HCNC

## 2024-03-05 PROCEDURE — 99900035 HC TECH TIME PER 15 MIN (STAT): Mod: HCNC

## 2024-03-05 PROCEDURE — 25000003 PHARM REV CODE 250: Mod: HCNC | Performed by: INTERNAL MEDICINE

## 2024-03-05 PROCEDURE — 80053 COMPREHEN METABOLIC PANEL: CPT | Mod: HCNC | Performed by: INTERNAL MEDICINE

## 2024-03-05 PROCEDURE — 94761 N-INVAS EAR/PLS OXIMETRY MLT: CPT | Mod: HCNC

## 2024-03-05 PROCEDURE — 85025 COMPLETE CBC W/AUTO DIFF WBC: CPT | Mod: HCNC | Performed by: INTERNAL MEDICINE

## 2024-03-05 PROCEDURE — 83735 ASSAY OF MAGNESIUM: CPT | Mod: HCNC | Performed by: INTERNAL MEDICINE

## 2024-03-05 RX ADMIN — ANASTROZOLE 1 MG: 1 TABLET, COATED ORAL at 08:03

## 2024-03-05 RX ADMIN — FUROSEMIDE 40 MG: 10 INJECTION, SOLUTION INTRAMUSCULAR; INTRAVENOUS at 09:03

## 2024-03-05 RX ADMIN — METOPROLOL TARTRATE 25 MG: 25 TABLET, FILM COATED ORAL at 08:03

## 2024-03-05 RX ADMIN — MIDODRINE HYDROCHLORIDE 10 MG: 5 TABLET ORAL at 05:03

## 2024-03-05 RX ADMIN — SODIUM CHLORIDE 250 ML: 9 INJECTION, SOLUTION INTRAVENOUS at 01:03

## 2024-03-05 RX ADMIN — ATORVASTATIN CALCIUM 20 MG: 10 TABLET, FILM COATED ORAL at 08:03

## 2024-03-05 RX ADMIN — Medication 400 MG: at 08:03

## 2024-03-05 RX ADMIN — ASPIRIN 81 MG: 81 TABLET, COATED ORAL at 08:03

## 2024-03-05 RX ADMIN — FUROSEMIDE 40 MG: 10 INJECTION, SOLUTION INTRAMUSCULAR; INTRAVENOUS at 08:03

## 2024-03-05 RX ADMIN — INSULIN ASPART 2 UNITS: 100 INJECTION, SOLUTION INTRAVENOUS; SUBCUTANEOUS at 11:03

## 2024-03-05 RX ADMIN — MIDODRINE HYDROCHLORIDE 10 MG: 5 TABLET ORAL at 11:03

## 2024-03-05 NOTE — PROGRESS NOTES
Incoming call from pt dtr, Hope with questions about whether pt will con't receiving tx. Explained to her that Dr. Baugh saw him this am and as of now tx d/c'd until cardiac function improves. Asked why med onc didn't see him at initial admit, explained the role of hospital med providers and specialty consult as needed. Assured her that multi D team approach is taken when caring for pts. Asked if MD upfront when pt has no other med options, told her both providers have a holistic approach to care and will discuss EOL with pts/families as needed and/or when requested. She voiced understanding information discussed, has no other questions at this time. Encouraged her to call me   Oncology Navigation   Intake  Date of Diagnosis: 12/14/23  Cancer Type: Breast  Type of Referral: Internal  Date of Referral: 12/19/23  Initial Nurse Navigator Contact: 12/19/23  Referral to Initial Contact Timeline (days): 0  Date Worked: 03/05/24  First Appointment Available: 12/21/23  Appointment Date: 12/21/23  First Available Date vs. Scheduled Date (days): 0  Multiple appointments: Yes     Treatment  Current Status: Active  Date Presented to Tumor Board: 12/28/23    Surgery: Planned  Surgical Oncologist: Brittnee Briones MD  Type of Surgery: Left total mastectomy with left sentinel lymph node biopsy    Medical Oncologist: Shaina Garg MD  Consult Date: 12/21/23  Chemotherapy: Planned  Chemotherapy Regimen: Taxotere  Immunotherapy: Planned  Immunotherapy Name: Herceptin/Perjeta  Start Date: 01/30/24    Radiation Therapy: Planned    Procedures: Port / PICC  Genetic Testing Date Sent: 12/21/23  Diagnostic Mammo Schedule Date:  (3/4 through [facility])  PET Scan Schedule Date: 12/29/23  Port / PICC Schedule Date: 01/05/24    General Referrals: Chemo Education; Social Work  Social Work Referral Date: 01/22/24    ER: Positive  NH: Negative  Her2: Postive       Support Systems: Children; Family members; Yarsani / sree community  Barriers of  "Care: Barriers to Care "Assessment completed-no barriers noted"     Acuity  Stage: 1  Systemic Treatment - predicted or initiated: Chemotherapy Regimen with Multiple drugs (+1)  Surgical Procedure Complexity: 2  Treatment Tolerability: Has not started treatment yet/treatment fully completed and side effects resolved  ECO  Comorbidities in Medical History: 1  Hospitalization Within the Past Month: 0   Needed: 0  Support: 0  Verbalizes Financial Concerns: 0  Transportation: 0  Psychological Factors (+1 each): Emotional during conversation  History of noncompliance/frequent no shows and cancellations: 0  Verbalizes the need for more education: 0  Other Factors (+1 for Each): 0  Navigation Acuity: 1     Follow Up  No follow-ups on file.     with questions/concerns. Notified Dr. Mendoza of our conversation.    "

## 2024-03-05 NOTE — ASSESSMENT & PLAN NOTE
Patient is identified as having Diastolic (HFpEF) heart failure that is Acute. CHF is currently uncontrolled due to Pulmonary edema/pleural effusion on CXR. Latest ECHO performed and demonstrates- Results for orders placed during the hospital encounter of 02/26/24    Echo    Interpretation Summary    Left Ventricle: The left ventricle is normal in size. There is concentric remodeling. There is normal systolic function with a visually estimated ejection fraction of 55 - 60%. There is indeterminate diastolic function.    Right Ventricle: Right ventricle was not well visualized due to poor acoustic window. Normal right ventricular cavity size. Systolic function is normal.    Pulmonary Artery: The estimated pulmonary artery systolic pressure is 37 mmHg.    IVC/SVC: Normal venous pressure at 3 mmHg.  . Continue Beta Blocker and Furosemide and monitor clinical status closely. Monitor on telemetry. Monitor strict Is&Os and daily weights.  Cardiology on board already for NSTEMI. Continue to stress to patient importance of self efficacy and  on diet for CHF. Last BNP reviewed- and noted below   Recent Labs   Lab 03/02/24  1126   *     Increased lasix to 40 mg IV bid as of 3/4 due to pitting edema to BLE, dyspnea requiring O2 supplementation, and abnormal CXR with pulmonary edema.  Fluid restrict 1200 mL.  Strict Is&Os and daily weights. Cardiology aware of current treatment for CHF

## 2024-03-05 NOTE — PROGRESS NOTES
[facility] - Telemetry ([facility])  Hematology/Oncology  Progress Note    Patient Name: Mark Dickerson Sr.  Admission Date: 2/26/2024  Hospital Length of Stay: 7 days  Code Status: DNR     Subjective:     HPI:  84-year-old male history of locally advanced HER2 positive breast carcinoma patient has received Taxotere Herceptin and Perjeta.  PET scan showed presumptive findings of metastatic disease in lung.  Patient is admitted to the hospital with a non STEMI.  Decrease in ejection fraction asked to see the patient for further evaluation because of elevated white cell count.    Interval History:  Patient resting comfortably today by himself in the room    Oncology Treatment Plan:   OP BREAST THP (pertuzumab trastuzumab DOCEtaxel) Q3W    Medications:  Continuous Infusions:   sodium chloride 0.9% 100 mL/hr (03/01/24 1334)     Scheduled Meds:   anastrozole  1 mg Oral Daily    aspirin  81 mg Oral Daily    atorvastatin  20 mg Oral QHS    furosemide (LASIX) injection  40 mg Intravenous BID    magnesium oxide  400 mg Oral Daily    metoprolol tartrate  25 mg Oral BID    midodrine  10 mg Oral TID WM     PRN Meds:sodium chloride 0.9%, acetaminophen, acetaminophen, cyclobenzaprine, dextrose 10%, dextrose 10%, glucagon (human recombinant), glucose, glucose, HYDROcodone-acetaminophen, insulin aspart U-100, melatonin, naloxone, ondansetron, ondansetron, sodium chloride 0.9%     Review of Systems   Constitutional:  Negative for activity change, appetite change, chills, diaphoresis, fatigue, fever and unexpected weight change.   HENT:  Negative for congestion, dental problem, drooling, ear discharge, ear pain, facial swelling, hearing loss, mouth sores, nosebleeds, postnasal drip, rhinorrhea, sinus pressure, sneezing, sore throat, tinnitus, trouble swallowing and voice change.    Eyes:  Negative for photophobia, pain, discharge, redness, itching and visual disturbance.   Respiratory:  Negative for apnea, cough, choking, chest tightness,  shortness of breath, wheezing and stridor.    Cardiovascular:  Negative for chest pain, palpitations and leg swelling.   Gastrointestinal:  Negative for abdominal distention, abdominal pain, anal bleeding, blood in stool, constipation, diarrhea, nausea, rectal pain and vomiting.   Endocrine: Negative for cold intolerance, heat intolerance, polydipsia, polyphagia and polyuria.   Genitourinary:  Negative for decreased urine volume, difficulty urinating, dysuria, enuresis, flank pain, frequency, genital sores, hematuria, penile discharge, penile pain, penile swelling, scrotal swelling, testicular pain and urgency.   Musculoskeletal:  Negative for arthralgias, back pain, gait problem, joint swelling, myalgias, neck pain and neck stiffness.   Skin:  Negative for color change, pallor, rash and wound.   Allergic/Immunologic: Negative for environmental allergies, food allergies and immunocompromised state.   Neurological:  Negative for dizziness, tremors, seizures, syncope, facial asymmetry, speech difficulty, weakness, light-headedness, numbness and headaches.   Hematological:  Negative for adenopathy. Does not bruise/bleed easily.   Psychiatric/Behavioral:  Positive for dysphoric mood. Negative for agitation, behavioral problems, confusion, decreased concentration, hallucinations, self-injury, sleep disturbance and suicidal ideas. The patient is nervous/anxious. The patient is not hyperactive.      Objective:     Vital Signs (Most Recent):  Temp: 97.7 °F (36.5 °C) (03/05/24 0420)  Pulse: 82 (03/05/24 0420)  Resp: 18 (03/05/24 0420)  BP: (!) 93/55 (03/05/24 0420)  SpO2: 97 % (03/05/24 0420) Vital Signs (24h Range):  Temp:  [97.3 °F (36.3 °C)-99.2 °F (37.3 °C)] 97.7 °F (36.5 °C)  Pulse:  [80-97] 82  Resp:  [16-20] 18  SpO2:  [91 %-98 %] 97 %  BP: ()/(44-62) 93/55     Weight: 108.8 kg (239 lb 13.8 oz)  Body mass index is 34.42 kg/m².  Body surface area is 2.32 meters squared.      Intake/Output Summary (Last 24 hours)  at 3/5/2024 0742  Last data filed at 3/4/2024 0849  Gross per 24 hour   Intake --   Output 1 ml   Net -1 ml        Physical Exam  Vitals reviewed.   Constitutional:       General: He is not in acute distress.     Appearance: Normal appearance. He is well-developed. He is obese. He is not diaphoretic.   HENT:      Head: Normocephalic.      Right Ear: External ear normal.      Left Ear: External ear normal.      Nose: Nose normal.      Right Sinus: No maxillary sinus tenderness or frontal sinus tenderness.      Left Sinus: No maxillary sinus tenderness or frontal sinus tenderness.      Mouth/Throat:      Pharynx: No oropharyngeal exudate.   Eyes:      General: Lids are normal. No scleral icterus.        Right eye: No discharge.         Left eye: No discharge.      Extraocular Movements:      Right eye: Normal extraocular motion.      Left eye: Normal extraocular motion.      Conjunctiva/sclera:      Right eye: Right conjunctiva is not injected. No hemorrhage.     Left eye: Left conjunctiva is not injected. No hemorrhage.     Pupils: Pupils are equal, round, and reactive to light.   Neck:      Thyroid: No thyromegaly.      Vascular: No JVD.      Trachea: No tracheal deviation.   Cardiovascular:      Rate and Rhythm: Normal rate.   Pulmonary:      Effort: Pulmonary effort is normal. No respiratory distress.      Breath sounds: No stridor.   Abdominal:      General: Bowel sounds are normal.      Palpations: Abdomen is soft. There is no hepatomegaly, splenomegaly or mass.      Tenderness: There is no abdominal tenderness.   Musculoskeletal:         General: No tenderness. Normal range of motion.      Cervical back: Normal range of motion and neck supple.   Lymphadenopathy:      Head:      Right side of head: No posterior auricular or occipital adenopathy.      Left side of head: No posterior auricular or occipital adenopathy.      Cervical: No cervical adenopathy.      Right cervical: No superficial, deep or posterior  "cervical adenopathy.     Left cervical: No superficial, deep or posterior cervical adenopathy.      Upper Body:      Right upper body: No supraclavicular adenopathy.      Left upper body: No supraclavicular adenopathy.   Skin:     General: Skin is dry.      Findings: No erythema or rash.      Nails: There is no clubbing.   Neurological:      Mental Status: He is alert and oriented to person, place, and time.      Cranial Nerves: No cranial nerve deficit.      Coordination: Coordination normal.   Psychiatric:         Behavior: Behavior normal.         Thought Content: Thought content normal.         Judgment: Judgment normal.          Significant Labs:   BMP:   Recent Labs   Lab 03/03/24  1119 03/04/24  0432 03/05/24  0616   * 157* 132*   * 134* 133*   K 4.7 4.9 4.0   CL 96 97 96   CO2 23 25 24   BUN 18 20 23   CREATININE 1.0 1.2 1.1   CALCIUM 8.8 8.5* 8.8   MG  --  1.8 1.6   , CBC:   Recent Labs   Lab 03/03/24  1119 03/04/24  0432 03/05/24  0616   WBC 20.33* 17.02* 17.02*   HGB 9.5* 9.6* 9.6*   HCT 27.9* 28.6* 29.3*    296 303   , CMP:   Recent Labs   Lab 03/03/24 1119 03/04/24  0432 03/05/24  0616   * 134* 133*   K 4.7 4.9 4.0   CL 96 97 96   CO2 23 25 24   * 157* 132*   BUN 18 20 23   CREATININE 1.0 1.2 1.1   CALCIUM 8.8 8.5* 8.8   PROT 6.4 5.9* 6.4   ALBUMIN 2.5* 2.6* 2.4*   BILITOT 0.5 0.6 0.6   ALKPHOS 118 124 115   AST 66* 120* 120*   ALT 70* 110* 118*   ANIONGAP 11 12 13   , Coagulation: No results for input(s): "PT", "INR", "APTT" in the last 48 hours., Haptoglobin: No results for input(s): "HAPTOGLOBIN" in the last 48 hours., Immunology: No results for input(s): "SPEP", "ARIEL", "PRIYA", "FREELAMBDALI" in the last 48 hours., LDH: No results for input(s): "LDHCSF", "BFSOURCE" in the last 48 hours., LFTs:   Recent Labs   Lab 03/03/24  1119 03/04/24  0432 03/05/24  0616   ALT 70* 110* 118*   AST 66* 120* 120*   ALKPHOS 118 124 115   BILITOT 0.5 0.6 0.6   PROT 6.4 5.9* 6.4 " "  ALBUMIN 2.5* 2.6* 2.4*   , Reticulocytes: No results for input(s): "RETIC" in the last 48 hours., Tumor Markers: No results for input(s): "PSA", "CEA", "", "AFPTM", "QS8424", "" in the last 48 hours.    Invalid input(s): "ALGTM", Uric Acid No results for input(s): "URICACID" in the last 48 hours., and Urine Studies: No results for input(s): "COLORU", "APPEARANCEUA", "PHUR", "SPECGRAV", "PROTEINUA", "GLUCUA", "KETONESU", "BILIRUBINUA", "OCCULTUA", "NITRITE", "UROBILINOGEN", "LEUKOCYTESUR", "RBCUA", "WBCUA", "BACTERIA", "SQUAMEPITHEL", "HYALINECASTS" in the last 48 hours.    Invalid input(s): "WRIGHTSUR"    Diagnostic Results:  I have reviewed all pertinent imaging results/findings within the past 24 hours.  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  Will need to reimage to see whether not disease in chest is responsive as well with similar findings.  Or could be potential other malignancy but will follow.  Will need to discuss with Cardiology to see what modifications need to be made in his treatment record regimen since he is receiving Taxotere Herceptin and Perjeta.  With decreasing ejection fraction  03/05/2024 extensive conversation with cardiology at this point patient's cardiac function has deteriorated from heart failure as well as aortic stenosis.  At this time all Herceptin products made to be discontinued until part is resolved.  The patient is ER positive I spoke with him concerning this in this may offer us an option with aromatase inhibitor plus or minus Lupron.  Control disease while we are of the attempting to have his heart maximize in terms of his ejection fraction to continue " with HER2 positive treatment I have discussed this with Dr. Cummins yesterday.  And will discuss this again with his primary oncologist Dr. Hu.  Reviewed patient has no evidence of germ line mutation contributing to his metastatic male breast cancer.  Limiting a potential therapeutic option with the PARP inhibitor    CKD stage 3 due to type 2 diabetes mellitus  Cared for by primary care team    Controlled type 2 diabetes mellitus without complication, without long-term current use of insulin  Cared for by primary care team        Thank you for your consult. I will follow-up with patient. Please contact us if you have any additional questions.     Wayne Baugh MD  Hematology/Oncology  [facility] - Telemetry ([facility])

## 2024-03-05 NOTE — ASSESSMENT & PLAN NOTE
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  Will need to reimage to see whether not disease in chest is responsive as well with similar findings.  Or could be potential other malignancy but will follow.  Will need to discuss with Cardiology to see what modifications need to be made in his treatment record regimen since he is receiving Taxotere Herceptin and Perjeta.  With decreasing ejection fraction  03/05/2024 extensive conversation with cardiology at this point patient's cardiac function has deteriorated from heart failure as well as aortic stenosis.  At this time all Herceptin products made to be discontinued until part is resolved.  The patient is ER positive I spoke with him concerning this in this may offer us an option with aromatase inhibitor plus or minus Lupron.  Control disease while we are of the attempting to have his heart maximize in terms of his ejection fraction to continue with HER2 positive treatment I have discussed this with Dr. Cummins yesterday.  And will discuss this again with his primary oncologist Dr. Hu.  Reviewed patient has no evidence of germ line mutation contributing to his metastatic male breast cancer.  Limiting a potential therapeutic option with the PARP inhibitor

## 2024-03-05 NOTE — ASSESSMENT & PLAN NOTE
Follows with Dr. Mendoza at [facility], last chemo session one week prior.  Patient was seen by Hematology Oncology due to significant leukocytosis felt to be related to Neulasta and recent cardiac event.  Dr. Baugh with Hematology Oncology recommends discontinuing all Herceptin products due to cardiac deterioration.  Consider aromatase inhibitor plus or minus Lupron.  Follow up outpatient.

## 2024-03-05 NOTE — ASSESSMENT & PLAN NOTE
Patient's FSGs are controlled on current medication regimen.  Last A1c reviewed-   Lab Results   Component Value Date    HGBA1C 7.1 (H) 11/28/2023     Most recent fingerstick glucose reviewed-   Recent Labs   Lab 03/04/24  1645 03/04/24 2040 03/05/24  1137   POCTGLUCOSE 188* 189* 213*       Current correctional scale  Low  Maintain anti-hyperglycemic dose as follows-   Antihyperglycemics (From admission, onward)    Start     Stop Route Frequency Ordered    02/26/24 1301  insulin aspart U-100 pen 0-5 Units         -- SubQ Before meals & nightly PRN 02/26/24 1204        Hold Oral hypoglycemics while patient is in the hospital.

## 2024-03-05 NOTE — PLAN OF CARE
03/05/24 1537   Post-Acute Status   Post-Acute Authorization Placement   Post-Acute Placement Status Set-up Complete/Auth obtained   Coverage [employer] Managed Medicare   Discharge Delays None known at this time   Discharge Plan   Discharge Plan A Skilled Nursing Facility       Per Shayna, with [facility], they have insurance authorization and just pending family completing admissions paperwork. SW contacted Patient's daughter in law, who stated patient's son should be getting there now to complete paperwork. Per Shayna, son was arriving now to complete Paperwork. PEARL inquired with Shayna, if DC would be tomorrow to [facility].     PEARL will continue to follow and assist as needed.

## 2024-03-05 NOTE — ASSESSMENT & PLAN NOTE
Transaminitis with trend upward, current  and .  Patient denies abdominal pain and has no tenderness to palpation on exam.  Suspect related to hepatic congestion with CHF exacerbation.  Check a.m. labs.

## 2024-03-05 NOTE — ASSESSMENT & PLAN NOTE
-Troponin 0.725>0.735, continue to trend  -No CP symptoms/SOB/angina  -No prior CV history  -Continue ASA, heparin gtt, statin  -EKG reviewed, T wave inversions inferiorly and laterally  -Limited TTE pending  -Will consider ischemic workup with MPI stress test this admission    2/27/24  -Troponin bumped to 1.2 overnight  -Remains CP free  -Continue ASA, heparin gtt, statin  -TTE with normal EF  -LHC planned once more stable respiratory wise/infection ruled out    2/28/24  -CP free  -Continue ASA, heparin gtt, statin  -BB added  -LHC deferred for now given leukocytosis/SOB    2/29/24  -Stable  -WBC count improving  -Continue BB, ASA, heparin gtt, statin  -Tentative plans for LHC tmw, keep NPO after MN    3/1/24  -Stable, no CP  -Continue BB, ASA, heparin gtt, statin  -R/LHC today by Dr. Aguillon. All risks, benefits, and treatment alternatives explained to patient in detail. All questions answered. He has agreed to proceed.    3/2/24 s/p R/LHC - with elevated filling pressures,  RCA with collaterals - will cont med management and discuss outpt TAVR eval for severe AS

## 2024-03-05 NOTE — SUBJECTIVE & OBJECTIVE
Interval History:  Patient resting comfortably today by himself in the room    Oncology Treatment Plan:   OP BREAST THP (pertuzumab trastuzumab DOCEtaxel) Q3W    Medications:  Continuous Infusions:   sodium chloride 0.9% 100 mL/hr (03/01/24 1334)     Scheduled Meds:   anastrozole  1 mg Oral Daily    aspirin  81 mg Oral Daily    atorvastatin  20 mg Oral QHS    furosemide (LASIX) injection  40 mg Intravenous BID    magnesium oxide  400 mg Oral Daily    metoprolol tartrate  25 mg Oral BID    midodrine  10 mg Oral TID WM     PRN Meds:sodium chloride 0.9%, acetaminophen, acetaminophen, cyclobenzaprine, dextrose 10%, dextrose 10%, glucagon (human recombinant), glucose, glucose, HYDROcodone-acetaminophen, insulin aspart U-100, melatonin, naloxone, ondansetron, ondansetron, sodium chloride 0.9%     Review of Systems   Constitutional:  Negative for activity change, appetite change, chills, diaphoresis, fatigue, fever and unexpected weight change.   HENT:  Negative for congestion, dental problem, drooling, ear discharge, ear pain, facial swelling, hearing loss, mouth sores, nosebleeds, postnasal drip, rhinorrhea, sinus pressure, sneezing, sore throat, tinnitus, trouble swallowing and voice change.    Eyes:  Negative for photophobia, pain, discharge, redness, itching and visual disturbance.   Respiratory:  Negative for apnea, cough, choking, chest tightness, shortness of breath, wheezing and stridor.    Cardiovascular:  Negative for chest pain, palpitations and leg swelling.   Gastrointestinal:  Negative for abdominal distention, abdominal pain, anal bleeding, blood in stool, constipation, diarrhea, nausea, rectal pain and vomiting.   Endocrine: Negative for cold intolerance, heat intolerance, polydipsia, polyphagia and polyuria.   Genitourinary:  Negative for decreased urine volume, difficulty urinating, dysuria, enuresis, flank pain, frequency, genital sores, hematuria, penile discharge, penile pain, penile swelling, scrotal  swelling, testicular pain and urgency.   Musculoskeletal:  Negative for arthralgias, back pain, gait problem, joint swelling, myalgias, neck pain and neck stiffness.   Skin:  Negative for color change, pallor, rash and wound.   Allergic/Immunologic: Negative for environmental allergies, food allergies and immunocompromised state.   Neurological:  Negative for dizziness, tremors, seizures, syncope, facial asymmetry, speech difficulty, weakness, light-headedness, numbness and headaches.   Hematological:  Negative for adenopathy. Does not bruise/bleed easily.   Psychiatric/Behavioral:  Positive for dysphoric mood. Negative for agitation, behavioral problems, confusion, decreased concentration, hallucinations, self-injury, sleep disturbance and suicidal ideas. The patient is nervous/anxious. The patient is not hyperactive.      Objective:     Vital Signs (Most Recent):  Temp: 97.7 °F (36.5 °C) (03/05/24 0420)  Pulse: 82 (03/05/24 0420)  Resp: 18 (03/05/24 0420)  BP: (!) 93/55 (03/05/24 0420)  SpO2: 97 % (03/05/24 0420) Vital Signs (24h Range):  Temp:  [97.3 °F (36.3 °C)-99.2 °F (37.3 °C)] 97.7 °F (36.5 °C)  Pulse:  [80-97] 82  Resp:  [16-20] 18  SpO2:  [91 %-98 %] 97 %  BP: ()/(44-62) 93/55     Weight: 108.8 kg (239 lb 13.8 oz)  Body mass index is 34.42 kg/m².  Body surface area is 2.32 meters squared.      Intake/Output Summary (Last 24 hours) at 3/5/2024 0742  Last data filed at 3/4/2024 0849  Gross per 24 hour   Intake --   Output 1 ml   Net -1 ml        Physical Exam  Vitals reviewed.   Constitutional:       General: He is not in acute distress.     Appearance: Normal appearance. He is well-developed. He is obese. He is not diaphoretic.   HENT:      Head: Normocephalic.      Right Ear: External ear normal.      Left Ear: External ear normal.      Nose: Nose normal.      Right Sinus: No maxillary sinus tenderness or frontal sinus tenderness.      Left Sinus: No maxillary sinus tenderness or frontal sinus  tenderness.      Mouth/Throat:      Pharynx: No oropharyngeal exudate.   Eyes:      General: Lids are normal. No scleral icterus.        Right eye: No discharge.         Left eye: No discharge.      Extraocular Movements:      Right eye: Normal extraocular motion.      Left eye: Normal extraocular motion.      Conjunctiva/sclera:      Right eye: Right conjunctiva is not injected. No hemorrhage.     Left eye: Left conjunctiva is not injected. No hemorrhage.     Pupils: Pupils are equal, round, and reactive to light.   Neck:      Thyroid: No thyromegaly.      Vascular: No JVD.      Trachea: No tracheal deviation.   Cardiovascular:      Rate and Rhythm: Normal rate.   Pulmonary:      Effort: Pulmonary effort is normal. No respiratory distress.      Breath sounds: No stridor.   Abdominal:      General: Bowel sounds are normal.      Palpations: Abdomen is soft. There is no hepatomegaly, splenomegaly or mass.      Tenderness: There is no abdominal tenderness.   Musculoskeletal:         General: No tenderness. Normal range of motion.      Cervical back: Normal range of motion and neck supple.   Lymphadenopathy:      Head:      Right side of head: No posterior auricular or occipital adenopathy.      Left side of head: No posterior auricular or occipital adenopathy.      Cervical: No cervical adenopathy.      Right cervical: No superficial, deep or posterior cervical adenopathy.     Left cervical: No superficial, deep or posterior cervical adenopathy.      Upper Body:      Right upper body: No supraclavicular adenopathy.      Left upper body: No supraclavicular adenopathy.   Skin:     General: Skin is dry.      Findings: No erythema or rash.      Nails: There is no clubbing.   Neurological:      Mental Status: He is alert and oriented to person, place, and time.      Cranial Nerves: No cranial nerve deficit.      Coordination: Coordination normal.   Psychiatric:         Behavior: Behavior normal.         Thought Content:  "Thought content normal.         Judgment: Judgment normal.          Significant Labs:   BMP:   Recent Labs   Lab 03/03/24 1119 03/04/24 0432 03/05/24  0616   * 157* 132*   * 134* 133*   K 4.7 4.9 4.0   CL 96 97 96   CO2 23 25 24   BUN 18 20 23   CREATININE 1.0 1.2 1.1   CALCIUM 8.8 8.5* 8.8   MG  --  1.8 1.6   , CBC:   Recent Labs   Lab 03/03/24 1119 03/04/24 0432 03/05/24  0616   WBC 20.33* 17.02* 17.02*   HGB 9.5* 9.6* 9.6*   HCT 27.9* 28.6* 29.3*    296 303   , CMP:   Recent Labs   Lab 03/03/24 1119 03/04/24 0432 03/05/24  0616   * 134* 133*   K 4.7 4.9 4.0   CL 96 97 96   CO2 23 25 24   * 157* 132*   BUN 18 20 23   CREATININE 1.0 1.2 1.1   CALCIUM 8.8 8.5* 8.8   PROT 6.4 5.9* 6.4   ALBUMIN 2.5* 2.6* 2.4*   BILITOT 0.5 0.6 0.6   ALKPHOS 118 124 115   AST 66* 120* 120*   ALT 70* 110* 118*   ANIONGAP 11 12 13   , Coagulation: No results for input(s): "PT", "INR", "APTT" in the last 48 hours., Haptoglobin: No results for input(s): "HAPTOGLOBIN" in the last 48 hours., Immunology: No results for input(s): "SPEP", "ARIEL", "PRIYA", "FREELAMBDALI" in the last 48 hours., LDH: No results for input(s): "LDHCSF", "BFSOURCE" in the last 48 hours., LFTs:   Recent Labs   Lab 03/03/24 1119 03/04/24 0432 03/05/24  0616   ALT 70* 110* 118*   AST 66* 120* 120*   ALKPHOS 118 124 115   BILITOT 0.5 0.6 0.6   PROT 6.4 5.9* 6.4   ALBUMIN 2.5* 2.6* 2.4*   , Reticulocytes: No results for input(s): "RETIC" in the last 48 hours., Tumor Markers: No results for input(s): "PSA", "CEA", "", "AFPTM", "DY1415", "" in the last 48 hours.    Invalid input(s): "ALGTM", Uric Acid No results for input(s): "URICACID" in the last 48 hours., and Urine Studies: No results for input(s): "COLORU", "APPEARANCEUA", "PHUR", "SPECGRAV", "PROTEINUA", "GLUCUA", "KETONESU", "BILIRUBINUA", "OCCULTUA", "NITRITE", "UROBILINOGEN", "LEUKOCYTESUR", "RBCUA", "WBCUA", "BACTERIA", "SQUAMEPITHEL", "HYALINECASTS" in the last 48 " "hours.    Invalid input(s): "CLARKSUR"    Diagnostic Results:  I have reviewed all pertinent imaging results/findings within the past 24 hours.  "

## 2024-03-05 NOTE — ASSESSMENT & PLAN NOTE
Patient's anemia is currently controlled. Has not received any PRBCs to date. Etiology likely d/t chronic disease due to Malignancy  Current CBC reviewed-   Lab Results   Component Value Date    HGB 9.6 (L) 03/05/2024    HCT 29.3 (L) 03/05/2024     Monitor serial CBC and transfuse if patient becomes hemodynamically unstable, symptomatic or H/H drops below 7/21.

## 2024-03-05 NOTE — PLAN OF CARE
A225/A225 JOHANNY Dickerson . is a 84 y.o.male admitted on 2/26/2024 for NSTEMI (non-ST elevated myocardial infarction)   Code Status: DNR MRN: [id number]   Review of patient's allergies indicates:   Allergen Reactions    Grass pollen-anai grass standard      Past Medical History:   Diagnosis Date    Chest pain 2/26/2024    CKD stage 3 due to type 2 diabetes mellitus 2/18/2021    DM (diabetes mellitus) 2014    BS didn't check 12/11/2019    DM (diabetes mellitus) 2014    BS didn't check 05/05/2022    Foreign body, eye     right eye    Hyperlipidemia     Hypertension     Need for shingles vaccine 11/20/2019    Normocytic anemia 3/4/2024    NSTEMI (non-ST elevated myocardial infarction) 2/27/2024    Pneumonia     Primary osteoarthritis of right knee 10/29/2021    Severe obesity (BMI 35.0-35.9 with comorbidity) 7/10/2019    Type 2 diabetes mellitus 2014    BS didn't check 12/12/2018      PRN meds    sodium chloride 0.9%, , Continuous PRN  acetaminophen, 650 mg, Q4H PRN  acetaminophen, 650 mg, Q4H PRN  cyclobenzaprine, 10 mg, TID PRN  dextrose 10%, 12.5 g, PRN  dextrose 10%, 25 g, PRN  glucagon (human recombinant), 1 mg, PRN  glucose, 16 g, PRN  glucose, 24 g, PRN  HYDROcodone-acetaminophen, 1 tablet, Q6H PRN  insulin aspart U-100, 0-5 Units, QID (AC + HS) PRN  melatonin, 6 mg, Nightly PRN  naloxone, 0.02 mg, PRN  ondansetron, 8 mg, Q8H PRN  ondansetron, 4 mg, Q8H PRN  sodium chloride 0.9%, 10 mL, Q12H PRN      Chart check completed. Will continue plan of care.      Orientation: oriented x 4  [place] Coma Scale Score: 15     Lead Monitored: Lead II Rhythm: normal sinus rhythm Frequency/Ectopy: PVCs  Cardiac/Telemetry Box Number: [id number]  VTE Required Core Measure: Pharmacological prophylaxis initiated/maintained Last Bowel Movement: 03/03/24  Diet Cardiac Fluid - 1200mL; Standard Tray  Voiding Characteristics: hesitancy  Sunil Score: 18  Fall Risk Score: 16  Accucheck []   Freq?      Lines/Drains/Airways       Central  Venous Catheter Line  Duration                  PowerPort A Cath Single Lumen 01/05/24 1415 Internal Jugular Right 59 days              Peripheral Intravenous Line  Duration                  Peripheral IV - Single Lumen 03/02/24 2201 22 G Posterior;Right Hand 1 day

## 2024-03-05 NOTE — ASSESSMENT & PLAN NOTE
Patient has hypokalemia which is Acute on Chronic and currently uncontrolled. Most recent potassium levels reviewed-   Lab Results   Component Value Date    K 4.0 03/05/2024   . Will continue potassium replacement per protocol and recheck repeat levels after replacement completed.

## 2024-03-05 NOTE — SUBJECTIVE & OBJECTIVE
Interval History:  Patient has shortness a breath has improved some overnight with increased IV Lasix diuresis initiated yesterday.  He does still have pitting edema however will need to be cautious with diuresis in the setting of severe aortic stenosis.  I did reach out to patient's cardiologist via secure chat regarding current treatment for CHF exacerbation.    Review of Systems   Respiratory:  Positive for shortness of breath.    Cardiovascular:  Positive for leg swelling. Negative for chest pain.   All other systems reviewed and are negative.    Objective:     Vital Signs (Most Recent):  Temp: 98.7 °F (37.1 °C) (03/05/24 1121)  Pulse: 84 (03/05/24 1310)  Resp: 20 (03/05/24 1121)  BP: (!) 97/55 (03/05/24 1121)  SpO2: (!) 93 % (03/05/24 1121) Vital Signs (24h Range):  Temp:  [96.7 °F (35.9 °C)-99.2 °F (37.3 °C)] 98.7 °F (37.1 °C)  Pulse:  [80-91] 84  Resp:  [16-20] 20  SpO2:  [92 %-98 %] 93 %  BP: ()/(44-72) 97/55     Weight: 108.8 kg (239 lb 13.8 oz)  Body mass index is 34.42 kg/m².    Intake/Output Summary (Last 24 hours) at 3/5/2024 1500  Last data filed at 3/5/2024 1110  Gross per 24 hour   Intake 240 ml   Output 700 ml   Net -460 ml         Physical Exam  Vitals reviewed.   Constitutional:       General: He is not in acute distress.     Appearance: He is obese.   HENT:      Nose: Nose normal.   Eyes:      Conjunctiva/sclera: Conjunctivae normal.   Cardiovascular:      Rate and Rhythm: Normal rate.   Pulmonary:      Effort: Pulmonary effort is normal. No respiratory distress.   Abdominal:      Tenderness: There is no abdominal tenderness.   Musculoskeletal:         General: Swelling present.      Right lower leg: Edema present.      Left lower leg: Edema present.   Skin:     General: Skin is warm and dry.   Neurological:      Mental Status: He is alert and oriented to person, place, and time.   Psychiatric:         Mood and Affect: Mood normal.         Behavior: Behavior normal.             Significant  Labs: All pertinent labs within the past 24 hours have been reviewed.  Recent Lab Results  (Last 5 results in the past 24 hours)        03/05/24  1137   03/05/24  0616   03/04/24  2040   03/04/24  1900   03/04/24  1645        Albumin   2.4             ALP   115             ALT   118             Anion Gap   13             Aniso   Slight             AST   120             Baso #   0.11             Basophil %   0.6             BILIRUBIN TOTAL   0.6  Comment: For infants and newborns, interpretation of results should be based  on gestational age, weight and in agreement with clinical  observations.    Premature Infant recommended reference ranges:  Up to 24 hours.............<8.0 mg/dL  Up to 48 hours............<12.0 mg/dL  3-5 days..................<15.0 mg/dL  6-29 days.................<15.0 mg/dL               BUN   23             Calcium   8.8             Chloride   96             CO2   24             Creatinine   1.1             Differential Method   Automated             eGFR   >60             Eos #   0.1             Eos %   0.5             Glucose   132             Gran # (ANC)   13.2             Gran %   77.6             Hematocrit   29.3             Hemoglobin   9.6             Immature Grans (Abs)   0.52  Comment: Mild elevation in immature granulocytes is non specific and   can be seen in a variety of conditions including stress response,   acute inflammation, trauma and pregnancy. Correlation with other   laboratory and clinical findings is essential.               Immature Granulocytes   3.1             Lymph #   2.2             Lymph %   12.7             Magnesium    1.6             MCH   29.4             MCHC   32.8             MCV   90             Mono #   0.9             Mono %   5.5             MPV   11.0             nRBC   0             Occult Blood       Negative         Platelet Estimate   Appears normal             Platelet Count   303             POCT Glucose 213     189     188       Potassium    4.0             PROTEIN TOTAL   6.4             RBC   3.27             RDW   15.6             Schistocytes   Present             Sodium   133             Teardrop Cells   Occasional             WBC   17.02                                    Significant Imaging: I have reviewed all pertinent imaging results/findings within the past 24 hours.  X-Ray Chest AP Portable   Final Result      There has been interval worsening of the appearance of the lungs. There is a moderate amount of interstitial and alveolar opacities seen in both lungs.  This is characteristic of pulmonary edema.         Electronically signed by: Marco Elliott MD   Date:    03/03/2024   Time:    19:00      X-Ray Chest 1 View   Final Result      As above.         Electronically signed by: Spenser Stallworth   Date:    02/28/2024   Time:    11:07      X-Ray Chest AP Portable   Final Result      Worsening moderate bilateral infiltrates.  Follow-up is recommended.         Electronically signed by: Shyla Giles MD   Date:    02/27/2024   Time:    10:18      CTA Chest Non-Coronary (PE Studies)   Final Result      No pulmonary embolism.  No dissection.  Atherosclerotic changes.  Mild interstitial opacities.  Correlate clinically for element of fibrosis versus CHF.  Pulmonary micronodularity.  Recommend follow-up.      All CT scans   are performed using dose optimization techniques including the following: automated exposure control; adjustment of the mA and/or kV; use of iterative reconstruction technique.  Dose modulation was employed for ALARA by means of: Automated exposure control; adjustment of the mA and/or kV according to patient size (this includes techniques or standardized protocols for targeted exams where dose is matched to indication/reason for exam; i.e. extremities or head); and/or use of iterative reconstructive technique.         Electronically signed by: Jhoan Roman   Date:    02/26/2024   Time:    19:11      CT Head Without Contrast   Final  Result      Chronic microvascular ischemic changes.  No intracranial hemorrhage.      All CT scans at this facility use dose modulation, iterative reconstruction, and/or weight based dosing when appropriate to reduce radiation dose to as low as reasonable achievable.         Electronically signed by: Earl Mendez MD   Date:    02/26/2024   Time:    10:36      X-Ray Chest AP Portable   Final Result      No acute process seen.         Electronically signed by: Earl Mendez MD   Date:    02/26/2024   Time:    10:02

## 2024-03-05 NOTE — ASSESSMENT & PLAN NOTE
Creatine stable for now. BMP reviewed- noted Estimated Creatinine Clearance: 59.5 mL/min (based on SCr of 1.1 mg/dL). according to latest data. Based on current GFR, CKD stage is stage 3 - GFR 30-59.  Monitor UOP and serial BMP and adjust therapy as needed. Renally dose meds. Avoid nephrotoxic medications and procedures

## 2024-03-05 NOTE — SUBJECTIVE & OBJECTIVE
Review of Systems   Constitutional: Positive for malaise/fatigue.   HENT: Negative.     Eyes: Negative.    Cardiovascular:  Positive for dyspnea on exertion, leg swelling and orthopnea.   Respiratory:  Positive for shortness of breath.    Endocrine: Negative.    Hematologic/Lymphatic: Negative.    Skin: Negative.    Musculoskeletal:  Positive for arthritis.   Gastrointestinal: Negative.    Genitourinary: Negative.    Neurological: Negative.    Psychiatric/Behavioral: Negative.     Allergic/Immunologic: Negative.      Objective:     Vital Signs (Most Recent):  Temp: 98.7 °F (37.1 °C) (03/05/24 1121)  Pulse: 84 (03/05/24 1310)  Resp: 20 (03/05/24 1121)  BP: (!) 97/55 (03/05/24 1121)  SpO2: (!) 93 % (03/05/24 1121) Vital Signs (24h Range):  Temp:  [96.7 °F (35.9 °C)-99.2 °F (37.3 °C)] 98.7 °F (37.1 °C)  Pulse:  [80-91] 84  Resp:  [16-20] 20  SpO2:  [92 %-98 %] 93 %  BP: ()/(44-72) 97/55     Weight: 108.8 kg (239 lb 13.8 oz)  Body mass index is 34.42 kg/m².     SpO2: (!) 93 %         Intake/Output Summary (Last 24 hours) at 3/5/2024 1527  Last data filed at 3/5/2024 1110  Gross per 24 hour   Intake 240 ml   Output 700 ml   Net -460 ml       Lines/Drains/Airways       Central Venous Catheter Line  Duration                  PowerPort A Cath Single Lumen 01/05/24 1415 Internal Jugular Right 60 days              Peripheral Intravenous Line  Duration                  Peripheral IV - Single Lumen 03/02/24 2201 22 G Posterior;Right Hand 2 days                       Physical Exam  Vitals and nursing note reviewed.   Constitutional:       General: He is not in acute distress.     Appearance: Normal appearance. He is well-developed. He is not diaphoretic.      Comments: On supplemental O2   HENT:      Head: Normocephalic and atraumatic.   Eyes:      General:         Right eye: No discharge.         Left eye: No discharge.      Pupils: Pupils are equal, round, and reactive to light.   Cardiovascular:      Rate and Rhythm:  "Normal rate and regular rhythm.      Heart sounds: S1 normal and S2 normal. Murmur heard.      Harsh midsystolic murmur is present at the upper right sternal border radiating to the neck.   Pulmonary:      Effort: Pulmonary effort is normal. No respiratory distress.      Breath sounds: Rales present. No wheezing.   Abdominal:      General: There is no distension.   Musculoskeletal:      Right lower leg: Edema present.      Left lower leg: Edema present.   Skin:     General: Skin is warm and dry.      Findings: No erythema.   Neurological:      General: No focal deficit present.      Mental Status: He is alert and oriented to person, place, and time.   Psychiatric:         Mood and Affect: Mood normal.         Behavior: Behavior normal.            Significant Labs: CMP   Recent Labs   Lab 03/04/24  0432 03/05/24  0616   * 133*   K 4.9 4.0   CL 97 96   CO2 25 24   * 132*   BUN 20 23   CREATININE 1.2 1.1   CALCIUM 8.5* 8.8   PROT 5.9* 6.4   ALBUMIN 2.6* 2.4*   BILITOT 0.6 0.6   ALKPHOS 124 115   * 120*   * 118*   ANIONGAP 12 13   , CBC   Recent Labs   Lab 03/04/24  0432 03/05/24  0616   WBC 17.02* 17.02*   HGB 9.6* 9.6*   HCT 28.6* 29.3*    303   , and Troponin No results for input(s): "TROPONINI" in the last 48 hours.    Significant Imaging: Echocardiogram: Transthoracic echo (TTE) complete ([facility] Only):   Results for orders placed or performed during the hospital encounter of 02/26/24   Echo   Result Value Ref Range    LVIDd 4.88 3.5 - 6.0 cm    LV Systolic Volume 52.86 mL    LVIDs 3.56 2.1 - 4.0 cm    LV Diastolic Volume 111.86 mL    IVS 1.04 0.6 - 1.1 cm    FS 27 (A) 28 - 44 %    Left Ventricle Relative Wall Thickness 0.44 cm    Posterior Wall 1.08 0.6 - 1.1 cm    LV mass 189.29 g    TR Max Can 2.91 m/s    RVDD 2.81 cm    LA size 4.18 cm    Triscuspid Valve Regurgitation Peak Gradient 34 mmHg    IVC diameter 2.08 cm    LV Systolic Volume Index 23.2 mL/m2    LV Diastolic Volume " Index 49.06 mL/m2    LV Mass Index 83 g/m2    ZLVIDS -2.98     ZLVIDD -5.65     TV resting pulmonary artery pressure 37 mmHg    RV TB RVSP 6 mmHg    Est. RA pres 3 mmHg    Narrative      Left Ventricle: The left ventricle is normal in size. There is   concentric remodeling. There is normal systolic function with a visually   estimated ejection fraction of 55 - 60%. There is indeterminate diastolic   function.    Right Ventricle: Right ventricle was not well visualized due to poor   acoustic window. Normal right ventricular cavity size. Systolic function   is normal.    Pulmonary Artery: The estimated pulmonary artery systolic pressure is   37 mmHg.    IVC/SVC: Normal venous pressure at 3 mmHg.      and EKG: Reviewed

## 2024-03-05 NOTE — PLAN OF CARE
A225/A225 JOHANNY Dickerson . is a 84 y.o.male admitted on 2/26/2024 for NSTEMI (non-ST elevated myocardial infarction)   Code Status: DNR MRN: [id number]   Review of patient's allergies indicates:   Allergen Reactions    Grass pollen-anai grass standard      Past Medical History:   Diagnosis Date    Chest pain 2/26/2024    CKD stage 3 due to type 2 diabetes mellitus 2/18/2021    DM (diabetes mellitus) 2014    BS didn't check 12/11/2019    DM (diabetes mellitus) 2014    BS didn't check 05/05/2022    Foreign body, eye     right eye    Hyperlipidemia     Hypertension     Need for shingles vaccine 11/20/2019    Normocytic anemia 3/4/2024    NSTEMI (non-ST elevated myocardial infarction) 2/27/2024    Pneumonia     Primary osteoarthritis of right knee 10/29/2021    Severe obesity (BMI 35.0-35.9 with comorbidity) 7/10/2019    Type 2 diabetes mellitus 2014    BS didn't check 12/12/2018      PRN meds    sodium chloride 0.9%, , Continuous PRN  acetaminophen, 650 mg, Q4H PRN  acetaminophen, 650 mg, Q4H PRN  cyclobenzaprine, 10 mg, TID PRN  dextrose 10%, 12.5 g, PRN  dextrose 10%, 25 g, PRN  glucagon (human recombinant), 1 mg, PRN  glucose, 16 g, PRN  glucose, 24 g, PRN  HYDROcodone-acetaminophen, 1 tablet, Q6H PRN  insulin aspart U-100, 0-5 Units, QID (AC + HS) PRN  melatonin, 6 mg, Nightly PRN  naloxone, 0.02 mg, PRN  ondansetron, 8 mg, Q8H PRN  ondansetron, 4 mg, Q8H PRN  sodium chloride 0.9%, 10 mL, Q12H PRN      Chart check completed. Will continue plan of care.      Orientation: oriented x 4  [place] Coma Scale Score: 15     Lead Monitored: Lead II Rhythm: normal sinus rhythm Frequency/Ectopy: PVCs  Cardiac/Telemetry Box Number: [id number]  VTE Required Core Measure: Pharmacological prophylaxis initiated/maintained Last Bowel Movement: 03/03/24  Diet Cardiac Fluid - 1200mL; Standard Tray  Voiding Characteristics: hesitancy  Sunil Score: 18  Fall Risk Score: 16  Accucheck []   Freq?      Lines/Drains/Airways       Central  Venous Catheter Line  Duration                  PowerPort A Cath Single Lumen 01/05/24 1415 Internal Jugular Right 60 days              Peripheral Intravenous Line  Duration                  Peripheral IV - Single Lumen 03/02/24 2201 22 G Posterior;Right Hand 2 days

## 2024-03-05 NOTE — ASSESSMENT & PLAN NOTE
Patient has Abnormal Magnesium: hypomagnesemia. Will continue to monitor electrolytes closely. Will replace the affected electrolytes and repeat labs to be done after interventions completed. The patient's magnesium results have been reviewed and are listed below.  Recent Labs   Lab 03/05/24  0616   MG 1.6

## 2024-03-05 NOTE — PROGRESS NOTES
"[facility] ([facility] Medicine  Progress Note    Patient Name: Mark Dickerson Sr.  MRN: [id number]  Patient Class: IP- Inpatient   Admission Date: 2/26/2024  Length of Stay: 7 days  Attending Physician: Atnonia Kapoor MD  Primary Care Provider: Tatyana Cannon MD        Subjective:     Principal Problem:NSTEMI (non-ST elevated myocardial infarction)        HPI:  Mark Dickerson Sr. is a 84 y.o. male patient with a PMHx of metastatic breast cancer (on chemo), HLD, HTN, DM II, CKD who presents to the ED for evaluation of AMS which onset this morning PTA. Per daughter in law she visited the pt early this morning and states that he was normal for a short period before becoming extremely disoriented with jumbled speech.  Patient mental status returned to baseline piror to ed arrival.  Patient reports he feels "dehydrated", he also reports diarrhea earlier last week but this has since resolved.  Patient denies any fever, chills, N/V, SOB and chest pain.   In the ED, work up notable for WBC 14, Na 130, K 3.0, mag 1.5, , Trop 0.7.  EKG with SR PACs, ST and T wave abnormality. CT head negative for acute processes.  Chest xray no acute processes. Patient started on IVF and given zofran for nausea.  Patient will be admitted to observation for electrolyte imbalances, elevated trop.  Cards consulted.     Code Status DNR  Surrogate Decision maker daughter    Overview/Hospital Course:  Patient is currently admitted for NSTEMI, SIRS criteria, severe leukocytosis, hypokalemia, hypomagnesemia, chronic kidney disease stage 3, diabetes mellitus type 2, left breast cancer in a male patient, acute diastolic CHF exacerbation, normocytic anemia, transaminitis, and severe aortic stenosis.  Consults on this admission include Cardiology, Hematology Oncology, and Wound Care.  Echocardiogram 02/26/2024 with EF 55-60% with indeterminate diastolic function.  Right and left heart catheterization 03/01/2024 with elevated " filling pressures, severe aortic stenosis,  RCA with collaterals.  Cardiology recommends continuing medical management for NSTEMI and we will discuss outpatient TAVR evaluation for severe aortic stenosis.  Continue IV Lasix diuresis for CHF exacerbation with evidence of volume overload.  Patient is noted to have pitting edema on exam.  Chest x-ray 03/03/2024 with interval worsening with moderate interstitial and alveolar opacities of bilateral lungs consistent with pulmonary edema.  BNP has trended upward and is currently 590.  Hematology oncology has seen the patient in consultation and feels that leukocytosis is related to Neulasta and acute cardiac event.  White blood cell count is trending downward.  Patient did receive empiric IV Rocephin for 4 days 2/28-03/02/2024.  No localizing source of infection was identified.  COVID-19 negative, influenza a/B negative, bcx were negative, urinalysis negative, lactic acid level 1.0, procalcitonin level 0.14.  CTA of chest 2/24/24 with no evidence of PE, pulmonary fibrosis versus CHF findings with recommendation for follow-up imaging.    Interval History:  Patient has shortness a breath has improved some overnight with increased IV Lasix diuresis initiated yesterday.  He does still have pitting edema however will need to be cautious with diuresis in the setting of severe aortic stenosis.  I did reach out to patient's cardiologist via secure chat regarding current treatment for CHF exacerbation.    Review of Systems   Respiratory:  Positive for shortness of breath.    Cardiovascular:  Positive for leg swelling. Negative for chest pain.   All other systems reviewed and are negative.    Objective:     Vital Signs (Most Recent):  Temp: 98.7 °F (37.1 °C) (03/05/24 1121)  Pulse: 84 (03/05/24 1310)  Resp: 20 (03/05/24 1121)  BP: (!) 97/55 (03/05/24 1121)  SpO2: (!) 93 % (03/05/24 1121) Vital Signs (24h Range):  Temp:  [96.7 °F (35.9 °C)-99.2 °F (37.3 °C)] 98.7 °F (37.1  °C)  Pulse:  [80-91] 84  Resp:  [16-20] 20  SpO2:  [92 %-98 %] 93 %  BP: ()/(44-72) 97/55     Weight: 108.8 kg (239 lb 13.8 oz)  Body mass index is 34.42 kg/m².    Intake/Output Summary (Last 24 hours) at 3/5/2024 1500  Last data filed at 3/5/2024 1110  Gross per 24 hour   Intake 240 ml   Output 700 ml   Net -460 ml         Physical Exam  Vitals reviewed.   Constitutional:       General: He is not in acute distress.     Appearance: He is obese.   HENT:      Nose: Nose normal.   Eyes:      Conjunctiva/sclera: Conjunctivae normal.   Cardiovascular:      Rate and Rhythm: Normal rate.   Pulmonary:      Effort: Pulmonary effort is normal. No respiratory distress.   Abdominal:      Tenderness: There is no abdominal tenderness.   Musculoskeletal:         General: Swelling present.      Right lower leg: Edema present.      Left lower leg: Edema present.   Skin:     General: Skin is warm and dry.   Neurological:      Mental Status: He is alert and oriented to person, place, and time.   Psychiatric:         Mood and Affect: Mood normal.         Behavior: Behavior normal.             Significant Labs: All pertinent labs within the past 24 hours have been reviewed.  Recent Lab Results  (Last 5 results in the past 24 hours)        03/05/24  1137   03/05/24  0616   03/04/24  2040   03/04/24  1900   03/04/24  1645        Albumin   2.4             ALP   115             ALT   118             Anion Gap   13             Aniso   Slight             AST   120             Baso #   0.11             Basophil %   0.6             BILIRUBIN TOTAL   0.6  Comment: For infants and newborns, interpretation of results should be based  on gestational age, weight and in agreement with clinical  observations.    Premature Infant recommended reference ranges:  Up to 24 hours.............<8.0 mg/dL  Up to 48 hours............<12.0 mg/dL  3-5 days..................<15.0 mg/dL  6-29 days.................<15.0 mg/dL               BUN   23              Calcium   8.8             Chloride   96             CO2   24             Creatinine   1.1             Differential Method   Automated             eGFR   >60             Eos #   0.1             Eos %   0.5             Glucose   132             Gran # (ANC)   13.2             Gran %   77.6             Hematocrit   29.3             Hemoglobin   9.6             Immature Grans (Abs)   0.52  Comment: Mild elevation in immature granulocytes is non specific and   can be seen in a variety of conditions including stress response,   acute inflammation, trauma and pregnancy. Correlation with other   laboratory and clinical findings is essential.               Immature Granulocytes   3.1             Lymph #   2.2             Lymph %   12.7             Magnesium    1.6             MCH   29.4             MCHC   32.8             MCV   90             Mono #   0.9             Mono %   5.5             MPV   11.0             nRBC   0             Occult Blood       Negative         Platelet Estimate   Appears normal             Platelet Count   303             POCT Glucose 213     189     188       Potassium   4.0             PROTEIN TOTAL   6.4             RBC   3.27             RDW   15.6             Schistocytes   Present             Sodium   133             Teardrop Cells   Occasional             WBC   17.02                                    Significant Imaging: I have reviewed all pertinent imaging results/findings within the past 24 hours.  X-Ray Chest AP Portable   Final Result      There has been interval worsening of the appearance of the lungs. There is a moderate amount of interstitial and alveolar opacities seen in both lungs.  This is characteristic of pulmonary edema.         Electronically signed by: Marco Elliott MD   Date:    03/03/2024   Time:    19:00      X-Ray Chest 1 View   Final Result      As above.         Electronically signed by: Spenser Stallworth   Date:    02/28/2024   Time:    11:07      X-Ray Chest AP  Portable   Final Result      Worsening moderate bilateral infiltrates.  Follow-up is recommended.         Electronically signed by: Shyla Giles MD   Date:    02/27/2024   Time:    10:18      CTA Chest Non-Coronary (PE Studies)   Final Result      No pulmonary embolism.  No dissection.  Atherosclerotic changes.  Mild interstitial opacities.  Correlate clinically for element of fibrosis versus CHF.  Pulmonary micronodularity.  Recommend follow-up.      All CT scans   are performed using dose optimization techniques including the following: automated exposure control; adjustment of the mA and/or kV; use of iterative reconstruction technique.  Dose modulation was employed for ALARA by means of: Automated exposure control; adjustment of the mA and/or kV according to patient size (this includes techniques or standardized protocols for targeted exams where dose is matched to indication/reason for exam; i.e. extremities or head); and/or use of iterative reconstructive technique.         Electronically signed by: Jhoan Roman   Date:    02/26/2024   Time:    19:11      CT Head Without Contrast   Final Result      Chronic microvascular ischemic changes.  No intracranial hemorrhage.      All CT scans at this facility use dose modulation, iterative reconstruction, and/or weight based dosing when appropriate to reduce radiation dose to as low as reasonable achievable.         Electronically signed by: Earl Mendez MD   Date:    02/26/2024   Time:    10:36      X-Ray Chest AP Portable   Final Result      No acute process seen.         Electronically signed by: Earl Mendez MD   Date:    02/26/2024   Time:    10:02           Assessment/Plan:      * NSTEMI (non-ST elevated myocardial infarction)  -No prior hx of CAD  -Troponin max 2.789  -EKG with SR PACS and ST-Twave abnormalities  -echocardiogram 02/26/2024 with EF 55-60% with indeterminate diastolic function   -right and left heart catheterization 03/01/2024 with  increased filling pressures, severe aortic stenosis,  RCA with collaterals  -continue medical management; continue aspirin, Lipitor, Lopressor, and midodrine  -Cardiology following/managing        Acute diastolic CHF (congestive heart failure)  Patient is identified as having Diastolic (HFpEF) heart failure that is Acute. CHF is currently uncontrolled due to Pulmonary edema/pleural effusion on CXR. Latest ECHO performed and demonstrates- Results for orders placed during the hospital encounter of 02/26/24    Echo    Interpretation Summary    Left Ventricle: The left ventricle is normal in size. There is concentric remodeling. There is normal systolic function with a visually estimated ejection fraction of 55 - 60%. There is indeterminate diastolic function.    Right Ventricle: Right ventricle was not well visualized due to poor acoustic window. Normal right ventricular cavity size. Systolic function is normal.    Pulmonary Artery: The estimated pulmonary artery systolic pressure is 37 mmHg.    IVC/SVC: Normal venous pressure at 3 mmHg.  . Continue Beta Blocker and Furosemide and monitor clinical status closely. Monitor on telemetry. Monitor strict Is&Os and daily weights.  Cardiology on board already for NSTEMI. Continue to stress to patient importance of self efficacy and  on diet for CHF. Last BNP reviewed- and noted below   Recent Labs   Lab 03/02/24  1126   *     Increased lasix to 40 mg IV bid as of 3/4 due to pitting edema to BLE, dyspnea requiring O2 supplementation, and abnormal CXR with pulmonary edema.  Fluid restrict 1200 mL.  Strict Is&Os and daily weights. Cardiology aware of current treatment for CHF    SIRS (systemic inflammatory response syndrome)  -WBC 34.56-->17.02, leukocytosis trending down   -COVID-19 negative, influenza a/B negative, procalcitonin level 0.14, lactic acid level 1.0, urinalysis negative, blood cultures negative  -CTA of chest 02/24/2024 with no evidence of PE,  pulmonary fibrosis versus CHF findings with recommendations for follow-up imaging  -patient did receive empiric IV ceftriaxone for 4 days 2/28-03/02/2024  -hematology oncology has seen the patient in consultation and feels severe leukocytosis related to recent Neulasta and acute cardiac event    Transaminitis    Transaminitis with trend upward, current  and .  Patient denies abdominal pain and has no tenderness to palpation on exam.  Suspect related to hepatic congestion with CHF exacerbation.  Check a.m. labs.      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 Mendoza at [facility], last chemo session one week prior.  Patient was seen by Hematology Oncology due to significant leukocytosis felt to be related to Neulasta and recent cardiac event.  Dr. Baugh with Hematology Oncology recommends discontinuing all Herceptin products due to cardiac deterioration.  Consider aromatase inhibitor plus or minus Lupron.  Follow up outpatient.    Severe aortic stenosis  Cardiology plans to discuss outpatient TAVR evaluation.  Monitor volume status with patient receiving IV diuresis.      Normocytic anemia  Patient's anemia is currently controlled. Has not received any PRBCs to date. Etiology likely d/t chronic disease due to Malignancy  Current CBC reviewed-   Lab Results   Component Value Date    HGB 9.6 (L) 03/05/2024    HCT 29.3 (L) 03/05/2024     Monitor serial CBC and transfuse if patient becomes hemodynamically unstable, symptomatic or H/H drops below 7/21.    Hypomagnesemia  Patient has Abnormal Magnesium: hypomagnesemia. Will continue to monitor electrolytes closely. Will replace the affected electrolytes and repeat labs to be done after interventions completed. The patient's magnesium results have been reviewed and are listed below.  Recent Labs   Lab 03/05/24  0616   MG 1.6          Hypokalemia  Patient has hypokalemia which is Acute  on Chronic and currently uncontrolled. Most recent potassium levels reviewed-   Lab Results   Component Value Date    K 4.0 03/05/2024   . Will continue potassium replacement per protocol and recheck repeat levels after replacement completed.     Elevated troponin  As above, see discussion for NSTEMI    CKD stage 3 due to type 2 diabetes mellitus  Creatine stable for now. BMP reviewed- noted Estimated Creatinine Clearance: 59.5 mL/min (based on SCr of 1.1 mg/dL). according to latest data. Based on current GFR, CKD stage is stage 3 - GFR 30-59.  Monitor UOP and serial BMP and adjust therapy as needed. Renally dose meds. Avoid nephrotoxic medications and procedures    Controlled type 2 diabetes mellitus without complication, without long-term current use of insulin  Patient's FSGs are controlled on current medication regimen.  Last A1c reviewed-   Lab Results   Component Value Date    HGBA1C 7.1 (H) 11/28/2023     Most recent fingerstick glucose reviewed-   Recent Labs   Lab 03/04/24  1645 03/04/24  2040 03/05/24  1137   POCTGLUCOSE 188* 189* 213*       Current correctional scale  Low  Maintain anti-hyperglycemic dose as follows-   Antihyperglycemics (From admission, onward)      Start     Stop Route Frequency Ordered    02/26/24 1301  insulin aspart U-100 pen 0-5 Units         -- SubQ Before meals & nightly PRN 02/26/24 1204          Hold Oral hypoglycemics while patient is in the hospital.      VTE Risk Mitigation (From admission, onward)           Ordered     IP VTE HIGH RISK PATIENT  Once         02/26/24 1204     Place sequential compression device  Until discontinued         02/26/24 1204                    Discharge Planning   DE:      Code Status: DNR   Is the patient medically ready for discharge?:     Reason for patient still in hospital (select all that apply): Patient trending condition, Laboratory test, Treatment, and Consult recommendations  Discharge Plan A: Skilled Nursing Facility   Discharge Delays:  None known at this time              Antonia Kapoor MD  Department of Hospital Medicine   [facility] Telemetry ([facility])

## 2024-03-05 NOTE — PROGRESS NOTES
[facility] - Telemetry ([facility])  Cardiology  Progress Note    Patient Name: Mark Dickerson Sr.  MRN: [id number]  Admission Date: 2/26/2024  Hospital Length of Stay: 7 days  Code Status: DNR   Attending Physician: Antonia Kapoor MD   Primary Care Physician: Tatyana Cannon MD  Expected Discharge Date:   Principal Problem:NSTEMI (non-ST elevated myocardial infarction)    Subjective:   HPI:  Mr. Dickerson is an 84 year old male patient whose current medical conditions include metastatic breast cancer (on chemo), HTN, hyperlipidemia, DM type II, and CKD who presented to [facility] ED this AM due to AMS/confusion. Patient's DIL reported patient seemed disoriented and had jumbled speech when she visited him earlier this AM. He returned to baseline prior to ED arrival. He denied any associated SOB, chest pain, palpitations, near syncope, or syncope. Initial workup in ED revealed leukocytosis (WBC 14,000), hyponatremia (Na 130), hypomagnesemia (1.5), and elevated troponin (0.725>0.735) and patient was subsequently admitted for further evaluation and treatment. Cardiology consulted to assist with management. Patient seen and examined today, resting in bed. Remains chest pain free. No other CV complaints. States he felt dehydrated earlier today. No prior CV history. He reports compliance with his medications. EKG reviewed, SR with T wave inversions inferiorly and anteriorly. Limited echo and repeat troponin pending. CT of head NAF              Hospital Course:   2/27/24-Patient seen and examined today, sitting up in bed. Appears more SOB/weak. No CP symptoms. WBC bumped to 26,000. CXR with bilateral infiltrates/worsening appearance. IV Lasix given and BC drawn. Troponin bumped to 1.293. [facility] initially planned today but cancelled due to lab abnormalities, CXR, and patient's clinical status.    2/28/24-Patient seen and examined today, sitting up in bedside chair. Complains of chills/weakness. WBC up to 34,000. CXR showed bilateral  opacities. Being diuresed. K 3.3, being repleted. [facility] deferred for now.    2/29/24-Patient seen and examined today, resting in bed. Feels/looks better. WBC trending down. Denies CP. BC NGTD. Tentative plans for [facility] tmw.    3/1/24-Patient seen and examined today, lying in bed. Stable overnight. No CV complaints. CP free. R/LHC today by Dr. Aguillon.    3/2/24 - s/p R/LHC - with elevated filling pressures,  RCA with collaterals - will cont med management and discuss outpt TAVR eval for severe AS    3/5/24-Patient seen and examined today, sitting up in bedside chair. Feels ok. Still with SOB/BLE edema. No CP. Heme/onc note reviewed. Needs diuresis. BP soft, BB d/c'd to allow diuresis. Discussed OP evaluation for TAVR/ballon valvuloplasty.      Review of Systems   Constitutional: Positive for malaise/fatigue.   HENT: Negative.     Eyes: Negative.    Cardiovascular:  Positive for dyspnea on exertion, leg swelling and orthopnea.   Respiratory:  Positive for shortness of breath.    Endocrine: Negative.    Hematologic/Lymphatic: Negative.    Skin: Negative.    Musculoskeletal:  Positive for arthritis.   Gastrointestinal: Negative.    Genitourinary: Negative.    Neurological: Negative.    Psychiatric/Behavioral: Negative.     Allergic/Immunologic: Negative.      Objective:     Vital Signs (Most Recent):  Temp: 98.7 °F (37.1 °C) (03/05/24 1121)  Pulse: 84 (03/05/24 1310)  Resp: 20 (03/05/24 1121)  BP: (!) 97/55 (03/05/24 1121)  SpO2: (!) 93 % (03/05/24 1121) Vital Signs (24h Range):  Temp:  [96.7 °F (35.9 °C)-99.2 °F (37.3 °C)] 98.7 °F (37.1 °C)  Pulse:  [80-91] 84  Resp:  [16-20] 20  SpO2:  [92 %-98 %] 93 %  BP: ()/(44-72) 97/55     Weight: 108.8 kg (239 lb 13.8 oz)  Body mass index is 34.42 kg/m².     SpO2: (!) 93 %         Intake/Output Summary (Last 24 hours) at 3/5/2024 1527  Last data filed at 3/5/2024 1110  Gross per 24 hour   Intake 240 ml   Output 700 ml   Net -460 ml       Lines/Drains/Airways       Central  Venous Catheter Line  Duration                  PowerPort A Cath Single Lumen 01/05/24 1415 Internal Jugular Right 60 days              Peripheral Intravenous Line  Duration                  Peripheral IV - Single Lumen 03/02/24 2201 22 G Posterior;Right Hand 2 days                       Physical Exam  Vitals and nursing note reviewed.   Constitutional:       General: He is not in acute distress.     Appearance: Normal appearance. He is well-developed. He is not diaphoretic.      Comments: On supplemental O2   HENT:      Head: Normocephalic and atraumatic.   Eyes:      General:         Right eye: No discharge.         Left eye: No discharge.      Pupils: Pupils are equal, round, and reactive to light.   Cardiovascular:      Rate and Rhythm: Normal rate and regular rhythm.      Heart sounds: S1 normal and S2 normal. Murmur heard.      Harsh midsystolic murmur is present at the upper right sternal border radiating to the neck.   Pulmonary:      Effort: Pulmonary effort is normal. No respiratory distress.      Breath sounds: Rales present. No wheezing.   Abdominal:      General: There is no distension.   Musculoskeletal:      Right lower leg: Edema present.      Left lower leg: Edema present.   Skin:     General: Skin is warm and dry.      Findings: No erythema.   Neurological:      General: No focal deficit present.      Mental Status: He is alert and oriented to person, place, and time.   Psychiatric:         Mood and Affect: Mood normal.         Behavior: Behavior normal.            Significant Labs: CMP   Recent Labs   Lab 03/04/24  0432 03/05/24  0616   * 133*   K 4.9 4.0   CL 97 96   CO2 25 24   * 132*   BUN 20 23   CREATININE 1.2 1.1   CALCIUM 8.5* 8.8   PROT 5.9* 6.4   ALBUMIN 2.6* 2.4*   BILITOT 0.6 0.6   ALKPHOS 124 115   * 120*   * 118*   ANIONGAP 12 13   , CBC   Recent Labs   Lab 03/04/24  0432 03/05/24  0616   WBC 17.02* 17.02*   HGB 9.6* 9.6*   HCT 28.6* 29.3*    303   ,  "and Troponin No results for input(s): "TROPONINI" in the last 48 hours.    Significant Imaging: Echocardiogram: Transthoracic echo (TTE) complete (Cupid Only):   Results for orders placed or performed during the hospital encounter of 02/26/24   Echo   Result Value Ref Range    LVIDd 4.88 3.5 - 6.0 cm    LV Systolic Volume 52.86 mL    LVIDs 3.56 2.1 - 4.0 cm    LV Diastolic Volume 111.86 mL    IVS 1.04 0.6 - 1.1 cm    FS 27 (A) 28 - 44 %    Left Ventricle Relative Wall Thickness 0.44 cm    Posterior Wall 1.08 0.6 - 1.1 cm    LV mass 189.29 g    TR Max Can 2.91 m/s    RVDD 2.81 cm    LA size 4.18 cm    Triscuspid Valve Regurgitation Peak Gradient 34 mmHg    IVC diameter 2.08 cm    LV Systolic Volume Index 23.2 mL/m2    LV Diastolic Volume Index 49.06 mL/m2    LV Mass Index 83 g/m2    ZLVIDS -2.98     ZLVIDD -5.65     TV resting pulmonary artery pressure 37 mmHg    RV TB RVSP 6 mmHg    Est. RA pres 3 mmHg    Narrative      Left Ventricle: The left ventricle is normal in size. There is   concentric remodeling. There is normal systolic function with a visually   estimated ejection fraction of 55 - 60%. There is indeterminate diastolic   function.    Right Ventricle: Right ventricle was not well visualized due to poor   acoustic window. Normal right ventricular cavity size. Systolic function   is normal.    Pulmonary Artery: The estimated pulmonary artery systolic pressure is   37 mmHg.    IVC/SVC: Normal venous pressure at 3 mmHg.      and EKG: Reviewed  Assessment and Plan:   Patient who presents with NSTEMI/CHF/severe AS. Volume overloaded on exam, continue IV diuresis. BB d/c'd due to hypotension. Continue midodrine. OP TAVR/balloon valvuloplasty  referral.    * NSTEMI (non-ST elevated myocardial infarction)  -See plan under elevated troponin    Abnormal EKG  -See plan under elevated troponin    SIRS (systemic inflammatory response syndrome)  -Mgmt as per primary team  -BC show NGTD    Hypomagnesemia  -Repletion as " per primary team    Hypokalemia  -Repletion as per primary team      Elevated troponin  -Troponin 0.725>0.735, continue to trend  -No CP symptoms/SOB/angina  -No prior CV history  -Continue ASA, heparin gtt, statin  -EKG reviewed, T wave inversions inferiorly and laterally  -Limited TTE pending  -Will consider ischemic workup with MPI stress test this admission    2/27/24  -Troponin bumped to 1.2 overnight  -Remains CP free  -Continue ASA, heparin gtt, statin  -TTE with normal EF  -LHC planned once more stable respiratory wise/infection ruled out    2/28/24  -CP free  -Continue ASA, heparin gtt, statin  -BB added  -LHC deferred for now given leukocytosis/SOB    2/29/24  -Stable  -WBC count improving  -Continue BB, ASA, heparin gtt, statin  -Tentative plans for [facility] tmw, keep NPO after MN    3/1/24  -Stable, no CP  -Continue BB, ASA, heparin gtt, statin  -R/LHC today by Dr. Aguillon. All risks, benefits, and treatment alternatives explained to patient in detail. All questions answered. He has agreed to proceed.    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

## 2024-03-05 NOTE — PLAN OF CARE
03/05/24 1012   Post-Acute Status   Post-Acute Authorization Placement   Post-Acute Placement Status Pending payor review/awaiting authorization (if required)   Coverage [employer]   Discharge Delays None known at this time   Discharge Plan   Discharge Plan A Skilled Nursing Facility       PEARL meet with Patient at bedside letting him know that [facility] was able to accept Patient. Patient requested that daughter-in-law be called and to make sure it was agreeable with her.  SW called Patient's daughter in law, Jordon, and inquired if [facility] would be agreeable and if they can submit insurance authorization. Patient's daughter in law was agreeable to [facility] submitting for insurance authorization.  PEARL will upload PASRR/ 142 to [facility].    PEARL will continue to follow and assist as needed.

## 2024-03-06 PROBLEM — R93.89 ABNORMAL CT OF THE CHEST: Status: ACTIVE | Noted: 2024-03-06

## 2024-03-06 PROBLEM — I50.21 ACUTE HFREF (HEART FAILURE WITH REDUCED EJECTION FRACTION): Status: ACTIVE | Noted: 2024-03-06

## 2024-03-06 PROBLEM — R09.02 HYPOXIA: Status: ACTIVE | Noted: 2024-03-06

## 2024-03-06 LAB
ALBUMIN SERPL BCP-MCNC: 2.5 G/DL (ref 3.5–5.2)
ALP SERPL-CCNC: 105 U/L (ref 55–135)
ALT SERPL W/O P-5'-P-CCNC: 115 U/L (ref 10–44)
ANION GAP SERPL CALC-SCNC: 11 MMOL/L (ref 8–16)
ANISOCYTOSIS BLD QL SMEAR: SLIGHT
AST SERPL-CCNC: 100 U/L (ref 10–40)
BASOPHILS # BLD AUTO: 0.12 K/UL (ref 0–0.2)
BASOPHILS NFR BLD: 0.9 % (ref 0–1.9)
BILIRUB SERPL-MCNC: 0.7 MG/DL (ref 0.1–1)
BNP SERPL-MCNC: 505 PG/ML (ref 0–99)
BUN SERPL-MCNC: 24 MG/DL (ref 8–23)
CALCIUM SERPL-MCNC: 8.3 MG/DL (ref 8.7–10.5)
CHLORIDE SERPL-SCNC: 94 MMOL/L (ref 95–110)
CO2 SERPL-SCNC: 30 MMOL/L (ref 23–29)
CREAT SERPL-MCNC: 1.2 MG/DL (ref 0.5–1.4)
DIFFERENTIAL METHOD BLD: ABNORMAL
EOSINOPHIL # BLD AUTO: 0 K/UL (ref 0–0.5)
EOSINOPHIL NFR BLD: 0.2 % (ref 0–8)
ERYTHROCYTE [DISTWIDTH] IN BLOOD BY AUTOMATED COUNT: 15.2 % (ref 11.5–14.5)
EST. GFR  (NO RACE VARIABLE): 59 ML/MIN/1.73 M^2
GIANT PLATELETS BLD QL SMEAR: PRESENT
GLUCOSE SERPL-MCNC: 149 MG/DL (ref 70–110)
HCT VFR BLD AUTO: 26.9 % (ref 40–54)
HGB BLD-MCNC: 9.1 G/DL (ref 14–18)
IMM GRANULOCYTES # BLD AUTO: 0.12 K/UL (ref 0–0.04)
IMM GRANULOCYTES NFR BLD AUTO: 0.9 % (ref 0–0.5)
LYMPHOCYTES # BLD AUTO: 1.5 K/UL (ref 1–4.8)
LYMPHOCYTES NFR BLD: 10.9 % (ref 18–48)
MCH RBC QN AUTO: 30.1 PG (ref 27–31)
MCHC RBC AUTO-ENTMCNC: 33.8 G/DL (ref 32–36)
MCV RBC AUTO: 89 FL (ref 82–98)
MONOCYTES # BLD AUTO: 0.9 K/UL (ref 0.3–1)
MONOCYTES NFR BLD: 6.2 % (ref 4–15)
NEUTROPHILS # BLD AUTO: 11.3 K/UL (ref 1.8–7.7)
NEUTROPHILS NFR BLD: 80.9 % (ref 38–73)
NRBC BLD-RTO: 0 /100 WBC
OVALOCYTES BLD QL SMEAR: ABNORMAL
PLATELET # BLD AUTO: 367 K/UL (ref 150–450)
PLATELET BLD QL SMEAR: ABNORMAL
PMV BLD AUTO: 10.5 FL (ref 9.2–12.9)
POCT GLUCOSE: 150 MG/DL (ref 70–110)
POCT GLUCOSE: 153 MG/DL (ref 70–110)
POCT GLUCOSE: 173 MG/DL (ref 70–110)
POIKILOCYTOSIS BLD QL SMEAR: SLIGHT
POLYCHROMASIA BLD QL SMEAR: ABNORMAL
POTASSIUM SERPL-SCNC: 4 MMOL/L (ref 3.5–5.1)
PROCALCITONIN SERPL IA-MCNC: 0.12 NG/ML
PROT SERPL-MCNC: 5.7 G/DL (ref 6–8.4)
RBC # BLD AUTO: 3.02 M/UL (ref 4.6–6.2)
SODIUM SERPL-SCNC: 135 MMOL/L (ref 136–145)
STOMATOCYTES BLD QL SMEAR: PRESENT
TARGETS BLD QL SMEAR: ABNORMAL
WBC # BLD AUTO: 13.98 K/UL (ref 3.9–12.7)

## 2024-03-06 PROCEDURE — 27000221 HC OXYGEN, UP TO 24 HOURS: Mod: HCNC

## 2024-03-06 PROCEDURE — 97116 GAIT TRAINING THERAPY: CPT | Mod: HCNC,CQ

## 2024-03-06 PROCEDURE — 25000003 PHARM REV CODE 250: Mod: HCNC | Performed by: INTERNAL MEDICINE

## 2024-03-06 PROCEDURE — 83880 ASSAY OF NATRIURETIC PEPTIDE: CPT | Mod: HCNC | Performed by: INTERNAL MEDICINE

## 2024-03-06 PROCEDURE — 84145 PROCALCITONIN (PCT): CPT | Mod: HCNC | Performed by: NURSE PRACTITIONER

## 2024-03-06 PROCEDURE — 36415 COLL VENOUS BLD VENIPUNCTURE: CPT | Mod: HCNC | Performed by: INTERNAL MEDICINE

## 2024-03-06 PROCEDURE — 36415 COLL VENOUS BLD VENIPUNCTURE: CPT | Mod: HCNC,XB | Performed by: INTERNAL MEDICINE

## 2024-03-06 PROCEDURE — 87081 CULTURE SCREEN ONLY: CPT | Mod: HCNC | Performed by: INTERNAL MEDICINE

## 2024-03-06 PROCEDURE — 85025 COMPLETE CBC W/AUTO DIFF WBC: CPT | Mod: HCNC | Performed by: INTERNAL MEDICINE

## 2024-03-06 PROCEDURE — 94761 N-INVAS EAR/PLS OXIMETRY MLT: CPT | Mod: HCNC

## 2024-03-06 PROCEDURE — 99232 SBSQ HOSP IP/OBS MODERATE 35: CPT | Mod: HCNC,,, | Performed by: INTERNAL MEDICINE

## 2024-03-06 PROCEDURE — 36415 COLL VENOUS BLD VENIPUNCTURE: CPT | Mod: HCNC,XB | Performed by: NURSE PRACTITIONER

## 2024-03-06 PROCEDURE — 63600175 PHARM REV CODE 636 W HCPCS: Mod: HCNC | Performed by: NURSE PRACTITIONER

## 2024-03-06 PROCEDURE — 87040 BLOOD CULTURE FOR BACTERIA: CPT | Mod: HCNC | Performed by: INTERNAL MEDICINE

## 2024-03-06 PROCEDURE — 21400001 HC TELEMETRY ROOM: Mod: HCNC

## 2024-03-06 PROCEDURE — 63600175 PHARM REV CODE 636 W HCPCS: Mod: HCNC | Performed by: INTERNAL MEDICINE

## 2024-03-06 PROCEDURE — 97110 THERAPEUTIC EXERCISES: CPT | Mod: HCNC

## 2024-03-06 PROCEDURE — 97530 THERAPEUTIC ACTIVITIES: CPT | Mod: HCNC,CQ

## 2024-03-06 PROCEDURE — 80053 COMPREHEN METABOLIC PANEL: CPT | Mod: HCNC | Performed by: INTERNAL MEDICINE

## 2024-03-06 PROCEDURE — 99900035 HC TECH TIME PER 15 MIN (STAT): Mod: HCNC

## 2024-03-06 PROCEDURE — 25000003 PHARM REV CODE 250: Mod: HCNC | Performed by: NURSE PRACTITIONER

## 2024-03-06 PROCEDURE — 99233 SBSQ HOSP IP/OBS HIGH 50: CPT | Mod: HCNC,,, | Performed by: INTERNAL MEDICINE

## 2024-03-06 RX ORDER — FUROSEMIDE 10 MG/ML
40 INJECTION INTRAMUSCULAR; INTRAVENOUS ONCE
Status: COMPLETED | OUTPATIENT
Start: 2024-03-06 | End: 2024-03-06

## 2024-03-06 RX ADMIN — MIDODRINE HYDROCHLORIDE 10 MG: 5 TABLET ORAL at 10:03

## 2024-03-06 RX ADMIN — ATORVASTATIN CALCIUM 20 MG: 10 TABLET, FILM COATED ORAL at 09:03

## 2024-03-06 RX ADMIN — FUROSEMIDE 40 MG: 10 INJECTION, SOLUTION INTRAMUSCULAR; INTRAVENOUS at 11:03

## 2024-03-06 RX ADMIN — ANASTROZOLE 1 MG: 1 TABLET, COATED ORAL at 10:03

## 2024-03-06 RX ADMIN — Medication 400 MG: at 10:03

## 2024-03-06 RX ADMIN — VANCOMYCIN HYDROCHLORIDE 2500 MG: 500 INJECTION, POWDER, LYOPHILIZED, FOR SOLUTION INTRAVENOUS at 03:03

## 2024-03-06 RX ADMIN — ASPIRIN 81 MG: 81 TABLET, COATED ORAL at 10:03

## 2024-03-06 RX ADMIN — CEFEPIME 2 G: 2 INJECTION, POWDER, FOR SOLUTION INTRAVENOUS at 03:03

## 2024-03-06 RX ADMIN — MIDODRINE HYDROCHLORIDE 10 MG: 5 TABLET ORAL at 04:03

## 2024-03-06 RX ADMIN — FUROSEMIDE 40 MG: 10 INJECTION, SOLUTION INTRAMUSCULAR; INTRAVENOUS at 09:03

## 2024-03-06 RX ADMIN — FUROSEMIDE 40 MG: 10 INJECTION, SOLUTION INTRAMUSCULAR; INTRAVENOUS at 06:03

## 2024-03-06 NOTE — PROGRESS NOTES
Pharmacist Renal Dose Adjustment Note    Mark Dickerson Sr. is a 84 y.o. male being treated with the medication cefepime    Patient Data:    Vital Signs (Most Recent):  Temp: 98.1 °F (36.7 °C) (03/06/24 0719)  Pulse: 95 (03/06/24 1125)  Resp: 18 (03/06/24 0719)  BP: 113/63 (03/06/24 1125)  SpO2: 97 % (03/06/24 0719) Vital Signs (72h Range):  Temp:  [96.7 °F (35.9 °C)-99.2 °F (37.3 °C)]   Pulse:  [68-98]   Resp:  [16-20]   BP: ()/(44-77)   SpO2:  [90 %-99 %]      Recent Labs   Lab 03/04/24  0432 03/05/24  0616 03/06/24  0450   CREATININE 1.2 1.1 1.2     Serum creatinine: 1.2 mg/dL 03/06/24 0450  Estimated creatinine clearance: 54.6 mL/min    Medication:cefepime 2g every 8 hours will be changed to cefepime 2g every 12 hours for crcl 30-60 ml/min    Pharmacist's Name: Mayuri Amin  Pharmacist's Extension: [phone]

## 2024-03-06 NOTE — SUBJECTIVE & OBJECTIVE
Past Medical History:   Diagnosis Date    Chest pain 2/26/2024    CKD stage 3 due to type 2 diabetes mellitus 2/18/2021    DM (diabetes mellitus) 2014    BS didn't check 12/11/2019    DM (diabetes mellitus) 2014    BS didn't check 05/05/2022    Foreign body, eye     right eye    Hyperlipidemia     Hypertension     Need for shingles vaccine 11/20/2019    Normocytic anemia 3/4/2024    NSTEMI (non-ST elevated myocardial infarction) 2/27/2024    Pneumonia     Primary osteoarthritis of right knee 10/29/2021    Severe obesity (BMI 35.0-35.9 with comorbidity) 7/10/2019    Type 2 diabetes mellitus 2014    BS didn't check 12/12/2018       Past Surgical History:   Procedure Laterality Date    ABDOMINAL AORTOGRAPHY N/A 3/1/2024    Procedure: AORTOGRAM, ABDOMINAL;  Surgeon: Sebastian Aguillon MD;  Location: [facility] CATH LAB;  Service: Cardiology;  Laterality: N/A;    ANGIOGRAPHY OF AORTIC ARCH  3/1/2024    Procedure: Aortogram, Aortic Arch;  Surgeon: Sebastian Aguillon MD;  Location: [facility] CATH LAB;  Service: Cardiology;;    APPENDECTOMY      CATARACT EXTRACTION Bilateral     CPG    CATHETERIZATION OF BOTH LEFT AND RIGHT HEART N/A 3/1/2024    Procedure: CATHETERIZATION, HEART, BOTH LEFT AND RIGHT;  Surgeon: Sebastian Aguillon MD;  Location: [facility] CATH LAB;  Service: Cardiology;  Laterality: N/A;    ENDOSCOPIC ULTRASOUND OF UPPER GASTROINTESTINAL TRACT N/A 1/11/2024    Procedure: ULTRASOUND, UPPER GI TRACT, ENDOSCOPIC;  Surgeon: Evens Monsalve MD;  Location: [facility] ENDO ([address] [place] [address]);  Service: Endoscopy;  Laterality: N/A;    FLUOROSCOPY N/A 1/5/2024    Procedure: Fluoroscopy/MEDIPORT PLACEMENT;  Surgeon: Jaret Lu MD;  Location: [facility] CATH LAB;  Service: General;  Laterality: N/A;    VALVE STUDY-AORTIC  3/1/2024    Procedure: Valve study-aortic;  Surgeon: Sebastian Aguillon MD;  Location: [facility] CATH LAB;  Service: Cardiology;;       Review of patient's allergies indicates:   Allergen Reactions    Grass pollen-anai grass standard   "      Family History       Problem Relation (Age of Onset)    Allergic rhinitis Daughter    Cancer Son    Cataracts Father    Heart disease Mother, Father    Hyperlipidemia Father    Hypertension Father, Sister, Brother          Tobacco Use    Smoking status: Former     Current packs/day: 0.00     Average packs/day: 3.0 packs/day for 10.0 years (30.0 ttl pk-yrs)     Types: Cigarettes     Start date:      Quit date:      Years since quittin.2    Smokeless tobacco: Never   Substance and Sexual Activity    Alcohol use: No    Drug use: No    Sexual activity: Never         Review of Systems   Constitutional:  Positive for fatigue. Negative for appetite change and fever.   HENT:  Negative for congestion and sore throat.         "Sinus problems"   Respiratory:  Positive for cough. Negative for shortness of breath.    Cardiovascular:  Positive for leg swelling. Negative for chest pain.   Gastrointestinal:  Negative for nausea and vomiting.   Genitourinary:  Negative for difficulty urinating and dysuria.   Musculoskeletal:  Negative for gait problem and neck pain.   Neurological:  Positive for weakness. Negative for dizziness.   Psychiatric/Behavioral:  Negative for sleep disturbance. The patient is not nervous/anxious.      Objective:     Vital Signs (Most Recent):  Temp: 98.5 °F (36.9 °C) (24)  Pulse: 94 (24)  Resp: 19 (24)  BP: 113/62 (24)  SpO2: 97 % (24) Vital Signs (24h Range):  Temp:  [97.8 °F (36.6 °C)-98.8 °F (37.1 °C)] 98.5 °F (36.9 °C)  Pulse:  [83-95] 94  Resp:  [18-20] 19  SpO2:  [90 %-99 %] 97 %  BP: ()/(51-63) 113/62     Weight: 108.8 kg (239 lb 13.8 oz)  Body mass index is 34.42 kg/m².      Intake/Output Summary (Last 24 hours) at 3/6/2024 3786  Last data filed at 3/6/2024 0400  Gross per 24 hour   Intake --   Output 1600 ml   Net -1600 ml        Physical Exam  Vitals reviewed.   Constitutional:       General: He is awake.      " Appearance: He is ill-appearing. He is not toxic-appearing.   HENT:      Head: Normocephalic and atraumatic.      Mouth/Throat:      Mouth: Mucous membranes are moist.      Pharynx: Oropharynx is clear.   Eyes:      Extraocular Movements: Extraocular movements intact.      Conjunctiva/sclera: Conjunctivae normal.   Cardiovascular:      Rate and Rhythm: Normal rate and regular rhythm.      Heart sounds: Murmur heard.      Comments: Right chest wall with port in place- site WNL  Pulmonary:      Effort: Pulmonary effort is normal.      Comments: Bilateral basilar rales. Mild conversational dyspnea. 2L NC  Abdominal:      General: There is no distension.      Palpations: Abdomen is soft.   Musculoskeletal:      Cervical back: Normal range of motion and neck supple.      Right lower leg: Edema present.      Left lower leg: Edema present.   Skin:     General: Skin is warm and dry.      Findings: Bruising present.   Neurological:      General: No focal deficit present.      Mental Status: He is alert and oriented to person, place, and time.   Psychiatric:         Mood and Affect: Mood normal.         Thought Content: Thought content normal.            Lines/Drains/Airways       Central Venous Catheter Line  Duration                  PowerPort A Cath Single Lumen 01/05/24 1415 Internal Jugular Right 61 days              Peripheral Intravenous Line  Duration                  Peripheral IV - Single Lumen 03/06/24 1705 22 G Posterior;Right Forearm <1 day                    Significant Labs:    CBC/Anemia Profile:  Recent Labs   Lab 03/04/24  1900 03/05/24  0616 03/06/24  0450   WBC  --  17.02* 13.98*   HGB  --  9.6* 9.1*   HCT  --  29.3* 26.9*   PLT  --  303 367   MCV  --  90 89   RDW  --  15.6* 15.2*   OCCULTBLOOD Negative  --   --         Chemistries:  Recent Labs   Lab 03/05/24  0616 03/06/24  0450   * 135*   K 4.0 4.0   CL 96 94*   CO2 24 30*   BUN 23 24*   CREATININE 1.1 1.2   CALCIUM 8.8 8.3*   ALBUMIN 2.4* 2.5*    PROT 6.4 5.7*   BILITOT 0.6 0.7   ALKPHOS 115 105   * 115*   * 100*   MG 1.6  --        All pertinent labs within the past 24 hours have been reviewed.    Significant Imaging:   I have reviewed all pertinent imaging results/findings within the past 24 hours.

## 2024-03-06 NOTE — PT/OT/SLP PROGRESS
"Physical Therapy  Treatment    Mark Dickerson .   MRN: [id number]   Admitting Diagnosis: NSTEMI (non-ST elevated myocardial infarction)    PT Received On: 03/06/24  PT Start Time: 0830     PT Stop Time: 0855    PT Total Time (min): 25 min       Billable Minutes:  Gait Training 15 and Therapeutic Activity 10    Treatment Type: Treatment  PT/PTA: PTA     Number of PTA visits since last PT visit: 1       General Precautions: Standard, fall, respiratory  Orthopedic Precautions: N/A  Braces: N/A  Respiratory Status: Nasal cannula, flow 5 L/min    Spiritual, Cultural Beliefs, Jewish Practices, Values that Affect Care: no    Subjective:  Communicated with epic and nurse prior to session.  Pt agreed to tx. "Let me sit here a minute"     Pain/Comfort  Pain Rating 1: 0/10    Objective:   Patient found with: oxygen, peripheral IV, telemetry. Standing next to the bed.     Functional Mobility:  Bed Mobility:     Not tested.     Transfers:    Sit to stand: CGA   Stand to sit: CGA    Gait:     50 ft x 2 with a RW with CGA. One lob. Static standing break at the end of the craig. No  increased sob noted.       Treatment and Education:  Worked on static standing at the end of the craig , leaning against wall for support. X 5 min. Demonstrated two sit to stand trials.     AM-PAC 6 CLICK MOBILITY  How much help from another person does this patient currently need?   1 = Unable, Total/Dependent Assistance  2 = A lot, Maximum/Moderate Assistance  3 = A little, Minimum/Contact Guard/Supervision  4 = None, Modified [place]/Independent    Turning over in bed (including adjusting bedclothes, sheets and blankets)?: 3  Sitting down on and standing up from a chair with arms (e.g., wheelchair, bedside commode, etc.): 3  Moving from lying on back to sitting on the side of the bed?: 3  Moving to and from a bed to a chair (including a wheelchair)?: 3  Need to walk in hospital room?: 3  Climbing 3-5 steps with a railing?: 1  Basic Mobility " Total Score: 16    AM-PAC Raw Score CMS G-Code Modifier Level of Impairment Assistance   6 % Total / Unable   7 - 9 CM 80 - 100% Maximal Assist   10 - 14 CL 60 - 80% Moderate Assist   15 - 19 CK 40 - 60% Moderate Assist   20 - 22 CJ 20 - 40% Minimal Assist   23 CI 1-20% SBA / CGA   24 CH 0% Independent/ Mod I     Patient left up in chair with all lines intact and call button in reach.    Assessment:  Mark Dickerson Sr. is a 84 y.o. male with a medical diagnosis of NSTEMI (non-ST elevated myocardial infarction) and presents with improvement with functional mobility.    Rehab identified problem list/impairments: weakness, impaired endurance, impaired self care skills, gait instability, decreased ROM, edema, impaired cardiopulmonary response to activity    Rehab potential is good.    Activity tolerance: Good    Discharge recommendations: Moderate Intensity Therapy      Barriers to discharge:      Equipment recommendations: to be determined by next level of care     GOALS:   Multidisciplinary Problems       Physical Therapy Goals          Problem: Physical Therapy    Goal Priority Disciplines Outcome Goal Variances Interventions   Physical Therapy Goal     PT, PT/OT Ongoing, Progressing     Description: Pt will perform bed mobility with SPV in order to participate in EOB activity.  Pt will perform transfers with SPV in order to participate in OOB activity.  Pt will ambulate 150 ft SBA with LRAD in order to participate in daily tasks.                        PLAN:    Patient to be seen 3 x/week to address the above listed problems via gait training, therapeutic activities, therapeutic exercises  Plan of Care expires: 03/12/24  Plan of Care reviewed with: patient, family         03/06/2024

## 2024-03-06 NOTE — PT/OT/SLP PROGRESS
"Occupational Therapy   Treatment    Name: Mark Dickerson Sr.  MRN: [id number]  Admitting Diagnosis:  NSTEMI (non-ST elevated myocardial infarction)  5 Days Post-Op    Recommendations:     Discharge Recommendations: Moderate Intensity Therapy  Discharge Equipment Recommendations:  to be determined by next level of care  Barriers to discharge:  None    Assessment:     Mark Dickerson Sr. is a 84 y.o. male with a medical diagnosis of NSTEMI (non-ST elevated myocardial infarction). Performance deficits affecting function are weakness, impaired functional mobility, decreased safety awareness, impaired cardiopulmonary response to activity, gait instability, impaired endurance, decreased upper extremity function, impaired balance, impaired self care skills, decreased lower extremity function.     Rehab Prognosis:  Good; patient would benefit from acute skilled OT services to address these deficits and reach maximum level of function.       Plan:     Patient to be seen 2 x/week to address the above listed problems via self-care/home management, therapeutic activities, therapeutic exercises  Plan of Care Expires: 03/12/24  Plan of Care Reviewed with: patient    Subjective     Chief Complaint: Fatigue  Patient/Family Comments/goals: return to PLOF  Pain/Comfort:  Pain Rating 1: 0/10  Pain Rating Post-Intervention 1: 0/10    Objective:     Communicated with: Nurse prior to session.  Patient found up in chair with oxygen, peripheral IV, telemetry upon OT entry to room.    General Precautions: Standard, fall, respiratory    Orthopedic Precautions:N/A  Braces: N/A  Respiratory Status: Nasal cannula, flow 2 L/min     Occupational Performance:     [facility] 6 Click ADL: 21    Treatment & Education:  Pt declined additional gait training with therapist, stating "I already walked today. I am about to discharge in a little while so I'd rather rest." Pt was willing to complete BUE AROM x10 reps to increase functional strength/endurance needed " for daily activities. Pt educated on use of call button for assistance. Pt verbalized understanding.    Patient left up in chair with all lines intact, call button in reach, chair alarm on, and nurse present    GOALS:   Multidisciplinary Problems       Occupational Therapy Goals          Problem: Occupational Therapy    Goal Priority Disciplines Outcome Interventions   Occupational Therapy Goal     OT, PT/OT Ongoing, Progressing    Description: Goals to be met by: 3/17/24     Patient will increase functional independence with ADLs by performing:    UE Dressing with [place].  Grooming while standing at sink with [place].  Toileting from toilet with [place] for hygiene and clothing management.   Toilet transfer to toilet with Modified [place].  Upper extremity exercise program x15 reps per handout, with independence.                       Time Tracking:     OT Date of Treatment: 03/06/24  OT Start Time: 1030  OT Stop Time: 1040  OT Total Time (min): 10 min    Billable Minutes:Therapeutic Exercise 10    MILEY Keenan  OT/SMITH: OT     Number of SMITH visits since last OT visit: 0    3/6/2024

## 2024-03-06 NOTE — PLAN OF CARE
Per Treatment team rounds, Patient needs repeat tests performed and DC needs to be postponed until tomorrow. PEARL received a call from Jennifer, with [facility] regarding patient. PEARL stated DC would be held until tomorrow. Jennifer verbalized understanding and stated she would let their admissions know.    15 02 SW meet with Patient and son at bedside, to discuss discharge. Per Attending, at bedside Pulmonology is consulted on patient due to CT being worse than yesterday. Patient not medically stable for DC to [facility]. SW verbalized understanding.  Patient's son had questions regarding bed at [facility] and insurance authorization. PEARL stated insurance authorization usually lasts about 7 days and SW keep [facility] updated on Patient's condition. Patient and son verbalized understanding.     SW will continue to follow and assist as needed.

## 2024-03-06 NOTE — ASSESSMENT & PLAN NOTE
Patient's anemia is currently controlled. Has not received any PRBCs to date. Etiology likely d/t chronic disease due to Malignancy  Current CBC reviewed-   Lab Results   Component Value Date    HGB 9.1 (L) 03/06/2024    HCT 26.9 (L) 03/06/2024     Monitor serial CBC and transfuse if patient becomes hemodynamically unstable, symptomatic or H/H drops below 7/21.

## 2024-03-06 NOTE — ASSESSMENT & PLAN NOTE
-Continue IV diuresis  -Avoid hypotension  -OP TAVR/balloon valvuloplasty referral    3/6/24  -Continue IV diuresis as tolerated  -Avoid hypotension, on midodrine to augment BP

## 2024-03-06 NOTE — ASSESSMENT & PLAN NOTE
Patient has hypokalemia which is Acute on Chronic and currently uncontrolled. Most recent potassium levels reviewed-   Lab Results   Component Value Date    K 4.0 03/06/2024   Current K normal

## 2024-03-06 NOTE — PROGRESS NOTES
"[facility] ([facility] Medicine  Progress Note    Patient Name: Mark Dickerson Sr.  MRN: [id number]  Patient Class: IP- Inpatient   Admission Date: 2/26/2024  Length of Stay: 8 days  Attending Physician: Antonia Kapoor MD  Primary Care Provider: Tatyana Cannon MD        Subjective:     Principal Problem:NSTEMI (non-ST elevated myocardial infarction)        HPI:  Mark Dickerson Sr. is a 84 y.o. male patient with a PMHx of metastatic breast cancer (on chemo), HLD, HTN, DM II, CKD who presents to the ED for evaluation of AMS which onset this morning PTA. Per daughter in law she visited the pt early this morning and states that he was normal for a short period before becoming extremely disoriented with jumbled speech.  Patient mental status returned to baseline piror to ed arrival.  Patient reports he feels "dehydrated", he also reports diarrhea earlier last week but this has since resolved.  Patient denies any fever, chills, N/V, SOB and chest pain.   In the ED, work up notable for WBC 14, Na 130, K 3.0, mag 1.5, , Trop 0.7.  EKG with SR PACs, ST and T wave abnormality. CT head negative for acute processes.  Chest xray no acute processes. Patient started on IVF and given zofran for nausea.  Patient will be admitted to observation for electrolyte imbalances, elevated trop.  Cards consulted.     Code Status DNR  Surrogate Decision maker daughter    Overview/Hospital Course:  Patient is currently admitted for NSTEMI, SIRS criteria, severe leukocytosis, hypokalemia, hypomagnesemia, chronic kidney disease stage 3, diabetes mellitus type 2, left breast cancer in a male patient, acute diastolic CHF exacerbation, normocytic anemia, transaminitis, and severe aortic stenosis.  Consults on this admission include Cardiology, Hematology Oncology, and Wound Care.  Echocardiogram 02/26/2024 with EF 55-60% with indeterminate diastolic function.  Right and left heart catheterization 03/01/2024 with elevated " filling pressures, severe aortic stenosis,  RCA with collaterals.  Cardiology recommends continuing medical management for NSTEMI and we will discuss outpatient TAVR evaluation for severe aortic stenosis.  Continue IV Lasix diuresis for CHF exacerbation with evidence of volume overload.  Patient is noted to have pitting edema on exam.  Chest x-ray 03/03/2024 with interval worsening with moderate interstitial and alveolar opacities of bilateral lungs consistent with pulmonary edema.  BNP has trended upward and is currently 590-->repeat 505 on 3/6.  Hematology oncology has seen the patient in consultation and feels that leukocytosis is related to Neulasta and acute cardiac event.  White blood cell count is trending downward.  Patient did receive empiric IV Rocephin for 4 days 2/28-03/02/2024.  No localizing source of infection was identified.  COVID-19 negative (x2), influenza a/B negative, bcx were negative, urinalysis negative, lactic acid level 1.0, procalcitonin level 0.14.  CTA of chest 2/24/24 with no evidence of PE, pulmonary fibrosis versus CHF findings with recommendation for follow-up imaging.  Repeat chest x-ray today 03/06/2024 with diffuse pulmonary infiltrates seen throughout lungs concerning for interstitial edema or interstitial pneumonia, no pleural effusions appreciated.  CT scan of chest without contrast has been ordered to further evaluate as patient is still requiring oxygen.  He generally does not wear home O2.    Interval History:  Patient reports shortness a breath is slowly improving however he is still requiring oxygen.  Chest x-ray today reviewed with diffuse pulmonary infiltrates seen throughout bilateral lungs suspicious for either edema or pneumonia.  CT scan of chest has been ordered.  Patient was pending skilled nursing facility placement today however we will hold until pulmonary/cardiac issues are fully addressed.    Review of Systems   Constitutional:  Negative for chills and  fever.   Respiratory:  Positive for shortness of breath.    Cardiovascular:  Positive for leg swelling. Negative for chest pain.   Gastrointestinal:  Negative for nausea and vomiting.   All other systems reviewed and are negative.    Objective:     Vital Signs (Most Recent):  Temp: 98.1 °F (36.7 °C) (03/06/24 0719)  Pulse: 90 (03/06/24 1023)  Resp: 18 (03/06/24 0719)  BP: (!) 97/52 (03/06/24 1023)  SpO2: 97 % (03/06/24 0719) Vital Signs (24h Range):  Temp:  [97.8 °F (36.6 °C)-98.8 °F (37.1 °C)] 98.1 °F (36.7 °C)  Pulse:  [83-90] 90  Resp:  [18-20] 18  SpO2:  [90 %-99 %] 97 %  BP: ()/(51-63) 97/52     Weight: 108.8 kg (239 lb 13.8 oz)  Body mass index is 34.42 kg/m².    Intake/Output Summary (Last 24 hours) at 3/6/2024 1054  Last data filed at 3/6/2024 0400  Gross per 24 hour   Intake 480 ml   Output 3050 ml   Net -2570 ml         Physical Exam  Vitals reviewed.   Constitutional:       General: He is not in acute distress.     Appearance: He is obese.   HENT:      Nose: Nose normal.   Eyes:      Conjunctiva/sclera: Conjunctivae normal.   Cardiovascular:      Rate and Rhythm: Normal rate.   Pulmonary:      Effort: Pulmonary effort is normal. No respiratory distress.      Breath sounds: No wheezing.   Abdominal:      General: There is no distension.      Tenderness: There is no abdominal tenderness.   Musculoskeletal:         General: Swelling present.      Right lower leg: Edema present.      Left lower leg: Edema present.   Skin:     General: Skin is warm and dry.   Neurological:      Mental Status: He is alert and oriented to person, place, and time.   Psychiatric:         Mood and Affect: Mood normal.         Behavior: Behavior normal.             Significant Labs: All pertinent labs within the past 24 hours have been reviewed.  Recent Lab Results  (Last 5 results in the past 24 hours)        03/06/24  0634   03/06/24  0450   03/05/24  2048   03/05/24  1742   03/05/24  1734        Albumin   2.5              ALP   105             ALT   115             Anion Gap   11             Aniso   Slight             AST   100             Baso #   0.12             Basophil %   0.9             BILIRUBIN TOTAL   0.7  Comment: For infants and newborns, interpretation of results should be based  on gestational age, weight and in agreement with clinical  observations.    Premature Infant recommended reference ranges:  Up to 24 hours.............<8.0 mg/dL  Up to 48 hours............<12.0 mg/dL  3-5 days..................<15.0 mg/dL  6-29 days.................<15.0 mg/dL               BNP   505  Comment: Values of less than 100 pg/ml are consistent with non-CHF populations.             BUN   24             Calcium   8.3             Chloride   94             CO2   30             Creatinine   1.2             Differential Method   Automated             eGFR   59             Eos #   0.0             Eos %   0.2             Giant Platelets   Present             Glucose   149             Gran # (ANC)   11.3             Gran %   80.9             Hematocrit   26.9             Hemoglobin   9.1             Immature Grans (Abs)   0.12  Comment: Mild elevation in immature granulocytes is non specific and   can be seen in a variety of conditions including stress response,   acute inflammation, trauma and pregnancy. Correlation with other   laboratory and clinical findings is essential.               Immature Granulocytes   0.9             Lymph #   1.5             Lymph %   10.9             MCH   30.1             MCHC   33.8             MCV   89             Mono #   0.9             Mono %   6.2             MPV   10.5             nRBC   0             Ovalocytes   Occasional             Platelet Estimate   Appears normal             Platelet Count   367             POCT Glucose 150     161     149       Poikilocytosis   Slight             Poly   Occasional             Potassium   4.0             PROTEIN TOTAL   5.7             RBC   3.02             RDW    15.2             SARS-CoV-2 RNA, Amplification, Qual       Negative  Comment: This test utilizes isothermal nucleic acid amplification technology   to   detect the SARS-CoV-2 RdRp nucleic acid segment. The analytical   sensitivity   (limit of detection) is 500 copies/swab.    A POSITIVE result is indicative of the presence of SARS-CoV-2 RNA;   clinical   correlation with patient history and other diagnostic information is   necessary to determine patient infection status.    A NEGATIVE result means that SARS-CoV-2 nucleic acids are not present   above   the limit of detection. A NEGATIVE result should be treated as   presumptive.   It does not rule out the possibility of COVID-19 and should not be   the sole   basis for treatment decisions.    If COVID-19 is strongly suspected based on clinical and exposure   history,   re-testing using an alternate molecular assay should be considered.    This test is Food and Drug Administration (FDA) approved. Performance   characteristics of this has been independently verified by [facility] Department of Pathology and Laboratory Medicine.           Sodium   135             Stomatocytes   Present             Target Cells   Occasional             WBC   13.98                                    Significant Imaging: I have reviewed all pertinent imaging results/findings within the past 24 hours.  X-Ray Chest AP Portable   Final Result      In comparison to the prior study, there is no adverse interval changes         Electronically signed by: Earl Mendez MD   Date:    03/06/2024   Time:    09:12      X-Ray Chest AP Portable   Final Result      There has been interval worsening of the appearance of the lungs. There is a moderate amount of interstitial and alveolar opacities seen in both lungs.  This is characteristic of pulmonary edema.         Electronically signed by: Marco Elliott MD   Date:    03/03/2024   Time:    19:00      X-Ray Chest 1 View   Final Result       As above.         Electronically signed by: Spenser Stallworth   Date:    02/28/2024   Time:    11:07      X-Ray Chest AP Portable   Final Result      Worsening moderate bilateral infiltrates.  Follow-up is recommended.         Electronically signed by: Shyla Giles MD   Date:    02/27/2024   Time:    10:18      CTA Chest Non-Coronary (PE Studies)   Final Result      No pulmonary embolism.  No dissection.  Atherosclerotic changes.  Mild interstitial opacities.  Correlate clinically for element of fibrosis versus CHF.  Pulmonary micronodularity.  Recommend follow-up.      All CT scans   are performed using dose optimization techniques including the following: automated exposure control; adjustment of the mA and/or kV; use of iterative reconstruction technique.  Dose modulation was employed for ALARA by means of: Automated exposure control; adjustment of the mA and/or kV according to patient size (this includes techniques or standardized protocols for targeted exams where dose is matched to indication/reason for exam; i.e. extremities or head); and/or use of iterative reconstructive technique.         Electronically signed by: Jhoan Roman   Date:    02/26/2024   Time:    19:11      CT Head Without Contrast   Final Result      Chronic microvascular ischemic changes.  No intracranial hemorrhage.      All CT scans at this facility use dose modulation, iterative reconstruction, and/or weight based dosing when appropriate to reduce radiation dose to as low as reasonable achievable.         Electronically signed by: Earl Mendez MD   Date:    02/26/2024   Time:    10:36      X-Ray Chest AP Portable   Final Result      No acute process seen.         Electronically signed by: Earl Mendez MD   Date:    02/26/2024   Time:    10:02      CT Chest Without Contrast    (Results Pending)        Assessment/Plan:      * NSTEMI (non-ST elevated myocardial infarction)  -No prior hx of CAD  -Troponin max 2.789  -EKG with SR PACS and  ST-Twave abnormalities  -echocardiogram 02/26/2024 with EF 55-60% with indeterminate diastolic function   -right and left heart catheterization 03/01/2024 with increased filling pressures, severe aortic stenosis,  RCA with collaterals  -continue medical management; continue aspirin, Lipitor, and midodrine. Lopressor held.  -Cardiology following/managing        Acute diastolic CHF (congestive heart failure)  Patient is identified as having Diastolic (HFpEF) heart failure that is Acute. CHF is currently uncontrolled due to Pulmonary edema/pleural effusion on CXR. Latest ECHO performed and demonstrates- Results for orders placed during the hospital encounter of 02/26/24    Echo    Interpretation Summary    Left Ventricle: The left ventricle is normal in size. There is concentric remodeling. There is normal systolic function with a visually estimated ejection fraction of 55 - 60%. There is indeterminate diastolic function.    Right Ventricle: Right ventricle was not well visualized due to poor acoustic window. Normal right ventricular cavity size. Systolic function is normal.    Pulmonary Artery: The estimated pulmonary artery systolic pressure is 37 mmHg.    IVC/SVC: Normal venous pressure at 3 mmHg.  . Continue lasix and monitor clinical status closely. Monitor on telemetry. Monitor strict Is&Os and daily weights.  Cardiology on board already for NSTEMI. Continue to stress to patient importance of self efficacy and  on diet for CHF. Last BNP reviewed- and noted below   Recent Labs   Lab 03/06/24  0450   *     -Increased lasix to 40 mg IV bid as of 3/4 due to pitting edema to BLE, dyspnea requiring O2 supplementation, and abnormal CXR with pulmonary edema.   -Fluid restrict 1200 mL.  Strict Is&Os and daily weights.   -Cardiology has continued IV Lasix diuresis, Lopressor stopped due to relative hypotension  -repeat chest x-ray today with diffuse pulmonary infiltrates seen throughout lungs concerning  for interstitial edema or interstitial pneumonia, no pleural effusions   -Check CT scan of chest without contrast  -transfer to skilled nursing facility has been held until cardiac/pulmonary issues addressed and stable    SIRS (systemic inflammatory response syndrome)  -WBC 34.56-->13/98, leukocytosis trending down   -COVID-19 negative, influenza a/B negative, procalcitonin level 0.14, lactic acid level 1.0, urinalysis negative, blood cultures negative  -CTA of chest 02/24/2024 with no evidence of PE, pulmonary fibrosis versus CHF findings with recommendations for follow-up imaging  -patient did receive empiric IV ceftriaxone for 4 days 2/28-03/02/2024  -hematology oncology has seen the patient in consultation and feels severe leukocytosis related to recent Neulasta and acute cardiac event    Transaminitis    Transaminitis with minimal trend downward, current  and .  Patient denies abdominal pain and has no tenderness to palpation on exam.  Suspect related to hepatic congestion with CHF exacerbation.  Check a.m. labs.      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 Mendoza at [facility], last chemo session one week prior.  Patient was seen by Hematology Oncology due to significant leukocytosis felt to be related to Neulasta and recent cardiac event.  Dr. Baugh with Hematology Oncology recommends discontinuing all Herceptin products due to cardiac deterioration.  Consider aromatase inhibitor plus or minus Lupron.  Follow up outpatient.    Severe aortic stenosis  Cardiology plans to discuss outpatient TAVR evaluation.  Monitor volume status with patient receiving IV diuresis.      Normocytic anemia  Patient's anemia is currently controlled. Has not received any PRBCs to date. Etiology likely d/t chronic disease due to Malignancy  Current CBC reviewed-   Lab Results   Component Value Date    HGB 9.1 (L) 03/06/2024    HCT 26.9 (L)  "03/06/2024     Monitor serial CBC and transfuse if patient becomes hemodynamically unstable, symptomatic or H/H drops below 7/21.    Hypomagnesemia  Patient has Abnormal Magnesium: hypomagnesemia. Will continue to monitor electrolytes closely. Will replace the affected electrolytes and repeat labs to be done after interventions completed. The patient's magnesium results have been reviewed and are listed below.  No results for input(s): "MG" in the last 24 hours.       Hypokalemia  Patient has hypokalemia which is Acute on Chronic and currently uncontrolled. Most recent potassium levels reviewed-   Lab Results   Component Value Date    K 4.0 03/06/2024   Current K normal    Elevated troponin  As above, see discussion for NSTEMI    CKD stage 3 due to type 2 diabetes mellitus  Creatine stable for now. BMP reviewed- noted Estimated Creatinine Clearance: 54.6 mL/min (based on SCr of 1.2 mg/dL). according to latest data. Based on current GFR, CKD stage is stage 3 - GFR 30-59.  Monitor UOP and serial BMP and adjust therapy as needed. Renally dose meds. Avoid nephrotoxic medications and procedures    Controlled type 2 diabetes mellitus without complication, without long-term current use of insulin  Patient's FSGs are controlled on current medication regimen.  Last A1c reviewed-   Lab Results   Component Value Date    HGBA1C 7.1 (H) 11/28/2023     Most recent fingerstick glucose reviewed-   Recent Labs   Lab 03/05/24  1137 03/05/24  1734 03/05/24  2048 03/06/24  0634   POCTGLUCOSE 213* 149* 161* 150*       Current correctional scale  Low  Maintain anti-hyperglycemic dose as follows-   Antihyperglycemics (From admission, onward)      Start     Stop Route Frequency Ordered    02/26/24 1301  insulin aspart U-100 pen 0-5 Units         -- SubQ Before meals & nightly PRN 02/26/24 1204          Hold Oral hypoglycemics while patient is in the hospital.      VTE Risk Mitigation (From admission, onward)           Ordered     IP VTE " HIGH RISK PATIENT  Once         02/26/24 1204     Place sequential compression device  Until discontinued         02/26/24 1204                    Discharge Planning   DE:      Code Status: DNR   Is the patient medically ready for discharge?:     Reason for patient still in hospital (select all that apply): Patient trending condition, Laboratory test, Treatment, Imaging, Consult recommendations, and Pending disposition  Discharge Plan A: Skilled Nursing Facility   Discharge Delays: None known at this time              Antonia Kapoor MD  Department of Hospital Medicine   '[facility] ([facility])

## 2024-03-06 NOTE — ASSESSMENT & PLAN NOTE
Transaminitis with minimal trend downward, current  and .  Patient denies abdominal pain and has no tenderness to palpation on exam.  Suspect related to hepatic congestion with CHF exacerbation.  Check a.m. labs.

## 2024-03-06 NOTE — PLAN OF CARE
Patient up in bedside chair, declining more gait training with therapy. Pt was willing to perform UB exercises seated up in chair.   Rec moderate intensity therapy

## 2024-03-06 NOTE — SUBJECTIVE & OBJECTIVE
Review of Systems   Constitutional: Positive for malaise/fatigue.   HENT: Negative.     Eyes: Negative.    Cardiovascular:  Positive for dyspnea on exertion and leg swelling.   Respiratory:  Positive for shortness of breath.    Endocrine: Negative.    Hematologic/Lymphatic: Negative.    Skin: Negative.    Musculoskeletal:  Positive for arthritis and joint pain.   Gastrointestinal: Negative.    Genitourinary: Negative.    Neurological: Negative.    Psychiatric/Behavioral: Negative.     Allergic/Immunologic: Negative.      Objective:     Vital Signs (Most Recent):  Temp: 98.1 °F (36.7 °C) (03/06/24 0719)  Pulse: 95 (03/06/24 1125)  Resp: 18 (03/06/24 0719)  BP: 113/63 (03/06/24 1125)  SpO2: 97 % (03/06/24 0719) Vital Signs (24h Range):  Temp:  [97.8 °F (36.6 °C)-98.8 °F (37.1 °C)] 98.1 °F (36.7 °C)  Pulse:  [83-95] 95  Resp:  [18-20] 18  SpO2:  [90 %-99 %] 97 %  BP: ()/(51-63) 113/63     Weight: 108.8 kg (239 lb 13.8 oz)  Body mass index is 34.42 kg/m².     SpO2: 97 %         Intake/Output Summary (Last 24 hours) at 3/6/2024 1204  Last data filed at 3/6/2024 0400  Gross per 24 hour   Intake 480 ml   Output 2650 ml   Net -2170 ml       Lines/Drains/Airways       Central Venous Catheter Line  Duration                  PowerPort A Cath Single Lumen 01/05/24 1415 Internal Jugular Right 60 days              Peripheral Intravenous Line  Duration                  Peripheral IV - Single Lumen 03/02/24 2201 22 G Posterior;Right Hand 3 days                       Physical Exam  Vitals and nursing note reviewed.   Constitutional:       General: He is not in acute distress.     Appearance: He is well-developed. He is ill-appearing. He is not diaphoretic.      Comments: On supplemental O2   HENT:      Head: Normocephalic and atraumatic.   Eyes:      General:         Right eye: No discharge.         Left eye: No discharge.      Pupils: Pupils are equal, round, and reactive to light.   Neck:      Thyroid: No thyromegaly.     "  Vascular: No JVD.      Trachea: No tracheal deviation.   Cardiovascular:      Rate and Rhythm: Normal rate and regular rhythm.      Heart sounds: S1 normal and S2 normal. Murmur heard.      Harsh midsystolic murmur is present at the upper right sternal border radiating to the neck.   Pulmonary:      Effort: Pulmonary effort is normal. No respiratory distress.      Breath sounds: Rhonchi present. No wheezing.   Abdominal:      General: There is no distension.   Musculoskeletal:      Cervical back: Neck supple.      Right lower leg: Edema present.      Left lower leg: Edema present.   Skin:     General: Skin is warm and dry.      Findings: No erythema.   Neurological:      General: No focal deficit present.      Mental Status: He is alert and oriented to person, place, and time.   Psychiatric:         Mood and Affect: Mood normal.         Behavior: Behavior normal.            Significant Labs: CMP   Recent Labs   Lab 03/05/24  0616 03/06/24  0450   * 135*   K 4.0 4.0   CL 96 94*   CO2 24 30*   * 149*   BUN 23 24*   CREATININE 1.1 1.2   CALCIUM 8.8 8.3*   PROT 6.4 5.7*   ALBUMIN 2.4* 2.5*   BILITOT 0.6 0.7   ALKPHOS 115 105   * 100*   * 115*   ANIONGAP 13 11   , CBC   Recent Labs   Lab 03/05/24  0616 03/06/24  0450   WBC 17.02* 13.98*   HGB 9.6* 9.1*   HCT 29.3* 26.9*    367   , Troponin No results for input(s): "TROPONINI" in the last 48 hours., and All pertinent lab results from the last 24 hours have been reviewed.    Significant Imaging: Echocardiogram: Transthoracic echo (TTE) complete (Cupid Only):   Results for orders placed or performed during the hospital encounter of 02/26/24   Echo   Result Value Ref Range    LVIDd 4.88 3.5 - 6.0 cm    LV Systolic Volume 52.86 mL    LVIDs 3.56 2.1 - 4.0 cm    LV Diastolic Volume 111.86 mL    IVS 1.04 0.6 - 1.1 cm    FS 27 (A) 28 - 44 %    Left Ventricle Relative Wall Thickness 0.44 cm    Posterior Wall 1.08 0.6 - 1.1 cm    LV mass 189.29 g "    TR Max Can 2.91 m/s    RVDD 2.81 cm    LA size 4.18 cm    Triscuspid Valve Regurgitation Peak Gradient 34 mmHg    IVC diameter 2.08 cm    LV Systolic Volume Index 23.2 mL/m2    LV Diastolic Volume Index 49.06 mL/m2    LV Mass Index 83 g/m2    ZLVIDS -2.98     ZLVIDD -5.65     TV resting pulmonary artery pressure 37 mmHg    RV TB RVSP 6 mmHg    Est. RA pres 3 mmHg    Narrative      Left Ventricle: The left ventricle is normal in size. There is   concentric remodeling. There is normal systolic function with a visually   estimated ejection fraction of 55 - 60%. There is indeterminate diastolic   function.    Right Ventricle: Right ventricle was not well visualized due to poor   acoustic window. Normal right ventricular cavity size. Systolic function   is normal.    Pulmonary Artery: The estimated pulmonary artery systolic pressure is   37 mmHg.    IVC/SVC: Normal venous pressure at 3 mmHg.      and EKG: Reviewed

## 2024-03-06 NOTE — PLAN OF CARE
CT scan of chest reviewed with interval worsening compared with previous CT scan, diffuse multifocal airspace opacities superimposed on interstitial thickening with differential diagnosis to include multifocal pneumonia, pulmonary edema, and pulmonary hemorrhage.  Patient has been continued on IV diuresis.  Will add empiric IV cefepime and IV vancomycin for coverage of possible infection with leukocytosis.  Check respiratory viral panel, blood culture, sputum culture, and MRSA culture.  Consult Pulmonary for further evaluation and treatment recommendations.

## 2024-03-06 NOTE — ASSESSMENT & PLAN NOTE
-WBC 34.56-->13/98, leukocytosis trending down   -COVID-19 negative, influenza a/B negative, procalcitonin level 0.14, lactic acid level 1.0, urinalysis negative, blood cultures negative  -CTA of chest 02/24/2024 with no evidence of PE, pulmonary fibrosis versus CHF findings with recommendations for follow-up imaging  -patient did receive empiric IV ceftriaxone for 4 days 2/28-03/02/2024  -hematology oncology has seen the patient in consultation and feels severe leukocytosis related to recent Neulasta and acute cardiac event

## 2024-03-06 NOTE — ASSESSMENT & PLAN NOTE
-No prior hx of CAD  -Troponin max 2.789  -EKG with SR PACS and ST-Twave abnormalities  -echocardiogram 02/26/2024 with EF 55-60% with indeterminate diastolic function   -right and left heart catheterization 03/01/2024 with increased filling pressures, severe aortic stenosis,  RCA with collaterals  -continue medical management; continue aspirin, Lipitor, and midodrine. Lopressor held.  -Cardiology following/managing

## 2024-03-06 NOTE — ASSESSMENT & PLAN NOTE
Patient is identified as having Diastolic (HFpEF) heart failure that is Acute. CHF is currently uncontrolled due to Pulmonary edema/pleural effusion on CXR. Latest ECHO performed and demonstrates- Results for orders placed during the hospital encounter of 02/26/24    Echo    Interpretation Summary    Left Ventricle: The left ventricle is normal in size. There is concentric remodeling. There is normal systolic function with a visually estimated ejection fraction of 55 - 60%. There is indeterminate diastolic function.    Right Ventricle: Right ventricle was not well visualized due to poor acoustic window. Normal right ventricular cavity size. Systolic function is normal.    Pulmonary Artery: The estimated pulmonary artery systolic pressure is 37 mmHg.    IVC/SVC: Normal venous pressure at 3 mmHg.  . Continue lasix and monitor clinical status closely. Monitor on telemetry. Monitor strict Is&Os and daily weights.  Cardiology on board already for NSTEMI. Continue to stress to patient importance of self efficacy and  on diet for CHF. Last BNP reviewed- and noted below   Recent Labs   Lab 03/06/24  0450   *     -Increased lasix to 40 mg IV bid as of 3/4 due to pitting edema to BLE, dyspnea requiring O2 supplementation, and abnormal CXR with pulmonary edema.   -Fluid restrict 1200 mL.  Strict Is&Os and daily weights.   -Cardiology has continued IV Lasix diuresis, Lopressor stopped due to relative hypotension  -repeat chest x-ray today with diffuse pulmonary infiltrates seen throughout lungs concerning for interstitial edema or interstitial pneumonia, no pleural effusions   -Check CT scan of chest without contrast  -transfer to skilled nursing facility has been held until cardiac/pulmonary issues addressed and stable

## 2024-03-06 NOTE — HPI
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 He underwent LHC 3/1/2024 with findings of single vessel disease, severe AS, moderate pulm HTN, and EF 30%. He has been receiving diuretics for volume overload with some improvement. He remains on oxygen which is new.  obtained CT chest for better evaluation which revealed worsening bilateral pulmonary infiltrates prompting pulmonary consult for additional recommendations.     Hospital Course/Interval History:   3/8: despite ongoing negative balance, diuresis has not improved oxygenation and CXR is overall unchanged. Remains on 3L NC. -4.2L balance, now with worsening contraction alkalosis, denies SOB with ambulating in hallway yesterday. Only complaint is weakness. Discussed possible causes including effect of chemo on lungs, lymphangitic spread, and/or HF; adding steroids  3/10: overall stable, remains on supplemental oxygen- decreased to 3L NC while at bedside. Mild HENDRICKSON. LE edema much better. Sputum culture with normal ana. No fevers. WBC has bumped up a little. Developed diarrhea x 2 days  3/12: Patient reports having a difficult night as he removed his oxygen while sleeping and awoke confused / disoriented. This morning he is sitting up in the chair as he states it helps him breath easier. Currently on 4L/NC with sats 97% at time of my exam.   3/13: Patient fails to improve clinically despite all aggressive treatment measures. Oxygen titrated up to 6L/NC over the past 24 hours. Case discussed with primary and cardiology teams. Family at bedside updated on plan of care.

## 2024-03-06 NOTE — ASSESSMENT & PLAN NOTE
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  Will need to reimage to see whether not disease in chest is responsive as well with similar findings.  Or could be potential other malignancy but will follow.  Will need to discuss with Cardiology to see what modifications need to be made in his treatment record regimen since he is receiving Taxotere Herceptin and Perjeta.  With decreasing ejection fraction  03/05/2024 extensive conversation with cardiology at this point patient's cardiac function has deteriorated from heart failure as well as aortic stenosis.  At this time all Herceptin products made to be discontinued until part is resolved.  The patient is ER positive I spoke with him concerning this in this may offer us an option with aromatase inhibitor plus or minus Lupron.  Control disease while we are of the attempting to have his heart maximize in terms of his ejection fraction to continue with HER2 positive treatment I have discussed this with Dr. Cummins yesterday.  And will discuss this again with his primary oncologist Dr. Hu.  Reviewed patient has no evidence of germ line mutation contributing to his metastatic male breast cancer.  Limiting a potential therapeutic option with the PARP inhibitor  03/06/2024.  Reviewed again with patient will not be treating with Herceptin containing medicine will defer in outpatient setting to primary oncologist whether not aromatase inhibitor Arimidex could be added.  At this particular point with metastatic male breast cancer and whether not consideration of Lupron type agent.  To prevent prevention while addressing cardiac issues

## 2024-03-06 NOTE — CARE UPDATE
I did call and update patient's daughter charmaine Byrd [phone].  All questions were answered.

## 2024-03-06 NOTE — SUBJECTIVE & OBJECTIVE
Interval History:  Resting comfortably says he plans to be discharged so nursing home today    Oncology Treatment Plan:   OP BREAST THP (pertuzumab trastuzumab DOCEtaxel) Q3W    Medications:  Continuous Infusions:   sodium chloride 0.9% 100 mL/hr (03/01/24 1334)     Scheduled Meds:   anastrozole  1 mg Oral Daily    aspirin  81 mg Oral Daily    atorvastatin  20 mg Oral QHS    furosemide (LASIX) injection  40 mg Intravenous BID    magnesium oxide  400 mg Oral Daily    midodrine  10 mg Oral TID WM     PRN Meds:sodium chloride 0.9%, acetaminophen, acetaminophen, cyclobenzaprine, dextrose 10%, dextrose 10%, glucagon (human recombinant), glucose, glucose, HYDROcodone-acetaminophen, insulin aspart U-100, melatonin, naloxone, ondansetron, ondansetron, sodium chloride 0.9%     Review of Systems   Constitutional:  Positive for fatigue. Negative for activity change, appetite change, chills, diaphoresis, fever and unexpected weight change.   HENT:  Negative for congestion, dental problem, drooling, ear discharge, ear pain, facial swelling, hearing loss, mouth sores, nosebleeds, postnasal drip, rhinorrhea, sinus pressure, sneezing, sore throat, tinnitus, trouble swallowing and voice change.    Eyes:  Negative for photophobia, pain, discharge, redness, itching and visual disturbance.   Respiratory:  Negative for apnea, cough, choking, chest tightness, shortness of breath, wheezing and stridor.    Cardiovascular:  Negative for chest pain, palpitations and leg swelling.   Gastrointestinal:  Negative for abdominal distention, abdominal pain, anal bleeding, blood in stool, constipation, diarrhea, nausea, rectal pain and vomiting.   Endocrine: Negative for cold intolerance, heat intolerance, polydipsia, polyphagia and polyuria.   Genitourinary:  Negative for decreased urine volume, difficulty urinating, dysuria, enuresis, flank pain, frequency, genital sores, hematuria, penile discharge, penile pain, penile swelling, scrotal swelling,  testicular pain and urgency.   Musculoskeletal:  Positive for gait problem. Negative for arthralgias, back pain, joint swelling, myalgias, neck pain and neck stiffness.   Skin:  Negative for color change, pallor, rash and wound.   Allergic/Immunologic: Negative for environmental allergies, food allergies and immunocompromised state.   Neurological:  Positive for weakness. Negative for dizziness, tremors, seizures, syncope, facial asymmetry, speech difficulty, light-headedness, numbness and headaches.   Hematological:  Negative for adenopathy. Does not bruise/bleed easily.   Psychiatric/Behavioral:  Positive for dysphoric mood. Negative for agitation, behavioral problems, confusion, decreased concentration, hallucinations, self-injury, sleep disturbance and suicidal ideas. The patient is nervous/anxious. The patient is not hyperactive.      Objective:     Vital Signs (Most Recent):  Temp: 98.1 °F (36.7 °C) (03/06/24 0719)  Pulse: 86 (03/06/24 0719)  Resp: 18 (03/06/24 0719)  BP: 118/63 (03/06/24 0719)  SpO2: 97 % (03/06/24 0719) Vital Signs (24h Range):  Temp:  [96.7 °F (35.9 °C)-98.8 °F (37.1 °C)] 98.1 °F (36.7 °C)  Pulse:  [83-90] 86  Resp:  [18-20] 18  SpO2:  [90 %-99 %] 97 %  BP: ()/(51-72) 118/63     Weight: 108.8 kg (239 lb 13.8 oz)  Body mass index is 34.42 kg/m².  Body surface area is 2.32 meters squared.      Intake/Output Summary (Last 24 hours) at 3/6/2024 0733  Last data filed at 3/6/2024 0400  Gross per 24 hour   Intake 720 ml   Output 3350 ml   Net -2630 ml        Physical Exam  Vitals reviewed.   Constitutional:       General: He is not in acute distress.     Appearance: He is well-developed. He is obese. He is ill-appearing. He is not diaphoretic.   HENT:      Head: Normocephalic.      Right Ear: External ear normal.      Left Ear: External ear normal.      Nose: Nose normal.      Right Sinus: No maxillary sinus tenderness or frontal sinus tenderness.      Left Sinus: No maxillary sinus  tenderness or frontal sinus tenderness.      Mouth/Throat:      Pharynx: No oropharyngeal exudate.   Eyes:      General: Lids are normal. No scleral icterus.        Right eye: No discharge.         Left eye: No discharge.      Extraocular Movements:      Right eye: Normal extraocular motion.      Left eye: Normal extraocular motion.      Conjunctiva/sclera:      Right eye: Right conjunctiva is not injected. No hemorrhage.     Left eye: Left conjunctiva is not injected. No hemorrhage.     Pupils: Pupils are equal, round, and reactive to light.   Neck:      Thyroid: No thyromegaly.      Vascular: No JVD.      Trachea: No tracheal deviation.   Cardiovascular:      Rate and Rhythm: Normal rate.   Pulmonary:      Effort: Pulmonary effort is normal. No respiratory distress.      Breath sounds: No stridor.   Chest:      Chest wall: Deformity: MediPort site.       Abdominal:      General: Bowel sounds are normal.      Palpations: Abdomen is soft. There is no hepatomegaly, splenomegaly or mass.      Tenderness: There is no abdominal tenderness.   Musculoskeletal:         General: No tenderness. Normal range of motion.      Cervical back: Normal range of motion and neck supple.   Lymphadenopathy:      Head:      Right side of head: No posterior auricular or occipital adenopathy.      Left side of head: No posterior auricular or occipital adenopathy.      Cervical: No cervical adenopathy.      Right cervical: No superficial, deep or posterior cervical adenopathy.     Left cervical: No superficial, deep or posterior cervical adenopathy.      Upper Body:      Right upper body: No supraclavicular adenopathy.      Left upper body: No supraclavicular adenopathy.   Skin:     General: Skin is dry.      Findings: No erythema or rash.      Nails: There is no clubbing.   Neurological:      Mental Status: He is alert and oriented to person, place, and time.      Cranial Nerves: No cranial nerve deficit.      Coordination: Coordination  "normal.   Psychiatric:         Behavior: Behavior normal.         Thought Content: Thought content normal.         Judgment: Judgment normal.          Significant Labs:   BMP:   Recent Labs   Lab 03/05/24  0616 03/06/24  0450   * 149*   * 135*   K 4.0 4.0   CL 96 94*   CO2 24 30*   BUN 23 24*   CREATININE 1.1 1.2   CALCIUM 8.8 8.3*   MG 1.6  --    , CBC:   Recent Labs   Lab 03/05/24  0616 03/06/24  0450   WBC 17.02* 13.98*   HGB 9.6* 9.1*   HCT 29.3* 26.9*    367   , CMP:   Recent Labs   Lab 03/05/24  0616 03/06/24  0450   * 135*   K 4.0 4.0   CL 96 94*   CO2 24 30*   * 149*   BUN 23 24*   CREATININE 1.1 1.2   CALCIUM 8.8 8.3*   PROT 6.4 5.7*   ALBUMIN 2.4* 2.5*   BILITOT 0.6 0.7   ALKPHOS 115 105   * 100*   * 115*   ANIONGAP 13 11   , Coagulation: No results for input(s): "PT", "INR", "APTT" in the last 48 hours., Haptoglobin: No results for input(s): "HAPTOGLOBIN" in the last 48 hours., Immunology: No results for input(s): "SPEP", "ARIEL", "PRIYA", "FREELAMBDALI" in the last 48 hours., LDH: No results for input(s): "LDHCSF", "BFSOURCE" in the last 48 hours., LFTs:   Recent Labs   Lab 03/05/24  0616 03/06/24  0450   * 115*   * 100*   ALKPHOS 115 105   BILITOT 0.6 0.7   PROT 6.4 5.7*   ALBUMIN 2.4* 2.5*   , Reticulocytes: No results for input(s): "RETIC" in the last 48 hours., Tumor Markers: No results for input(s): "PSA", "CEA", "", "AFPTM", "SE1333", "" in the last 48 hours.    Invalid input(s): "ALGTM", and Uric Acid No results for input(s): "URICACID" in the last 48 hours.    Diagnostic Results:  I have reviewed all pertinent imaging results/findings within the past 24 hours.  "

## 2024-03-06 NOTE — PLAN OF CARE
CLINICAL PHARMACY NOTE: MEDS TO BEDS    Total # of Prescriptions Filled: 4   The following medications were delivered to the patient:  · Cefuroxime 500mg  · Lorazepam 1mg  · Narcan 4mg/0.1ml  · Morphine sulfate soln    Additional Documentation:    Medications were picked up in the Outpatient Pharmacy by family member Grace Muniz Recommendation/Intervention:   1. Recommend cardiac, diabetic diet with 1200 mL fluid restriction (per MD/NP)   2. Recommend Boost Vanilla glucose control BID   3. Weekly weight  4. Collaboration with other providers    Goals:   1. Pt diet will be modified within 24 hours   2. Pt will tolerate and consume 75% of EEN and EPN by RD follow up  Nutrition Goal Status: colin Carvalho, Dietetic Intern

## 2024-03-06 NOTE — PROGRESS NOTES
[facility] - Telemetry ([facility])  Adult Nutrition  Progress Note    SUMMARY       Recommendations    Recommendation/Intervention:   1. Recommend cardiac, diabetic diet with 1200 mL fluid restriction (per MD/NP)   2. Recommend Boost Vanilla glucose control BID   3. Weekly weight    Goals:   1. Pt diet will be modified within 24 hours   2. Pt will tolerate and consume 75% of EEN and EPN by RD follow up  Nutrition Goal Status: new    Assessment and Plan    Nutrition Problem  Inadequate protein-energy intake    Related to (etiology):   Scheduled or planned medical therapy or medication that is predicted to decrease ability to consume sufficient energy, Scheduled or planned medical therapy or medication that is predicted to increase energy requirements    Signs and Symptoms (as evidenced by):   Chemotherapy, Change in appetite or taste, Diarrhea, Estimated intake of food less than estimated needs    Interventions/Recommendations (treatment strategy):  1. Decreased Sodium/Fat/Consistent carbohydrate/Fluid modified diet  2. Commercial beverage  3. PO intake encouragement  4. 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 Pt admitted for elevated troponin and electrolyte imbalances found during ER workup. Pt dx of NSTEMI. RD  "visited pt at bedside, pt reports no appetite, but states he still eats because he knows he has to. Pt states current PO intake is 60% + some snacks, as physicians encouraged him to eat regularly throughout the day to help him maintain his energy during chemo. Pt states eating small meals regularly throughout the day outisde of hospital, and prefers this, as he does not like all of the hospital food he has been brought. RD provided pt a menu to encourage self selection of foods to increase PO intake. Pt was also agreeable to RD recommendation of Boost to provide additional calories throughout the day at pt leisure. Pt denies difficulty chewing/swallowing, abdominal pain after eating, nausea, and vomiting. Pt reports an episode of diarrhea but states it was a side effect of one of the chemotherapy drugs and his chemo medication has already been changed to address this. Visul NFPE performed, pt appears nourished. RD will continue to follow and monitor pt during admit.    Nutrition Discharge Planning: Cardiac, diabetic diet, 1200 mL fluid restriction (per MD/NP)    Nutrition Risk Screen    Nutrition Risk Screen: no indicators present    Nutrition/Diet History    Spiritual, Cultural Beliefs, Jainism Practices, Values that Affect Care: no  Food Allergies: NKFA  Factors Affecting Nutritional Intake: altered taste (Pt reports altered taste is side effect of chemo)    Anthropometrics    Temp: 98.1 °F (36.7 °C)  Height: 5' 10" (177.8 cm)  Height (inches): 70 in  Weight Method: Bed Scale  Weight: 108.8 kg (239 lb 13.8 oz)  Weight (lb): 239.86 lb  Ideal Body Weight (IBW), Male: 166 lb  % Ideal Body Weight, Male (lb): 144.49 %  BMI (Calculated): 34.4  BMI Grade: 30 - 34.9- obesity - grade I     Wt Readings from Last 15 Encounters:   03/06/24 108.8 kg (239 lb 13.8 oz)   02/20/24 104.9 kg (231 lb 2.4 oz)   02/20/24 104.9 kg (231 lb 2.4 oz)   01/31/24 108.5 kg (239 lb 3.2 oz)   01/30/24 108.5 kg (239 lb 3.2 oz)   01/30/24 108.5 " kg (239 lb 3.2 oz)   01/23/24 105.2 kg (232 lb)   01/11/24 105.2 kg (232 lb)   01/09/24 105.3 kg (232 lb 4.1 oz)   01/05/24 107 kg (235 lb 14.3 oz)   12/14/23 107 kg (236 lb)   12/01/23 107.2 kg (236 lb 7.1 oz)   11/30/23 112.9 kg (249 lb)   05/19/23 113 kg (249 lb 1.9 oz)   04/27/23 111.1 kg (244 lb 14.9 oz)     Lab/Procedures/Meds    Pertinent Labs Reviewed: reviewed  Pertinent Medications Reviewed: reviewed  CMP  Sodium   Date Value Ref Range Status   03/06/2024 135 (L) 136 - 145 mmol/L Final     Potassium   Date Value Ref Range Status   03/06/2024 4.0 3.5 - 5.1 mmol/L Final     Chloride   Date Value Ref Range Status   03/06/2024 94 (L) 95 - 110 mmol/L Final     CO2   Date Value Ref Range Status   03/06/2024 30 (H) 23 - 29 mmol/L Final     Glucose   Date Value Ref Range Status   03/06/2024 149 (H) 70 - 110 mg/dL Final     BUN   Date Value Ref Range Status   03/06/2024 24 (H) 8 - 23 mg/dL Final     Creatinine   Date Value Ref Range Status   03/06/2024 1.2 0.5 - 1.4 mg/dL Final     Calcium   Date Value Ref Range Status   03/06/2024 8.3 (L) 8.7 - 10.5 mg/dL Final     Total Protein   Date Value Ref Range Status   03/06/2024 5.7 (L) 6.0 - 8.4 g/dL Final     Albumin   Date Value Ref Range Status   03/06/2024 2.5 (L) 3.5 - 5.2 g/dL Final     Total Bilirubin   Date Value Ref Range Status   03/06/2024 0.7 0.1 - 1.0 mg/dL Final     Comment:     For infants and newborns, interpretation of results should be based  on gestational age, weight and in agreement with clinical  observations.    Premature Infant recommended reference ranges:  Up to 24 hours.............<8.0 mg/dL  Up to 48 hours............<12.0 mg/dL  3-5 days..................<15.0 mg/dL  6-29 days.................<15.0 mg/dL       Alkaline Phosphatase   Date Value Ref Range Status   03/06/2024 105 55 - 135 U/L Final     AST   Date Value Ref Range Status   03/06/2024 100 (H) 10 - 40 U/L Final     ALT   Date Value Ref Range Status   03/06/2024 115 (H) 10 - 44 U/L  Final     Anion Gap   Date Value Ref Range Status   03/06/2024 11 8 - 16 mmol/L Final     eGFR   Date Value Ref Range Status   03/06/2024 59 (A) >60 mL/min/1.73 m^2 Final       Lab Results   Component Value Date    WBC 13.98 (H) 03/06/2024    RBC 3.02 (L) 03/06/2024    HGB 9.1 (L) 03/06/2024    HCT 26.9 (L) 03/06/2024    MCV 89 03/06/2024    MCH 30.1 03/06/2024    MCHC 33.8 03/06/2024    RDW 15.2 (H) 03/06/2024     03/06/2024    MPV 10.5 03/06/2024    GRAN 11.3 (H) 03/06/2024    GRAN 80.9 (H) 03/06/2024    LYMPH 1.5 03/06/2024    LYMPH 10.9 (L) 03/06/2024    MONO 0.9 03/06/2024    MONO 6.2 03/06/2024    EOS 0.0 03/06/2024    BASO 0.12 03/06/2024    EOSINOPHIL 0.2 03/06/2024    BASOPHIL 0.9 03/06/2024     POCT Glucose   Date Value Ref Range Status   03/06/2024 150 (H) 70 - 110 mg/dL Final   03/05/2024 161 (H) 70 - 110 mg/dL Final   03/05/2024 149 (H) 70 - 110 mg/dL Final   03/05/2024 213 (H) 70 - 110 mg/dL Final   03/04/2024 189 (H) 70 - 110 mg/dL Final   03/04/2024 188 (H) 70 - 110 mg/dL Final   03/04/2024 178 (H) 70 - 110 mg/dL Final   03/03/2024 204 (H) 70 - 110 mg/dL Final     Lab Results   Component Value Date    HGBA1C 7.1 (H) 11/28/2023         Scheduled Meds:   anastrozole  1 mg Oral Daily    aspirin  81 mg Oral Daily    atorvastatin  20 mg Oral QHS    ceFEPime IV (PEDS and ADULTS)  2 g Intravenous Q12H    furosemide (LASIX) injection  40 mg Intravenous BID    magnesium oxide  400 mg Oral Daily    midodrine  10 mg Oral TID WM    vancomycin (VANCOCIN) IV (PEDS and ADULTS)  2,500 mg Intravenous Once     Continuous Infusions:   sodium chloride 0.9% 100 mL/hr (03/01/24 1334)     PRN Meds:.sodium chloride 0.9%, acetaminophen, acetaminophen, cyclobenzaprine, dextrose 10%, dextrose 10%, glucagon (human recombinant), glucose, glucose, HYDROcodone-acetaminophen, insulin aspart U-100, melatonin, naloxone, ondansetron, ondansetron, sodium chloride 0.9%, Pharmacy to dose Vancomycin consult **AND** vancomycin -  pharmacy to dose    Physical Findings/Assessment         Estimated/Assessed Needs    Weight Used For Calorie Calculations: 108.8 kg (239 lb 13.8 oz)  Energy Calorie Requirements (kcal): 6552-1236 kcal/day (25-30 kcal/kg (cancer, standard treatment))  Energy Need Method: Kcal/kg  Protein Requirements: 109-131 g/day (1.0-1.2 g/kg (cancer))  Weight Used For Protein Calculations: 108.8 kg (239 lb 13.8 oz)  Fluid Requirements (mL): 1200 mL/day (1200 mL fluid restriction per MD/NP)     RDA Method (mL): 2720  CHO Requirement: 340-408 g/day      Nutrition Prescription Ordered    Current Diet Order: Cardiac, 1200 mL fluid restriction    Evaluation of Received Nutrient/Fluid Intake  I/O Net (since admit):   3/6/24: -2508.8    Energy Calories Required: not meeting needs  Protein Required: not meeting needs  Fluid Required: not meeting needs  Tolerance: tolerating  % Intake of Estimated Energy Needs: 50 - 75 %  % Meal Intake: 50 - 75 %    Nutrition Risk    Level of Risk/Frequency of Follow-up: low (1x weekly follow up)     Monitor and Evaluation    Food and Nutrient Intake: energy intake, food and beverage intake  Food and Nutrient Adminstration: diet order  Knowledge/Beliefs/Attitudes: food and nutrition knowledge/skill, beliefs and attitudes  Anthropometric Measurements: weight, weight change, body mass index  Biochemical Data, Medical Tests and Procedures: electrolyte and renal panel, glucose/endocrine profile, inflammatory profile  Nutrition-Focused Physical Findings: overall appearance, extremities, muscles and bones, head and eyes, skin     Nutrition Follow-Up    RD Follow-up?: Yes  Rolly Carvalho, Dietetic Intern

## 2024-03-06 NOTE — PLAN OF CARE
Mr. Dickerson  is progressing with therapy. He is currently ambulating with MIN a with the aid of a RW. He tolerated ~ 50 feet x 2.

## 2024-03-06 NOTE — PLAN OF CARE
A225/A225 JOHANNY Dickerson . is a 84 y.o.male admitted on 2/26/2024 for NSTEMI (non-ST elevated myocardial infarction)   Code Status: DNR MRN: [id number]   Review of patient's allergies indicates:   Allergen Reactions    Grass pollen-anai grass standard      Past Medical History:   Diagnosis Date    Chest pain 2/26/2024    CKD stage 3 due to type 2 diabetes mellitus 2/18/2021    DM (diabetes mellitus) 2014    BS didn't check 12/11/2019    DM (diabetes mellitus) 2014    BS didn't check 05/05/2022    Foreign body, eye     right eye    Hyperlipidemia     Hypertension     Need for shingles vaccine 11/20/2019    Normocytic anemia 3/4/2024    NSTEMI (non-ST elevated myocardial infarction) 2/27/2024    Pneumonia     Primary osteoarthritis of right knee 10/29/2021    Severe obesity (BMI 35.0-35.9 with comorbidity) 7/10/2019    Type 2 diabetes mellitus 2014    BS didn't check 12/12/2018      PRN meds    sodium chloride 0.9%, , Continuous PRN  acetaminophen, 650 mg, Q4H PRN  acetaminophen, 650 mg, Q4H PRN  cyclobenzaprine, 10 mg, TID PRN  dextrose 10%, 12.5 g, PRN  dextrose 10%, 25 g, PRN  glucagon (human recombinant), 1 mg, PRN  glucose, 16 g, PRN  glucose, 24 g, PRN  HYDROcodone-acetaminophen, 1 tablet, Q6H PRN  insulin aspart U-100, 0-5 Units, QID (AC + HS) PRN  melatonin, 6 mg, Nightly PRN  naloxone, 0.02 mg, PRN  ondansetron, 8 mg, Q8H PRN  ondansetron, 4 mg, Q8H PRN  sodium chloride 0.9%, 10 mL, Q12H PRN  vancomycin - pharmacy to dose, , pharmacy to manage frequency      Chart check completed. Will continue plan of care.      Orientation: oriented x 4  William Coma Scale Score: 15     Lead Monitored: Lead II Rhythm: normal sinus rhythm Frequency/Ectopy: PVCs  Cardiac/Telemetry Box Number: [id number]  VTE Required Core Measure: Pharmacological prophylaxis initiated/maintained Last Bowel Movement: 03/05/24 (per MICHI Quinn)  Diet Cardiac Fluid - 1200mL; Standard Tray  Voiding Characteristics: hesitancy  Sunil Score:  18  Fall Risk Score: 16  Accucheck [x]   Freq? Achs       Lines/Drains/Airways       Central Venous Catheter Line  Duration                  PowerPort A Cath Single Lumen 01/05/24 1415 Internal Jugular Right 61 days              Peripheral Intravenous Line  Duration                  Peripheral IV - Single Lumen 03/02/24 2201 22 G Posterior;Right Hand 3 days                       Problem: Adult Inpatient Plan of Care  Goal: Plan of Care Review  Outcome: Ongoing, Progressing  Goal: Patient-Specific Goal (Individualized)  Outcome: Ongoing, Progressing  Goal: Absence of Hospital-Acquired Illness or Injury  Outcome: Ongoing, Progressing  Goal: Optimal Comfort and Wellbeing  Outcome: Ongoing, Progressing  Goal: Readiness for Transition of Care  Outcome: Ongoing, Progressing     Problem: Diabetes Comorbidity  Goal: Blood Glucose Level Within Targeted Range  Outcome: Ongoing, Progressing     Problem: Fall Injury Risk  Goal: Absence of Fall and Fall-Related Injury  Outcome: Ongoing, Progressing     Problem: Skin Injury Risk Increased  Goal: Skin Health and Integrity  Outcome: Ongoing, Progressing     Problem: Impaired Wound Healing  Goal: Optimal Wound Healing  Outcome: Ongoing, Progressing

## 2024-03-06 NOTE — SUBJECTIVE & OBJECTIVE
Interval History:  Patient reports shortness a breath is slowly improving however he is still requiring oxygen.  Chest x-ray today reviewed with diffuse pulmonary infiltrates seen throughout bilateral lungs suspicious for either edema or pneumonia.  CT scan of chest has been ordered.  Patient was pending skilled nursing facility placement today however we will hold until pulmonary/cardiac issues are fully addressed.    Review of Systems   Constitutional:  Negative for chills and fever.   Respiratory:  Positive for shortness of breath.    Cardiovascular:  Positive for leg swelling. Negative for chest pain.   Gastrointestinal:  Negative for nausea and vomiting.   All other systems reviewed and are negative.    Objective:     Vital Signs (Most Recent):  Temp: 98.1 °F (36.7 °C) (03/06/24 0719)  Pulse: 90 (03/06/24 1023)  Resp: 18 (03/06/24 0719)  BP: (!) 97/52 (03/06/24 1023)  SpO2: 97 % (03/06/24 0719) Vital Signs (24h Range):  Temp:  [97.8 °F (36.6 °C)-98.8 °F (37.1 °C)] 98.1 °F (36.7 °C)  Pulse:  [83-90] 90  Resp:  [18-20] 18  SpO2:  [90 %-99 %] 97 %  BP: ()/(51-63) 97/52     Weight: 108.8 kg (239 lb 13.8 oz)  Body mass index is 34.42 kg/m².    Intake/Output Summary (Last 24 hours) at 3/6/2024 1054  Last data filed at 3/6/2024 0400  Gross per 24 hour   Intake 480 ml   Output 3050 ml   Net -2570 ml         Physical Exam  Vitals reviewed.   Constitutional:       General: He is not in acute distress.     Appearance: He is obese.   HENT:      Nose: Nose normal.   Eyes:      Conjunctiva/sclera: Conjunctivae normal.   Cardiovascular:      Rate and Rhythm: Normal rate.   Pulmonary:      Effort: Pulmonary effort is normal. No respiratory distress.      Breath sounds: No wheezing.   Abdominal:      General: There is no distension.      Tenderness: There is no abdominal tenderness.   Musculoskeletal:         General: Swelling present.      Right lower leg: Edema present.      Left lower leg: Edema present.   Skin:      General: Skin is warm and dry.   Neurological:      Mental Status: He is alert and oriented to person, place, and time.   Psychiatric:         Mood and Affect: Mood normal.         Behavior: Behavior normal.             Significant Labs: All pertinent labs within the past 24 hours have been reviewed.  Recent Lab Results  (Last 5 results in the past 24 hours)        03/06/24  0634   03/06/24  0450   03/05/24  2048   03/05/24  1742   03/05/24  1734        Albumin   2.5             ALP   105             ALT   115             Anion Gap   11             Aniso   Slight             AST   100             Baso #   0.12             Basophil %   0.9             BILIRUBIN TOTAL   0.7  Comment: For infants and newborns, interpretation of results should be based  on gestational age, weight and in agreement with clinical  observations.    Premature Infant recommended reference ranges:  Up to 24 hours.............<8.0 mg/dL  Up to 48 hours............<12.0 mg/dL  3-5 days..................<15.0 mg/dL  6-29 days.................<15.0 mg/dL               BNP   505  Comment: Values of less than 100 pg/ml are consistent with non-CHF populations.             BUN   24             Calcium   8.3             Chloride   94             CO2   30             Creatinine   1.2             Differential Method   Automated             eGFR   59             Eos #   0.0             Eos %   0.2             Giant Platelets   Present             Glucose   149             Gran # (ANC)   11.3             Gran %   80.9             Hematocrit   26.9             Hemoglobin   9.1             Immature Grans (Abs)   0.12  Comment: Mild elevation in immature granulocytes is non specific and   can be seen in a variety of conditions including stress response,   acute inflammation, trauma and pregnancy. Correlation with other   laboratory and clinical findings is essential.               Immature Granulocytes   0.9             Lymph #   1.5             Lymph %    10.9             MCH   30.1             MCHC   33.8             MCV   89             Mono #   0.9             Mono %   6.2             MPV   10.5             nRBC   0             Ovalocytes   Occasional             Platelet Estimate   Appears normal             Platelet Count   367             POCT Glucose 150     161     149       Poikilocytosis   Slight             Poly   Occasional             Potassium   4.0             PROTEIN TOTAL   5.7             RBC   3.02             RDW   15.2             SARS-CoV-2 RNA, Amplification, Qual       Negative  Comment: This test utilizes isothermal nucleic acid amplification technology   to   detect the SARS-CoV-2 RdRp nucleic acid segment. The analytical   sensitivity   (limit of detection) is 500 copies/swab.    A POSITIVE result is indicative of the presence of SARS-CoV-2 RNA;   clinical   correlation with patient history and other diagnostic information is   necessary to determine patient infection status.    A NEGATIVE result means that SARS-CoV-2 nucleic acids are not present   above   the limit of detection. A NEGATIVE result should be treated as   presumptive.   It does not rule out the possibility of COVID-19 and should not be   the sole   basis for treatment decisions.    If COVID-19 is strongly suspected based on clinical and exposure   history,   re-testing using an alternate molecular assay should be considered.    This test is Food and Drug Administration (FDA) approved. Performance   characteristics of this has been independently verified by [facility] Department of Pathology and Laboratory Medicine.           Sodium   135             Stomatocytes   Present             Target Cells   Occasional             WBC   13.98                                    Significant Imaging: I have reviewed all pertinent imaging results/findings within the past 24 hours.  X-Ray Chest AP Portable   Final Result      In comparison to the prior study, there is no  adverse interval changes         Electronically signed by: Earl Mendez MD   Date:    03/06/2024   Time:    09:12      X-Ray Chest AP Portable   Final Result      There has been interval worsening of the appearance of the lungs. There is a moderate amount of interstitial and alveolar opacities seen in both lungs.  This is characteristic of pulmonary edema.         Electronically signed by: Marco Elliott MD   Date:    03/03/2024   Time:    19:00      X-Ray Chest 1 View   Final Result      As above.         Electronically signed by: Spenser Stallworth   Date:    02/28/2024   Time:    11:07      X-Ray Chest AP Portable   Final Result      Worsening moderate bilateral infiltrates.  Follow-up is recommended.         Electronically signed by: Shyla Giles MD   Date:    02/27/2024   Time:    10:18      CTA Chest Non-Coronary (PE Studies)   Final Result      No pulmonary embolism.  No dissection.  Atherosclerotic changes.  Mild interstitial opacities.  Correlate clinically for element of fibrosis versus CHF.  Pulmonary micronodularity.  Recommend follow-up.      All CT scans   are performed using dose optimization techniques including the following: automated exposure control; adjustment of the mA and/or kV; use of iterative reconstruction technique.  Dose modulation was employed for ALARA by means of: Automated exposure control; adjustment of the mA and/or kV according to patient size (this includes techniques or standardized protocols for targeted exams where dose is matched to indication/reason for exam; i.e. extremities or head); and/or use of iterative reconstructive technique.         Electronically signed by: Jhoan Roman   Date:    02/26/2024   Time:    19:11      CT Head Without Contrast   Final Result      Chronic microvascular ischemic changes.  No intracranial hemorrhage.      All CT scans at this facility use dose modulation, iterative reconstruction, and/or weight based dosing when appropriate to reduce  radiation dose to as low as reasonable achievable.         Electronically signed by: Earl Mendez MD   Date:    02/26/2024   Time:    10:36      X-Ray Chest AP Portable   Final Result      No acute process seen.         Electronically signed by: Earl Mendez MD   Date:    02/26/2024   Time:    10:02      CT Chest Without Contrast    (Results Pending)

## 2024-03-06 NOTE — PROGRESS NOTES
[facility] ([facility])  Cardiology  Progress Note    Patient Name: Mark Dickerson Sr.  MRN: [id number]  Admission Date: 2/26/2024  Hospital Length of Stay: 8 days  Code Status: DNR   Attending Physician: Antonia Kapoor MD   Primary Care Physician: Tatyana Cannon MD  Expected Discharge Date:   Principal Problem:NSTEMI (non-ST elevated myocardial infarction)    Subjective:   HPI:  Mr. Dickerson is an 84 year old male patient whose current medical conditions include metastatic breast cancer (on chemo), HTN, hyperlipidemia, DM type II, and CKD who presented to [facility] ED this AM due to AMS/confusion. Patient's DIL reported patient seemed disoriented and had jumbled speech when she visited him earlier this AM. He returned to baseline prior to ED arrival. He denied any associated SOB, chest pain, palpitations, near syncope, or syncope. Initial workup in ED revealed leukocytosis (WBC 14,000), hyponatremia (Na 130), hypomagnesemia (1.5), and elevated troponin (0.725>0.735) and patient was subsequently admitted for further evaluation and treatment. Cardiology consulted to assist with management. Patient seen and examined today, resting in bed. Remains chest pain free. No other CV complaints. States he felt dehydrated earlier today. No prior CV history. He reports compliance with his medications. EKG reviewed, SR with T wave inversions inferiorly and anteriorly. Limited echo and repeat troponin pending. CT of head NAF                  Hospital Course:   2/27/24-Patient seen and examined today, sitting up in bed. Appears more SOB/weak. No CP symptoms. WBC bumped to 26,000. CXR with bilateral infiltrates/worsening appearance. IV Lasix given and BC drawn. Troponin bumped to 1.293. [facility] initially planned today but cancelled due to lab abnormalities, CXR, and patient's clinical status.    2/28/24-Patient seen and examined today, sitting up in bedside chair. Complains of chills/weakness. WBC up to 34,000. CXR showed bilateral  opacities. Being diuresed. K 3.3, being repleted. [facility] deferred for now.    2/29/24-Patient seen and examined today, resting in bed. Feels/looks better. WBC trending down. Denies CP. BC NGTD. Tentative plans for [facility] tmw.    3/1/24-Patient seen and examined today, lying in bed. Stable overnight. No CV complaints. CP free. R/LHC today by Dr. Aguillon.    3/2/24 - s/p R/LHC - with elevated filling pressures,  RCA with collaterals - will cont med management and discuss outpt TAVR eval for severe AS    3/5/24-Patient seen and examined today, sitting up in bedside chair. Feels ok. Still with SOB/BLE edema. No CP. Heme/onc note reviewed. Needs diuresis. BP soft, BB d/c'd to allow diuresis. Discussed OP evaluation for TAVR/ballon valvuloplasty.    3/6/24-Patient seen and examined today, sitting up in bedside chair. Doing ok. SOB stable. Diuresed well overnight. BNP still elevated. CT of chest pending.        Review of Systems   Constitutional: Positive for malaise/fatigue.   HENT: Negative.     Eyes: Negative.    Cardiovascular:  Positive for dyspnea on exertion and leg swelling.   Respiratory:  Positive for shortness of breath.    Endocrine: Negative.    Hematologic/Lymphatic: Negative.    Skin: Negative.    Musculoskeletal:  Positive for arthritis and joint pain.   Gastrointestinal: Negative.    Genitourinary: Negative.    Neurological: Negative.    Psychiatric/Behavioral: Negative.     Allergic/Immunologic: Negative.      Objective:     Vital Signs (Most Recent):  Temp: 98.1 °F (36.7 °C) (03/06/24 0719)  Pulse: 95 (03/06/24 1125)  Resp: 18 (03/06/24 0719)  BP: 113/63 (03/06/24 1125)  SpO2: 97 % (03/06/24 0719) Vital Signs (24h Range):  Temp:  [97.8 °F (36.6 °C)-98.8 °F (37.1 °C)] 98.1 °F (36.7 °C)  Pulse:  [83-95] 95  Resp:  [18-20] 18  SpO2:  [90 %-99 %] 97 %  BP: ()/(51-63) 113/63     Weight: 108.8 kg (239 lb 13.8 oz)  Body mass index is 34.42 kg/m².     SpO2: 97 %         Intake/Output Summary (Last 24 hours)  at 3/6/2024 1204  Last data filed at 3/6/2024 0400  Gross per 24 hour   Intake 480 ml   Output 2650 ml   Net -2170 ml       Lines/Drains/Airways       Central Venous Catheter Line  Duration                  PowerPort A Cath Single Lumen 01/05/24 1415 Internal Jugular Right 60 days              Peripheral Intravenous Line  Duration                  Peripheral IV - Single Lumen 03/02/24 2201 22 G Posterior;Right Hand 3 days                       Physical Exam  Vitals and nursing note reviewed.   Constitutional:       General: He is not in acute distress.     Appearance: He is well-developed. He is ill-appearing. He is not diaphoretic.      Comments: On supplemental O2   HENT:      Head: Normocephalic and atraumatic.   Eyes:      General:         Right eye: No discharge.         Left eye: No discharge.      Pupils: Pupils are equal, round, and reactive to light.   Neck:      Thyroid: No thyromegaly.      Vascular: No JVD.      Trachea: No tracheal deviation.   Cardiovascular:      Rate and Rhythm: Normal rate and regular rhythm.      Heart sounds: S1 normal and S2 normal. Murmur heard.      Harsh midsystolic murmur is present at the upper right sternal border radiating to the neck.   Pulmonary:      Effort: Pulmonary effort is normal. No respiratory distress.      Breath sounds: Rhonchi present. No wheezing.   Abdominal:      General: There is no distension.   Musculoskeletal:      Cervical back: Neck supple.      Right lower leg: Edema present.      Left lower leg: Edema present.   Skin:     General: Skin is warm and dry.      Findings: No erythema.   Neurological:      General: No focal deficit present.      Mental Status: He is alert and oriented to person, place, and time.   Psychiatric:         Mood and Affect: Mood normal.         Behavior: Behavior normal.            Significant Labs: CMP   Recent Labs   Lab 03/05/24  0616 03/06/24  0450   * 135*   K 4.0 4.0   CL 96 94*   CO2 24 30*   * 149*   BUN  "23 24*   CREATININE 1.1 1.2   CALCIUM 8.8 8.3*   PROT 6.4 5.7*   ALBUMIN 2.4* 2.5*   BILITOT 0.6 0.7   ALKPHOS 115 105   * 100*   * 115*   ANIONGAP 13 11   , CBC   Recent Labs   Lab 03/05/24  0616 03/06/24  0450   WBC 17.02* 13.98*   HGB 9.6* 9.1*   HCT 29.3* 26.9*    367   , Troponin No results for input(s): "TROPONINI" in the last 48 hours., and All pertinent lab results from the last 24 hours have been reviewed.    Significant Imaging: Echocardiogram: Transthoracic echo (TTE) complete (Cupid Only):   Results for orders placed or performed during the hospital encounter of 02/26/24   Echo   Result Value Ref Range    LVIDd 4.88 3.5 - 6.0 cm    LV Systolic Volume 52.86 mL    LVIDs 3.56 2.1 - 4.0 cm    LV Diastolic Volume 111.86 mL    IVS 1.04 0.6 - 1.1 cm    FS 27 (A) 28 - 44 %    Left Ventricle Relative Wall Thickness 0.44 cm    Posterior Wall 1.08 0.6 - 1.1 cm    LV mass 189.29 g    TR Max Can 2.91 m/s    RVDD 2.81 cm    LA size 4.18 cm    Triscuspid Valve Regurgitation Peak Gradient 34 mmHg    IVC diameter 2.08 cm    LV Systolic Volume Index 23.2 mL/m2    LV Diastolic Volume Index 49.06 mL/m2    LV Mass Index 83 g/m2    ZLVIDS -2.98     ZLVIDD -5.65     TV resting pulmonary artery pressure 37 mmHg    RV TB RVSP 6 mmHg    Est. RA pres 3 mmHg    Narrative      Left Ventricle: The left ventricle is normal in size. There is   concentric remodeling. There is normal systolic function with a visually   estimated ejection fraction of 55 - 60%. There is indeterminate diastolic   function.    Right Ventricle: Right ventricle was not well visualized due to poor   acoustic window. Normal right ventricular cavity size. Systolic function   is normal.    Pulmonary Artery: The estimated pulmonary artery systolic pressure is   37 mmHg.    IVC/SVC: Normal venous pressure at 3 mmHg.      and EKG: Reviewed  Assessment and Plan:   Patient who presents with NSTEMI/severe AS/CHF. Diuresed well overnight but CXR " still with infiltrates. Continue same mgmt for now. CT of chest pending. OP TAVR evaluation.    * NSTEMI (non-ST elevated myocardial infarction)  -See plan under elevated troponin    Abnormal EKG  -See plan under elevated troponin    SIRS (systemic inflammatory response syndrome)  -Mgmt as per primary team  -BC show NGTD    Hypomagnesemia  -Repletion as per primary team    Hypokalemia  -Repletion as per primary team      Elevated troponin  -Troponin 0.725>0.735, continue to trend  -No CP symptoms/SOB/angina  -No prior CV history  -Continue ASA, heparin gtt, statin  -EKG reviewed, T wave inversions inferiorly and laterally  -Limited TTE pending  -Will consider ischemic workup with MPI stress test this admission    2/27/24  -Troponin bumped to 1.2 overnight  -Remains CP free  -Continue ASA, heparin gtt, statin  -TTE with normal EF  -LHC planned once more stable respiratory wise/infection ruled out    2/28/24  -CP free  -Continue ASA, heparin gtt, statin  -BB added  -LHC deferred for now given leukocytosis/SOB    2/29/24  -Stable  -WBC count improving  -Continue BB, ASA, heparin gtt, statin  -Tentative plans for [facility] tmw, keep NPO after MN    3/1/24  -Stable, no CP  -Continue BB, ASA, heparin gtt, statin  -R/LHC today by Dr. Aguillon. All risks, benefits, and treatment alternatives explained to patient in detail. All questions answered. He has agreed to proceed.    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, estrogen receptor positive  -Mgmt as per heme/onc    Severe aortic stenosis  -Continue IV diuresis  -Avoid hypotension  -OP TAVR/balloon valvuloplasty referral    3/6/24  -Continue IV diuresis as tolerated  -Avoid hypotension, on midodrine to augment BP    CKD stage 3 due to type 2 diabetes mellitus  -Monitor        VTE Risk Mitigation  (From admission, onward)           Ordered     IP VTE HIGH RISK PATIENT  Once         02/26/24 1204     Place sequential compression device  Until discontinued         02/26/24 1204                    Sandy Rodriguez PA-C  Cardiology  [facility] - Telemetry ([facility])

## 2024-03-06 NOTE — ASSESSMENT & PLAN NOTE
Patient's FSGs are controlled on current medication regimen.  Last A1c reviewed-   Lab Results   Component Value Date    HGBA1C 7.1 (H) 11/28/2023     Most recent fingerstick glucose reviewed-   Recent Labs   Lab 03/05/24  1137 03/05/24  1734 03/05/24  2048 03/06/24  0634   POCTGLUCOSE 213* 149* 161* 150*       Current correctional scale  Low  Maintain anti-hyperglycemic dose as follows-   Antihyperglycemics (From admission, onward)    Start     Stop Route Frequency Ordered    02/26/24 1301  insulin aspart U-100 pen 0-5 Units         -- SubQ Before meals & nightly PRN 02/26/24 1204        Hold Oral hypoglycemics while patient is in the hospital.

## 2024-03-06 NOTE — PROGRESS NOTES
[facility] - Telemetry ([facility])  Hematology/Oncology  Progress Note    Patient Name: Mark Dickerson Sr.  Admission Date: 2/26/2024  Hospital Length of Stay: 8 days  Code Status: DNR     Subjective:     HPI:  84-year-old male history of locally advanced HER2 positive breast carcinoma patient has received Taxotere Herceptin and Perjeta.  PET scan showed presumptive findings of metastatic disease in lung.  Patient is admitted to the hospital with a non STEMI.  Decrease in ejection fraction asked to see the patient for further evaluation because of elevated white cell count.    Interval History:  Resting comfortably says he plans to be discharged so nursing home today    Oncology Treatment Plan:   OP BREAST THP (pertuzumab trastuzumab DOCEtaxel) Q3W    Medications:  Continuous Infusions:   sodium chloride 0.9% 100 mL/hr (03/01/24 1334)     Scheduled Meds:   anastrozole  1 mg Oral Daily    aspirin  81 mg Oral Daily    atorvastatin  20 mg Oral QHS    furosemide (LASIX) injection  40 mg Intravenous BID    magnesium oxide  400 mg Oral Daily    midodrine  10 mg Oral TID WM     PRN Meds:sodium chloride 0.9%, acetaminophen, acetaminophen, cyclobenzaprine, dextrose 10%, dextrose 10%, glucagon (human recombinant), glucose, glucose, HYDROcodone-acetaminophen, insulin aspart U-100, melatonin, naloxone, ondansetron, ondansetron, sodium chloride 0.9%     Review of Systems   Constitutional:  Positive for fatigue. Negative for activity change, appetite change, chills, diaphoresis, fever and unexpected weight change.   HENT:  Negative for congestion, dental problem, drooling, ear discharge, ear pain, facial swelling, hearing loss, mouth sores, nosebleeds, postnasal drip, rhinorrhea, sinus pressure, sneezing, sore throat, tinnitus, trouble swallowing and voice change.    Eyes:  Negative for photophobia, pain, discharge, redness, itching and visual disturbance.   Respiratory:  Negative for apnea, cough, choking, chest tightness, shortness  of breath, wheezing and stridor.    Cardiovascular:  Negative for chest pain, palpitations and leg swelling.   Gastrointestinal:  Negative for abdominal distention, abdominal pain, anal bleeding, blood in stool, constipation, diarrhea, nausea, rectal pain and vomiting.   Endocrine: Negative for cold intolerance, heat intolerance, polydipsia, polyphagia and polyuria.   Genitourinary:  Negative for decreased urine volume, difficulty urinating, dysuria, enuresis, flank pain, frequency, genital sores, hematuria, penile discharge, penile pain, penile swelling, scrotal swelling, testicular pain and urgency.   Musculoskeletal:  Positive for gait problem. Negative for arthralgias, back pain, joint swelling, myalgias, neck pain and neck stiffness.   Skin:  Negative for color change, pallor, rash and wound.   Allergic/Immunologic: Negative for environmental allergies, food allergies and immunocompromised state.   Neurological:  Positive for weakness. Negative for dizziness, tremors, seizures, syncope, facial asymmetry, speech difficulty, light-headedness, numbness and headaches.   Hematological:  Negative for adenopathy. Does not bruise/bleed easily.   Psychiatric/Behavioral:  Positive for dysphoric mood. Negative for agitation, behavioral problems, confusion, decreased concentration, hallucinations, self-injury, sleep disturbance and suicidal ideas. The patient is nervous/anxious. The patient is not hyperactive.      Objective:     Vital Signs (Most Recent):  Temp: 98.1 °F (36.7 °C) (03/06/24 0719)  Pulse: 86 (03/06/24 0719)  Resp: 18 (03/06/24 0719)  BP: 118/63 (03/06/24 0719)  SpO2: 97 % (03/06/24 0719) Vital Signs (24h Range):  Temp:  [96.7 °F (35.9 °C)-98.8 °F (37.1 °C)] 98.1 °F (36.7 °C)  Pulse:  [83-90] 86  Resp:  [18-20] 18  SpO2:  [90 %-99 %] 97 %  BP: ()/(51-72) 118/63     Weight: 108.8 kg (239 lb 13.8 oz)  Body mass index is 34.42 kg/m².  Body surface area is 2.32 meters squared.      Intake/Output Summary  (Last 24 hours) at 3/6/2024 0726  Last data filed at 3/6/2024 0400  Gross per 24 hour   Intake 720 ml   Output 3350 ml   Net -2630 ml        Physical Exam  Vitals reviewed.   Constitutional:       General: He is not in acute distress.     Appearance: He is well-developed. He is obese. He is ill-appearing. He is not diaphoretic.   HENT:      Head: Normocephalic.      Right Ear: External ear normal.      Left Ear: External ear normal.      Nose: Nose normal.      Right Sinus: No maxillary sinus tenderness or frontal sinus tenderness.      Left Sinus: No maxillary sinus tenderness or frontal sinus tenderness.      Mouth/Throat:      Pharynx: No oropharyngeal exudate.   Eyes:      General: Lids are normal. No scleral icterus.        Right eye: No discharge.         Left eye: No discharge.      Extraocular Movements:      Right eye: Normal extraocular motion.      Left eye: Normal extraocular motion.      Conjunctiva/sclera:      Right eye: Right conjunctiva is not injected. No hemorrhage.     Left eye: Left conjunctiva is not injected. No hemorrhage.     Pupils: Pupils are equal, round, and reactive to light.   Neck:      Thyroid: No thyromegaly.      Vascular: No JVD.      Trachea: No tracheal deviation.   Cardiovascular:      Rate and Rhythm: Normal rate.   Pulmonary:      Effort: Pulmonary effort is normal. No respiratory distress.      Breath sounds: No stridor.   Chest:      Chest wall: Deformity: MediPort site.       Abdominal:      General: Bowel sounds are normal.      Palpations: Abdomen is soft. There is no hepatomegaly, splenomegaly or mass.      Tenderness: There is no abdominal tenderness.   Musculoskeletal:         General: No tenderness. Normal range of motion.      Cervical back: Normal range of motion and neck supple.   Lymphadenopathy:      Head:      Right side of head: No posterior auricular or occipital adenopathy.      Left side of head: No posterior auricular or occipital adenopathy.       "Cervical: No cervical adenopathy.      Right cervical: No superficial, deep or posterior cervical adenopathy.     Left cervical: No superficial, deep or posterior cervical adenopathy.      Upper Body:      Right upper body: No supraclavicular adenopathy.      Left upper body: No supraclavicular adenopathy.   Skin:     General: Skin is dry.      Findings: No erythema or rash.      Nails: There is no clubbing.   Neurological:      Mental Status: He is alert and oriented to person, place, and time.      Cranial Nerves: No cranial nerve deficit.      Coordination: Coordination normal.   Psychiatric:         Behavior: Behavior normal.         Thought Content: Thought content normal.         Judgment: Judgment normal.          Significant Labs:   BMP:   Recent Labs   Lab 03/05/24  0616 03/06/24  0450   * 149*   * 135*   K 4.0 4.0   CL 96 94*   CO2 24 30*   BUN 23 24*   CREATININE 1.1 1.2   CALCIUM 8.8 8.3*   MG 1.6  --    , CBC:   Recent Labs   Lab 03/05/24  0616 03/06/24  0450   WBC 17.02* 13.98*   HGB 9.6* 9.1*   HCT 29.3* 26.9*    367   , CMP:   Recent Labs   Lab 03/05/24  0616 03/06/24  0450   * 135*   K 4.0 4.0   CL 96 94*   CO2 24 30*   * 149*   BUN 23 24*   CREATININE 1.1 1.2   CALCIUM 8.8 8.3*   PROT 6.4 5.7*   ALBUMIN 2.4* 2.5*   BILITOT 0.6 0.7   ALKPHOS 115 105   * 100*   * 115*   ANIONGAP 13 11   , Coagulation: No results for input(s): "PT", "INR", "APTT" in the last 48 hours., Haptoglobin: No results for input(s): "HAPTOGLOBIN" in the last 48 hours., Immunology: No results for input(s): "SPEP", "ARIEL", "PRIYA", "FREELAMBDALI" in the last 48 hours., LDH: No results for input(s): "LDHCSF", "BFSOURCE" in the last 48 hours., LFTs:   Recent Labs   Lab 03/05/24  0616 03/06/24  0450   * 115*   * 100*   ALKPHOS 115 105   BILITOT 0.6 0.7   PROT 6.4 5.7*   ALBUMIN 2.4* 2.5*   , Reticulocytes: No results for input(s): "RETIC" in the last 48 hours., Tumor Markers: " "No results for input(s): "PSA", "CEA", "", "AFPTM", "RU0157", "" in the last 48 hours.    Invalid input(s): "ALGTM", and Uric Acid No results for input(s): "URICACID" in the last 48 hours.    Diagnostic Results:  I have reviewed all pertinent imaging results/findings within the past 24 hours.  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  Will need to reimage to see whether not disease in chest is responsive as well with similar findings.  Or could be potential other malignancy but will follow.  Will need to discuss with Cardiology to see what modifications need to be made in his treatment record regimen since he is receiving Taxotere Herceptin and Perjeta.  With decreasing ejection fraction  03/05/2024 extensive conversation with cardiology at this point patient's cardiac function has deteriorated from heart failure as well as aortic stenosis.  At this time all Herceptin products made to be discontinued until part is resolved.  The patient is ER positive I spoke with him concerning this in this may offer us an option with aromatase inhibitor plus or minus Lupron.  Control disease while we are of the attempting to have his heart maximize in terms of his ejection fraction to continue with HER2 positive treatment I have discussed this with Dr. Cummins yesterday.  And will discuss this again with his primary oncologist Dr. Hu.  Reviewed patient has no evidence of germ line mutation contributing to his metastatic male breast cancer.  Limiting a potential therapeutic option with the PARP inhibitor  03/06/2024.  Reviewed again with patient will not be treating with Herceptin containing medicine will " defer in outpatient setting to primary oncologist whether not aromatase inhibitor Arimidex could be added.  At this particular point with metastatic male breast cancer and whether not consideration of Lupron type agent.  To prevent prevention while addressing cardiac issues    CKD stage 3 due to type 2 diabetes mellitus  Cared for by primary care team    Controlled type 2 diabetes mellitus without complication, without long-term current use of insulin  Cared for by primary care team        Thank you for your consult. I will follow-up with patient. Please contact us if you have any additional questions.     Wayne Baugh MD  Hematology/Oncology  [facility] Tele[facility] ([facility])

## 2024-03-07 LAB
ANION GAP SERPL CALC-SCNC: 14 MMOL/L (ref 8–16)
BUN SERPL-MCNC: 25 MG/DL (ref 8–23)
CALCIUM SERPL-MCNC: 8.8 MG/DL (ref 8.7–10.5)
CHLORIDE SERPL-SCNC: 90 MMOL/L (ref 95–110)
CO2 SERPL-SCNC: 29 MMOL/L (ref 23–29)
CREAT SERPL-MCNC: 1.1 MG/DL (ref 0.5–1.4)
EST. GFR  (NO RACE VARIABLE): >60 ML/MIN/1.73 M^2
GLUCOSE SERPL-MCNC: 189 MG/DL (ref 70–110)
MAGNESIUM SERPL-MCNC: 1.4 MG/DL (ref 1.6–2.6)
POCT GLUCOSE: 176 MG/DL (ref 70–110)
POCT GLUCOSE: 192 MG/DL (ref 70–110)
POCT GLUCOSE: 201 MG/DL (ref 70–110)
POTASSIUM SERPL-SCNC: 3.2 MMOL/L (ref 3.5–5.1)
SODIUM SERPL-SCNC: 133 MMOL/L (ref 136–145)

## 2024-03-07 PROCEDURE — 87070 CULTURE OTHR SPECIMN AEROBIC: CPT | Mod: HCNC | Performed by: INTERNAL MEDICINE

## 2024-03-07 PROCEDURE — 99900035 HC TECH TIME PER 15 MIN (STAT): Mod: HCNC

## 2024-03-07 PROCEDURE — 80048 BASIC METABOLIC PNL TOTAL CA: CPT | Mod: HCNC | Performed by: INTERNAL MEDICINE

## 2024-03-07 PROCEDURE — 83735 ASSAY OF MAGNESIUM: CPT | Mod: HCNC | Performed by: INTERNAL MEDICINE

## 2024-03-07 PROCEDURE — 94761 N-INVAS EAR/PLS OXIMETRY MLT: CPT | Mod: HCNC

## 2024-03-07 PROCEDURE — 97116 GAIT TRAINING THERAPY: CPT | Mod: HCNC

## 2024-03-07 PROCEDURE — 87205 SMEAR GRAM STAIN: CPT | Mod: HCNC | Performed by: INTERNAL MEDICINE

## 2024-03-07 PROCEDURE — 21400001 HC TELEMETRY ROOM: Mod: HCNC

## 2024-03-07 PROCEDURE — 25000003 PHARM REV CODE 250: Mod: HCNC | Performed by: INTERNAL MEDICINE

## 2024-03-07 PROCEDURE — 25000003 PHARM REV CODE 250: Mod: HCNC | Performed by: NURSE PRACTITIONER

## 2024-03-07 PROCEDURE — 63600175 PHARM REV CODE 636 W HCPCS: Mod: HCNC | Performed by: INTERNAL MEDICINE

## 2024-03-07 PROCEDURE — 97530 THERAPEUTIC ACTIVITIES: CPT | Mod: HCNC

## 2024-03-07 PROCEDURE — 99232 SBSQ HOSP IP/OBS MODERATE 35: CPT | Mod: HCNC,,, | Performed by: INTERNAL MEDICINE

## 2024-03-07 PROCEDURE — 25000242 PHARM REV CODE 250 ALT 637 W/ HCPCS: Mod: HCNC | Performed by: INTERNAL MEDICINE

## 2024-03-07 PROCEDURE — 27000221 HC OXYGEN, UP TO 24 HOURS: Mod: HCNC

## 2024-03-07 PROCEDURE — 36415 COLL VENOUS BLD VENIPUNCTURE: CPT | Mod: HCNC | Performed by: INTERNAL MEDICINE

## 2024-03-07 RX ORDER — POTASSIUM CHLORIDE 20 MEQ/1
40 TABLET, EXTENDED RELEASE ORAL ONCE
Status: COMPLETED | OUTPATIENT
Start: 2024-03-07 | End: 2024-03-07

## 2024-03-07 RX ORDER — FLUTICASONE PROPIONATE 50 MCG
2 SPRAY, SUSPENSION (ML) NASAL DAILY
Status: DISCONTINUED | OUTPATIENT
Start: 2024-03-07 | End: 2024-03-14 | Stop reason: HOSPADM

## 2024-03-07 RX ORDER — POTASSIUM CHLORIDE 20 MEQ/1
20 TABLET, EXTENDED RELEASE ORAL 2 TIMES DAILY
Status: DISCONTINUED | OUTPATIENT
Start: 2024-03-07 | End: 2024-03-09

## 2024-03-07 RX ORDER — MAGNESIUM SULFATE HEPTAHYDRATE 40 MG/ML
2 INJECTION, SOLUTION INTRAVENOUS ONCE
Status: COMPLETED | OUTPATIENT
Start: 2024-03-07 | End: 2024-03-07

## 2024-03-07 RX ADMIN — POTASSIUM CHLORIDE 20 MEQ: 1500 TABLET, EXTENDED RELEASE ORAL at 09:03

## 2024-03-07 RX ADMIN — VANCOMYCIN HYDROCHLORIDE 2250 MG: 500 INJECTION, POWDER, LYOPHILIZED, FOR SOLUTION INTRAVENOUS at 05:03

## 2024-03-07 RX ADMIN — Medication 400 MG: at 08:03

## 2024-03-07 RX ADMIN — MIDODRINE HYDROCHLORIDE 10 MG: 5 TABLET ORAL at 05:03

## 2024-03-07 RX ADMIN — SODIUM CHLORIDE 250 ML: 9 INJECTION, SOLUTION INTRAVENOUS at 04:03

## 2024-03-07 RX ADMIN — ATORVASTATIN CALCIUM 20 MG: 10 TABLET, FILM COATED ORAL at 09:03

## 2024-03-07 RX ADMIN — CEFEPIME 2 G: 2 INJECTION, POWDER, FOR SOLUTION INTRAVENOUS at 04:03

## 2024-03-07 RX ADMIN — MAGNESIUM SULFATE HEPTAHYDRATE 2 G: 40 INJECTION, SOLUTION INTRAVENOUS at 11:03

## 2024-03-07 RX ADMIN — ASPIRIN 81 MG: 81 TABLET, COATED ORAL at 08:03

## 2024-03-07 RX ADMIN — MIDODRINE HYDROCHLORIDE 10 MG: 5 TABLET ORAL at 11:03

## 2024-03-07 RX ADMIN — FLUTICASONE PROPIONATE 100 MCG: 50 SPRAY, METERED NASAL at 08:03

## 2024-03-07 RX ADMIN — MIDODRINE HYDROCHLORIDE 10 MG: 5 TABLET ORAL at 08:03

## 2024-03-07 RX ADMIN — INSULIN ASPART 2 UNITS: 100 INJECTION, SOLUTION INTRAVENOUS; SUBCUTANEOUS at 01:03

## 2024-03-07 RX ADMIN — ANASTROZOLE 1 MG: 1 TABLET, COATED ORAL at 08:03

## 2024-03-07 RX ADMIN — FUROSEMIDE 40 MG: 10 INJECTION, SOLUTION INTRAMUSCULAR; INTRAVENOUS at 09:03

## 2024-03-07 RX ADMIN — POTASSIUM CHLORIDE 40 MEQ: 1500 TABLET, EXTENDED RELEASE ORAL at 11:03

## 2024-03-07 RX ADMIN — FUROSEMIDE 40 MG: 10 INJECTION, SOLUTION INTRAMUSCULAR; INTRAVENOUS at 08:03

## 2024-03-07 NOTE — ASSESSMENT & PLAN NOTE
CT chest today 2/2 ongoing hypoxia despite treatment so far, labored breathing, and leukocytosis   Reviewed with Dr. Hernandez- suspect this is fluid and needs more diuresis; given history, agree with infectious work up and empiric treatment until able to rule out   Lasix 40 mg BID- will give additional 40 mg IV now   Sputum culture if able to collect  Cefepime and Vanc   Blood cultures pending   Supplemental oxygen as needed  IS, OOB and PT/OT as tolerated

## 2024-03-07 NOTE — PLAN OF CARE
Pt tolerated interventions well. Required CGA for STS, ambulated 75ft x2 CGA using RW. Recommending moderate intensity therapy upon d/c.

## 2024-03-07 NOTE — ASSESSMENT & PLAN NOTE
"-WBC 34.56-->13.98, leukocytosis trending down   -COVID-19 negative, influenza a/B negative, procalcitonin level 0.14, lactic acid level 1.0, urinalysis negative, blood cultures negative  -CTA of chest 02/24/2024 with no evidence of PE, pulmonary fibrosis versus CHF findings with recommendations for follow-up imaging  -repeat CT chest 3/6 with "interval worsening from comparison CT with diffuse multifocal airspace opacification superimposed upon interstitial thickening, considerations are multifocal pneumonia, pulmonary edema, pulmonary hemorrhage; new small left pleural effusion   -patient did receive empiric IV ceftriaxone for 4 days 2/28-03/02/2024  -hematology oncology has seen the patient in consultation and feels severe leukocytosis related to recent Neulasta and acute cardiac event  -given interval worsening and CT scan, persistent leukocytosis, and underlying breast cancer, Pulmonary was consulted for further evaluation and treatment recommendations--> abnormal CT scan felt to be most likely related to pulmonary edema with IV diuresis to be continued  -continue empiric IV cefepime and IV vancomycin day 2.  -additional evaluation ordered 3/6 with sputum culture pending, MRSA culture pending, respiratory viral panel pending, and blood cultures with no growth to date    "

## 2024-03-07 NOTE — PT/OT/SLP PROGRESS
Physical Therapy Treatment    Patient Name:  Mark Dickerson Sr.   MRN:  [id number]    Recommendations:     Discharge Recommendations: Moderate Intensity Therapy  Discharge Equipment Recommendations: to be determined by next level of care  Barriers to discharge: None    Assessment:     Mark Dickerson Sr. is a 84 y.o. male admitted with a medical diagnosis of NSTEMI (non-ST elevated myocardial infarction).  He presents with the following impairments/functional limitations: weakness, impaired endurance, impaired functional mobility, gait instability, impaired balance, pain, decreased safety awareness, decreased lower extremity function, impaired cardiopulmonary response to activity.    Rehab Prognosis: Good; patient would benefit from acute skilled PT services to address these deficits and reach maximum level of function.    Recent Surgery: Procedure(s) (LRB):  CATHETERIZATION, HEART, BOTH LEFT AND RIGHT (N/A)  Aortogram, Aortic Arch  Valve study-aortic  AORTOGRAM, ABDOMINAL (N/A) 6 Days Post-Op    Plan:     During this hospitalization, patient to be seen 3 x/week to address the identified rehab impairments via therapeutic activities, gait training, therapeutic exercises and progress toward the following goals:    Plan of Care Expires:  03/12/24    Subjective     Chief Complaint: Pt is motivate to participate  Patient/Family Comments/goals: none stated  Pain/Comfort:  Pain Rating 1: 0/10      Objective:     Communicated with nurse and epic chart review prior to session.  Patient found up in chair with peripheral IV, telemetry, oxygen upon PT entry to room.     General Precautions: Standard, fall, respiratory  Orthopedic Precautions: N/A  Braces: N/A  Respiratory Status: Nasal cannula, flow 5 L/min     Functional Mobility:  Gait belt applied - Yes  Bed Mobility  Seated in chair at start of session and returned to chair  Scooting: CGA  Transfers  Sit to Stand: contact guard assistance with rolling walker  Gait  Patient  "ambulated 75ft x2 with rolling walker and contact guard assistance. Patient demonstrates occasional unsteady gait, no gross LOB. Prolonged standing rest break needed. Slowed pace, increased time needed.   Balance  Sitting: contact guard assistance  Standing: contact guard assistance  No c/o dizziness, mild SOB with exertion, educated about pursed lip breathing technique and cued for use with mobility.  All lines remained intact throughout ambulation trail.    AM-PAC 6 CLICK MOBILITY  Turning over in bed (including adjusting bedclothes, sheets and blankets)?: 1 (NT)  Sitting down on and standing up from a chair with arms (e.g., wheelchair, bedside commode, etc.): 3  Moving from lying on back to sitting on the side of the bed?: 1 (NT)  Moving to and from a bed to a chair (including a wheelchair)?: 3  Need to walk in hospital room?: 3  Climbing 3-5 steps with a railing?: 1 (NT)  Basic Mobility Total Score: 12       Treatment & Education:  Reviewed role of PT in acute care and POC. Pt tolerated interventions well. Reviewed importance of OOB activities, activity pacing, and HEP (marching/hip flex, hip abd, heel slides/LAQ, quad sets, ankle pumps) in order to maintain/regain strength. Encouraged to sit up in chair for all meals. Reviewed proper use of RW for safety and to reduce risk of falling. Reviewed "call don't fall" policy and increased risk of falling due to weakness, instructed to utilize call bell for assistance with all transfers. Pt agreeable to all requests.    Patient left up in chair with all lines intact, call button in reach, chair alarm on, and family present..    GOALS:   Multidisciplinary Problems       Physical Therapy Goals          Problem: Physical Therapy    Goal Priority Disciplines Outcome Goal Variances Interventions   Physical Therapy Goal     PT, PT/OT Ongoing, Progressing     Description: Pt will perform bed mobility with SPV in order to participate in EOB activity.  Pt will perform transfers " with SPV in order to participate in OOB activity.  Pt will ambulate 150 ft SBA with LRAD in order to participate in daily tasks.                        Time Tracking:     PT Received On: 03/07/24  PT Start Time: 1321     PT Stop Time: 1344  PT Total Time (min): 23 min     Billable Minutes: Gait Training 13min and Therapeutic Activity 10min    Treatment Type: Treatment  PT/PTA: PT     Number of PTA visits since last PT visit: 0     03/07/2024

## 2024-03-07 NOTE — CONSULTS
[facility] - Telemetry ([facility])  Pulmonology  Consult Note    Patient Name: Mark Dickerson Sr.  MRN: [id number]  Admission Date: 2/26/2024  Hospital Length of Stay: 8 days  Code Status: DNR  Attending Physician: Antonia Kapoor MD  Primary Care Provider: Tatyana Cannon MD   Principal Problem: NSTEMI (non-ST elevated myocardial infarction)    Inpatient consult to Pulmonology  Consult performed by: Dinora Chavira NP  Consult ordered by: Antonia Kapoor MD        Subjective:     HPI:  Information obtained from chart review, discussion with Dr. Kapoor, and patient who seems reliable.     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 He underwent LHC 3/1/2024 with findings of single vessel disease, severe AS, moderate pulm HTN, and EF 30%. He has been receiving diuretics for volume overload with some improvement. He remains on oxygen which is new.  obtained CT chest for better evaluation which revealed worsening bilateral pulmonary infiltrates prompting pulmonary consult for additional recommendations.     Upon exam, Mr. Dickerson is sitting up in chair, awake and alert. He appears to be breathing comfortably on 2L NC. He reports feeling better overall, but remains weak, easily fatigued, and is concerned that he is still requiring oxygen. He denies fever, chest pain, or SOB. He states he walked in the hallway today and didn't feel short of breath while doing so. He is having intermittent productive cough- mostly tan and sometimes blood tinged.     Past Medical History:   Diagnosis Date    Chest pain 2/26/2024    CKD stage 3 due to type 2 diabetes mellitus 2/18/2021    DM (diabetes mellitus) 2014    BS didn't check 12/11/2019    DM (diabetes mellitus) 2014    BS didn't check 05/05/2022    Foreign body, eye     right eye    Hyperlipidemia      Hypertension     Need for shingles vaccine 11/20/2019    Normocytic anemia 3/4/2024    NSTEMI (non-ST elevated myocardial infarction) 2/27/2024    Pneumonia     Primary osteoarthritis of right knee 10/29/2021    Severe obesity (BMI 35.0-35.9 with comorbidity) 7/10/2019    Type 2 diabetes mellitus 2014    BS didn't check 12/12/2018       Past Surgical History:   Procedure Laterality Date    ABDOMINAL AORTOGRAPHY N/A 3/1/2024    Procedure: AORTOGRAM, ABDOMINAL;  Surgeon: Sebastian Aguillon MD;  Location: [facility] CATH LAB;  Service: Cardiology;  Laterality: N/A;    ANGIOGRAPHY OF AORTIC ARCH  3/1/2024    Procedure: Aortogram, Aortic Arch;  Surgeon: Sebastian Aguillon MD;  Location: [facility] CATH LAB;  Service: Cardiology;;    APPENDECTOMY      CATARACT EXTRACTION Bilateral     CPG    CATHETERIZATION OF BOTH LEFT AND RIGHT HEART N/A 3/1/2024    Procedure: CATHETERIZATION, HEART, BOTH LEFT AND RIGHT;  Surgeon: Sebastian Aguillon MD;  Location: [facility] CATH LAB;  Service: Cardiology;  Laterality: N/A;    ENDOSCOPIC ULTRASOUND OF UPPER GASTROINTESTINAL TRACT N/A 1/11/2024    Procedure: ULTRASOUND, UPPER GI TRACT, ENDOSCOPIC;  Surgeon: Evens Monsalve MD;  Location: [facility] ENDO ([address] [place] [address]);  Service: Endoscopy;  Laterality: N/A;    FLUOROSCOPY N/A 1/5/2024    Procedure: Fluoroscopy/MEDIPORT PLACEMENT;  Surgeon: Jaret Lu MD;  Location: [facility] CATH LAB;  Service: General;  Laterality: N/A;    VALVE STUDY-AORTIC  3/1/2024    Procedure: Valve study-aortic;  Surgeon: Sebastian Aguillon MD;  Location: [facility] CATH LAB;  Service: Cardiology;;       Review of patient's allergies indicates:   Allergen Reactions    Grass pollen-anai grass standard        Family History       Problem Relation (Age of Onset)    Allergic rhinitis Daughter    Cancer Son    Cataracts Father    Heart disease Mother, Father    Hyperlipidemia Father    Hypertension Father, Sister, Brother          Tobacco Use    Smoking status: Former     Current packs/day: 0.00     " Average packs/day: 3.0 packs/day for 10.0 years (30.0 ttl pk-yrs)     Types: Cigarettes     Start date:      Quit date:      Years since quittin.2    Smokeless tobacco: Never   Substance and Sexual Activity    Alcohol use: No    Drug use: No    Sexual activity: Never         Review of Systems   Constitutional:  Positive for fatigue. Negative for appetite change and fever.   HENT:  Negative for congestion and sore throat.         "Sinus problems"   Respiratory:  Positive for cough. Negative for shortness of breath.    Cardiovascular:  Positive for leg swelling. Negative for chest pain.   Gastrointestinal:  Negative for nausea and vomiting.   Genitourinary:  Negative for difficulty urinating and dysuria.   Musculoskeletal:  Negative for gait problem and neck pain.   Neurological:  Positive for weakness. Negative for dizziness.   Psychiatric/Behavioral:  Negative for sleep disturbance. The patient is not nervous/anxious.      Objective:     Vital Signs (Most Recent):  Temp: 98.5 °F (36.9 °C) (24 1652)  Pulse: 94 (24 1652)  Resp: 19 (24 165)  BP: 113/62 (24)  SpO2: 97 % (24 165) Vital Signs (24h Range):  Temp:  [97.8 °F (36.6 °C)-98.8 °F (37.1 °C)] 98.5 °F (36.9 °C)  Pulse:  [83-95] 94  Resp:  [18-20] 19  SpO2:  [90 %-99 %] 97 %  BP: ()/(51-63) 113/62     Weight: 108.8 kg (239 lb 13.8 oz)  Body mass index is 34.42 kg/m².      Intake/Output Summary (Last 24 hours) at 3/6/2024 1754  Last data filed at 3/6/2024 0400  Gross per 24 hour   Intake --   Output 1600 ml   Net -1600 ml        Physical Exam  Vitals reviewed.   Constitutional:       General: He is awake.      Appearance: He is ill-appearing. He is not toxic-appearing.   HENT:      Head: Normocephalic and atraumatic.      Mouth/Throat:      Mouth: Mucous membranes are moist.      Pharynx: Oropharynx is clear.   Eyes:      Extraocular Movements: Extraocular movements intact.      Conjunctiva/sclera: Conjunctivae " normal.   Cardiovascular:      Rate and Rhythm: Normal rate and regular rhythm.      Heart sounds: Murmur heard.      Comments: Right chest wall with port in place- site WNL  Pulmonary:      Effort: Pulmonary effort is normal.      Comments: Bilateral basilar rales. Mild conversational dyspnea. 2L NC  Abdominal:      General: There is no distension.      Palpations: Abdomen is soft.   Musculoskeletal:      Cervical back: Normal range of motion and neck supple.      Right lower leg: Edema present.      Left lower leg: Edema present.   Skin:     General: Skin is warm and dry.      Findings: Bruising present.   Neurological:      General: No focal deficit present.      Mental Status: He is alert and oriented to person, place, and time.   Psychiatric:         Mood and Affect: Mood normal.         Thought Content: Thought content normal.            Lines/Drains/Airways       Central Venous Catheter Line  Duration                  PowerPort A Cath Single Lumen 01/05/24 1415 Internal Jugular Right 61 days              Peripheral Intravenous Line  Duration                  Peripheral IV - Single Lumen 03/06/24 1705 22 G Posterior;Right Forearm <1 day                    Significant Labs:    CBC/Anemia Profile:  Recent Labs   Lab 03/04/24  1900 03/05/24  0616 03/06/24  0450   WBC  --  17.02* 13.98*   HGB  --  9.6* 9.1*   HCT  --  29.3* 26.9*   PLT  --  303 367   MCV  --  90 89   RDW  --  15.6* 15.2*   OCCULTBLOOD Negative  --   --         Chemistries:  Recent Labs   Lab 03/05/24  0616 03/06/24  0450   * 135*   K 4.0 4.0   CL 96 94*   CO2 24 30*   BUN 23 24*   CREATININE 1.1 1.2   CALCIUM 8.8 8.3*   ALBUMIN 2.4* 2.5*   PROT 6.4 5.7*   BILITOT 0.6 0.7   ALKPHOS 115 105   * 115*   * 100*   MG 1.6  --        All pertinent labs within the past 24 hours have been reviewed.    Significant Imaging:   I have reviewed all pertinent imaging results/findings within the past 24 hours.    ABG  Recent Labs   Lab  03/01/24  1336   PH 7.421   PO2 26*   PCO2 45.3*   HCO3 29.5*   BE 5*     Assessment/Plan:     Pulmonary  Hypoxia  Suspect this is secondary to volume overload, diuresis in progress  Additionally, see problem: abnormal CT chest    Cardiac/Vascular  Acute diastolic CHF (congestive heart failure)  Has been receiving lasix with some symptom improvement  Appears to need more diuresis with pulm edema + elevated BNP  Cardiology following      Severe aortic stenosis  Volume management with diuresis   Cardiology following  outpatient eval for TAVR    Other  Abnormal CT of the chest  CT chest today 2/2 ongoing hypoxia despite treatment so far, labored breathing, and leukocytosis   Reviewed with Dr. Hernandez- suspect this is fluid and needs more diuresis; given history, agree with infectious work up and empiric treatment until able to rule out   Lasix 40 mg BID- will give additional 40 mg IV now   Sputum culture if able to collect  Cefepime and Vanc   Blood cultures pending   Supplemental oxygen as needed  IS, OOB and PT/OT as tolerated           Thank you for your consult. I will follow-up with patient. Please contact us if you have any additional questions.     Dinora Chavira NP  Pulmonology  [facility] - Telemetry ([facility])

## 2024-03-07 NOTE — ASSESSMENT & PLAN NOTE
-No prior hx of CAD  -Troponin max 2.789  -EKG with SR PACs and ST-Twave abnormalities  -echocardiogram 02/26/2024 with EF 55-60% with indeterminate diastolic function   -right and left heart catheterization 03/01/2024 with increased filling pressures, severe aortic stenosis,  RCA with collaterals, EF 30%  -continue medical management; continue aspirin, Lipitor, and midodrine. Lopressor held due to low BP.  -Cardiology following/managing

## 2024-03-07 NOTE — SUBJECTIVE & OBJECTIVE
Interval History:  Patient does complain of shortness a breath and had some dizziness overnight.  He denies chest pain, nausea, vomiting.  Blood pressure currently running on the low end and all antihypertensives have been held.  Patient is on midodrine for low blood pressure, and he has been continued on IV Lasix diuresis due to significant pulmonary edema.  O2 saturations 91-93% with O2 via nasal cannula.    Review of Systems   Constitutional:  Negative for chills and fever.   Respiratory:  Positive for shortness of breath.    Cardiovascular:  Negative for chest pain.   Gastrointestinal:  Negative for diarrhea, nausea and vomiting.   Neurological:  Positive for dizziness.   All other systems reviewed and are negative.    Objective:     Vital Signs (Most Recent):  Temp: 98.2 °F (36.8 °C) (03/07/24 0835)  Pulse: 96 (03/07/24 0835)  Resp: 20 (03/07/24 0835)  BP: (!) 90/51 (03/07/24 0835)  SpO2: (!) 94 % (03/07/24 0835) Vital Signs (24h Range):  Temp:  [97.3 °F (36.3 °C)-98.5 °F (36.9 °C)] 98.2 °F (36.8 °C)  Pulse:  [] 96  Resp:  [16-20] 20  SpO2:  [91 %-98 %] 94 %  BP: ()/(51-68) 90/51     Weight: 108.8 kg (239 lb 13.8 oz)  Body mass index is 34.42 kg/m².    Intake/Output Summary (Last 24 hours) at 3/7/2024 1052  Last data filed at 3/7/2024 0617  Gross per 24 hour   Intake --   Output 1300 ml   Net -1300 ml         Physical Exam  Vitals reviewed.   Constitutional:       Appearance: He is obese. He is not toxic-appearing or diaphoretic.   HENT:      Nose: Nose normal.   Eyes:      Conjunctiva/sclera: Conjunctivae normal.   Cardiovascular:      Rate and Rhythm: Normal rate.   Pulmonary:      Effort: No respiratory distress.      Comments: Coarse BS   Abdominal:      General: There is no distension.      Tenderness: There is no abdominal tenderness.   Musculoskeletal:         General: Swelling present.      Right lower leg: Edema present.      Left lower leg: Edema present.   Skin:     General: Skin is warm  and dry.   Neurological:      Mental Status: He is alert and oriented to person, place, and time.   Psychiatric:         Mood and Affect: Mood normal.         Behavior: Behavior normal.             Significant Labs: All pertinent labs within the past 24 hours have been reviewed.  Recent Lab Results  (Last 5 results in the past 24 hours)        03/07/24  0620   03/07/24  0557   03/06/24  2112   03/06/24  1555   03/06/24  1534        Procalcitonin         0.12  Comment: A concentration < 0.25 ng/mL represents a low risk of bacterial   infection.  Procalcitonin may not be accurate among patients with localized   infection, recent trauma or major surgery, immunosuppressed state,   invasive fungal infection, renal dysfunction. Decisions regarding   initiation or continuation of antibiotic therapy should not be based   solely on procalcitonin levels.         Anion Gap   14             Blood Culture, Routine               BUN   25             Calcium   8.8             Chloride   90             CO2   29             Creatinine   1.1             eGFR   >60             Glucose   189             Magnesium    1.4             POCT Glucose 176     153   173         Potassium   3.2             Sodium   133                                    Significant Imaging: I have reviewed all pertinent imaging results/findings within the past 24 hours.  CT Chest Without Contrast   Final Result      Interval worsening from comparison CT with diffuse multifocal airspace opacification superimposed upon interstitial thickening.  Considerations are multifocal pneumonia, pulmonary edema, pulmonary hemorrhage.      New small left pleural effusion      All CT scans at this facility use dose modulation, iterative reconstruction and/or weight based dosing when appropriate to reduce radiation dose to as low as reasonably achievable.         Electronically signed by: Evens Alba MD   Date:    03/06/2024   Time:    13:06      X-Ray Chest AP Portable    Final Result      In comparison to the prior study, there is no adverse interval changes         Electronically signed by: Earl Mendez MD   Date:    03/06/2024   Time:    09:12      X-Ray Chest AP Portable   Final Result      There has been interval worsening of the appearance of the lungs. There is a moderate amount of interstitial and alveolar opacities seen in both lungs.  This is characteristic of pulmonary edema.         Electronically signed by: Marco Elliott MD   Date:    03/03/2024   Time:    19:00      X-Ray Chest 1 View   Final Result      As above.         Electronically signed by: Spenser Stallworth   Date:    02/28/2024   Time:    11:07      X-Ray Chest AP Portable   Final Result      Worsening moderate bilateral infiltrates.  Follow-up is recommended.         Electronically signed by: Shyla Giles MD   Date:    02/27/2024   Time:    10:18      CTA Chest Non-Coronary (PE Studies)   Final Result      No pulmonary embolism.  No dissection.  Atherosclerotic changes.  Mild interstitial opacities.  Correlate clinically for element of fibrosis versus CHF.  Pulmonary micronodularity.  Recommend follow-up.      All CT scans   are performed using dose optimization techniques including the following: automated exposure control; adjustment of the mA and/or kV; use of iterative reconstruction technique.  Dose modulation was employed for ALARA by means of: Automated exposure control; adjustment of the mA and/or kV according to patient size (this includes techniques or standardized protocols for targeted exams where dose is matched to indication/reason for exam; i.e. extremities or head); and/or use of iterative reconstructive technique.         Electronically signed by: Jhoan Roman   Date:    02/26/2024   Time:    19:11      CT Head Without Contrast   Final Result      Chronic microvascular ischemic changes.  No intracranial hemorrhage.      All CT scans at this facility use dose modulation, iterative  reconstruction, and/or weight based dosing when appropriate to reduce radiation dose to as low as reasonable achievable.         Electronically signed by: Earl Mendez MD   Date:    02/26/2024   Time:    10:36      X-Ray Chest AP Portable   Final Result      No acute process seen.         Electronically signed by: Earl Mendez MD   Date:    02/26/2024   Time:    10:02

## 2024-03-07 NOTE — ASSESSMENT & PLAN NOTE
Has been receiving lasix with some symptom improvement  Appears to need more diuresis with pulm edema + elevated BNP  Cardiology following

## 2024-03-07 NOTE — PLAN OF CARE
03/07/24 1114   Discharge Reassessment   Assessment Type Discharge Planning Reassessment   Did the patient's condition or plan change since previous assessment? No   Communicated DE with patient/caregiver Date not available/Unable to determine   Discharge Plan A Skilled Nursing Facility   DME Needed Upon Discharge  none   Transition of Care Barriers None   Why the patient remains in the hospital Requires continued medical care       Per Treatment Team Rounds, Patient CT of chest still showing lots of fluid and will continue doing IV diuresis and will not be medically stable for DC until potentially Monday.   PEARL reached out to Jennifer,  with [facility], inquiring about how long authorization is good for and updating her on DC Monday. Per Jennifer, they can cancel the authorization and resubmit Friday, for potential discharge Monday, to [facility].   PEARL verbalized understanding.     PEARL will continue to follow and assist as needed.

## 2024-03-07 NOTE — PLAN OF CARE
Good session today. Pt provided with functional mobility of 75'x2 with CGA using RW before he fatigued and required a rest break.  He remains a good candidate to DC with moderate intensity OT.

## 2024-03-07 NOTE — ASSESSMENT & PLAN NOTE
Patient's FSGs are controlled on current medication regimen.  Last A1c reviewed-   Lab Results   Component Value Date    HGBA1C 7.1 (H) 11/28/2023     Most recent fingerstick glucose reviewed-   Recent Labs   Lab 03/06/24  1555 03/06/24  2112 03/07/24  0620   POCTGLUCOSE 173* 153* 176*       Current correctional scale  Low  Maintain anti-hyperglycemic dose as follows-   Antihyperglycemics (From admission, onward)    Start     Stop Route Frequency Ordered    02/26/24 1301  insulin aspart U-100 pen 0-5 Units         -- SubQ Before meals & nightly PRN 02/26/24 1204        Hold Oral hypoglycemics while patient is in the hospital.

## 2024-03-07 NOTE — ASSESSMENT & PLAN NOTE
-CT of chest reviewed  -Pulmonary following, on abx   -Continue IV diuresis-limited with increasing dosage given underling severe AS/hypotension

## 2024-03-07 NOTE — ASSESSMENT & PLAN NOTE
"Patient is identified as having Diastolic (HFpEF) heart failure that is Acute. CHF is currently uncontrolled due to Pulmonary edema/pleural effusion on CXR. Latest ECHO performed and demonstrates- Results for orders placed during the hospital encounter of 02/26/24.      Echo    Interpretation Summary    Left Ventricle: The left ventricle is normal in size. There is concentric remodeling. There is normal systolic function with a visually estimated ejection fraction of 55 - 60%. There is indeterminate diastolic function.    Right Ventricle: Right ventricle was not well visualized due to poor acoustic window. Normal right ventricular cavity size. Systolic function is normal.    Pulmonary Artery: The estimated pulmonary artery systolic pressure is 37 mmHg.    IVC/SVC: Normal venous pressure at 3 mmHg.  . Continue lasix and monitor clinical status closely. Monitor on telemetry. Monitor strict Is&Os and daily weights.  Cardiology on board already for NSTEMI. Continue to stress to patient importance of self efficacy and  on diet for CHF. Last BNP reviewed- and noted below   Recent Labs   Lab 03/06/24  0450   *     -cardiac catheterization 03/01/2024 with EF 30%  -Increased lasix to 40 mg IV bid as of 3/4 due to pitting edema to BLE, dyspnea requiring O2 supplementation, and abnormal CXR with pulmonary edema.   -Fluid restrict 1200 mL.  Strict Is&Os and daily weights.   -Cardiology has continued IV Lasix diuresis, Lopressor stopped due to relative hypotension  -repeat chest x-ray 3/6 with diffuse pulmonary infiltrates seen throughout lungs concerning for interstitial edema or interstitial pneumonia, no pleural effusions   -CT scan of chest without contrast 3/6 with "interval worsening from comparison CT with diffuse multifocal airspace opacification superimposed upon interstitial thickening, considerations are multifocal pneumonia, pulmonary edema, pulmonary hemorrhage; new small left pleural effusion." "   -transfer to skilled nursing facility has been held until cardiac/pulmonary issues addressed and stable

## 2024-03-07 NOTE — PROGRESS NOTES
[facility] Tele[facility] ([facility])  Cardiology  Progress Note    Patient Name: Mark Dickerson Sr.  MRN: [id number]  Admission Date: 2/26/2024  Hospital Length of Stay: 9 days  Code Status: DNR   Attending Physician: Antonia Kapoor MD   Primary Care Physician: Tatyana Cannon MD  Expected Discharge Date:   Principal Problem:NSTEMI (non-ST elevated myocardial infarction)    Subjective:   HPI:  Mr. Dickerson is an 84 year old male patient whose current medical conditions include metastatic breast cancer (on chemo), HTN, hyperlipidemia, DM type II, and CKD who presented to [facility] ED this AM due to AMS/confusion. Patient's DIL reported patient seemed disoriented and had jumbled speech when she visited him earlier this AM. He returned to baseline prior to ED arrival. He denied any associated SOB, chest pain, palpitations, near syncope, or syncope. Initial workup in ED revealed leukocytosis (WBC 14,000), hyponatremia (Na 130), hypomagnesemia (1.5), and elevated troponin (0.725>0.735) and patient was subsequently admitted for further evaluation and treatment. Cardiology consulted to assist with management. Patient seen and examined today, resting in bed. Remains chest pain free. No other CV complaints. States he felt dehydrated earlier today. No prior CV history. He reports compliance with his medications. EKG reviewed, SR with T wave inversions inferiorly and anteriorly. Limited echo and repeat troponin pending. CT of head NAF               Hospital Course:   2/27/24-Patient seen and examined today, sitting up in bed. Appears more SOB/weak. No CP symptoms. WBC bumped to 26,000. CXR with bilateral infiltrates/worsening appearance. IV Lasix given and BC drawn. Troponin bumped to 1.293. [facility] initially planned today but cancelled due to lab abnormalities, CXR, and patient's clinical status.    2/28/24-Patient seen and examined today, sitting up in bedside chair. Complains of chills/weakness. WBC up to 34,000. CXR showed bilateral  opacities. Being diuresed. K 3.3, being repleted. [facility] deferred for now.    2/29/24-Patient seen and examined today, resting in bed. Feels/looks better. WBC trending down. Denies CP. BC NGTD. Tentative plans for [facility] tmw.    3/1/24-Patient seen and examined today, lying in bed. Stable overnight. No CV complaints. CP free. R/LHC today by Dr. Aguillon.    3/2/24 - s/p R/LHC - with elevated filling pressures,  RCA with collaterals - will cont med management and discuss outpt TAVR eval for severe AS    3/5/24-Patient seen and examined today, sitting up in bedside chair. Feels ok. Still with SOB/BLE edema. No CP. Heme/onc note reviewed. Needs diuresis. BP soft, BB d/c'd to allow diuresis. Discussed OP evaluation for TAVR/ballon valvuloplasty.    3/6/24-Patient seen and examined today, sitting up in bedside chair. Doing ok. SOB stable. Diuresed well overnight. BNP still elevated. CT of chest pending.    3/7/24-Patient seen and examined today, resting in bedside chair. Still SOB, especially with exertion. CT of chest reviewed-interval worsening from comparison CT with diffuse multifocal airspace opacification superimposed upon interstitial thickening. Considerations are multifocal pneumonia, pulmonary edema, pulmonary hemorrhage. Pulmonary following, being covered with abx. Diuretics continued---limited with higher dose/aggressive diuresis due to severe AS/hypotension.        Review of Systems   Constitutional: Positive for malaise/fatigue.   HENT: Negative.     Eyes: Negative.    Cardiovascular:  Positive for dyspnea on exertion.   Respiratory:  Positive for shortness of breath.    Endocrine: Negative.    Hematologic/Lymphatic: Negative.    Skin: Negative.    Musculoskeletal:  Positive for arthritis.   Gastrointestinal: Negative.    Genitourinary: Negative.    Neurological: Negative.    Psychiatric/Behavioral: Negative.     Allergic/Immunologic: Negative.      Objective:     Vital Signs (Most Recent):  Temp: 98 °F (36.7  °C) (03/07/24 1202)  Pulse: 100 (03/07/24 1202)  Resp: 19 (03/07/24 1202)  BP: 127/62 (03/07/24 1202)  SpO2: (!) 91 % (03/07/24 1202) Vital Signs (24h Range):  Temp:  [97.3 °F (36.3 °C)-98.5 °F (36.9 °C)] 98 °F (36.7 °C)  Pulse:  [] 100  Resp:  [16-20] 19  SpO2:  [91 %-98 %] 91 %  BP: ()/(51-68) 127/62     Weight: 108.8 kg (239 lb 13.8 oz)  Body mass index is 34.42 kg/m².     SpO2: (!) 91 %         Intake/Output Summary (Last 24 hours) at 3/7/2024 1346  Last data filed at 3/7/2024 0617  Gross per 24 hour   Intake --   Output 1300 ml   Net -1300 ml       Lines/Drains/Airways       Central Venous Catheter Line  Duration                  PowerPort A Cath Single Lumen 01/05/24 1415 Internal Jugular Right 61 days              Peripheral Intravenous Line  Duration                  Peripheral IV - Single Lumen 03/06/24 1705 22 G Posterior;Right Forearm <1 day                       Physical Exam  Vitals and nursing note reviewed.   Constitutional:       General: He is not in acute distress.     Appearance: Normal appearance. He is well-developed. He is not diaphoretic.      Comments: On supplemental O2 via NC   HENT:      Head: Normocephalic and atraumatic.   Eyes:      General:         Right eye: No discharge.         Left eye: No discharge.      Pupils: Pupils are equal, round, and reactive to light.   Cardiovascular:      Rate and Rhythm: Normal rate and regular rhythm.      Heart sounds: S1 normal and S2 normal. Murmur heard.      Harsh midsystolic murmur is present at the upper right sternal border radiating to the neck.   Pulmonary:      Effort: Pulmonary effort is normal. No respiratory distress.      Breath sounds: No wheezing.      Comments: Coarse BS  Abdominal:      General: There is no distension.   Musculoskeletal:      Right lower leg: Edema present.      Left lower leg: Edema present.   Skin:     General: Skin is warm and dry.      Findings: No erythema.   Neurological:      General: No focal  "deficit present.      Mental Status: He is alert and oriented to person, place, and time.   Psychiatric:         Mood and Affect: Mood normal.         Behavior: Behavior normal.            Significant Labs: CMP   Recent Labs   Lab 03/06/24  0450 03/07/24  0557   * 133*   K 4.0 3.2*   CL 94* 90*   CO2 30* 29   * 189*   BUN 24* 25*   CREATININE 1.2 1.1   CALCIUM 8.3* 8.8   PROT 5.7*  --    ALBUMIN 2.5*  --    BILITOT 0.7  --    ALKPHOS 105  --    *  --    *  --    ANIONGAP 11 14   , CBC   Recent Labs   Lab 03/06/24  0450   WBC 13.98*   HGB 9.1*   HCT 26.9*      , Troponin No results for input(s): "TROPONINI" in the last 48 hours., and All pertinent lab results from the last 24 hours have been reviewed.    Significant Imaging: Echocardiogram: Transthoracic echo (TTE) complete (Cupid Only):   Results for orders placed or performed during the hospital encounter of 02/26/24   Echo   Result Value Ref Range    LVIDd 4.88 3.5 - 6.0 cm    LV Systolic Volume 52.86 mL    LVIDs 3.56 2.1 - 4.0 cm    LV Diastolic Volume 111.86 mL    IVS 1.04 0.6 - 1.1 cm    FS 27 (A) 28 - 44 %    Left Ventricle Relative Wall Thickness 0.44 cm    Posterior Wall 1.08 0.6 - 1.1 cm    LV mass 189.29 g    TR Max Can 2.91 m/s    RVDD 2.81 cm    LA size 4.18 cm    Triscuspid Valve Regurgitation Peak Gradient 34 mmHg    IVC diameter 2.08 cm    LV Systolic Volume Index 23.2 mL/m2    LV Diastolic Volume Index 49.06 mL/m2    LV Mass Index 83 g/m2    ZLVIDS -2.98     ZLVIDD -5.65     TV resting pulmonary artery pressure 37 mmHg    RV TB RVSP 6 mmHg    Est. RA pres 3 mmHg    Narrative      Left Ventricle: The left ventricle is normal in size. There is   concentric remodeling. There is normal systolic function with a visually   estimated ejection fraction of 55 - 60%. There is indeterminate diastolic   function.    Right Ventricle: Right ventricle was not well visualized due to poor   acoustic window. Normal right ventricular " cavity size. Systolic function   is normal.    Pulmonary Artery: The estimated pulmonary artery systolic pressure is   37 mmHg.    IVC/SVC: Normal venous pressure at 3 mmHg.     , EKG: Reviewed, and X-Ray: CXR: X-Ray Chest 1 View (CXR):   Results for orders placed or performed during the hospital encounter of 02/26/24   X-Ray Chest 1 View    Narrative    EXAMINATION:  XR CHEST 1 VIEW    CLINICAL HISTORY:  SOB, elevated WBC;.    TECHNIQUE:  Single frontal portable view of the chest was performed.    COMPARISON:  02/26/2024    FINDINGS:  Support devices: Right IJ MediPort unchanged.    Ground-glass and peripheral linear reticular interstitial opacification similar to recent prior exams, nonspecific.  Heart normal size.  No pneumothorax or pleural effusion.      Impression    As above.      Electronically signed by: Spenser Stallworth  Date:    02/28/2024  Time:    11:07    and X-Ray Chest PA and Lateral (CXR): No results found for this visit on 02/26/24.  Assessment and Plan:   Patient who presents with NSTEMI/hypoxia/CHF/severe AS. Still SOB, CT of chest reviewed. Appreciate pulmonary assistance. Continue IV diuresis. OP TAVR referral.    * NSTEMI (non-ST elevated myocardial infarction)  -See plan under elevated troponin    Abnormal CT of the chest  -Pulmonary following, appreciate assistance    Hypoxia  -CT of chest reviewed  -Pulmonary following, on abx   -Continue IV diuresis-limited with increasing dosage given underling severe AS/hypotension    Abnormal EKG  -See plan under elevated troponin    SIRS (systemic inflammatory response syndrome)  -Mgmt as per primary team  -BC show NGTD    Hypomagnesemia  -Repletion as per primary team    Hypokalemia  -Repletion as per primary team      Elevated troponin  -Troponin 0.725>0.735, continue to trend  -No CP symptoms/SOB/angina  -No prior CV history  -Continue ASA, heparin gtt, statin  -EKG reviewed, T wave inversions inferiorly and laterally  -Limited TTE pending  -Will  consider ischemic workup with MPI stress test this admission    2/27/24  -Troponin bumped to 1.2 overnight  -Remains CP free  -Continue ASA, heparin gtt, statin  -TTE with normal EF  -LHC planned once more stable respiratory wise/infection ruled out    2/28/24  -CP free  -Continue ASA, heparin gtt, statin  -BB added  -LHC deferred for now given leukocytosis/SOB    2/29/24  -Stable  -WBC count improving  -Continue BB, ASA, heparin gtt, statin  -Tentative plans for [facility] tmw, keep NPO after MN    3/1/24  -Stable, no CP  -Continue BB, ASA, heparin gtt, statin  -R/LHC today by Dr. Aguillon. All risks, benefits, and treatment alternatives explained to patient in detail. All questions answered. He has agreed to proceed.    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, estrogen receptor positive  -Mgmt as per heme/onc    Severe aortic stenosis  -Continue IV diuresis  -Avoid hypotension  -OP TAVR/balloon valvuloplasty referral    3/6/24  -Continue IV diuresis as tolerated  -Avoid hypotension, on midodrine to augment BP    CKD stage 3 due to type 2 diabetes mellitus  -Monitor        VTE Risk Mitigation (From admission, onward)           Ordered     IP VTE HIGH RISK PATIENT  Once         02/26/24 1204     Place sequential compression device  Until discontinued         02/26/24 1204                    Sandy Rodriguez PA-C  Cardiology  [facility] - Telemetry ([facility])

## 2024-03-07 NOTE — PT/OT/SLP PROGRESS
"Occupational Therapy  Treatment    Mark Dickerson Sr.   MRN: [id number]   Admitting Diagnosis: NSTEMI (non-ST elevated myocardial infarction)    OT Date of Treatment: 03/07/24   OT Start Time: 1330  OT Stop Time: 1350  OT Total Time (min): 20 min    Billable Minutes:  Therapeutic Activity 20    OT/SMITH: OT     Number of SMITH visits since last OT visit: 0    General Precautions: Standard, fall, respiratory  Orthopedic Precautions: N/A  Braces: N/A  Respiratory Status: Nasal cannula, flow 3 L/min    Subjective:  Communicated with nurse prior to session.  Pt was seated in recliner when OT arrived. He was willing to participate in treatment.    Objective:  Patient found with: oxygen, peripheral IV, telemetry     Functional Mobility:  Transfers: CGA sit/stand  Functional Ambulation: 75'x2 with RW and CGA    Balance:   Static Sit: GOOD: Takes MODERATE challenges from all directions  Dynamic Sit: GOOD: Maintains balance through MODERATE excursions of active trunk movement  Static Stand: GOOD: Takes MODERATE challenges from all directions  Dynamic stand: FAIR+: Needs CLOSE SUPERVISION during gait and is able to right self with minor LOB    AM-PAC 6 CLICK ADL   How much help from another person does this patient currently need?   1 = Unable, Total/Dependent Assistance  2 = A lot, Maximum/Moderate Assistance  3 = A little, Minimum/Contact Guard/Supervision  4 = None, Modified [place]/Independent    Putting on and taking off regular lower body clothing? : 3  Bathing (including washing, rinsing, drying)?: 3  Toileting, which includes using toilet, bedpan, or urinal? : 3  Putting on and taking off regular upper body clothing?: 4  Taking care of personal grooming such as brushing teeth?: 4  Eating meals?: 4  Daily Activity Total Score: 21     AM-PAC Raw Score CMS "G-Code Modifier Level of Impairment Assistance   6 % Total / Unable   7 - 8 CM 80 - 100% Maximal Assist   9-13 CL 60 - 80% Moderate Assist   14 - 19 CK 40 - 60% " Moderate Assist   20 - 22 CJ 20 - 40% Minimal Assist   23 CI 1-20% SBA / CGA   24 CH 0% Independent/ Mod I       Patient left up in chair with all lines intact, call button in reach, and chair alarm on    ASSESSMENT:  Mark Dickerson Sr. is a 84 y.o. male with a medical diagnosis of NSTEMI (non-ST elevated myocardial infarction) and presents with weakness.    Rehab identified problem list/impairments:  weakness, impaired functional mobility, decreased safety awareness, impaired cardiopulmonary response to activity, gait instability, impaired endurance, decreased upper extremity function, impaired balance, impaired self care skills, decreased lower extremity function    Rehab potential is good.    Activity tolerance: Good    Discharge recommendations: Moderate Intensity Therapy   Barriers to discharge: Barriers to Discharge: None    Equipment recommendations: to be determined by next level of care    GOALS:   Multidisciplinary Problems       Occupational Therapy Goals          Problem: Occupational Therapy    Goal Priority Disciplines Outcome Interventions   Occupational Therapy Goal     OT, PT/OT Ongoing, Progressing    Description: Goals to be met by: 3/17/24     Patient will increase functional independence with ADLs by performing:    UE Dressing with [place].  Grooming while standing at sink with Jenkins.  Toileting from toilet with Jenkins for hygiene and clothing management.   Toilet transfer to toilet with Modified Jenkins.  Upper extremity exercise program x15 reps per handout, with independence.                       Plan:  Patient to be seen 2 x/week to address the above listed problems via self-care/home management, therapeutic activities, therapeutic exercises, neuromuscular re-education  Plan of Care expires: 03/12/24  Plan of Care reviewed with: patient         03/07/2024

## 2024-03-07 NOTE — ASSESSMENT & PLAN NOTE
Suspect this is secondary to volume overload, diuresis in progress  Additionally, see problem: abnormal CT chest

## 2024-03-07 NOTE — PLAN OF CARE
A225/A225 JOHANNY Dickerson . is a 84 y.o.male admitted on 2/26/2024 for NSTEMI (non-ST elevated myocardial infarction)   Code Status: DNR MRN: [id number]   Review of patient's allergies indicates:   Allergen Reactions    Grass pollen-anai grass standard      Past Medical History:   Diagnosis Date    Chest pain 2/26/2024    CKD stage 3 due to type 2 diabetes mellitus 2/18/2021    DM (diabetes mellitus) 2014    BS didn't check 12/11/2019    DM (diabetes mellitus) 2014    BS didn't check 05/05/2022    Foreign body, eye     right eye    Hyperlipidemia     Hypertension     Need for shingles vaccine 11/20/2019    Normocytic anemia 3/4/2024    NSTEMI (non-ST elevated myocardial infarction) 2/27/2024    Pneumonia     Primary osteoarthritis of right knee 10/29/2021    Severe obesity (BMI 35.0-35.9 with comorbidity) 7/10/2019    Type 2 diabetes mellitus 2014    BS didn't check 12/12/2018      PRN meds    sodium chloride 0.9%, , Continuous PRN  acetaminophen, 650 mg, Q4H PRN  acetaminophen, 650 mg, Q4H PRN  cyclobenzaprine, 10 mg, TID PRN  dextrose 10%, 12.5 g, PRN  dextrose 10%, 25 g, PRN  glucagon (human recombinant), 1 mg, PRN  glucose, 16 g, PRN  glucose, 24 g, PRN  HYDROcodone-acetaminophen, 1 tablet, Q6H PRN  insulin aspart U-100, 0-5 Units, QID (AC + HS) PRN  melatonin, 6 mg, Nightly PRN  naloxone, 0.02 mg, PRN  ondansetron, 8 mg, Q8H PRN  ondansetron, 4 mg, Q8H PRN  sodium chloride, 1 spray, PRN  sodium chloride 0.9%, 10 mL, Q12H PRN  vancomycin - pharmacy to dose, , pharmacy to manage frequency      Chart check completed. Will continue plan of care.      Orientation: oriented x 4  William Coma Scale Score: 15     Lead Monitored: Lead II Rhythm: sinus tachycardia Frequency/Ectopy: PVCs, PACs  Cardiac/Telemetry Box Number: [id number]  VTE Required Core Measure: Pharmacological prophylaxis initiated/maintained Last Bowel Movement: 03/05/24  Diet Cardiac Fluid - 1200mL; Standard Tray  Voiding Characteristics: urgency  Sunil  Score: 18  Fall Risk Score: 16  Accucheck []   Freq?      Lines/Drains/Airways       Central Venous Catheter Line  Duration                  PowerPort A Cath Single Lumen 01/05/24 1415 Internal Jugular Right 62 days              Peripheral Intravenous Line  Duration                  Peripheral IV - Single Lumen 03/06/24 1705 22 G Posterior;Right Forearm <1 day

## 2024-03-07 NOTE — ASSESSMENT & PLAN NOTE
Patient has hypokalemia which is Acute on Chronic and currently uncontrolled. Most recent potassium levels reviewed-   Lab Results   Component Value Date    K 3.2 (L) 03/07/2024   Aggravated by IV Lasix diuresis.  Give potassium chloride 40 mEq p.o. x1 dose now followed by potassium chloride 20 mEq p.o. b.i.d..  Magnesium level 1.4.  Continue Mag-Ox 400 mg p.o. daily.  Give magnesium sulfate 2 g IV x1 dose.  Check a.m. labs.

## 2024-03-07 NOTE — SUBJECTIVE & OBJECTIVE
Review of Systems   Constitutional: Positive for malaise/fatigue.   HENT: Negative.     Eyes: Negative.    Cardiovascular:  Positive for dyspnea on exertion.   Respiratory:  Positive for shortness of breath.    Endocrine: Negative.    Hematologic/Lymphatic: Negative.    Skin: Negative.    Musculoskeletal:  Positive for arthritis.   Gastrointestinal: Negative.    Genitourinary: Negative.    Neurological: Negative.    Psychiatric/Behavioral: Negative.     Allergic/Immunologic: Negative.      Objective:     Vital Signs (Most Recent):  Temp: 98 °F (36.7 °C) (03/07/24 1202)  Pulse: 100 (03/07/24 1202)  Resp: 19 (03/07/24 1202)  BP: 127/62 (03/07/24 1202)  SpO2: (!) 91 % (03/07/24 1202) Vital Signs (24h Range):  Temp:  [97.3 °F (36.3 °C)-98.5 °F (36.9 °C)] 98 °F (36.7 °C)  Pulse:  [] 100  Resp:  [16-20] 19  SpO2:  [91 %-98 %] 91 %  BP: ()/(51-68) 127/62     Weight: 108.8 kg (239 lb 13.8 oz)  Body mass index is 34.42 kg/m².     SpO2: (!) 91 %         Intake/Output Summary (Last 24 hours) at 3/7/2024 1346  Last data filed at 3/7/2024 0617  Gross per 24 hour   Intake --   Output 1300 ml   Net -1300 ml       Lines/Drains/Airways       Central Venous Catheter Line  Duration                  PowerPort A Cath Single Lumen 01/05/24 1415 Internal Jugular Right 61 days              Peripheral Intravenous Line  Duration                  Peripheral IV - Single Lumen 03/06/24 1705 22 G Posterior;Right Forearm <1 day                       Physical Exam  Vitals and nursing note reviewed.   Constitutional:       General: He is not in acute distress.     Appearance: Normal appearance. He is well-developed. He is not diaphoretic.      Comments: On supplemental O2 via NC   HENT:      Head: Normocephalic and atraumatic.   Eyes:      General:         Right eye: No discharge.         Left eye: No discharge.      Pupils: Pupils are equal, round, and reactive to light.   Cardiovascular:      Rate and Rhythm: Normal rate and  "regular rhythm.      Heart sounds: S1 normal and S2 normal. Murmur heard.      Harsh midsystolic murmur is present at the upper right sternal border radiating to the neck.   Pulmonary:      Effort: Pulmonary effort is normal. No respiratory distress.      Breath sounds: No wheezing.      Comments: Coarse BS  Abdominal:      General: There is no distension.   Musculoskeletal:      Right lower leg: Edema present.      Left lower leg: Edema present.   Skin:     General: Skin is warm and dry.      Findings: No erythema.   Neurological:      General: No focal deficit present.      Mental Status: He is alert and oriented to person, place, and time.   Psychiatric:         Mood and Affect: Mood normal.         Behavior: Behavior normal.            Significant Labs: CMP   Recent Labs   Lab 03/06/24  0450 03/07/24  0557   * 133*   K 4.0 3.2*   CL 94* 90*   CO2 30* 29   * 189*   BUN 24* 25*   CREATININE 1.2 1.1   CALCIUM 8.3* 8.8   PROT 5.7*  --    ALBUMIN 2.5*  --    BILITOT 0.7  --    ALKPHOS 105  --    *  --    *  --    ANIONGAP 11 14   , CBC   Recent Labs   Lab 03/06/24  0450   WBC 13.98*   HGB 9.1*   HCT 26.9*      , Troponin No results for input(s): "TROPONINI" in the last 48 hours., and All pertinent lab results from the last 24 hours have been reviewed.    Significant Imaging: Echocardiogram: Transthoracic echo (TTE) complete (Cupid Only):   Results for orders placed or performed during the hospital encounter of 02/26/24   Echo   Result Value Ref Range    LVIDd 4.88 3.5 - 6.0 cm    LV Systolic Volume 52.86 mL    LVIDs 3.56 2.1 - 4.0 cm    LV Diastolic Volume 111.86 mL    IVS 1.04 0.6 - 1.1 cm    FS 27 (A) 28 - 44 %    Left Ventricle Relative Wall Thickness 0.44 cm    Posterior Wall 1.08 0.6 - 1.1 cm    LV mass 189.29 g    TR Max Can 2.91 m/s    RVDD 2.81 cm    LA size 4.18 cm    Triscuspid Valve Regurgitation Peak Gradient 34 mmHg    IVC diameter 2.08 cm    LV Systolic Volume Index " 23.2 mL/m2    LV Diastolic Volume Index 49.06 mL/m2    LV Mass Index 83 g/m2    ZLVIDS -2.98     ZLVIDD -5.65     TV resting pulmonary artery pressure 37 mmHg    RV TB RVSP 6 mmHg    Est. RA pres 3 mmHg    Narrative      Left Ventricle: The left ventricle is normal in size. There is   concentric remodeling. There is normal systolic function with a visually   estimated ejection fraction of 55 - 60%. There is indeterminate diastolic   function.    Right Ventricle: Right ventricle was not well visualized due to poor   acoustic window. Normal right ventricular cavity size. Systolic function   is normal.    Pulmonary Artery: The estimated pulmonary artery systolic pressure is   37 mmHg.    IVC/SVC: Normal venous pressure at 3 mmHg.     , EKG: Reviewed, and X-Ray: CXR: X-Ray Chest 1 View (CXR):   Results for orders placed or performed during the hospital encounter of 02/26/24   X-Ray Chest 1 View    Narrative    EXAMINATION:  XR CHEST 1 VIEW    CLINICAL HISTORY:  SOB, elevated WBC;.    TECHNIQUE:  Single frontal portable view of the chest was performed.    COMPARISON:  02/26/2024    FINDINGS:  Support devices: Right IJ MediPort unchanged.    Ground-glass and peripheral linear reticular interstitial opacification similar to recent prior exams, nonspecific.  Heart normal size.  No pneumothorax or pleural effusion.      Impression    As above.      Electronically signed by: Spenser Stallworth  Date:    02/28/2024  Time:    11:07    and X-Ray Chest PA and Lateral (CXR): No results found for this visit on 02/26/24.

## 2024-03-07 NOTE — PROGRESS NOTES
"[facility]  Progress Note    Patient Name: Mark Dickerson Sr.  MRN: [id number]  Patient Class: IP- Inpatient   Admission Date: 2/26/2024  Length of Stay: 9 days  Attending Physician: Antonia Kapoor MD  Primary Care Provider: Tatyana Cannon MD        Subjective:     Principal Problem:NSTEMI (non-ST elevated myocardial infarction)        HPI:  Mark Dickerson Sr. is a 84 y.o. male patient with a PMHx of metastatic breast cancer (on chemo), HLD, HTN, DM II, CKD who presents to the ED for evaluation of AMS which onset this morning PTA. Per daughter in law she visited the pt early this morning and states that he was normal for a short period before becoming extremely disoriented with jumbled speech.  Patient mental status returned to baseline piror to ed arrival.  Patient reports he feels "dehydrated", he also reports diarrhea earlier last week but this has since resolved.  Patient denies any fever, chills, N/V, SOB and chest pain.   In the ED, work up notable for WBC 14, Na 130, K 3.0, mag 1.5, , Trop 0.7.  EKG with SR PACs, ST and T wave abnormality. CT head negative for acute processes.  Chest xray no acute processes. Patient started on IVF and given zofran for nausea.  Patient will be admitted to observation for electrolyte imbalances, elevated trop.  Cards consulted.     Code Status DNR  Surrogate Decision maker daughter    Overview/Hospital Course:  Patient is currently admitted for NSTEMI, SIRS criteria, severe leukocytosis, hypokalemia, hypomagnesemia, chronic kidney disease stage 3, diabetes mellitus type 2, left breast cancer in a male patient, acute diastolic CHF exacerbation, normocytic anemia, transaminitis, and severe aortic stenosis.  Consults on this admission include Cardiology, Hematology Oncology, Pulmonary, and Wound Care.  Echocardiogram 02/26/2024 with EF 55-60% with indeterminate diastolic function.  Right and left heart catheterization 03/01/2024 with " "elevated filling pressures, severe aortic stenosis,  RCA with collaterals, EF 30%.  Cardiology recommends continuing medical management for NSTEMI and will discuss outpatient TAVR evaluation for severe aortic stenosis.  Continue IV Lasix diuresis for CHF exacerbation with evidence of volume overload.  Patient is noted to have pitting edema on exam.  Chest x-ray 03/03/2024 with interval worsening with moderate interstitial and alveolar opacities of bilateral lungs consistent with pulmonary edema.  BNP had trended upward and is currently 590-->repeat 505 on 3/6.  Hematology oncology has seen the patient in consultation and feels that leukocytosis is related to Neulasta and acute cardiac event.  White blood cell count is trending downward.  Patient did receive empiric IV Rocephin for 4 days 2/28-03/02/2024.  No localizing source of infection was identified.  COVID-19 negative (x2), influenza a/B negative, bcx were negative, urinalysis negative, lactic acid level 1.0, procalcitonin level 0.14.  CTA of chest 2/24/24 with no evidence of PE, pulmonary fibrosis versus CHF findings with recommendation for follow-up imaging.  Repeat chest x-ray 03/06/2024 with diffuse pulmonary infiltrates seen throughout lungs concerning for interstitial edema or interstitial pneumonia, no pleural effusions appreciated.  CT scan of chest without contrast 3/6  with "interval worsening from comparison CT with diffuse multifocal airspace opacification superimposed upon interstitial thickening, considerations are multifocal pneumonia, pulmonary edema, pulmonary hemorrhage; new small left pleural effusion." Pulmonary did see the patient in consultation yesterday and feels that abnormal CT scan of chest most likely related to fluid however okay with continued IV antibiotics for now.  Patient we will continue IV Lasix diuresis..  Continue to replace electrolytes.    Interval History:  Patient does complain of shortness a breath and had some " dizziness overnight.  He denies chest pain, nausea, vomiting.  Blood pressure currently running on the low end and all antihypertensives have been held.  Patient is on midodrine for low blood pressure, and he has been continued on IV Lasix diuresis due to significant pulmonary edema.  O2 saturations 91-93% with O2 via nasal cannula.    Review of Systems   Constitutional:  Negative for chills and fever.   Respiratory:  Positive for shortness of breath.    Cardiovascular:  Negative for chest pain.   Gastrointestinal:  Negative for diarrhea, nausea and vomiting.   Neurological:  Positive for dizziness.   All other systems reviewed and are negative.    Objective:     Vital Signs (Most Recent):  Temp: 98.2 °F (36.8 °C) (03/07/24 0835)  Pulse: 96 (03/07/24 0835)  Resp: 20 (03/07/24 0835)  BP: (!) 90/51 (03/07/24 0835)  SpO2: (!) 94 % (03/07/24 0835) Vital Signs (24h Range):  Temp:  [97.3 °F (36.3 °C)-98.5 °F (36.9 °C)] 98.2 °F (36.8 °C)  Pulse:  [] 96  Resp:  [16-20] 20  SpO2:  [91 %-98 %] 94 %  BP: ()/(51-68) 90/51     Weight: 108.8 kg (239 lb 13.8 oz)  Body mass index is 34.42 kg/m².    Intake/Output Summary (Last 24 hours) at 3/7/2024 1052  Last data filed at 3/7/2024 0617  Gross per 24 hour   Intake --   Output 1300 ml   Net -1300 ml         Physical Exam  Vitals reviewed.   Constitutional:       Appearance: He is obese. He is not toxic-appearing or diaphoretic.   HENT:      Nose: Nose normal.   Eyes:      Conjunctiva/sclera: Conjunctivae normal.   Cardiovascular:      Rate and Rhythm: Normal rate.   Pulmonary:      Effort: No respiratory distress.      Comments: Coarse BS   Abdominal:      General: There is no distension.      Tenderness: There is no abdominal tenderness.   Musculoskeletal:         General: Swelling present.      Right lower leg: Edema present.      Left lower leg: Edema present.   Skin:     General: Skin is warm and dry.   Neurological:      Mental Status: He is alert and oriented to  person, place, and time.   Psychiatric:         Mood and Affect: Mood normal.         Behavior: Behavior normal.             Significant Labs: All pertinent labs within the past 24 hours have been reviewed.  Recent Lab Results  (Last 5 results in the past 24 hours)        03/07/24  0620   03/07/24  0557   03/06/24  2112   03/06/24  1555   03/06/24  1534        Procalcitonin         0.12  Comment: A concentration < 0.25 ng/mL represents a low risk of bacterial   infection.  Procalcitonin may not be accurate among patients with localized   infection, recent trauma or major surgery, immunosuppressed state,   invasive fungal infection, renal dysfunction. Decisions regarding   initiation or continuation of antibiotic therapy should not be based   solely on procalcitonin levels.         Anion Gap   14             Blood Culture, Routine               BUN   25             Calcium   8.8             Chloride   90             CO2   29             Creatinine   1.1             eGFR   >60             Glucose   189             Magnesium    1.4             POCT Glucose 176     153   173         Potassium   3.2             Sodium   133                                    Significant Imaging: I have reviewed all pertinent imaging results/findings within the past 24 hours.  CT Chest Without Contrast   Final Result      Interval worsening from comparison CT with diffuse multifocal airspace opacification superimposed upon interstitial thickening.  Considerations are multifocal pneumonia, pulmonary edema, pulmonary hemorrhage.      New small left pleural effusion      All CT scans at this facility use dose modulation, iterative reconstruction and/or weight based dosing when appropriate to reduce radiation dose to as low as reasonably achievable.         Electronically signed by: Evens Alba MD   Date:    03/06/2024   Time:    13:06      X-Ray Chest AP Portable   Final Result      In comparison to the prior study, there is no adverse  interval changes         Electronically signed by: Earl Mendez MD   Date:    03/06/2024   Time:    09:12      X-Ray Chest AP Portable   Final Result      There has been interval worsening of the appearance of the lungs. There is a moderate amount of interstitial and alveolar opacities seen in both lungs.  This is characteristic of pulmonary edema.         Electronically signed by: Marco Elliott MD   Date:    03/03/2024   Time:    19:00      X-Ray Chest 1 View   Final Result      As above.         Electronically signed by: Spenser Stallworth   Date:    02/28/2024   Time:    11:07      X-Ray Chest AP Portable   Final Result      Worsening moderate bilateral infiltrates.  Follow-up is recommended.         Electronically signed by: Shyla Giles MD   Date:    02/27/2024   Time:    10:18      CTA Chest Non-Coronary (PE Studies)   Final Result      No pulmonary embolism.  No dissection.  Atherosclerotic changes.  Mild interstitial opacities.  Correlate clinically for element of fibrosis versus CHF.  Pulmonary micronodularity.  Recommend follow-up.      All CT scans   are performed using dose optimization techniques including the following: automated exposure control; adjustment of the mA and/or kV; use of iterative reconstruction technique.  Dose modulation was employed for ALARA by means of: Automated exposure control; adjustment of the mA and/or kV according to patient size (this includes techniques or standardized protocols for targeted exams where dose is matched to indication/reason for exam; i.e. extremities or head); and/or use of iterative reconstructive technique.         Electronically signed by: Jhoan Roman   Date:    02/26/2024   Time:    19:11      CT Head Without Contrast   Final Result      Chronic microvascular ischemic changes.  No intracranial hemorrhage.      All CT scans at this facility use dose modulation, iterative reconstruction, and/or weight based dosing when appropriate to reduce radiation dose  to as low as reasonable achievable.         Electronically signed by: Earl Mendez MD   Date:    02/26/2024   Time:    10:36      X-Ray Chest AP Portable   Final Result      No acute process seen.         Electronically signed by: Earl Mendez MD   Date:    02/26/2024   Time:    10:02           Assessment/Plan:      * NSTEMI (non-ST elevated myocardial infarction)  -No prior hx of CAD  -Troponin max 2.789  -EKG with SR PACs and ST-Twave abnormalities  -echocardiogram 02/26/2024 with EF 55-60% with indeterminate diastolic function   -right and left heart catheterization 03/01/2024 with increased filling pressures, severe aortic stenosis,  RCA with collaterals, EF 30%  -continue medical management; continue aspirin, Lipitor, and midodrine. Lopressor held due to low BP.  -Cardiology following/managing        Acute diastolic CHF (congestive heart failure)  Patient is identified as having Diastolic (HFpEF) heart failure that is Acute. CHF is currently uncontrolled due to Pulmonary edema/pleural effusion on CXR. Latest ECHO performed and demonstrates- Results for orders placed during the hospital encounter of 02/26/24.      Echo    Interpretation Summary    Left Ventricle: The left ventricle is normal in size. There is concentric remodeling. There is normal systolic function with a visually estimated ejection fraction of 55 - 60%. There is indeterminate diastolic function.    Right Ventricle: Right ventricle was not well visualized due to poor acoustic window. Normal right ventricular cavity size. Systolic function is normal.    Pulmonary Artery: The estimated pulmonary artery systolic pressure is 37 mmHg.    IVC/SVC: Normal venous pressure at 3 mmHg.  . Continue lasix and monitor clinical status closely. Monitor on telemetry. Monitor strict Is&Os and daily weights.  Cardiology on board already for NSTEMI. Continue to stress to patient importance of self efficacy and  on diet for CHF. Last BNP reviewed- and  "noted below   Recent Labs   Lab 03/06/24  0450   *     -cardiac catheterization 03/01/2024 with EF 30%  -Increased lasix to 40 mg IV bid as of 3/4 due to pitting edema to BLE, dyspnea requiring O2 supplementation, and abnormal CXR with pulmonary edema.   -Fluid restrict 1200 mL.  Strict Is&Os and daily weights.   -Cardiology has continued IV Lasix diuresis, Lopressor stopped due to relative hypotension  -repeat chest x-ray 3/6 with diffuse pulmonary infiltrates seen throughout lungs concerning for interstitial edema or interstitial pneumonia, no pleural effusions   -CT scan of chest without contrast 3/6 with "interval worsening from comparison CT with diffuse multifocal airspace opacification superimposed upon interstitial thickening, considerations are multifocal pneumonia, pulmonary edema, pulmonary hemorrhage; new small left pleural effusion."   -transfer to skilled nursing facility has been held until cardiac/pulmonary issues addressed and stable    SIRS (systemic inflammatory response syndrome)  -WBC 34.56-->13.98, leukocytosis trending down   -COVID-19 negative, influenza a/B negative, procalcitonin level 0.14, lactic acid level 1.0, urinalysis negative, blood cultures negative  -CTA of chest 02/24/2024 with no evidence of PE, pulmonary fibrosis versus CHF findings with recommendations for follow-up imaging  -repeat CT chest 3/6 with "interval worsening from comparison CT with diffuse multifocal airspace opacification superimposed upon interstitial thickening, considerations are multifocal pneumonia, pulmonary edema, pulmonary hemorrhage; new small left pleural effusion   -patient did receive empiric IV ceftriaxone for 4 days 2/28-03/02/2024  -hematology oncology has seen the patient in consultation and feels severe leukocytosis related to recent Neulasta and acute cardiac event  -given interval worsening and CT scan, persistent leukocytosis, and underlying breast cancer, Pulmonary was consulted for " further evaluation and treatment recommendations--> abnormal CT scan felt to be most likely related to pulmonary edema with IV diuresis to be continued  -continue empiric IV cefepime and IV vancomycin day 2.  -additional evaluation ordered 3/6 with sputum culture pending, MRSA culture pending, respiratory viral panel pending, and blood cultures with no growth to date      Transaminitis    Transaminitis with minimal trend downward, current  and .  Patient denies abdominal pain and has no tenderness to palpation on exam.  Suspect related to hepatic congestion with CHF exacerbation.  Check a.m. labs.      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 Mendoza at [facility], last chemo session one week prior.  Patient was seen by Hematology Oncology due to significant leukocytosis felt to be related to Neulasta and recent cardiac event.  Dr. Baugh with Hematology Oncology recommends discontinuing all Herceptin products due to cardiac deterioration.  Consider aromatase inhibitor plus or minus Lupron.  Follow up outpatient.    Severe aortic stenosis  Cardiology plans to discuss outpatient TAVR evaluation.  Monitor volume status with patient receiving IV diuresis.      Abnormal CT of the chest        Acute HFrEF (heart failure with reduced ejection fraction)        Hypoxia        Normocytic anemia  Patient's anemia is currently controlled. Has not received any PRBCs to date. Etiology likely d/t chronic disease due to Malignancy  Current CBC reviewed-   Lab Results   Component Value Date    HGB 9.1 (L) 03/06/2024    HCT 26.9 (L) 03/06/2024     Monitor serial CBC and transfuse if patient becomes hemodynamically unstable, symptomatic or H/H drops below 7/21.    Hypomagnesemia  Patient has Abnormal Magnesium: hypomagnesemia. Will continue to monitor electrolytes closely. Will replace the affected electrolytes and repeat labs to be done after  interventions completed. The patient's magnesium results have been reviewed and are listed below.  Recent Labs   Lab 03/07/24  0557   MG 1.4*      Give magnesium sulfate 2 g IV x1 dose.  Continue Mag-Ox 400 mg p.o. daily.    Hypokalemia  Patient has hypokalemia which is Acute on Chronic and currently uncontrolled. Most recent potassium levels reviewed-   Lab Results   Component Value Date    K 3.2 (L) 03/07/2024   Aggravated by IV Lasix diuresis.  Give potassium chloride 40 mEq p.o. x1 dose now followed by potassium chloride 20 mEq p.o. b.i.d..  Magnesium level 1.4.  Continue Mag-Ox 400 mg p.o. daily.  Give magnesium sulfate 2 g IV x1 dose.  Check a.m. labs.    Elevated troponin  As above, see discussion for NSTEMI    CKD stage 3 due to type 2 diabetes mellitus  Creatine stable for now. BMP reviewed- noted Estimated Creatinine Clearance: 59.5 mL/min (based on SCr of 1.1 mg/dL). according to latest data. Based on current GFR, CKD stage is stage 3 - GFR 30-59.  Monitor UOP and serial BMP and adjust therapy as needed. Renally dose meds. Avoid nephrotoxic medications and procedures    Controlled type 2 diabetes mellitus without complication, without long-term current use of insulin  Patient's FSGs are controlled on current medication regimen.  Last A1c reviewed-   Lab Results   Component Value Date    HGBA1C 7.1 (H) 11/28/2023     Most recent fingerstick glucose reviewed-   Recent Labs   Lab 03/06/24  1555 03/06/24  2112 03/07/24  0620   POCTGLUCOSE 173* 153* 176*       Current correctional scale  Low  Maintain anti-hyperglycemic dose as follows-   Antihyperglycemics (From admission, onward)      Start     Stop Route Frequency Ordered    02/26/24 1301  insulin aspart U-100 pen 0-5 Units         -- SubQ Before meals & nightly PRN 02/26/24 1204          Hold Oral hypoglycemics while patient is in the hospital.      VTE Risk Mitigation (From admission, onward)           Ordered     IP VTE HIGH RISK PATIENT  Once          02/26/24 1204     Place sequential compression device  Until discontinued         02/26/24 1204                    Discharge Planning   DE:      Code Status: DNR   Is the patient medically ready for discharge?:     Reason for patient still in hospital (select all that apply): Patient trending condition, Laboratory test, Treatment, and Consult recommendations  Discharge Plan A: Skilled Nursing Facility   Discharge Delays: None known at this time              Antonia Kapoor MD  Department of Hospital Medicine   [facility] ([facility])

## 2024-03-07 NOTE — ASSESSMENT & PLAN NOTE
Patient has Abnormal Magnesium: hypomagnesemia. Will continue to monitor electrolytes closely. Will replace the affected electrolytes and repeat labs to be done after interventions completed. The patient's magnesium results have been reviewed and are listed below.  Recent Labs   Lab 03/07/24  0557   MG 1.4*      Give magnesium sulfate 2 g IV x1 dose.  Continue Mag-Ox 400 mg p.o. daily.

## 2024-03-08 PROBLEM — I25.118 CORONARY ARTERY DISEASE OF NATIVE ARTERY OF NATIVE HEART WITH STABLE ANGINA PECTORIS: Status: ACTIVE | Noted: 2024-03-08

## 2024-03-08 LAB
ALLENS TEST: ABNORMAL
ANION GAP SERPL CALC-SCNC: 12 MMOL/L (ref 8–16)
BASOPHILS # BLD AUTO: 0.12 K/UL (ref 0–0.2)
BASOPHILS NFR BLD: 1 % (ref 0–1.9)
BUN SERPL-MCNC: 25 MG/DL (ref 8–23)
CALCIUM SERPL-MCNC: 8.9 MG/DL (ref 8.7–10.5)
CHLORIDE SERPL-SCNC: 89 MMOL/L (ref 95–110)
CO2 SERPL-SCNC: 34 MMOL/L (ref 23–29)
CREAT SERPL-MCNC: 1.1 MG/DL (ref 0.5–1.4)
DELSYS: ABNORMAL
DIFFERENTIAL METHOD BLD: ABNORMAL
EOSINOPHIL # BLD AUTO: 0 K/UL (ref 0–0.5)
EOSINOPHIL NFR BLD: 0.2 % (ref 0–8)
ERYTHROCYTE [DISTWIDTH] IN BLOOD BY AUTOMATED COUNT: 15.3 % (ref 11.5–14.5)
EST. GFR  (NO RACE VARIABLE): >60 ML/MIN/1.73 M^2
FLOW: 3
GLUCOSE SERPL-MCNC: 178 MG/DL (ref 70–110)
HCO3 UR-SCNC: 36.4 MMOL/L (ref 24–28)
HCT VFR BLD AUTO: 29.1 % (ref 40–54)
HGB BLD-MCNC: 9.6 G/DL (ref 14–18)
IMM GRANULOCYTES # BLD AUTO: 0.08 K/UL (ref 0–0.04)
IMM GRANULOCYTES NFR BLD AUTO: 0.7 % (ref 0–0.5)
LYMPHOCYTES # BLD AUTO: 1.6 K/UL (ref 1–4.8)
LYMPHOCYTES NFR BLD: 14 % (ref 18–48)
MAGNESIUM SERPL-MCNC: 1.8 MG/DL (ref 1.6–2.6)
MCH RBC QN AUTO: 29.6 PG (ref 27–31)
MCHC RBC AUTO-ENTMCNC: 33 G/DL (ref 32–36)
MCV RBC AUTO: 90 FL (ref 82–98)
MODE: ABNORMAL
MONOCYTES # BLD AUTO: 1.1 K/UL (ref 0.3–1)
MONOCYTES NFR BLD: 9.7 % (ref 4–15)
MRSA SPEC QL CULT: NORMAL
NEUTROPHILS # BLD AUTO: 8.6 K/UL (ref 1.8–7.7)
NEUTROPHILS NFR BLD: 74.4 % (ref 38–73)
NRBC BLD-RTO: 0 /100 WBC
PCO2 BLDA: 46.7 MMHG (ref 35–45)
PH SMN: 7.5 [PH] (ref 7.35–7.45)
PLATELET # BLD AUTO: 391 K/UL (ref 150–450)
PMV BLD AUTO: 10.4 FL (ref 9.2–12.9)
PO2 BLDA: 67 MMHG (ref 80–100)
POC BE: 13 MMOL/L
POC SATURATED O2: 94 % (ref 95–100)
POCT GLUCOSE: 186 MG/DL (ref 70–110)
POCT GLUCOSE: 188 MG/DL (ref 70–110)
POCT GLUCOSE: 244 MG/DL (ref 70–110)
POCT GLUCOSE: 274 MG/DL (ref 70–110)
POTASSIUM SERPL-SCNC: 3.6 MMOL/L (ref 3.5–5.1)
RBC # BLD AUTO: 3.24 M/UL (ref 4.6–6.2)
SAMPLE: ABNORMAL
SITE: ABNORMAL
SODIUM SERPL-SCNC: 135 MMOL/L (ref 136–145)
VANCOMYCIN TROUGH SERPL-MCNC: 17.3 UG/ML (ref 10–22)
WBC # BLD AUTO: 11.54 K/UL (ref 3.9–12.7)

## 2024-03-08 PROCEDURE — 85025 COMPLETE CBC W/AUTO DIFF WBC: CPT | Mod: HCNC | Performed by: INTERNAL MEDICINE

## 2024-03-08 PROCEDURE — 36600 WITHDRAWAL OF ARTERIAL BLOOD: CPT | Mod: HCNC

## 2024-03-08 PROCEDURE — 80048 BASIC METABOLIC PNL TOTAL CA: CPT | Mod: HCNC | Performed by: INTERNAL MEDICINE

## 2024-03-08 PROCEDURE — 63600175 PHARM REV CODE 636 W HCPCS: Mod: HCNC | Performed by: INTERNAL MEDICINE

## 2024-03-08 PROCEDURE — 21400001 HC TELEMETRY ROOM: Mod: HCNC

## 2024-03-08 PROCEDURE — 25000003 PHARM REV CODE 250: Mod: HCNC | Performed by: INTERNAL MEDICINE

## 2024-03-08 PROCEDURE — 25000003 PHARM REV CODE 250: Mod: HCNC | Performed by: NURSE PRACTITIONER

## 2024-03-08 PROCEDURE — 27000221 HC OXYGEN, UP TO 24 HOURS: Mod: HCNC

## 2024-03-08 PROCEDURE — 92523 SPEECH SOUND LANG COMPREHEN: CPT | Mod: HCNC

## 2024-03-08 PROCEDURE — 94761 N-INVAS EAR/PLS OXIMETRY MLT: CPT | Mod: HCNC,XB

## 2024-03-08 PROCEDURE — 94640 AIRWAY INHALATION TREATMENT: CPT | Mod: HCNC

## 2024-03-08 PROCEDURE — 82803 BLOOD GASES ANY COMBINATION: CPT | Mod: HCNC

## 2024-03-08 PROCEDURE — 94799 UNLISTED PULMONARY SVC/PX: CPT | Mod: HCNC,XB

## 2024-03-08 PROCEDURE — 99900035 HC TECH TIME PER 15 MIN (STAT): Mod: HCNC

## 2024-03-08 PROCEDURE — 36415 COLL VENOUS BLD VENIPUNCTURE: CPT | Mod: HCNC | Performed by: INTERNAL MEDICINE

## 2024-03-08 PROCEDURE — 80202 ASSAY OF VANCOMYCIN: CPT | Mod: HCNC | Performed by: INTERNAL MEDICINE

## 2024-03-08 PROCEDURE — 92610 EVALUATE SWALLOWING FUNCTION: CPT | Mod: HCNC

## 2024-03-08 PROCEDURE — 63600175 PHARM REV CODE 636 W HCPCS: Mod: HCNC | Performed by: NURSE PRACTITIONER

## 2024-03-08 PROCEDURE — 83735 ASSAY OF MAGNESIUM: CPT | Mod: HCNC | Performed by: INTERNAL MEDICINE

## 2024-03-08 PROCEDURE — 97535 SELF CARE MNGMENT TRAINING: CPT | Mod: HCNC

## 2024-03-08 PROCEDURE — 25000242 PHARM REV CODE 250 ALT 637 W/ HCPCS: Mod: HCNC | Performed by: INTERNAL MEDICINE

## 2024-03-08 PROCEDURE — 99232 SBSQ HOSP IP/OBS MODERATE 35: CPT | Mod: HCNC,,, | Performed by: INTERNAL MEDICINE

## 2024-03-08 RX ORDER — LEVALBUTEROL INHALATION SOLUTION 0.63 MG/3ML
1.25 SOLUTION RESPIRATORY (INHALATION) EVERY 6 HOURS
Status: DISCONTINUED | OUTPATIENT
Start: 2024-03-08 | End: 2024-03-08

## 2024-03-08 RX ORDER — IPRATROPIUM BROMIDE 0.5 MG/2.5ML
0.5 SOLUTION RESPIRATORY (INHALATION) EVERY 6 HOURS
Status: DISCONTINUED | OUTPATIENT
Start: 2024-03-08 | End: 2024-03-09

## 2024-03-08 RX ORDER — LEVALBUTEROL INHALATION SOLUTION 0.63 MG/3ML
1.25 SOLUTION RESPIRATORY (INHALATION) EVERY 6 HOURS
Status: DISCONTINUED | OUTPATIENT
Start: 2024-03-08 | End: 2024-03-09

## 2024-03-08 RX ADMIN — CEFEPIME 2 G: 2 INJECTION, POWDER, FOR SOLUTION INTRAVENOUS at 03:03

## 2024-03-08 RX ADMIN — FUROSEMIDE 40 MG: 10 INJECTION, SOLUTION INTRAMUSCULAR; INTRAVENOUS at 08:03

## 2024-03-08 RX ADMIN — ANASTROZOLE 1 MG: 1 TABLET, COATED ORAL at 08:03

## 2024-03-08 RX ADMIN — MIDODRINE HYDROCHLORIDE 10 MG: 5 TABLET ORAL at 05:03

## 2024-03-08 RX ADMIN — VANCOMYCIN HYDROCHLORIDE 2000 MG: 10 INJECTION, POWDER, LYOPHILIZED, FOR SOLUTION INTRAVENOUS at 05:03

## 2024-03-08 RX ADMIN — LEVALBUTEROL HYDROCHLORIDE 1.25 MG: 0.63 SOLUTION RESPIRATORY (INHALATION) at 08:03

## 2024-03-08 RX ADMIN — SODIUM CHLORIDE 100 ML: 9 INJECTION, SOLUTION INTRAVENOUS at 05:03

## 2024-03-08 RX ADMIN — METHYLPREDNISOLONE SODIUM SUCCINATE 60 MG: 40 INJECTION, POWDER, FOR SOLUTION INTRAMUSCULAR; INTRAVENOUS at 11:03

## 2024-03-08 RX ADMIN — IPRATROPIUM BROMIDE 0.5 MG: 0.5 SOLUTION RESPIRATORY (INHALATION) at 07:03

## 2024-03-08 RX ADMIN — MIDODRINE HYDROCHLORIDE 10 MG: 5 TABLET ORAL at 11:03

## 2024-03-08 RX ADMIN — CEFEPIME 2 G: 2 INJECTION, POWDER, FOR SOLUTION INTRAVENOUS at 05:03

## 2024-03-08 RX ADMIN — INSULIN ASPART 2 UNITS: 100 INJECTION, SOLUTION INTRAVENOUS; SUBCUTANEOUS at 05:03

## 2024-03-08 RX ADMIN — FLUTICASONE PROPIONATE 100 MCG: 50 SPRAY, METERED NASAL at 08:03

## 2024-03-08 RX ADMIN — ATORVASTATIN CALCIUM 20 MG: 10 TABLET, FILM COATED ORAL at 08:03

## 2024-03-08 RX ADMIN — Medication 400 MG: at 08:03

## 2024-03-08 RX ADMIN — IPRATROPIUM BROMIDE 0.5 MG: 0.5 SOLUTION RESPIRATORY (INHALATION) at 08:03

## 2024-03-08 RX ADMIN — ONDANSETRON 8 MG: 8 TABLET, ORALLY DISINTEGRATING ORAL at 06:03

## 2024-03-08 RX ADMIN — LEVALBUTEROL HYDROCHLORIDE 1.25 MG: 0.63 SOLUTION RESPIRATORY (INHALATION) at 07:03

## 2024-03-08 RX ADMIN — POTASSIUM CHLORIDE 20 MEQ: 1500 TABLET, EXTENDED RELEASE ORAL at 08:03

## 2024-03-08 RX ADMIN — MIDODRINE HYDROCHLORIDE 10 MG: 5 TABLET ORAL at 08:03

## 2024-03-08 RX ADMIN — ASPIRIN 81 MG: 81 TABLET, COATED ORAL at 08:03

## 2024-03-08 RX ADMIN — METHYLPREDNISOLONE SODIUM SUCCINATE 60 MG: 40 INJECTION, POWDER, FOR SOLUTION INTRAMUSCULAR; INTRAVENOUS at 05:03

## 2024-03-08 NOTE — ASSESSMENT & PLAN NOTE
"Patient is identified as having Diastolic (HFpEF) heart failure that is Acute. CHF is currently uncontrolled due to Pulmonary edema/pleural effusion on CXR. Latest ECHO performed and demonstrates- Results for orders placed during the hospital encounter of 02/26/24.      Echo    Interpretation Summary    Left Ventricle: The left ventricle is normal in size. There is concentric remodeling. There is normal systolic function with a visually estimated ejection fraction of 55 - 60%. There is indeterminate diastolic function.    Right Ventricle: Right ventricle was not well visualized due to poor acoustic window. Normal right ventricular cavity size. Systolic function is normal.    Pulmonary Artery: The estimated pulmonary artery systolic pressure is 37 mmHg.    IVC/SVC: Normal venous pressure at 3 mmHg.  . Continue lasix and monitor clinical status closely. Monitor on telemetry. Monitor strict Is&Os and daily weights.  Cardiology on board already for NSTEMI. Continue to stress to patient importance of self efficacy and  on diet for CHF. Last BNP reviewed- and noted below   Recent Labs   Lab 03/06/24  0450   *     -cardiac catheterization 03/01/2024 with EF 30%  -Increased lasix to 40 mg IV bid as of 3/4 due to pitting edema to BLE, dyspnea requiring O2 supplementation, and abnormal CXR with pulmonary edema.   -Fluid restrict 1200 mL.  Strict Is&Os and daily weights.   -Cardiology has continued IV Lasix diuresis, Lopressor stopped due to relative hypotension  -CT scan of chest without contrast 3/6 with "interval worsening from comparison CT with diffuse multifocal airspace opacification superimposed upon interstitial thickening, considerations are multifocal pneumonia, pulmonary edema, pulmonary hemorrhage; new small left pleural effusion."   -chest x-ray 03/08/2024 with diffuse pulmonary infiltrates, trace pleural effusion on the left, similar to prior chest x-ray  -transfer to skilled nursing facility has " been held until cardiac/pulmonary issues addressed and stable

## 2024-03-08 NOTE — CONSULTS
[facility] - Telemetry ([facility])  Pulmonology Progress Note    Patient Name: Mark Dickerson Sr.  MRN: [id number]  Admission Date: 2/26/2024  Hospital Length of Stay: 10 days  Code Status: DNR  Attending Physician: Antonia Kapoor MD  Primary Care Provider: Tatyana Cannon MD   Principal Problem: NSTEMI (non-ST elevated myocardial infarction)    Subjective:     HPI:  Information obtained from chart review, discussion with Dr. Kapoor, and patient who seems reliable.     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 He underwent LHC 3/1/2024 with findings of single vessel disease, severe AS, moderate pulm HTN, and EF 30%. He has been receiving diuretics for volume overload with some improvement. He remains on oxygen which is new.  obtained CT chest for better evaluation which revealed worsening bilateral pulmonary infiltrates prompting pulmonary consult for additional recommendations.     Upon exam, Mr. Dickerson is sitting up in chair, awake and alert. He appears to be breathing comfortably on 2L NC. He reports feeling better overall, but remains weak, easily fatigued, and is concerned that he is still requiring oxygen. He denies fever, chest pain, or SOB. He states he walked in the hallway today and didn't feel short of breath while doing so. He is having intermittent productive cough- mostly tan and sometimes blood tinged.     Hospital Course/Interval History:   3/8: despite ongoing negative balance, diuresis has not improved oxygenation and CXR is overall unchanged. Remains on 3L NC. -4.2L balance, now with worsening contraction alkalosis, denies SOB with ambulating in hallway yesterday. Only complaint is weakness. Discussed possible causes including effect of chemo on lungs, lymphangitic spread, and/or HF; adding steroids    Past Medical  History:   Diagnosis Date    Chest pain 2/26/2024    CKD stage 3 due to type 2 diabetes mellitus 2/18/2021    DM (diabetes mellitus) 2014    BS didn't check 12/11/2019    DM (diabetes mellitus) 2014    BS didn't check 05/05/2022    Foreign body, eye     right eye    Hyperlipidemia     Hypertension     Need for shingles vaccine 11/20/2019    Normocytic anemia 3/4/2024    NSTEMI (non-ST elevated myocardial infarction) 2/27/2024    Pneumonia     Primary osteoarthritis of right knee 10/29/2021    Severe obesity (BMI 35.0-35.9 with comorbidity) 7/10/2019    Type 2 diabetes mellitus 2014    BS didn't check 12/12/2018       Past Surgical History:   Procedure Laterality Date    ABDOMINAL AORTOGRAPHY N/A 3/1/2024    Procedure: AORTOGRAM, ABDOMINAL;  Surgeon: Sebastian Aguillon MD;  Location: [facility] CATH LAB;  Service: Cardiology;  Laterality: N/A;    ANGIOGRAPHY OF AORTIC ARCH  3/1/2024    Procedure: Aortogram, Aortic Arch;  Surgeon: Sebastian Aguillon MD;  Location: [facility] CATH LAB;  Service: Cardiology;;    APPENDECTOMY      CATARACT EXTRACTION Bilateral     CPG    CATHETERIZATION OF BOTH LEFT AND RIGHT HEART N/A 3/1/2024    Procedure: CATHETERIZATION, HEART, BOTH LEFT AND RIGHT;  Surgeon: Sebastian Aguillon MD;  Location: [facility] CATH LAB;  Service: Cardiology;  Laterality: N/A;    ENDOSCOPIC ULTRASOUND OF UPPER GASTROINTESTINAL TRACT N/A 1/11/2024    Procedure: ULTRASOUND, UPPER GI TRACT, ENDOSCOPIC;  Surgeon: Evens Monsalve MD;  Location: [facility] ENDO ([address] [place] [address]);  Service: Endoscopy;  Laterality: N/A;    FLUOROSCOPY N/A 1/5/2024    Procedure: Fluoroscopy/MEDIPORT PLACEMENT;  Surgeon: Jaret Lu MD;  Location: [facility] CATH LAB;  Service: General;  Laterality: N/A;    VALVE STUDY-AORTIC  3/1/2024    Procedure: Valve study-aortic;  Surgeon: Sebastian Aguillon MD;  Location: [facility] CATH LAB;  Service: Cardiology;;       Review of patient's allergies indicates:   Allergen Reactions    Grass pollen-anai grass standard        Family  History       Problem Relation (Age of Onset)    Allergic rhinitis Daughter    Cancer Son    Cataracts Father    Heart disease Mother, Father    Hyperlipidemia Father    Hypertension Father, Sister, Brother          Tobacco Use    Smoking status: Former     Current packs/day: 0.00     Average packs/day: 3.0 packs/day for 10.0 years (30.0 ttl pk-yrs)     Types: Cigarettes     Start date:      Quit date:      Years since quittin.2    Smokeless tobacco: Never   Substance and Sexual Activity    Alcohol use: No    Drug use: No    Sexual activity: Never         Review of Systems  Completed and negative except per hospital course/interval history   Objective:     Vital Signs (Most Recent):  Temp: 98.3 °F (36.8 °C) (24 1141)  Pulse: 90 (24 1141)  Resp: 20 (24 1141)  BP: (!) 90/52 (24 1141)  SpO2: (!) 93 % (24 1141) Vital Signs (24h Range):  Temp:  [97.4 °F (36.3 °C)-98.6 °F (37 °C)] 98.3 °F (36.8 °C)  Pulse:  [] 90  Resp:  [18-20] 20  SpO2:  [87 %-98 %] 93 %  BP: ()/(52-65) 90/52     Weight: 108.8 kg (239 lb 13.8 oz)  Body mass index is 34.42 kg/m².      Intake/Output Summary (Last 24 hours) at 3/8/2024 1316  Last data filed at 3/8/2024 0300  Gross per 24 hour   Intake --   Output 450 ml   Net -450 ml        Physical Exam  Vitals reviewed.   Constitutional:       General: He is not in acute distress.  HENT:      Head: Normocephalic and atraumatic.      Mouth/Throat:      Mouth: Mucous membranes are moist.      Pharynx: Oropharynx is clear.   Eyes:      Extraocular Movements: Extraocular movements intact.      Conjunctiva/sclera: Conjunctivae normal.   Cardiovascular:      Rate and Rhythm: Normal rate and regular rhythm.   Pulmonary:      Effort: Pulmonary effort is normal.      Breath sounds: Rales present.      Comments: No conversational dyspnea. 3L NC. No cough  Abdominal:      General: Bowel sounds are normal.      Palpations: Abdomen is soft.   Musculoskeletal:       Cervical back: Normal range of motion and neck supple.      Right lower leg: Edema present.      Left lower leg: Edema present.      Comments: Trace LE edema   Skin:     General: Skin is warm and dry.      Comments: Left breast lesion- no drainage or erythema   Neurological:      General: No focal deficit present.      Mental Status: He is alert and oriented to person, place, and time.            Lines/Drains/Airways       Central Venous Catheter Line  Duration                  PowerPort A Cath Single Lumen 01/05/24 1415 Internal Jugular Right 62 days              Peripheral Intravenous Line  Duration                  Peripheral IV - Single Lumen 03/06/24 1705 22 G Posterior;Right Forearm 1 day                    Significant Labs:    CBC/Anemia Profile:  Recent Labs   Lab 03/08/24  0546   WBC 11.54   HGB 9.6*   HCT 29.1*      MCV 90   RDW 15.3*        Chemistries:  Recent Labs   Lab 03/07/24  0557 03/08/24  0546   * 135*   K 3.2* 3.6   CL 90* 89*   CO2 29 34*   BUN 25* 25*   CREATININE 1.1 1.1   CALCIUM 8.8 8.9   MG 1.4* 1.8       All pertinent labs within the past 24 hours have been reviewed.      Significant Imaging:   I have reviewed all pertinent imaging results/findings within the past 24 hours.    ABG  Recent Labs   Lab 03/08/24  0835   PH 7.500*   PO2 67*   PCO2 46.7*   HCO3 36.4*   BE 13*     Assessment/Plan:     Pulmonary  Hypoxia  Initially believed to be due to HF exacerbation; however, despite diuresis and increasing negative fluid balance, hypoxia and bilateral infiltrates persist   Additionally, see problem: abnormal CT chest    Cardiac/Vascular  Acute diastolic CHF (congestive heart failure)  Appeared volume overloaded on exam with supporting labs and imaging   Worsening contraction alkalosis, - 4.2L since admit  Peripheral edema has improved; hypoxemia has not though   Cardiology - decreasing lasix to daily       Severe aortic stenosis  Volume management with diuresis   Cardiology  following- discussed with Cardiology team at bedside today   outpatient eval for TAVR    Other  Abnormal CT of the chest  CT chest 3/6 done 2/2 ongoing hypoxia despite treatment so far, labored breathing, and leukocytosis   Initially believed to be fluid as patient appeared volume overloaded + elevated BNP in conjunction with physical exam  Despite diuresis, hypoxemia and bilateral infiltrates persist  Ddx includes: pneumonitis, lymphangitic spread, HF  Chemotherapy with potentially toxic pulm effects   Start steroids 60 mg Q6 hours- will continue for 48 hours and evaluate response  Supplemental oxygen as needed  IS, OOB and PT/OT as tolerated       Pulmonary will continue to follow, please call if needed.      Dinora Chavira NP  Pulmonology  [facility] - Telemetry ([facility])

## 2024-03-08 NOTE — CHAPLAIN
Follow up visit with patient.  Visited with patient to determine how I might be of support to him today.  Pt was tired but did take time to share more of his story with me.  Pt wanted me to pray for him and I took time to do this before leaving.  Spiritual care remains available as needed.    Chaplain Gavin Shoemaker M.Div., [facility]

## 2024-03-08 NOTE — ASSESSMENT & PLAN NOTE
Patient has hypokalemia which is Acute on Chronic and currently uncontrolled. Most recent potassium levels reviewed-   Lab Results   Component Value Date    K 3.6 03/08/2024   Aggravated by IV Lasix diuresis.  Continue potassium chloride 20 mEq p.o. b.i.d..  Magnesium level 1.8.  Continue Mag-Ox 400 mg p.o. daily.  Check a.m. labs.

## 2024-03-08 NOTE — ASSESSMENT & PLAN NOTE
Transaminitis with minimal trend downward. Patient denies abdominal pain and has no tenderness to palpation on exam.  Suspect related to hepatic congestion with CHF exacerbation.  Check a.m. labs.

## 2024-03-08 NOTE — PT/OT/SLP EVAL
Speech Language Pathology Evaluation  Bedside Swallow    Patient Name:  Mark Dickerson Jr.  MRN:  [id number]  Admitting Diagnosis: NSTEMI (non-ST elevated myocardial infarction)    Recommendations:                 General Recommendations:  Acute SLP follow up not indicated  Diet recommendations:  Regular Diet - IDDSI Level 7, Thin liquids - IDDSI Level 0   Aspiration Precautions: 1 bite/sip at a time, Frequent oral care, HOB to 90 degrees, Small bites/sips, and Standard aspiration precautions   General Precautions: Standard, aspiration  Communication strategies:  none    History:       Social History: Patient lives by himself in [place], [place]. He reports that after his hospitalization he will be moving in with his son and daughter-in-law.    Prior Intubation HX:  Pt is s/p R/LHC on 03/01/2024    Modified Barium Swallow: NA    CT CHEST WITHOUT CONTRAST on 03/06/2024:    COMPARISON:  Chest x-ray 03/06/2024.  CT chest 226 24     FINDINGS:  Heart: No pericardial effusions.  Coronary arterial calcifications.  Mediastinum: Increased number of small subcentimeter mediastinal lymph nodes.  Vasculature: Mild aortic atherosclerosis.  Right chest port  Lungs: Small left pleural effusion.  Extensive diffuse multifocal areas of airspace opacification throughout the lungs with some areas of denser consolidation some areas more ground-glass in nature.  This is superimposed upon chronic interstitial thickening.  Esophagus: Unremarkable.  Upper Abdomen: No abnormality of the partially imaged upper abdomen.  Bones: No acute fracture. Stable degenerative change.  Increased number of small left axillary lymph nodes with some mildly enlarged.  These appear similar.  Largest appears to have a biopsy clip within it.  There is also asymmetric nodularity in the retroareolar left breast with a biopsy clip and some skin thickening.  Findings are stable from the comparison CT chest and have been previously evaluated with  diagnostic  examination 12/01/2023.     Impression:  Interval worsening from comparison CT with diffuse multifocal airspace opacification superimposed upon interstitial thickening.  Considerations are multifocal pneumonia, pulmonary edema, pulmonary hemorrhage.  New small left pleural effusion     Electronically signed by: Evens Alba MD  Date:                                            03/06/2024    Prior diet: Regular diet.    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 He reports that he was still driving also.    Subjective     Pt was finishing with OT upon SLP arrival.   Patient goals: to be discharged     Pain/Comfort:  Pain Rating 1: 0/10  Pain Rating Post-Intervention 1: 0/10  Pain Rating 2: 0/10  Pain Rating Post-Intervention 2: 0/10    Respiratory Status: Nasal cannula, flow 3 L/min    Clinical Swallow Examination:   Of note, patient self-fed throughout evaluation. Patient presented with:     CONSISTENCY  NOTES   THIN (IDDSI 0) 3 oz water challenge    No overt s/s of dysphagia/aspiration   PUREE (IDDSI 4/Extremely Thick)   TSP/TBSP bites of pudding No overt s/s of dysphagia/aspiration   SOLID (IDDSI 7/Regular) Bite of mason cracker   No overt s/s of dysphagia/aspiration     Thickened liquids were not used in this assessment. Evelynfe (2018) reported that thickened liquids have no sound evidence at reducing the risk of pneumonia in patients with dysphagia and can cause harm by increasing their risk of dehydration. It also presents an increased risk of UTI, electrolyte imbalance, constipation, fecal impaction, cognitive impairment, functional decline and even death ([place], 2002; [place], 2016).  Thickened liquids are associated with risks including dehydration, increased pharyngeal residue, potential interference with medication absorption, and decreased quality of life (Meaghan, 2013). Thickened liquids are also  more likely to be silently aspirated than thin liquids (Ewdard et al., 2018). This supports the assertion that we should confirm a patient requires thickened liquids with an instrumental swallow study prior to recommending them.    References:   Meaghan ORLANDO (2013). Thickening agents used for dysphagia management: Effect on bioavailability of water, medication and feelings of satiety. Nutrition Journal, 12, 54. https://doi.org/10.1186/6362-9334-84-54    JOHANNY Leon, MILEY Cruz, MIGUEL Carnes, & JOHANNY Dickson (2018). Cough response to aspiration in thin and thick fluids during FEES in hospitalized inpatients. International journal of language & communication disorders, 53(5), 909-918. https://doi.org/10.1111/7806-9099.02429    INTERPRETATION AND RISK ASSESSMENT:  Clinical swallow evaluation (CSE) revealed oral phase characterized by lingual, labial, buccal strength and range of motion functional for lip closure, bolus preparation and propulsion. The patient had no anterior loss of the bolus with complete closure of the lips around the utensils. No residue remained in the oral cavity following the swallow. Patient without overt clinical signs/symptoms of aspiration on any PO trials given. Contributing risk factors for dysphagia include deficits in respiratory-swallow coordination s/t SOB.     Assessment:     Mark Dickerson Jr. is a 84 year old male admitted to [facility] acute with dx of NSTEMI (non-ST elevated myocardial infarction).  OM and communication at functional baseline.  No overt s/s of dysphagia present during bedside CSE, and pt recommended for IDDSI 7-regular solids with IDDSI 0-thin liquids, following standard aspiration precautions.  No further acute SLP intervention indicated at this time. MD to re-consult if needed.    Goals: N/A--D/C ST  Multidisciplinary Problems       SLP Goals       Not on file                    Plan:     Plan of Care reviewed with:  patient   SLP Follow-Up:  No       Discharge  recommendations:    Barriers to Discharge: None    Time Tracking:     SLP Treatment Date:   03/08/2024  Speech Start Time:  9:30 AM  Speech Stop Time:  10:10 AM  Speech Total Time (min):  40     Billable Minutes: Eval 20  and Eval Swallow and Oral Function 20

## 2024-03-08 NOTE — SUBJECTIVE & OBJECTIVE
Past Medical History:   Diagnosis Date    Chest pain 2/26/2024    CKD stage 3 due to type 2 diabetes mellitus 2/18/2021    DM (diabetes mellitus) 2014    BS didn't check 12/11/2019    DM (diabetes mellitus) 2014    BS didn't check 05/05/2022    Foreign body, eye     right eye    Hyperlipidemia     Hypertension     Need for shingles vaccine 11/20/2019    Normocytic anemia 3/4/2024    NSTEMI (non-ST elevated myocardial infarction) 2/27/2024    Pneumonia     Primary osteoarthritis of right knee 10/29/2021    Severe obesity (BMI 35.0-35.9 with comorbidity) 7/10/2019    Type 2 diabetes mellitus 2014    BS didn't check 12/12/2018       Past Surgical History:   Procedure Laterality Date    ABDOMINAL AORTOGRAPHY N/A 3/1/2024    Procedure: AORTOGRAM, ABDOMINAL;  Surgeon: Sebastian Aguillon MD;  Location: [facility] CATH LAB;  Service: Cardiology;  Laterality: N/A;    ANGIOGRAPHY OF AORTIC ARCH  3/1/2024    Procedure: Aortogram, Aortic Arch;  Surgeon: Sebastian Aguillon MD;  Location: [facility] CATH LAB;  Service: Cardiology;;    APPENDECTOMY      CATARACT EXTRACTION Bilateral     CPG    CATHETERIZATION OF BOTH LEFT AND RIGHT HEART N/A 3/1/2024    Procedure: CATHETERIZATION, HEART, BOTH LEFT AND RIGHT;  Surgeon: Sebastian Aguillon MD;  Location: [facility] CATH LAB;  Service: Cardiology;  Laterality: N/A;    ENDOSCOPIC ULTRASOUND OF UPPER GASTROINTESTINAL TRACT N/A 1/11/2024    Procedure: ULTRASOUND, UPPER GI TRACT, ENDOSCOPIC;  Surgeon: Evens Monsalve MD;  Location: [facility] ENDO ([address] [place] [address]);  Service: Endoscopy;  Laterality: N/A;    FLUOROSCOPY N/A 1/5/2024    Procedure: Fluoroscopy/MEDIPORT PLACEMENT;  Surgeon: Jaret Lu MD;  Location: [facility] CATH LAB;  Service: General;  Laterality: N/A;    VALVE STUDY-AORTIC  3/1/2024    Procedure: Valve study-aortic;  Surgeon: Sebastian Aguillon MD;  Location: [facility] CATH LAB;  Service: Cardiology;;       Review of patient's allergies indicates:   Allergen Reactions    Grass pollen-anai grass standard         Family History       Problem Relation (Age of Onset)    Allergic rhinitis Daughter    Cancer Son    Cataracts Father    Heart disease Mother, Father    Hyperlipidemia Father    Hypertension Father, Sister, Brother          Tobacco Use    Smoking status: Former     Current packs/day: 0.00     Average packs/day: 3.0 packs/day for 10.0 years (30.0 ttl pk-yrs)     Types: Cigarettes     Start date:      Quit date:      Years since quittin.2    Smokeless tobacco: Never   Substance and Sexual Activity    Alcohol use: No    Drug use: No    Sexual activity: Never         Review of Systems  Completed and negative except per hospital course/interval history   Objective:     Vital Signs (Most Recent):  Temp: 98.3 °F (36.8 °C) (24 1141)  Pulse: 90 (24 1141)  Resp: 20 (24 1141)  BP: (!) 90/52 (24 1141)  SpO2: (!) 93 % (24 1141) Vital Signs (24h Range):  Temp:  [97.4 °F (36.3 °C)-98.6 °F (37 °C)] 98.3 °F (36.8 °C)  Pulse:  [] 90  Resp:  [18-20] 20  SpO2:  [87 %-98 %] 93 %  BP: ()/(52-65) 90/52     Weight: 108.8 kg (239 lb 13.8 oz)  Body mass index is 34.42 kg/m².      Intake/Output Summary (Last 24 hours) at 3/8/2024 1316  Last data filed at 3/8/2024 0300  Gross per 24 hour   Intake --   Output 450 ml   Net -450 ml        Physical Exam  Vitals reviewed.   Constitutional:       General: He is not in acute distress.  HENT:      Head: Normocephalic and atraumatic.      Mouth/Throat:      Mouth: Mucous membranes are moist.      Pharynx: Oropharynx is clear.   Eyes:      Extraocular Movements: Extraocular movements intact.      Conjunctiva/sclera: Conjunctivae normal.   Cardiovascular:      Rate and Rhythm: Normal rate and regular rhythm.   Pulmonary:      Effort: Pulmonary effort is normal.      Breath sounds: Rales present.      Comments: No conversational dyspnea. 3L NC. No cough  Abdominal:      General: Bowel sounds are normal.      Palpations: Abdomen is soft.    Musculoskeletal:      Cervical back: Normal range of motion and neck supple.      Right lower leg: Edema present.      Left lower leg: Edema present.      Comments: Trace LE edema   Skin:     General: Skin is warm and dry.      Comments: Left breast lesion- no drainage or erythema   Neurological:      General: No focal deficit present.      Mental Status: He is alert and oriented to person, place, and time.            Lines/Drains/Airways       Central Venous Catheter Line  Duration                  PowerPort A Cath Single Lumen 01/05/24 1415 Internal Jugular Right 62 days              Peripheral Intravenous Line  Duration                  Peripheral IV - Single Lumen 03/06/24 1705 22 G Posterior;Right Forearm 1 day                    Significant Labs:    CBC/Anemia Profile:  Recent Labs   Lab 03/08/24  0546   WBC 11.54   HGB 9.6*   HCT 29.1*      MCV 90   RDW 15.3*        Chemistries:  Recent Labs   Lab 03/07/24  0557 03/08/24  0546   * 135*   K 3.2* 3.6   CL 90* 89*   CO2 29 34*   BUN 25* 25*   CREATININE 1.1 1.1   CALCIUM 8.8 8.9   MG 1.4* 1.8       All pertinent labs within the past 24 hours have been reviewed.      Significant Imaging:   I have reviewed all pertinent imaging results/findings within the past 24 hours.

## 2024-03-08 NOTE — PLAN OF CARE
A225/A225 JOHANNY Dickerson . is a 84 y.o.male admitted on 2/26/2024 for NSTEMI (non-ST elevated myocardial infarction)   Code Status: DNR MRN: [id number]   Review of patient's allergies indicates:   Allergen Reactions    Grass pollen-anai grass standard      Past Medical History:   Diagnosis Date    Chest pain 2/26/2024    CKD stage 3 due to type 2 diabetes mellitus 2/18/2021    DM (diabetes mellitus) 2014    BS didn't check 12/11/2019    DM (diabetes mellitus) 2014    BS didn't check 05/05/2022    Foreign body, eye     right eye    Hyperlipidemia     Hypertension     Need for shingles vaccine 11/20/2019    Normocytic anemia 3/4/2024    NSTEMI (non-ST elevated myocardial infarction) 2/27/2024    Pneumonia     Primary osteoarthritis of right knee 10/29/2021    Severe obesity (BMI 35.0-35.9 with comorbidity) 7/10/2019    Type 2 diabetes mellitus 2014    BS didn't check 12/12/2018      PRN meds    sodium chloride 0.9%, , Continuous PRN  acetaminophen, 650 mg, Q4H PRN  acetaminophen, 650 mg, Q4H PRN  cyclobenzaprine, 10 mg, TID PRN  dextrose 10%, 12.5 g, PRN  dextrose 10%, 25 g, PRN  glucagon (human recombinant), 1 mg, PRN  glucose, 16 g, PRN  glucose, 24 g, PRN  HYDROcodone-acetaminophen, 1 tablet, Q6H PRN  insulin aspart U-100, 0-5 Units, QID (AC + HS) PRN  melatonin, 6 mg, Nightly PRN  naloxone, 0.02 mg, PRN  ondansetron, 8 mg, Q8H PRN  ondansetron, 4 mg, Q8H PRN  sodium chloride, 1 spray, PRN  sodium chloride 0.9%, 10 mL, Q12H PRN  vancomycin - pharmacy to dose, , pharmacy to manage frequency      Chart check completed. Will continue plan of care.      Orientation: oriented x 4  William Coma Scale Score: 15     Lead Monitored: Lead II Rhythm: normal sinus rhythm Frequency/Ectopy: PVCs  Cardiac/Telemetry Box Number: [id number]  VTE Required Core Measure: Patient refused interventions Last Bowel Movement: 03/05/24  Diet Cardiac Fluid - 1200mL; Standard Tray  Voiding Characteristics: external catheter, urgency (during  sleeping hours d/t urgency and SOBE)  Sunil Score: 19  Fall Risk Score: 16  Accucheck [x]   Freq? ACHS     Lines/Drains/Airways       Central Venous Catheter Line  Duration                  PowerPort A Cath Single Lumen 01/05/24 1415 Internal Jugular Right 63 days              Peripheral Intravenous Line  Duration                  Peripheral IV - Single Lumen 03/06/24 1705 22 G Posterior;Right Forearm 1 day

## 2024-03-08 NOTE — ASSESSMENT & PLAN NOTE
Patient's FSGs are controlled on current medication regimen.  Last A1c reviewed-   Lab Results   Component Value Date    HGBA1C 7.1 (H) 11/28/2023     Most recent fingerstick glucose reviewed-   Recent Labs   Lab 03/07/24  1801 03/08/24  0622 03/08/24  1139   POCTGLUCOSE 192* 186* 188*       Current correctional scale  Low  Maintain anti-hyperglycemic dose as follows-   Antihyperglycemics (From admission, onward)    Start     Stop Route Frequency Ordered    02/26/24 1301  insulin aspart U-100 pen 0-5 Units         -- SubQ Before meals & nightly PRN 02/26/24 1204        Hold Oral hypoglycemics while patient is in the hospital.

## 2024-03-08 NOTE — ASSESSMENT & PLAN NOTE
Follows with Dr. Mendoza at [facility], last chemo session one week prior.  Patient was seen by Hematology Oncology due to significant leukocytosis felt to be related to Neulasta and recent cardiac event.  Dr. Baugh with Hematology Oncology recommends discontinuing all Herceptin products due to cardiac deterioration.  Consider aromatase inhibitor +/- Lupron.  Follow up outpatient.

## 2024-03-08 NOTE — PROGRESS NOTES
"[facility]  Progress Note    Patient Name: Mark Dickerson Sr.  MRN: [id number]  Patient Class: IP- Inpatient   Admission Date: 2/26/2024  Length of Stay: 10 days  Attending Physician: Antonia Kapoor MD  Primary Care Provider: Tatyana Cannon MD        Subjective:     Principal Problem:NSTEMI (non-ST elevated myocardial infarction)        HPI:  Mark Dickerson Sr. is a 84 y.o. male patient with a PMHx of metastatic breast cancer (on chemo), HLD, HTN, DM II, CKD who presents to the ED for evaluation of AMS which onset this morning PTA. Per daughter in law she visited the pt early this morning and states that he was normal for a short period before becoming extremely disoriented with jumbled speech.  Patient mental status returned to baseline piror to ed arrival.  Patient reports he feels "dehydrated", he also reports diarrhea earlier last week but this has since resolved.  Patient denies any fever, chills, N/V, SOB and chest pain.   In the ED, work up notable for WBC 14, Na 130, K 3.0, mag 1.5, , Trop 0.7.  EKG with SR PACs, ST and T wave abnormality. CT head negative for acute processes.  Chest xray no acute processes. Patient started on IVF and given zofran for nausea.  Patient will be admitted to observation for electrolyte imbalances, elevated trop.  Cards consulted.     Code Status DNR  Surrogate Decision maker daughter    Overview/Hospital Course:  Patient is currently admitted for NSTEMI, SIRS criteria, severe leukocytosis, hypokalemia, hypomagnesemia, chronic kidney disease stage 3, diabetes mellitus type 2, left breast cancer in a male patient, acute diastolic CHF exacerbation, normocytic anemia, transaminitis, and severe aortic stenosis.  Consults on this admission include Cardiology, Hematology Oncology, Pulmonary, and Wound Care.  Echocardiogram 02/26/2024 with EF 55-60% with indeterminate diastolic function.  Right and left heart catheterization 03/01/2024 with " "elevated filling pressures, severe aortic stenosis,  RCA with collaterals, EF 30%.  Cardiology recommends continuing medical management for NSTEMI and will discuss outpatient TAVR evaluation for severe aortic stenosis.  Continue IV Lasix diuresis for CHF exacerbation with evidence of volume overload.  Patient is noted to have pitting edema on exam.  Chest x-ray 03/03/2024 with interval worsening with moderate interstitial and alveolar opacities of bilateral lungs consistent with pulmonary edema.  BNP had trended upward and is currently 590-->repeat 505 on 3/6.  Hematology oncology has seen the patient in consultation and feels that leukocytosis is related to Neulasta and acute cardiac event.  White blood cell count is trending downward.  Patient did receive empiric IV Rocephin for 4 days 2/28-03/02/2024.  No localizing source of infection was identified.  COVID-19 negative (x2), influenza a/B negative, bcx were negative, urinalysis negative, lactic acid level 1.0, procalcitonin level 0.14.  CTA of chest 2/24/24 with no evidence of PE, pulmonary fibrosis versus CHF findings with recommendation for follow-up imaging.  Repeat chest x-ray 03/06/2024 with diffuse pulmonary infiltrates seen throughout lungs concerning for interstitial edema or interstitial pneumonia, no pleural effusions appreciated.  CT scan of chest without contrast 3/6  with "interval worsening from comparison CT with diffuse multifocal airspace opacification superimposed upon interstitial thickening, considerations are multifocal pneumonia, pulmonary edema, pulmonary hemorrhage; new small left pleural effusion." Pulmonary did see the patient in consultation yesterday and feels that abnormal CT scan of chest most likely related to fluid however okay with continued IV antibiotics for now.  Patient will continue IV Lasix diuresis..  Continue to replace electrolytes.  Patient with episode desaturation with hypoxia overnight.  He was 87% and oxygen " supplementation was increased.  He was 98% on 6 L nasal cannula this a.m..  Stat ABG, continuous pulse oximetry monitoring, frequent incentive spirometry, Xopenex/Atrovent nebs q.6 hours, aspiration precautions, and speech therapy evaluation were ordered.    Interval History:  Patient was sitting up in the recliner eating breakfast when I saw him early this a.m..  He did not appear to be in respiratory distress.  He did report he had some shortness a breath last night while lying flat and had to sit up in the chair to help with his breathing.  I checked up on the patient again at lunchtime and he was resting comfortably in bed with no signs of respiratory distress.    Review of Systems   Constitutional:  Negative for chills and fever.   Respiratory:  Positive for shortness of breath. Negative for wheezing.    Cardiovascular:  Negative for chest pain.   All other systems reviewed and are negative.    Objective:     Vital Signs (Most Recent):  Temp: 98.3 °F (36.8 °C) (03/08/24 1141)  Pulse: 90 (03/08/24 1141)  Resp: 20 (03/08/24 1141)  BP: (!) 90/52 (03/08/24 1141)  SpO2: (!) 93 % (03/08/24 1141) Vital Signs (24h Range):  Temp:  [97.4 °F (36.3 °C)-98.6 °F (37 °C)] 98.3 °F (36.8 °C)  Pulse:  [] 90  Resp:  [18-20] 20  SpO2:  [87 %-98 %] 93 %  BP: ()/(52-65) 90/52     Weight: 108.8 kg (239 lb 13.8 oz)  Body mass index is 34.42 kg/m².    Intake/Output Summary (Last 24 hours) at 3/8/2024 1218  Last data filed at 3/8/2024 0300  Gross per 24 hour   Intake --   Output 450 ml   Net -450 ml         Physical Exam  Vitals reviewed.   Constitutional:       General: He is not in acute distress.     Appearance: He is obese.   HENT:      Nose: Nose normal.   Eyes:      Conjunctiva/sclera: Conjunctivae normal.   Cardiovascular:      Rate and Rhythm: Normal rate.   Pulmonary:      Effort: Pulmonary effort is normal. No respiratory distress.   Abdominal:      General: There is no distension.      Tenderness: There is no  abdominal tenderness.   Musculoskeletal:         General: Swelling present.      Right lower leg: Edema present.      Left lower leg: Edema present.   Skin:     General: Skin is warm and dry.   Neurological:      Mental Status: He is alert and oriented to person, place, and time.   Psychiatric:         Mood and Affect: Mood normal.         Behavior: Behavior normal.             Significant Labs: All pertinent labs within the past 24 hours have been reviewed.  Recent Lab Results  (Last 5 results in the past 24 hours)        03/08/24  1139   03/08/24  0835   03/08/24  0622   03/08/24  0546   03/07/24  1801        Allens Test   Pass             Anion Gap       12         Baso #       0.12         Basophil %       1.0         Site   RR             BUN       25         Calcium       8.9         Chloride       89         CO2       34         Creatinine       1.1         DelSys   Nasal Can             Differential Method       Automated         eGFR       >60         Eos #       0.0         Eos %       0.2         Flow   3             Glucose       178         Gran # (ANC)       8.6         Gran %       74.4         Hematocrit       29.1         Hemoglobin       9.6         Immature Grans (Abs)       0.08  Comment: Mild elevation in immature granulocytes is non specific and   can be seen in a variety of conditions including stress response,   acute inflammation, trauma and pregnancy. Correlation with other   laboratory and clinical findings is essential.           Immature Granulocytes       0.7         Lymph #       1.6         Lymph %       14.0         Magnesium        1.8         MCH       29.6         MCHC       33.0         MCV       90         Mode   SPONT             Mono #       1.1         Mono %       9.7         MPV       10.4         nRBC       0         Platelet Count       391         POC BE   13             POC HCO3   36.4             POC PCO2   46.7             POC PH   7.500             POC PO2   67              POC SATURATED O2   94             POCT Glucose 188     186     192       Potassium       3.6         RBC       3.24         RDW       15.3         Sample   ARTERIAL             Sodium       135         WBC       11.54                                Significant Imaging: I have reviewed all pertinent imaging results/findings within the past 24 hours.  X-Ray Chest 1 View   Final Result      Findings are similar to prior         Electronically signed by: Earl Mendez MD   Date:    03/08/2024   Time:    07:21      CT Chest Without Contrast   Final Result      Interval worsening from comparison CT with diffuse multifocal airspace opacification superimposed upon interstitial thickening.  Considerations are multifocal pneumonia, pulmonary edema, pulmonary hemorrhage.      New small left pleural effusion      All CT scans at this facility use dose modulation, iterative reconstruction and/or weight based dosing when appropriate to reduce radiation dose to as low as reasonably achievable.         Electronically signed by: Evens Alba MD   Date:    03/06/2024   Time:    13:06      X-Ray Chest AP Portable   Final Result      In comparison to the prior study, there is no adverse interval changes         Electronically signed by: Earl Mendez MD   Date:    03/06/2024   Time:    09:12      X-Ray Chest AP Portable   Final Result      There has been interval worsening of the appearance of the lungs. There is a moderate amount of interstitial and alveolar opacities seen in both lungs.  This is characteristic of pulmonary edema.         Electronically signed by: Marco Elliott MD   Date:    03/03/2024   Time:    19:00      X-Ray Chest 1 View   Final Result      As above.         Electronically signed by: Spenser Stallworth   Date:    02/28/2024   Time:    11:07      X-Ray Chest AP Portable   Final Result      Worsening moderate bilateral infiltrates.  Follow-up is recommended.         Electronically signed by: Shyla Giles MD    Date:    02/27/2024   Time:    10:18      CTA Chest Non-Coronary (PE Studies)   Final Result      No pulmonary embolism.  No dissection.  Atherosclerotic changes.  Mild interstitial opacities.  Correlate clinically for element of fibrosis versus CHF.  Pulmonary micronodularity.  Recommend follow-up.      All CT scans   are performed using dose optimization techniques including the following: automated exposure control; adjustment of the mA and/or kV; use of iterative reconstruction technique.  Dose modulation was employed for ALARA by means of: Automated exposure control; adjustment of the mA and/or kV according to patient size (this includes techniques or standardized protocols for targeted exams where dose is matched to indication/reason for exam; i.e. extremities or head); and/or use of iterative reconstructive technique.         Electronically signed by: Jhoan Roman   Date:    02/26/2024   Time:    19:11      CT Head Without Contrast   Final Result      Chronic microvascular ischemic changes.  No intracranial hemorrhage.      All CT scans at this facility use dose modulation, iterative reconstruction, and/or weight based dosing when appropriate to reduce radiation dose to as low as reasonable achievable.         Electronically signed by: Earl Mendez MD   Date:    02/26/2024   Time:    10:36      X-Ray Chest AP Portable   Final Result      No acute process seen.         Electronically signed by: Earl Mendez MD   Date:    02/26/2024   Time:    10:02           Assessment/Plan:      * NSTEMI (non-ST elevated myocardial infarction)  -No prior hx of CAD  -Troponin max 2.789  -EKG with SR PACs and ST-Twave abnormalities  -echocardiogram 02/26/2024 with EF 55-60% with indeterminate diastolic function   -right and left heart catheterization 03/01/2024 with increased filling pressures, severe aortic stenosis,  RCA with collaterals, EF 30%  -continue medical management; continue aspirin, Lipitor, and midodrine.  "Lopressor held due to low BP.  -Cardiology following/managing        Acute diastolic CHF (congestive heart failure)  Patient is identified as having Diastolic (HFpEF) heart failure that is Acute. CHF is currently uncontrolled due to Pulmonary edema/pleural effusion on CXR. Latest ECHO performed and demonstrates- Results for orders placed during the hospital encounter of 02/26/24.      Echo    Interpretation Summary    Left Ventricle: The left ventricle is normal in size. There is concentric remodeling. There is normal systolic function with a visually estimated ejection fraction of 55 - 60%. There is indeterminate diastolic function.    Right Ventricle: Right ventricle was not well visualized due to poor acoustic window. Normal right ventricular cavity size. Systolic function is normal.    Pulmonary Artery: The estimated pulmonary artery systolic pressure is 37 mmHg.    IVC/SVC: Normal venous pressure at 3 mmHg.  . Continue lasix and monitor clinical status closely. Monitor on telemetry. Monitor strict Is&Os and daily weights.  Cardiology on board already for NSTEMI. Continue to stress to patient importance of self efficacy and  on diet for CHF. Last BNP reviewed- and noted below   Recent Labs   Lab 03/06/24  0450   *     -cardiac catheterization 03/01/2024 with EF 30%  -Increased lasix to 40 mg IV bid as of 3/4 due to pitting edema to BLE, dyspnea requiring O2 supplementation, and abnormal CXR with pulmonary edema.   -Fluid restrict 1200 mL.  Strict Is&Os and daily weights.   -Cardiology has continued IV Lasix diuresis, Lopressor stopped due to relative hypotension  -CT scan of chest without contrast 3/6 with "interval worsening from comparison CT with diffuse multifocal airspace opacification superimposed upon interstitial thickening, considerations are multifocal pneumonia, pulmonary edema, pulmonary hemorrhage; new small left pleural effusion."   -chest x-ray 03/08/2024 with diffuse pulmonary " "infiltrates, trace pleural effusion on the left, similar to prior chest x-ray  -transfer to skilled nursing facility has been held until cardiac/pulmonary issues addressed and stable    SIRS (systemic inflammatory response syndrome)  -WBC 34.56-->11.54, leukocytosis resolved  -COVID-19 negative, influenza a/B negative, procalcitonin level 0.14-->0.12, lactic acid level 1.0, urinalysis negative, blood cultures negative  -CTA of chest 02/24/2024 with no evidence of PE, pulmonary fibrosis versus CHF findings with recommendations for follow-up imaging  -repeat CT chest 3/6 with "interval worsening from comparison CT with diffuse multifocal airspace opacification superimposed upon interstitial thickening, considerations are multifocal pneumonia, pulmonary edema, pulmonary hemorrhage; new small left pleural effusion   -patient did receive empiric IV ceftriaxone for 4 days 2/28-03/02/2024  -hematology oncology has seen the patient in consultation and feels severe leukocytosis related to recent Neulasta and acute cardiac event  -given interval worsening in CT scan, persistent leukocytosis, and underlying breast cancer, Pulmonary was consulted for further evaluation and treatment recommendations--> abnormal CT scan felt to be most likely related to pulmonary edema with IV diuresis to be continued  -continue empiric IV cefepime and IV vancomycin day #3.  -additional evaluation ordered 3/6 with sputum culture with rare GPC, MRSA culture pending, respiratory viral panel pending, and blood cultures with no growth to date      Hypoxia  Hypoxia related to CHF exacerbation and pulmonary edema.  Continue IV Lasix diuresis.  Add Xopenex/Atrovent nebs q.6 hours, continuous pulse oximetry monitoring, and encourage incentive spirometry q.4 hours while awake.  Aspiration precautions and speech therapy evaluation.  Stat ABG reviewed and consistent with primary metabolic alkalosis with secondary respiratory alkalosis.  Continue O2 " supplementation and wean as tolerated.      Transaminitis  Transaminitis with minimal trend downward. Patient denies abdominal pain and has no tenderness to palpation on exam.  Suspect related to hepatic congestion with CHF exacerbation.  Check a.m. labs.      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 Mendoza at [facility], last chemo session one week prior.  Patient was seen by Hematology Oncology due to significant leukocytosis felt to be related to Neulasta and recent cardiac event.  Dr. Baugh with Hematology Oncology recommends discontinuing all Herceptin products due to cardiac deterioration.  Consider aromatase inhibitor +/- Lupron.  Follow up outpatient.    Severe aortic stenosis  Cardiology plans to discuss outpatient TAVR evaluation.  Monitor volume status with patient receiving IV diuresis.      Abnormal CT of the chest        Acute HFrEF (heart failure with reduced ejection fraction)        Normocytic anemia  Patient's anemia is currently controlled. Has not received any PRBCs to date. 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    Hypomagnesemia  Patient has Abnormal Magnesium: hypomagnesemia. Will continue to monitor electrolytes closely. Will replace the affected electrolytes and repeat labs to be done after interventions completed. The patient's magnesium results have been reviewed and are listed below.  Recent Labs   Lab 03/08/24  0546   MG 1.8      Continue Mag-Ox 400 mg p.o. daily.    Hypokalemia  Patient has hypokalemia which is Acute on Chronic and currently uncontrolled. Most recent potassium levels reviewed-   Lab Results   Component Value Date    K 3.6 03/08/2024   Aggravated by  IV Lasix diuresis.  Continue potassium chloride 20 mEq p.o. b.i.d..  Magnesium level 1.8.  Continue Mag-Ox 400 mg p.o. daily.  Check a.m. labs.    Elevated troponin  As above, see discussion for NSTEMI    CKD stage 3 due to type 2 diabetes mellitus  Creatine stable for now. BMP reviewed- noted Estimated Creatinine Clearance: 59.5 mL/min (based on SCr of 1.1 mg/dL). according to latest data. Based on current GFR, CKD stage is stage 3 - GFR 30-59.  Monitor UOP and serial BMP and adjust therapy as needed. Renally dose meds. Avoid nephrotoxic medications and procedures    Controlled type 2 diabetes mellitus without complication, without long-term current use of insulin  Patient's FSGs are controlled on current medication regimen.  Last A1c reviewed-   Lab Results   Component Value Date    HGBA1C 7.1 (H) 11/28/2023     Most recent fingerstick glucose reviewed-   Recent Labs   Lab 03/07/24  1801 03/08/24  0622 03/08/24  1139   POCTGLUCOSE 192* 186* 188*       Current correctional scale  Low  Maintain anti-hyperglycemic dose as follows-   Antihyperglycemics (From admission, onward)      Start     Stop Route Frequency Ordered    02/26/24 1301  insulin aspart U-100 pen 0-5 Units         -- SubQ Before meals & nightly PRN 02/26/24 1204          Hold Oral hypoglycemics while patient is in the hospital.      VTE Risk Mitigation (From admission, onward)           Ordered     IP VTE HIGH RISK PATIENT  Once         02/26/24 1204     Place sequential compression device  Until discontinued         02/26/24 1204                    Discharge Planning   DE:      Code Status: DNR   Is the patient medically ready for discharge?:     Reason for patient still in hospital (select all that apply): Patient trending condition, Laboratory test, Treatment, and Consult recommendations  Discharge Plan A: Skilled Nursing Facility   Discharge Delays: None known at this time              Antonia Kapoor MD  Department of Hospital Medicine   [facility] -  Telemetry ([facility])

## 2024-03-08 NOTE — ASSESSMENT & PLAN NOTE
Initially believed to be due to HF exacerbation; however, despite diuresis and increasing negative fluid balance, hypoxia and bilateral infiltrates persist   Additionally, see problem: abnormal CT chest

## 2024-03-08 NOTE — PLAN OF CARE
Good session today with SBA toileting and toilet transfer as well as SBA LB dressing with socks. Patient required extensive rest breaks between tasks due to shortness of breath.  DC with moderate intensity OT indicated.

## 2024-03-08 NOTE — ASSESSMENT & PLAN NOTE
CT chest 3/6 done 2/2 ongoing hypoxia despite treatment so far, labored breathing, and leukocytosis   Initially believed to be fluid as patient appeared volume overloaded + elevated BNP in conjunction with physical exam  Despite diuresis, hypoxemia and bilateral infiltrates persist  Ddx includes: pneumonitis, lymphangitic spread, HF  Chemotherapy with potentially toxic pulm effects   Start steroids 60 mg Q6 hours- will continue for 48 hours and evaluate response  Supplemental oxygen as needed  IS, OOB and PT/OT as tolerated

## 2024-03-08 NOTE — ASSESSMENT & PLAN NOTE
Appeared volume overloaded on exam with supporting labs and imaging   Worsening contraction alkalosis, - 4.2L since admit  Peripheral edema has improved; hypoxemia has not though   Cardiology - decreasing lasix to daily

## 2024-03-08 NOTE — PT/OT/SLP PROGRESS
Occupational Therapy  Treatment    Mark Dickerson .   MRN: [id number]   Admitting Diagnosis: NSTEMI (non-ST elevated myocardial infarction)    OT Date of Treatment: 03/08/24   OT Start Time: 0910  OT Stop Time: 0935  OT Total Time (min): 25 min    Billable Minutes:  Self Care/Home Management 25    OT/SMITH: OT     Number of SMITH visits since last OT visit: 0    General Precautions: Standard, fall, respiratory  Orthopedic Precautions: N/A  Braces: N/A  Respiratory Status: Nasal cannula, flow 3 L/min         Subjective:  Communicated with nurse prior to session.  Patient was seated up in bedside chair when OT arrived. He was willing to participate in treatment.  Pain/Comfort  Pain Rating 1: 0/10  Pain Rating Post-Intervention 1: 0/10    Objective:  Patient found with: peripheral IV, telemetry, oxygen     Functional Mobility:  Transfers: toilet transfer and chair transfer with SBA.    Activities of Daily Living:  SBA toileting and toilet transfer   SBA LB dressing with socks    Balance:   Static Sit: GOOD: Takes MODERATE challenges from all directions  Dynamic Sit: GOOD: Maintains balance through MODERATE excursions of active trunk movement  Static Stand: GOOD: Takes MODERATE challenges from all directions  Dynamic stand: GOOD: Needs SUPERVISION only during gait and able to self right with moderate LOB      AM-PAC 6 CLICK ADL   How much help from another person does this patient currently need?   1 = Unable, Total/Dependent Assistance  2 = A lot, Maximum/Moderate Assistance  3 = A little, Minimum/Contact Guard/Supervision  4 = None, Modified [place]/Independent    Putting on and taking off regular lower body clothing? : 4  Bathing (including washing, rinsing, drying)?: 3  Toileting, which includes using toilet, bedpan, or urinal? : 4  Putting on and taking off regular upper body clothing?: 4  Taking care of personal grooming such as brushing teeth?: 4  Eating meals?: 4  Daily Activity Total Score: 23     AM-PAC Raw  "Score CMS "G-Code Modifier Level of Impairment Assistance   6 % Total / Unable   7 - 8 CM 80 - 100% Maximal Assist   9-13 CL 60 - 80% Moderate Assist   14 - 19 CK 40 - 60% Moderate Assist   20 - 22 CJ 20 - 40% Minimal Assist   23 CI 1-20% SBA / CGA   24 CH 0% Independent/ Mod I       Patient left up in chair with all lines intact, call button in reach, and chair alarm on    ASSESSMENT:  Mark Dickerson Sr. is a 84 y.o. male with a medical diagnosis of NSTEMI (non-ST elevated myocardial infarction). Good session today with SBA toileting and toilet transfer as well as SBA LB dressing with socks. Patient required extensive rest breaks between tasks due to shortness of breath.  DC with moderate intensity OT indicated.     Rehab identified problem list/impairments:  weakness, impaired endurance, impaired functional mobility, impaired balance, pain, decreased safety awareness, decreased lower extremity function, impaired cardiopulmonary response to activity, impaired self care skills    Rehab potential is excellent.    Activity tolerance: Fair    Discharge recommendations: Moderate Intensity Therapy   Barriers to discharge: Barriers to Discharge: None    Equipment recommendations: to be determined by next level of care    GOALS:   Multidisciplinary Problems       Occupational Therapy Goals          Problem: Occupational Therapy    Goal Priority Disciplines Outcome Interventions   Occupational Therapy Goal     OT, PT/OT Ongoing, Progressing    Description: Goals to be met by: 3/17/24     Patient will increase functional independence with ADLs by performing:    UE Dressing with [place].  Grooming while standing at sink with [place].  Toileting from toilet with [place] for hygiene and clothing management.   Toilet transfer to toilet with Modified [place].  Upper extremity exercise program x15 reps per handout, with independence.                       Plan:  Patient to be seen 2 x/week to address the " above listed problems via self-care/home management, therapeutic activities, therapeutic exercises, neuromuscular re-education  Plan of Care expires: 03/12/24  Plan of Care reviewed with: patient         03/08/2024

## 2024-03-08 NOTE — ASSESSMENT & PLAN NOTE
Patient has Abnormal Magnesium: hypomagnesemia. Will continue to monitor electrolytes closely. Will replace the affected electrolytes and repeat labs to be done after interventions completed. The patient's magnesium results have been reviewed and are listed below.  Recent Labs   Lab 03/08/24  0546   MG 1.8      Continue Mag-Ox 400 mg p.o. daily.

## 2024-03-08 NOTE — ASSESSMENT & PLAN NOTE
Patient's anemia is currently controlled. Has not received any PRBCs to date. 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

## 2024-03-08 NOTE — ASSESSMENT & PLAN NOTE
"-WBC 34.56-->11.54, leukocytosis resolved  -COVID-19 negative, influenza a/B negative, procalcitonin level 0.14-->0.12, lactic acid level 1.0, urinalysis negative, blood cultures negative  -CTA of chest 02/24/2024 with no evidence of PE, pulmonary fibrosis versus CHF findings with recommendations for follow-up imaging  -repeat CT chest 3/6 with "interval worsening from comparison CT with diffuse multifocal airspace opacification superimposed upon interstitial thickening, considerations are multifocal pneumonia, pulmonary edema, pulmonary hemorrhage; new small left pleural effusion   -patient did receive empiric IV ceftriaxone for 4 days 2/28-03/02/2024  -hematology oncology has seen the patient in consultation and feels severe leukocytosis related to recent Neulasta and acute cardiac event  -given interval worsening in CT scan, persistent leukocytosis, and underlying breast cancer, Pulmonary was consulted for further evaluation and treatment recommendations--> abnormal CT scan felt to be most likely related to pulmonary edema with IV diuresis to be continued  -continue empiric IV cefepime and IV vancomycin day #3.  -additional evaluation ordered 3/6 with sputum culture with rare GPC, MRSA culture pending, respiratory viral panel pending, and blood cultures with no growth to date    "

## 2024-03-08 NOTE — PLAN OF CARE
Reviewed chart/labs/imaging. Briefly spoke with patient. Pulm following, steroids initiated.    Lasix d/c'd as edema improved, BP soft.    Diurese prn.

## 2024-03-08 NOTE — SUBJECTIVE & OBJECTIVE
Interval History:  Patient was sitting up in the recliner eating breakfast when I saw him early this a.m..  He did not appear to be in respiratory distress.  He did report he had some shortness a breath last night while lying flat and had to sit up in the chair to help with his breathing.  I checked up on the patient again at lunchtime and he was resting comfortably in bed with no signs of respiratory distress.    Review of Systems   Constitutional:  Negative for chills and fever.   Respiratory:  Positive for shortness of breath. Negative for wheezing.    Cardiovascular:  Negative for chest pain.   All other systems reviewed and are negative.    Objective:     Vital Signs (Most Recent):  Temp: 98.3 °F (36.8 °C) (03/08/24 1141)  Pulse: 90 (03/08/24 1141)  Resp: 20 (03/08/24 1141)  BP: (!) 90/52 (03/08/24 1141)  SpO2: (!) 93 % (03/08/24 1141) Vital Signs (24h Range):  Temp:  [97.4 °F (36.3 °C)-98.6 °F (37 °C)] 98.3 °F (36.8 °C)  Pulse:  [] 90  Resp:  [18-20] 20  SpO2:  [87 %-98 %] 93 %  BP: ()/(52-65) 90/52     Weight: 108.8 kg (239 lb 13.8 oz)  Body mass index is 34.42 kg/m².    Intake/Output Summary (Last 24 hours) at 3/8/2024 1218  Last data filed at 3/8/2024 0300  Gross per 24 hour   Intake --   Output 450 ml   Net -450 ml         Physical Exam  Vitals reviewed.   Constitutional:       General: He is not in acute distress.     Appearance: He is obese.   HENT:      Nose: Nose normal.   Eyes:      Conjunctiva/sclera: Conjunctivae normal.   Cardiovascular:      Rate and Rhythm: Normal rate.   Pulmonary:      Effort: Pulmonary effort is normal. No respiratory distress.   Abdominal:      General: There is no distension.      Tenderness: There is no abdominal tenderness.   Musculoskeletal:         General: Swelling present.      Right lower leg: Edema present.      Left lower leg: Edema present.   Skin:     General: Skin is warm and dry.   Neurological:      Mental Status: He is alert and oriented to person,  place, and time.   Psychiatric:         Mood and Affect: Mood normal.         Behavior: Behavior normal.             Significant Labs: All pertinent labs within the past 24 hours have been reviewed.  Recent Lab Results  (Last 5 results in the past 24 hours)        03/08/24  1139   03/08/24  0835   03/08/24  0622   03/08/24  0546   03/07/24  1801        Allens Test   Pass             Anion Gap       12         Baso #       0.12         Basophil %       1.0         Site   RR             BUN       25         Calcium       8.9         Chloride       89         CO2       34         Creatinine       1.1         DelSys   Nasal Can             Differential Method       Automated         eGFR       >60         Eos #       0.0         Eos %       0.2         Flow   3             Glucose       178         Gran # (ANC)       8.6         Gran %       74.4         Hematocrit       29.1         Hemoglobin       9.6         Immature Grans (Abs)       0.08  Comment: Mild elevation in immature granulocytes is non specific and   can be seen in a variety of conditions including stress response,   acute inflammation, trauma and pregnancy. Correlation with other   laboratory and clinical findings is essential.           Immature Granulocytes       0.7         Lymph #       1.6         Lymph %       14.0         Magnesium        1.8         MCH       29.6         MCHC       33.0         MCV       90         Mode   SPONT             Mono #       1.1         Mono %       9.7         MPV       10.4         nRBC       0         Platelet Count       391         POC BE   13             POC HCO3   36.4             POC PCO2   46.7             POC PH   7.500             POC PO2   67             POC SATURATED O2   94             POCT Glucose 188     186     192       Potassium       3.6         RBC       3.24         RDW       15.3         Sample   ARTERIAL             Sodium       135         WBC       11.54                                 Significant Imaging: I have reviewed all pertinent imaging results/findings within the past 24 hours.  X-Ray Chest 1 View   Final Result      Findings are similar to prior         Electronically signed by: Earl Mendez MD   Date:    03/08/2024   Time:    07:21      CT Chest Without Contrast   Final Result      Interval worsening from comparison CT with diffuse multifocal airspace opacification superimposed upon interstitial thickening.  Considerations are multifocal pneumonia, pulmonary edema, pulmonary hemorrhage.      New small left pleural effusion      All CT scans at this facility use dose modulation, iterative reconstruction and/or weight based dosing when appropriate to reduce radiation dose to as low as reasonably achievable.         Electronically signed by: Evens Alba MD   Date:    03/06/2024   Time:    13:06      X-Ray Chest AP Portable   Final Result      In comparison to the prior study, there is no adverse interval changes         Electronically signed by: Earl Mendez MD   Date:    03/06/2024   Time:    09:12      X-Ray Chest AP Portable   Final Result      There has been interval worsening of the appearance of the lungs. There is a moderate amount of interstitial and alveolar opacities seen in both lungs.  This is characteristic of pulmonary edema.         Electronically signed by: Marco Elliott MD   Date:    03/03/2024   Time:    19:00      X-Ray Chest 1 View   Final Result      As above.         Electronically signed by: Spenser Stallworth   Date:    02/28/2024   Time:    11:07      X-Ray Chest AP Portable   Final Result      Worsening moderate bilateral infiltrates.  Follow-up is recommended.         Electronically signed by: Shyla Giles MD   Date:    02/27/2024   Time:    10:18      CTA Chest Non-Coronary (PE Studies)   Final Result      No pulmonary embolism.  No dissection.  Atherosclerotic changes.  Mild interstitial opacities.  Correlate clinically for element of fibrosis versus CHF.   Pulmonary micronodularity.  Recommend follow-up.      All CT scans   are performed using dose optimization techniques including the following: automated exposure control; adjustment of the mA and/or kV; use of iterative reconstruction technique.  Dose modulation was employed for ALARA by means of: Automated exposure control; adjustment of the mA and/or kV according to patient size (this includes techniques or standardized protocols for targeted exams where dose is matched to indication/reason for exam; i.e. extremities or head); and/or use of iterative reconstructive technique.         Electronically signed by: Jhoan Roman   Date:    02/26/2024   Time:    19:11      CT Head Without Contrast   Final Result      Chronic microvascular ischemic changes.  No intracranial hemorrhage.      All CT scans at this facility use dose modulation, iterative reconstruction, and/or weight based dosing when appropriate to reduce radiation dose to as low as reasonable achievable.         Electronically signed by: Earl Mendez MD   Date:    02/26/2024   Time:    10:36      X-Ray Chest AP Portable   Final Result      No acute process seen.         Electronically signed by: Earl Mendez MD   Date:    02/26/2024   Time:    10:02

## 2024-03-08 NOTE — ASSESSMENT & PLAN NOTE
Volume management with diuresis   Cardiology following- discussed with Cardiology team at bedside today   outpatient eval for TAVR

## 2024-03-08 NOTE — ASSESSMENT & PLAN NOTE
Hypoxia related to CHF exacerbation and pulmonary edema.  Continue IV Lasix diuresis.  Add Xopenex/Atrovent nebs q.6 hours, continuous pulse oximetry monitoring, and encourage incentive spirometry q.4 hours while awake.  Aspiration precautions and speech therapy evaluation.  Stat ABG reviewed and consistent with primary metabolic alkalosis with secondary respiratory alkalosis.  Continue O2 supplementation and wean as tolerated.

## 2024-03-09 LAB
BACTERIA SPEC AEROBE CULT: NORMAL
BACTERIA SPEC AEROBE CULT: NORMAL
GRAM STN SPEC: NORMAL
POCT GLUCOSE: 222 MG/DL (ref 70–110)
POCT GLUCOSE: 229 MG/DL (ref 70–110)
POCT GLUCOSE: 236 MG/DL (ref 70–110)
POCT GLUCOSE: 249 MG/DL (ref 70–110)

## 2024-03-09 PROCEDURE — 94799 UNLISTED PULMONARY SVC/PX: CPT | Mod: HCNC,XB

## 2024-03-09 PROCEDURE — 94640 AIRWAY INHALATION TREATMENT: CPT | Mod: HCNC

## 2024-03-09 PROCEDURE — 63600175 PHARM REV CODE 636 W HCPCS: Mod: HCNC | Performed by: INTERNAL MEDICINE

## 2024-03-09 PROCEDURE — 97116 GAIT TRAINING THERAPY: CPT | Mod: HCNC,CQ

## 2024-03-09 PROCEDURE — 25000003 PHARM REV CODE 250: Mod: HCNC | Performed by: NURSE PRACTITIONER

## 2024-03-09 PROCEDURE — 21400001 HC TELEMETRY ROOM: Mod: HCNC

## 2024-03-09 PROCEDURE — 63600175 PHARM REV CODE 636 W HCPCS: Mod: HCNC | Performed by: NURSE PRACTITIONER

## 2024-03-09 PROCEDURE — 25000003 PHARM REV CODE 250: Mod: HCNC | Performed by: INTERNAL MEDICINE

## 2024-03-09 PROCEDURE — 99232 SBSQ HOSP IP/OBS MODERATE 35: CPT | Mod: HCNC,,, | Performed by: INTERNAL MEDICINE

## 2024-03-09 PROCEDURE — 27000221 HC OXYGEN, UP TO 24 HOURS: Mod: HCNC

## 2024-03-09 PROCEDURE — 97530 THERAPEUTIC ACTIVITIES: CPT | Mod: HCNC,CQ

## 2024-03-09 PROCEDURE — 99900035 HC TECH TIME PER 15 MIN (STAT): Mod: HCNC

## 2024-03-09 PROCEDURE — 25000242 PHARM REV CODE 250 ALT 637 W/ HCPCS: Mod: HCNC | Performed by: INTERNAL MEDICINE

## 2024-03-09 PROCEDURE — 94761 N-INVAS EAR/PLS OXIMETRY MLT: CPT | Mod: HCNC

## 2024-03-09 RX ORDER — L. ACIDOPHILUS/L.BULGARICUS 100MM CELL
1 GRANULES IN PACKET (EA) ORAL 2 TIMES DAILY
Status: DISCONTINUED | OUTPATIENT
Start: 2024-03-09 | End: 2024-03-14 | Stop reason: HOSPADM

## 2024-03-09 RX ORDER — IPRATROPIUM BROMIDE 0.5 MG/2.5ML
0.5 SOLUTION RESPIRATORY (INHALATION)
Status: DISCONTINUED | OUTPATIENT
Start: 2024-03-10 | End: 2024-03-14 | Stop reason: HOSPADM

## 2024-03-09 RX ORDER — LEVALBUTEROL INHALATION SOLUTION 0.63 MG/3ML
1.25 SOLUTION RESPIRATORY (INHALATION)
Status: DISCONTINUED | OUTPATIENT
Start: 2024-03-10 | End: 2024-03-14 | Stop reason: HOSPADM

## 2024-03-09 RX ORDER — LEVALBUTEROL 1.25 MG/.5ML
1.25 SOLUTION, CONCENTRATE RESPIRATORY (INHALATION)
Status: DISCONTINUED | OUTPATIENT
Start: 2024-03-10 | End: 2024-03-09

## 2024-03-09 RX ADMIN — IPRATROPIUM BROMIDE 0.5 MG: 0.5 SOLUTION RESPIRATORY (INHALATION) at 12:03

## 2024-03-09 RX ADMIN — FLUTICASONE PROPIONATE 100 MCG: 50 SPRAY, METERED NASAL at 09:03

## 2024-03-09 RX ADMIN — CEFEPIME 2 G: 2 INJECTION, POWDER, FOR SOLUTION INTRAVENOUS at 03:03

## 2024-03-09 RX ADMIN — MIDODRINE HYDROCHLORIDE 10 MG: 5 TABLET ORAL at 12:03

## 2024-03-09 RX ADMIN — METHYLPREDNISOLONE SODIUM SUCCINATE 60 MG: 40 INJECTION, POWDER, FOR SOLUTION INTRAMUSCULAR; INTRAVENOUS at 04:03

## 2024-03-09 RX ADMIN — MIDODRINE HYDROCHLORIDE 10 MG: 5 TABLET ORAL at 04:03

## 2024-03-09 RX ADMIN — IPRATROPIUM BROMIDE 0.5 MG: 0.5 SOLUTION RESPIRATORY (INHALATION) at 07:03

## 2024-03-09 RX ADMIN — POTASSIUM CHLORIDE 20 MEQ: 1500 TABLET, EXTENDED RELEASE ORAL at 09:03

## 2024-03-09 RX ADMIN — METHYLPREDNISOLONE SODIUM SUCCINATE 60 MG: 40 INJECTION, POWDER, FOR SOLUTION INTRAMUSCULAR; INTRAVENOUS at 05:03

## 2024-03-09 RX ADMIN — METHYLPREDNISOLONE SODIUM SUCCINATE 60 MG: 40 INJECTION, POWDER, FOR SOLUTION INTRAMUSCULAR; INTRAVENOUS at 11:03

## 2024-03-09 RX ADMIN — INSULIN ASPART 2 UNITS: 100 INJECTION, SOLUTION INTRAVENOUS; SUBCUTANEOUS at 12:03

## 2024-03-09 RX ADMIN — MIDODRINE HYDROCHLORIDE 10 MG: 5 TABLET ORAL at 09:03

## 2024-03-09 RX ADMIN — ANASTROZOLE 1 MG: 1 TABLET, COATED ORAL at 09:03

## 2024-03-09 RX ADMIN — LEVALBUTEROL HYDROCHLORIDE 1.25 MG: 0.63 SOLUTION RESPIRATORY (INHALATION) at 12:03

## 2024-03-09 RX ADMIN — ATORVASTATIN CALCIUM 20 MG: 10 TABLET, FILM COATED ORAL at 08:03

## 2024-03-09 RX ADMIN — INSULIN ASPART 2 UNITS: 100 INJECTION, SOLUTION INTRAVENOUS; SUBCUTANEOUS at 06:03

## 2024-03-09 RX ADMIN — INSULIN ASPART 1 UNITS: 100 INJECTION, SOLUTION INTRAVENOUS; SUBCUTANEOUS at 08:03

## 2024-03-09 RX ADMIN — LEVALBUTEROL HYDROCHLORIDE 1.25 MG: 0.63 SOLUTION RESPIRATORY (INHALATION) at 07:03

## 2024-03-09 RX ADMIN — Medication 400 MG: at 09:03

## 2024-03-09 RX ADMIN — ASPIRIN 81 MG: 81 TABLET, COATED ORAL at 09:03

## 2024-03-09 RX ADMIN — CEFEPIME 2 G: 2 INJECTION, POWDER, FOR SOLUTION INTRAVENOUS at 04:03

## 2024-03-09 RX ADMIN — LACTOBACILLUS ACIDOPHILUS / LACTOBACILLUS BULGARICUS 1 EACH: 100 MILLION CFU STRENGTH GRANULES at 11:03

## 2024-03-09 RX ADMIN — INSULIN ASPART 2 UNITS: 100 INJECTION, SOLUTION INTRAVENOUS; SUBCUTANEOUS at 04:03

## 2024-03-09 NOTE — ASSESSMENT & PLAN NOTE
Transaminitis with minimal trend downward. Patient denies abdominal pain and has no tenderness to palpation on exam.  Suspect related to hepatic congestion with CHF exacerbation.

## 2024-03-09 NOTE — PROGRESS NOTES
"[facility]  Progress Note    Patient Name: Mark Dickerson Sr.  MRN: [id number]  Patient Class: IP- Inpatient   Admission Date: 2/26/2024  Length of Stay: 11 days  Attending Physician: Antonia Kapoor MD  Primary Care Provider: Tatyana Cannon MD        Subjective:     Principal Problem:NSTEMI (non-ST elevated myocardial infarction)        HPI:  Mark Dickerson Sr. is a 84 y.o. male patient with a PMHx of metastatic breast cancer (on chemo), HLD, HTN, DM II, CKD who presents to the ED for evaluation of AMS which onset this morning PTA. Per daughter in law she visited the pt early this morning and states that he was normal for a short period before becoming extremely disoriented with jumbled speech.  Patient mental status returned to baseline piror to ed arrival.  Patient reports he feels "dehydrated", he also reports diarrhea earlier last week but this has since resolved.  Patient denies any fever, chills, N/V, SOB and chest pain.   In the ED, work up notable for WBC 14, Na 130, K 3.0, mag 1.5, , Trop 0.7.  EKG with SR PACs, ST and T wave abnormality. CT head negative for acute processes.  Chest xray no acute processes. Patient started on IVF and given zofran for nausea.  Patient will be admitted to observation for electrolyte imbalances, elevated trop.  Cards consulted.     Code Status DNR  Surrogate Decision maker daughter    Overview/Hospital Course:  Patient is currently admitted for NSTEMI, SIRS criteria, severe leukocytosis, hypokalemia, hypomagnesemia, chronic kidney disease stage 3, diabetes mellitus type 2, left breast cancer in a male patient, acute diastolic CHF exacerbation, normocytic anemia, transaminitis, and severe aortic stenosis.  Consults on this admission include Cardiology, Hematology Oncology, Pulmonary, and Wound Care.  Echocardiogram 02/26/2024 with EF 55-60% with indeterminate diastolic function.  Right and left heart catheterization 03/01/2024 with " "elevated filling pressures, severe aortic stenosis,  RCA with collaterals, EF 30%.  Cardiology recommends continuing medical management for NSTEMI and will discuss outpatient TAVR evaluation for severe aortic stenosis.  Continue IV Lasix diuresis for CHF exacerbation with evidence of volume overload.  Patient is noted to have pitting edema on exam.  Chest x-ray 03/03/2024 with interval worsening with moderate interstitial and alveolar opacities of bilateral lungs consistent with pulmonary edema.  BNP had trended upward and is currently 590-->repeat 505 on 3/6.  Hematology oncology has seen the patient in consultation and feels that leukocytosis is related to Neulasta and acute cardiac event.  White blood cell count is trending downward.  Patient did receive empiric IV Rocephin for 4 days 2/28-03/02/2024.  No localizing source of infection was identified.  COVID-19 negative (x2), influenza a/B negative, bcx were negative, urinalysis negative, lactic acid level 1.0, procalcitonin level 0.14.  CTA of chest 2/24/24 with no evidence of PE, pulmonary fibrosis versus CHF findings with recommendation for follow-up imaging.  Repeat chest x-ray 03/06/2024 with diffuse pulmonary infiltrates seen throughout lungs concerning for interstitial edema or interstitial pneumonia, no pleural effusions appreciated.  CT scan of chest without contrast 3/6  with "interval worsening from comparison CT with diffuse multifocal airspace opacification superimposed upon interstitial thickening, considerations are multifocal pneumonia, pulmonary edema, pulmonary hemorrhage; new small left pleural effusion." Pulmonary did see the patient in consultation and feels that abnormal CT scan of chest most likely related to fluid however okay with continued IV antibiotics for now.  Continue IV cefepime.  IV vancomycin was discontinued as MRSA culture was negative.  Continue to replace electrolytes.  Patient with episodes of desaturation with hypoxia.  " Continue O2 supplementation, continuous pulse oximetry monitoring, frequent incentive spirometry, Xopenex/Atrovent nebs q.6 hours, IV Solu-Medrol 60 mg q.6 hours (steroids started by Pulmonary on 03/08/2024).  IV Lasix diuresis had to be discontinued due to severe aortic stenosis and hypotension.  Will use p.r.n. Lasix and fluid restriction as per Cardiology.    Interval History:  Patient feels okay.  He is resting comfortably in a recliner at the time of my exam.  He denies chest pain or palpitations.  He did have an episode where he desaturated to 85% on 3 L nasal cannula and is now 92% on 4 L nasal cannula.    Review of Systems   Constitutional:  Negative for chills and fever.   Respiratory:  Positive for shortness of breath.    Cardiovascular:  Positive for leg swelling (improving). Negative for chest pain and palpitations.   Gastrointestinal:  Negative for nausea and vomiting.   All other systems reviewed and are negative.    Objective:     Vital Signs (Most Recent):  Temp: 98.1 °F (36.7 °C) (03/09/24 1100)  Pulse: 104 (03/09/24 1100)  Resp: 18 (03/09/24 1100)  BP: 97/60 (03/09/24 1100)  SpO2: 97 % (03/09/24 1100) Vital Signs (24h Range):  Temp:  [98.1 °F (36.7 °C)-98.7 °F (37.1 °C)] 98.1 °F (36.7 °C)  Pulse:  [] 104  Resp:  [18-20] 18  SpO2:  [85 %-98 %] 97 %  BP: ()/(59-70) 97/60     Weight: 108.8 kg (239 lb 13.8 oz)  Body mass index is 34.42 kg/m².  No intake or output data in the 24 hours ending 03/09/24 1232      Physical Exam  Vitals reviewed.   Constitutional:       General: He is not in acute distress.     Appearance: He is obese.   HENT:      Nose: Nose normal.   Eyes:      Conjunctiva/sclera: Conjunctivae normal.   Cardiovascular:      Rate and Rhythm: Normal rate.   Pulmonary:      Effort: Pulmonary effort is normal. No respiratory distress.   Abdominal:      General: There is no distension.      Tenderness: There is no abdominal tenderness.   Musculoskeletal:         General: Swelling  present.      Right lower leg: Edema present.      Left lower leg: Edema present.      Comments: Bilateral lower extremity edema still present but significantly improved with IV diuresis given   Skin:     General: Skin is warm and dry.   Neurological:      Mental Status: He is alert and oriented to person, place, and time.   Psychiatric:         Mood and Affect: Mood normal.         Behavior: Behavior normal.             Significant Labs: All pertinent labs within the past 24 hours have been reviewed.  Recent Lab Results  (Last 5 results in the past 24 hours)        03/09/24  1150   03/09/24  0605   03/08/24  2033   03/08/24  1746   03/08/24  1502        POCT Glucose 222   229   274   244         Vancomycin-Trough         17.3                              Significant Imaging: I have reviewed all pertinent imaging results/findings within the past 24 hours.  X-Ray Chest 1 View   Final Result      Findings are similar to prior         Electronically signed by: Earl Mendez MD   Date:    03/08/2024   Time:    07:21      CT Chest Without Contrast   Final Result      Interval worsening from comparison CT with diffuse multifocal airspace opacification superimposed upon interstitial thickening.  Considerations are multifocal pneumonia, pulmonary edema, pulmonary hemorrhage.      New small left pleural effusion      All CT scans at this facility use dose modulation, iterative reconstruction and/or weight based dosing when appropriate to reduce radiation dose to as low as reasonably achievable.         Electronically signed by: Evens Alba MD   Date:    03/06/2024   Time:    13:06      X-Ray Chest AP Portable   Final Result      In comparison to the prior study, there is no adverse interval changes         Electronically signed by: Earl Mendez MD   Date:    03/06/2024   Time:    09:12      X-Ray Chest AP Portable   Final Result      There has been interval worsening of the appearance of the lungs. There is a moderate  amount of interstitial and alveolar opacities seen in both lungs.  This is characteristic of pulmonary edema.         Electronically signed by: Marco Elliott MD   Date:    03/03/2024   Time:    19:00      X-Ray Chest 1 View   Final Result      As above.         Electronically signed by: Spenser Stallworth   Date:    02/28/2024   Time:    11:07      X-Ray Chest AP Portable   Final Result      Worsening moderate bilateral infiltrates.  Follow-up is recommended.         Electronically signed by: Shyla Giles MD   Date:    02/27/2024   Time:    10:18      CTA Chest Non-Coronary (PE Studies)   Final Result      No pulmonary embolism.  No dissection.  Atherosclerotic changes.  Mild interstitial opacities.  Correlate clinically for element of fibrosis versus CHF.  Pulmonary micronodularity.  Recommend follow-up.      All CT scans   are performed using dose optimization techniques including the following: automated exposure control; adjustment of the mA and/or kV; use of iterative reconstruction technique.  Dose modulation was employed for ALARA by means of: Automated exposure control; adjustment of the mA and/or kV according to patient size (this includes techniques or standardized protocols for targeted exams where dose is matched to indication/reason for exam; i.e. extremities or head); and/or use of iterative reconstructive technique.         Electronically signed by: Jhoan Roman   Date:    02/26/2024   Time:    19:11      CT Head Without Contrast   Final Result      Chronic microvascular ischemic changes.  No intracranial hemorrhage.      All CT scans at this facility use dose modulation, iterative reconstruction, and/or weight based dosing when appropriate to reduce radiation dose to as low as reasonable achievable.         Electronically signed by: Earl Mendez MD   Date:    02/26/2024   Time:    10:36      X-Ray Chest AP Portable   Final Result      No acute process seen.         Electronically signed by: Earl  MD Vanessa   Date:    02/26/2024   Time:    10:02           Assessment/Plan:      * NSTEMI (non-ST elevated myocardial infarction)  -No prior hx of CAD  -Troponin max 2.789  -EKG with SR PACs and ST-Twave abnormalities  -echocardiogram 02/26/2024 with EF 55-60% with indeterminate diastolic function   -right and left heart catheterization 03/01/2024 with increased filling pressures, severe aortic stenosis,  RCA with collaterals, EF 30%  -continue medical management; continue aspirin, Lipitor, and midodrine. Lopressor held due to low BP.  -Cardiology following/managing        Acute diastolic CHF (congestive heart failure)  Patient is identified as having Diastolic (HFpEF) heart failure that is Acute. CHF is currently uncontrolled due to Pulmonary edema/pleural effusion on CXR. Latest ECHO performed and demonstrates- Results for orders placed during the hospital encounter of 02/26/24.      Echo    Interpretation Summary    Left Ventricle: The left ventricle is normal in size. There is concentric remodeling. There is normal systolic function with a visually estimated ejection fraction of 55 - 60%. There is indeterminate diastolic function.    Right Ventricle: Right ventricle was not well visualized due to poor acoustic window. Normal right ventricular cavity size. Systolic function is normal.    Pulmonary Artery: The estimated pulmonary artery systolic pressure is 37 mmHg.    IVC/SVC: Normal venous pressure at 3 mmHg.  . Continue lasix and monitor clinical status closely. Monitor on telemetry. Monitor strict Is&Os and daily weights.  Cardiology on board already for NSTEMI. Continue to stress to patient importance of self efficacy and  on diet for CHF. Last BNP reviewed- and noted below   Recent Labs   Lab 03/06/24  0450   *     -cardiac catheterization 03/01/2024 with EF 30%  -Increased lasix to 40 mg IV bid as of 3/4 due to pitting edema to BLE, dyspnea requiring O2 supplementation, and abnormal CXR  "with pulmonary edema. Lasix stopped 3/8 due to hypotension and severe AS. Use prn lasix as per cardiology.  -Fluid restrict 1200 mL.  Strict Is&Os and daily weights.   -Lopressor has been held due to relative hypotension  -CT scan of chest without contrast 3/6 with "interval worsening from comparison CT with diffuse multifocal airspace opacification superimposed upon interstitial thickening, considerations are multifocal pneumonia, pulmonary edema, pulmonary hemorrhage; new small left pleural effusion."   -chest x-ray 03/08/2024 with diffuse pulmonary infiltrates, trace pleural effusion on the left, similar to prior chest x-ray  -transfer to skilled nursing facility has been held until cardiac/pulmonary issues addressed and stable    SIRS (systemic inflammatory response syndrome)  -WBC 34.56-->11.54, leukocytosis resolved  -COVID-19 negative, influenza a/B negative, procalcitonin level 0.14-->0.12, lactic acid level 1.0, urinalysis negative, blood cultures negative  -CTA of chest 02/24/2024 with no evidence of PE, pulmonary fibrosis versus CHF findings with recommendations for follow-up imaging  -repeat CT chest 3/6 with "interval worsening from comparison CT with diffuse multifocal airspace opacification superimposed upon interstitial thickening, considerations are multifocal pneumonia, pulmonary edema, pulmonary hemorrhage; new small left pleural effusion   -patient did receive empiric IV ceftriaxone for 4 days 2/28-03/02/2024  -hematology oncology has seen the patient in consultation and feels severe leukocytosis related to recent Neulasta and acute cardiac event  -given interval worsening in CT scan, persistent leukocytosis, and underlying breast cancer, Pulmonary was consulted for further evaluation and treatment recommendations--> abnormal CT scan felt to be most likely related to pulmonary edema  -On IV cefepime and IV vancomycin day #4; vancomycin discontinued today 3/9.  -additional evaluation ordered 3/6 " with sputum culture with rare GPC, MRSA culture negative, respiratory viral panel pending, and blood cultures with no growth to date      Hypoxia  Hypoxia related to CHF exacerbation and pulmonary edema.  Prn lasix only now due to hypotension with severe AS. Continue Xopenex/Atrovent nebs q.6 hours, continuous pulse oximetry monitoring, and encourage incentive spirometry q.4 hours while awake.  Pulmonary added IV Solu-Medrol 60 mg q.6 hours as of 03/08/2024.  Aspiration precautions and speech therapy evaluation.  Stat ABG reviewed and consistent with primary metabolic alkalosis with secondary respiratory alkalosis.  Continue O2 supplementation and wean as tolerated.      Transaminitis  Transaminitis with minimal trend downward. Patient denies abdominal pain and has no tenderness to palpation on exam.  Suspect related to hepatic congestion with CHF exacerbation.     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 Mendoza at [facility], last chemo session one week prior.  Patient was seen by Hematology Oncology due to significant leukocytosis felt to be related to Neulasta and recent cardiac event.  Dr. Baugh with Hematology Oncology recommends discontinuing all Herceptin products due to cardiac deterioration.  Consider aromatase inhibitor +/- Lupron.  Follow up outpatient.    Severe aortic stenosis  Cardiology plans to discuss outpatient TAVR evaluation.  Monitor volume status and BP. Prn lasix only now.       Abnormal CT of the chest        Acute HFrEF (heart failure with reduced ejection fraction)        Normocytic anemia  Patient's anemia is currently controlled. Has not received any PRBCs to date. Etiology likely d/t chronic disease due to Malignancy  Current CBC reviewed-   Lab Results   Component Value Date    HGB 9.6 (L) 03/08/2024    HCT 29.1 (L) 03/08/2024     Monitor serial CBC and transfuse if patient becomes hemodynamically unstable,  "symptomatic or H/H drops below 7/21.    Hypomagnesemia  Patient has Abnormal Magnesium: hypomagnesemia. Will continue to monitor electrolytes closely. Will replace the affected electrolytes and repeat labs to be done after interventions completed. The patient's magnesium results have been reviewed and are listed below.  No results for input(s): "MG" in the last 24 hours.   Continue Mag-Ox 400 mg p.o. daily.    Hypokalemia  Patient has hypokalemia which is Acute on Chronic and currently uncontrolled. Most recent potassium levels reviewed-   Lab Results   Component Value Date    K 3.6 03/08/2024   Aggravated by IV Lasix diuresis.  Discontinue potassium replacement now that scheduled IV Lasix has been discontinued.  Replace as needed.  Check a.m. labs.    Elevated troponin  As above, see discussion for NSTEMI    CKD stage 3 due to type 2 diabetes mellitus  Creatine stable for now. BMP reviewed- noted Estimated Creatinine Clearance: 59.5 mL/min (based on SCr of 1.1 mg/dL). according to latest data. Based on current GFR, CKD stage is stage 3 - GFR 30-59.  Monitor UOP and serial BMP and adjust therapy as needed. Renally dose meds. Avoid nephrotoxic medications and procedures    Controlled type 2 diabetes mellitus without complication, without long-term current use of insulin  Patient's FSGs are controlled on current medication regimen.  Last A1c reviewed-   Lab Results   Component Value Date    HGBA1C 7.1 (H) 11/28/2023     Most recent fingerstick glucose reviewed-   Recent Labs   Lab 03/08/24  1746 03/08/24  2033 03/09/24  0605 03/09/24  1150   POCTGLUCOSE 244* 274* 229* 222*       Current correctional scale  Low  Maintain anti-hyperglycemic dose as follows-   Antihyperglycemics (From admission, onward)      Start     Stop Route Frequency Ordered    02/26/24 1301  insulin aspart U-100 pen 0-5 Units         -- SubQ Before meals & nightly PRN 02/26/24 1204          Hold Oral hypoglycemics while patient is in the " [facility].      VTE Risk Mitigation (From admission, onward)           Ordered     IP VTE HIGH RISK PATIENT  Once         02/26/24 1204     Place sequential compression device  Until discontinued         02/26/24 1204                    Discharge Planning   DE:      Code Status: DNR   Is the patient medically ready for discharge?:     Reason for patient still in hospital (select all that apply): Patient trending condition, Laboratory test, Treatment, and Consult recommendations  Discharge Plan A: Skilled Nursing Facility   Discharge Delays: None known at this time              Antonia Kapoor MD  Department of Hospital Medicine   [facility] ([facility])

## 2024-03-09 NOTE — ASSESSMENT & PLAN NOTE
Cardiology plans to discuss outpatient TAVR evaluation.  Monitor volume status and BP. Prn lasix only now.

## 2024-03-09 NOTE — ASSESSMENT & PLAN NOTE
Hypoxia related to CHF exacerbation and pulmonary edema.  Prn lasix only now due to hypotension with severe AS. Continue Xopenex/Atrovent nebs q.6 hours, continuous pulse oximetry monitoring, and encourage incentive spirometry q.4 hours while awake.  Pulmonary added IV Solu-Medrol 60 mg q.6 hours as of 03/08/2024.  Aspiration precautions and speech therapy evaluation.  Stat ABG reviewed and consistent with primary metabolic alkalosis with secondary respiratory alkalosis.  Continue O2 supplementation and wean as tolerated.

## 2024-03-09 NOTE — ASSESSMENT & PLAN NOTE
-Continue IV diuresis  -Avoid hypotension  -OP TAVR/balloon valvuloplasty referral    3/6/24  -Continue IV diuresis as tolerated  -Avoid hypotension, on midodrine to augment BP    03/8  Stable  OP TAVR eval    3/9  Avoid fluid overloaded and BP control

## 2024-03-09 NOTE — PLAN OF CARE
OOB UPON ARRIVAL    SIT<-->STAND SBA    INITIAL O2 SATS 92%    AMBULATED WITH RW O2 AT 5L 40' WITH LOWER EXTREMITIES SPONTANEOUSLY GIVING OUT AND PATIENT NEARLY FALLING IN THE HALLWAY. PTA ALERTED NSG WHO TRANSPORTED A CHAIR QUICKLY TO PATIENT AND UPON CHAIR ARRIVING, PATIENT STOOD USING BILATERAL LOWER EXTREMITY'S EQUALLY WITH NO ADDED SUPPORT FROM PTA. O2 SATS AT THIS TIME WERE 85%. BEFORE PTA LEFT PATIENT'S ROOM, O2 SATS WERE 92%.     BEFORE PTA LEFT LEFT THE HALLWAY OUTSIDE OF PATIENT ROOM, NSG RESPONDED TO PATIENT ROOM TO RECONNECT HIS TELEMETRY.    PATIENT APPEARS TO BE MANIPULATIVE.

## 2024-03-09 NOTE — ASSESSMENT & PLAN NOTE
Patient is identified as having Diastolic (HFpEF) heart failure that is Acute on chronic. CHF is currently controlled. Latest ECHO performed and demonstrates- Results for orders placed during the hospital encounter of 02/26/24    Echo    Interpretation Summary    Left Ventricle: The left ventricle is normal in size. There is concentric remodeling. There is normal systolic function with a visually estimated ejection fraction of 55 - 60%. There is indeterminate diastolic function.    Right Ventricle: Right ventricle was not well visualized due to poor acoustic window. Normal right ventricular cavity size. Systolic function is normal.    Pulmonary Artery: The estimated pulmonary artery systolic pressure is 37 mmHg.    IVC/SVC: Normal venous pressure at 3 mmHg.  . Monitor clinical status closely. Monitor on telemetry. Patient is on CHF pathway.  Monitor strict Is&Os and daily weights.  Place on fluid restriction of 1.5 L. Cardiology has been consulted. Continue to stress to patient importance of self efficacy and  on diet for CHF. Last BNP reviewed- and noted below   Recent Labs   Lab 03/06/24  0450   *       03/08  Volume status stable.  Keep I & O even  DASH  Fluid restriction 1.5 liters a day  Lasix as needed    3/9/24 chf compensated  Lasix as needed

## 2024-03-09 NOTE — ASSESSMENT & PLAN NOTE
Patient's FSGs are controlled on current medication regimen.  Last A1c reviewed-   Lab Results   Component Value Date    HGBA1C 7.1 (H) 11/28/2023     Most recent fingerstick glucose reviewed-   Recent Labs   Lab 03/08/24  1746 03/08/24 2033 03/09/24  0605 03/09/24  1150   POCTGLUCOSE 244* 274* 229* 222*       Current correctional scale  Low  Maintain anti-hyperglycemic dose as follows-   Antihyperglycemics (From admission, onward)    Start     Stop Route Frequency Ordered    02/26/24 1301  insulin aspart U-100 pen 0-5 Units         -- SubQ Before meals & nightly PRN 02/26/24 1204        Hold Oral hypoglycemics while patient is in the hospital.

## 2024-03-09 NOTE — CARE UPDATE
I did call and speak with patient's daughter Tiffany [phone] to give her an update on her father's most recent developments to include hypoxia, addition of nebulizer treatments and continuous pulse oximetry monitoring, and Cardiology temporarily holding Lasix due to soft blood pressure readings.

## 2024-03-09 NOTE — ASSESSMENT & PLAN NOTE
Patient is identified as having Diastolic (HFpEF) heart failure that is Acute on chronic. CHF is currently controlled. Latest ECHO performed and demonstrates- Results for orders placed during the hospital encounter of 02/26/24    Echo    Interpretation Summary    Left Ventricle: The left ventricle is normal in size. There is concentric remodeling. There is normal systolic function with a visually estimated ejection fraction of 55 - 60%. There is indeterminate diastolic function.    Right Ventricle: Right ventricle was not well visualized due to poor acoustic window. Normal right ventricular cavity size. Systolic function is normal.    Pulmonary Artery: The estimated pulmonary artery systolic pressure is 37 mmHg.    IVC/SVC: Normal venous pressure at 3 mmHg.  . Monitor clinical status closely. Monitor on telemetry. Patient is on CHF pathway.  Monitor strict Is&Os and daily weights.  Place on fluid restriction of 1.5 L. Cardiology has been consulted. Continue to stress to patient importance of self efficacy and  on diet for CHF. Last BNP reviewed- and noted below   Recent Labs   Lab 03/06/24  0450   *     03/08  Volume status stable.  Keep I & O even  DASH  Fluid restriction 1.5 liters a day  Lasix as needed

## 2024-03-09 NOTE — PLAN OF CARE
Pt oriented x4 VSS  Plan of care reviewed. pt verbalizes understanding.  Patient moving/ turning with assistance.  Patient ST on monitor  Bed low, side rails up x2, wheels locked, call light in reach.  Pt instructed to call for assistance

## 2024-03-09 NOTE — PROGRESS NOTES
[facility] - Telemetry ([facility])  Cardiology  Progress Note    Patient Name: Mark Dickerson Sr.  MRN: [id number]  Admission Date: 2/26/2024  Hospital Length of Stay: 11 days  Code Status: DNR   Attending Physician: Antonia Kapoor MD   Primary Care Physician: Tatyana Cannon MD  Expected Discharge Date:   Principal Problem:NSTEMI (non-ST elevated myocardial infarction)    Subjective:     Hospital Course:   Mr. Dickerson is an 84 year old male patient whose current medical conditions include metastatic breast cancer (on chemo), HTN, hyperlipidemia, DM type II, and CKD who presented to [facility] ED this AM due to AMS/confusion. Patient's DIL reported patient seemed disoriented and had jumbled speech when she visited him earlier this AM. He returned to baseline prior to ED arrival. He denied any associated SOB, chest pain, palpitations, near syncope, or syncope. Initial workup in ED revealed leukocytosis (WBC 14,000), hyponatremia (Na 130), hypomagnesemia (1.5), and elevated troponin (0.725>0.735) and patient was subsequently admitted for further evaluation and treatment. Cardiology consulted to assist with management. Patient seen and examined today, resting in bed. Remains chest pain free. No other CV complaints. States he felt dehydrated earlier today. No prior CV history. He reports compliance with his medications. EKG reviewed, SR with T wave inversions inferiorly and anteriorly. Limited echo and repeat troponin pending.    3/1/24-Patient seen and examined today, lying in bed. Stable overnight. No CV complaints. CP free. R/LHC today by Dr. Aguillon.    3/2/24 - s/p R/LHC - with elevated filling pressures,  RCA with collaterals - will cont med management and discuss outpt TAVR eval for severe AS    3/5/24-Patient seen and examined today, sitting up in bedside chair. Feels ok. Still with SOB/BLE edema. No CP. Heme/onc note reviewed. Needs diuresis. BP soft, BB d/c'd to allow diuresis. Discussed OP evaluation for TAVR/ballon  valvuloplasty.    3/6/24-Patient seen and examined today, sitting up in bedside chair. Doing ok. SOB stable. Diuresed well overnight. BNP still elevated. CT of chest pending.    3/7/24-Patient seen and examined today, resting in bedside chair. Still SOB, especially with exertion. CT of chest reviewed-interval worsening from comparison CT with diffuse multifocal airspace opacification superimposed upon interstitial thickening. Considerations are multifocal pneumonia, pulmonary edema, pulmonary hemorrhage. Pulmonary following, being covered with abx. Diuretics continued---limited with higher dose/aggressive diuresis due to severe AS/hypotension.    03/08/24 Cr stable and BNP remains at 500 improved volume status. Held lasix today due to soft BP. Breathing Rx started by pulm. For possible pneumonitis.     03/09/24 leg swelling improved, + PND. Continue Rx for pneumonitis per pulm service, no arrhythmia        ROS  Objective:     Vital Signs (Most Recent):  Temp: 98.1 °F (36.7 °C) (03/09/24 1100)  Pulse: 103 (03/09/24 1250)  Resp: 20 (03/09/24 1250)  BP: 97/60 (03/09/24 1100)  SpO2: 97 % (03/09/24 1250) Vital Signs (24h Range):  Temp:  [98.1 °F (36.7 °C)-98.7 °F (37.1 °C)] 98.1 °F (36.7 °C)  Pulse:  [] 103  Resp:  [18-20] 20  SpO2:  [85 %-98 %] 97 %  BP: ()/(59-70) 97/60     Weight: 108.8 kg (239 lb 13.8 oz)  Body mass index is 34.42 kg/m².     SpO2: 97 %       No intake or output data in the 24 hours ending 03/09/24 1442    Lines/Drains/Airways       Central Venous Catheter Line  Duration                  PowerPort A Cath Single Lumen 01/05/24 1415 Internal Jugular Right 64 days              Peripheral Intravenous Line  Duration                  Peripheral IV - Single Lumen 03/06/24 1705 22 G Posterior;Right Forearm 2 days                       Physical Exam  Vitals and nursing note reviewed.   Constitutional:       General: He is not in acute distress.     Appearance: Normal appearance. He is  "well-developed. He is not diaphoretic.      Comments: On supplemental O2 via NC   HENT:      Head: Normocephalic and atraumatic.   Eyes:      General:         Right eye: No discharge.         Left eye: No discharge.      Pupils: Pupils are equal, round, and reactive to light.   Cardiovascular:      Rate and Rhythm: Normal rate and regular rhythm.      Heart sounds: S1 normal and S2 normal. Murmur heard.      Harsh midsystolic murmur is present at the upper right sternal border radiating to the neck.   Pulmonary:      Effort: Pulmonary effort is normal. No respiratory distress.      Breath sounds: No wheezing.      Comments: Coarse BS  Abdominal:      General: There is no distension.   Musculoskeletal:      Right lower leg: Edema present.      Left lower leg: Edema present.   Skin:     General: Skin is warm and dry.      Findings: No erythema.   Neurological:      General: No focal deficit present.      Mental Status: He is alert and oriented to person, place, and time.   Psychiatric:         Mood and Affect: Mood normal.         Behavior: Behavior normal.            Significant Labs: ABG:   Recent Labs   Lab 03/08/24  0835   PH 7.500*   PCO2 46.7*   HCO3 36.4*   POCSATURATED 94   BE 13*   , Blood Culture: No results for input(s): "LABBLOO" in the last 48 hours., BMP:   Recent Labs   Lab 03/08/24  0546   *   *   K 3.6   CL 89*   CO2 34*   BUN 25*   CREATININE 1.1   CALCIUM 8.9   MG 1.8   , CMP   Recent Labs   Lab 03/08/24  0546   *   K 3.6   CL 89*   CO2 34*   *   BUN 25*   CREATININE 1.1   CALCIUM 8.9   ANIONGAP 12   , CBC   Recent Labs   Lab 03/08/24  0546   WBC 11.54   HGB 9.6*   HCT 29.1*      , INR No results for input(s): "INR", "PROTIME" in the last 48 hours., Lipid Panel No results for input(s): "CHOL", "HDL", "LDLCALC", "TRIG", "CHOLHDL" in the last 48 hours., and Troponin No results for input(s): "TROPONINI" in the last 48 hours.    Significant Imaging: EKG:    Assessment " and Plan:       * NSTEMI (non-ST elevated myocardial infarction)  -See plan under elevated troponin    Coronary artery disease of native artery of native heart with stable angina pectoris  Continue asa and statin  f/u h/o statin intolerance    Abnormal CT of the chest  -Pulmonary following, appreciate assistance    Hypoxia  -CT of chest reviewed  -Pulmonary following, on abx   -Continue IV diuresis-limited with increasing dosage given underling severe AS/hypotension    Acute diastolic CHF (congestive heart failure)  Patient is identified as having Diastolic (HFpEF) heart failure that is Acute on chronic. CHF is currently controlled. Latest ECHO performed and demonstrates- Results for orders placed during the hospital encounter of 02/26/24    Echo    Interpretation Summary    Left Ventricle: The left ventricle is normal in size. There is concentric remodeling. There is normal systolic function with a visually estimated ejection fraction of 55 - 60%. There is indeterminate diastolic function.    Right Ventricle: Right ventricle was not well visualized due to poor acoustic window. Normal right ventricular cavity size. Systolic function is normal.    Pulmonary Artery: The estimated pulmonary artery systolic pressure is 37 mmHg.    IVC/SVC: Normal venous pressure at 3 mmHg.  . Monitor clinical status closely. Monitor on telemetry. Patient is on CHF pathway.  Monitor strict Is&Os and daily weights.  Place on fluid restriction of 1.5 L. Cardiology has been consulted. Continue to stress to patient importance of self efficacy and  on diet for CHF. Last BNP reviewed- and noted below   Recent Labs   Lab 03/06/24  0450   *       03/08  Volume status stable.  Keep I & O even  DASH  Fluid restriction 1.5 liters a day  Lasix as needed    3/9/24 chf compensated  Lasix as needed    Abnormal EKG  -See plan under elevated troponin    SIRS (systemic inflammatory response syndrome)  -Mgmt as per primary team  -BC show  NGTD    3/9/24  Rx per HM and pulm service    Hypomagnesemia  -Repletion as per primary team    Hypokalemia  -Repletion as per primary team      Elevated troponin  -Troponin 0.725>0.735, continue to trend  -No CP symptoms/SOB/angina  -No prior CV history  -Continue ASA, heparin gtt, statin  -EKG reviewed, T wave inversions inferiorly and laterally  -Limited TTE pending  -Will consider ischemic workup with MPI stress test this admission    2/27/24  -Troponin bumped to 1.2 overnight  -Remains CP free  -Continue ASA, heparin gtt, statin  -TTE with normal EF  -LHC planned once more stable respiratory wise/infection ruled out    2/28/24  -CP free  -Continue ASA, heparin gtt, statin  -BB added  -LHC deferred for now given leukocytosis/SOB    2/29/24  -Stable  -WBC count improving  -Continue BB, ASA, heparin gtt, statin  -Tentative plans for LHC tmw, keep NPO after MN    3/1/24  -Stable, no CP  -Continue BB, ASA, heparin gtt, statin  -R/LHC today by Dr. Aguillon. All risks, benefits, and treatment alternatives explained to patient in detail. All questions answered. He has agreed to proceed.    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, estrogen receptor positive  -Mgmt as per heme/onc    Severe aortic stenosis  -Continue IV diuresis  -Avoid hypotension  -OP TAVR/balloon valvuloplasty referral    3/6/24  -Continue IV diuresis as tolerated  -Avoid hypotension, on midodrine to augment BP    03/8  Stable  OP TAVR eval    3/9  Avoid fluid overloaded and BP control    CKD stage 3 due to type 2 diabetes mellitus  -Monitor        VTE Risk Mitigation (From admission, onward)           Ordered     IP VTE HIGH RISK PATIENT  Once         02/26/24 1204     Place sequential compression device  Until discontinued         02/26/24 1204                    Juwan  MD Pawan  Cardiology  [facility] - Telemetry ([facility])

## 2024-03-09 NOTE — SUBJECTIVE & OBJECTIVE
Interval History:  Patient feels okay.  He is resting comfortably in a recliner at the time of my exam.  He denies chest pain or palpitations.  He did have an episode where he desaturated to 85% on 3 L nasal cannula and is now 92% on 4 L nasal cannula.    Review of Systems   Constitutional:  Negative for chills and fever.   Respiratory:  Positive for shortness of breath.    Cardiovascular:  Positive for leg swelling (improving). Negative for chest pain and palpitations.   Gastrointestinal:  Negative for nausea and vomiting.   All other systems reviewed and are negative.    Objective:     Vital Signs (Most Recent):  Temp: 98.1 °F (36.7 °C) (03/09/24 1100)  Pulse: 104 (03/09/24 1100)  Resp: 18 (03/09/24 1100)  BP: 97/60 (03/09/24 1100)  SpO2: 97 % (03/09/24 1100) Vital Signs (24h Range):  Temp:  [98.1 °F (36.7 °C)-98.7 °F (37.1 °C)] 98.1 °F (36.7 °C)  Pulse:  [] 104  Resp:  [18-20] 18  SpO2:  [85 %-98 %] 97 %  BP: ()/(59-70) 97/60     Weight: 108.8 kg (239 lb 13.8 oz)  Body mass index is 34.42 kg/m².  No intake or output data in the 24 hours ending 03/09/24 1232      Physical Exam  Vitals reviewed.   Constitutional:       General: He is not in acute distress.     Appearance: He is obese.   HENT:      Nose: Nose normal.   Eyes:      Conjunctiva/sclera: Conjunctivae normal.   Cardiovascular:      Rate and Rhythm: Normal rate.   Pulmonary:      Effort: Pulmonary effort is normal. No respiratory distress.   Abdominal:      General: There is no distension.      Tenderness: There is no abdominal tenderness.   Musculoskeletal:         General: Swelling present.      Right lower leg: Edema present.      Left lower leg: Edema present.      Comments: Bilateral lower extremity edema still present but significantly improved with IV diuresis given   Skin:     General: Skin is warm and dry.   Neurological:      Mental Status: He is alert and oriented to person, place, and time.   Psychiatric:         Mood and Affect:  Mood normal.         Behavior: Behavior normal.             Significant Labs: All pertinent labs within the past 24 hours have been reviewed.  Recent Lab Results  (Last 5 results in the past 24 hours)        03/09/24  1150   03/09/24  0605   03/08/24  2033   03/08/24  1746   03/08/24  1502        POCT Glucose 222   229   274   244         Vancomycin-Trough         17.3                              Significant Imaging: I have reviewed all pertinent imaging results/findings within the past 24 hours.  X-Ray Chest 1 View   Final Result      Findings are similar to prior         Electronically signed by: Earl Mendez MD   Date:    03/08/2024   Time:    07:21      CT Chest Without Contrast   Final Result      Interval worsening from comparison CT with diffuse multifocal airspace opacification superimposed upon interstitial thickening.  Considerations are multifocal pneumonia, pulmonary edema, pulmonary hemorrhage.      New small left pleural effusion      All CT scans at this facility use dose modulation, iterative reconstruction and/or weight based dosing when appropriate to reduce radiation dose to as low as reasonably achievable.         Electronically signed by: Evens Alba MD   Date:    03/06/2024   Time:    13:06      X-Ray Chest AP Portable   Final Result      In comparison to the prior study, there is no adverse interval changes         Electronically signed by: Earl Mendez MD   Date:    03/06/2024   Time:    09:12      X-Ray Chest AP Portable   Final Result      There has been interval worsening of the appearance of the lungs. There is a moderate amount of interstitial and alveolar opacities seen in both lungs.  This is characteristic of pulmonary edema.         Electronically signed by: Marco Elliott MD   Date:    03/03/2024   Time:    19:00      X-Ray Chest 1 View   Final Result      As above.         Electronically signed by: Spenser Stallworth   Date:    02/28/2024   Time:    11:07      X-Ray Chest AP Portable    Final Result      Worsening moderate bilateral infiltrates.  Follow-up is recommended.         Electronically signed by: Shyla Giles MD   Date:    02/27/2024   Time:    10:18      CTA Chest Non-Coronary (PE Studies)   Final Result      No pulmonary embolism.  No dissection.  Atherosclerotic changes.  Mild interstitial opacities.  Correlate clinically for element of fibrosis versus CHF.  Pulmonary micronodularity.  Recommend follow-up.      All CT scans   are performed using dose optimization techniques including the following: automated exposure control; adjustment of the mA and/or kV; use of iterative reconstruction technique.  Dose modulation was employed for ALARA by means of: Automated exposure control; adjustment of the mA and/or kV according to patient size (this includes techniques or standardized protocols for targeted exams where dose is matched to indication/reason for exam; i.e. extremities or head); and/or use of iterative reconstructive technique.         Electronically signed by: Jhoan Roman   Date:    02/26/2024   Time:    19:11      CT Head Without Contrast   Final Result      Chronic microvascular ischemic changes.  No intracranial hemorrhage.      All CT scans at this facility use dose modulation, iterative reconstruction, and/or weight based dosing when appropriate to reduce radiation dose to as low as reasonable achievable.         Electronically signed by: Earl Mendez MD   Date:    02/26/2024   Time:    10:36      X-Ray Chest AP Portable   Final Result      No acute process seen.         Electronically signed by: Earl Mendez MD   Date:    02/26/2024   Time:    10:02

## 2024-03-09 NOTE — PLAN OF CARE
A225/A225 JOHANNY Dickerson . is a 84 y.o.male admitted on 2/26/2024 for NSTEMI (non-ST elevated myocardial infarction)   Code Status: DNR MRN: [id number]   Review of patient's allergies indicates:   Allergen Reactions    Grass pollen-anai grass standard      Past Medical History:   Diagnosis Date    Chest pain 2/26/2024    CKD stage 3 due to type 2 diabetes mellitus 2/18/2021    Coronary artery disease of native artery of native heart with stable angina pectoris 3/8/2024    DM (diabetes mellitus) 2014    BS didn't check 12/11/2019    DM (diabetes mellitus) 2014    BS didn't check 05/05/2022    Foreign body, eye     right eye    Hyperlipidemia     Hypertension     Need for shingles vaccine 11/20/2019    Normocytic anemia 3/4/2024    NSTEMI (non-ST elevated myocardial infarction) 2/27/2024    Pneumonia     Primary osteoarthritis of right knee 10/29/2021    Severe obesity (BMI 35.0-35.9 with comorbidity) 7/10/2019    Type 2 diabetes mellitus 2014    BS didn't check 12/12/2018      PRN meds    sodium chloride 0.9%, , Continuous PRN  acetaminophen, 650 mg, Q4H PRN  acetaminophen, 650 mg, Q4H PRN  cyclobenzaprine, 10 mg, TID PRN  dextrose 10%, 12.5 g, PRN  dextrose 10%, 25 g, PRN  glucagon (human recombinant), 1 mg, PRN  glucose, 16 g, PRN  glucose, 24 g, PRN  HYDROcodone-acetaminophen, 1 tablet, Q6H PRN  insulin aspart U-100, 0-5 Units, QID (AC + HS) PRN  melatonin, 6 mg, Nightly PRN  naloxone, 0.02 mg, PRN  ondansetron, 8 mg, Q8H PRN  ondansetron, 4 mg, Q8H PRN  sodium chloride, 1 spray, PRN  sodium chloride 0.9%, 10 mL, Q12H PRN      Chart check completed. Will continue plan of care.      Orientation: oriented x 4  [place] Coma Scale Score: 15     Lead Monitored: Lead II Rhythm: sinus tachycardia Frequency/Ectopy: Bigeminy, PVCs, multifocal PVCs  Cardiac/Telemetry Box Number: [id number]  VTE Required Core Measure: Patient refused interventions Last Bowel Movement: 03/08/24  Diet Cardiac Fluid - 1200mL; Standard  Tray  Voiding Characteristics: external catheter, urgency (during sleeping hours d/t urgency and SOBE)  Sunil Score: 19  Fall Risk Score: 16  Accucheck []   Freq?      Lines/Drains/Airways       Central Venous Catheter Line  Duration                  PowerPort A Cath Single Lumen 01/05/24 1415 Internal Jugular Right 64 days              Peripheral Intravenous Line  Duration                  Peripheral IV - Single Lumen 03/06/24 1705 22 G Posterior;Right Forearm 2 days

## 2024-03-09 NOTE — PT/OT/SLP PROGRESS
Physical Therapy  Treatment    Mark Dickerson Sr.   MRN: [id number]   Admitting Diagnosis: NSTEMI (non-ST elevated myocardial infarction)       PT Start Time: 1015     PT Stop Time: 1040    PT Total Time (min): 25 min       Billable Minutes:  Gait Training 15 and Therapeutic Activity 10    Treatment Type: Treatment  PT/PTA: PTA     Number of PTA visits since last PT visit: 1       General Precautions: Standard, fall  Orthopedic Precautions: N/A  Braces: N/A  Respiratory Status: Nasal cannula, flow 5 L/min    Spiritual, Cultural Beliefs, Yarsanism Practices, Values that Affect Care: no    Subjective:  Communicated with VERNON prior to session.      Pain/Comfort  Pain Rating 1: 0/10  Pain Rating Post-Intervention 1: 0/10    Treatment and Education:    OOB UPON ARRIVAL     SIT<-->STAND SBA     INITIAL O2 SATS 92%     AMBULATED WITH RW O2 AT 5L 40' WITH LOWER EXTREMITIES SPONTANEOUSLY GIVING OUT AND PATIENT NEARLY FALLING IN THE HALLWAY. PTA ALERTED NSG WHO TRANSPORTED A CHAIR QUICKLY TO PATIENT AND UPON CHAIR ARRIVING, PATIENT STOOD USING BILATERAL LOWER EXTREMITY'S EQUALLY WITH NO ADDED SUPPORT FROM PTA. O2 SATS AT THIS TIME WERE 85%. BEFORE PTA LEFT PATIENT'S ROOM, O2 SATS WERE 92%.      BEFORE PTA LEFT LEFT THE HALLWAY OUTSIDE OF PATIENT ROOM, NSG RESPONDED TO PATIENT ROOM TO RECONNECT HIS TELEMETRY.     PATIENT APPEARS TO BE MANIPULATIVE.             AM-PAC 6 CLICK MOBILITY  How much help from another person does this patient currently need?   1 = Unable, Total/Dependent Assistance  2 = A lot, Maximum/Moderate Assistance  3 = A little, Minimum/Contact Guard/Supervision  4 = None, Modified Alexander/Independent    Turning over in bed (including adjusting bedclothes, sheets and blankets)?:  (NA)  Sitting down on and standing up from a chair with arms (e.g., wheelchair, bedside commode, etc.):  (NA)  Moving from lying on back to sitting on the side of the bed?:  (NA)  Moving to and from a bed to a chair (including a  wheelchair)?:  (NA)  Need to walk in hospital room?: 3  Climbing 3-5 steps with a railing?: 1    AM-PAC Raw Score CMS G-Code Modifier Level of Impairment Assistance   6 % Total / Unable   7 - 9 CM 80 - 100% Maximal Assist   10 - 14 CL 60 - 80% Moderate Assist   15 - 19 CK 40 - 60% Moderate Assist   20 - 22 CJ 20 - 40% Minimal Assist   23 CI 1-20% SBA / CGA   24 CH 0% Independent/ Mod I     Patient left up in chair with all lines intact, call button in reach, chair alarm on, and NSG notified.    Assessment:  Mark Dickerson Sr. is a 84 y.o. male with a medical diagnosis of NSTEMI (non-ST elevated myocardial infarction) and presents with .    Rehab identified problem list/impairments: weakness, decreased safety awareness, impaired endurance, impaired self care skills, impaired functional mobility, decreased upper extremity function, decreased lower extremity function, gait instability    Rehab potential is fair.    Activity tolerance: Poor    Discharge recommendations: No Therapy Indicated      Barriers to discharge:      Equipment recommendations: walker, rolling, wheelchair, bedside commode, bath bench     GOALS:   Multidisciplinary Problems       Physical Therapy Goals          Problem: Physical Therapy    Goal Priority Disciplines Outcome Goal Variances Interventions   Physical Therapy Goal     PT, PT/OT Ongoing, Progressing     Description: Pt will perform bed mobility with SPV in order to participate in EOB activity.  Pt will perform transfers with SPV in order to participate in OOB activity.  Pt will ambulate 150 ft SBA with LRAD in order to participate in daily tasks.                        PLAN:    Patient to be seen 3 x/week to address the above listed problems via gait training, therapeutic activities, therapeutic exercises  Plan of Care expires: 03/12/24  Plan of Care reviewed with: patient, family         03/09/2024

## 2024-03-09 NOTE — SUBJECTIVE & OBJECTIVE
ROS  Objective:     Vital Signs (Most Recent):  Temp: 98.1 °F (36.7 °C) (03/09/24 1100)  Pulse: 103 (03/09/24 1250)  Resp: 20 (03/09/24 1250)  BP: 97/60 (03/09/24 1100)  SpO2: 97 % (03/09/24 1250) Vital Signs (24h Range):  Temp:  [98.1 °F (36.7 °C)-98.7 °F (37.1 °C)] 98.1 °F (36.7 °C)  Pulse:  [] 103  Resp:  [18-20] 20  SpO2:  [85 %-98 %] 97 %  BP: ()/(59-70) 97/60     Weight: 108.8 kg (239 lb 13.8 oz)  Body mass index is 34.42 kg/m².     SpO2: 97 %       No intake or output data in the 24 hours ending 03/09/24 1442    Lines/Drains/Airways       Central Venous Catheter Line  Duration                  PowerPort A Cath Single Lumen 01/05/24 1415 Internal Jugular Right 64 days              Peripheral Intravenous Line  Duration                  Peripheral IV - Single Lumen 03/06/24 1705 22 G Posterior;Right Forearm 2 days                       Physical Exam  Vitals and nursing note reviewed.   Constitutional:       General: He is not in acute distress.     Appearance: Normal appearance. He is well-developed. He is not diaphoretic.      Comments: On supplemental O2 via NC   HENT:      Head: Normocephalic and atraumatic.   Eyes:      General:         Right eye: No discharge.         Left eye: No discharge.      Pupils: Pupils are equal, round, and reactive to light.   Cardiovascular:      Rate and Rhythm: Normal rate and regular rhythm.      Heart sounds: S1 normal and S2 normal. Murmur heard.      Harsh midsystolic murmur is present at the upper right sternal border radiating to the neck.   Pulmonary:      Effort: Pulmonary effort is normal. No respiratory distress.      Breath sounds: No wheezing.      Comments: Coarse BS  Abdominal:      General: There is no distension.   Musculoskeletal:      Right lower leg: Edema present.      Left lower leg: Edema present.   Skin:     General: Skin is warm and dry.      Findings: No erythema.   Neurological:      General: No focal deficit present.      Mental  "Status: He is alert and oriented to person, place, and time.   Psychiatric:         Mood and Affect: Mood normal.         Behavior: Behavior normal.            Significant Labs: ABG:   Recent Labs   Lab 03/08/24  0835   PH 7.500*   PCO2 46.7*   HCO3 36.4*   POCSATURATED 94   BE 13*   , Blood Culture: No results for input(s): "LABBLOO" in the last 48 hours., BMP:   Recent Labs   Lab 03/08/24  0546   *   *   K 3.6   CL 89*   CO2 34*   BUN 25*   CREATININE 1.1   CALCIUM 8.9   MG 1.8   , CMP   Recent Labs   Lab 03/08/24  0546   *   K 3.6   CL 89*   CO2 34*   *   BUN 25*   CREATININE 1.1   CALCIUM 8.9   ANIONGAP 12   , CBC   Recent Labs   Lab 03/08/24  0546   WBC 11.54   HGB 9.6*   HCT 29.1*      , INR No results for input(s): "INR", "PROTIME" in the last 48 hours., Lipid Panel No results for input(s): "CHOL", "HDL", "LDLCALC", "TRIG", "CHOLHDL" in the last 48 hours., and Troponin No results for input(s): "TROPONINI" in the last 48 hours.    Significant Imaging: EKG:    "

## 2024-03-09 NOTE — PROGRESS NOTES
Pharmacokinetic Assessment Sign Off: IV Vancomycin     Therapy with Vancomycin complete and/or consult discontinued by provider.  Pharmacy will sign off, please re-consult as needed.     Thank you for allowing us to participate in this patient's care.      Calista Garces, PharmD 03/09/2024 7:40 AM

## 2024-03-09 NOTE — PROGRESS NOTES
Hematology/Oncology  Progress Note   Admission Date: 2/26/2024  Hospital Length of Stay: 11  Code Status: DNR  Attending Provider:    Consulting Provider: Shaina Garg MD  Primary Care Physician: Tatyana Cannon MD   Principal Problem: NSTEMI (non-ST elevated myocardial infarction)    Consult Requested By: Judah Oden MD  Reason for Consult: Leukocytosis, unclear etiology, malignant breast cancer on chemotherapy  Subjective   Patient sitting up comfortable at the bedside.  He has no acute complaints.  We discussed potential treatment changes and he is understanding.  He is still dealing with the volume overload and cough.  Cardiology is following.    Review of Systems   Constitutional:  Negative for chills, fever, malaise/fatigue and weight loss.   Respiratory:  Positive for cough. Negative for shortness of breath.    Cardiovascular:  Negative for chest pain.   Gastrointestinal:  Negative for abdominal pain, diarrhea, nausea and vomiting.   Neurological:  Negative for headaches.     HPI   Mark Dickerson Sr. is a 84 y.o. male with pertinent history of stage IV triple positive breast cancer and difficulty tolerating THP who was admitted to [facility] for NSTEMI.  He has had extensive cardiac workup revealing decreased EF and AS.  Hospital course has been complicated by volume overload, hypotension, PNA and ?pneumonitis.    Objective   Continuous Infusions:   sodium chloride 0.9% 100 mL (03/08/24 0510)     Scheduled Meds:   anastrozole  1 mg Oral Daily    aspirin  81 mg Oral Daily    atorvastatin  20 mg Oral QHS    ceFEPime IV (PEDS and ADULTS)  2 g Intravenous Q12H    fluticasone propionate  2 spray Each Nostril Daily    ipratropium  0.5 mg Nebulization Q6H    levalbuterol  1.2495 mg Nebulization Q6H    magnesium oxide  400 mg Oral Daily    methylPREDNISolone sodium succinate injection  60 mg Intravenous Q6H    midodrine  10 mg Oral TID WM    potassium chloride  20 mEq Oral BID     PRN Meds:sodium  "chloride 0.9%, acetaminophen, acetaminophen, cyclobenzaprine, dextrose 10%, dextrose 10%, glucagon (human recombinant), glucose, glucose, HYDROcodone-acetaminophen, insulin aspart U-100, melatonin, naloxone, ondansetron, ondansetron, sodium chloride, sodium chloride 0.9%      Vital Signs Range (Last 24H):  Temp:  [98.1 °F (36.7 °C)-98.7 °F (37.1 °C)]   Pulse:  []   Resp:  [18-20]   BP: ()/(52-70)   SpO2:  [85 %-98 %]     Physical Exam  Constitutional:       General: He is not in acute distress.     Appearance: He is not toxic-appearing or diaphoretic.   HENT:      Head: Normocephalic.   Eyes:      Extraocular Movements: Extraocular movements intact.      Conjunctiva/sclera: Conjunctivae normal.   Cardiovascular:      Rate and Rhythm: Normal rate.   Pulmonary:      Effort: Pulmonary effort is normal.      Comments: NC Oxygen  Chest:      Comments: Left breast mass much improved in appearance; smaller, less fungating  Musculoskeletal:      Right lower leg: No edema.      Left lower leg: No edema.   Neurological:      General: No focal deficit present.      Mental Status: He is alert and oriented to person, place, and time.   Psychiatric:         Mood and Affect: Mood normal.         Behavior: Behavior normal.       Laboratory   CBC with Differential:  No results for input(s): "WBC", "NEUTROPCT", "LYMPH", "MONOPCT", "EOSPCT", "BASOPHIL", "RBC", "HCT", "HGB", "MCV", "MCH", "RDW", "PLT", "MPV" in the last 24 hours.    Invalid input(s): "MCMC"  CMP:  No results for input(s): "GLU", "CALCIUM", "ALBUMIN", "PROT", "NA", "K", "CO2", "CL", "BUN", "CREATININE", "ALKPHOS", "ALT", "AST", "BILITOT" in the last 24 hours.  BMP: No results for input(s): "GLU", "CALCIUM", "NA", "K", "CO2", "CL", "BUN", "CREATININE" in the last 24 hours.  LFTs: No results for input(s): "ALT", "AST", "ALKPHOS", "BILITOT", "PROT", "ALBUMIN" in the last 24 hours.  Haptoglobin: No results for input(s): "HAPTOGLOBIN" in the last 24 hours.  Tumor " "Markers: No results for input(s): "PSA", "CEA", "", "AFPTM", "IU6355", "" in the last 24 hours.    Invalid input(s): "ALGTM"  Immunology: No results for input(s): "SPEP", "ARIEL", "PRIYA", "FREELAMBDALI" in the last 24 hours.  Coagulation: No results for input(s): "PT", "INR", "APTT" in the last 24 hours.    Microbiology Results (last 7 days)       Procedure Component Value Units Date/Time    Blood culture [[id number]] Collected: 03/06/24 1533    Order Status: Completed Specimen: Blood Updated: 03/08/24 2322     Blood Culture, Routine No Growth to date      No Growth to date      No Growth to date    Culture, Respiratory with Gram Stain [[id number]] Collected: 03/07/24 0010    Order Status: Completed Specimen: Respiratory from Sputum Updated: 03/08/24 1022     Respiratory Culture Insufficient incubation, culture in progress     Gram Stain (Respiratory) <10 epithelial cells per low power field.     Gram Stain (Respiratory) Rare WBC's     Gram Stain (Respiratory) Rare Gram positive cocci    Culture, MRSA [[id number]] Collected: 03/06/24 1558    Order Status: Completed Specimen: MRSA source from Nares, Right Updated: 03/08/24 1017     MRSA Surveillance Screen No MRSA isolated    Respiratory Infection Panel (PCR), Nasopharyngeal [[id number]]     Order Status: No result Specimen: Nasopharyngeal Swab     Culture, MRSA [[id number]]     Order Status: Canceled Specimen: MRSA source from Nares, Left     Blood culture [[id number]] Collected: 02/27/24 0933    Order Status: Completed Specimen: Blood from Peripheral, Antecubital, Left Updated: 03/03/24 1812     Blood Culture, Routine No growth after 5 days.    Blood culture [[id number]] Collected: 02/27/24 0933    Order Status: Completed Specimen: Blood from Peripheral, Antecubital, Right Updated: 03/03/24 1812     Blood Culture, Routine No growth after 5 days.            Imaging   CT CHEST WITHOUT CONTRAST 03/06/2024     CLINICAL HISTORY:  abnormal CXR;   "   TECHNIQUE:  Axial images performed through the chest without IV contrast.  Sagittal and coronal reformats obtained.     COMPARISON:  Chest x-ray 03/06/2024.  CT chest 226 24     FINDINGS:  Heart: No pericardial effusions.  Coronary arterial calcifications.     Mediastinum: Increased number of small subcentimeter mediastinal lymph nodes.     Vasculature: Mild aortic atherosclerosis.  Right chest port     Lungs: Small left pleural effusion.  Extensive diffuse multifocal areas of airspace opacification throughout the lungs with some areas of denser consolidation some areas more ground-glass in nature.  This is superimposed upon chronic interstitial thickening.     Esophagus: Unremarkable.     Upper Abdomen: No abnormality of the partially imaged upper abdomen.     Bones: No acute fracture. Stable degenerative change.     Increased number of small left axillary lymph nodes with some mildly enlarged.  These appear similar.  Largest appears to have a biopsy clip within it.  There is also asymmetric nodularity in the retroareolar left breast with a biopsy clip and some skin thickening.  Findings are stable from the comparison CT chest and have been previously evaluated with  diagnostic examination 12/01/2023.     Impression:     Interval worsening from comparison CT with diffuse multifocal airspace opacification superimposed upon interstitial thickening.  Considerations are multifocal pneumonia, pulmonary edema, pulmonary hemorrhage.     New small left pleural effusion    Assessment and Plan:   Leukocytosis 2/2 SIRS, Resolved  Increased WBC likely 2/2 to Neulasta and cardiac events       Stage IV Male Breast Cancer, +/+/+Malignant neoplasm involving both nipple and areol  12/01/23 MMG showed left breast mass, 5 cm, at the retroareolar anterior position and abnormal intramammary lymph node versus 2nd site of malignancy cassidy axillary tail   s/p L breast biopsy 12/14/2023:  Positive for malignancy; left axillary lymph node  positive for malignancy  PET CT 12/29/2023 unfortunately showed mediastinal and hilar lymphadenopathy suspicious for metastatic disease  EUS with biopsy 01/11/2024 results positive for metastatic adenocarcinoma of the breast  Genetic testing pending  Outpatient treatment regimen: THP + Arimidex (01/30/2024 - )   C1 THP 01/30/24:  Patient with reaction to Herceptin with Rigors; Demerol administered with relief. He then had tremors/jitteriness to Dexamethasone. Treatment aborted.  Taxotere given the next day.  C2 administered without Kanjinti and dose reduced docetaxel at 20% due to toxicity  Consulting oncologist held extensive discussion with cardiology 03/05/2024 regarding cardiac function and that at this time all Herceptin products should be discontinued until cardiac dysfunction is resolved.   Extensive discussion held with Mr. Dickerson this morning regarding future treatment and awaiting full cardiac workup however likely will discontinue anti-HER2 therapies.  We may continue antiestrogen therapy and possible chemotherapy.  He has been compliant with Arimidex outpatient.        Current Hospital Problems  HFrEF  HFpEF  Severe AS  Hypoxia  Hypotension  CKD III  DM II  NSTEMI  Sepsis 2/2 PNA      I appreciate the opportunity to participate in this patient's care. Please do not hesitate to contact me with any questions or concerns.     Shaina Garg MD  Hematology/Oncology

## 2024-03-09 NOTE — ASSESSMENT & PLAN NOTE
"Patient is identified as having Diastolic (HFpEF) heart failure that is Acute. CHF is currently uncontrolled due to Pulmonary edema/pleural effusion on CXR. Latest ECHO performed and demonstrates- Results for orders placed during the hospital encounter of 02/26/24.      Echo    Interpretation Summary    Left Ventricle: The left ventricle is normal in size. There is concentric remodeling. There is normal systolic function with a visually estimated ejection fraction of 55 - 60%. There is indeterminate diastolic function.    Right Ventricle: Right ventricle was not well visualized due to poor acoustic window. Normal right ventricular cavity size. Systolic function is normal.    Pulmonary Artery: The estimated pulmonary artery systolic pressure is 37 mmHg.    IVC/SVC: Normal venous pressure at 3 mmHg.  . Continue lasix and monitor clinical status closely. Monitor on telemetry. Monitor strict Is&Os and daily weights.  Cardiology on board already for NSTEMI. Continue to stress to patient importance of self efficacy and  on diet for CHF. Last BNP reviewed- and noted below   Recent Labs   Lab 03/06/24  0450   *     -cardiac catheterization 03/01/2024 with EF 30%  -Increased lasix to 40 mg IV bid as of 3/4 due to pitting edema to BLE, dyspnea requiring O2 supplementation, and abnormal CXR with pulmonary edema. Lasix stopped 3/8 due to hypotension and severe AS. Use prn lasix as per cardiology.  -Fluid restrict 1200 mL.  Strict Is&Os and daily weights.   -Lopressor has been held due to relative hypotension  -CT scan of chest without contrast 3/6 with "interval worsening from comparison CT with diffuse multifocal airspace opacification superimposed upon interstitial thickening, considerations are multifocal pneumonia, pulmonary edema, pulmonary hemorrhage; new small left pleural effusion."   -chest x-ray 03/08/2024 with diffuse pulmonary infiltrates, trace pleural effusion on the left, similar to prior chest " x-ray  -transfer to skilled nursing facility has been held until cardiac/pulmonary issues addressed and stable

## 2024-03-09 NOTE — SUBJECTIVE & OBJECTIVE
ROS  Objective:     Vital Signs (Most Recent):  Temp: 98.2 °F (36.8 °C) (03/08/24 2030)  Pulse: 95 (03/08/24 2030)  Resp: 18 (03/08/24 2030)  BP: 108/60 (03/08/24 2030)  SpO2: 97 % (03/08/24 2030) Vital Signs (24h Range):  Temp:  [98 °F (36.7 °C)-98.7 °F (37.1 °C)] 98.2 °F (36.8 °C)  Pulse:  [] 95  Resp:  [18-20] 18  SpO2:  [87 %-98 %] 97 %  BP: ()/(52-65) 108/60     Weight: 108.8 kg (239 lb 13.8 oz)  Body mass index is 34.42 kg/m².     SpO2: 97 %         Intake/Output Summary (Last 24 hours) at 3/8/2024 2113  Last data filed at 3/8/2024 0300  Gross per 24 hour   Intake --   Output 450 ml   Net -450 ml       Lines/Drains/Airways       Central Venous Catheter Line  Duration                  PowerPort A Cath Single Lumen 01/05/24 1415 Internal Jugular Right 63 days              Peripheral Intravenous Line  Duration                  Peripheral IV - Single Lumen 03/06/24 1705 22 G Posterior;Right Forearm 2 days                       Physical Exam  Vitals and nursing note reviewed.   Constitutional:       General: He is not in acute distress.     Appearance: Normal appearance. He is well-developed. He is not diaphoretic.      Comments: On supplemental O2 via NC   HENT:      Head: Normocephalic and atraumatic.   Eyes:      General:         Right eye: No discharge.         Left eye: No discharge.      Pupils: Pupils are equal, round, and reactive to light.   Cardiovascular:      Rate and Rhythm: Normal rate and regular rhythm.      Heart sounds: S1 normal and S2 normal. Murmur heard.      Harsh midsystolic murmur is present at the upper right sternal border radiating to the neck.   Pulmonary:      Effort: Pulmonary effort is normal. No respiratory distress.      Breath sounds: No wheezing.      Comments: Coarse BS  Abdominal:      General: There is no distension.   Musculoskeletal:      Right lower leg: Edema present.      Left lower leg: Edema present.   Skin:     General: Skin is warm and dry.       "Findings: No erythema.   Neurological:      General: No focal deficit present.      Mental Status: He is alert and oriented to person, place, and time.   Psychiatric:         Mood and Affect: Mood normal.         Behavior: Behavior normal.            Significant Labs: ABG:   Recent Labs   Lab 03/08/24  0835   PH 7.500*   PCO2 46.7*   HCO3 36.4*   POCSATURATED 94   BE 13*   , Blood Culture: No results for input(s): "LABBLOO" in the last 48 hours., BMP:   Recent Labs   Lab 03/07/24  0557 03/08/24  0546   * 178*   * 135*   K 3.2* 3.6   CL 90* 89*   CO2 29 34*   BUN 25* 25*   CREATININE 1.1 1.1   CALCIUM 8.8 8.9   MG 1.4* 1.8   , CMP   Recent Labs   Lab 03/07/24  0557 03/08/24  0546   * 135*   K 3.2* 3.6   CL 90* 89*   CO2 29 34*   * 178*   BUN 25* 25*   CREATININE 1.1 1.1   CALCIUM 8.8 8.9   ANIONGAP 14 12   , CBC   Recent Labs   Lab 03/08/24  0546   WBC 11.54   HGB 9.6*   HCT 29.1*      , INR No results for input(s): "INR", "PROTIME" in the last 48 hours., and Lipid Panel No results for input(s): "CHOL", "HDL", "LDLCALC", "TRIG", "CHOLHDL" in the last 48 hours.    Significant Imaging: EKG:    "

## 2024-03-09 NOTE — ASSESSMENT & PLAN NOTE
"Patient has Abnormal Magnesium: hypomagnesemia. Will continue to monitor electrolytes closely. Will replace the affected electrolytes and repeat labs to be done after interventions completed. The patient's magnesium results have been reviewed and are listed below.  No results for input(s): "MG" in the last 24 hours.   Continue Mag-Ox 400 mg p.o. daily.  "

## 2024-03-09 NOTE — ASSESSMENT & PLAN NOTE
-Continue IV diuresis  -Avoid hypotension  -OP TAVR/balloon valvuloplasty referral    3/6/24  -Continue IV diuresis as tolerated  -Avoid hypotension, on midodrine to augment BP    03/8  Stable  OP TAVR eval

## 2024-03-09 NOTE — ASSESSMENT & PLAN NOTE
"-WBC 34.56-->11.54, leukocytosis resolved  -COVID-19 negative, influenza a/B negative, procalcitonin level 0.14-->0.12, lactic acid level 1.0, urinalysis negative, blood cultures negative  -CTA of chest 02/24/2024 with no evidence of PE, pulmonary fibrosis versus CHF findings with recommendations for follow-up imaging  -repeat CT chest 3/6 with "interval worsening from comparison CT with diffuse multifocal airspace opacification superimposed upon interstitial thickening, considerations are multifocal pneumonia, pulmonary edema, pulmonary hemorrhage; new small left pleural effusion   -patient did receive empiric IV ceftriaxone for 4 days 2/28-03/02/2024  -hematology oncology has seen the patient in consultation and feels severe leukocytosis related to recent Neulasta and acute cardiac event  -given interval worsening in CT scan, persistent leukocytosis, and underlying breast cancer, Pulmonary was consulted for further evaluation and treatment recommendations--> abnormal CT scan felt to be most likely related to pulmonary edema  -On IV cefepime and IV vancomycin day #4; vancomycin discontinued today 3/9.  -additional evaluation ordered 3/6 with sputum culture with rare GPC, MRSA culture negative, respiratory viral panel pending, and blood cultures with no growth to date    "

## 2024-03-09 NOTE — CONSULTS
Pharmacokinetic Assessment Follow Up: IV Vancomycin    Vancomycin serum concentration assessment(s):    The trough level was drawn correctly and can be used to guide therapy at this time. The measurement is within the desired definitive target range of 15 to 20 mcg/mL.    Vancomycin Regimen Plan:    Change regimen to Vancomycin 2000 mg IV every 24 hours with next serum trough concentration measured at 1645 prior to 3rd dose on 03/11    Drug levels (last 3 results):  Recent Labs   Lab Result Units 03/08/24  1502   Vancomycin-Trough ug/mL 17.3       Pharmacy will continue to follow and monitor vancomycin.    Please contact pharmacy at extension [phone] for questions regarding this assessment.    Thank you for the consult,   Jai Lomeli       Patient brief summary:  Mark Dickerson Sr. is a 84 y.o. male initiated on antimicrobial therapy with IV Vancomycin for treatment of sepsis    The patient's current regimen is Vanc 2000 mg q24H    Drug Allergies:   Review of patient's allergies indicates:   Allergen Reactions    Grass pollen-june grass standard        Actual Body Weight:   108.8    Renal Function:   Estimated Creatinine Clearance: 59.5 mL/min (based on SCr of 1.1 mg/dL).,     Dialysis Method (if applicable):  N/A    CBC (last 72 hours):  Recent Labs   Lab Result Units 03/06/24  0450 03/08/24  0546   WBC K/uL 13.98* 11.54   Hemoglobin g/dL 9.1* 9.6*   Hematocrit % 26.9* 29.1*   Platelets K/uL 367 391   Gran % % 80.9* 74.4*   Lymph % % 10.9* 14.0*   Mono % % 6.2 9.7   Eosinophil % % 0.2 0.2   Basophil % % 0.9 1.0   Differential Method  Automated Automated       Metabolic Panel (last 72 hours):  Recent Labs   Lab Result Units 03/06/24  0450 03/07/24  0557 03/08/24  0546   Sodium mmol/L 135* 133* 135*   Potassium mmol/L 4.0 3.2* 3.6   Chloride mmol/L 94* 90* 89*   CO2 mmol/L 30* 29 34*   Glucose mg/dL 149* 189* 178*   BUN mg/dL 24* 25* 25*   Creatinine mg/dL 1.2 1.1 1.1   Albumin g/dL 2.5*  --   --    Total  Bilirubin mg/dL 0.7  --   --    Alkaline Phosphatase U/L 105  --   --    AST U/L 100*  --   --    ALT U/L 115*  --   --    Magnesium mg/dL  --  1.4* 1.8       Vancomycin Administrations:  vancomycin given in the last 96 hours                     vancomycin 2 g in dextrose 5 % 500 mL IVPB (mg) 2,000 mg New Bag 03/08/24 1742    vancomycin (VANCOCIN) 2,250 mg in dextrose 5 % (D5W) 500 mL IVPB (mg) 2,250 mg New Bag 03/07/24 1706    vancomycin (VANCOCIN) 2,500 mg in dextrose 5 % (D5W) 500 mL IVPB (mg) 2,500 mg New Bag 03/06/24 1558                    Microbiologic Results:  Microbiology Results (last 7 days)       Procedure Component Value Units Date/Time    Culture, Respiratory with Gram Stain [[id number]] Collected: 03/07/24 0010    Order Status: Completed Specimen: Respiratory from Sputum Updated: 03/08/24 1022     Respiratory Culture Insufficient incubation, culture in progress     Gram Stain (Respiratory) <10 epithelial cells per low power field.     Gram Stain (Respiratory) Rare WBC's     Gram Stain (Respiratory) Rare Gram positive cocci    Culture, MRSA [[id number]] Collected: 03/06/24 1558    Order Status: Completed Specimen: MRSA source from Nares, Right Updated: 03/08/24 1017     MRSA Surveillance Screen No MRSA isolated    Blood culture [[id number]] Collected: 03/06/24 1533    Order Status: Completed Specimen: Blood Updated: 03/07/24 2322     Blood Culture, Routine No Growth to date      No Growth to date    Respiratory Infection Panel (PCR), Nasopharyngeal [[id number]]     Order Status: No result Specimen: Nasopharyngeal Swab     Culture, MRSA [[id number]]     Order Status: Canceled Specimen: MRSA source from Nares, Left     Blood culture [[id number]] Collected: 02/27/24 0933    Order Status: Completed Specimen: Blood from Peripheral, Antecubital, Left Updated: 03/03/24 1812     Blood Culture, Routine No growth after 5 days.    Blood culture [[id number]] Collected: 02/27/24 0933    Order Status: Completed  Specimen: Blood from Peripheral, Antecubital, Right Updated: 03/03/24 1812     Blood Culture, Routine No growth after 5 days.

## 2024-03-09 NOTE — ASSESSMENT & PLAN NOTE
Patient has hypokalemia which is Acute on Chronic and currently uncontrolled. Most recent potassium levels reviewed-   Lab Results   Component Value Date    K 3.6 03/08/2024   Aggravated by IV Lasix diuresis.  Discontinue potassium replacement now that scheduled IV Lasix has been discontinued.  Replace as needed.  Check a.m. labs.

## 2024-03-10 LAB
ANION GAP SERPL CALC-SCNC: 14 MMOL/L (ref 8–16)
BASOPHILS # BLD AUTO: 0.01 K/UL (ref 0–0.2)
BASOPHILS NFR BLD: 0.1 % (ref 0–1.9)
BUN SERPL-MCNC: 42 MG/DL (ref 8–23)
C DIFF GDH STL QL: NEGATIVE
C DIFF TOX A+B STL QL IA: NEGATIVE
CALCIUM SERPL-MCNC: 9.1 MG/DL (ref 8.7–10.5)
CHLORIDE SERPL-SCNC: 95 MMOL/L (ref 95–110)
CO2 SERPL-SCNC: 26 MMOL/L (ref 23–29)
CREAT SERPL-MCNC: 1.2 MG/DL (ref 0.5–1.4)
DIFFERENTIAL METHOD BLD: ABNORMAL
EOSINOPHIL # BLD AUTO: 0 K/UL (ref 0–0.5)
EOSINOPHIL NFR BLD: 0.2 % (ref 0–8)
ERYTHROCYTE [DISTWIDTH] IN BLOOD BY AUTOMATED COUNT: 15.9 % (ref 11.5–14.5)
EST. GFR  (NO RACE VARIABLE): 59 ML/MIN/1.73 M^2
GLUCOSE SERPL-MCNC: 209 MG/DL (ref 70–110)
HCT VFR BLD AUTO: 32.3 % (ref 40–54)
HGB BLD-MCNC: 10.3 G/DL (ref 14–18)
IMM GRANULOCYTES # BLD AUTO: 0.08 K/UL (ref 0–0.04)
IMM GRANULOCYTES NFR BLD AUTO: 0.6 % (ref 0–0.5)
LYMPHOCYTES # BLD AUTO: 0.8 K/UL (ref 1–4.8)
LYMPHOCYTES NFR BLD: 6.2 % (ref 18–48)
MCH RBC QN AUTO: 29.3 PG (ref 27–31)
MCHC RBC AUTO-ENTMCNC: 31.9 G/DL (ref 32–36)
MCV RBC AUTO: 92 FL (ref 82–98)
MONOCYTES # BLD AUTO: 0.8 K/UL (ref 0.3–1)
MONOCYTES NFR BLD: 6.2 % (ref 4–15)
NEUTROPHILS # BLD AUTO: 11.5 K/UL (ref 1.8–7.7)
NEUTROPHILS NFR BLD: 86.7 % (ref 38–73)
NRBC BLD-RTO: 0 /100 WBC
PLATELET # BLD AUTO: 355 K/UL (ref 150–450)
PMV BLD AUTO: 11.1 FL (ref 9.2–12.9)
POCT GLUCOSE: 213 MG/DL (ref 70–110)
POCT GLUCOSE: 223 MG/DL (ref 70–110)
POCT GLUCOSE: 237 MG/DL (ref 70–110)
POTASSIUM SERPL-SCNC: 3.6 MMOL/L (ref 3.5–5.1)
RBC # BLD AUTO: 3.51 M/UL (ref 4.6–6.2)
SODIUM SERPL-SCNC: 135 MMOL/L (ref 136–145)
WBC # BLD AUTO: 13.22 K/UL (ref 3.9–12.7)

## 2024-03-10 PROCEDURE — 80048 BASIC METABOLIC PNL TOTAL CA: CPT | Mod: HCNC | Performed by: INTERNAL MEDICINE

## 2024-03-10 PROCEDURE — 21400001 HC TELEMETRY ROOM: Mod: HCNC

## 2024-03-10 PROCEDURE — 25000242 PHARM REV CODE 250 ALT 637 W/ HCPCS: Mod: HCNC | Performed by: INTERNAL MEDICINE

## 2024-03-10 PROCEDURE — 63600175 PHARM REV CODE 636 W HCPCS: Mod: HCNC | Performed by: NURSE PRACTITIONER

## 2024-03-10 PROCEDURE — 27000221 HC OXYGEN, UP TO 24 HOURS: Mod: HCNC

## 2024-03-10 PROCEDURE — 94761 N-INVAS EAR/PLS OXIMETRY MLT: CPT | Mod: HCNC

## 2024-03-10 PROCEDURE — 25000003 PHARM REV CODE 250: Mod: HCNC | Performed by: NURSE PRACTITIONER

## 2024-03-10 PROCEDURE — 99232 SBSQ HOSP IP/OBS MODERATE 35: CPT | Mod: HCNC,,, | Performed by: INTERNAL MEDICINE

## 2024-03-10 PROCEDURE — 25000003 PHARM REV CODE 250: Mod: HCNC | Performed by: INTERNAL MEDICINE

## 2024-03-10 PROCEDURE — 63600175 PHARM REV CODE 636 W HCPCS: Mod: HCNC | Performed by: INTERNAL MEDICINE

## 2024-03-10 PROCEDURE — 99900035 HC TECH TIME PER 15 MIN (STAT): Mod: HCNC

## 2024-03-10 PROCEDURE — 87449 NOS EACH ORGANISM AG IA: CPT | Mod: HCNC | Performed by: INTERNAL MEDICINE

## 2024-03-10 PROCEDURE — 94799 UNLISTED PULMONARY SVC/PX: CPT | Mod: HCNC,XB

## 2024-03-10 PROCEDURE — 36415 COLL VENOUS BLD VENIPUNCTURE: CPT | Mod: HCNC | Performed by: INTERNAL MEDICINE

## 2024-03-10 PROCEDURE — 25000242 PHARM REV CODE 250 ALT 637 W/ HCPCS: Mod: HCNC | Performed by: SPECIALIST

## 2024-03-10 PROCEDURE — 85025 COMPLETE CBC W/AUTO DIFF WBC: CPT | Mod: HCNC | Performed by: INTERNAL MEDICINE

## 2024-03-10 PROCEDURE — 94640 AIRWAY INHALATION TREATMENT: CPT | Mod: HCNC

## 2024-03-10 PROCEDURE — 27000207 HC ISOLATION: Mod: HCNC

## 2024-03-10 RX ORDER — SODIUM CHLORIDE 9 MG/ML
INJECTION, SOLUTION INTRAVENOUS
Status: DISCONTINUED | OUTPATIENT
Start: 2024-03-10 | End: 2024-03-14 | Stop reason: HOSPADM

## 2024-03-10 RX ADMIN — METHYLPREDNISOLONE SODIUM SUCCINATE 60 MG: 40 INJECTION, POWDER, FOR SOLUTION INTRAMUSCULAR; INTRAVENOUS at 04:03

## 2024-03-10 RX ADMIN — LEVALBUTEROL HYDROCHLORIDE 1.25 MG: 0.63 SOLUTION RESPIRATORY (INHALATION) at 08:03

## 2024-03-10 RX ADMIN — MIDODRINE HYDROCHLORIDE 10 MG: 5 TABLET ORAL at 12:03

## 2024-03-10 RX ADMIN — INSULIN ASPART 2 UNITS: 100 INJECTION, SOLUTION INTRAVENOUS; SUBCUTANEOUS at 05:03

## 2024-03-10 RX ADMIN — METHYLPREDNISOLONE SODIUM SUCCINATE 60 MG: 40 INJECTION, POWDER, FOR SOLUTION INTRAMUSCULAR; INTRAVENOUS at 11:03

## 2024-03-10 RX ADMIN — MIDODRINE HYDROCHLORIDE 10 MG: 5 TABLET ORAL at 08:03

## 2024-03-10 RX ADMIN — METHYLPREDNISOLONE SODIUM SUCCINATE 60 MG: 40 INJECTION, POWDER, FOR SOLUTION INTRAMUSCULAR; INTRAVENOUS at 10:03

## 2024-03-10 RX ADMIN — LEVALBUTEROL HYDROCHLORIDE 1.25 MG: 0.63 SOLUTION RESPIRATORY (INHALATION) at 01:03

## 2024-03-10 RX ADMIN — INSULIN ASPART 2 UNITS: 100 INJECTION, SOLUTION INTRAVENOUS; SUBCUTANEOUS at 12:03

## 2024-03-10 RX ADMIN — ASPIRIN 81 MG: 81 TABLET, COATED ORAL at 08:03

## 2024-03-10 RX ADMIN — Medication 400 MG: at 08:03

## 2024-03-10 RX ADMIN — ATORVASTATIN CALCIUM 20 MG: 10 TABLET, FILM COATED ORAL at 08:03

## 2024-03-10 RX ADMIN — LACTOBACILLUS ACIDOPHILUS / LACTOBACILLUS BULGARICUS 1 EACH: 100 MILLION CFU STRENGTH GRANULES at 08:03

## 2024-03-10 RX ADMIN — MIDODRINE HYDROCHLORIDE 10 MG: 5 TABLET ORAL at 04:03

## 2024-03-10 RX ADMIN — IPRATROPIUM BROMIDE 0.5 MG: 0.5 SOLUTION RESPIRATORY (INHALATION) at 08:03

## 2024-03-10 RX ADMIN — CEFEPIME 2 G: 2 INJECTION, POWDER, FOR SOLUTION INTRAVENOUS at 05:03

## 2024-03-10 RX ADMIN — LEVALBUTEROL HYDROCHLORIDE 1.25 MG: 0.63 SOLUTION RESPIRATORY (INHALATION) at 07:03

## 2024-03-10 RX ADMIN — IPRATROPIUM BROMIDE 0.5 MG: 0.5 SOLUTION RESPIRATORY (INHALATION) at 01:03

## 2024-03-10 RX ADMIN — IPRATROPIUM BROMIDE 0.5 MG: 0.5 SOLUTION RESPIRATORY (INHALATION) at 07:03

## 2024-03-10 RX ADMIN — ANASTROZOLE 1 MG: 1 TABLET, COATED ORAL at 08:03

## 2024-03-10 RX ADMIN — INSULIN DETEMIR 8 UNITS: 100 INJECTION, SOLUTION SUBCUTANEOUS at 12:03

## 2024-03-10 RX ADMIN — CEFEPIME 2 G: 2 INJECTION, POWDER, FOR SOLUTION INTRAVENOUS at 04:03

## 2024-03-10 RX ADMIN — SODIUM CHLORIDE: 9 INJECTION, SOLUTION INTRAVENOUS at 05:03

## 2024-03-10 RX ADMIN — FLUTICASONE PROPIONATE 100 MCG: 50 SPRAY, METERED NASAL at 08:03

## 2024-03-10 NOTE — SUBJECTIVE & OBJECTIVE
Interval History:  Patient reports 2 episodes of diarrhea overnight, brown, nonbloody, associated with nausea but no vomiting.  Patient is afebrile.  We will check stool for C diff as he has been on broad-spectrum IV antibiotics.    Review of Systems   Constitutional:  Negative for chills and fever.   Respiratory:  Negative for shortness of breath.    Cardiovascular:  Negative for chest pain.   Gastrointestinal:  Positive for diarrhea and nausea. Negative for abdominal pain and vomiting.   All other systems reviewed and are negative.    Objective:     Vital Signs (Most Recent):  Temp: 97.8 °F (36.6 °C) (03/10/24 0801)  Pulse: 105 (03/10/24 0816)  Resp: 17 (03/10/24 0801)  BP: (!) 100/59 (03/10/24 0801)  SpO2: (!) 94 % (03/10/24 0801) Vital Signs (24h Range):  Temp:  [97.7 °F (36.5 °C)-98.4 °F (36.9 °C)] 97.8 °F (36.6 °C)  Pulse:  [] 105  Resp:  [17-20] 17  SpO2:  [92 %-97 %] 94 %  BP: ()/(53-65) 100/59     Weight: 108.8 kg (239 lb 13.8 oz)  Body mass index is 34.42 kg/m².  No intake or output data in the 24 hours ending 03/10/24 1105      Physical Exam  Vitals reviewed.   Constitutional:       General: He is not in acute distress.     Appearance: He is obese.   HENT:      Nose: Nose normal.   Eyes:      Conjunctiva/sclera: Conjunctivae normal.   Cardiovascular:      Rate and Rhythm: Normal rate.   Pulmonary:      Effort: Pulmonary effort is normal. No respiratory distress.   Abdominal:      General: There is no distension.      Tenderness: There is no abdominal tenderness.   Musculoskeletal:         General: Swelling present.      Right lower leg: Edema present.      Left lower leg: Edema present.      Comments: Bilateral lower extremity edema significantly improved   Skin:     General: Skin is warm and dry.   Neurological:      Mental Status: He is alert and oriented to person, place, and time.   Psychiatric:         Mood and Affect: Mood normal.         Behavior: Behavior normal.              Significant Labs: All pertinent labs within the past 24 hours have been reviewed.  Recent Lab Results  (Last 5 results in the past 24 hours)        03/10/24  0411   03/10/24  0349   03/09/24 2028 03/09/24  1521   03/09/24  1150        Anion Gap   14             Baso #   0.01             Basophil %   0.1             BUN   42             Calcium   9.1             Chloride   95             CO2   26             Creatinine   1.2             Differential Method   Automated             eGFR   59             Eos #   0.0             Eos %   0.2             Glucose   209             Gran # (ANC)   11.5             Gran %   86.7             Hematocrit   32.3             Hemoglobin   10.3             Immature Grans (Abs)   0.08  Comment: Mild elevation in immature granulocytes is non specific and   can be seen in a variety of conditions including stress response,   acute inflammation, trauma and pregnancy. Correlation with other   laboratory and clinical findings is essential.               Immature Granulocytes   0.6             Lymph #   0.8             Lymph %   6.2             MCH   29.3             MCHC   31.9             MCV   92             Mono #   0.8             Mono %   6.2             MPV   11.1             nRBC   0             Platelet Count   355             POCT Glucose 213     236   249   222       Potassium   3.6             RBC   3.51             RDW   15.9             Sodium   135             WBC   13.22                                    Significant Imaging: I have reviewed all pertinent imaging results/findings within the past 24 hours.  X-Ray Chest 1 View   Final Result      Findings are similar to prior         Electronically signed by: Earl Mendez MD   Date:    03/08/2024   Time:    07:21      CT Chest Without Contrast   Final Result      Interval worsening from comparison CT with diffuse multifocal airspace opacification superimposed upon interstitial thickening.  Considerations are multifocal  pneumonia, pulmonary edema, pulmonary hemorrhage.      New small left pleural effusion      All CT scans at this facility use dose modulation, iterative reconstruction and/or weight based dosing when appropriate to reduce radiation dose to as low as reasonably achievable.         Electronically signed by: Evens Alba MD   Date:    03/06/2024   Time:    13:06      X-Ray Chest AP Portable   Final Result      In comparison to the prior study, there is no adverse interval changes         Electronically signed by: Earl Mendez MD   Date:    03/06/2024   Time:    09:12      X-Ray Chest AP Portable   Final Result      There has been interval worsening of the appearance of the lungs. There is a moderate amount of interstitial and alveolar opacities seen in both lungs.  This is characteristic of pulmonary edema.         Electronically signed by: Marco Elliott MD   Date:    03/03/2024   Time:    19:00      X-Ray Chest 1 View   Final Result      As above.         Electronically signed by: Spenser Stallworth   Date:    02/28/2024   Time:    11:07      X-Ray Chest AP Portable   Final Result      Worsening moderate bilateral infiltrates.  Follow-up is recommended.         Electronically signed by: Shyla Giles MD   Date:    02/27/2024   Time:    10:18      CTA Chest Non-Coronary (PE Studies)   Final Result      No pulmonary embolism.  No dissection.  Atherosclerotic changes.  Mild interstitial opacities.  Correlate clinically for element of fibrosis versus CHF.  Pulmonary micronodularity.  Recommend follow-up.      All CT scans   are performed using dose optimization techniques including the following: automated exposure control; adjustment of the mA and/or kV; use of iterative reconstruction technique.  Dose modulation was employed for ALARA by means of: Automated exposure control; adjustment of the mA and/or kV according to patient size (this includes techniques or standardized protocols for targeted exams where dose is matched  to indication/reason for exam; i.e. extremities or head); and/or use of iterative reconstructive technique.         Electronically signed by: Jhoan Roman   Date:    02/26/2024   Time:    19:11      CT Head Without Contrast   Final Result      Chronic microvascular ischemic changes.  No intracranial hemorrhage.      All CT scans at this facility use dose modulation, iterative reconstruction, and/or weight based dosing when appropriate to reduce radiation dose to as low as reasonable achievable.         Electronically signed by: Earl Mendez MD   Date:    02/26/2024   Time:    10:36      X-Ray Chest AP Portable   Final Result      No acute process seen.         Electronically signed by: Earl Mendez MD   Date:    02/26/2024   Time:    10:02

## 2024-03-10 NOTE — ASSESSMENT & PLAN NOTE
Patient's FSGs are controlled on current medication regimen.  Last A1c reviewed-   Lab Results   Component Value Date    HGBA1C 7.1 (H) 11/28/2023     Most recent fingerstick glucose reviewed-   Recent Labs   Lab 03/09/24  1150 03/09/24  1521 03/09/24  2028 03/10/24  0411   POCTGLUCOSE 222* 249* 236* 213*       Current correctional scale  Low  Maintain anti-hyperglycemic dose as follows-   Antihyperglycemics (From admission, onward)      Start     Stop Route Frequency Ordered    03/10/24 1130  insulin detemir U-100 (Levemir) pen 8 Units         -- SubQ Daily 03/10/24 1101    02/26/24 1301  insulin aspart U-100 pen 0-5 Units         -- SubQ Before meals & nightly PRN 02/26/24 1204          Hold Oral hypoglycemics while patient is in the hospital. Add levemir 8 units subq daily. BG in 200s.

## 2024-03-10 NOTE — PLAN OF CARE
Pt oriented x4 VSS  Plan of care reviewed. pt verbalizes understanding.  Patient moving/ turning with assistance.  Patient normal sinus on monitor  Bed low, side rails up x2, wheels locked, call light in reach.  Pt instructed to call for assistance

## 2024-03-10 NOTE — ASSESSMENT & PLAN NOTE
-Mgmt as per primary team  -BC show NGTD    3/9/24  Rx per HM and pulm service    3/10  D/c asa per new finding on chest ct

## 2024-03-10 NOTE — ASSESSMENT & PLAN NOTE
CT chest 3/6 done 2/2 ongoing hypoxia despite treatment so far, labored breathing, and leukocytosis   Initially believed to be fluid as patient appeared volume overloaded + elevated BNP in conjunction with physical exam  Despite diuresis, hypoxemia and bilateral infiltrates persist  Sputum culture normal ana- agree with stopping antibiotics with low suspicion for infection  Ddx includes: pneumonitis, lymphangitic spread, HF  Chemotherapy with potentially toxic pulm effects   steroids 60 mg Q6 hours, will decrease tomorrow   CXR in am   Supplemental oxygen as needed, SPO2 goal >/= 90%  IS, OOB and PT/OT as tolerated

## 2024-03-10 NOTE — ASSESSMENT & PLAN NOTE
Appeared volume overloaded on exam with supporting labs and imaging   Peripheral edema has improved with only trace edema now   Continues to require O2  Per Cardiology

## 2024-03-10 NOTE — ASSESSMENT & PLAN NOTE
"Patient is identified as having Diastolic (HFpEF) heart failure that is Acute. CHF is currently uncontrolled due to Pulmonary edema/pleural effusion on CXR. Latest ECHO performed and demonstrates- Results for orders placed during the hospital encounter of 02/26/24.      Echo    Interpretation Summary    Left Ventricle: The left ventricle is normal in size. There is concentric remodeling. There is normal systolic function with a visually estimated ejection fraction of 55 - 60%. There is indeterminate diastolic function.    Right Ventricle: Right ventricle was not well visualized due to poor acoustic window. Normal right ventricular cavity size. Systolic function is normal.    Pulmonary Artery: The estimated pulmonary artery systolic pressure is 37 mmHg.    IVC/SVC: Normal venous pressure at 3 mmHg.  . Continue lasix and monitor clinical status closely. Monitor on telemetry. Monitor strict Is&Os and daily weights.  Cardiology on board already for NSTEMI. Continue to stress to patient importance of self efficacy and  on diet for CHF. Last BNP reviewed- and noted below   Recent Labs   Lab 03/06/24  0450   *     -cardiac catheterization 03/01/2024 with EF 30%  -Increased lasix to 40 mg IV bid as of 3/4 due to pitting edema to BLE, dyspnea requiring O2 supplementation, and abnormal CXR with pulmonary edema. Lasix stopped 3/8 due to hypotension and severe AS. Use prn lasix as per cardiology.  -Fluid restrict 1200 mL.  Strict Is&Os and daily weights.   -Lopressor has been held due to relative hypotension  -CT scan of chest without contrast 3/6 with "interval worsening from comparison CT with diffuse multifocal airspace opacification superimposed upon interstitial thickening, considerations are multifocal pneumonia, pulmonary edema, pulmonary hemorrhage; new small left pleural effusion."   -chest x-ray 03/08/2024 with diffuse pulmonary infiltrates, trace pleural effusion on the left, similar to prior chest " x-ray  -Pending transfer to SNF

## 2024-03-10 NOTE — NURSING
Care of patient transferred from [facility] to [facility]. Report received from [facility]ose[facility]. Agree with previous nurse assessment. Patient resting in bed with no complaints or concerns at this time.    Mother calling today requesting a letter for Insurance so they will cover her daughters medication.  JUNEL FE 1/20 TABLETS 28  3 MONTHS AT A TIME. Going away to school  Has 1 refill left.  Up date letter from last year can call mother if you have any questions.

## 2024-03-10 NOTE — ASSESSMENT & PLAN NOTE
Volume management with diuresis as tolerated/needed  outpatient eval for TAVR  Plan per Cardiology

## 2024-03-10 NOTE — SUBJECTIVE & OBJECTIVE
ROS  Objective:     Vital Signs (Most Recent):  Temp: 97.8 °F (36.6 °C) (03/10/24 0801)  Pulse: 105 (03/10/24 0816)  Resp: 17 (03/10/24 0801)  BP: (!) 100/59 (03/10/24 0801)  SpO2: (!) 94 % (03/10/24 0801) Vital Signs (24h Range):  Temp:  [97.7 °F (36.5 °C)-98.4 °F (36.9 °C)] 97.8 °F (36.6 °C)  Pulse:  [] 105  Resp:  [17-20] 17  SpO2:  [92 %-97 %] 94 %  BP: ()/(53-65) 100/59     Weight: 108.8 kg (239 lb 13.8 oz)  Body mass index is 34.42 kg/m².     SpO2: (!) 94 %       No intake or output data in the 24 hours ending 03/10/24 1102    Lines/Drains/Airways       Central Venous Catheter Line  Duration                  PowerPort A Cath Single Lumen 01/05/24 1415 Internal Jugular Right 64 days              Peripheral Intravenous Line  Duration                  Peripheral IV - Single Lumen 03/06/24 1705 22 G Posterior;Right Forearm 3 days                       Physical Exam  Vitals and nursing note reviewed.   Constitutional:       General: He is not in acute distress.     Appearance: Normal appearance. He is well-developed. He is not diaphoretic.      Comments: On supplemental O2 via NC   HENT:      Head: Normocephalic and atraumatic.   Eyes:      General:         Right eye: No discharge.         Left eye: No discharge.      Pupils: Pupils are equal, round, and reactive to light.   Cardiovascular:      Rate and Rhythm: Normal rate and regular rhythm.      Heart sounds: S1 normal and S2 normal. Murmur heard.      Harsh midsystolic murmur is present at the upper right sternal border radiating to the neck.   Pulmonary:      Effort: Pulmonary effort is normal. No respiratory distress.      Breath sounds: No wheezing.      Comments: Coarse BS  Abdominal:      General: There is no distension.   Musculoskeletal:      Right lower leg: Edema present.      Left lower leg: Edema present.   Skin:     General: Skin is warm and dry.      Findings: No erythema.   Neurological:      General: No focal deficit present.       "Mental Status: He is alert and oriented to person, place, and time.   Psychiatric:         Mood and Affect: Mood normal.         Behavior: Behavior normal.            Significant Labs: ABG: No results for input(s): "PH", "PCO2", "HCO3", "POCSATURATED", "BE" in the last 48 hours., Blood Culture: No results for input(s): "LABBLOO" in the last 48 hours., BMP:   Recent Labs   Lab 03/10/24  0349   *   *   K 3.6   CL 95   CO2 26   BUN 42*   CREATININE 1.2   CALCIUM 9.1   , CMP   Recent Labs   Lab 03/10/24  0349   *   K 3.6   CL 95   CO2 26   *   BUN 42*   CREATININE 1.2   CALCIUM 9.1   ANIONGAP 14   , CBC   Recent Labs   Lab 03/10/24  0349   WBC 13.22*   HGB 10.3*   HCT 32.3*      , INR No results for input(s): "INR", "PROTIME" in the last 48 hours., Lipid Panel No results for input(s): "CHOL", "HDL", "LDLCALC", "TRIG", "CHOLHDL" in the last 48 hours., and Troponin No results for input(s): "TROPONINI" in the last 48 hours.    Significant Imaging: EKG:    "

## 2024-03-10 NOTE — ASSESSMENT & PLAN NOTE
"-WBC 34.56-->11.54-->13.22, afebrile  -COVID-19 negative, influenza a/B negative, procalcitonin level 0.14-->0.12, lactic acid level 1.0, urinalysis negative, blood cultures negative  -CTA of chest 02/24/2024 with no evidence of PE, pulmonary fibrosis versus CHF findings with recommendations for follow-up imaging  -repeat CT chest 3/6 with "interval worsening from comparison CT with diffuse multifocal airspace opacification superimposed upon interstitial thickening, considerations are multifocal pneumonia, pulmonary edema, pulmonary hemorrhage; new small left pleural effusion   -patient did receive empiric IV ceftriaxone for 4 days 2/28-03/02/2024  -hematology oncology has seen the patient in consultation and feels severe leukocytosis related to recent Neulasta and acute cardiac event  -given interval worsening in CT scan, persistent leukocytosis, and underlying breast cancer, Pulmonary was consulted for further evaluation and treatment recommendations--> abnormal CT scan felt to be most likely related to pulmonary edema  -On IV cefepime d#5; vancomycin discontinued 3/9 (received 4 days).  -additional evaluation ordered 3/6 with sputum culture with normal respiratory ana, MRSA culture negative, and blood cultures with no growth to date    "

## 2024-03-10 NOTE — ASSESSMENT & PLAN NOTE
-Continue IV diuresis  -Avoid hypotension  -OP TAVR/balloon valvuloplasty referral    3/6/24  -Continue IV diuresis as tolerated  -Avoid hypotension, on midodrine to augment BP    03/8  Stable  OP TAVR eval    3/9  Avoid fluid overloaded and BP control    3/10  Now chest pain free. Is euvolemic.  F/u as OP

## 2024-03-10 NOTE — PROGRESS NOTES
[facility] ([facility])  Cardiology  Progress Note    Patient Name: Mark Dickerson Sr.  MRN: [id number]  Admission Date: 2/26/2024  Hospital Length of Stay: 12 days  Code Status: DNR   Attending Physician: Antonia Kapoor MD   Primary Care Physician: Tatyana Cannon MD  Expected Discharge Date:   Principal Problem:NSTEMI (non-ST elevated myocardial infarction)    Subjective:     Hospital Course:   Mr. Dickerson is an 84 year old male patient whose current medical conditions include metastatic breast cancer (on chemo), HTN, hyperlipidemia, DM type II, and CKD who presented to [facility] ED this AM due to AMS/confusion. Patient's DIL reported patient seemed disoriented and had jumbled speech when she visited him earlier this AM. He returned to baseline prior to ED arrival. He denied any associated SOB, chest pain, palpitations, near syncope, or syncope. Initial workup in ED revealed leukocytosis (WBC 14,000), hyponatremia (Na 130), hypomagnesemia (1.5), and elevated troponin (0.725>0.735) and patient was subsequently admitted for further evaluation and treatment. Cardiology consulted to assist with management. Patient seen and examined today, resting in bed. Remains chest pain free. No other CV complaints. States he felt dehydrated earlier today. No prior CV history. He reports compliance with his medications. EKG reviewed, SR with T wave inversions inferiorly and anteriorly. Limited echo and repeat troponin pending.    3/1/24-Patient seen and examined today, lying in bed. Stable overnight. No CV complaints. CP free. R/LHC today by Dr. Aguillon.    3/2/24 - s/p R/LHC - with elevated filling pressures,  RCA with collaterals - will cont med management and discuss outpt TAVR eval for severe AS    3/5/24-Patient seen and examined today, sitting up in bedside chair. Feels ok. Still with SOB/BLE edema. No CP. Heme/onc note reviewed. Needs diuresis. BP soft, BB d/c'd to allow diuresis. Discussed OP evaluation for TAVR/ballon  valvuloplasty.    3/6/24-Patient seen and examined today, sitting up in bedside chair. Doing ok. SOB stable. Diuresed well overnight. BNP still elevated. CT of chest pending.    3/7/24-Patient seen and examined today, resting in bedside chair. Still SOB, especially with exertion. CT of chest reviewed-interval worsening from comparison CT with diffuse multifocal airspace opacification superimposed upon interstitial thickening. Considerations are multifocal pneumonia, pulmonary edema, pulmonary hemorrhage. Pulmonary following, being covered with abx. Diuretics continued---limited with higher dose/aggressive diuresis due to severe AS/hypotension.    03/08/24 Cr stable and BNP remains at 500 improved volume status. Held lasix today due to soft BP. Breathing Rx started by pulm. For possible pneumonitis.     03/09/24 leg swelling improved, + PND. Continue Rx for pneumonitis per pulm service, no arrhythmia    3/10/24 F/U WITH PULM FOR CHEST CT FINDING, showed Interval worsening from comparison CT with diffuse multifocal airspace opacification superimposed upon interstitial thickening.  Considerations are multifocal pneumonia, pulmonary edema, pulmonary hemorrhage. New small left pleural effusion.  On NC O2.   Metastatic breast ca Rx on hold due to severe AS.              ROS  Objective:     Vital Signs (Most Recent):  Temp: 97.8 °F (36.6 °C) (03/10/24 0801)  Pulse: 105 (03/10/24 0816)  Resp: 17 (03/10/24 0801)  BP: (!) 100/59 (03/10/24 0801)  SpO2: (!) 94 % (03/10/24 0801) Vital Signs (24h Range):  Temp:  [97.7 °F (36.5 °C)-98.4 °F (36.9 °C)] 97.8 °F (36.6 °C)  Pulse:  [] 105  Resp:  [17-20] 17  SpO2:  [92 %-97 %] 94 %  BP: ()/(53-65) 100/59     Weight: 108.8 kg (239 lb 13.8 oz)  Body mass index is 34.42 kg/m².     SpO2: (!) 94 %       No intake or output data in the 24 hours ending 03/10/24 1102    Lines/Drains/Airways       Central Venous Catheter Line  Duration                  PowerPort A Cath Single Lumen  "01/05/24 1415 Internal Jugular Right 64 days              Peripheral Intravenous Line  Duration                  Peripheral IV - Single Lumen 03/06/24 1705 22 G Posterior;Right Forearm 3 days                       Physical Exam  Vitals and nursing note reviewed.   Constitutional:       General: He is not in acute distress.     Appearance: Normal appearance. He is well-developed. He is not diaphoretic.      Comments: On supplemental O2 via NC   HENT:      Head: Normocephalic and atraumatic.   Eyes:      General:         Right eye: No discharge.         Left eye: No discharge.      Pupils: Pupils are equal, round, and reactive to light.   Cardiovascular:      Rate and Rhythm: Normal rate and regular rhythm.      Heart sounds: S1 normal and S2 normal. Murmur heard.      Harsh midsystolic murmur is present at the upper right sternal border radiating to the neck.   Pulmonary:      Effort: Pulmonary effort is normal. No respiratory distress.      Breath sounds: No wheezing.      Comments: Coarse BS  Abdominal:      General: There is no distension.   Musculoskeletal:      Right lower leg: Edema present.      Left lower leg: Edema present.   Skin:     General: Skin is warm and dry.      Findings: No erythema.   Neurological:      General: No focal deficit present.      Mental Status: He is alert and oriented to person, place, and time.   Psychiatric:         Mood and Affect: Mood normal.         Behavior: Behavior normal.            Significant Labs: ABG: No results for input(s): "PH", "PCO2", "HCO3", "POCSATURATED", "BE" in the last 48 hours., Blood Culture: No results for input(s): "LABBLOO" in the last 48 hours., BMP:   Recent Labs   Lab 03/10/24  0349   *   *   K 3.6   CL 95   CO2 26   BUN 42*   CREATININE 1.2   CALCIUM 9.1   , CMP   Recent Labs   Lab 03/10/24  0349   *   K 3.6   CL 95   CO2 26   *   BUN 42*   CREATININE 1.2   CALCIUM 9.1   ANIONGAP 14   , CBC   Recent Labs   Lab 03/10/24  0349 " "  WBC 13.22*   HGB 10.3*   HCT 32.3*      , INR No results for input(s): "INR", "PROTIME" in the last 48 hours., Lipid Panel No results for input(s): "CHOL", "HDL", "LDLCALC", "TRIG", "CHOLHDL" in the last 48 hours., and Troponin No results for input(s): "TROPONINI" in the last 48 hours.    Significant Imaging: EKG:    Assessment and Plan:         * NSTEMI (non-ST elevated myocardial infarction)  -See plan under elevated troponin    Coronary artery disease of native artery of native heart with stable angina pectoris  Continue asa and statin  f/u h/o statin intolerance    Abnormal CT of the chest  -Pulmonary following, appreciate assistance    Hypoxia  -CT of chest reviewed  -Pulmonary following, on abx   -Continue IV diuresis-limited with increasing dosage given underling severe AS/hypotension    Acute diastolic CHF (congestive heart failure)  Patient is identified as having Diastolic (HFpEF) heart failure that is Acute on chronic. CHF is currently controlled. Latest ECHO performed and demonstrates- Results for orders placed during the hospital encounter of 02/26/24    Echo    Interpretation Summary    Left Ventricle: The left ventricle is normal in size. There is concentric remodeling. There is normal systolic function with a visually estimated ejection fraction of 55 - 60%. There is indeterminate diastolic function.    Right Ventricle: Right ventricle was not well visualized due to poor acoustic window. Normal right ventricular cavity size. Systolic function is normal.    Pulmonary Artery: The estimated pulmonary artery systolic pressure is 37 mmHg.    IVC/SVC: Normal venous pressure at 3 mmHg.  . Monitor clinical status closely. Monitor on telemetry. Patient is on CHF pathway.  Monitor strict Is&Os and daily weights.  Place on fluid restriction of 1.5 L. Cardiology has been consulted. Continue to stress to patient importance of self efficacy and  on diet for CHF. Last BNP reviewed- and noted below "   Recent Labs   Lab 03/06/24  0450   *       03/08  Volume status stable.  Keep I & O even  DASH  Fluid restriction 1.5 liters a day  Lasix as needed    3/9/24 chf compensated  Lasix as needed    Abnormal EKG  -See plan under elevated troponin    SIRS (systemic inflammatory response syndrome)  -Mgmt as per primary team  -BC show NGTD    3/9/24  Rx per HM and pulm service    3/10  D/c asa per new finding on chest ct    Hypomagnesemia  -Repletion as per primary team    Hypokalemia  -Repletion as per primary team      Elevated troponin  -Troponin 0.725>0.735, continue to trend  -No CP symptoms/SOB/angina  -No prior CV history  -Continue ASA, heparin gtt, statin  -EKG reviewed, T wave inversions inferiorly and laterally  -Limited TTE pending  -Will consider ischemic workup with MPI stress test this admission    2/27/24  -Troponin bumped to 1.2 overnight  -Remains CP free  -Continue ASA, heparin gtt, statin  -TTE with normal EF  -LHC planned once more stable respiratory wise/infection ruled out    2/28/24  -CP free  -Continue ASA, heparin gtt, statin  -BB added  -LHC deferred for now given leukocytosis/SOB    2/29/24  -Stable  -WBC count improving  -Continue BB, ASA, heparin gtt, statin  -Tentative plans for LHC tmw, keep NPO after MN    3/1/24  -Stable, no CP  -Continue BB, ASA, heparin gtt, statin  -R/LHC today by Dr. Aguillon. All risks, benefits, and treatment alternatives explained to patient in detail. All questions answered. He has agreed to proceed.    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 Is euvolemic.  F/u as OP    CKD stage 3 due to type 2 diabetes mellitus  -Monitor        VTE Risk Mitigation (From admission, onward)           Ordered     IP VTE HIGH RISK PATIENT  Once         02/26/24 1204     Place sequential compression device  Until discontinued         02/26/24 1204                    Juwan Villalta MD  Cardiology  [facility] - Telemetry ([facility])

## 2024-03-10 NOTE — PROGRESS NOTES
"[facility]  Progress Note    Patient Name: Mark Dickerson Sr.  MRN: [id number]  Patient Class: IP- Inpatient   Admission Date: 2/26/2024  Length of Stay: 12 days  Attending Physician: Antonia Kapoor MD  Primary Care Provider: Tatyana Cannon MD        Subjective:     Principal Problem:NSTEMI (non-ST elevated myocardial infarction)        HPI:  Mark Dickerson Sr. is a 84 y.o. male patient with a PMHx of metastatic breast cancer (on chemo), HLD, HTN, DM II, CKD who presents to the ED for evaluation of AMS which onset this morning PTA. Per daughter in law she visited the pt early this morning and states that he was normal for a short period before becoming extremely disoriented with jumbled speech.  Patient mental status returned to baseline piror to ed arrival.  Patient reports he feels "dehydrated", he also reports diarrhea earlier last week but this has since resolved.  Patient denies any fever, chills, N/V, SOB and chest pain.   In the ED, work up notable for WBC 14, Na 130, K 3.0, mag 1.5, , Trop 0.7.  EKG with SR PACs, ST and T wave abnormality. CT head negative for acute processes.  Chest xray no acute processes. Patient started on IVF and given zofran for nausea.  Patient will be admitted to observation for electrolyte imbalances, elevated trop.  Cards consulted.     Code Status DNR  Surrogate Decision maker daughter    Overview/Hospital Course:  Patient is currently admitted for NSTEMI, SIRS criteria, severe leukocytosis, hypokalemia, hypomagnesemia, chronic kidney disease stage 3, diabetes mellitus type 2, left breast cancer in a male patient, acute systolic CHF exacerbation, normocytic anemia, transaminitis, and severe aortic stenosis.  Consults on this admission include Cardiology, Hematology Oncology, Pulmonary, and Wound Care.  Echocardiogram 02/26/2024 with EF 55-60% with indeterminate diastolic function.  Right and left heart catheterization 03/01/2024 with " "elevated filling pressures, severe aortic stenosis,  RCA with collaterals, EF 30%.  Cardiology recommends continuing medical management for NSTEMI and will discuss outpatient TAVR evaluation for severe aortic stenosis.  Initiated IV Lasix diuresis for CHF exacerbation with evidence of volume overload.  Chest x-ray 03/03/2024 with interval worsening with moderate interstitial and alveolar opacities of bilateral lungs consistent with pulmonary edema.  BNP had trended upward and is currently 590-->repeat 505 on 3/6.  Hematology oncology has seen the patient in consultation and felt that leukocytosis is related to Neulasta and acute cardiac event. Patient did receive empiric IV Rocephin for 4 days 2/28-03/02/2024.  No localizing source of infection was identified.  COVID-19 negative (x2), influenza a/B negative, bcx were negative, urinalysis negative, lactic acid level 1.0, procalcitonin level 0.14.  CTA of chest 2/24/24 with no evidence of PE, pulmonary fibrosis versus CHF findings with recommendation for follow-up imaging.  Repeat chest x-ray 03/06/2024 with diffuse pulmonary infiltrates seen throughout lungs concerning for interstitial edema or interstitial pneumonia, no pleural effusions appreciated.  CT scan of chest without contrast 3/6  with "interval worsening from comparison CT with diffuse multifocal airspace opacification superimposed upon interstitial thickening, considerations are multifocal pneumonia, pulmonary edema, pulmonary hemorrhage; new small left pleural effusion." Pulmonary did see the patient in consultation and feels that abnormal CT scan of chest most likely related to fluid however okay with continued IV antibiotics for now.  Continue IV cefepime.  IV vancomycin was discontinued as MRSA culture was negative.  Continue to replace electrolytes.  Patient with episodes of desaturation with hypoxia.  Continue O2 supplementation, continuous pulse oximetry monitoring, frequent incentive " spirometry, Xopenex/Atrovent nebs q.6 hours, IV Solu-Medrol 60 mg q.6 hours (steroids started by Pulmonary on 03/08/2024).  IV Lasix diuresis had to be discontinued due to severe aortic stenosis and hypotension.  Will use p.r.n. Lasix and fluid restriction as per Cardiology.    Interval History:  Patient reports 2 episodes of diarrhea overnight, brown, nonbloody, associated with nausea but no vomiting.  Patient is afebrile.  We will check stool for C diff as he has been on broad-spectrum IV antibiotics.    Review of Systems   Constitutional:  Negative for chills and fever.   Respiratory:  Negative for shortness of breath.    Cardiovascular:  Negative for chest pain.   Gastrointestinal:  Positive for diarrhea and nausea. Negative for abdominal pain and vomiting.   All other systems reviewed and are negative.    Objective:     Vital Signs (Most Recent):  Temp: 97.8 °F (36.6 °C) (03/10/24 0801)  Pulse: 105 (03/10/24 0816)  Resp: 17 (03/10/24 0801)  BP: (!) 100/59 (03/10/24 0801)  SpO2: (!) 94 % (03/10/24 0801) Vital Signs (24h Range):  Temp:  [97.7 °F (36.5 °C)-98.4 °F (36.9 °C)] 97.8 °F (36.6 °C)  Pulse:  [] 105  Resp:  [17-20] 17  SpO2:  [92 %-97 %] 94 %  BP: ()/(53-65) 100/59     Weight: 108.8 kg (239 lb 13.8 oz)  Body mass index is 34.42 kg/m².  No intake or output data in the 24 hours ending 03/10/24 1105      Physical Exam  Vitals reviewed.   Constitutional:       General: He is not in acute distress.     Appearance: He is obese.   HENT:      Nose: Nose normal.   Eyes:      Conjunctiva/sclera: Conjunctivae normal.   Cardiovascular:      Rate and Rhythm: Normal rate.   Pulmonary:      Effort: Pulmonary effort is normal. No respiratory distress.   Abdominal:      General: There is no distension.      Tenderness: There is no abdominal tenderness.   Musculoskeletal:         General: Swelling present.      Right lower leg: Edema present.      Left lower leg: Edema present.      Comments: Bilateral lower  extremity edema significantly improved   Skin:     General: Skin is warm and dry.   Neurological:      Mental Status: He is alert and oriented to person, place, and time.   Psychiatric:         Mood and Affect: Mood normal.         Behavior: Behavior normal.             Significant Labs: All pertinent labs within the past 24 hours have been reviewed.  Recent Lab Results  (Last 5 results in the past 24 hours)        03/10/24  0411   03/10/24  0349   03/09/24  2028   03/09/24  1521   03/09/24  1150        Anion Gap   14             Baso #   0.01             Basophil %   0.1             BUN   42             Calcium   9.1             Chloride   95             CO2   26             Creatinine   1.2             Differential Method   Automated             eGFR   59             Eos #   0.0             Eos %   0.2             Glucose   209             Gran # (ANC)   11.5             Gran %   86.7             Hematocrit   32.3             Hemoglobin   10.3             Immature Grans (Abs)   0.08  Comment: Mild elevation in immature granulocytes is non specific and   can be seen in a variety of conditions including stress response,   acute inflammation, trauma and pregnancy. Correlation with other   laboratory and clinical findings is essential.               Immature Granulocytes   0.6             Lymph #   0.8             Lymph %   6.2             MCH   29.3             MCHC   31.9             MCV   92             Mono #   0.8             Mono %   6.2             MPV   11.1             nRBC   0             Platelet Count   355             POCT Glucose 213     236   249   222       Potassium   3.6             RBC   3.51             RDW   15.9             Sodium   135             WBC   13.22                                    Significant Imaging: I have reviewed all pertinent imaging results/findings within the past 24 hours.  X-Ray Chest 1 View   Final Result      Findings are similar to prior         Electronically signed  by: Earl Mendez MD   Date:    03/08/2024   Time:    07:21      CT Chest Without Contrast   Final Result      Interval worsening from comparison CT with diffuse multifocal airspace opacification superimposed upon interstitial thickening.  Considerations are multifocal pneumonia, pulmonary edema, pulmonary hemorrhage.      New small left pleural effusion      All CT scans at this facility use dose modulation, iterative reconstruction and/or weight based dosing when appropriate to reduce radiation dose to as low as reasonably achievable.         Electronically signed by: Evens Alba MD   Date:    03/06/2024   Time:    13:06      X-Ray Chest AP Portable   Final Result      In comparison to the prior study, there is no adverse interval changes         Electronically signed by: Earl Mendez MD   Date:    03/06/2024   Time:    09:12      X-Ray Chest AP Portable   Final Result      There has been interval worsening of the appearance of the lungs. There is a moderate amount of interstitial and alveolar opacities seen in both lungs.  This is characteristic of pulmonary edema.         Electronically signed by: Marco Elliott MD   Date:    03/03/2024   Time:    19:00      X-Ray Chest 1 View   Final Result      As above.         Electronically signed by: Spenser Stallworth   Date:    02/28/2024   Time:    11:07      X-Ray Chest AP Portable   Final Result      Worsening moderate bilateral infiltrates.  Follow-up is recommended.         Electronically signed by: Shyla Giles MD   Date:    02/27/2024   Time:    10:18      CTA Chest Non-Coronary (PE Studies)   Final Result      No pulmonary embolism.  No dissection.  Atherosclerotic changes.  Mild interstitial opacities.  Correlate clinically for element of fibrosis versus CHF.  Pulmonary micronodularity.  Recommend follow-up.      All CT scans   are performed using dose optimization techniques including the following: automated exposure control; adjustment of the mA and/or kV; use  of iterative reconstruction technique.  Dose modulation was employed for ALARA by means of: Automated exposure control; adjustment of the mA and/or kV according to patient size (this includes techniques or standardized protocols for targeted exams where dose is matched to indication/reason for exam; i.e. extremities or head); and/or use of iterative reconstructive technique.         Electronically signed by: Jhoan Roman   Date:    02/26/2024   Time:    19:11      CT Head Without Contrast   Final Result      Chronic microvascular ischemic changes.  No intracranial hemorrhage.      All CT scans at this facility use dose modulation, iterative reconstruction, and/or weight based dosing when appropriate to reduce radiation dose to as low as reasonable achievable.         Electronically signed by: Earl Mendez MD   Date:    02/26/2024   Time:    10:36      X-Ray Chest AP Portable   Final Result      No acute process seen.         Electronically signed by: Earl Mendez MD   Date:    02/26/2024   Time:    10:02           Assessment/Plan:      * NSTEMI (non-ST elevated myocardial infarction)  -No prior hx of CAD  -Troponin max 2.789  -EKG with SR PACs and ST-Twave abnormalities  -echocardiogram 02/26/2024 with EF 55-60% with indeterminate diastolic function   -right and left heart catheterization 03/01/2024 with increased filling pressures, severe aortic stenosis,  RCA with collaterals, EF 30%  -continue medical management; continue aspirin, Lipitor, and midodrine. Lopressor held due to low BP.  -Cardiology following/managing        Acute diastolic CHF (congestive heart failure)  Patient is identified as having Diastolic (HFpEF) heart failure that is Acute. CHF is currently uncontrolled due to Pulmonary edema/pleural effusion on CXR. Latest ECHO performed and demonstrates- Results for orders placed during the hospital encounter of 02/26/24.      Echo    Interpretation Summary    Left Ventricle: The left ventricle is  "normal in size. There is concentric remodeling. There is normal systolic function with a visually estimated ejection fraction of 55 - 60%. There is indeterminate diastolic function.    Right Ventricle: Right ventricle was not well visualized due to poor acoustic window. Normal right ventricular cavity size. Systolic function is normal.    Pulmonary Artery: The estimated pulmonary artery systolic pressure is 37 mmHg.    IVC/SVC: Normal venous pressure at 3 mmHg.  . Continue lasix and monitor clinical status closely. Monitor on telemetry. Monitor strict Is&Os and daily weights.  Cardiology on board already for NSTEMI. Continue to stress to patient importance of self efficacy and  on diet for CHF. Last BNP reviewed- and noted below   Recent Labs   Lab 03/06/24  0450   *     -cardiac catheterization 03/01/2024 with EF 30%  -Increased lasix to 40 mg IV bid as of 3/4 due to pitting edema to BLE, dyspnea requiring O2 supplementation, and abnormal CXR with pulmonary edema. Lasix stopped 3/8 due to hypotension and severe AS. Use prn lasix as per cardiology.  -Fluid restrict 1200 mL.  Strict Is&Os and daily weights.   -Lopressor has been held due to relative hypotension  -CT scan of chest without contrast 3/6 with "interval worsening from comparison CT with diffuse multifocal airspace opacification superimposed upon interstitial thickening, considerations are multifocal pneumonia, pulmonary edema, pulmonary hemorrhage; new small left pleural effusion."   -chest x-ray 03/08/2024 with diffuse pulmonary infiltrates, trace pleural effusion on the left, similar to prior chest x-ray  -[facility]    SIRS (systemic inflammatory response syndrome)  -WBC 34.56-->11.54-->13.22, afebrile  -COVID-19 negative, influenza a/B negative, procalcitonin level 0.14-->0.12, lactic acid level 1.0, urinalysis negative, blood cultures negative  -CTA of chest 02/24/2024 with no evidence of PE, pulmonary fibrosis versus CHF " "findings with recommendations for follow-up imaging  -repeat CT chest 3/6 with "interval worsening from comparison CT with diffuse multifocal airspace opacification superimposed upon interstitial thickening, considerations are multifocal pneumonia, pulmonary edema, pulmonary hemorrhage; new small left pleural effusion   -patient did receive empiric IV ceftriaxone for 4 days 2/28-03/02/2024  -hematology oncology has seen the patient in consultation and feels severe leukocytosis related to recent Neulasta and acute cardiac event  -given interval worsening in CT scan, persistent leukocytosis, and underlying breast cancer, Pulmonary was consulted for further evaluation and treatment recommendations--> abnormal CT scan felt to be most likely related to pulmonary edema  -On IV cefepime d#5; vancomycin discontinued 3/9 (received 4 days).  -additional evaluation ordered 3/6 with sputum culture with normal respiratory ana, MRSA culture negative, and blood cultures with no growth to date      Hypoxia  Hypoxia related to CHF exacerbation and pulmonary edema.  Prn lasix only now due to hypotension with severe AS. Continue Xopenex/Atrovent nebs q.6 hours, continuous pulse oximetry monitoring, and encourage incentive spirometry q.4 hours while awake.  Pulmonary added IV Solu-Medrol 60 mg q.6 hours as of 03/08/2024.  Aspiration precautions and speech therapy evaluation.  Stat ABG reviewed and consistent with primary metabolic alkalosis with secondary respiratory alkalosis.  Continue O2 supplementation and wean as tolerated.      Transaminitis  Transaminitis with minimal trend downward. Patient denies abdominal pain and has no tenderness to palpation on exam.  Suspect related to hepatic congestion with CHF exacerbation.     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 Mendoza at [facility], last chemo session one week prior.  Patient was seen by Hematology " "Oncology due to significant leukocytosis felt to be related to Neulasta and recent cardiac event.  Dr. Baugh with Hematology Oncology recommends discontinuing all Herceptin products due to cardiac deterioration.  Consider aromatase inhibitor +/- Lupron.  Follow up outpatient.    Severe aortic stenosis  Cardiology plans to discuss outpatient TAVR evaluation.  Monitor volume status and BP. Prn lasix only now.       Abnormal CT of the chest        Acute HFrEF (heart failure with reduced ejection fraction)        Normocytic anemia  Patient's anemia is currently controlled. Has not received any PRBCs to date. Etiology likely d/t chronic disease due to Malignancy  Current CBC reviewed-   Lab Results   Component Value Date    HGB 10.3 (L) 03/10/2024    HCT 32.3 (L) 03/10/2024     Monitor serial CBC and transfuse if patient becomes hemodynamically unstable, symptomatic or H/H drops below 7/21.    Hypomagnesemia  Patient has Abnormal Magnesium: hypomagnesemia. Will continue to monitor electrolytes closely. Will replace the affected electrolytes and repeat labs to be done after interventions completed. The patient's magnesium results have been reviewed and are listed below.  No results for input(s): "MG" in the last 24 hours.   Continue Mag-Ox 400 mg p.o. daily.    Hypokalemia  Patient has hypokalemia which is Acute on Chronic and currently uncontrolled. Most recent potassium levels reviewed-   Lab Results   Component Value Date    K 3.6 03/10/2024   Aggravated by IV Lasix diuresis.  Discontinue potassium replacement now that scheduled IV Lasix has been discontinued.  Replace as needed.  Check a.m. labs.    Elevated troponin  As above, see discussion for NSTEMI    CKD stage 3 due to type 2 diabetes mellitus  Creatine stable for now. BMP reviewed- noted Estimated Creatinine Clearance: 54.6 mL/min (based on SCr of 1.2 mg/dL). according to latest data. Based on current GFR, CKD stage is stage 3 - GFR 30-59.  Monitor UOP and " serial BMP and adjust therapy as needed. Renally dose meds. Avoid nephrotoxic medications and procedures    Controlled type 2 diabetes mellitus without complication, without long-term current use of insulin  Patient's FSGs are controlled on current medication regimen.  Last A1c reviewed-   Lab Results   Component Value Date    HGBA1C 7.1 (H) 11/28/2023     Most recent fingerstick glucose reviewed-   Recent Labs   Lab 03/09/24  1150 03/09/24  1521 03/09/24  2028 03/10/24  0411   POCTGLUCOSE 222* 249* 236* 213*       Current correctional scale  Low  Maintain anti-hyperglycemic dose as follows-   Antihyperglycemics (From admission, onward)      Start     Stop Route Frequency Ordered    03/10/24 1130  insulin detemir U-100 (Levemir) pen 8 Units         -- SubQ Daily 03/10/24 1101    02/26/24 1301  insulin aspart U-100 pen 0-5 Units         -- SubQ Before meals & nightly PRN 02/26/24 1204          Hold Oral hypoglycemics while patient is in the hospital. Add levemir 8 units subq daily. BG in 200s.      VTE Risk Mitigation (From admission, onward)           Ordered     IP VTE HIGH RISK PATIENT  Once         02/26/24 1204     Place sequential compression device  Until discontinued         02/26/24 1204                    Discharge Planning   DE:      Code Status: DNR   Is the patient medically ready for discharge?:     Reason for patient still in hospital (select all that apply): Patient trending condition, Treatment, and Consult recommendations  Discharge Plan A: Skilled Nursing Facility   Discharge Delays: None known at this time              Antonia Kapoor MD  Department of Hospital Medicine   '[facility]

## 2024-03-10 NOTE — SUBJECTIVE & OBJECTIVE
Past Medical History:   Diagnosis Date    Chest pain 2/26/2024    CKD stage 3 due to type 2 diabetes mellitus 2/18/2021    Coronary artery disease of native artery of native heart with stable angina pectoris 3/8/2024    DM (diabetes mellitus) 2014    BS didn't check 12/11/2019    DM (diabetes mellitus) 2014    BS didn't check 05/05/2022    Foreign body, eye     right eye    Hyperlipidemia     Hypertension     Need for shingles vaccine 11/20/2019    Normocytic anemia 3/4/2024    NSTEMI (non-ST elevated myocardial infarction) 2/27/2024    Pneumonia     Primary osteoarthritis of right knee 10/29/2021    Severe obesity (BMI 35.0-35.9 with comorbidity) 7/10/2019    Type 2 diabetes mellitus 2014    BS didn't check 12/12/2018       Past Surgical History:   Procedure Laterality Date    ABDOMINAL AORTOGRAPHY N/A 3/1/2024    Procedure: AORTOGRAM, ABDOMINAL;  Surgeon: Sebastian Aguillon MD;  Location: [facility] CATH LAB;  Service: Cardiology;  Laterality: N/A;    ANGIOGRAPHY OF AORTIC ARCH  3/1/2024    Procedure: Aortogram, Aortic Arch;  Surgeon: Sebastian Aguillon MD;  Location: [facility] CATH LAB;  Service: Cardiology;;    APPENDECTOMY      CATARACT EXTRACTION Bilateral     CPG    CATHETERIZATION OF BOTH LEFT AND RIGHT HEART N/A 3/1/2024    Procedure: CATHETERIZATION, HEART, BOTH LEFT AND RIGHT;  Surgeon: Sebastian Aguillon MD;  Location: [facility] CATH LAB;  Service: Cardiology;  Laterality: N/A;    ENDOSCOPIC ULTRASOUND OF UPPER GASTROINTESTINAL TRACT N/A 1/11/2024    Procedure: ULTRASOUND, UPPER GI TRACT, ENDOSCOPIC;  Surgeon: Evens Monsalve MD;  Location: [facility] ENDO ([address] [place] [address]);  Service: Endoscopy;  Laterality: N/A;    FLUOROSCOPY N/A 1/5/2024    Procedure: Fluoroscopy/MEDIPORT PLACEMENT;  Surgeon: Jaret Lu MD;  Location: [facility] CATH LAB;  Service: General;  Laterality: N/A;    VALVE STUDY-AORTIC  3/1/2024    Procedure: Valve study-aortic;  Surgeon: Sebastian Aguillon MD;  Location: [facility] CATH LAB;  Service: Cardiology;;        Review of patient's allergies indicates:   Allergen Reactions    Grass pollen-anai grass standard        Family History       Problem Relation (Age of Onset)    Allergic rhinitis Daughter    Cancer Son    Cataracts Father    Heart disease Mother, Father    Hyperlipidemia Father    Hypertension Father, Sister, Brother          Tobacco Use    Smoking status: Former     Current packs/day: 0.00     Average packs/day: 3.0 packs/day for 10.0 years (30.0 ttl pk-yrs)     Types: Cigarettes     Start date:      Quit date:      Years since quittin.2    Smokeless tobacco: Never   Substance and Sexual Activity    Alcohol use: No    Drug use: No    Sexual activity: Never         Review of Systems  Completed and negative except per hospital course/interval history   Objective:     Vital Signs (Most Recent):  Temp: 98 °F (36.7 °C) (03/10/24 1552)  Pulse: 98 (03/10/24 1552)  Resp: 18 (03/10/24 1552)  BP: (!) 106/58 (03/10/24 1552)  SpO2: (!) 91 % (03/10/24 1552) Vital Signs (24h Range):  Temp:  [97.7 °F (36.5 °C)-98.4 °F (36.9 °C)] 98 °F (36.7 °C)  Pulse:  [] 98  Resp:  [17-20] 18  SpO2:  [87 %-97 %] 91 %  BP: ()/(52-65) 106/58     Weight: 108.8 kg (239 lb 13.8 oz)  Body mass index is 34.42 kg/m².    No intake or output data in the 24 hours ending 03/10/24 1621     Physical Exam  Vitals reviewed.   Constitutional:       General: He is awake.      Appearance: He is obese.   HENT:      Head: Normocephalic and atraumatic.      Mouth/Throat:      Mouth: Mucous membranes are moist.      Pharynx: Oropharynx is clear.   Eyes:      Extraocular Movements: Extraocular movements intact.      Conjunctiva/sclera: Conjunctivae normal.   Cardiovascular:      Rate and Rhythm: Normal rate. Rhythm irregular.      Comments: 1+ edema in BLE  Pulmonary:      Effort: Pulmonary effort is normal.      Comments: Diminished in upper lung fields, basilar rales. 3 L NC. No obvious conversational dyspnea   Abdominal:      General:  Bowel sounds are normal.      Palpations: Abdomen is soft.   Musculoskeletal:      Cervical back: Normal range of motion and neck supple.   Skin:     General: Skin is warm and dry.      Findings: Bruising present.   Neurological:      General: No focal deficit present.      Mental Status: He is alert and oriented to person, place, and time.   Psychiatric:         Mood and Affect: Mood normal.         Behavior: Behavior is cooperative.         Thought Content: Thought content normal.         Lines/Drains/Airways       Central Venous Catheter Line  Duration                  PowerPort A Cath Single Lumen 01/05/24 1415 Internal Jugular Right 65 days              Peripheral Intravenous Line  Duration                  Peripheral IV - Single Lumen 03/06/24 1705 22 G Posterior;Right Forearm 3 days                    Significant Labs:    CBC/Anemia Profile:  Recent Labs   Lab 03/10/24  0349   WBC 13.22*   HGB 10.3*   HCT 32.3*      MCV 92   RDW 15.9*        Chemistries:  Recent Labs   Lab 03/10/24  0349   *   K 3.6   CL 95   CO2 26   BUN 42*   CREATININE 1.2   CALCIUM 9.1       All pertinent labs within the past 24 hours have been reviewed.    Significant Imaging:   I have reviewed all pertinent imaging results/findings within the past 24 hours.

## 2024-03-11 LAB
BACTERIA BLD CULT: NORMAL
POCT GLUCOSE: 221 MG/DL (ref 70–110)
POCT GLUCOSE: 232 MG/DL (ref 70–110)
POCT GLUCOSE: 263 MG/DL (ref 70–110)

## 2024-03-11 PROCEDURE — 25000242 PHARM REV CODE 250 ALT 637 W/ HCPCS: Mod: HCNC | Performed by: INTERNAL MEDICINE

## 2024-03-11 PROCEDURE — 25000003 PHARM REV CODE 250: Mod: HCNC | Performed by: NURSE PRACTITIONER

## 2024-03-11 PROCEDURE — 94761 N-INVAS EAR/PLS OXIMETRY MLT: CPT | Mod: HCNC

## 2024-03-11 PROCEDURE — 25000003 PHARM REV CODE 250: Mod: HCNC | Performed by: INTERNAL MEDICINE

## 2024-03-11 PROCEDURE — 21400001 HC TELEMETRY ROOM: Mod: HCNC

## 2024-03-11 PROCEDURE — 27000221 HC OXYGEN, UP TO 24 HOURS: Mod: HCNC

## 2024-03-11 PROCEDURE — 63600175 PHARM REV CODE 636 W HCPCS: Mod: HCNC | Performed by: NURSE PRACTITIONER

## 2024-03-11 PROCEDURE — 25000242 PHARM REV CODE 250 ALT 637 W/ HCPCS: Mod: HCNC | Performed by: SPECIALIST

## 2024-03-11 PROCEDURE — 99900035 HC TECH TIME PER 15 MIN (STAT): Mod: HCNC

## 2024-03-11 PROCEDURE — 97116 GAIT TRAINING THERAPY: CPT | Mod: HCNC

## 2024-03-11 PROCEDURE — 63600175 PHARM REV CODE 636 W HCPCS: Mod: HCNC | Performed by: INTERNAL MEDICINE

## 2024-03-11 PROCEDURE — 97110 THERAPEUTIC EXERCISES: CPT | Mod: HCNC

## 2024-03-11 PROCEDURE — 97530 THERAPEUTIC ACTIVITIES: CPT | Mod: HCNC

## 2024-03-11 PROCEDURE — 94640 AIRWAY INHALATION TREATMENT: CPT | Mod: HCNC

## 2024-03-11 RX ORDER — DEXTROSE MONOHYDRATE 50 MG/ML
INJECTION, SOLUTION INTRAVENOUS
Status: DISCONTINUED | OUTPATIENT
Start: 2024-03-11 | End: 2024-03-14 | Stop reason: HOSPADM

## 2024-03-11 RX ADMIN — MIDODRINE HYDROCHLORIDE 10 MG: 5 TABLET ORAL at 05:03

## 2024-03-11 RX ADMIN — MIDODRINE HYDROCHLORIDE 10 MG: 5 TABLET ORAL at 01:03

## 2024-03-11 RX ADMIN — INSULIN ASPART 1 UNITS: 100 INJECTION, SOLUTION INTRAVENOUS; SUBCUTANEOUS at 09:03

## 2024-03-11 RX ADMIN — DEXTROSE MONOHYDRATE: 50 INJECTION, SOLUTION INTRAVENOUS at 03:03

## 2024-03-11 RX ADMIN — INSULIN DETEMIR 8 UNITS: 100 INJECTION, SOLUTION SUBCUTANEOUS at 10:03

## 2024-03-11 RX ADMIN — IPRATROPIUM BROMIDE 0.5 MG: 0.5 SOLUTION RESPIRATORY (INHALATION) at 07:03

## 2024-03-11 RX ADMIN — LEVALBUTEROL HYDROCHLORIDE 1.25 MG: 0.63 SOLUTION RESPIRATORY (INHALATION) at 07:03

## 2024-03-11 RX ADMIN — ANASTROZOLE 1 MG: 1 TABLET, COATED ORAL at 09:03

## 2024-03-11 RX ADMIN — MIDODRINE HYDROCHLORIDE 10 MG: 5 TABLET ORAL at 08:03

## 2024-03-11 RX ADMIN — LACTOBACILLUS ACIDOPHILUS / LACTOBACILLUS BULGARICUS 1 EACH: 100 MILLION CFU STRENGTH GRANULES at 09:03

## 2024-03-11 RX ADMIN — Medication 400 MG: at 09:03

## 2024-03-11 RX ADMIN — METHYLPREDNISOLONE SODIUM SUCCINATE 40 MG: 40 INJECTION, POWDER, FOR SOLUTION INTRAMUSCULAR; INTRAVENOUS at 08:03

## 2024-03-11 RX ADMIN — CEFEPIME 2 G: 2 INJECTION, POWDER, FOR SOLUTION INTRAVENOUS at 03:03

## 2024-03-11 RX ADMIN — IPRATROPIUM BROMIDE 0.5 MG: 0.5 SOLUTION RESPIRATORY (INHALATION) at 02:03

## 2024-03-11 RX ADMIN — HYDROCODONE BITARTRATE AND ACETAMINOPHEN 1 TABLET: 5; 325 TABLET ORAL at 06:03

## 2024-03-11 RX ADMIN — CEFEPIME 2 G: 2 INJECTION, POWDER, FOR SOLUTION INTRAVENOUS at 04:03

## 2024-03-11 RX ADMIN — INSULIN ASPART 1 UNITS: 100 INJECTION, SOLUTION INTRAVENOUS; SUBCUTANEOUS at 12:03

## 2024-03-11 RX ADMIN — ATORVASTATIN CALCIUM 20 MG: 10 TABLET, FILM COATED ORAL at 09:03

## 2024-03-11 RX ADMIN — METHYLPREDNISOLONE SODIUM SUCCINATE 40 MG: 40 INJECTION, POWDER, FOR SOLUTION INTRAMUSCULAR; INTRAVENOUS at 06:03

## 2024-03-11 RX ADMIN — FLUTICASONE PROPIONATE 100 MCG: 50 SPRAY, METERED NASAL at 09:03

## 2024-03-11 RX ADMIN — LEVALBUTEROL HYDROCHLORIDE 1.25 MG: 0.63 SOLUTION RESPIRATORY (INHALATION) at 02:03

## 2024-03-11 NOTE — PLAN OF CARE
A225/A225 JOHANNY Dickerson . is a 84 y.o.male admitted on 2/26/2024 for NSTEMI (non-ST elevated myocardial infarction)   Code Status: DNR MRN: [id number]   Review of patient's allergies indicates:   Allergen Reactions    Grass pollen-anai grass standard      Past Medical History:   Diagnosis Date    Chest pain 2/26/2024    CKD stage 3 due to type 2 diabetes mellitus 2/18/2021    Coronary artery disease of native artery of native heart with stable angina pectoris 3/8/2024    DM (diabetes mellitus) 2014    BS didn't check 12/11/2019    DM (diabetes mellitus) 2014    BS didn't check 05/05/2022    Foreign body, eye     right eye    Hyperlipidemia     Hypertension     Need for shingles vaccine 11/20/2019    Normocytic anemia 3/4/2024    NSTEMI (non-ST elevated myocardial infarction) 2/27/2024    Pneumonia     Primary osteoarthritis of right knee 10/29/2021    Severe obesity (BMI 35.0-35.9 with comorbidity) 7/10/2019    Type 2 diabetes mellitus 2014    BS didn't check 12/12/2018      PRN meds    sodium chloride 0.9%, , Continuous PRN  sodium chloride 0.9%, , PRN  acetaminophen, 650 mg, Q4H PRN  acetaminophen, 650 mg, Q4H PRN  cyclobenzaprine, 10 mg, TID PRN  dextrose 10%, 12.5 g, PRN  dextrose 10%, 25 g, PRN  dextrose 5 % (D5W), , PRN  glucagon (human recombinant), 1 mg, PRN  glucose, 16 g, PRN  glucose, 24 g, PRN  HYDROcodone-acetaminophen, 1 tablet, Q6H PRN  insulin aspart U-100, 0-5 Units, QID (AC + HS) PRN  melatonin, 6 mg, Nightly PRN  naloxone, 0.02 mg, PRN  ondansetron, 8 mg, Q8H PRN  ondansetron, 4 mg, Q8H PRN  sodium chloride, 1 spray, PRN  sodium chloride 0.9%, 10 mL, Q12H PRN      Chart check completed. Will continue plan of care.      Orientation: oriented x 4  [place] Coma Scale Score: 15     Lead Monitored: Lead II Rhythm: normal sinus rhythm Frequency/Ectopy: PACs, frequent, PVCs (Couplets)  Cardiac/Telemetry Box Number: [id number]  VTE Required Core Measure: Patient refused interventions Last Bowel Movement:  03/10/24  Diet Cardiac Fluid - 1200mL; Standard Tray  Voiding Characteristics: other (see comments) (brief)  Sunil Score: 19  Fall Risk Score: 13  Accucheck [x]   Freq? AC/HS     Lines/Drains/Airways       Central Venous Catheter Line  Duration                  PowerPort A Cath Single Lumen 01/05/24 1415 Internal Jugular Right 65 days              Peripheral Intravenous Line  Duration                  Peripheral IV - Single Lumen 03/10/24 1800 22 G Posterior;Right Hand <1 day

## 2024-03-11 NOTE — PLAN OF CARE
A225/A225 JOHANNY Dickerson . is a 84 y.o.male admitted on 2/26/2024 for NSTEMI (non-ST elevated myocardial infarction)   Code Status: DNR MRN: [id number]   Review of patient's allergies indicates:   Allergen Reactions    Grass pollen-anai grass standard      Past Medical History:   Diagnosis Date    Chest pain 2/26/2024    CKD stage 3 due to type 2 diabetes mellitus 2/18/2021    Coronary artery disease of native artery of native heart with stable angina pectoris 3/8/2024    DM (diabetes mellitus) 2014    BS didn't check 12/11/2019    DM (diabetes mellitus) 2014    BS didn't check 05/05/2022    Foreign body, eye     right eye    Hyperlipidemia     Hypertension     Need for shingles vaccine 11/20/2019    Normocytic anemia 3/4/2024    NSTEMI (non-ST elevated myocardial infarction) 2/27/2024    Pneumonia     Primary osteoarthritis of right knee 10/29/2021    Severe obesity (BMI 35.0-35.9 with comorbidity) 7/10/2019    Type 2 diabetes mellitus 2014    BS didn't check 12/12/2018      PRN meds    sodium chloride 0.9%, , Continuous PRN  sodium chloride 0.9%, , PRN  acetaminophen, 650 mg, Q4H PRN  acetaminophen, 650 mg, Q4H PRN  cyclobenzaprine, 10 mg, TID PRN  dextrose 10%, 12.5 g, PRN  dextrose 10%, 25 g, PRN  glucagon (human recombinant), 1 mg, PRN  glucose, 16 g, PRN  glucose, 24 g, PRN  HYDROcodone-acetaminophen, 1 tablet, Q6H PRN  insulin aspart U-100, 0-5 Units, QID (AC + HS) PRN  melatonin, 6 mg, Nightly PRN  naloxone, 0.02 mg, PRN  ondansetron, 8 mg, Q8H PRN  ondansetron, 4 mg, Q8H PRN  sodium chloride, 1 spray, PRN  sodium chloride 0.9%, 10 mL, Q12H PRN      Chart check completed. Will continue plan of care.      Orientation: oriented x 4  [place] Coma Scale Score: 15     Lead Monitored: Lead II Rhythm: normal sinus rhythm Frequency/Ectopy: frequent, PVCs, [place] (Couplets)  Cardiac/Telemetry Box Number: [id number]  VTE Required Core Measure: Patient refused interventions Last Bowel Movement: 03/10/24  Diet Cardiac  Fluid - 1200mL; Standard Tray  Voiding Characteristics: external catheter, urgency (during sleeping hours d/t urgency and SOBE)  Sunil Score: 19  Fall Risk Score: 16  Accucheck [x]   Freq?      Lines/Drains/Airways       Central Venous Catheter Line  Duration                  PowerPort A Cath Single Lumen 01/05/24 1415 Internal Jugular Right 65 days              Peripheral Intravenous Line  Duration                  Peripheral IV - Single Lumen 03/10/24 1800 22 G Posterior;Right Hand <1 day                       Problem: Adult Inpatient Plan of Care  Goal: Plan of Care Review  Outcome: Ongoing, Progressing  Goal: Patient-Specific Goal (Individualized)  Outcome: Ongoing, Progressing  Goal: Absence of Hospital-Acquired Illness or Injury  Outcome: Ongoing, Progressing  Goal: Optimal Comfort and Wellbeing  Outcome: Ongoing, Progressing  Goal: Readiness for Transition of Care  Outcome: Ongoing, Progressing

## 2024-03-11 NOTE — PT/OT/SLP PROGRESS
Physical Therapy Treatment    Patient Name:  Mark Dickerson Sr.   MRN:  [id number]    Recommendations:     Discharge Recommendations: Moderate Intensity Therapy  Discharge Equipment Recommendations: to be determined by next level of care  Barriers to discharge: None    Assessment:     Mark Dickerson Sr. is a 84 y.o. male admitted with a medical diagnosis of NSTEMI (non-ST elevated myocardial infarction).  He presents with the following impairments/functional limitations: weakness, impaired endurance, impaired functional mobility, gait instability, impaired balance, pain, decreased safety awareness, impaired cardiopulmonary response to activity.    Rehab Prognosis: Good; patient would benefit from acute skilled PT services to address these deficits and reach maximum level of function.    Recent Surgery: Procedure(s) (LRB):  CATHETERIZATION, HEART, BOTH LEFT AND RIGHT (N/A)  Aortogram, Aortic Arch  Valve study-aortic  AORTOGRAM, ABDOMINAL (N/A) 10 Days Post-Op    Plan:     During this hospitalization, patient to be seen 3 x/week to address the identified rehab impairments via gait training, therapeutic exercises, therapeutic activities and progress toward the following goals:    Plan of Care Expires:  03/12/24    Subjective     Chief Complaint: Pt is motivated to participate  Patient/Family Comments/goals: none stated  Pain/Comfort:  Pain Rating 1: 0/10      Objective:     Communicated with nurse Banda and epic chart review prior to session.  Patient found up in chair with chair alarm, peripheral IV, telemetry, oxygen upon PT entry to room.     General Precautions: Standard, fall  Orthopedic Precautions: N/A  Braces: N/A  Respiratory Status: Nasal cannula, flow 5 L/min     Functional Mobility:  Gait belt applied - Yes  Bed Mobility  Seated in chair at start of session and returned to chair  Transfers  Sit to Stand: contact guard assistance with rolling walker  Gait  Patient ambulated 20ft x2 with rolling walker and  "contact guard assistance. Patient demonstrates occasional unsteady gait, no gross LOB.  Balance  Sitting: contact guard assistance  Standing: contact guard assistance  Frequent rest needed due to SOB and O2 desat, educated about pursed lip breathing technique and cued for use with mobility. C/o dizziness after ambulating 35ft, SpO2 84%, recovered to 91% with rest.   All lines remained intact throughout ambulation trail.      Therapeutic Exercise  Patient performed 1 set(s) of 10 repetitions of the following seated exercises: ankle pumps, long arc quads, and marches for bilateral LE with Activity pacing.   Patient required skilled PT for instruction of exercises and appropriate cues to perform exercises safely and appropriately.    AM-PAC 6 CLICK MOBILITY  Turning over in bed (including adjusting bedclothes, sheets and blankets)?: 1 (NT)  Sitting down on and standing up from a chair with arms (e.g., wheelchair, bedside commode, etc.): 3  Moving from lying on back to sitting on the side of the bed?: 1 (NT)  Moving to and from a bed to a chair (including a wheelchair)?: 3  Need to walk in hospital room?: 3  Climbing 3-5 steps with a railing?: 1 (NT)  Basic Mobility Total Score: 12       Treatment & Education:  Reviewed role of PT in acute care and POC. Pt tolerated interventions well. Reviewed importance of OOB activities, activity pacing, and HEP (marching/hip flex, hip abd, heel slides/LAQ, quad sets, ankle pumps) in order to maintain/regain strength. Encouraged to sit up in chair for all meals. Reviewed proper use of RW for safety and to reduce risk of falling. Reviewed "call don't fall" policy and increased risk of falling due to weakness, instructed to utilize call bell for assistance with all transfers. Pt agreeable to all requests.    Patient left up in chair with all lines intact, call button in reach, and chair alarm on, nurse notified..    GOALS:   Multidisciplinary Problems       Physical Therapy Goals       "    Problem: Physical Therapy    Goal Priority Disciplines Outcome Goal Variances Interventions   Physical Therapy Goal     PT, PT/OT Ongoing, Progressing     Description: Pt will perform bed mobility with SPV in order to participate in EOB activity.  Pt will perform transfers with SPV in order to participate in OOB activity.  Pt will ambulate 150 ft SBA with LRAD in order to participate in daily tasks.                        Time Tracking:     PT Received On: 03/11/24  PT Start Time: 1540     PT Stop Time: 1605  PT Total Time (min): 25 min     Billable Minutes: Gait Training 10min and Therapeutic Exercise 15min    Treatment Type: Treatment  PT/PTA: PT     Number of PTA visits since last PT visit: 0     03/11/2024

## 2024-03-11 NOTE — PLAN OF CARE
PT EVAL complete. Required CGA for STS, ambulated 20ft x2 CGA using RW. Pt c/o dizziness, SpO2 84%, recovered to 91%. Recommending moderate intensity therapy upon d/c.

## 2024-03-11 NOTE — SUBJECTIVE & OBJECTIVE
Interval History: KRISS, on 4 L NC this AM, weaning as able. Continue cefepime, IV steroids. Lasix as needed. Not medically stable for discharge to SNF. Cardiology and Pulmonary following.     Review of Systems   All other systems reviewed and are negative.    Objective:     Vital Signs (Most Recent):  Temp: 98 °F (36.7 °C) (03/11/24 0413)  Pulse: 95 (03/11/24 1514)  Resp: 18 (03/11/24 1431)  BP: 125/64 (03/11/24 0413)  SpO2: (!) 91 % (03/11/24 1431) Vital Signs (24h Range):  Temp:  [97.5 °F (36.4 °C)-98.5 °F (36.9 °C)] 98 °F (36.7 °C)  Pulse:  [] 95  Resp:  [18-20] 18  SpO2:  [91 %-95 %] 91 %  BP: (106-125)/(63-66) 125/64     Weight: 108.8 kg (239 lb 13.8 oz)  Body mass index is 34.42 kg/m².    Intake/Output Summary (Last 24 hours) at 3/11/2024 1640  Last data filed at 3/10/2024 2011  Gross per 24 hour   Intake 99.34 ml   Output --   Net 99.34 ml         Physical Exam  Vitals and nursing note reviewed.   Constitutional:       General: He is not in acute distress.     Appearance: Normal appearance.   Cardiovascular:      Rate and Rhythm: Normal rate and regular rhythm.      Heart sounds: No murmur heard.  Pulmonary:      Effort: Pulmonary effort is normal. No respiratory distress.      Breath sounds: Normal breath sounds. No wheezing.      Comments: 6 L HFNC  Decreased BS bilaterally  Abdominal:      General: There is no distension.      Palpations: Abdomen is soft.      Tenderness: There is no abdominal tenderness.   Neurological:      Mental Status: He is alert.             Significant Labs: All pertinent labs within the past 24 hours have been reviewed.  Recent Lab Results         03/11/24  0644   03/11/24  0018        POCT Glucose 232   221               Significant Imaging: I have reviewed all pertinent imaging results/findings within the past 24 hours.    X-Ray Chest 1 View   Final Result      In comparison to the prior study, there is no adverse interval changes         Electronically signed by: Earl  MD Vanessa   Date:    03/11/2024   Time:    07:26      X-Ray Chest 1 View   Final Result      Findings are similar to prior         Electronically signed by: Earl Mendez MD   Date:    03/08/2024   Time:    07:21      CT Chest Without Contrast   Final Result      Interval worsening from comparison CT with diffuse multifocal airspace opacification superimposed upon interstitial thickening.  Considerations are multifocal pneumonia, pulmonary edema, pulmonary hemorrhage.      New small left pleural effusion      All CT scans at this facility use dose modulation, iterative reconstruction and/or weight based dosing when appropriate to reduce radiation dose to as low as reasonably achievable.         Electronically signed by: Evens Alba MD   Date:    03/06/2024   Time:    13:06      X-Ray Chest AP Portable   Final Result      In comparison to the prior study, there is no adverse interval changes         Electronically signed by: Earl Mendez MD   Date:    03/06/2024   Time:    09:12      X-Ray Chest AP Portable   Final Result      There has been interval worsening of the appearance of the lungs. There is a moderate amount of interstitial and alveolar opacities seen in both lungs.  This is characteristic of pulmonary edema.         Electronically signed by: Marco Elliott MD   Date:    03/03/2024   Time:    19:00      X-Ray Chest 1 View   Final Result      As above.         Electronically signed by: Spenser Stallworth   Date:    02/28/2024   Time:    11:07      X-Ray Chest AP Portable   Final Result      Worsening moderate bilateral infiltrates.  Follow-up is recommended.         Electronically signed by: Shyla Giles MD   Date:    02/27/2024   Time:    10:18      CTA Chest Non-Coronary (PE Studies)   Final Result      No pulmonary embolism.  No dissection.  Atherosclerotic changes.  Mild interstitial opacities.  Correlate clinically for element of fibrosis versus CHF.  Pulmonary micronodularity.  Recommend follow-up.       All CT scans   are performed using dose optimization techniques including the following: automated exposure control; adjustment of the mA and/or kV; use of iterative reconstruction technique.  Dose modulation was employed for ALARA by means of: Automated exposure control; adjustment of the mA and/or kV according to patient size (this includes techniques or standardized protocols for targeted exams where dose is matched to indication/reason for exam; i.e. extremities or head); and/or use of iterative reconstructive technique.         Electronically signed by: Jhoan Roman   Date:    02/26/2024   Time:    19:11      CT Head Without Contrast   Final Result      Chronic microvascular ischemic changes.  No intracranial hemorrhage.      All CT scans at this facility use dose modulation, iterative reconstruction, and/or weight based dosing when appropriate to reduce radiation dose to as low as reasonable achievable.         Electronically signed by: Earl Mendez MD   Date:    02/26/2024   Time:    10:36      X-Ray Chest AP Portable   Final Result      No acute process seen.         Electronically signed by: Earl Mendez MD   Date:    02/26/2024   Time:    10:02

## 2024-03-11 NOTE — PLAN OF CARE
PEARL received a call from Shayna, with [facility], inquiring about when patient would be ready for DC. Per Shayna, they need to resubmit for insurance authorization and was inquiring about DC date.   Per Attending, Patient is on 6L of oxygen so potentially not ready for DC yet.   PEARL will update Shayna with [facility].     10 26 SW meet with patient at bedside upon request, Patient was inquiring about update on DC plan. PEARL stated that [facility] still has bed available and can get new insurance authorization upon becoming medically stable. Patient and family at bedside verbalized understanding.    10 58 SW sent updated clinicals to [facility].     PEARL will continue to follow and assist as needed.

## 2024-03-11 NOTE — PLAN OF CARE
Continue OT POC.  CGA for t/fs and ambulation 40ft with RW.   Dizziness, desat to 84% after ambulating

## 2024-03-11 NOTE — PT/OT/SLP PROGRESS
Occupational Therapy   Treatment    Name: Mark Dickerson Sr.  MRN: [id number]  Admitting Diagnosis:  NSTEMI (non-ST elevated myocardial infarction)  10 Days Post-Op    Recommendations:     Discharge Recommendations: Moderate Intensity Therapy  Discharge Equipment Recommendations:  to be determined by next level of care  Barriers to discharge:  None    Assessment:     Mark Dickerson Sr. is a 84 y.o. male with a medical diagnosis of NSTEMI (non-ST elevated myocardial infarction).  He presents with the following performance deficits affecting function are weakness, impaired endurance, impaired self care skills, impaired functional mobility, gait instability, impaired balance, decreased safety awareness, impaired cardiopulmonary response to activity.     Rehab Prognosis:  Good; patient would benefit from acute skilled OT services to address these deficits and reach maximum level of function.       Plan:     Patient to be seen 2 x/week to address the above listed problems via self-care/home management, therapeutic activities, therapeutic exercises  Plan of Care Expires: 03/12/24  Plan of Care Reviewed with: patient    Subjective     Chief Complaint: dizziness, SOB  Patient/Family Comments/goals: get better  Pain/Comfort:  Pain Rating 1: 0/10    Objective:     Communicated with: Nurse and epic chart review prior to session.  Patient found up in chair with peripheral IV, telemetry, oxygen upon OT entry to room.    General Precautions: Standard, fall, respiratory    Orthopedic Precautions:N/A  Braces: N/A  Respiratory Status: Nasal cannula, flow 5 L/min    Functional Mobility/Transfers:  Patient completed Sit <> Stand Transfer with contact guard assistance  with  rolling walker   Patient completed Bed <> Chair Transfer using Stand Pivot technique with contact guard assistance with rolling walker  Functional Mobility: Patient completed x40ft functional mobility with CGA and RW to increase dynamic standing balance and activity  tolerance needed for ADL completion. Pt reporting increased dizziness and SOB with ambulation. SpO2 84-87% following ambulation, while resting in chair. Recovered to 90% at end of session.     [facility] 6 Click ADL: 18    Treatment & Education:  Educated pt on pursed lip breathing and importance of activity pacing for SOB and dizziness.  Pt performed x10 reps BUE AROM therex in chair:  Elbow flexion/ext  Wrist flexion/ext  Digit flexion/ext  Patient educated on role of OT in acute setting and benefits of participation. Educated on techniques to use to increase independence and decrease fall risk with functional transfers. Educated on importance of OOB activity and calling for A to transfer back to bed. Encouraged completion of B UE AROM therex throughout the day to tolerance to increase functional strength and activity tolerance. Educated patient on importance of increased tolerance to upright position and direct impact on CV endurance and strength. Patient encouraged to sit up in chair for a minimum of 2 consecutive hours per day.  Patient stated understanding and in agreement with POC.     Patient left up in chair with all lines intact, call button in reach, chair alarm on, and nurse notified    GOALS:   Multidisciplinary Problems       Occupational Therapy Goals          Problem: Occupational Therapy    Goal Priority Disciplines Outcome Interventions   Occupational Therapy Goal     OT, PT/OT Ongoing, Progressing    Description: Goals to be met by: 3/17/24     Patient will increase functional independence with ADLs by performing:    UE Dressing with [place].  Grooming while standing at sink with [place].  Toileting from toilet with [place] for hygiene and clothing management.   Toilet transfer to toilet with Modified [place].  Upper extremity exercise program x15 reps per handout, with independence.                       Time Tracking:     OT Date of Treatment: 03/11/24  OT Start Time: 1540  OT  Stop Time: 1605  OT Total Time (min): 25 min    Billable Minutes:Therapeutic Activity 15  Therapeutic Exercise 10    OT/SMITH: OT     Susie Tillman OT     3/11/2024

## 2024-03-12 LAB
ALBUMIN SERPL BCP-MCNC: 2.7 G/DL (ref 3.5–5.2)
ALP SERPL-CCNC: 81 U/L (ref 55–135)
ALT SERPL W/O P-5'-P-CCNC: 79 U/L (ref 10–44)
ANION GAP SERPL CALC-SCNC: 12 MMOL/L (ref 8–16)
AST SERPL-CCNC: 54 U/L (ref 10–40)
BASOPHILS # BLD AUTO: 0.02 K/UL (ref 0–0.2)
BASOPHILS NFR BLD: 0.1 % (ref 0–1.9)
BILIRUB SERPL-MCNC: 0.7 MG/DL (ref 0.1–1)
BNP SERPL-MCNC: 423 PG/ML (ref 0–99)
BUN SERPL-MCNC: 47 MG/DL (ref 8–23)
CALCIUM SERPL-MCNC: 8.9 MG/DL (ref 8.7–10.5)
CHLORIDE SERPL-SCNC: 99 MMOL/L (ref 95–110)
CO2 SERPL-SCNC: 26 MMOL/L (ref 23–29)
CREAT SERPL-MCNC: 1.1 MG/DL (ref 0.5–1.4)
DIFFERENTIAL METHOD BLD: ABNORMAL
EOSINOPHIL # BLD AUTO: 0 K/UL (ref 0–0.5)
EOSINOPHIL NFR BLD: 0.1 % (ref 0–8)
ERYTHROCYTE [DISTWIDTH] IN BLOOD BY AUTOMATED COUNT: 15.7 % (ref 11.5–14.5)
EST. GFR  (NO RACE VARIABLE): >60 ML/MIN/1.73 M^2
GLUCOSE SERPL-MCNC: 228 MG/DL (ref 70–110)
HCT VFR BLD AUTO: 30.3 % (ref 40–54)
HGB BLD-MCNC: 10.1 G/DL (ref 14–18)
IMM GRANULOCYTES # BLD AUTO: 0.13 K/UL (ref 0–0.04)
IMM GRANULOCYTES NFR BLD AUTO: 0.7 % (ref 0–0.5)
LYMPHOCYTES # BLD AUTO: 0.9 K/UL (ref 1–4.8)
LYMPHOCYTES NFR BLD: 5 % (ref 18–48)
MCH RBC QN AUTO: 29.5 PG (ref 27–31)
MCHC RBC AUTO-ENTMCNC: 33.3 G/DL (ref 32–36)
MCV RBC AUTO: 89 FL (ref 82–98)
MONOCYTES # BLD AUTO: 0.6 K/UL (ref 0.3–1)
MONOCYTES NFR BLD: 3.2 % (ref 4–15)
NEUTROPHILS # BLD AUTO: 16.2 K/UL (ref 1.8–7.7)
NEUTROPHILS NFR BLD: 90.9 % (ref 38–73)
NRBC BLD-RTO: 0 /100 WBC
PLATELET # BLD AUTO: 353 K/UL (ref 150–450)
PMV BLD AUTO: 11 FL (ref 9.2–12.9)
POCT GLUCOSE: 241 MG/DL (ref 70–110)
POCT GLUCOSE: 285 MG/DL (ref 70–110)
POCT GLUCOSE: 321 MG/DL (ref 70–110)
POTASSIUM SERPL-SCNC: 3.5 MMOL/L (ref 3.5–5.1)
PROT SERPL-MCNC: 6.8 G/DL (ref 6–8.4)
RBC # BLD AUTO: 3.42 M/UL (ref 4.6–6.2)
SODIUM SERPL-SCNC: 137 MMOL/L (ref 136–145)
WBC # BLD AUTO: 17.87 K/UL (ref 3.9–12.7)

## 2024-03-12 PROCEDURE — 83880 ASSAY OF NATRIURETIC PEPTIDE: CPT | Mod: HCNC | Performed by: HOSPITALIST

## 2024-03-12 PROCEDURE — 25000003 PHARM REV CODE 250: Mod: HCNC | Performed by: INTERNAL MEDICINE

## 2024-03-12 PROCEDURE — 27000221 HC OXYGEN, UP TO 24 HOURS: Mod: HCNC

## 2024-03-12 PROCEDURE — 63600175 PHARM REV CODE 636 W HCPCS: Mod: HCNC | Performed by: HOSPITALIST

## 2024-03-12 PROCEDURE — 85025 COMPLETE CBC W/AUTO DIFF WBC: CPT | Mod: HCNC | Performed by: HOSPITALIST

## 2024-03-12 PROCEDURE — 63600175 PHARM REV CODE 636 W HCPCS: Mod: HCNC | Performed by: INTERNAL MEDICINE

## 2024-03-12 PROCEDURE — 25000242 PHARM REV CODE 250 ALT 637 W/ HCPCS: Mod: HCNC | Performed by: SPECIALIST

## 2024-03-12 PROCEDURE — 94761 N-INVAS EAR/PLS OXIMETRY MLT: CPT | Mod: HCNC

## 2024-03-12 PROCEDURE — 97530 THERAPEUTIC ACTIVITIES: CPT | Mod: HCNC

## 2024-03-12 PROCEDURE — 80053 COMPREHEN METABOLIC PANEL: CPT | Mod: HCNC | Performed by: HOSPITALIST

## 2024-03-12 PROCEDURE — 83036 HEMOGLOBIN GLYCOSYLATED A1C: CPT | Mod: HCNC | Performed by: HOSPITALIST

## 2024-03-12 PROCEDURE — 25000242 PHARM REV CODE 250 ALT 637 W/ HCPCS: Mod: HCNC | Performed by: INTERNAL MEDICINE

## 2024-03-12 PROCEDURE — 94640 AIRWAY INHALATION TREATMENT: CPT | Mod: HCNC

## 2024-03-12 PROCEDURE — 21400001 HC TELEMETRY ROOM: Mod: HCNC

## 2024-03-12 PROCEDURE — 36415 COLL VENOUS BLD VENIPUNCTURE: CPT | Mod: HCNC,XB | Performed by: HOSPITALIST

## 2024-03-12 PROCEDURE — 99900035 HC TECH TIME PER 15 MIN (STAT): Mod: HCNC

## 2024-03-12 PROCEDURE — 97116 GAIT TRAINING THERAPY: CPT | Mod: HCNC

## 2024-03-12 PROCEDURE — 25000003 PHARM REV CODE 250: Mod: HCNC | Performed by: NURSE PRACTITIONER

## 2024-03-12 PROCEDURE — 63600175 PHARM REV CODE 636 W HCPCS: Mod: HCNC | Performed by: NURSE PRACTITIONER

## 2024-03-12 PROCEDURE — 25000003 PHARM REV CODE 250: Mod: HCNC | Performed by: HOSPITALIST

## 2024-03-12 RX ORDER — IBUPROFEN 200 MG
16 TABLET ORAL
Status: DISCONTINUED | OUTPATIENT
Start: 2024-03-12 | End: 2024-03-14 | Stop reason: HOSPADM

## 2024-03-12 RX ORDER — PREDNISONE 20 MG/1
20 TABLET ORAL 2 TIMES DAILY
Status: DISCONTINUED | OUTPATIENT
Start: 2024-03-12 | End: 2024-03-14 | Stop reason: HOSPADM

## 2024-03-12 RX ORDER — GLUCAGON 1 MG
1 KIT INJECTION
Status: DISCONTINUED | OUTPATIENT
Start: 2024-03-12 | End: 2024-03-14 | Stop reason: HOSPADM

## 2024-03-12 RX ORDER — INSULIN ASPART 100 [IU]/ML
0-10 INJECTION, SOLUTION INTRAVENOUS; SUBCUTANEOUS
Status: DISCONTINUED | OUTPATIENT
Start: 2024-03-12 | End: 2024-03-14 | Stop reason: HOSPADM

## 2024-03-12 RX ORDER — POTASSIUM CHLORIDE 750 MG/1
10 TABLET, EXTENDED RELEASE ORAL ONCE
Status: COMPLETED | OUTPATIENT
Start: 2024-03-12 | End: 2024-03-12

## 2024-03-12 RX ORDER — IBUPROFEN 200 MG
24 TABLET ORAL
Status: DISCONTINUED | OUTPATIENT
Start: 2024-03-12 | End: 2024-03-14 | Stop reason: HOSPADM

## 2024-03-12 RX ORDER — FUROSEMIDE 10 MG/ML
20 INJECTION INTRAMUSCULAR; INTRAVENOUS ONCE
Status: COMPLETED | OUTPATIENT
Start: 2024-03-12 | End: 2024-03-12

## 2024-03-12 RX ADMIN — INSULIN DETEMIR 8 UNITS: 100 INJECTION, SOLUTION SUBCUTANEOUS at 08:03

## 2024-03-12 RX ADMIN — LACTOBACILLUS ACIDOPHILUS / LACTOBACILLUS BULGARICUS 1 EACH: 100 MILLION CFU STRENGTH GRANULES at 09:03

## 2024-03-12 RX ADMIN — LEVALBUTEROL HYDROCHLORIDE 1.25 MG: 0.63 SOLUTION RESPIRATORY (INHALATION) at 07:03

## 2024-03-12 RX ADMIN — FLUTICASONE PROPIONATE 100 MCG: 50 SPRAY, METERED NASAL at 08:03

## 2024-03-12 RX ADMIN — INSULIN ASPART 3 UNITS: 100 INJECTION, SOLUTION INTRAVENOUS; SUBCUTANEOUS at 11:03

## 2024-03-12 RX ADMIN — MIDODRINE HYDROCHLORIDE 10 MG: 5 TABLET ORAL at 08:03

## 2024-03-12 RX ADMIN — HYDROCODONE BITARTRATE AND ACETAMINOPHEN 1 TABLET: 5; 325 TABLET ORAL at 09:03

## 2024-03-12 RX ADMIN — Medication 400 MG: at 08:03

## 2024-03-12 RX ADMIN — INSULIN DETEMIR 5 UNITS: 100 INJECTION, SOLUTION SUBCUTANEOUS at 06:03

## 2024-03-12 RX ADMIN — MIDODRINE HYDROCHLORIDE 10 MG: 5 TABLET ORAL at 11:03

## 2024-03-12 RX ADMIN — POTASSIUM CHLORIDE 10 MEQ: 750 TABLET, EXTENDED RELEASE ORAL at 01:03

## 2024-03-12 RX ADMIN — INSULIN ASPART 4 UNITS: 100 INJECTION, SOLUTION INTRAVENOUS; SUBCUTANEOUS at 04:03

## 2024-03-12 RX ADMIN — IPRATROPIUM BROMIDE 0.5 MG: 0.5 SOLUTION RESPIRATORY (INHALATION) at 01:03

## 2024-03-12 RX ADMIN — CEFEPIME 2 G: 2 INJECTION, POWDER, FOR SOLUTION INTRAVENOUS at 05:03

## 2024-03-12 RX ADMIN — Medication 6 MG: at 09:03

## 2024-03-12 RX ADMIN — INSULIN ASPART 2 UNITS: 100 INJECTION, SOLUTION INTRAVENOUS; SUBCUTANEOUS at 07:03

## 2024-03-12 RX ADMIN — IPRATROPIUM BROMIDE 0.5 MG: 0.5 SOLUTION RESPIRATORY (INHALATION) at 08:03

## 2024-03-12 RX ADMIN — MIDODRINE HYDROCHLORIDE 10 MG: 5 TABLET ORAL at 04:03

## 2024-03-12 RX ADMIN — LACTOBACILLUS ACIDOPHILUS / LACTOBACILLUS BULGARICUS 1 EACH: 100 MILLION CFU STRENGTH GRANULES at 08:03

## 2024-03-12 RX ADMIN — LEVALBUTEROL HYDROCHLORIDE 1.25 MG: 0.63 SOLUTION RESPIRATORY (INHALATION) at 01:03

## 2024-03-12 RX ADMIN — ANASTROZOLE 1 MG: 1 TABLET, COATED ORAL at 08:03

## 2024-03-12 RX ADMIN — FUROSEMIDE 20 MG: 10 INJECTION, SOLUTION INTRAMUSCULAR; INTRAVENOUS at 01:03

## 2024-03-12 RX ADMIN — CEFEPIME 2 G: 2 INJECTION, POWDER, FOR SOLUTION INTRAVENOUS at 03:03

## 2024-03-12 RX ADMIN — METHYLPREDNISOLONE SODIUM SUCCINATE 40 MG: 40 INJECTION, POWDER, FOR SOLUTION INTRAMUSCULAR; INTRAVENOUS at 09:03

## 2024-03-12 RX ADMIN — ATORVASTATIN CALCIUM 20 MG: 10 TABLET, FILM COATED ORAL at 09:03

## 2024-03-12 RX ADMIN — PREDNISONE 20 MG: 20 TABLET ORAL at 09:03

## 2024-03-12 RX ADMIN — LEVALBUTEROL HYDROCHLORIDE 1.25 MG: 0.63 SOLUTION RESPIRATORY (INHALATION) at 08:03

## 2024-03-12 RX ADMIN — IPRATROPIUM BROMIDE 0.5 MG: 0.5 SOLUTION RESPIRATORY (INHALATION) at 07:03

## 2024-03-12 NOTE — PLAN OF CARE
03/12/24 1607   Discharge Reassessment   Assessment Type Discharge Planning Reassessment   Did the patient's condition or plan change since previous assessment? No   Communicated DE with patient/caregiver Date not available/Unable to determine   Discharge Plan A Skilled Nursing Facility   DME Needed Upon Discharge  none   Transition of Care Barriers None   Why the patient remains in the hospital Requires continued medical care       Per Attending, Patient requiring 6L oxygen. Per [facility], they can only accommodate 3 L oxygen. Patient will receive diuresis and lasix to see if that helps with oxygen needs.  Per Attending, if [facility] can accommodate, they can resubmit for insurance authorization tomorrow.      SW will continue to follow and assist as needed.

## 2024-03-12 NOTE — PROGRESS NOTES
"[facility]  Progress Note    Patient Name: Mark Dickerson Sr.  MRN: [id number]  Patient Class: IP- Inpatient   Admission Date: 2/26/2024  Length of Stay: 14 days  Attending Physician: Judah Oden MD  Primary Care Provider: Tatyana Cannon MD        Subjective:     Principal Problem:NSTEMI (non-ST elevated myocardial infarction)        HPI:  Mark Dickerson Sr. is a 84 y.o. male patient with a PMHx of metastatic breast cancer (on chemo), HLD, HTN, DM II, CKD who presents to the ED for evaluation of AMS which onset this morning PTA. Per daughter in law she visited the pt early this morning and states that he was normal for a short period before becoming extremely disoriented with jumbled speech.  Patient mental status returned to baseline piror to ed arrival.  Patient reports he feels "dehydrated", he also reports diarrhea earlier last week but this has since resolved.  Patient denies any fever, chills, N/V, SOB and chest pain.   In the ED, work up notable for WBC 14, Na 130, K 3.0, mag 1.5, , Trop 0.7.  EKG with SR PACs, ST and T wave abnormality. CT head negative for acute processes.  Chest xray no acute processes. Patient started on IVF and given zofran for nausea.  Patient will be admitted to observation for electrolyte imbalances, elevated trop.  Cards consulted.     Code Status DNR  Surrogate Decision maker daughter    Overview/Hospital Course:  Patient is currently admitted for NSTEMI, SIRS criteria, severe leukocytosis, hypokalemia, hypomagnesemia, chronic kidney disease stage 3, diabetes mellitus type 2, left breast cancer in a male patient, acute systolic CHF exacerbation, normocytic anemia, transaminitis, and severe aortic stenosis.  Consults on this admission include Cardiology, Hematology Oncology, Pulmonary, and Wound Care.  Echocardiogram 02/26/2024 with EF 55-60% with indeterminate diastolic function.  Right and left heart catheterization 03/01/2024 with " "elevated filling pressures, severe aortic stenosis,  RCA with collaterals, EF 30%.  Cardiology recommends continuing medical management for NSTEMI and will discuss outpatient TAVR evaluation for severe aortic stenosis.  Initiated IV Lasix diuresis for CHF exacerbation with evidence of volume overload.  Chest x-ray 03/03/2024 with interval worsening with moderate interstitial and alveolar opacities of bilateral lungs consistent with pulmonary edema.  BNP had trended upward and is currently 590-->repeat 505 on 3/6.  Hematology oncology has seen the patient in consultation and felt that leukocytosis is related to Neulasta and acute cardiac event. Patient did receive empiric IV Rocephin for 4 days 2/28-03/02/2024.  No localizing source of infection was identified.  COVID-19 negative (x2), influenza a/B negative, bcx were negative, urinalysis negative, lactic acid level 1.0, procalcitonin level 0.14.  CTA of chest 2/24/24 with no evidence of PE, pulmonary fibrosis versus CHF findings with recommendation for follow-up imaging.  Repeat chest x-ray 03/06/2024 with diffuse pulmonary infiltrates seen throughout lungs concerning for interstitial edema or interstitial pneumonia, no pleural effusions appreciated.  CT scan of chest without contrast 3/6  with "interval worsening from comparison CT with diffuse multifocal airspace opacification superimposed upon interstitial thickening, considerations are multifocal pneumonia, pulmonary edema, pulmonary hemorrhage; new small left pleural effusion." Pulmonary did see the patient in consultation and feels that abnormal CT scan of chest most likely related to fluid however okay with continued IV antibiotics for now.  Continue IV cefepime.  IV vancomycin was discontinued as MRSA culture was negative.  Continue to replace electrolytes.  Patient with episodes of desaturation with hypoxia.  Continue O2 supplementation, continuous pulse oximetry monitoring, frequent incentive " spirometry, Xopenex/Atrovent nebs q.6 hours, IV Solu-Medrol 60 mg q.6 hours (steroids started by Pulmonary on 03/08/2024).  IV Lasix diuresis had to be discontinued due to severe aortic stenosis and hypotension.  Will use p.r.n. Lasix and fluid restriction as per Cardiology.    Interval History: KRISS, on 4 L NC this AM, weaning as able. Continue cefepime, IV steroids. Lasix as needed. Not medically stable for discharge to SNF. Cardiology and Pulmonary following.     Review of Systems   All other systems reviewed and are negative.    Objective:     Vital Signs (Most Recent):  Temp: 98 °F (36.7 °C) (03/11/24 0413)  Pulse: 95 (03/11/24 1514)  Resp: 18 (03/11/24 1431)  BP: 125/64 (03/11/24 0413)  SpO2: (!) 91 % (03/11/24 1431) Vital Signs (24h Range):  Temp:  [97.5 °F (36.4 °C)-98.5 °F (36.9 °C)] 98 °F (36.7 °C)  Pulse:  [] 95  Resp:  [18-20] 18  SpO2:  [91 %-95 %] 91 %  BP: (106-125)/(63-66) 125/64     Weight: 108.8 kg (239 lb 13.8 oz)  Body mass index is 34.42 kg/m².    Intake/Output Summary (Last 24 hours) at 3/11/2024 1640  Last data filed at 3/10/2024 2011  Gross per 24 hour   Intake 99.34 ml   Output --   Net 99.34 ml         Physical Exam  Vitals and nursing note reviewed.   Constitutional:       General: He is not in acute distress.     Appearance: Normal appearance.   Cardiovascular:      Rate and Rhythm: Normal rate and regular rhythm.      Heart sounds: No murmur heard.  Pulmonary:      Effort: Pulmonary effort is normal. No respiratory distress.      Breath sounds: Normal breath sounds. No wheezing.      Comments: 6 L HFNC  Decreased BS bilaterally  Abdominal:      General: There is no distension.      Palpations: Abdomen is soft.      Tenderness: There is no abdominal tenderness.   Neurological:      Mental Status: He is alert.             Significant Labs: All pertinent labs within the past 24 hours have been reviewed.  Recent Lab Results         03/11/24  0644   03/11/24  0018        POCT Glucose  232   221               Significant Imaging: I have reviewed all pertinent imaging results/findings within the past 24 hours.    X-Ray Chest 1 View   Final Result      In comparison to the prior study, there is no adverse interval changes         Electronically signed by: Earl Mendez MD   Date:    03/11/2024   Time:    07:26      X-Ray Chest 1 View   Final Result      Findings are similar to prior         Electronically signed by: Earl Mendez MD   Date:    03/08/2024   Time:    07:21      CT Chest Without Contrast   Final Result      Interval worsening from comparison CT with diffuse multifocal airspace opacification superimposed upon interstitial thickening.  Considerations are multifocal pneumonia, pulmonary edema, pulmonary hemorrhage.      New small left pleural effusion      All CT scans at this facility use dose modulation, iterative reconstruction and/or weight based dosing when appropriate to reduce radiation dose to as low as reasonably achievable.         Electronically signed by: Evens Alba MD   Date:    03/06/2024   Time:    13:06      X-Ray Chest AP Portable   Final Result      In comparison to the prior study, there is no adverse interval changes         Electronically signed by: Earl Mendez MD   Date:    03/06/2024   Time:    09:12      X-Ray Chest AP Portable   Final Result      There has been interval worsening of the appearance of the lungs. There is a moderate amount of interstitial and alveolar opacities seen in both lungs.  This is characteristic of pulmonary edema.         Electronically signed by: Marco Elliott MD   Date:    03/03/2024   Time:    19:00      X-Ray Chest 1 View   Final Result      As above.         Electronically signed by: Spenser Stallworth   Date:    02/28/2024   Time:    11:07      X-Ray Chest AP Portable   Final Result      Worsening moderate bilateral infiltrates.  Follow-up is recommended.         Electronically signed by: Shyla Giles MD   Date:    02/27/2024    Time:    10:18      CTA Chest Non-Coronary (PE Studies)   Final Result      No pulmonary embolism.  No dissection.  Atherosclerotic changes.  Mild interstitial opacities.  Correlate clinically for element of fibrosis versus CHF.  Pulmonary micronodularity.  Recommend follow-up.      All CT scans   are performed using dose optimization techniques including the following: automated exposure control; adjustment of the mA and/or kV; use of iterative reconstruction technique.  Dose modulation was employed for ALARA by means of: Automated exposure control; adjustment of the mA and/or kV according to patient size (this includes techniques or standardized protocols for targeted exams where dose is matched to indication/reason for exam; i.e. extremities or head); and/or use of iterative reconstructive technique.         Electronically signed by: Jhoan Roman   Date:    02/26/2024   Time:    19:11      CT Head Without Contrast   Final Result      Chronic microvascular ischemic changes.  No intracranial hemorrhage.      All CT scans at this facility use dose modulation, iterative reconstruction, and/or weight based dosing when appropriate to reduce radiation dose to as low as reasonable achievable.         Electronically signed by: Earl Mendez MD   Date:    02/26/2024   Time:    10:36      X-Ray Chest AP Portable   Final Result      No acute process seen.         Electronically signed by: Earl Mendez MD   Date:    02/26/2024   Time:    10:02            Assessment/Plan:      * NSTEMI (non-ST elevated myocardial infarction)  -No prior hx of CAD  -Troponin max 2.789  -EKG with SR PACs and ST-Twave abnormalities  -echocardiogram 02/26/2024 with EF 55-60% with indeterminate diastolic function   -right and left heart catheterization 03/01/2024 with increased filling pressures, severe aortic stenosis,  RCA with collaterals, EF 30%  -continue medical management; continue aspirin, Lipitor, and midodrine. Lopressor held due to  "low BP.  -Cardiology following/managing        Elevated troponin  As above, see discussion for NSTEMI    SIRS (systemic inflammatory response syndrome)  -WBC 34.56-->11.54-->13.22, afebrile  -COVID-19 negative, influenza a/B negative, procalcitonin level 0.14-->0.12, lactic acid level 1.0, urinalysis negative, blood cultures negative  -CTA of chest 02/24/2024 with no evidence of PE, pulmonary fibrosis versus CHF findings with recommendations for follow-up imaging  -repeat CT chest 3/6 with "interval worsening from comparison CT with diffuse multifocal airspace opacification superimposed upon interstitial thickening, considerations are multifocal pneumonia, pulmonary edema, pulmonary hemorrhage; new small left pleural effusion   -patient did receive empiric IV ceftriaxone for 4 days 2/28-03/02/2024  -hematology oncology has seen the patient in consultation and feels severe leukocytosis related to recent Neulasta and acute cardiac event  -given interval worsening in CT scan, persistent leukocytosis, and underlying breast cancer, Pulmonary was consulted for further evaluation and treatment recommendations--> abnormal CT scan felt to be most likely related to pulmonary edema  -On IV cefepime d#5; vancomycin discontinued 3/9 (received 4 days).  -additional evaluation ordered 3/6 with sputum culture with normal respiratory ana, MRSA culture negative, and blood cultures with no growth to date      Hypokalemia  Patient has hypokalemia which is Acute on Chronic and currently uncontrolled. Most recent potassium levels reviewed-   Lab Results   Component Value Date    K 3.6 03/10/2024   Aggravated by IV Lasix diuresis.  Discontinue potassium replacement now that scheduled IV Lasix has been discontinued.  Replace as needed.  Check a.m. labs.    Hypomagnesemia  Patient has Abnormal Magnesium: hypomagnesemia. Will continue to monitor electrolytes closely. Will replace the affected electrolytes and repeat labs to be done after " "interventions completed. The patient's magnesium results have been reviewed and are listed below.  No results for input(s): "MG" in the last 24 hours.   Continue Mag-Ox 400 mg p.o. daily.    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 BMP reviewed- noted Estimated Creatinine Clearance: 54.6 mL/min (based on SCr of 1.2 mg/dL). according to latest data. Based on current GFR, CKD stage is stage 3 - GFR 30-59.  Monitor UOP and serial BMP and adjust therapy as needed. Renally dose meds. Avoid nephrotoxic medications and procedures    Controlled type 2 diabetes mellitus without complication, without long-term current use of insulin  Patient's FSGs are controlled on current medication regimen.  Last A1c reviewed-   Lab Results   Component Value Date    HGBA1C 7.1 (H) 11/28/2023     Most recent fingerstick glucose reviewed-   Recent Labs   Lab 03/09/24  1150 03/09/24  1521 03/09/24 2028 03/10/24  0411   POCTGLUCOSE 222* 249* 236* 213*       Current correctional scale  Low  Maintain anti-hyperglycemic dose as follows-   Antihyperglycemics (From admission, onward)      Start     Stop Route Frequency Ordered    03/10/24 1130  insulin detemir U-100 (Levemir) pen 8 Units         -- SubQ Daily 03/10/24 1101    02/26/24 1301  insulin aspart U-100 pen 0-5 Units         -- SubQ Before meals & nightly PRN 02/26/24 1204          Hold Oral hypoglycemics while patient is in the hospital. Add levemir 8 units subq daily. BG in 200s.    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 Mendoza at [facility], last chemo session one week prior.  Patient was seen by Hematology Oncology due to significant leukocytosis felt to be related to Neulasta and recent cardiac event.  Dr. Baugh with Hematology Oncology recommends discontinuing all Herceptin products due to cardiac deterioration.  Consider aromatase inhibitor +/- Lupron.  Follow up " outpatient.    Abnormal CT of the chest        Acute HFrEF (heart failure with reduced ejection fraction)        Hypoxia  Hypoxia related to CHF exacerbation and pulmonary edema.  Prn lasix only now due to hypotension with severe AS. Continue Xopenex/Atrovent nebs q.6 hours, continuous pulse oximetry monitoring, and encourage incentive spirometry q.4 hours while awake.  Pulmonary added IV Solu-Medrol 60 mg q.6 hours as of 03/08/2024.  Aspiration precautions and speech therapy evaluation.  Stat ABG reviewed and consistent with primary metabolic alkalosis with secondary respiratory alkalosis.  Continue O2 supplementation and wean as tolerated.      Transaminitis  Transaminitis with minimal trend downward. Patient denies abdominal pain and has no tenderness to palpation on exam.  Suspect related to hepatic congestion with CHF exacerbation.     Normocytic anemia  Patient's anemia is currently controlled. Has not received any PRBCs to date. Etiology likely d/t chronic disease due to Malignancy  Current CBC reviewed-   Lab Results   Component Value Date    HGB 10.3 (L) 03/10/2024    HCT 32.3 (L) 03/10/2024     Monitor serial CBC and transfuse if patient becomes hemodynamically unstable, symptomatic or H/H drops below 7/21.    Acute diastolic CHF (congestive heart failure)  Patient is identified as having Diastolic (HFpEF) heart failure that is Acute. CHF is currently uncontrolled due to Pulmonary edema/pleural effusion on CXR. Latest ECHO performed and demonstrates- Results for orders placed during the hospital encounter of 02/26/24.      Echo    Interpretation Summary    Left Ventricle: The left ventricle is normal in size. There is concentric remodeling. There is normal systolic function with a visually estimated ejection fraction of 55 - 60%. There is indeterminate diastolic function.    Right Ventricle: Right ventricle was not well visualized due to poor acoustic window. Normal right ventricular cavity size. Systolic  "function is normal.    Pulmonary Artery: The estimated pulmonary artery systolic pressure is 37 mmHg.    IVC/SVC: Normal venous pressure at 3 mmHg.  . Continue lasix and monitor clinical status closely. Monitor on telemetry. Monitor strict Is&Os and daily weights.  Cardiology on board already for NSTEMI. Continue to stress to patient importance of self efficacy and  on diet for CHF. Last BNP reviewed- and noted below   Recent Labs   Lab 03/06/24  0450   *     -cardiac catheterization 03/01/2024 with EF 30%  -Increased lasix to 40 mg IV bid as of 3/4 due to pitting edema to BLE, dyspnea requiring O2 supplementation, and abnormal CXR with pulmonary edema. Lasix stopped 3/8 due to hypotension and severe AS. Use prn lasix as per cardiology.  -Fluid restrict 1200 mL.  Strict Is&Os and daily weights.   -Lopressor has been held due to relative hypotension  -CT scan of chest without contrast 3/6 with "interval worsening from comparison CT with diffuse multifocal airspace opacification superimposed upon interstitial thickening, considerations are multifocal pneumonia, pulmonary edema, pulmonary hemorrhage; new small left pleural effusion."   -chest x-ray 03/08/2024 with diffuse pulmonary infiltrates, trace pleural effusion on the left, similar to prior chest x-ray  -Pending transfer to [facility]    Severe aortic stenosis  Cardiology plans to discuss outpatient TAVR evaluation.  Monitor volume status and BP. Prn lasix only now.         VTE Risk Mitigation (From admission, onward)           Ordered     IP VTE HIGH RISK PATIENT  Once         02/26/24 1204     Place sequential compression device  Until discontinued         02/26/24 1204                    Discharge Planning   DE:      Code Status: DNR   Is the patient medically ready for discharge?:     Reason for patient still in hospital (select all that apply): Laboratory test, Treatment, Imaging, and Consult recommendations  Discharge Plan A: Skilled Nursing " Facility   Discharge Delays: None known at this time              Judah Oden MD  Department of Hospital Medicine   [facility] - Telemetry ([facility])

## 2024-03-12 NOTE — SUBJECTIVE & OBJECTIVE
Interval History: KRISS, on 6 L NC. , give one time dose Lasix 20 mg IV. Will transition IV steroids to PO today. PT/OT recc IVETTE, SNF placement pending medically stability and insurance authorization.    Review of Systems   All other systems reviewed and are negative.    Objective:     Vital Signs (Most Recent):  Temp: 97.7 °F (36.5 °C) (03/12/24 1641)  Pulse: 93 (03/12/24 1641)  Resp: 18 (03/12/24 1641)  BP: 127/63 (03/12/24 1641)  SpO2: (!) 92 % (03/12/24 1641) Vital Signs (24h Range):  Temp:  [97.6 °F (36.4 °C)-98.4 °F (36.9 °C)] 97.7 °F (36.5 °C)  Pulse:  [] 93  Resp:  [16-20] 18  SpO2:  [90 %-98 %] 92 %  BP: (100-127)/(56-71) 127/63     Weight: 98.4 kg (216 lb 14.9 oz)  Body mass index is 31.13 kg/m².    Intake/Output Summary (Last 24 hours) at 3/12/2024 1701  Last data filed at 3/12/2024 0800  Gross per 24 hour   Intake 400 ml   Output 550 ml   Net -150 ml           Physical Exam  Vitals and nursing note reviewed.   Constitutional:       General: He is not in acute distress.     Appearance: Normal appearance.   Cardiovascular:      Rate and Rhythm: Normal rate and regular rhythm.      Heart sounds: No murmur heard.  Pulmonary:      Effort: Pulmonary effort is normal. No respiratory distress.      Breath sounds: Normal breath sounds. No wheezing.      Comments: 6 L HFNC  Decreased BS bilaterally  Abdominal:      General: There is no distension.      Palpations: Abdomen is soft.      Tenderness: There is no abdominal tenderness.   Neurological:      Mental Status: He is alert.             Significant Labs: All pertinent labs within the past 24 hours have been reviewed.  Recent Lab Results  (Last 5 results in the past 24 hours)        03/12/24  1623   03/12/24  1215   03/12/24  1132   03/12/24  0737   03/12/24  0500        Albumin         2.7       ALP         81       ALT         79       Anion Gap         12       AST         54       Baso #         0.02       Basophil %         0.1       BILIRUBIN  TOTAL         0.7  Comment: For infants and newborns, interpretation of results should be based  on gestational age, weight and in agreement with clinical  observations.    Premature Infant recommended reference ranges:  Up to 24 hours.............<8.0 mg/dL  Up to 48 hours............<12.0 mg/dL  3-5 days..................<15.0 mg/dL  6-29 days.................<15.0 mg/dL         BNP   423  Comment: Values of less than 100 pg/ml are consistent with non-CHF populations.             BUN         47       Calcium         8.9       Chloride         99       CO2         26       Creatinine         1.1       Differential Method         Automated       eGFR         >60       Eos #         0.0       Eos %         0.1       Glucose         228       Gran # (ANC)         16.2       Gran %         90.9       Hematocrit         30.3       Hemoglobin         10.1       Immature Grans (Abs)         0.13  Comment: Mild elevation in immature granulocytes is non specific and   can be seen in a variety of conditions including stress response,   acute inflammation, trauma and pregnancy. Correlation with other   laboratory and clinical findings is essential.         Immature Granulocytes         0.7       Lymph #         0.9       Lymph %         5.0       MCH         29.5       MCHC         33.3       MCV         89       Mono #         0.6       Mono %         3.2       MPV         11.0       nRBC         0       Platelet Count         353       POCT Glucose 321     285   241         Potassium         3.5       PROTEIN TOTAL         6.8       RBC         3.42       RDW         15.7       Sodium         137       WBC         17.87                              Significant Imaging: I have reviewed all pertinent imaging results/findings within the past 24 hours.    X-Ray Chest 1 View   Final Result      In comparison to the prior study, there is no adverse interval changes         Electronically signed by: Earl Mendez MD    Date:    03/11/2024   Time:    07:26      X-Ray Chest 1 View   Final Result      Findings are similar to prior         Electronically signed by: Earl Mendez MD   Date:    03/08/2024   Time:    07:21      CT Chest Without Contrast   Final Result      Interval worsening from comparison CT with diffuse multifocal airspace opacification superimposed upon interstitial thickening.  Considerations are multifocal pneumonia, pulmonary edema, pulmonary hemorrhage.      New small left pleural effusion      All CT scans at this facility use dose modulation, iterative reconstruction and/or weight based dosing when appropriate to reduce radiation dose to as low as reasonably achievable.         Electronically signed by: Evens Alba MD   Date:    03/06/2024   Time:    13:06      X-Ray Chest AP Portable   Final Result      In comparison to the prior study, there is no adverse interval changes         Electronically signed by: Earl Mendez MD   Date:    03/06/2024   Time:    09:12      X-Ray Chest AP Portable   Final Result      There has been interval worsening of the appearance of the lungs. There is a moderate amount of interstitial and alveolar opacities seen in both lungs.  This is characteristic of pulmonary edema.         Electronically signed by: Marco Elliott MD   Date:    03/03/2024   Time:    19:00      X-Ray Chest 1 View   Final Result      As above.         Electronically signed by: Spenser Stallworth   Date:    02/28/2024   Time:    11:07      X-Ray Chest AP Portable   Final Result      Worsening moderate bilateral infiltrates.  Follow-up is recommended.         Electronically signed by: Shyla Giles MD   Date:    02/27/2024   Time:    10:18      CTA Chest Non-Coronary (PE Studies)   Final Result      No pulmonary embolism.  No dissection.  Atherosclerotic changes.  Mild interstitial opacities.  Correlate clinically for element of fibrosis versus CHF.  Pulmonary micronodularity.  Recommend follow-up.      All CT  scans   are performed using dose optimization techniques including the following: automated exposure control; adjustment of the mA and/or kV; use of iterative reconstruction technique.  Dose modulation was employed for ALARA by means of: Automated exposure control; adjustment of the mA and/or kV according to patient size (this includes techniques or standardized protocols for targeted exams where dose is matched to indication/reason for exam; i.e. extremities or head); and/or use of iterative reconstructive technique.         Electronically signed by: Jhoan Roman   Date:    02/26/2024   Time:    19:11      CT Head Without Contrast   Final Result      Chronic microvascular ischemic changes.  No intracranial hemorrhage.      All CT scans at this facility use dose modulation, iterative reconstruction, and/or weight based dosing when appropriate to reduce radiation dose to as low as reasonable achievable.         Electronically signed by: Earl Mendez MD   Date:    02/26/2024   Time:    10:36      X-Ray Chest AP Portable   Final Result      No acute process seen.         Electronically signed by: Earl Mendez MD   Date:    02/26/2024   Time:    10:02

## 2024-03-12 NOTE — PLAN OF CARE
Pt tolerated interventions well. Required CGA for STS, ambulated 20ft x2 CGA using RW. Recommending moderate intensity therapy upon d/c.

## 2024-03-12 NOTE — ASSESSMENT & PLAN NOTE
Initial hypoxia thought secondary to volume; however, persistent hypoxia and shortness of breath was noted  CT chest with differential diagnosis including: noncardiogenic edema vs interstitial edema vs pneumonitis vs lymphangitic spread, chemotherapy pulmonary toxicity  Continue diuresis as needed  Continue steroids  Follow chest imaging and ABGs as needed  Supplemental oxygen as needed to maintain saturations 90% or greater  IS, OOB and PT/OT as tolerated   Follow fever and WBC trends

## 2024-03-12 NOTE — PROGRESS NOTES
"[facility] Medicine  Progress Note    Patient Name: Mark Dickerson Sr.  MRN: [id number]  Patient Class: IP- Inpatient   Admission Date: 2/26/2024  Length of Stay: 14 days  Attending Physician: Judah Oden MD  Primary Care Provider: Tatyana Cannon MD        Subjective:     Principal Problem:NSTEMI (non-ST elevated myocardial infarction)        HPI:  Mark Dickerson Sr. is a 84 y.o. male patient with a PMHx of metastatic breast cancer (on chemo), HLD, HTN, DM II, CKD who presents to the ED for evaluation of AMS which onset this morning PTA. Per daughter in law she visited the pt early this morning and states that he was normal for a short period before becoming extremely disoriented with jumbled speech.  Patient mental status returned to baseline piror to ed arrival.  Patient reports he feels "dehydrated", he also reports diarrhea earlier last week but this has since resolved.  Patient denies any fever, chills, N/V, SOB and chest pain.   In the ED, work up notable for WBC 14, Na 130, K 3.0, mag 1.5, , Trop 0.7.  EKG with SR PACs, ST and T wave abnormality. CT head negative for acute processes.  Chest xray no acute processes. Patient started on IVF and given zofran for nausea.  Patient will be admitted to observation for electrolyte imbalances, elevated trop.  Cards consulted.     Code Status DNR  Surrogate Decision maker daughter    Overview/Hospital Course:  Patient is currently admitted for NSTEMI, SIRS criteria, severe leukocytosis, hypokalemia, hypomagnesemia, chronic kidney disease stage 3, diabetes mellitus type 2, left breast cancer in a male patient, acute systolic CHF exacerbation, normocytic anemia, transaminitis, and severe aortic stenosis.  Consults on this admission include Cardiology, Hematology Oncology, Pulmonary, and Wound Care.  Echocardiogram 02/26/2024 with EF 55-60% with indeterminate diastolic function.  Right and left heart catheterization 03/01/2024 with " "elevated filling pressures, severe aortic stenosis,  RCA with collaterals, EF 30%.  Cardiology recommends continuing medical management for NSTEMI and will discuss outpatient TAVR evaluation for severe aortic stenosis.  Initiated IV Lasix diuresis for CHF exacerbation with evidence of volume overload.  Chest x-ray 03/03/2024 with interval worsening with moderate interstitial and alveolar opacities of bilateral lungs consistent with pulmonary edema.  BNP had trended upward and is currently 590-->repeat 505 on 3/6.  Hematology oncology has seen the patient in consultation and felt that leukocytosis is related to Neulasta and acute cardiac event. Patient did receive empiric IV Rocephin for 4 days 2/28-03/02/2024.  No localizing source of infection was identified.  COVID-19 negative (x2), influenza a/B negative, bcx were negative, urinalysis negative, lactic acid level 1.0, procalcitonin level 0.14.  CTA of chest 2/24/24 with no evidence of PE, pulmonary fibrosis versus CHF findings with recommendation for follow-up imaging.  Repeat chest x-ray 03/06/2024 with diffuse pulmonary infiltrates seen throughout lungs concerning for interstitial edema or interstitial pneumonia, no pleural effusions appreciated.  CT scan of chest without contrast 3/6  with "interval worsening from comparison CT with diffuse multifocal airspace opacification superimposed upon interstitial thickening, considerations are multifocal pneumonia, pulmonary edema, pulmonary hemorrhage; new small left pleural effusion." Pulmonary did see the patient in consultation and feels that abnormal CT scan of chest most likely related to fluid however okay with continued IV antibiotics for now.  Continue IV cefepime.  IV vancomycin was discontinued as MRSA culture was negative.  Continue to replace electrolytes.  Patient with episodes of desaturation with hypoxia.  Continue O2 supplementation, continuous pulse oximetry monitoring, frequent incentive " spirometry, Xopenex/Atrovent nebs q.6 hours, IV Solu-Medrol 60 mg q.6 hours (steroids started by Pulmonary on 03/08/2024).  IV Lasix diuresis had to be discontinued due to severe aortic stenosis and hypotension.  Will use p.r.n. Lasix and fluid restriction as per Cardiology.    Interval History: KRISS, on 6 L NC. , give one time dose Lasix 20 mg IV. Will transition IV steroids to PO today. PT/OT recc IVETTE, SNF placement pending medically stability and insurance authorization.    Review of Systems   All other systems reviewed and are negative.    Objective:     Vital Signs (Most Recent):  Temp: 97.7 °F (36.5 °C) (03/12/24 1641)  Pulse: 93 (03/12/24 1641)  Resp: 18 (03/12/24 1641)  BP: 127/63 (03/12/24 1641)  SpO2: (!) 92 % (03/12/24 1641) Vital Signs (24h Range):  Temp:  [97.6 °F (36.4 °C)-98.4 °F (36.9 °C)] 97.7 °F (36.5 °C)  Pulse:  [] 93  Resp:  [16-20] 18  SpO2:  [90 %-98 %] 92 %  BP: (100-127)/(56-71) 127/63     Weight: 98.4 kg (216 lb 14.9 oz)  Body mass index is 31.13 kg/m².    Intake/Output Summary (Last 24 hours) at 3/12/2024 1701  Last data filed at 3/12/2024 0800  Gross per 24 hour   Intake 400 ml   Output 550 ml   Net -150 ml           Physical Exam  Vitals and nursing note reviewed.   Constitutional:       General: He is not in acute distress.     Appearance: Normal appearance.   Cardiovascular:      Rate and Rhythm: Normal rate and regular rhythm.      Heart sounds: No murmur heard.  Pulmonary:      Effort: Pulmonary effort is normal. No respiratory distress.      Breath sounds: Normal breath sounds. No wheezing.      Comments: 6 L HFNC  Decreased BS bilaterally  Abdominal:      General: There is no distension.      Palpations: Abdomen is soft.      Tenderness: There is no abdominal tenderness.   Neurological:      Mental Status: He is alert.             Significant Labs: All pertinent labs within the past 24 hours have been reviewed.  Recent Lab Results  (Last 5 results in the past 24  hours)        03/12/24  1623   03/12/24  1215   03/12/24  1132   03/12/24  0737   03/12/24  0500        Albumin         2.7       ALP         81       ALT         79       Anion Gap         12       AST         54       Baso #         0.02       Basophil %         0.1       BILIRUBIN TOTAL         0.7  Comment: For infants and newborns, interpretation of results should be based  on gestational age, weight and in agreement with clinical  observations.    Premature Infant recommended reference ranges:  Up to 24 hours.............<8.0 mg/dL  Up to 48 hours............<12.0 mg/dL  3-5 days..................<15.0 mg/dL  6-29 days.................<15.0 mg/dL         BNP   423  Comment: Values of less than 100 pg/ml are consistent with non-CHF populations.             BUN         47       Calcium         8.9       Chloride         99       CO2         26       Creatinine         1.1       Differential Method         Automated       eGFR         >60       Eos #         0.0       Eos %         0.1       Glucose         228       Gran # (ANC)         16.2       Gran %         90.9       Hematocrit         30.3       Hemoglobin         10.1       Immature Grans (Abs)         0.13  Comment: Mild elevation in immature granulocytes is non specific and   can be seen in a variety of conditions including stress response,   acute inflammation, trauma and pregnancy. Correlation with other   laboratory and clinical findings is essential.         Immature Granulocytes         0.7       Lymph #         0.9       Lymph %         5.0       MCH         29.5       MCHC         33.3       MCV         89       Mono #         0.6       Mono %         3.2       MPV         11.0       nRBC         0       Platelet Count         353       POCT Glucose 321     285   241         Potassium         3.5       PROTEIN TOTAL         6.8       RBC         3.42       RDW         15.7       Sodium         137       WBC         17.87                               Significant Imaging: I have reviewed all pertinent imaging results/findings within the past 24 hours.    X-Ray Chest 1 View   Final Result      In comparison to the prior study, there is no adverse interval changes         Electronically signed by: Earl Mendez MD   Date:    03/11/2024   Time:    07:26      X-Ray Chest 1 View   Final Result      Findings are similar to prior         Electronically signed by: Earl Mendez MD   Date:    03/08/2024   Time:    07:21      CT Chest Without Contrast   Final Result      Interval worsening from comparison CT with diffuse multifocal airspace opacification superimposed upon interstitial thickening.  Considerations are multifocal pneumonia, pulmonary edema, pulmonary hemorrhage.      New small left pleural effusion      All CT scans at this facility use dose modulation, iterative reconstruction and/or weight based dosing when appropriate to reduce radiation dose to as low as reasonably achievable.         Electronically signed by: Evens Alba MD   Date:    03/06/2024   Time:    13:06      X-Ray Chest AP Portable   Final Result      In comparison to the prior study, there is no adverse interval changes         Electronically signed by: Earl Mendez MD   Date:    03/06/2024   Time:    09:12      X-Ray Chest AP Portable   Final Result      There has been interval worsening of the appearance of the lungs. There is a moderate amount of interstitial and alveolar opacities seen in both lungs.  This is characteristic of pulmonary edema.         Electronically signed by: Marco Elliott MD   Date:    03/03/2024   Time:    19:00      X-Ray Chest 1 View   Final Result      As above.         Electronically signed by: Spenser Stallworth   Date:    02/28/2024   Time:    11:07      X-Ray Chest AP Portable   Final Result      Worsening moderate bilateral infiltrates.  Follow-up is recommended.         Electronically signed by: Shyla Giles MD   Date:    02/27/2024   Time:    10:18       CTA Chest Non-Coronary (PE Studies)   Final Result      No pulmonary embolism.  No dissection.  Atherosclerotic changes.  Mild interstitial opacities.  Correlate clinically for element of fibrosis versus CHF.  Pulmonary micronodularity.  Recommend follow-up.      All CT scans   are performed using dose optimization techniques including the following: automated exposure control; adjustment of the mA and/or kV; use of iterative reconstruction technique.  Dose modulation was employed for ALARA by means of: Automated exposure control; adjustment of the mA and/or kV according to patient size (this includes techniques or standardized protocols for targeted exams where dose is matched to indication/reason for exam; i.e. extremities or head); and/or use of iterative reconstructive technique.         Electronically signed by: Jhoan Roman   Date:    02/26/2024   Time:    19:11      CT Head Without Contrast   Final Result      Chronic microvascular ischemic changes.  No intracranial hemorrhage.      All CT scans at this facility use dose modulation, iterative reconstruction, and/or weight based dosing when appropriate to reduce radiation dose to as low as reasonable achievable.         Electronically signed by: Earl Mendez MD   Date:    02/26/2024   Time:    10:36      X-Ray Chest AP Portable   Final Result      No acute process seen.         Electronically signed by: Earl Mendez MD   Date:    02/26/2024   Time:    10:02            Assessment/Plan:      * NSTEMI (non-ST elevated myocardial infarction)  -No prior hx of CAD  -Troponin max 2.789  -EKG with SR PACs and ST-Twave abnormalities  -echocardiogram 02/26/2024 with EF 55-60% with indeterminate diastolic function   -right and left heart catheterization 03/01/2024 with increased filling pressures, severe aortic stenosis,  RCA with collaterals, EF 30%  -continue medical management; continue aspirin, Lipitor, and midodrine. Lopressor held due to low  "BP.  -Cardiology following/managing        Elevated troponin  As above, see discussion for NSTEMI    SIRS (systemic inflammatory response syndrome)  -WBC 34.56-->11.54-->13.22, afebrile  -COVID-19 negative, influenza a/B negative, procalcitonin level 0.14-->0.12, lactic acid level 1.0, urinalysis negative, blood cultures negative  -CTA of chest 02/24/2024 with no evidence of PE, pulmonary fibrosis versus CHF findings with recommendations for follow-up imaging  -repeat CT chest 3/6 with "interval worsening from comparison CT with diffuse multifocal airspace opacification superimposed upon interstitial thickening, considerations are multifocal pneumonia, pulmonary edema, pulmonary hemorrhage; new small left pleural effusion   -patient did receive empiric IV ceftriaxone for 4 days 2/28-03/02/2024  -hematology oncology has seen the patient in consultation and feels severe leukocytosis related to recent Neulasta and acute cardiac event  -given interval worsening in CT scan, persistent leukocytosis, and underlying breast cancer, Pulmonary was consulted for further evaluation and treatment recommendations--> abnormal CT scan felt to be most likely related to pulmonary edema  -On IV cefepime d#5; vancomycin discontinued 3/9 (received 4 days).  -additional evaluation ordered 3/6 with sputum culture with normal respiratory ana, MRSA culture negative, and blood cultures with no growth to date      Hypokalemia  Patient has hypokalemia which is Acute on Chronic and currently uncontrolled. Most recent potassium levels reviewed-   Lab Results   Component Value Date    K 3.6 03/10/2024   Aggravated by IV Lasix diuresis.  Discontinue potassium replacement now that scheduled IV Lasix has been discontinued.  Replace as needed.  Check a.m. labs.    Hypomagnesemia  Patient has Abnormal Magnesium: hypomagnesemia. Will continue to monitor electrolytes closely. Will replace the affected electrolytes and repeat labs to be done after " "interventions completed. The patient's magnesium results have been reviewed and are listed below.  No results for input(s): "MG" in the last 24 hours.   Continue Mag-Ox 400 mg p.o. daily.    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 BMP reviewed- noted Estimated Creatinine Clearance: 54.6 mL/min (based on SCr of 1.2 mg/dL). according to latest data. Based on current GFR, CKD stage is stage 3 - GFR 30-59.  Monitor UOP and serial BMP and adjust therapy as needed. Renally dose meds. Avoid nephrotoxic medications and procedures    Controlled type 2 diabetes mellitus without complication, without long-term current use of insulin  Patient's FSGs are controlled on current medication regimen.  Last A1c reviewed-   Lab Results   Component Value Date    HGBA1C 7.1 (H) 11/28/2023     Most recent fingerstick glucose reviewed-   Recent Labs   Lab 03/09/24  1150 03/09/24  1521 03/09/24 2028 03/10/24  0411   POCTGLUCOSE 222* 249* 236* 213*       Current correctional scale  Low  Maintain anti-hyperglycemic dose as follows-   Antihyperglycemics (From admission, onward)      Start     Stop Route Frequency Ordered    03/10/24 1130  insulin detemir U-100 (Levemir) pen 8 Units         -- SubQ Daily 03/10/24 1101    02/26/24 1301  insulin aspart U-100 pen 0-5 Units         -- SubQ Before meals & nightly PRN 02/26/24 1204          Hold Oral hypoglycemics while patient is in the hospital. Add levemir 8 units subq daily. BG in 200s.    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 Mendoza at [facility], last chemo session one week prior.  Patient was seen by Hematology Oncology due to significant leukocytosis felt to be related to Neulasta and recent cardiac event.  Dr. Baugh with Hematology Oncology recommends discontinuing all Herceptin products due to cardiac deterioration.  Consider aromatase inhibitor +/- Lupron.  Follow up " outpatient.    Abnormal CT of the chest        Acute HFrEF (heart failure with reduced ejection fraction)        Hypoxia  Hypoxia related to CHF exacerbation and pulmonary edema.  Prn lasix only now due to hypotension with severe AS. Continue Xopenex/Atrovent nebs q.6 hours, continuous pulse oximetry monitoring, and encourage incentive spirometry q.4 hours while awake.  Pulmonary added IV Solu-Medrol 60 mg q.6 hours as of 03/08/2024.  Aspiration precautions and speech therapy evaluation.  Stat ABG reviewed and consistent with primary metabolic alkalosis with secondary respiratory alkalosis.  Continue O2 supplementation and wean as tolerated.      Transaminitis  Transaminitis with minimal trend downward. Patient denies abdominal pain and has no tenderness to palpation on exam.  Suspect related to hepatic congestion with CHF exacerbation.     Normocytic anemia  Patient's anemia is currently controlled. Has not received any PRBCs to date. Etiology likely d/t chronic disease due to Malignancy  Current CBC reviewed-   Lab Results   Component Value Date    HGB 10.3 (L) 03/10/2024    HCT 32.3 (L) 03/10/2024     Monitor serial CBC and transfuse if patient becomes hemodynamically unstable, symptomatic or H/H drops below 7/21.    Acute diastolic CHF (congestive heart failure)  Patient is identified as having Diastolic (HFpEF) heart failure that is Acute. CHF is currently uncontrolled due to Pulmonary edema/pleural effusion on CXR. Latest ECHO performed and demonstrates- Results for orders placed during the hospital encounter of 02/26/24.      Echo    Interpretation Summary    Left Ventricle: The left ventricle is normal in size. There is concentric remodeling. There is normal systolic function with a visually estimated ejection fraction of 55 - 60%. There is indeterminate diastolic function.    Right Ventricle: Right ventricle was not well visualized due to poor acoustic window. Normal right ventricular cavity size. Systolic  "function is normal.    Pulmonary Artery: The estimated pulmonary artery systolic pressure is 37 mmHg.    IVC/SVC: Normal venous pressure at 3 mmHg.  . Continue lasix and monitor clinical status closely. Monitor on telemetry. Monitor strict Is&Os and daily weights.  Cardiology on board already for NSTEMI. Continue to stress to patient importance of self efficacy and  on diet for CHF. Last BNP reviewed- and noted below   Recent Labs   Lab 03/06/24  0450   *     -cardiac catheterization 03/01/2024 with EF 30%  -Increased lasix to 40 mg IV bid as of 3/4 due to pitting edema to BLE, dyspnea requiring O2 supplementation, and abnormal CXR with pulmonary edema. Lasix stopped 3/8 due to hypotension and severe AS. Use prn lasix as per cardiology.  -Fluid restrict 1200 mL.  Strict Is&Os and daily weights.   -Lopressor has been held due to relative hypotension  -CT scan of chest without contrast 3/6 with "interval worsening from comparison CT with diffuse multifocal airspace opacification superimposed upon interstitial thickening, considerations are multifocal pneumonia, pulmonary edema, pulmonary hemorrhage; new small left pleural effusion."   -chest x-ray 03/08/2024 with diffuse pulmonary infiltrates, trace pleural effusion on the left, similar to prior chest x-ray  -Pending transfer to [facility]    Severe aortic stenosis  Cardiology plans to discuss outpatient TAVR evaluation.  Monitor volume status and BP. Prn lasix only now.         VTE Risk Mitigation (From admission, onward)           Ordered     IP VTE HIGH RISK PATIENT  Once         02/26/24 1204     Place sequential compression device  Until discontinued         02/26/24 1204                    Discharge Planning   DE:      Code Status: DNR   Is the patient medically ready for discharge?:     Reason for patient still in hospital (select all that apply): Patient trending condition, Laboratory test, Consult recommendations, and Pending disposition  Discharge " Plan A: Skilled Nursing Facility   Discharge Delays: None known at this time              Judah Oden MD  Department of Hospital Medicine   [facility] ([facility])

## 2024-03-12 NOTE — PLAN OF CARE
A225/A225 JOHANNY Dickerson . is a 84 y.o.male admitted on 2/26/2024 for NSTEMI (non-ST elevated myocardial infarction)   Code Status: DNR MRN: [id number]   Review of patient's allergies indicates:   Allergen Reactions    Grass pollen-anai grass standard      Past Medical History:   Diagnosis Date    Chest pain 2/26/2024    CKD stage 3 due to type 2 diabetes mellitus 2/18/2021    Coronary artery disease of native artery of native heart with stable angina pectoris 3/8/2024    DM (diabetes mellitus) 2014    BS didn't check 12/11/2019    DM (diabetes mellitus) 2014    BS didn't check 05/05/2022    Foreign body, eye     right eye    Hyperlipidemia     Hypertension     Need for shingles vaccine 11/20/2019    Normocytic anemia 3/4/2024    NSTEMI (non-ST elevated myocardial infarction) 2/27/2024    Pneumonia     Primary osteoarthritis of right knee 10/29/2021    Severe obesity (BMI 35.0-35.9 with comorbidity) 7/10/2019    Type 2 diabetes mellitus 2014    BS didn't check 12/12/2018      PRN meds    sodium chloride 0.9%, , Continuous PRN  sodium chloride 0.9%, , PRN  acetaminophen, 650 mg, Q4H PRN  acetaminophen, 650 mg, Q4H PRN  cyclobenzaprine, 10 mg, TID PRN  dextrose 10%, 12.5 g, PRN  dextrose 10%, 25 g, PRN  dextrose 5 % (D5W), , PRN  glucagon (human recombinant), 1 mg, PRN  glucose, 16 g, PRN  glucose, 24 g, PRN  HYDROcodone-acetaminophen, 1 tablet, Q6H PRN  insulin aspart U-100, 0-5 Units, QID (AC + HS) PRN  melatonin, 6 mg, Nightly PRN  naloxone, 0.02 mg, PRN  ondansetron, 8 mg, Q8H PRN  ondansetron, 4 mg, Q8H PRN  sodium chloride, 1 spray, PRN  sodium chloride 0.9%, 10 mL, Q12H PRN      Patient noted to have frequent hyperglycemic episodes, Dr. Oden notified, PRN meds ordered and given. 22 G IV Left hand infiltrated, Dr. Oden notified, Infiltration protocol followed. 22 G placed in right hand and charted. Telesitter still in place, Bed alarm set, Call light within reach. Chart check completed. Will continue plan  of care.      Orientation: oriented x 4  William Coma Scale Score: 15     Lead Monitored: Lead II Rhythm: sinus tachycardia Frequency/Ectopy: frequent, PVCs, Bigeminy (couplets)  Cardiac/Telemetry Box Number: [id number]  VTE Required Core Measure: Patient refused interventions Last Bowel Movement: 03/11/24  Diet Cardiac Fluid - 1200mL; Standard Tray  Voiding Characteristics: hesitancy, dribbling  Sunil Score: 17  Fall Risk Score: 15  Accucheck [x]   Freq? ACHS     Lines/Drains/Airways       Central Venous Catheter Line  Duration                  PowerPort A Cath Single Lumen 01/05/24 1415 Internal Jugular Right 66 days              Peripheral Intravenous Line  Duration                  Peripheral IV - Single Lumen 03/11/24 2140 20 G Anterior;Distal;Left Forearm <1 day

## 2024-03-12 NOTE — PT/OT/SLP PROGRESS
Physical Therapy Treatment    Patient Name:  Mark Dickerson Sr.   MRN:  [id number]    Recommendations:     Discharge Recommendations: Moderate Intensity Therapy  Discharge Equipment Recommendations: to be determined by next level of care  Barriers to discharge: None    Assessment:     Mark Dickerson Sr. is a 84 y.o. male admitted with a medical diagnosis of NSTEMI (non-ST elevated myocardial infarction).  He presents with the following impairments/functional limitations: weakness, impaired endurance, impaired functional mobility, gait instability, impaired balance, pain, decreased safety awareness, decreased lower extremity function, impaired cardiopulmonary response to activity.    Rehab Prognosis: Good; patient would benefit from acute skilled PT services to address these deficits and reach maximum level of function.    Recent Surgery: Procedure(s) (LRB):  CATHETERIZATION, HEART, BOTH LEFT AND RIGHT (N/A)  Aortogram, Aortic Arch  Valve study-aortic  AORTOGRAM, ABDOMINAL (N/A) 11 Days Post-Op    Plan:     During this hospitalization, patient to be seen 3 x/week to address the identified rehab impairments via gait training, therapeutic activities, therapeutic exercises and progress toward the following goals:    Plan of Care Expires:  03/12/24    Subjective     Chief Complaint: Pt is motivated to participate  Patient/Family Comments/goals: none stated  Pain/Comfort:  Pain Rating 1: 0/10      Objective:     Communicated with nurse Quiroz and [facility] chart review prior to session.  Patient found up in chair with chair check, peripheral IV, telemetry, oxygen upon PT entry to room.     General Precautions: Standard, fall, respiratory  Orthopedic Precautions: N/A  Braces: N/A  Respiratory Status: Nasal cannula, flow 5 L/min     Functional Mobility:  Gait belt applied - Yes  Bed Mobility  Seated in chair at start of session and returned to chair  Transfers  Sit to Stand: contact guard assistance with rolling walker  Toilet  "Transfer to [facility]: minimum assistance with rolling walker using Step Transfer, MIN A to STS from [facility]  Total A for toileting hygiene. Pt soiled, tolerated standing 10 min to clean pt and to don clean brief. Linens replaced, assistance to don clean socks/gown.   Gait  Patient ambulated 20ft x2 with rolling walker and contact guard assistance. Patient demonstrates occasional unsteady gait, no gross LOB.  Standing rest break needed, increased time for completion  Balance  Sitting: contact guard assistance  Standing: contact guard assistance  No c/o dizziness, mild SOB, educated about pursed lip breathing technique and cued for use with mobility.  All lines remained intact throughout ambulation trail and portable Supplemental O2 6L utilized.    AM-PAC 6 CLICK MOBILITY  Turning over in bed (including adjusting bedclothes, sheets and blankets)?: 1 (NT)  Sitting down on and standing up from a chair with arms (e.g., wheelchair, bedside commode, etc.): 3  Moving from lying on back to sitting on the side of the bed?: 1 (NT)  Moving to and from a bed to a chair (including a wheelchair)?: 3  Need to walk in hospital room?: 3  Climbing 3-5 steps with a railing?: 1 (NT)  Basic Mobility Total Score: 12       Treatment & Education:  Reviewed role of PT in acute care and POC. Pt tolerated interventions well. Reviewed importance of OOB activities, activity pacing, and HEP (marching/hip flex, hip abd, heel slides/LAQ, quad sets, ankle pumps) in order to maintain/regain strength. Encouraged to sit up in chair for all meals. Reviewed proper use of RW for safety and to reduce risk of falling. Reviewed "call don't fall" policy and increased risk of falling due to weakness, instructed to utilize call bell for assistance with all transfers. Pt agreeable to all requests.    Patient left up in chair with all lines intact, call button in reach, and chair alarm on..    GOALS:   Multidisciplinary Problems       Physical Therapy Goals          " Problem: Physical Therapy    Goal Priority Disciplines Outcome Goal Variances Interventions   Physical Therapy Goal     PT, PT/OT Ongoing, Progressing     Description: Pt will perform bed mobility with SPV in order to participate in EOB activity.  Pt will perform transfers with SPV in order to participate in OOB activity.  Pt will ambulate 150 ft SBA with LRAD in order to participate in daily tasks.                        Time Tracking:     PT Received On: 03/12/24  PT Start Time: 1539     PT Stop Time: 1617  PT Total Time (min): 38 min     Billable Minutes: Gait Training 15min and Therapeutic Activity 23min    Treatment Type: Treatment  PT/PTA: PT     Number of PTA visits since last PT visit: 0     03/12/2024

## 2024-03-12 NOTE — ASSESSMENT & PLAN NOTE
Appeared volume overloaded on exam at time of admission   Monitor I&O trends and hemodynamics  Diurese as needed  Follow Cardiology recs

## 2024-03-12 NOTE — PLAN OF CARE
Nutrition Recommendations 3/12/24:  1. Recommend modify pt's diet to Diabetic 2400 calorie, Cardiac diet with 1200 mL fluid restriction (per MD/NP)   2. Recommend continue Boost Vanilla glucose control BID   3. Weekly weight  4. Collaboration with other providers     Goals:   1. Pt diet will be modified within 24 hours   2. Pt will continue to tolerate and consume 75% of EEN and EPN by RD follow up    Ofe Seth, Registration Eligible, Provisional LDN

## 2024-03-12 NOTE — MEDICAL/APP STUDENT
[facility]   Pulmonology  Progress Note    Patient Name: Mark Dickerson Sr.  MRN: [id number]  Admission Date: 2/26/2024  Hospital Length of Stay: 14 days  Code Status: DNR  Attending Provider: Judah Oden MD  Primary Care Provider: Tatyana Cannon MD   Principal Problem: NSTEMI (non-ST elevated myocardial infarction)    Subjective:   Mr. 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 Patient presented with altered mental status found to be in NSTEMI on 2/26/2024. He underwent Left heart catheterization and found to have single vessel disease, severe AS, moderated pulmonary HTN, and EF of 30%.   Interval History:   3/12: Patient resting comfortably in bed asleep on rounds. Currently on 5L NC humidified. Patient does not appear to be in distress. No changes to current plan at this time.     Objective:     Vital Signs (Most Recent):  Temp: 97.6 °F (36.4 °C) (03/12/24 0710)  Pulse: 99 (03/12/24 0937)  Resp: 17 (03/12/24 0710)  BP: (!) 101/59 (03/12/24 0710)  SpO2: 97 % (03/12/24 0805) Vital Signs (24h Range):  Temp:  [97.6 °F (36.4 °C)-98.4 °F (36.9 °C)] 97.6 °F (36.4 °C)  Pulse:  [90-99] 99  Resp:  [16-20] 17  SpO2:  [90 %-98 %] 97 %  BP: (101-123)/(59-71) 101/59     Weight: 98.4 kg (216 lb 14.9 oz)  Body mass index is 31.13 kg/m².      Intake/Output Summary (Last 24 hours) at 3/12/2024 1108  Last data filed at 3/12/2024 0800  Gross per 24 hour   Intake 400 ml   Output 550 ml   Net -150 ml       Physical Exam  Constitutional:       Appearance: Normal appearance.   Pulmonary:      Effort: Pulmonary effort is normal.   Skin:     General: Skin is warm and dry.   Neurological:      Mental Status: He is alert and oriented to person, place, and time.   Psychiatric:         Mood and Affect: Mood normal.         Vents:  Oxygen Concentration (%): 40 (03/12/24 0710)    Lines/Drains/Airways       Central Venous Catheter  Line  Duration                  PowerPort A Cath Single Lumen 01/05/24 1415 Internal Jugular Right 66 days              Peripheral Intravenous Line  Duration                  Peripheral IV - Single Lumen 03/11/24 2140 20 G Anterior;Distal;Left Forearm <1 day                    Significant Labs:    CBC/Anemia Profile:  Recent Labs   Lab 03/12/24  0500   WBC 17.87*   HGB 10.1*   HCT 30.3*      MCV 89   RDW 15.7*        Chemistries:  Recent Labs   Lab 03/12/24  0500      K 3.5   CL 99   CO2 26   BUN 47*   CREATININE 1.1   CALCIUM 8.9   ALBUMIN 2.7*   PROT 6.8   BILITOT 0.7   ALKPHOS 81   ALT 79*   AST 54*       Assessment/Plan:     Active Diagnoses:    Diagnosis Date Noted POA    PRINCIPAL PROBLEM:  NSTEMI (non-ST elevated myocardial infarction) [I21.4] 02/27/2024 Yes    Hypoxia [R09.02] 03/06/2024 Yes    Acute HFrEF (heart failure with reduced ejection fraction) [I50.21] 03/06/2024 Yes    Abnormal CT of the chest [R93.89] 03/06/2024 Yes    Acute diastolic CHF (congestive heart failure) [I50.31] 03/04/2024 No    Normocytic anemia [D64.9] 03/04/2024 Yes    Transaminitis [R74.01] 03/04/2024 Yes    Hypokalemia [E87.6] 02/26/2024 Yes    Hypomagnesemia [E83.42] 02/26/2024 Yes    SIRS (systemic inflammatory response syndrome) [R65.10] 02/26/2024 Yes    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, estrogen receptor positive [C50.022, Z17.0] 12/21/2023 Not Applicable    Severe aortic stenosis [I35.0] 04/03/2022 Yes    CKD stage 3 due to type 2 diabetes mellitus [E11.22, N18.30] 02/18/2021 Yes    Controlled type 2 diabetes mellitus without complication, without long-term current use of insulin [E11.9] 07/28/2017 Yes      Problems Resolved During this Admission:   Pulmonary  Hypoxia  Currently on 5L NC  Shortness of breath; possible volume overload; continual sob and hypoxia noted  CT Chest- Interval worsening from comparison CT with diffuse multifocal airspace opacification  superimposed upon interstitial thickening.  Considerations are multifocal pneumonia, pulmonary edema, pulmonary hemorrhage.New small left pleural effusion  Continue Diuresis- Lasix 20mg  Continue Nebs  Continue Steroids  Encourage use of IS and BRANDON Alvarez RN,BSN, Nurse Practitioner Student   Pulmonology

## 2024-03-12 NOTE — PROGRESS NOTES
[facility] - Telemetry ([facility])  Adult Nutrition  Progress Note    SUMMARY       Recommendations    Recommendation/Intervention:   1. Recommend modify pt's diet to Diabetic 2400 calorie, Cardiac diet with 1200 mL fluid restriction (per MD/NP)   2. Recommend continue Boost Vanilla glucose control BID   3. Weekly weight    Goals:   1. Pt diet will be modified within 24 hours   2. Pt will continue to tolerate and consume 75% of EEN and EPN by RD follow up  Nutrition Goal Status: continues     Assessment and Plan    Nutrition Problem  Inadequate protein-energy intake (Improving)   Altered nutrition related lab values    Related to (etiology):   Scheduled or planned medical therapy or medication that is predicted to decrease ability to consume sufficient energy, Scheduled or planned medical therapy or medication that is predicted to increase energy requirements  T2DM    Signs and Symptoms (as evidenced by):   Chemotherapy, Change in appetite or taste, Diarrhea, Estimated intake of food less than estimated needs  Elevated lab values: Glu 285 (H), A1C 7.1 (H)    Interventions/Recommendations (treatment strategy):  1. Decreased carbohydrate, sodium and fat, fluid modified diet   2. Commercial beverage  3. 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 Pt admitted for elevated troponin and electrolyte imbalances found during ER workup. Pt dx of NSTEMI. RD visited pt at bedside, pt reports no appetite, but states he still eats because he knows he has to. Pt states current PO intake is 60% + some snacks, as physicians encouraged him to eat regularly throughout the day to help him maintain his energy during chemo. Pt states eating small meals regularly throughout the day outisde of hospital, and prefers this, as he does not like all of the hospital food he has been brought. RD provided pt a menu to encourage self selection of foods to increase PO intake. Pt was also agreeable to RD recommendation of Boost to provide additional calories throughout the day at pt leisure. Pt denies difficulty chewing/swallowing, abdominal pain after eating, nausea, and vomiting. Pt reports an episode of diarrhea but states it was a side effect of one of the chemotherapy drugs and his chemo medication has already been changed to address this. Visul NFPE performed, pt appears nourished. RD will continue to follow and monitor pt during admit.    Nutrition Discharge Planning: Cardiac, diabetic diet, 1200 mL fluid restriction (per MD/NP)    Follow up:   3/12: RD follow up. Pt currently on Cardiac, 1200 mL fluid restriction diet + Boost plus TID. Hospital Medicine Physician noted on 3/11 that the pt was on 4 L NC and weaning as able, stated pt is not yet medically stable for d/c to SNF. Spoke to RN via secure chat, confirmed pt is consuming 100% PO intake of meals and Boost, and pt is not experiencing any N/V/D. Reviewed chart: LBM 3/11; Skin: red, dry, flaky; Altered skin: L chest pink/red, coccyx incontinence dermatitis pink/red/intact; Sunil score: 17 (mild risk); Edema: 1+ trace bilateral legs. Labs, meds, weight reviewed. Note Lasix, lactobacillus. Weight charted 3/6 239 lbs, 3/11 216 lbs (BMI 31.13, Obese), -23  "lb weight loss (9% wt change) x 6 days, note edema decreased, re weigh for accuracy warranted. RD will continue to follow and monitor pt's nutritional status during admit.     Nutrition Risk Screen    Nutrition Risk Screen: no indicators present    Nutrition/Diet History    Spiritual, Cultural Beliefs, Druze Practices, Values that Affect Care: no  Food Allergies: NKFA  Factors Affecting Nutritional Intake: altered taste (Pt reports altered taste is side effect of chemo)    Anthropometrics    Temp: 97.6 °F (36.4 °C)  Height: 5' 10" (177.8 cm)  Height (inches): 70 in  Weight Method: Bed Scale  Weight: 98.4 kg (216 lb 14.9 oz)  Weight (lb): 216.93 lb  Ideal Body Weight (IBW), Male: 166 lb  % Ideal Body Weight, Male (lb): 144.49 %  BMI (Calculated): 31.1  BMI Grade: 30 - 34.9- obesity - grade I     Wt Readings from Last 15 Encounters:   03/11/24 98.4 kg (216 lb 14.9 oz)   02/20/24 104.9 kg (231 lb 2.4 oz)   02/20/24 104.9 kg (231 lb 2.4 oz)   01/31/24 108.5 kg (239 lb 3.2 oz)   01/30/24 108.5 kg (239 lb 3.2 oz)   01/30/24 108.5 kg (239 lb 3.2 oz)   01/23/24 105.2 kg (232 lb)   01/11/24 105.2 kg (232 lb)   01/09/24 105.3 kg (232 lb 4.1 oz)   01/05/24 107 kg (235 lb 14.3 oz)   12/14/23 107 kg (236 lb)   12/01/23 107.2 kg (236 lb 7.1 oz)   11/30/23 112.9 kg (249 lb)   05/19/23 113 kg (249 lb 1.9 oz)   04/27/23 111.1 kg (244 lb 14.9 oz)     Lab/Procedures/Meds    Pertinent Labs Reviewed: reviewed  Pertinent Medications Reviewed: reviewed  CMP  Sodium   Date Value Ref Range Status   03/12/2024 137 136 - 145 mmol/L Final     Potassium   Date Value Ref Range Status   03/12/2024 3.5 3.5 - 5.1 mmol/L Final     Chloride   Date Value Ref Range Status   03/12/2024 99 95 - 110 mmol/L Final     CO2   Date Value Ref Range Status   03/12/2024 26 23 - 29 mmol/L Final     Glucose   Date Value Ref Range Status   03/12/2024 228 (H) 70 - 110 mg/dL Final     BUN   Date Value Ref Range Status   03/12/2024 47 (H) 8 - 23 mg/dL Final "     Creatinine   Date Value Ref Range Status   03/12/2024 1.1 0.5 - 1.4 mg/dL Final     Calcium   Date Value Ref Range Status   03/12/2024 8.9 8.7 - 10.5 mg/dL Final     Total Protein   Date Value Ref Range Status   03/12/2024 6.8 6.0 - 8.4 g/dL Final     Albumin   Date Value Ref Range Status   03/12/2024 2.7 (L) 3.5 - 5.2 g/dL Final     Total Bilirubin   Date Value Ref Range Status   03/12/2024 0.7 0.1 - 1.0 mg/dL Final     Comment:     For infants and newborns, interpretation of results should be based  on gestational age, weight and in agreement with clinical  observations.    Premature Infant recommended reference ranges:  Up to 24 hours.............<8.0 mg/dL  Up to 48 hours............<12.0 mg/dL  3-5 days..................<15.0 mg/dL  6-29 days.................<15.0 mg/dL       Alkaline Phosphatase   Date Value Ref Range Status   03/12/2024 81 55 - 135 U/L Final     AST   Date Value Ref Range Status   03/12/2024 54 (H) 10 - 40 U/L Final     ALT   Date Value Ref Range Status   03/12/2024 79 (H) 10 - 44 U/L Final     Anion Gap   Date Value Ref Range Status   03/12/2024 12 8 - 16 mmol/L Final     eGFR   Date Value Ref Range Status   03/12/2024 >60 >60 mL/min/1.73 m^2 Final       Lab Results   Component Value Date    WBC 17.87 (H) 03/12/2024    RBC 3.42 (L) 03/12/2024    HGB 10.1 (L) 03/12/2024    HCT 30.3 (L) 03/12/2024    MCV 89 03/12/2024    MCH 29.5 03/12/2024    MCHC 33.3 03/12/2024    RDW 15.7 (H) 03/12/2024     03/12/2024    MPV 11.0 03/12/2024    GRAN 16.2 (H) 03/12/2024    GRAN 90.9 (H) 03/12/2024    LYMPH 0.9 (L) 03/12/2024    LYMPH 5.0 (L) 03/12/2024    MONO 0.6 03/12/2024    MONO 3.2 (L) 03/12/2024    EOS 0.0 03/12/2024    BASO 0.02 03/12/2024    EOSINOPHIL 0.1 03/12/2024    BASOPHIL 0.1 03/12/2024     POCT Glucose   Date Value Ref Range Status   03/12/2024 285 (H) 70 - 110 mg/dL Final   03/12/2024 241 (H) 70 - 110 mg/dL Final   03/11/2024 263 (H) 70 - 110 mg/dL Final   03/11/2024 232 (H) 70 -  110 mg/dL Final   03/11/2024 221 (H) 70 - 110 mg/dL Final   03/10/2024 237 (H) 70 - 110 mg/dL Final   03/10/2024 223 (H) 70 - 110 mg/dL Final   03/10/2024 213 (H) 70 - 110 mg/dL Final   03/09/2024 236 (H) 70 - 110 mg/dL Final   03/09/2024 249 (H) 70 - 110 mg/dL Final     Lab Results   Component Value Date    HGBA1C 7.1 (H) 11/28/2023     Lab Results   Component Value Date    WBC 17.87 (H) 03/12/2024    HGB 10.1 (L) 03/12/2024    HCT 30.3 (L) 03/12/2024    MCV 89 03/12/2024     03/12/2024     Scheduled Meds:   anastrozole  1 mg Oral Daily    atorvastatin  20 mg Oral QHS    ceFEPime IV (PEDS and ADULTS)  2 g Intravenous Q12H    fluticasone propionate  2 spray Each Nostril Daily    furosemide (LASIX) injection  20 mg Intravenous Once    insulin detemir U-100  8 Units Subcutaneous Daily    ipratropium  0.5 mg Nebulization Q6H WAKE    lactobacillus acidophilus & bulgar  1 packet Oral BID    levalbuterol  1.2495 mg Nebulization Q6H WAKE    magnesium oxide  400 mg Oral Daily    methylPREDNISolone sodium succinate injection  40 mg Intravenous Q12H    midodrine  10 mg Oral TID WM    potassium chloride  10 mEq Oral Once     Continuous Infusions:   sodium chloride 0.9% 100 mL (03/08/24 0510)     PRN Meds:.sodium chloride 0.9%, sodium chloride 0.9%, acetaminophen, acetaminophen, cyclobenzaprine, dextrose 10%, dextrose 10%, dextrose 5 % (D5W), glucagon (human recombinant), glucose, glucose, HYDROcodone-acetaminophen, insulin aspart U-100, melatonin, naloxone, ondansetron, ondansetron, sodium chloride, sodium chloride 0.9%    Physical Findings/Assessment         Estimated/Assessed Needs    Weight Used For Calorie Calculations: 98.4 kg (216 lb 14.9 oz)  Energy Calorie Requirements (kcal): 3723-5394 kcals (25-30 kcals/kg ABW (Cancer, standard treatment vs CHF vs Diabetes)  Energy Need Method: Kcal/kg  Protein Requirements:  g (1.0-1.2 g/kg ABW (Cancer vs CHF vs Diabetes)  Weight Used For Protein Calculations: 98.4 kg  (216 lb 14.9 oz)  Fluid Requirements (mL): 1200 mL fluid restriction (per MD/NP (CHF)  Estimated Fluid Requirement Method: other (see comments)  RDA Method (mL): 2460  CHO Requirement: 307-369 g (8927-7869 kcals/8)      Nutrition Prescription Ordered    Current Diet Order: Cardiac, 1200 mL fluid restriction  Oral Nutrition Supplement: Boost plus TID    Evaluation of Received Nutrient/Fluid Intake  I/O Net (since admit):   3/6/24: -2508.8 mL   3/12: -4069.5 mL     Energy Calories Required: meeting needs  Protein Required: meeting needs  Fluid Required: not meeting needs  Total Fluid Intake (mL): 400  Tolerance: tolerating  % Intake of Estimated Energy Needs: 75 - 100 %  % Meal Intake: 75 - 100 %    Nutrition Risk    Level of Risk/Frequency of Follow-up: low (1x weekly follow up)     Monitor and Evaluation    Food and Nutrient Intake: energy intake, food and beverage intake  Food and Nutrient Adminstration: diet order  Knowledge/Beliefs/Attitudes: food and nutrition knowledge/skill, beliefs and attitudes  Anthropometric Measurements: weight, weight change, body mass index  Biochemical Data, Medical Tests and Procedures: electrolyte and renal panel, glucose/endocrine profile, inflammatory profile  Nutrition-Focused Physical Findings: overall appearance, extremities, muscles and bones, head and eyes, skin     Nutrition Follow-Up    RD Follow-up?: Yes  Rolly Carvalho, Dietetic Intern

## 2024-03-12 NOTE — SUBJECTIVE & OBJECTIVE
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 He underwent LHC 3/1/2024 with findings of single vessel disease, severe AS, moderate pulm HTN, and EF 30%. He has been receiving diuretics for volume overload with some improvement. He remains on oxygen which is new.  obtained CT chest for better evaluation which revealed worsening bilateral pulmonary infiltrates prompting pulmonary consult for additional recommendations.     Hospital Course/Interval History:   3/8: despite ongoing negative balance, diuresis has not improved oxygenation and CXR is overall unchanged. Remains on 3L NC. -4.2L balance, now with worsening contraction alkalosis, denies SOB with ambulating in hallway yesterday. Only complaint is weakness. Discussed possible causes including effect of chemo on lungs, lymphangitic spread, and/or HF; adding steroids  3/10: overall stable, remains on supplemental oxygen- decreased to 3L NC while at bedside. Mild HENDRICKSON. LE edema much better. Sputum culture with normal ana. No fevers. WBC has bumped up a little. Developed diarrhea x 2 days  3/12: Patient reports having a difficult night as he removed his oxygen while sleeping and awoke confused / disoriented. This morning he is sitting up in the chair as he states it helps him breath easier. Currently on 4L/NC with sats 97% at time of my exam.     Objective:     Vital Signs (Most Recent):  Temp: 97.6 °F (36.4 °C) (03/12/24 0710)  Pulse: 99 (03/12/24 0937)  Resp: 17 (03/12/24 0710)  BP: (!) 101/59 (03/12/24 0710)  SpO2: 97 % (03/12/24 0805) Vital Signs (24h Range):  Temp:  [97.6 °F (36.4 °C)-98.4 °F (36.9 °C)] 97.6 °F (36.4 °C)  Pulse:  [90-99] 99  Resp:  [16-20] 17  SpO2:  [90 %-98 %] 97 %  BP: (101-123)/(59-71) 101/59    Weight: 98.4 kg (216 lb 14.9 oz); Body mass index is 31.13 kg/m².      Intake/Output Summary (Last 24 hours) at 3/12/2024 1102  Last data filed at 3/12/2024 0800  Gross per 24 hour   Intake 400 ml   Output 550 ml   Net -150 ml      Physical Exam  Vitals and nursing note reviewed.   Constitutional:       Appearance: He is obese.   HENT:      Head: Normocephalic.   Eyes:      Conjunctiva/sclera: Conjunctivae normal.   Cardiovascular:      Rate and Rhythm: Normal rate.   Pulmonary:      Effort: Pulmonary effort is normal.      Breath sounds: Normal breath sounds.   Abdominal:      Palpations: Abdomen is soft.   Musculoskeletal:         General: Normal range of motion.      Cervical back: Normal range of motion.      Right lower leg: Edema present.      Left lower leg: Edema present.   Skin:     General: Skin is warm and dry.      Findings: Bruising present.   Neurological:      Mental Status: He is alert and oriented to person, place, and time.           Review of Systems: Negative except as indicated in HPI    Significant Labs:  CBC/Anemia Profile:  Recent Labs   Lab 03/12/24  0500   WBC 17.87*   HGB 10.1*   HCT 30.3*      MCV 89   RDW 15.7*   Chemistries:  Recent Labs   Lab 03/12/24  0500      K 3.5   CL 99   CO2 26   BUN 47*   CREATININE 1.1   CALCIUM 8.9   ALBUMIN 2.7*   PROT 6.8   BILITOT 0.7   ALKPHOS 81   ALT 79*   AST 54*

## 2024-03-13 LAB
ALBUMIN SERPL BCP-MCNC: 2.6 G/DL (ref 3.5–5.2)
ALP SERPL-CCNC: 90 U/L (ref 55–135)
ALT SERPL W/O P-5'-P-CCNC: 66 U/L (ref 10–44)
ANION GAP SERPL CALC-SCNC: 18 MMOL/L (ref 8–16)
AST SERPL-CCNC: 39 U/L (ref 10–40)
BASOPHILS # BLD AUTO: 0.01 K/UL (ref 0–0.2)
BASOPHILS NFR BLD: 0.1 % (ref 0–1.9)
BILIRUB SERPL-MCNC: 0.6 MG/DL (ref 0.1–1)
BNP SERPL-MCNC: 488 PG/ML (ref 0–99)
BUN SERPL-MCNC: 56 MG/DL (ref 8–23)
CALCIUM SERPL-MCNC: 8.6 MG/DL (ref 8.7–10.5)
CHLORIDE SERPL-SCNC: 99 MMOL/L (ref 95–110)
CO2 SERPL-SCNC: 21 MMOL/L (ref 23–29)
CREAT SERPL-MCNC: 1.3 MG/DL (ref 0.5–1.4)
DIFFERENTIAL METHOD BLD: ABNORMAL
EOSINOPHIL # BLD AUTO: 0 K/UL (ref 0–0.5)
EOSINOPHIL NFR BLD: 0 % (ref 0–8)
ERYTHROCYTE [DISTWIDTH] IN BLOOD BY AUTOMATED COUNT: 16.1 % (ref 11.5–14.5)
EST. GFR  (NO RACE VARIABLE): 54 ML/MIN/1.73 M^2
ESTIMATED AVG GLUCOSE: 171 MG/DL (ref 68–131)
GLUCOSE SERPL-MCNC: 335 MG/DL (ref 70–110)
HBA1C MFR BLD: 7.6 % (ref 4–5.6)
HCT VFR BLD AUTO: 30.5 % (ref 40–54)
HGB BLD-MCNC: 9.9 G/DL (ref 14–18)
IMM GRANULOCYTES # BLD AUTO: 0.21 K/UL (ref 0–0.04)
IMM GRANULOCYTES NFR BLD AUTO: 1.1 % (ref 0–0.5)
LYMPHOCYTES # BLD AUTO: 1.3 K/UL (ref 1–4.8)
LYMPHOCYTES NFR BLD: 6.5 % (ref 18–48)
MAGNESIUM SERPL-MCNC: 2.5 MG/DL (ref 1.6–2.6)
MCH RBC QN AUTO: 29.6 PG (ref 27–31)
MCHC RBC AUTO-ENTMCNC: 32.5 G/DL (ref 32–36)
MCV RBC AUTO: 91 FL (ref 82–98)
MONOCYTES # BLD AUTO: 0.7 K/UL (ref 0.3–1)
MONOCYTES NFR BLD: 3.7 % (ref 4–15)
NEUTROPHILS # BLD AUTO: 17.2 K/UL (ref 1.8–7.7)
NEUTROPHILS NFR BLD: 88.6 % (ref 38–73)
NRBC BLD-RTO: 0 /100 WBC
OHS QRS DURATION: 104 MS
OHS QTC CALCULATION: 467 MS
PLATELET # BLD AUTO: 345 K/UL (ref 150–450)
PMV BLD AUTO: 10.9 FL (ref 9.2–12.9)
POCT GLUCOSE: 246 MG/DL (ref 70–110)
POCT GLUCOSE: 299 MG/DL (ref 70–110)
POTASSIUM SERPL-SCNC: 3.6 MMOL/L (ref 3.5–5.1)
PROT SERPL-MCNC: 6.6 G/DL (ref 6–8.4)
RBC # BLD AUTO: 3.35 M/UL (ref 4.6–6.2)
SODIUM SERPL-SCNC: 138 MMOL/L (ref 136–145)
TROPONIN I SERPL DL<=0.01 NG/ML-MCNC: 0.11 NG/ML (ref 0–0.03)
TROPONIN I SERPL DL<=0.01 NG/ML-MCNC: 0.16 NG/ML (ref 0–0.03)
WBC # BLD AUTO: 19.39 K/UL (ref 3.9–12.7)

## 2024-03-13 PROCEDURE — 83735 ASSAY OF MAGNESIUM: CPT | Mod: HCNC | Performed by: INTERNAL MEDICINE

## 2024-03-13 PROCEDURE — 25000003 PHARM REV CODE 250: Mod: HCNC | Performed by: NURSE PRACTITIONER

## 2024-03-13 PROCEDURE — 93005 ELECTROCARDIOGRAM TRACING: CPT | Mod: HCNC

## 2024-03-13 PROCEDURE — 25000003 PHARM REV CODE 250: Mod: HCNC | Performed by: INTERNAL MEDICINE

## 2024-03-13 PROCEDURE — 36415 COLL VENOUS BLD VENIPUNCTURE: CPT | Mod: HCNC,XB | Performed by: HOSPITALIST

## 2024-03-13 PROCEDURE — 21400001 HC TELEMETRY ROOM: Mod: HCNC

## 2024-03-13 PROCEDURE — 94761 N-INVAS EAR/PLS OXIMETRY MLT: CPT | Mod: HCNC

## 2024-03-13 PROCEDURE — 27100171 HC OXYGEN HIGH FLOW UP TO 24 HOURS: Mod: HCNC

## 2024-03-13 PROCEDURE — 85025 COMPLETE CBC W/AUTO DIFF WBC: CPT | Mod: HCNC | Performed by: INTERNAL MEDICINE

## 2024-03-13 PROCEDURE — 36415 COLL VENOUS BLD VENIPUNCTURE: CPT | Mod: HCNC | Performed by: INTERNAL MEDICINE

## 2024-03-13 PROCEDURE — 84484 ASSAY OF TROPONIN QUANT: CPT | Mod: HCNC | Performed by: INTERNAL MEDICINE

## 2024-03-13 PROCEDURE — 94640 AIRWAY INHALATION TREATMENT: CPT | Mod: HCNC

## 2024-03-13 PROCEDURE — 93010 ELECTROCARDIOGRAM REPORT: CPT | Mod: HCNC,,, | Performed by: INTERNAL MEDICINE

## 2024-03-13 PROCEDURE — 99232 SBSQ HOSP IP/OBS MODERATE 35: CPT | Mod: 25,HCNC,, | Performed by: PHYSICIAN ASSISTANT

## 2024-03-13 PROCEDURE — 87205 SMEAR GRAM STAIN: CPT | Mod: HCNC | Performed by: HOSPITALIST

## 2024-03-13 PROCEDURE — 25000242 PHARM REV CODE 250 ALT 637 W/ HCPCS: Mod: HCNC | Performed by: SPECIALIST

## 2024-03-13 PROCEDURE — 63600175 PHARM REV CODE 636 W HCPCS: Mod: HCNC | Performed by: INTERNAL MEDICINE

## 2024-03-13 PROCEDURE — 80053 COMPREHEN METABOLIC PANEL: CPT | Mod: HCNC | Performed by: INTERNAL MEDICINE

## 2024-03-13 PROCEDURE — 63600175 PHARM REV CODE 636 W HCPCS: Mod: HCNC | Performed by: HOSPITALIST

## 2024-03-13 PROCEDURE — 84484 ASSAY OF TROPONIN QUANT: CPT | Mod: 91,HCNC | Performed by: HOSPITALIST

## 2024-03-13 PROCEDURE — 83880 ASSAY OF NATRIURETIC PEPTIDE: CPT | Mod: HCNC | Performed by: INTERNAL MEDICINE

## 2024-03-13 PROCEDURE — 25000242 PHARM REV CODE 250 ALT 637 W/ HCPCS: Mod: HCNC | Performed by: INTERNAL MEDICINE

## 2024-03-13 PROCEDURE — 99900035 HC TECH TIME PER 15 MIN (STAT): Mod: HCNC

## 2024-03-13 PROCEDURE — 27000221 HC OXYGEN, UP TO 24 HOURS: Mod: HCNC

## 2024-03-13 RX ORDER — FUROSEMIDE 10 MG/ML
20 INJECTION INTRAMUSCULAR; INTRAVENOUS EVERY 12 HOURS
Status: DISCONTINUED | OUTPATIENT
Start: 2024-03-13 | End: 2024-03-14 | Stop reason: HOSPADM

## 2024-03-13 RX ORDER — FUROSEMIDE 10 MG/ML
40 INJECTION INTRAMUSCULAR; INTRAVENOUS ONCE
Status: COMPLETED | OUTPATIENT
Start: 2024-03-13 | End: 2024-03-13

## 2024-03-13 RX ADMIN — FLUTICASONE PROPIONATE 100 MCG: 50 SPRAY, METERED NASAL at 08:03

## 2024-03-13 RX ADMIN — LEVALBUTEROL HYDROCHLORIDE 1.25 MG: 0.63 SOLUTION RESPIRATORY (INHALATION) at 07:03

## 2024-03-13 RX ADMIN — MIDODRINE HYDROCHLORIDE 10 MG: 5 TABLET ORAL at 05:03

## 2024-03-13 RX ADMIN — INSULIN ASPART 4 UNITS: 100 INJECTION, SOLUTION INTRAVENOUS; SUBCUTANEOUS at 06:03

## 2024-03-13 RX ADMIN — HYDROCODONE BITARTRATE AND ACETAMINOPHEN 1 TABLET: 5; 325 TABLET ORAL at 02:03

## 2024-03-13 RX ADMIN — Medication 400 MG: at 08:03

## 2024-03-13 RX ADMIN — INSULIN ASPART 6 UNITS: 100 INJECTION, SOLUTION INTRAVENOUS; SUBCUTANEOUS at 12:03

## 2024-03-13 RX ADMIN — FUROSEMIDE 40 MG: 10 INJECTION, SOLUTION INTRAMUSCULAR; INTRAVENOUS at 04:03

## 2024-03-13 RX ADMIN — FUROSEMIDE 20 MG: 10 INJECTION, SOLUTION INTRAMUSCULAR; INTRAVENOUS at 05:03

## 2024-03-13 RX ADMIN — IPRATROPIUM BROMIDE 0.5 MG: 0.5 SOLUTION RESPIRATORY (INHALATION) at 07:03

## 2024-03-13 RX ADMIN — MIDODRINE HYDROCHLORIDE 10 MG: 5 TABLET ORAL at 12:03

## 2024-03-13 RX ADMIN — LACTOBACILLUS ACIDOPHILUS / LACTOBACILLUS BULGARICUS 1 EACH: 100 MILLION CFU STRENGTH GRANULES at 08:03

## 2024-03-13 RX ADMIN — PREDNISONE 20 MG: 20 TABLET ORAL at 08:03

## 2024-03-13 RX ADMIN — PREDNISONE 20 MG: 20 TABLET ORAL at 09:03

## 2024-03-13 RX ADMIN — LEVALBUTEROL HYDROCHLORIDE 1.25 MG: 0.63 SOLUTION RESPIRATORY (INHALATION) at 02:03

## 2024-03-13 RX ADMIN — INSULIN DETEMIR 8 UNITS: 100 INJECTION, SOLUTION SUBCUTANEOUS at 08:03

## 2024-03-13 RX ADMIN — ANASTROZOLE 1 MG: 1 TABLET, COATED ORAL at 08:03

## 2024-03-13 RX ADMIN — IPRATROPIUM BROMIDE 0.5 MG: 0.5 SOLUTION RESPIRATORY (INHALATION) at 02:03

## 2024-03-13 RX ADMIN — MIDODRINE HYDROCHLORIDE 10 MG: 5 TABLET ORAL at 08:03

## 2024-03-13 RX ADMIN — LEVALBUTEROL HYDROCHLORIDE 1.25 MG: 0.63 SOLUTION RESPIRATORY (INHALATION) at 04:03

## 2024-03-13 RX ADMIN — LACTOBACILLUS ACIDOPHILUS / LACTOBACILLUS BULGARICUS 1 EACH: 100 MILLION CFU STRENGTH GRANULES at 09:03

## 2024-03-13 RX ADMIN — ATORVASTATIN CALCIUM 20 MG: 10 TABLET, FILM COATED ORAL at 09:03

## 2024-03-13 NOTE — PLAN OF CARE
03/13/24 1533   Post-Acute Status   Post-Acute Authorization Hospice   Hospice Status Referrals Sent   Coverage [employer]   Discharge Delays None known at this time   Discharge Plan   Discharge Plan A Hospice/home       Referral sent to [facility].  DC to [facility] for Respite care tomorrow.   SW sent message to Attending.     16 00 SW meet with Patient and nephew at bedside to discuss Hospice services. SW stated Treze with [facility] will come complete admissions paperwork and expected DC is tomorrow. Patient and nephew verbalized understanding.     SW will continue to follow and assist as needed.

## 2024-03-13 NOTE — ASSESSMENT & PLAN NOTE
Initial hypoxia thought secondary to volume; however, persistent hypoxia and shortness of breath was noted  CT chest with differential diagnosis including: noncardiogenic edema vs interstitial edema vs pneumonitis vs lymphangitic spread, chemotherapy pulmonary toxicity  Patient has failed to clinically improve despite aggressive diuresis and steroids  Worrisome for lymphangitic spread  Likely appropriate for palliative care / hospice  Follow chest imaging and ABGs as needed  Supplemental oxygen as needed to maintain saturations 90% or greater  IS, OOB and PT/OT as tolerated   Follow fever and WBC trends

## 2024-03-13 NOTE — SUBJECTIVE & OBJECTIVE
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 He underwent LHC 3/1/2024 with findings of single vessel disease, severe AS, moderate pulm HTN, and EF 30%. He has been receiving diuretics for volume overload with some improvement. He remains on oxygen which is new.  obtained CT chest for better evaluation which revealed worsening bilateral pulmonary infiltrates prompting pulmonary consult for additional recommendations.     Hospital Course/Interval History:   3/8: despite ongoing negative balance, diuresis has not improved oxygenation and CXR is overall unchanged. Remains on 3L NC. -4.2L balance, now with worsening contraction alkalosis, denies SOB with ambulating in hallway yesterday. Only complaint is weakness. Discussed possible causes including effect of chemo on lungs, lymphangitic spread, and/or HF; adding steroids  3/10: overall stable, remains on supplemental oxygen- decreased to 3L NC while at bedside. Mild HENDRICKSON. LE edema much better. Sputum culture with normal ana. No fevers. WBC has bumped up a little. Developed diarrhea x 2 days  3/12: Patient reports having a difficult night as he removed his oxygen while sleeping and awoke confused / disoriented. This morning he is sitting up in the chair as he states it helps him breath easier. Currently on 4L/NC with sats 97% at time of my exam.   3/13: Patient fails to improve clinically despite all aggressive treatment measures. Oxygen titrated up to 6L/NC over the past 24 hours. Case discussed with primary and cardiology teams. Family at bedside updated on plan of care.     Objective:     Vital Signs (Most Recent):  Temp: 97.8 °F (36.6 °C) (03/13/24 1134)  Pulse: 93 (03/13/24 1134)  Resp: 18 (03/13/24 1134)  BP: (!)  111/55 (03/13/24 1134)  SpO2: 96 % (03/13/24 1134) Vital Signs (24h Range):  Temp:  [97.4 °F (36.3 °C)-97.9 °F (36.6 °C)] 97.8 °F (36.6 °C)  Pulse:  [] 93  Resp:  [17-20] 18  SpO2:  [91 %-96 %] 96 %  BP: (101-133)/(55-64) 111/55   Weight:  (pt. refused); Body mass index is 31.13 kg/m².    Intake/Output Summary (Last 24 hours) at 3/13/2024 1313  Last data filed at 3/12/2024 1739  Gross per 24 hour   Intake 96.54 ml   Output --   Net 96.54 ml      Physical Exam  Vitals and nursing note reviewed.   HENT:      Head: Normocephalic.   Eyes:      Conjunctiva/sclera: Conjunctivae normal.   Cardiovascular:      Rate and Rhythm: Normal rate.   Pulmonary:      Comments: Increased work of breathing with conversational dyspnea  Abdominal:      Palpations: Abdomen is soft.   Musculoskeletal:      Cervical back: Normal range of motion.      Right lower leg: Edema present.      Left lower leg: Edema present.   Skin:     General: Skin is warm.      Findings: Bruising present.   Neurological:      Mental Status: He is alert.      Comments: Mild confusion         Review of Systems: Negative except as indicated in HPI    Significant Labs:  CBC/Anemia Profile:  Recent Labs   Lab 03/12/24  0500 03/13/24  0444   WBC 17.87* 19.39*   HGB 10.1* 9.9*   HCT 30.3* 30.5*    345   MCV 89 91   RDW 15.7* 16.1*   Chemistries:  Recent Labs   Lab 03/12/24  0500 03/13/24  0444    138   K 3.5 3.6   CL 99 99   CO2 26 21*   BUN 47* 56*   CREATININE 1.1 1.3   CALCIUM 8.9 8.6*   ALBUMIN 2.7* 2.6*   PROT 6.8 6.6   BILITOT 0.7 0.6   ALKPHOS 81 90   ALT 79* 66*   AST 54* 39   MG  --  2.5

## 2024-03-13 NOTE — PT/OT/SLP PROGRESS
"Physical Therapy      Patient Name:  Mark Dickerson Sr.   MRN:  [id number]    Chart review complete. Presented to pt's room at 1131. Patient not seen today secondary to Patient unwilling to participate. Reported "I'm not doing that today." Reports discussion with MD about possible hospice. Will continue efforts.    Slime Garg, PT, DPT  3/13/2024   1131  "

## 2024-03-13 NOTE — ASSESSMENT & PLAN NOTE
-CT of chest reviewed  -Pulmonary following, on abx   -Continue IV diuresis-limited with increasing dosage given underling severe AS/hypotension    3/13/24  -Persistent  -Diurese as needed  -Pulm following on steroids

## 2024-03-13 NOTE — PT/OT/SLP PROGRESS
"Occupational Therapy      Patient Name:  Mark Dickerson Sr.   MRN:  [id number]    Chart review completed. Patient not seen today secondary to pt refusing OT tx at this time. Pt reported "I'm not doing that today. They're talking about hospice. There's nothing else they can do for me. I don't want to do anything."  Will follow-up at next available opportunity.    3/13/2024  Susie Tillman, OT   1130  "

## 2024-03-13 NOTE — PROGRESS NOTES
"[facility] Medicine  Progress Note    Patient Name: Mark Dickerson Sr.  MRN: [id number]  Patient Class: IP- Inpatient   Admission Date: 2/26/2024  Length of Stay: 15 days  Attending Physician: Judah Oden MD  Primary Care Provider: Tatyana Cannon MD        Subjective:     Principal Problem:NSTEMI (non-ST elevated myocardial infarction)        HPI:  Mark Dickerson Sr. is a 84 y.o. male patient with a PMHx of metastatic breast cancer (on chemo), HLD, HTN, DM II, CKD who presents to the ED for evaluation of AMS which onset this morning PTA. Per daughter in law she visited the pt early this morning and states that he was normal for a short period before becoming extremely disoriented with jumbled speech.  Patient mental status returned to baseline piror to ed arrival.  Patient reports he feels "dehydrated", he also reports diarrhea earlier last week but this has since resolved.  Patient denies any fever, chills, N/V, SOB and chest pain.   In the ED, work up notable for WBC 14, Na 130, K 3.0, mag 1.5, , Trop 0.7.  EKG with SR PACs, ST and T wave abnormality. CT head negative for acute processes.  Chest xray no acute processes. Patient started on IVF and given zofran for nausea.  Patient will be admitted to observation for electrolyte imbalances, elevated trop.  Cards consulted.     Code Status DNR  Surrogate Decision maker daughter    Overview/Hospital Course:  Patient is currently admitted for NSTEMI, SIRS criteria, severe leukocytosis, hypokalemia, hypomagnesemia, chronic kidney disease stage 3, diabetes mellitus type 2, left breast cancer in a male patient, acute systolic CHF exacerbation, normocytic anemia, transaminitis, and severe aortic stenosis.  Consults on this admission include Cardiology, Hematology Oncology, Pulmonary, and Wound Care.  Echocardiogram 02/26/2024 with EF 55-60% with indeterminate diastolic function.  Right and left heart catheterization 03/01/2024 with " "elevated filling pressures, severe aortic stenosis,  RCA with collaterals, EF 30%.  Cardiology recommends continuing medical management for NSTEMI and will discuss outpatient TAVR evaluation for severe aortic stenosis.  Initiated IV Lasix diuresis for CHF exacerbation with evidence of volume overload.  Chest x-ray 03/03/2024 with interval worsening with moderate interstitial and alveolar opacities of bilateral lungs consistent with pulmonary edema.  BNP had trended upward and is currently 590-->repeat 505 on 3/6.  Hematology oncology has seen the patient in consultation and felt that leukocytosis is related to Neulasta and acute cardiac event. Patient did receive empiric IV Rocephin for 4 days 2/28-03/02/2024.  No localizing source of infection was identified.  COVID-19 negative (x2), influenza a/B negative, bcx were negative, urinalysis negative, lactic acid level 1.0, procalcitonin level 0.14.  CTA of chest 2/24/24 with no evidence of PE, pulmonary fibrosis versus CHF findings with recommendation for follow-up imaging.  Repeat chest x-ray 03/06/2024 with diffuse pulmonary infiltrates seen throughout lungs concerning for interstitial edema or interstitial pneumonia, no pleural effusions appreciated.  CT scan of chest without contrast 3/6  with "interval worsening from comparison CT with diffuse multifocal airspace opacification superimposed upon interstitial thickening, considerations are multifocal pneumonia, pulmonary edema, pulmonary hemorrhage; new small left pleural effusion." Pulmonary did see the patient in consultation and feels that abnormal CT scan of chest most likely related to fluid however okay with continued IV antibiotics for now.  Continue IV cefepime.  IV vancomycin was discontinued as MRSA culture was negative.  Continue to replace electrolytes.  Patient with episodes of desaturation with hypoxia.  Continue O2 supplementation, continuous pulse oximetry monitoring, frequent incentive " spirometry, Xopenex/Atrovent nebs q.6 hours, IV Solu-Medrol 60 mg q.6 hours (steroids started by Pulmonary on 03/08/2024).  IV Lasix diuresis had to be discontinued due to severe aortic stenosis and hypotension.  Will use p.r.n. Lasix and fluid restriction as per Cardiology.    3/12  4 L NC this AM, weaning as able. Continue cefepime, IV steroids. Lasix as needed. Not medically stable for discharge to SNF. Cardiology and Pulmonary following.     3/13  Now increased to 6 L NC.   No signficiant improvement in condition after several days of IV steroids, transitioned to PO  Restart IV diuretics  Discussed with patient and nephew at bedside, overall poor prognosis, DNR status  Plan for hospice when arraignments made      Review of Systems   All other systems reviewed and are negative.    Objective:     Vital Signs (Most Recent):  Temp: 97.8 °F (36.6 °C) (03/13/24 1134)  Pulse: 107 (03/13/24 1622)  Resp: 18 (03/13/24 1622)  BP: 115/63 (03/13/24 1622)  SpO2: (!) 94 % (03/13/24 1622) Vital Signs (24h Range):  Temp:  [97.4 °F (36.3 °C)-97.9 °F (36.6 °C)] 97.8 °F (36.6 °C)  Pulse:  [] 107  Resp:  [17-20] 18  SpO2:  [92 %-96 %] 94 %  BP: (101-133)/(55-64) 115/63     Weight:  (pt. refused)  Body mass index is 31.13 kg/m².    Intake/Output Summary (Last 24 hours) at 3/13/2024 1805  Last data filed at 3/13/2024 1706  Gross per 24 hour   Intake 1090 ml   Output 600 ml   Net 490 ml           Physical Exam  Vitals and nursing note reviewed.   Constitutional:       General: He is not in acute distress.     Appearance: Normal appearance.   Cardiovascular:      Rate and Rhythm: Normal rate and regular rhythm.      Heart sounds: No murmur heard.  Pulmonary:      Effort: Pulmonary effort is normal. No respiratory distress.      Breath sounds: Normal breath sounds. No wheezing.      Comments: 6 L HFNC  Decreased BS bilaterally  Abdominal:      General: There is no distension.      Palpations: Abdomen is soft.      Tenderness: There  is no abdominal tenderness.   Neurological:      Mental Status: He is alert.             Significant Labs: All pertinent labs within the past 24 hours have been reviewed.  Recent Lab Results         03/13/24  1246   03/13/24  0934   03/13/24  0453   03/13/24  0444        Albumin       2.6       ALP       90       ALT       66       Anion Gap       18       AST       39       Baso #       0.01       Basophil %       0.1       BILIRUBIN TOTAL       0.6  Comment: For infants and newborns, interpretation of results should be based  on gestational age, weight and in agreement with clinical  observations.    Premature Infant recommended reference ranges:  Up to 24 hours.............<8.0 mg/dL  Up to 48 hours............<12.0 mg/dL  3-5 days..................<15.0 mg/dL  6-29 days.................<15.0 mg/dL         BNP       488  Comment: Values of less than 100 pg/ml are consistent with non-CHF populations.       BUN       56       Calcium       8.6       Chloride       99       CO2       21       Creatinine       1.3       Differential Method       Automated       eGFR       54       Eos #       0.0       Eos %       0.0       Glucose       335       Gran # (ANC)       17.2       Gran %       88.6       Hematocrit       30.5       Hemoglobin       9.9       Immature Grans (Abs)       0.21  Comment: Mild elevation in immature granulocytes is non specific and   can be seen in a variety of conditions including stress response,   acute inflammation, trauma and pregnancy. Correlation with other   laboratory and clinical findings is essential.         Immature Granulocytes       1.1       Lymph #       1.3       Lymph %       6.5       Magnesium        2.5       MCH       29.6       MCHC       32.5       MCV       91       Mono #       0.7       Mono %       3.7       MPV       10.9       nRBC       0       QRS Duration     104         [facility] QTC Calculation     467         Platelet Count       345       POCT Glucose 299              Potassium       3.6       PROTEIN TOTAL       6.6       RBC       3.35       RDW       16.1       Sodium       138       Troponin I   0.164  Comment: The reference interval for Troponin I represents the 99th percentile   cutoff   for our facility and is consistent with 3rd generation assay   performance.       0.112  Comment: The reference interval for Troponin I represents the 99th percentile   cutoff   for our facility and is consistent with 3rd generation assay   performance.         WBC       19.39               Significant Imaging: I have reviewed all pertinent imaging results/findings within the past 24 hours.    X-Ray Chest AP Portable   Final Result      No significant change when compared to prior exam.         Electronically signed by: Jaret Sapp   Date:    03/13/2024   Time:    07:30      X-Ray Chest 1 View   Final Result      In comparison to the prior study, there is no adverse interval changes         Electronically signed by: Earl Mendez MD   Date:    03/11/2024   Time:    07:26      X-Ray Chest 1 View   Final Result      Findings are similar to prior         Electronically signed by: Earl Mendez MD   Date:    03/08/2024   Time:    07:21      CT Chest Without Contrast   Final Result      Interval worsening from comparison CT with diffuse multifocal airspace opacification superimposed upon interstitial thickening.  Considerations are multifocal pneumonia, pulmonary edema, pulmonary hemorrhage.      New small left pleural effusion      All CT scans at this facility use dose modulation, iterative reconstruction and/or weight based dosing when appropriate to reduce radiation dose to as low as reasonably achievable.         Electronically signed by: Evens Alba MD   Date:    03/06/2024   Time:    13:06      X-Ray Chest AP Portable   Final Result      In comparison to the prior study, there is no adverse interval changes         Electronically signed by: Earl Mendez MD   Date:    03/06/2024    Time:    09:12      X-Ray Chest AP Portable   Final Result      There has been interval worsening of the appearance of the lungs. There is a moderate amount of interstitial and alveolar opacities seen in both lungs.  This is characteristic of pulmonary edema.         Electronically signed by: Marco Elliott MD   Date:    03/03/2024   Time:    19:00      X-Ray Chest 1 View   Final Result      As above.         Electronically signed by: Spenser Stallworth   Date:    02/28/2024   Time:    11:07      X-Ray Chest AP Portable   Final Result      Worsening moderate bilateral infiltrates.  Follow-up is recommended.         Electronically signed by: Shyla Giles MD   Date:    02/27/2024   Time:    10:18      CTA Chest Non-Coronary (PE Studies)   Final Result      No pulmonary embolism.  No dissection.  Atherosclerotic changes.  Mild interstitial opacities.  Correlate clinically for element of fibrosis versus CHF.  Pulmonary micronodularity.  Recommend follow-up.      All CT scans   are performed using dose optimization techniques including the following: automated exposure control; adjustment of the mA and/or kV; use of iterative reconstruction technique.  Dose modulation was employed for ALARA by means of: Automated exposure control; adjustment of the mA and/or kV according to patient size (this includes techniques or standardized protocols for targeted exams where dose is matched to indication/reason for exam; i.e. extremities or head); and/or use of iterative reconstructive technique.         Electronically signed by: Jhoan oRman   Date:    02/26/2024   Time:    19:11      CT Head Without Contrast   Final Result      Chronic microvascular ischemic changes.  No intracranial hemorrhage.      All CT scans at this facility use dose modulation, iterative reconstruction, and/or weight based dosing when appropriate to reduce radiation dose to as low as reasonable achievable.         Electronically signed by: Earl Mendez MD  "  Date:    02/26/2024   Time:    10:36      X-Ray Chest AP Portable   Final Result      No acute process seen.         Electronically signed by: Earl Mendez MD   Date:    02/26/2024   Time:    10:02            Assessment/Plan:      * NSTEMI (non-ST elevated myocardial infarction)  -No prior hx of CAD  -Troponin max 2.789  -EKG with SR PACs and ST-Twave abnormalities  -echocardiogram 02/26/2024 with EF 55-60% with indeterminate diastolic function   -right and left heart catheterization 03/01/2024 with increased filling pressures, severe aortic stenosis,  RCA with collaterals, EF 30%  -continue medical management; continue aspirin, Lipitor, and midodrine. Lopressor held due to low BP.  -Cardiology following/managing        Elevated troponin  As above, see discussion for NSTEMI    SIRS (systemic inflammatory response syndrome)  -WBC 34.56-->11.54-->13.22, afebrile  -COVID-19 negative, influenza a/B negative, procalcitonin level 0.14-->0.12, lactic acid level 1.0, urinalysis negative, blood cultures negative  -CTA of chest 02/24/2024 with no evidence of PE, pulmonary fibrosis versus CHF findings with recommendations for follow-up imaging  -repeat CT chest 3/6 with "interval worsening from comparison CT with diffuse multifocal airspace opacification superimposed upon interstitial thickening, considerations are multifocal pneumonia, pulmonary edema, pulmonary hemorrhage; new small left pleural effusion   -patient did receive empiric IV ceftriaxone for 4 days 2/28-03/02/2024  -hematology oncology has seen the patient in consultation and feels severe leukocytosis related to recent Neulasta and acute cardiac event  -given interval worsening in CT scan, persistent leukocytosis, and underlying breast cancer, Pulmonary was consulted for further evaluation and treatment recommendations--> abnormal CT scan felt to be most likely related to pulmonary edema  -On IV cefepime d#5; vancomycin discontinued 3/9 (received 4 " "days).  -additional evaluation ordered 3/6 with sputum culture with normal respiratory ana, MRSA culture negative, and blood cultures with no growth to date      Hypokalemia  Patient has hypokalemia which is Acute on Chronic and currently uncontrolled. Most recent potassium levels reviewed-   Lab Results   Component Value Date    K 3.6 03/10/2024   Aggravated by IV Lasix diuresis.  Discontinue potassium replacement now that scheduled IV Lasix has been discontinued.  Replace as needed.  Check a.m. labs.    Hypomagnesemia  Patient has Abnormal Magnesium: hypomagnesemia. Will continue to monitor electrolytes closely. Will replace the affected electrolytes and repeat labs to be done after interventions completed. The patient's magnesium results have been reviewed and are listed below.  No results for input(s): "MG" in the last 24 hours.   Continue Mag-Ox 400 mg p.o. daily.    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 BMP reviewed- noted Estimated Creatinine Clearance: 54.6 mL/min (based on SCr of 1.2 mg/dL). according to latest data. Based on current GFR, CKD stage is stage 3 - GFR 30-59.  Monitor UOP and serial BMP and adjust therapy as needed. Renally dose meds. Avoid nephrotoxic medications and procedures    Controlled type 2 diabetes mellitus without complication, without long-term current use of insulin  Patient's FSGs are controlled on current medication regimen.  Last A1c reviewed-   Lab Results   Component Value Date    HGBA1C 7.1 (H) 11/28/2023     Most recent fingerstick glucose reviewed-   Recent Labs   Lab 03/09/24  1150 03/09/24  1521 03/09/24  2028 03/10/24  0411   POCTGLUCOSE 222* 249* 236* 213*       Current correctional scale  Low  Maintain anti-hyperglycemic dose as follows-   Antihyperglycemics (From admission, onward)      Start     Stop Route Frequency Ordered    03/10/24 1130  insulin detemir U-100 (Levemir) pen 8 Units         -- SubQ Daily 03/10/24 1101    02/26/24 " 1301  insulin aspart U-100 pen 0-5 Units         -- SubQ Before meals & nightly PRN 02/26/24 1204          Hold Oral hypoglycemics while patient is in the hospital. Add levemir 8 units subq daily. BG in 200s.    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 Mendoza at [facility], last chemo session one week prior.  Patient was seen by Hematology Oncology due to significant leukocytosis felt to be related to Neulasta and recent cardiac event.  Dr. Baugh with Hematology Oncology recommends discontinuing all Herceptin products due to cardiac deterioration.  Consider aromatase inhibitor +/- Lupron.  Follow up outpatient.    Abnormal CT of the chest        Acute HFrEF (heart failure with reduced ejection fraction)        Hypoxia  Hypoxia related to CHF exacerbation and pulmonary edema.  Prn lasix only now due to hypotension with severe AS. Continue Xopenex/Atrovent nebs q.6 hours, continuous pulse oximetry monitoring, and encourage incentive spirometry q.4 hours while awake.  Pulmonary added IV Solu-Medrol 60 mg q.6 hours as of 03/08/2024.  Aspiration precautions and speech therapy evaluation.  Stat ABG reviewed and consistent with primary metabolic alkalosis with secondary respiratory alkalosis.  Continue O2 supplementation and wean as tolerated.      Transaminitis  Transaminitis with minimal trend downward. Patient denies abdominal pain and has no tenderness to palpation on exam.  Suspect related to hepatic congestion with CHF exacerbation.     Normocytic anemia  Patient's anemia is currently controlled. Has not received any PRBCs to date. Etiology likely d/t chronic disease due to Malignancy  Current CBC reviewed-   Lab Results   Component Value Date    HGB 10.3 (L) 03/10/2024    HCT 32.3 (L) 03/10/2024     Monitor serial CBC and transfuse if patient becomes hemodynamically unstable, symptomatic or H/H drops below 7/21.    Acute diastolic CHF  "(congestive heart failure)  Patient is identified as having Diastolic (HFpEF) heart failure that is Acute. CHF is currently uncontrolled due to Pulmonary edema/pleural effusion on CXR. Latest ECHO performed and demonstrates- Results for orders placed during the hospital encounter of 02/26/24.      Echo    Interpretation Summary    Left Ventricle: The left ventricle is normal in size. There is concentric remodeling. There is normal systolic function with a visually estimated ejection fraction of 55 - 60%. There is indeterminate diastolic function.    Right Ventricle: Right ventricle was not well visualized due to poor acoustic window. Normal right ventricular cavity size. Systolic function is normal.    Pulmonary Artery: The estimated pulmonary artery systolic pressure is 37 mmHg.    IVC/SVC: Normal venous pressure at 3 mmHg.  . Continue lasix and monitor clinical status closely. Monitor on telemetry. Monitor strict Is&Os and daily weights.  Cardiology on board already for NSTEMI. Continue to stress to patient importance of self efficacy and  on diet for CHF. Last BNP reviewed- and noted below   Recent Labs   Lab 03/06/24  0450   *     -cardiac catheterization 03/01/2024 with EF 30%  -Increased lasix to 40 mg IV bid as of 3/4 due to pitting edema to BLE, dyspnea requiring O2 supplementation, and abnormal CXR with pulmonary edema. Lasix stopped 3/8 due to hypotension and severe AS. Use prn lasix as per cardiology.  -Fluid restrict 1200 mL.  Strict Is&Os and daily weights.   -Lopressor has been held due to relative hypotension  -CT scan of chest without contrast 3/6 with "interval worsening from comparison CT with diffuse multifocal airspace opacification superimposed upon interstitial thickening, considerations are multifocal pneumonia, pulmonary edema, pulmonary hemorrhage; new small left pleural effusion."   -chest x-ray 03/08/2024 with diffuse pulmonary infiltrates, trace pleural effusion on the left, " similar to prior chest x-ray  -Pending transfer to SNF    Severe aortic stenosis  Cardiology plans to discuss outpatient TAVR evaluation.  Monitor volume status and BP. Prn lasix only now.         VTE Risk Mitigation (From admission, onward)           Ordered     IP VTE HIGH RISK PATIENT  Once         02/26/24 1204     Place sequential compression device  Until discontinued         02/26/24 1204                    Discharge Planning   DE:      Code Status: DNR   Is the patient medically ready for discharge?:     Reason for patient still in hospital (select all that apply): Patient trending condition, Laboratory test, Treatment, Consult recommendations, and Pending disposition  Discharge Plan A: Hospice/home   Discharge Delays: None known at this time              Judah Oden MD  Department of Hospital Medicine   [facility] Tele[facility] ([facility])

## 2024-03-13 NOTE — PROGRESS NOTES
[facility] - Telemetry ([facility])  Cardiology  Progress Note    Patient Name: Mark Dickerson Sr.  MRN: [id number]  Admission Date: 2/26/2024  Hospital Length of Stay: 15 days  Code Status: DNR   Attending Physician: Judah Oden MD   Primary Care Physician: Tatyana Cannon MD  Expected Discharge Date:   Principal Problem:NSTEMI (non-ST elevated myocardial infarction)    Subjective:     Hospital Course:   Mr. Dickerson is an 84 year old male patient whose current medical conditions include metastatic breast cancer (on chemo), HTN, hyperlipidemia, DM type II, and CKD who presented to [facility] ED this AM due to AMS/confusion. Patient's DIL reported patient seemed disoriented and had jumbled speech when she visited him earlier this AM. He returned to baseline prior to ED arrival. He denied any associated SOB, chest pain, palpitations, near syncope, or syncope. Initial workup in ED revealed leukocytosis (WBC 14,000), hyponatremia (Na 130), hypomagnesemia (1.5), and elevated troponin (0.725>0.735) and patient was subsequently admitted for further evaluation and treatment. Cardiology consulted to assist with management. Patient seen and examined today, resting in bed. Remains chest pain free. No other CV complaints. States he felt dehydrated earlier today. No prior CV history. He reports compliance with his medications. EKG reviewed, SR with T wave inversions inferiorly and anteriorly. Limited echo and repeat troponin pending.    3/1/24-Patient seen and examined today, lying in bed. Stable overnight. No CV complaints. CP free. R/LHC today by Dr. Aguillon.    3/2/24 - s/p R/LHC - with elevated filling pressures,  RCA with collaterals - will cont med management and discuss outpt TAVR eval for severe AS    3/5/24-Patient seen and examined today, sitting up in bedside chair. Feels ok. Still with SOB/BLE edema. No CP. Heme/onc note reviewed. Needs diuresis. BP soft, BB d/c'd to allow diuresis. Discussed OP evaluation for TAVR/ballon  valvuloplasty.    3/6/24-Patient seen and examined today, sitting up in bedside chair. Doing ok. SOB stable. Diuresed well overnight. BNP still elevated. CT of chest pending.    3/7/24-Patient seen and examined today, resting in bedside chair. Still SOB, especially with exertion. CT of chest reviewed-interval worsening from comparison CT with diffuse multifocal airspace opacification superimposed upon interstitial thickening. Considerations are multifocal pneumonia, pulmonary edema, pulmonary hemorrhage. Pulmonary following, being covered with abx. Diuretics continued---limited with higher dose/aggressive diuresis due to severe AS/hypotension.    03/08/24 Cr stable and BNP remains at 500 improved volume status. Held lasix today due to soft BP. Breathing Rx started by pulm. For possible pneumonitis.     03/09/24 leg swelling improved, + PND. Continue Rx for pneumonitis per pulm service, no arrhythmia    3/10/24 F/U WITH PULM FOR CHEST CT FINDING, showed Interval worsening from comparison CT with diffuse multifocal airspace opacification superimposed upon interstitial thickening.  Considerations are multifocal pneumonia, pulmonary edema, pulmonary hemorrhage. New small left pleural effusion.  On NC O2.   Metastatic breast ca Rx on hold due to severe AS.    3/13/24-Cardiology asked to re-evaluate patient. Worsening SOB/hypoxia overnight (reportedly pulled off his oxygen). Feels ok this AM. No CP. Coughed up some blood-tinged/green sputum during exam. Diuretics re-initiated. Troponin stable. BNP in similar range as admission. CXR reviewed. Pulmonary also following.               Review of Systems   Constitutional: Positive for malaise/fatigue.   HENT: Negative.     Eyes: Negative.    Cardiovascular:  Positive for dyspnea on exertion.   Respiratory:  Positive for cough, shortness of breath and sputum production (blood-tinged/rust colored).    Endocrine: Negative.    Hematologic/Lymphatic: Bruises/bleeds easily.   Skin:  Negative.    Musculoskeletal:  Positive for arthritis and joint pain.   Gastrointestinal: Negative.    Genitourinary: Negative.    Neurological: Negative.    Psychiatric/Behavioral: Negative.     Allergic/Immunologic: Negative.      Objective:     Vital Signs (Most Recent):  Temp: 97.9 °F (36.6 °C) (03/13/24 0846)  Pulse: 100 (03/13/24 0846)  Resp: 20 (03/13/24 0846)  BP: 101/64 (03/13/24 0846)  SpO2: (!) 94 % (03/13/24 0846) Vital Signs (24h Range):  Temp:  [97.4 °F (36.3 °C)-98 °F (36.7 °C)] 97.9 °F (36.6 °C)  Pulse:  [] 100  Resp:  [17-20] 20  SpO2:  [91 %-95 %] 94 %  BP: (100-133)/(56-64) 101/64     Weight:  (pt. refused)  Body mass index is 31.13 kg/m².     SpO2: (!) 94 %         Intake/Output Summary (Last 24 hours) at 3/13/2024 1050  Last data filed at 3/12/2024 1739  Gross per 24 hour   Intake 96.54 ml   Output --   Net 96.54 ml       Lines/Drains/Airways       Central Venous Catheter Line  Duration                  PowerPort A Cath Single Lumen 01/05/24 1415 Internal Jugular Right 67 days              Peripheral Intravenous Line  Duration                  Peripheral IV - Single Lumen 03/12/24 1731 22 G Posterior;Right Hand <1 day                       Physical Exam  Vitals and nursing note reviewed.   Constitutional:       General: He is not in acute distress.     Appearance: He is well-developed. He is ill-appearing. He is not diaphoretic.      Comments: On supplemental O2 via NC   HENT:      Head: Normocephalic and atraumatic.   Eyes:      General:         Right eye: No discharge.         Left eye: No discharge.      Pupils: Pupils are equal, round, and reactive to light.   Cardiovascular:      Rate and Rhythm: Normal rate and regular rhythm.      Heart sounds: S1 normal and S2 normal. Murmur heard.      Harsh midsystolic murmur is present at the upper right sternal border radiating to the neck.   Pulmonary:      Effort: Pulmonary effort is normal. No respiratory distress.      Breath sounds: Normal  breath sounds. No wheezing or rales.   Abdominal:      General: There is no distension.   Musculoskeletal:      Right lower leg: No edema.      Left lower leg: No edema.   Skin:     General: Skin is warm and dry.      Findings: No erythema.   Neurological:      General: No focal deficit present.      Mental Status: He is alert and oriented to person, place, and time.   Psychiatric:         Mood and Affect: Mood normal.         Behavior: Behavior normal.            Significant Labs: CMP   Recent Labs   Lab 03/12/24  0500 03/13/24  0444    138   K 3.5 3.6   CL 99 99   CO2 26 21*   * 335*   BUN 47* 56*   CREATININE 1.1 1.3   CALCIUM 8.9 8.6*   PROT 6.8 6.6   ALBUMIN 2.7* 2.6*   BILITOT 0.7 0.6   ALKPHOS 81 90   AST 54* 39   ALT 79* 66*   ANIONGAP 12 18*   , CBC   Recent Labs   Lab 03/12/24  0500 03/13/24  0444   WBC 17.87* 19.39*   HGB 10.1* 9.9*   HCT 30.3* 30.5*    345   , Troponin   Recent Labs   Lab 03/13/24  0444 03/13/24  0934   TROPONINI 0.112* 0.164*   , and All pertinent lab results from the last 24 hours have been reviewed.    Significant Imaging: Echocardiogram: Transthoracic echo (TTE) complete (Cupid Only):   Results for orders placed or performed during the hospital encounter of 02/26/24   Echo   Result Value Ref Range    LVIDd 4.88 3.5 - 6.0 cm    LV Systolic Volume 52.86 mL    LVIDs 3.56 2.1 - 4.0 cm    LV Diastolic Volume 111.86 mL    IVS 1.04 0.6 - 1.1 cm    FS 27 (A) 28 - 44 %    Left Ventricle Relative Wall Thickness 0.44 cm    Posterior Wall 1.08 0.6 - 1.1 cm    LV mass 189.29 g    TR Max Can 2.91 m/s    RVDD 2.81 cm    LA size 4.18 cm    Triscuspid Valve Regurgitation Peak Gradient 34 mmHg    IVC diameter 2.08 cm    LV Systolic Volume Index 23.2 mL/m2    LV Diastolic Volume Index 49.06 mL/m2    LV Mass Index 83 g/m2    ZLVIDS -2.98     ZLVIDD -5.65     TV resting pulmonary artery pressure 37 mmHg    RV TB RVSP 6 mmHg    Est. RA pres 3 mmHg    Narrative      Left Ventricle: The  left ventricle is normal in size. There is   concentric remodeling. There is normal systolic function with a visually   estimated ejection fraction of 55 - 60%. There is indeterminate diastolic   function.    Right Ventricle: Right ventricle was not well visualized due to poor   acoustic window. Normal right ventricular cavity size. Systolic function   is normal.    Pulmonary Artery: The estimated pulmonary artery systolic pressure is   37 mmHg.    IVC/SVC: Normal venous pressure at 3 mmHg.     , EKG: Reviewed, and X-Ray: CXR: X-Ray Chest 1 View (CXR):   Results for orders placed or performed during the hospital encounter of 02/26/24   X-Ray Chest 1 View    Narrative    EXAMINATION:  XR CHEST 1 VIEW    CLINICAL HISTORY:  hypoxia;    TECHNIQUE:  Single frontal view of the chest was performed.    COMPARISON:  03/08/24    FINDINGS:  Diffuse pulmonary infiltrates similar to prior.  No large pleural effusion or pneumothorax.  Heart size is borderline enlarged.  Right chest port remains in position.  Moderate degenerative changes and moderate atherosclerotic disease.   In comparison to the prior study, there is no adverse interval changes      Impression    In comparison to the prior study, there is no adverse interval changes      Electronically signed by: Earl Mendez MD  Date:    03/11/2024  Time:    07:26    and X-Ray Chest PA and Lateral (CXR): No results found for this visit on 02/26/24.  Assessment and Plan:   Patient who presents with NSTEMI/hypoxia/CHF/? Pneumonitis. Worsening hypoxia/SOB overnight, diuretics resumed---continue as needed. Pulmonary also following, will await rec's.     * NSTEMI (non-ST elevated myocardial infarction)  -See plan under elevated troponin    Coronary artery disease of native artery of native heart with stable angina pectoris  Continue asa and statin  f/u h/o statin intolerance    Abnormal CT of the chest  -Pulmonary following, appreciate assistance    Hypoxia  -CT of chest  reviewed  -Pulmonary following, on abx   -Continue IV diuresis-limited with increasing dosage given underling severe AS/hypotension    3/13/24  -Persistent  -Diurese as needed  -Pulm following on steroids    Acute diastolic CHF (congestive heart failure)  Patient is identified as having Diastolic (HFpEF) heart failure that is Acute on chronic. CHF is currently controlled. Latest ECHO performed and demonstrates- Results for orders placed during the hospital encounter of 02/26/24    Echo    Interpretation Summary    Left Ventricle: The left ventricle is normal in size. There is concentric remodeling. There is normal systolic function with a visually estimated ejection fraction of 55 - 60%. There is indeterminate diastolic function.    Right Ventricle: Right ventricle was not well visualized due to poor acoustic window. Normal right ventricular cavity size. Systolic function is normal.    Pulmonary Artery: The estimated pulmonary artery systolic pressure is 37 mmHg.    IVC/SVC: Normal venous pressure at 3 mmHg.  . Monitor clinical status closely. Monitor on telemetry. Patient is on CHF pathway.  Monitor strict Is&Os and daily weights.  Place on fluid restriction of 1.5 L. Cardiology has been consulted. Continue to stress to patient importance of self efficacy and  on diet for CHF. Last BNP reviewed- and noted below   Recent Labs   Lab 03/13/24  0444   *       03/08  Volume status stable.  Keep I & O even  DASH  Fluid restriction 1.5 liters a day  Lasix as needed    3/9/24 chf compensated  Lasix as needed    3/13/24  -Diuretics resumed overnight due to worsening SOB/hypoxia  -BNP in similar range as earlier during admission  -Diurese as needed  -Patient coughed up large chunk of rust-colored/blood-tinged sputum during exam----pulmonary on board    Abnormal EKG  -See plan under elevated troponin    SIRS (systemic inflammatory response syndrome)  -Mgmt as per primary team  -BC show NGTD    3/9/24  Rx per   and pulm service    3/10  D/c asa per new finding on chest ct    Hypomagnesemia  -Repletion as per primary team    Hypokalemia  -Repletion as per primary team      Elevated troponin  -Troponin 0.725>0.735, continue to trend  -No CP symptoms/SOB/angina  -No prior CV history  -Continue ASA, heparin gtt, statin  -EKG reviewed, T wave inversions inferiorly and laterally  -Limited TTE pending  -Will consider ischemic workup with MPI stress test this admission    2/27/24  -Troponin bumped to 1.2 overnight  -Remains CP free  -Continue ASA, heparin gtt, statin  -TTE with normal EF  -LHC planned once more stable respiratory wise/infection ruled out    2/28/24  -CP free  -Continue ASA, heparin gtt, statin  -BB added  -LHC deferred for now given leukocytosis/SOB    2/29/24  -Stable  -WBC count improving  -Continue BB, ASA, heparin gtt, statin  -Tentative plans for LHC tmw, keep NPO after MN    3/1/24  -Stable, no CP  -Continue BB, ASA, heparin gtt, statin  -R/LHC today by Dr. Aguillon. All risks, benefits, and treatment alternatives explained to patient in detail. All questions answered. He has agreed to proceed.    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 Is euvolemic.  F/u as OP    CKD stage 3 due to type 2 diabetes mellitus  -Monitor        VTE Risk Mitigation (From admission, onward)           Ordered     IP VTE HIGH RISK PATIENT  Once         02/26/24 1204     Place sequential compression  device  Until discontinued         02/26/24 1206                    Sandy Rodriguez PA-C  Cardiology  [facility] - Telemetry ([facility])

## 2024-03-13 NOTE — ASSESSMENT & PLAN NOTE
Patient is identified as having Diastolic (HFpEF) heart failure that is Acute on chronic. CHF is currently controlled. Latest ECHO performed and demonstrates- Results for orders placed during the hospital encounter of 02/26/24    Echo    Interpretation Summary    Left Ventricle: The left ventricle is normal in size. There is concentric remodeling. There is normal systolic function with a visually estimated ejection fraction of 55 - 60%. There is indeterminate diastolic function.    Right Ventricle: Right ventricle was not well visualized due to poor acoustic window. Normal right ventricular cavity size. Systolic function is normal.    Pulmonary Artery: The estimated pulmonary artery systolic pressure is 37 mmHg.    IVC/SVC: Normal venous pressure at 3 mmHg.  . Monitor clinical status closely. Monitor on telemetry. Patient is on CHF pathway.  Monitor strict Is&Os and daily weights.  Place on fluid restriction of 1.5 L. Cardiology has been consulted. Continue to stress to patient importance of self efficacy and  on diet for CHF. Last BNP reviewed- and noted below   Recent Labs   Lab 03/13/24  0444   *       03/08  Volume status stable.  Keep I & O even  DASH  Fluid restriction 1.5 liters a day  Lasix as needed    3/9/24 chf compensated  Lasix as needed    3/13/24  -Diuretics resumed overnight due to worsening SOB/hypoxia  -BNP in similar range as earlier during admission  -Diurese as needed  -Patient coughed up large chunk of rust-colored/blood-tinged sputum during exam----pulmonary on board

## 2024-03-13 NOTE — SUBJECTIVE & OBJECTIVE
Review of Systems   All other systems reviewed and are negative.    Objective:     Vital Signs (Most Recent):  Temp: 97.8 °F (36.6 °C) (03/13/24 1134)  Pulse: 107 (03/13/24 1622)  Resp: 18 (03/13/24 1622)  BP: 115/63 (03/13/24 1622)  SpO2: (!) 94 % (03/13/24 1622) Vital Signs (24h Range):  Temp:  [97.4 °F (36.3 °C)-97.9 °F (36.6 °C)] 97.8 °F (36.6 °C)  Pulse:  [] 107  Resp:  [17-20] 18  SpO2:  [92 %-96 %] 94 %  BP: (101-133)/(55-64) 115/63     Weight:  (pt. refused)  Body mass index is 31.13 kg/m².    Intake/Output Summary (Last 24 hours) at 3/13/2024 1805  Last data filed at 3/13/2024 1706  Gross per 24 hour   Intake 1090 ml   Output 600 ml   Net 490 ml           Physical Exam  Vitals and nursing note reviewed.   Constitutional:       General: He is not in acute distress.     Appearance: Normal appearance.   Cardiovascular:      Rate and Rhythm: Normal rate and regular rhythm.      Heart sounds: No murmur heard.  Pulmonary:      Effort: Pulmonary effort is normal. No respiratory distress.      Breath sounds: Normal breath sounds. No wheezing.      Comments: 6 L HFNC  Decreased BS bilaterally  Abdominal:      General: There is no distension.      Palpations: Abdomen is soft.      Tenderness: There is no abdominal tenderness.   Neurological:      Mental Status: He is alert.             Significant Labs: All pertinent labs within the past 24 hours have been reviewed.  Recent Lab Results         03/13/24  1246   03/13/24  0934   03/13/24  0453   03/13/24  0444        Albumin       2.6       ALP       90       ALT       66       Anion Gap       18       AST       39       Baso #       0.01       Basophil %       0.1       BILIRUBIN TOTAL       0.6  Comment: For infants and newborns, interpretation of results should be based  on gestational age, weight and in agreement with clinical  observations.    Premature Infant recommended reference ranges:  Up to 24 hours.............<8.0 mg/dL  Up to 48  hours............<12.0 mg/dL  3-5 days..................<15.0 mg/dL  6-29 days.................<15.0 mg/dL         BNP       488  Comment: Values of less than 100 pg/ml are consistent with non-CHF populations.       BUN       56       Calcium       8.6       Chloride       99       CO2       21       Creatinine       1.3       Differential Method       Automated       eGFR       54       Eos #       0.0       Eos %       0.0       Glucose       335       Gran # (ANC)       17.2       Gran %       88.6       Hematocrit       30.5       Hemoglobin       9.9       Immature Grans (Abs)       0.21  Comment: Mild elevation in immature granulocytes is non specific and   can be seen in a variety of conditions including stress response,   acute inflammation, trauma and pregnancy. Correlation with other   laboratory and clinical findings is essential.         Immature Granulocytes       1.1       Lymph #       1.3       Lymph %       6.5       Magnesium        2.5       MCH       29.6       MCHC       32.5       MCV       91       Mono #       0.7       Mono %       3.7       MPV       10.9       nRBC       0       QRS Duration     104         [facility] QTC Calculation     467         Platelet Count       345       POCT Glucose 299             Potassium       3.6       PROTEIN TOTAL       6.6       RBC       3.35       RDW       16.1       Sodium       138       Troponin I   0.164  Comment: The reference interval for Troponin I represents the 99th percentile   cutoff   for our facility and is consistent with 3rd generation assay   performance.       0.112  Comment: The reference interval for Troponin I represents the 99th percentile   cutoff   for our facility and is consistent with 3rd generation assay   performance.         WBC       19.39               Significant Imaging: I have reviewed all pertinent imaging results/findings within the past 24 hours.    X-Ray Chest AP Portable   Final Result      No significant change when  compared to prior exam.         Electronically signed by: Jaret Sapp   Date:    03/13/2024   Time:    07:30      X-Ray Chest 1 View   Final Result      In comparison to the prior study, there is no adverse interval changes         Electronically signed by: Earl Mendez MD   Date:    03/11/2024   Time:    07:26      X-Ray Chest 1 View   Final Result      Findings are similar to prior         Electronically signed by: Earl Mendez MD   Date:    03/08/2024   Time:    07:21      CT Chest Without Contrast   Final Result      Interval worsening from comparison CT with diffuse multifocal airspace opacification superimposed upon interstitial thickening.  Considerations are multifocal pneumonia, pulmonary edema, pulmonary hemorrhage.      New small left pleural effusion      All CT scans at this facility use dose modulation, iterative reconstruction and/or weight based dosing when appropriate to reduce radiation dose to as low as reasonably achievable.         Electronically signed by: Evens Alba MD   Date:    03/06/2024   Time:    13:06      X-Ray Chest AP Portable   Final Result      In comparison to the prior study, there is no adverse interval changes         Electronically signed by: Earl Mendez MD   Date:    03/06/2024   Time:    09:12      X-Ray Chest AP Portable   Final Result      There has been interval worsening of the appearance of the lungs. There is a moderate amount of interstitial and alveolar opacities seen in both lungs.  This is characteristic of pulmonary edema.         Electronically signed by: Marco Elliott MD   Date:    03/03/2024   Time:    19:00      X-Ray Chest 1 View   Final Result      As above.         Electronically signed by: Spenser Stallworth   Date:    02/28/2024   Time:    11:07      X-Ray Chest AP Portable   Final Result      Worsening moderate bilateral infiltrates.  Follow-up is recommended.         Electronically signed by: Shyla Giles MD   Date:    02/27/2024   Time:    10:18       CTA Chest Non-Coronary (PE Studies)   Final Result      No pulmonary embolism.  No dissection.  Atherosclerotic changes.  Mild interstitial opacities.  Correlate clinically for element of fibrosis versus CHF.  Pulmonary micronodularity.  Recommend follow-up.      All CT scans   are performed using dose optimization techniques including the following: automated exposure control; adjustment of the mA and/or kV; use of iterative reconstruction technique.  Dose modulation was employed for ALARA by means of: Automated exposure control; adjustment of the mA and/or kV according to patient size (this includes techniques or standardized protocols for targeted exams where dose is matched to indication/reason for exam; i.e. extremities or head); and/or use of iterative reconstructive technique.         Electronically signed by: Jhoan Roman   Date:    02/26/2024   Time:    19:11      CT Head Without Contrast   Final Result      Chronic microvascular ischemic changes.  No intracranial hemorrhage.      All CT scans at this facility use dose modulation, iterative reconstruction, and/or weight based dosing when appropriate to reduce radiation dose to as low as reasonable achievable.         Electronically signed by: Earl Mendez MD   Date:    02/26/2024   Time:    10:36      X-Ray Chest AP Portable   Final Result      No acute process seen.         Electronically signed by: Earl Mendez MD   Date:    02/26/2024   Time:    10:02

## 2024-03-13 NOTE — PROGRESS NOTES
[facility]  Pulmonology Progress Note    Patient Name: Mark Dickerson Sr.   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 He underwent LHC 3/1/2024 with findings of single vessel disease, severe AS, moderate pulm HTN, and EF 30%. He has been receiving diuretics for volume overload with some improvement. He remains on oxygen which is new.  obtained CT chest for better evaluation which revealed worsening bilateral pulmonary infiltrates prompting pulmonary consult for additional recommendations.     Hospital Course/Interval History:   3/8: despite ongoing negative balance, diuresis has not improved oxygenation and CXR is overall unchanged. Remains on 3L NC. -4.2L balance, now with worsening contraction alkalosis, denies SOB with ambulating in hallway yesterday. Only complaint is weakness. Discussed possible causes including effect of chemo on lungs, lymphangitic spread, and/or HF; adding steroids  3/10: overall stable, remains on supplemental oxygen- decreased to 3L NC while at bedside. Mild HENDRICKSON. LE edema much better. Sputum culture with normal ana. No fevers. WBC has bumped up a little. Developed diarrhea x 2 days  3/12: Patient reports having a difficult night as he removed his oxygen while sleeping and awoke confused / disoriented. This morning he is sitting up in the chair as he states it helps him breath easier. Currently on 4L/NC with sats 97% at time of my exam.   3/13: Patient fails to improve clinically despite all aggressive treatment measures. Oxygen titrated up to 6L/NC  over the past 24 hours. Case discussed with primary and cardiology teams. Family at bedside updated on plan of care.     Objective:     Vital Signs (Most Recent):  Temp: 97.8 °F (36.6 °C) (03/13/24 1134)  Pulse: 93 (03/13/24 1134)  Resp: 18 (03/13/24 1134)  BP: (!) 111/55 (03/13/24 1134)  SpO2: 96 % (03/13/24 1134) Vital Signs (24h Range):  Temp:  [97.4 °F (36.3 °C)-97.9 °F (36.6 °C)] 97.8 °F (36.6 °C)  Pulse:  [] 93  Resp:  [17-20] 18  SpO2:  [91 %-96 %] 96 %  BP: (101-133)/(55-64) 111/55   Weight:  (pt. refused); Body mass index is 31.13 kg/m².    Intake/Output Summary (Last 24 hours) at 3/13/2024 1313  Last data filed at 3/12/2024 1739  Gross per 24 hour   Intake 96.54 ml   Output --   Net 96.54 ml      Physical Exam  Vitals and nursing note reviewed.   HENT:      Head: Normocephalic.   Eyes:      Conjunctiva/sclera: Conjunctivae normal.   Cardiovascular:      Rate and Rhythm: Normal rate.   Pulmonary:      Comments: Increased work of breathing with conversational dyspnea  Abdominal:      Palpations: Abdomen is soft.   Musculoskeletal:      Cervical back: Normal range of motion.      Right lower leg: Edema present.      Left lower leg: Edema present.   Skin:     General: Skin is warm.      Findings: Bruising present.   Neurological:      Mental Status: He is alert.      Comments: Mild confusion         Review of Systems: Negative except as indicated in HPI    Significant Labs:  CBC/Anemia Profile:  Recent Labs   Lab 03/12/24  0500 03/13/24  0444   WBC 17.87* 19.39*   HGB 10.1* 9.9*   HCT 30.3* 30.5*    345   MCV 89 91   RDW 15.7* 16.1*   Chemistries:  Recent Labs   Lab 03/12/24  0500 03/13/24  0444    138   K 3.5 3.6   CL 99 99   CO2 26 21*   BUN 47* 56*   CREATININE 1.1 1.3   CALCIUM 8.9 8.6*   ALBUMIN 2.7* 2.6*   PROT 6.8 6.6   BILITOT 0.7 0.6   ALKPHOS 81 90   ALT 79* 66*   AST 54* 39   MG  --  2.5   ABG  Recent Labs   Lab 03/08/24  0835   PH 7.500*   PO2 67*   PCO2 46.7*   HCO3 36.4*   BE  13*     Assessment/Plan:   Abnormal CT of the chest  Hypoxia  Initial hypoxia thought secondary to volume; however, persistent hypoxia and shortness of breath was noted  CT chest with differential diagnosis including: noncardiogenic edema vs interstitial edema vs pneumonitis vs lymphangitic spread, chemotherapy pulmonary toxicity  Patient has failed to clinically improve despite aggressive diuresis and steroids  Worrisome for lymphangitic spread  Likely appropriate for palliative care / hospice  Follow chest imaging and ABGs as needed  Supplemental oxygen as needed to maintain saturations 90% or greater  IS, OOB and PT/OT as tolerated   Follow fever and WBC trends     Leslie Soliman NP  Pulmonary and Critical Care  [facility]

## 2024-03-13 NOTE — PLAN OF CARE
A225/A225 JOHANNY Dickerson . is a 84 y.o.male admitted on 2/26/2024 for NSTEMI (non-ST elevated myocardial infarction)   Code Status: DNR MRN: [id number]   Review of patient's allergies indicates:   Allergen Reactions    Grass pollen-anai grass standard      Past Medical History:   Diagnosis Date    Chest pain 2/26/2024    CKD stage 3 due to type 2 diabetes mellitus 2/18/2021    Coronary artery disease of native artery of native heart with stable angina pectoris 3/8/2024    DM (diabetes mellitus) 2014    BS didn't check 12/11/2019    DM (diabetes mellitus) 2014    BS didn't check 05/05/2022    Foreign body, eye     right eye    Hyperlipidemia     Hypertension     Need for shingles vaccine 11/20/2019    Normocytic anemia 3/4/2024    NSTEMI (non-ST elevated myocardial infarction) 2/27/2024    Pneumonia     Primary osteoarthritis of right knee 10/29/2021    Severe obesity (BMI 35.0-35.9 with comorbidity) 7/10/2019    Type 2 diabetes mellitus 2014    BS didn't check 12/12/2018      PRN meds    sodium chloride 0.9%, , Continuous PRN  sodium chloride 0.9%, , PRN  acetaminophen, 650 mg, Q4H PRN  acetaminophen, 650 mg, Q4H PRN  cyclobenzaprine, 10 mg, TID PRN  dextrose 10%, 12.5 g, PRN  dextrose 10%, 12.5 g, PRN  dextrose 10%, 25 g, PRN  dextrose 10%, 25 g, PRN  dextrose 5 % (D5W), , PRN  glucagon (human recombinant), 1 mg, PRN  glucagon (human recombinant), 1 mg, PRN  glucose, 16 g, PRN  glucose, 16 g, PRN  glucose, 24 g, PRN  glucose, 24 g, PRN  HYDROcodone-acetaminophen, 1 tablet, Q6H PRN  insulin aspart U-100, 0-10 Units, QID (AC + HS) PRN  melatonin, 6 mg, Nightly PRN  naloxone, 0.02 mg, PRN  ondansetron, 8 mg, Q8H PRN  ondansetron, 4 mg, Q8H PRN  sodium chloride, 1 spray, PRN  sodium chloride 0.9%, 10 mL, Q12H PRN      Chart check completed. Will continue plan of care.      Orientation: oriented x 4  [place] Coma Scale Score: 15     Lead Monitored: Lead II Rhythm: normal sinus rhythm Frequency/Ectopy: frequent,  PVCs  Cardiac/Telemetry Box Number: [id number]  VTE Required Core Measure: Patient refused interventions Last Bowel Movement: 03/12/24  Diet Cardiac Fluid - 1200mL; Standard Tray  Voiding Characteristics: dribbling, incontinence  Sunil Score: 17  Fall Risk Score: 15  Accucheck [x]   Freq? ACHS     Lines/Drains/Airways       Central Venous Catheter Line  Duration                  PowerPort A Cath Single Lumen 01/05/24 1415 Internal Jugular Right 67 days              Peripheral Intravenous Line  Duration                  Peripheral IV - Single Lumen 03/12/24 1731 22 G Posterior;Right Hand <1 day

## 2024-03-13 NOTE — SUBJECTIVE & OBJECTIVE
Review of Systems   Constitutional: Positive for malaise/fatigue.   HENT: Negative.     Eyes: Negative.    Cardiovascular:  Positive for dyspnea on exertion.   Respiratory:  Positive for cough, shortness of breath and sputum production (blood-tinged/rust colored).    Endocrine: Negative.    Hematologic/Lymphatic: Bruises/bleeds easily.   Skin: Negative.    Musculoskeletal:  Positive for arthritis and joint pain.   Gastrointestinal: Negative.    Genitourinary: Negative.    Neurological: Negative.    Psychiatric/Behavioral: Negative.     Allergic/Immunologic: Negative.      Objective:     Vital Signs (Most Recent):  Temp: 97.9 °F (36.6 °C) (03/13/24 0846)  Pulse: 100 (03/13/24 0846)  Resp: 20 (03/13/24 0846)  BP: 101/64 (03/13/24 0846)  SpO2: (!) 94 % (03/13/24 0846) Vital Signs (24h Range):  Temp:  [97.4 °F (36.3 °C)-98 °F (36.7 °C)] 97.9 °F (36.6 °C)  Pulse:  [] 100  Resp:  [17-20] 20  SpO2:  [91 %-95 %] 94 %  BP: (100-133)/(56-64) 101/64     Weight:  (pt. refused)  Body mass index is 31.13 kg/m².     SpO2: (!) 94 %         Intake/Output Summary (Last 24 hours) at 3/13/2024 1050  Last data filed at 3/12/2024 1739  Gross per 24 hour   Intake 96.54 ml   Output --   Net 96.54 ml       Lines/Drains/Airways       Central Venous Catheter Line  Duration                  PowerPort A Cath Single Lumen 01/05/24 1415 Internal Jugular Right 67 days              Peripheral Intravenous Line  Duration                  Peripheral IV - Single Lumen 03/12/24 1731 22 G Posterior;Right Hand <1 day                       Physical Exam  Vitals and nursing note reviewed.   Constitutional:       General: He is not in acute distress.     Appearance: He is well-developed. He is ill-appearing. He is not diaphoretic.      Comments: On supplemental O2 via NC   HENT:      Head: Normocephalic and atraumatic.   Eyes:      General:         Right eye: No discharge.         Left eye: No discharge.      Pupils: Pupils are equal, round, and  reactive to light.   Cardiovascular:      Rate and Rhythm: Normal rate and regular rhythm.      Heart sounds: S1 normal and S2 normal. Murmur heard.      Harsh midsystolic murmur is present at the upper right sternal border radiating to the neck.   Pulmonary:      Effort: Pulmonary effort is normal. No respiratory distress.      Breath sounds: Normal breath sounds. No wheezing or rales.   Abdominal:      General: There is no distension.   Musculoskeletal:      Right lower leg: No edema.      Left lower leg: No edema.   Skin:     General: Skin is warm and dry.      Findings: No erythema.   Neurological:      General: No focal deficit present.      Mental Status: He is alert and oriented to person, place, and time.   Psychiatric:         Mood and Affect: Mood normal.         Behavior: Behavior normal.            Significant Labs: CMP   Recent Labs   Lab 03/12/24  0500 03/13/24  0444    138   K 3.5 3.6   CL 99 99   CO2 26 21*   * 335*   BUN 47* 56*   CREATININE 1.1 1.3   CALCIUM 8.9 8.6*   PROT 6.8 6.6   ALBUMIN 2.7* 2.6*   BILITOT 0.7 0.6   ALKPHOS 81 90   AST 54* 39   ALT 79* 66*   ANIONGAP 12 18*   , CBC   Recent Labs   Lab 03/12/24  0500 03/13/24  0444   WBC 17.87* 19.39*   HGB 10.1* 9.9*   HCT 30.3* 30.5*    345   , Troponin   Recent Labs   Lab 03/13/24  0444 03/13/24  0934   TROPONINI 0.112* 0.164*   , and All pertinent lab results from the last 24 hours have been reviewed.    Significant Imaging: Echocardiogram: Transthoracic echo (TTE) complete (Cupid Only):   Results for orders placed or performed during the hospital encounter of 02/26/24   Echo   Result Value Ref Range    LVIDd 4.88 3.5 - 6.0 cm    LV Systolic Volume 52.86 mL    LVIDs 3.56 2.1 - 4.0 cm    LV Diastolic Volume 111.86 mL    IVS 1.04 0.6 - 1.1 cm    FS 27 (A) 28 - 44 %    Left Ventricle Relative Wall Thickness 0.44 cm    Posterior Wall 1.08 0.6 - 1.1 cm    LV mass 189.29 g    TR Max Can 2.91 m/s    RVDD 2.81 cm    LA size 4.18  cm    Triscuspid Valve Regurgitation Peak Gradient 34 mmHg    IVC diameter 2.08 cm    LV Systolic Volume Index 23.2 mL/m2    LV Diastolic Volume Index 49.06 mL/m2    LV Mass Index 83 g/m2    ZLVIDS -2.98     ZLVIDD -5.65     TV resting pulmonary artery pressure 37 mmHg    RV TB RVSP 6 mmHg    Est. RA pres 3 mmHg    Narrative      Left Ventricle: The left ventricle is normal in size. There is   concentric remodeling. There is normal systolic function with a visually   estimated ejection fraction of 55 - 60%. There is indeterminate diastolic   function.    Right Ventricle: Right ventricle was not well visualized due to poor   acoustic window. Normal right ventricular cavity size. Systolic function   is normal.    Pulmonary Artery: The estimated pulmonary artery systolic pressure is   37 mmHg.    IVC/SVC: Normal venous pressure at 3 mmHg.     , EKG: Reviewed, and X-Ray: CXR: X-Ray Chest 1 View (CXR):   Results for orders placed or performed during the hospital encounter of 02/26/24   X-Ray Chest 1 View    Narrative    EXAMINATION:  XR CHEST 1 VIEW    CLINICAL HISTORY:  hypoxia;    TECHNIQUE:  Single frontal view of the chest was performed.    COMPARISON:  03/08/24    FINDINGS:  Diffuse pulmonary infiltrates similar to prior.  No large pleural effusion or pneumothorax.  Heart size is borderline enlarged.  Right chest port remains in position.  Moderate degenerative changes and moderate atherosclerotic disease.   In comparison to the prior study, there is no adverse interval changes      Impression    In comparison to the prior study, there is no adverse interval changes      Electronically signed by: Earl Mendez MD  Date:    03/11/2024  Time:    07:26    and X-Ray Chest PA and Lateral (CXR): No results found for this visit on 02/26/24.

## 2024-03-13 NOTE — SIGNIFICANT EVENT
Patient currently admitted for NSTEMI, acute systolic CHF exacerbation, severe aortic stenosis, hypoxia, SIRS, hypokalemia, hypomagnesemia, normocytic anemia, chronic kidney disease stage 3.    Nursing staff called as patient tachypneic, hypoxic into the 70s, and with agitation.  Patient had his oxygen off.  Oxygen was replaced, he is currently receiving a nebulizer treatment and O2 saturation 93%.  He is alert and oriented, tachycardic, coarse breath sounds bilaterally with respiratory distress, benign abdominal exam, and with peripheral edema present.    Impression/plan:  1.  Acute hypoxic respiratory failure   2. Acute systolic CHF exacerbation  3.  Severe aortic stenosis   4. Recent NSTEMI   5. Systemic inflammatory response syndrome  6. Tachycardia    -give Lasix 40 mg IV x1 dose stat  -Xopenex nebs q.6 hours, receiving inhalation treatment now  -check stat EKG  -stat chest x-ray  -stat labs to include CBC, CMP, magnesium level, troponin, and BNP

## 2024-03-13 NOTE — CONSULTS
SW meet with Patient and nephew at bedside to discuss hospice discharge plan. Patient was aware of Hospice discussion brought up by Hospital Medicine Attending. Patient was agreeable with Hospice services and voiced that his main goal was to be with son and daughter-in-law at home as long as possible.   SW provided brochures for Hospices companies in [facility], with IP Hospice units for respite care. Patient stated son-in-law works with Hospice agencies in [facility] and wishes to use that company if possible. Patient called son-in-law, Madden, and discovered he works for [facility] H[facility]ice company.   SW inquired about if Patient would be agreeable to [facility], Patient deferred to daughter-in-law, Italia, for decisions. SW stated she would call [place] and discuss DC options. Patient will need Respite care for the time when the family is moving to [place], [place]. SW messaged Attending to inquire about expected DC date for Patient. Attending stated Patient was medically stable for DC with Hospice services upon setting up Hospice. SW verbalized understanding.    SW called Italia, Patient's daughter in law, to inquire about moving/ home situation for family. Per Italia, they will not be moving to [place], [place], until next Friday, March 22nd. PEARL stated that we are hoping to discharge Patient from the hospital, sooner rather than later. PEARL stated that Patient could use Respite care, via Hospice services for 5 days and they could private pay for additional days if needed. Italia inquired about cost of private-pay respite care. PEARL stated she would check with hospice agencies for pricing. Italia stated she would talk to her  and give SW a call back regarding DC plan.     PEARL spoke to Rosey, with [facility] for update on Patient. Per Rosey, if Patient and family know they need Respite care for longer than 5 days, the rate would be $250 a day, with a week minimum stay. Rosey  stated that if Patient was able to go home, until the family moves, then family could do 5 day Respite, during the move and then take Patient home upon end of Respite care. Rosey stated that their Respite care rate is $350 a day. PEARL stated she would like Patient's daughter in  know when she called back.     14 09 SW spoke to patient's daughter in [place] [employer], and son, Bryant, over the phone to discuss Hospice. SW explained that Respite care is 5 days and if additional time in Respite is needed, it would be $325 a day. Patient's family verbalized understanding and inquired about additional options. PEARL stated that [facility] Liaison discussed possible DC home, using Respite (5 days) upon moving to [place], [place]. Patient's family requested to have about an hour to discuss with Patient's other children regarding DC plan. PEARL stated she would update Attending regarding Patient's DC plan. Patient's family verbalized understanding and will call Patient's family back in about an hour.     15 05 PEARL spoke to patient's daughter in [place] [employer], and son, Bryant, over the phone. Per Italia and Bryant they did wish for [facility] for Hospice services and requested SW call Patient's son in [place] [employer], to coordinate. SW verbalize understanding and confirmed [place]'s phone number.   PEARL then inquired about where Patient would DC upon release from the Hospital. Patient's family stated Patient would go to Respite care with [facility]. Patient's family stated they do not wish to move him multiple times. Patient's family said if they need to private pay for additional Respite days, they will pay to keep Patient comfortable. PEARL verbalized understanding.  PEARL called Patient's son in [place] [employer]. Per Madden, he is discussing with his superiors for Patient care. Patient's son in  will be at bedside by about 3:30 pm and PEARL stated she would meet with them at bedside to discuss DC plan.   PEARL spoke to  Rosey, with [facility] who stated she would get approval from their Director and have someone come complete hospice paperwork at bedside.     SW will continue to follow and assist as needed.

## 2024-03-14 VITALS
DIASTOLIC BLOOD PRESSURE: 65 MMHG | SYSTOLIC BLOOD PRESSURE: 111 MMHG | HEIGHT: 70 IN | RESPIRATION RATE: 16 BRPM | BODY MASS INDEX: 31.06 KG/M2 | WEIGHT: 216.94 LBS | TEMPERATURE: 98 F | HEART RATE: 100 BPM | OXYGEN SATURATION: 97 %

## 2024-03-14 PROBLEM — Z51.5 COMFORT MEASURES ONLY STATUS: Status: ACTIVE | Noted: 2024-03-14

## 2024-03-14 LAB
ALBUMIN SERPL BCP-MCNC: 2.7 G/DL (ref 3.5–5.2)
ALLENS TEST: ABNORMAL
ALP SERPL-CCNC: 95 U/L (ref 55–135)
ALT SERPL W/O P-5'-P-CCNC: 60 U/L (ref 10–44)
ANION GAP SERPL CALC-SCNC: 16 MMOL/L (ref 8–16)
AST SERPL-CCNC: 40 U/L (ref 10–40)
BACTERIA SPEC AEROBE CULT: NORMAL
BASOPHILS # BLD AUTO: 0.03 K/UL (ref 0–0.2)
BASOPHILS NFR BLD: 0.1 % (ref 0–1.9)
BILIRUB SERPL-MCNC: 0.8 MG/DL (ref 0.1–1)
BUN SERPL-MCNC: 54 MG/DL (ref 8–23)
CALCIUM SERPL-MCNC: 8.8 MG/DL (ref 8.7–10.5)
CHLORIDE SERPL-SCNC: 95 MMOL/L (ref 95–110)
CO2 SERPL-SCNC: 28 MMOL/L (ref 23–29)
CREAT SERPL-MCNC: 1.2 MG/DL (ref 0.5–1.4)
DELSYS: ABNORMAL
DIFFERENTIAL METHOD BLD: ABNORMAL
EOSINOPHIL # BLD AUTO: 0 K/UL (ref 0–0.5)
EOSINOPHIL NFR BLD: 0 % (ref 0–8)
ERYTHROCYTE [DISTWIDTH] IN BLOOD BY AUTOMATED COUNT: 15.9 % (ref 11.5–14.5)
EST. GFR  (NO RACE VARIABLE): 59 ML/MIN/1.73 M^2
FIO2: 60
FLOW: 30
GLUCOSE SERPL-MCNC: 228 MG/DL (ref 70–110)
GRAM STN SPEC: NORMAL
GRAM STN SPEC: NORMAL
HCO3 UR-SCNC: 31.6 MMOL/L (ref 24–28)
HCT VFR BLD AUTO: 31 % (ref 40–54)
HGB BLD-MCNC: 10.1 G/DL (ref 14–18)
IMM GRANULOCYTES # BLD AUTO: 0.17 K/UL (ref 0–0.04)
IMM GRANULOCYTES NFR BLD AUTO: 0.8 % (ref 0–0.5)
LYMPHOCYTES # BLD AUTO: 0.9 K/UL (ref 1–4.8)
LYMPHOCYTES NFR BLD: 4.2 % (ref 18–48)
MAGNESIUM SERPL-MCNC: 2.3 MG/DL (ref 1.6–2.6)
MCH RBC QN AUTO: 29.2 PG (ref 27–31)
MCHC RBC AUTO-ENTMCNC: 32.6 G/DL (ref 32–36)
MCV RBC AUTO: 90 FL (ref 82–98)
MODE: ABNORMAL
MONOCYTES # BLD AUTO: 0.6 K/UL (ref 0.3–1)
MONOCYTES NFR BLD: 2.8 % (ref 4–15)
NEUTROPHILS # BLD AUTO: 19.8 K/UL (ref 1.8–7.7)
NEUTROPHILS NFR BLD: 92.1 % (ref 38–73)
NRBC BLD-RTO: 0 /100 WBC
OHS QRS DURATION: 106 MS
OHS QTC CALCULATION: 490 MS
PCO2 BLDA: 44.2 MMHG (ref 35–45)
PH SMN: 7.46 [PH] (ref 7.35–7.45)
PLATELET # BLD AUTO: 302 K/UL (ref 150–450)
PMV BLD AUTO: 10.7 FL (ref 9.2–12.9)
PO2 BLDA: 64 MMHG (ref 80–100)
POC BE: 8 MMOL/L
POC SATURATED O2: 93 % (ref 95–100)
POCT GLUCOSE: 212 MG/DL (ref 70–110)
POCT GLUCOSE: 224 MG/DL (ref 70–110)
POCT GLUCOSE: 289 MG/DL (ref 70–110)
POTASSIUM SERPL-SCNC: 3.5 MMOL/L (ref 3.5–5.1)
PROT SERPL-MCNC: 6.7 G/DL (ref 6–8.4)
RBC # BLD AUTO: 3.46 M/UL (ref 4.6–6.2)
SAMPLE: ABNORMAL
SITE: ABNORMAL
SODIUM SERPL-SCNC: 139 MMOL/L (ref 136–145)
WBC # BLD AUTO: 21.49 K/UL (ref 3.9–12.7)

## 2024-03-14 PROCEDURE — 27100092 HC HIGH FLOW DELIVERY CANNULA: Mod: HCNC

## 2024-03-14 PROCEDURE — 80053 COMPREHEN METABOLIC PANEL: CPT | Mod: HCNC | Performed by: INTERNAL MEDICINE

## 2024-03-14 PROCEDURE — 99900035 HC TECH TIME PER 15 MIN (STAT): Mod: HCNC

## 2024-03-14 PROCEDURE — 27100171 HC OXYGEN HIGH FLOW UP TO 24 HOURS: Mod: HCNC

## 2024-03-14 PROCEDURE — 94761 N-INVAS EAR/PLS OXIMETRY MLT: CPT | Mod: HCNC,XB

## 2024-03-14 PROCEDURE — 25000003 PHARM REV CODE 250: Mod: HCNC | Performed by: NURSE PRACTITIONER

## 2024-03-14 PROCEDURE — 25000242 PHARM REV CODE 250 ALT 637 W/ HCPCS: Mod: HCNC | Performed by: INTERNAL MEDICINE

## 2024-03-14 PROCEDURE — 36600 WITHDRAWAL OF ARTERIAL BLOOD: CPT | Mod: HCNC

## 2024-03-14 PROCEDURE — 93010 ELECTROCARDIOGRAM REPORT: CPT | Mod: HCNC,,, | Performed by: INTERNAL MEDICINE

## 2024-03-14 PROCEDURE — 63600175 PHARM REV CODE 636 W HCPCS: Mod: HCNC | Performed by: HOSPITALIST

## 2024-03-14 PROCEDURE — 85025 COMPLETE CBC W/AUTO DIFF WBC: CPT | Mod: HCNC | Performed by: INTERNAL MEDICINE

## 2024-03-14 PROCEDURE — 25000003 PHARM REV CODE 250: Mod: HCNC | Performed by: INTERNAL MEDICINE

## 2024-03-14 PROCEDURE — 25000242 PHARM REV CODE 250 ALT 637 W/ HCPCS: Mod: HCNC | Performed by: SPECIALIST

## 2024-03-14 PROCEDURE — 94799 UNLISTED PULMONARY SVC/PX: CPT | Mod: HCNC,XB

## 2024-03-14 PROCEDURE — 63600175 PHARM REV CODE 636 W HCPCS: Mod: HCNC | Performed by: INTERNAL MEDICINE

## 2024-03-14 PROCEDURE — 94640 AIRWAY INHALATION TREATMENT: CPT | Mod: HCNC

## 2024-03-14 PROCEDURE — 36415 COLL VENOUS BLD VENIPUNCTURE: CPT | Mod: HCNC | Performed by: INTERNAL MEDICINE

## 2024-03-14 PROCEDURE — 27000249 HC VAPOTHERM CIRCUIT: Mod: HCNC

## 2024-03-14 PROCEDURE — 82803 BLOOD GASES ANY COMBINATION: CPT | Mod: HCNC

## 2024-03-14 PROCEDURE — 93005 ELECTROCARDIOGRAM TRACING: CPT | Mod: HCNC

## 2024-03-14 PROCEDURE — 83735 ASSAY OF MAGNESIUM: CPT | Mod: HCNC | Performed by: INTERNAL MEDICINE

## 2024-03-14 RX ORDER — DILTIAZEM HYDROCHLORIDE 5 MG/ML
10 INJECTION INTRAVENOUS ONCE
Status: COMPLETED | OUTPATIENT
Start: 2024-03-14 | End: 2024-03-14

## 2024-03-14 RX ORDER — LEVALBUTEROL INHALATION SOLUTION 0.63 MG/3ML
1.25 SOLUTION RESPIRATORY (INHALATION) ONCE
Status: COMPLETED | OUTPATIENT
Start: 2024-03-14 | End: 2024-03-14

## 2024-03-14 RX ADMIN — Medication 400 MG: at 09:03

## 2024-03-14 RX ADMIN — LEVALBUTEROL HYDROCHLORIDE 1.25 MG: 0.63 SOLUTION RESPIRATORY (INHALATION) at 04:03

## 2024-03-14 RX ADMIN — LEVALBUTEROL HYDROCHLORIDE 1.25 MG: 0.63 SOLUTION RESPIRATORY (INHALATION) at 07:03

## 2024-03-14 RX ADMIN — MIDODRINE HYDROCHLORIDE 10 MG: 5 TABLET ORAL at 11:03

## 2024-03-14 RX ADMIN — FLUTICASONE PROPIONATE 100 MCG: 50 SPRAY, METERED NASAL at 09:03

## 2024-03-14 RX ADMIN — INSULIN ASPART 4 UNITS: 100 INJECTION, SOLUTION INTRAVENOUS; SUBCUTANEOUS at 05:03

## 2024-03-14 RX ADMIN — DILTIAZEM HYDROCHLORIDE 10 MG: 5 INJECTION INTRAVENOUS at 03:03

## 2024-03-14 RX ADMIN — INSULIN DETEMIR 8 UNITS: 100 INJECTION, SOLUTION SUBCUTANEOUS at 09:03

## 2024-03-14 RX ADMIN — AMIODARONE HYDROCHLORIDE 0.5 MG/MIN: 1.8 INJECTION, SOLUTION INTRAVENOUS at 09:03

## 2024-03-14 RX ADMIN — INSULIN ASPART 4 UNITS: 100 INJECTION, SOLUTION INTRAVENOUS; SUBCUTANEOUS at 11:03

## 2024-03-14 RX ADMIN — FUROSEMIDE 20 MG: 10 INJECTION, SOLUTION INTRAMUSCULAR; INTRAVENOUS at 09:03

## 2024-03-14 RX ADMIN — IPRATROPIUM BROMIDE 0.5 MG: 0.5 SOLUTION RESPIRATORY (INHALATION) at 07:03

## 2024-03-14 RX ADMIN — AMIODARONE HYDROCHLORIDE 1 MG/MIN: 1.8 INJECTION, SOLUTION INTRAVENOUS at 04:03

## 2024-03-14 RX ADMIN — PREDNISONE 20 MG: 20 TABLET ORAL at 09:03

## 2024-03-14 RX ADMIN — MIDODRINE HYDROCHLORIDE 10 MG: 5 TABLET ORAL at 05:03

## 2024-03-14 RX ADMIN — AMIODARONE HYDROCHLORIDE 150 MG: 1.5 INJECTION, SOLUTION INTRAVENOUS at 04:03

## 2024-03-14 RX ADMIN — LACTOBACILLUS ACIDOPHILUS / LACTOBACILLUS BULGARICUS 1 EACH: 100 MILLION CFU STRENGTH GRANULES at 09:03

## 2024-03-14 RX ADMIN — ANASTROZOLE 1 MG: 1 TABLET, COATED ORAL at 09:03

## 2024-03-14 NOTE — PLAN OF CARE
[facility] - Telemetry (Hospital)  Discharge Final Note    Primary Care Provider: Tatyana Cannon MD    Expected Discharge Date: 3/14/2024    Final Discharge Note (most recent)       Final Note - 03/14/24 1133          Final Note    Assessment Type Final Discharge Note     Anticipated Discharge Disposition Hospice/Home        Post-Acute Status    Post-Acute Authorization Hospice     Hospice Status Set-up Complete/Auth obtained     Coverage [employer] Managed Medicare     Discharge Delays None known at this time                     Important Message from Medicare             DC Disposition: [facility], Home  Family Notified: Patient and daughter at bedside  Transportation: [facility], set up via [place]    Patient and family were agreeable to [facility] for home hospice services. Consents were signed with Basilio, with [facility] and DC today to [facility], with home hospice. Patient will go to [facility] for Respite care for family to complete move to [place], [place] and then Patient will transition home to [place], [place].  SW uploaded DC orders and AVS to [facility]. Per Basilio, she has ordered [facility] and is pending  time.

## 2024-03-14 NOTE — SIGNIFICANT EVENT
Patient currently admitted for NSTEMI, acute systolic CHF exacerbation, severe aortic stenosis, hypoxia, SIRS, hypokalemia, hypomagnesemia, normocytic anemia, chronic kidney disease stage 3. Patient is DNR.    Significant tachycardia with telemetry monitor 140-160s.  Systolic blood pressure in the 120s.  O2 saturation 98% on increased O2 via NC.  Give Cardizem 10 mg IV x1 dose stat.  Check EKG stat.  Check stat labs to include CBC, CMP, and magnesium level.    SBP now in the 90s (after cardizem 10 mg IV) and still with tachycardia, resp distress, and hypoxia. Order STAT ABG, CXR. Place patient on vapotherm. Give xopenex neb x 1 dose STAT. Pending EKG to determine next step in treatment.    Addendum 3/14/2024 at 0356: afib with RVR at 167 on EKG. BP unable to tolerate CCB or BB. Give amiodarone load STAT followed by infusion. Secure chat was sent to Cardiology NP Sandy Rodriguez regarding acute events.

## 2024-03-14 NOTE — PLAN OF CARE
Telemetry monitor removed and returned to monitor tech. PIV removed. Discharge instructions reviewed with patient and family.  Patient verbalizes understanding and voices no concerns. All personal belongings left with patient. Discharged via [facility] per stretcher.  Pt in no acute distress.  Pt going to hospice care facility.  Report called to Janine HOLLIDAY.

## 2024-03-14 NOTE — PT/OT/SLP PROGRESS
Physical Therapy      Patient Name:  Mark Dickerson Sr.   MRN:  [id number]    Chart review complete. Presented to pt's room at 1326. Patient not seen today secondary to paramedics present for pt d/c. No need to follow up.    Slime Garg, PT, DPT  3/14/2024   1326

## 2024-03-14 NOTE — DISCHARGE SUMMARY
"[facility] - Tele[facility] ([facility] Medicine  Discharge Summary      Patient Name: Mark Dickerson Sr.  MRN: [id number]  ERICKA: [id number]  Patient Class: IP- Inpatient  Admission Date: 2/26/2024  Hospital Length of Stay: 16 days  Discharge Date and Time:  03/14/2024 10:49 AM  Attending Physician: Judah Oden MD   Discharging Provider: Judah Oden MD  Primary Care Provider: Tatyana Cannon MD    Primary Care Team: [facility] MEDICINE A    HPI:   Mark Dickerson Sr. is a 84 y.o. male patient with a PMHx of metastatic breast cancer (on chemo), HLD, HTN, DM II, CKD who presents to the ED for evaluation of AMS which onset this morning PTA. Per daughter in law she visited the pt early this morning and states that he was normal for a short period before becoming extremely disoriented with jumbled speech.  Patient mental status returned to baseline piror to ed arrival.  Patient reports he feels "dehydrated", he also reports diarrhea earlier last week but this has since resolved.  Patient denies any fever, chills, N/V, SOB and chest pain.   In the ED, work up notable for WBC 14, Na 130, K 3.0, mag 1.5, , Trop 0.7.  EKG with SR PACs, ST and T wave abnormality. CT head negative for acute processes.  Chest xray no acute processes. Patient started on IVF and given zofran for nausea.  Patient will be admitted to observation for electrolyte imbalances, elevated trop.  Cards consulted.     Code Status DNR  Surrogate Decision maker daughter    Procedure(s) (LRB):  CATHETERIZATION, HEART, BOTH LEFT AND RIGHT (N/A)  Aortogram, Aortic Arch  Valve study-aortic  AORTOGRAM, ABDOMINAL (N/A)      Hospital Course:   Patient is currently admitted for NSTEMI, SIRS criteria, severe leukocytosis, hypokalemia, hypomagnesemia, chronic kidney disease stage 3, diabetes mellitus type 2, left breast cancer in a male patient, acute systolic CHF exacerbation, normocytic anemia, transaminitis, and severe aortic stenosis.  Consults on this " "admission include Cardiology, Hematology Oncology, Pulmonary, and Wound Care.  Echocardiogram 02/26/2024 with EF 55-60% with indeterminate diastolic function.  Right and left heart catheterization 03/01/2024 with elevated filling pressures, severe aortic stenosis,  RCA with collaterals, EF 30%.  Cardiology recommends continuing medical management for NSTEMI and will discuss outpatient TAVR evaluation for severe aortic stenosis.  Initiated IV Lasix diuresis for CHF exacerbation with evidence of volume overload.  Chest x-ray 03/03/2024 with interval worsening with moderate interstitial and alveolar opacities of bilateral lungs consistent with pulmonary edema.  BNP had trended upward and is currently 590-->repeat 505 on 3/6.  Hematology oncology has seen the patient in consultation and felt that leukocytosis is related to Neulasta and acute cardiac event. Patient did receive empiric IV Rocephin for 4 days 2/28-03/02/2024.  No localizing source of infection was identified.  COVID-19 negative (x2), influenza a/B negative, bcx were negative, urinalysis negative, lactic acid level 1.0, procalcitonin level 0.14.  CTA of chest 2/24/24 with no evidence of PE, pulmonary fibrosis versus CHF findings with recommendation for follow-up imaging.  Repeat chest x-ray 03/06/2024 with diffuse pulmonary infiltrates seen throughout lungs concerning for interstitial edema or interstitial pneumonia, no pleural effusions appreciated.  CT scan of chest without contrast 3/6  with "interval worsening from comparison CT with diffuse multifocal airspace opacification superimposed upon interstitial thickening, considerations are multifocal pneumonia, pulmonary edema, pulmonary hemorrhage; new small left pleural effusion." Pulmonary did see the patient in consultation and feels that abnormal CT scan of chest most likely related to fluid however okay with continued IV antibiotics for now.  Continue IV cefepime.  IV vancomycin was discontinued as " MRSA culture was negative.  Continue to replace electrolytes.  Patient with episodes of desaturation with hypoxia.  Continue O2 supplementation, continuous pulse oximetry monitoring, frequent incentive spirometry, Xopenex/Atrovent nebs q.6 hours, IV Solu-Medrol 60 mg q.6 hours (steroids started by Pulmonary on 03/08/2024).  IV Lasix diuresis had to be discontinued due to severe aortic stenosis and hypotension.  Will use p.r.n. Lasix and fluid restriction as per Cardiology.    3/12  4 L NC this AM, weaning as able. Continue cefepime, IV steroids. Lasix as needed. Not medically stable for discharge to SNF. Cardiology and Pulmonary following.     3/13  Now increased to 6 L NC.   No signficiant improvement in condition after several days of IV steroids, transitioned to PO  Restart IV diuretics  Discussed with patient and nephew at bedside, overall poor prognosis, DNR status  Plan for hospice when arraignments made    3/14  Overnight events reviewed, accelerated HR and started on amiodarone drip, placed on HFNC  Patient sitting in chair, reports feeling comfortable  Daughter at bedside who is POA, confirms DNR status and discharge plans for hospice     Goals of Care Treatment Preferences:  Code Status: DNR      Consults:   Consults (From admission, onward)          Status Ordering Provider     Inpatient consult to Social Work  Once        Provider:  (Not yet assigned)    Completed NIETO, SERAFIN     Inpatient consult to Pulmonology  Once        Provider:  Sandrita Hernandez MD    Completed MORALES DAVILA     Inpatient consult to Social Work  Once        Provider:  (Not yet assigned)    Completed NIETO, SERAFIN     Inpatient consult to Hematology/Oncology  Once        Provider:  Wayne Baugh MD    Acknowledged NIETO, SERAFIN     Inpatient consult to Cardiology  Once        Provider:  Rommel Cummins MD    Completed DIAN MAGALLANES            No new Assessment & Plan notes have been filed under this hospital  "service since the last note was generated.  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, estrogen receptor positive [C50.022, Z17.0] 12/21/2023 Not Applicable    Comfort measures only status [Z51.5] 03/14/2024 Not Applicable    Hypoxia [R09.02] 03/06/2024 Yes    Acute HFrEF (heart failure with reduced ejection fraction) [I50.21] 03/06/2024 Yes    Abnormal CT of the chest [R93.89] 03/06/2024 Yes    Acute diastolic CHF (congestive heart failure) [I50.31] 03/04/2024 No    Normocytic anemia [D64.9] 03/04/2024 Yes    Transaminitis [R74.01] 03/04/2024 Yes    Severe aortic stenosis [I35.0] 04/03/2022 Yes      Problems Resolved During this Admission:       Discharged Condition: poor    Disposition: Hospice/Medical Facility    Follow Up:    Patient Instructions:      WHEELCHAIR FOR HOME USE     Order Specific Question Answer Comments   Hours in W/C per day: 6    Type of Wheelchair: Standard    Size(Width): 18"(STD adult)    Leg Support: STD footrests    Lap Belt: Velcro    Accessories: Anti-tippers    Cushion: Basic    Reclining Back No    Height: 5' 10" (1.778 m)    Weight: 108.8 kg (239 lb 13.8 oz)    Does patient have medical equipment at home? cane, straight    Does patient have medical equipment at home? grab bar    Length of need (1-99 months): 99    Please check all that apply: Patient's upper body strength is sufficient for propulsion.    Please check all that " apply: Caregiver is capable and willing to operate wheelchair safely.    Please check all that apply: Patient mobility limitations cannot be sufficiently resolved by the use of other ambulatory therapies.        Significant Diagnostic Studies: Labs: All labs within the past 24 hours have been reviewed    Pending Diagnostic Studies:       None           Medications:  Reconciled Home Medications:      Medication List        CONTINUE taking these medications      anastrozole 1 mg Tab  Commonly known as: ARIMIDEX  Take 1 tablet (1 mg total) by mouth once daily.     atorvastatin 40 MG tablet  Commonly known as: LIPITOR  Take 1 tablet (40 mg total) by mouth once daily.     clotrimazole 1 % cream  Commonly known as: LOTRIMIN  Apply topically 2 (two) times daily.     * diphenhydrAMINE-aluminum-magnesium hydroxide-simethicone-LIDOcaine viscous HCl 2%  Swish and spit 15 mLs every 4 (four) hours as needed.     * magic mouthwash diphen/antac/lidoc  swish and spit 15 mLs every 4 (four) hours as needed.     fluorouraciL 5 % cream  Commonly known as: EFUDEX  APPLY TO THE SCALP TWICE A DAY FOR 4 WEEKS WILL CAUSE REDNESS AND IRRITATION     glipiZIDE 5 MG tablet  Commonly known as: GLUCOTROL  TAKE 1 TABLET BY MOUTH TWICE DAILY WITH MEALS     hydroCHLOROthiazide 25 MG tablet  Commonly known as: HYDRODIURIL  Take 1 tablet by mouth once daily     levocetirizine 5 MG tablet  Commonly known as: XYZAL  Take 1 tablet (5 mg total) by mouth every evening.     LIDOcaine-prilocaine cream  Commonly known as: EMLA  Apply topically as needed. Over port at least 30 minutes before treatment     multivitamin per tablet  Commonly known as: THERAGRAN  Take 1 tablet by mouth once daily.     ondansetron 4 MG Tbdl  Commonly known as: ZOFRAN-ODT  Take 1 tablet (4 mg total) by mouth every 8 (eight) hours as needed.     potassium chloride SA 20 MEQ tablet  Commonly known as: K-DUR,KLOR-CON  Take 1 tablet (20 mEq total) by mouth once daily.      prochlorperazine 5 MG tablet  Commonly known as: COMPAZINE  Take 1 tablet (5 mg total) by mouth every 6 (six) hours as needed for Nausea.     triamcinolone acetonide 0.025% 0.025 % cream  Commonly known as: KENALOG  Apply to the affected area twice daily           * This list has 2 medication(s) that are the same as other medications prescribed for you. Read the directions carefully, and ask your doctor or other care provider to review them with you.                  Indwelling Lines/Drains at time of discharge:   Lines/Drains/Airways       Central Venous Catheter Line  Duration                  PowerPort A Cath Single Lumen 01/05/24 1415 Internal Jugular Right 68 days                    Time spent on the discharge of patient: 40 minutes         Judah Oden MD  Department of Hospital Medicine  [facility] - Telemetry ([facility])

## 2024-03-14 NOTE — RESPIRATORY THERAPY
Comfort measures only orders placed. Pt to discharge with hospice and be removed from vapotherm per MD order by RN. RN called to verify this, will place pt on nasal cannula for comfort.

## 2024-03-20 ENCOUNTER — DOCUMENTATION ONLY (OUTPATIENT)
Dept: HEMATOLOGY/ONCOLOGY | Facility: CLINIC | Age: 84
End: 2024-03-20
Payer: MEDICARE

## 2024-03-20 NOTE — PROGRESS NOTES
PEARL attempted to apply for the [employer] today as it opened today, but received the following message:At this time, we are not able to accept new applications. The application will open on Wednesday, May 15, 2024 at 12:00 p.m. (noon) Eastern Time. You must start your application before 12:10 pm Eastern Time. PEARL will try again at that time.

## 2024-03-21 ENCOUNTER — TELEPHONE (OUTPATIENT)
Dept: FAMILY MEDICINE | Facility: CLINIC | Age: 84
End: 2024-03-21
Payer: MEDICARE

## 2024-03-21 NOTE — TELEPHONE ENCOUNTER
----- Message from Shweta Ray sent at 3/20/2024  4:40 PM CDT -----  Contact: Hope/ Daughter  The pt daughter is calling to informed that  is now .

## (undated) DEVICE — Device

## (undated) DEVICE — KIT SITE-RITE NDL GUIDE 21G

## (undated) DEVICE — SHEATH INTRODUCER 7FR 11CM

## (undated) DEVICE — ANGIOTOUCH KIT

## (undated) DEVICE — CATH PIG145 LANGSTON 6FR 110CM

## (undated) DEVICE — SHEATH INTRODUCER 5FR 10CM

## (undated) DEVICE — OMNIPAQUE 300MG 150ML VIAL

## (undated) DEVICE — GUIDEWIRE STD .035X260CM ANG

## (undated) DEVICE — SHEATH INTRODUCER 6FR 11CM

## (undated) DEVICE — PACK HEART CATH BR

## (undated) DEVICE — CATH SWAN GANZ 7FR 110CM

## (undated) DEVICE — CATH ANGIO 5FR AR1

## (undated) DEVICE — CATH INFINITI MULTIPAK JR4 5FR

## (undated) DEVICE — KIT SYR REUSABLE

## (undated) DEVICE — CATH JR4 5FR

## (undated) DEVICE — CATH PIG145 INFINITI 5X110CM

## (undated) DEVICE — CATH AL2 5FR

## (undated) DEVICE — CATH INFINITI 4F 3DRC 100CM

## (undated) DEVICE — CATH JL4 5FR

## (undated) DEVICE — GUIDEWIRE EMERALD .035IN 260CM

## (undated) DEVICE — CONTRAST VISIPAQUE 150ML